# Patient Record
Sex: FEMALE | Race: WHITE | NOT HISPANIC OR LATINO | Employment: FULL TIME | URBAN - METROPOLITAN AREA
[De-identification: names, ages, dates, MRNs, and addresses within clinical notes are randomized per-mention and may not be internally consistent; named-entity substitution may affect disease eponyms.]

---

## 2017-01-26 ENCOUNTER — DOCUMENTATION (OUTPATIENT)
Dept: PHYSICAL THERAPY | Facility: HOSPITAL | Age: 57
End: 2017-01-26

## 2017-01-26 ENCOUNTER — OFFICE VISIT (OUTPATIENT)
Dept: SURGERY | Facility: CLINIC | Age: 57
End: 2017-01-26

## 2017-01-26 VITALS
HEART RATE: 67 BPM | WEIGHT: 274.9 LBS | HEIGHT: 67 IN | DIASTOLIC BLOOD PRESSURE: 80 MMHG | SYSTOLIC BLOOD PRESSURE: 140 MMHG | OXYGEN SATURATION: 98 % | BODY MASS INDEX: 43.15 KG/M2

## 2017-01-26 DIAGNOSIS — R10.33 PERIUMBILICAL ABDOMINAL PAIN: Primary | ICD-10-CM

## 2017-01-26 PROCEDURE — 99213 OFFICE O/P EST LOW 20 MIN: CPT | Performed by: SURGERY

## 2017-01-26 RX ORDER — CARVEDILOL 3.12 MG/1
TABLET ORAL
Refills: 11 | COMMUNITY
Start: 2016-12-31 | End: 2017-10-02 | Stop reason: SINTOL

## 2017-01-26 RX ORDER — LEVOCETIRIZINE DIHYDROCHLORIDE 5 MG/1
TABLET, FILM COATED ORAL
Refills: 11 | COMMUNITY
Start: 2016-12-31 | End: 2017-10-02

## 2017-01-26 RX ORDER — MOMETASONE FUROATE AND FORMOTEROL FUMARATE DIHYDRATE 200; 5 UG/1; UG/1
AEROSOL RESPIRATORY (INHALATION)
Refills: 0 | COMMUNITY
Start: 2016-11-28 | End: 2017-10-02

## 2017-01-26 RX ORDER — MONTELUKAST SODIUM 10 MG/1
TABLET ORAL
Refills: 11 | COMMUNITY
Start: 2016-12-01 | End: 2017-10-02

## 2017-01-26 NOTE — LETTER
January 26, 2017     Francisca Gillette MD    Patient: Mariya Campos   YOB: 1960   Date of Visit: 1/26/2017       Dear Dr. Nain MD:    Thank you for referring Mariya Campos to me for evaluation. Below are the relevant portions of my assessment and plan of care.        Mariya Campos is a pleasant 56 y.o. female presenting with periumbilical abdominal pain and asymmetry.  At this time, I am unable to appreciate any incisional hernias.  I have discussed with the patient if she feels something that she can push in or out to please follow back up.  Patient will follow-up as needed.    Tri Guzman MD  General, Minimally Invasive and Endoscopic Surgery  [facility]    [address], [address]  [place], [place], [address]  P: [phone]  F: [phone]    Cc:  Francisca Gillette MD                If you have questions, please do not hesitate to call me. I look forward to following Mariya along with you.         Sincerely,        Tri Guzman MD        CC: No Recipients

## 2017-01-26 NOTE — MR AVS SNAPSHOT
Mariya Campos   1/26/2017 8:50 AM   Office Visit    Dept Phone:  [phone]   Encounter #:  [id number]    Provider:  Tri Guzman MD   Department:  [facility] GENERAL SURGERY                Your Full Care Plan              Today's Medication Changes          These changes are accurate as of: 1/26/17  9:56 AM.  If you have any questions, ask your nurse or doctor.               Stop taking medication(s)listed here:     B-12 PO   Stopped by:  Tri Guzman MD           Fluticasone Furoate-Vilanterol 200-25 MCG/INH aerosol powder    Stopped by:  Tri Guzman MD                      Your Updated Medication List          This list is accurate as of: 1/26/17  9:56 AM.  Always use your most recent med list.                CALCIUM 600 PO       carvedilol 3.125 MG tablet   Commonly known as:  COREG       DULERA 200-5 MCG/ACT inhaler   Generic drug:  mometasone-formoterol       Iron tablet       levocetirizine 5 MG tablet   Commonly known as:  XYZAL       montelukast 10 MG tablet   Commonly known as:  SINGULAIR       PROAIR  (90 BASE) MCG/ACT inhaler   Generic drug:  albuterol               Instructions     None    Patient Instructions History      Upcoming Appointments     Visit Type Date Time Department    OFFICE VISIT 1/26/2017  8:50 AM [facility]      [place] Signup     Our records indicate that you have an active [facility] Getable account.    You can view your After Visit Summary by going to [employer] and logging in with your Getable username and password.  If you don't have a Getable username and password but a parent or guardian has access to your record, the parent or guardian should login with their own Getable username and password and access your record to view the After Visit Summary.    If you have questions, you can email [email or web address] or call [phone] to talk to our Getable staff.   "Remember, MyChart is NOT to be used for urgent needs.  For medical emergencies, dial 911.               Other Info from Your Visit           Allergies     Fish-derived Products  Anaphylaxis    Shellfish-derived Products  Anaphylaxis      Reason for Visit     Hernia ventral      Vital Signs     Blood Pressure Pulse Height Weight Oxygen Saturation Body Mass Index    140/80 67 67\" (170.2 cm) 274 lb 14.4 oz (125 kg) 98% 43.06 kg/m2    Smoking Status                   Never Smoker             "

## 2017-01-26 NOTE — PROGRESS NOTES
Subjective   Hernia    History of Present Illness    Mariya Campos is a 56 y.o. female is being seen in consultation today for incisional periumbilical pain described as a dull aching sensation.  Patient first noticed this about 2-3 weeks ago.  She noticed some asymmetry in bulging off to the right of the umbilicus that disappears when she lays down.  Patient reports she has gained 25 pounds however.  Patient is unable to push anything in or out.  Patient denies fever, chills, nausea or vomiting.  Patient had not had this in the past.    Past Medical History   Diagnosis Date   • Allergic rhinitis    • Anemia    • Asthma    • Chronic bronchitis    • Chronic kidney disease    • Colon cancer    • Hernia    • Hiatal hernia    • Hypertension    • Incisional hernia    • Overweight    • Peripheral neuropathy    • Rosacea    • Shoulder pain, left      PT STATES MIGHT BE TORN ROTATOR CUFF   • Visit for screening mammogram      Past Surgical History   Procedure Laterality Date   • Colonoscopy       Complete Colonoscopy   • Colectomy partial / total       Partial Colectomy    • Ventral hernia repair N/A 3/15/2016     Procedure: LAPAROSCOPIC INSIONAL HERNIA REPAIR W/ MESH DAVINCI ROBOT;  Surgeon: Tri uGzman MD;  Location: [facility] OR;  Service:    • Colon surgery       XS 2   • Joint manipulation Left 4/18/2016     Procedure: MANIPULATION OF LT SHOULDER;  Surgeon: Lidia Ugalde MD;  Location: [facility];  Service:    • Shoulder surgery     • Breast biopsy       Family History   Problem Relation Age of Onset   • Cancer Mother      Breast Cancer   • Breast cancer Mother    • Cancer Father      Colon Cancer   • Cancer Maternal Grandmother      Breast Cancer   • Hypertension Other    • Cancer Other      Liver Cancer   • Other Other      Varicose veins of lower extremities     Social History   Substance Use Topics   • Smoking status: Never Smoker   • Smokeless tobacco: Never Used   • Alcohol use Yes       Comment: Social drinker         Current Outpatient Prescriptions:   •  Calcium Carbonate (CALCIUM 600 PO), Take 600 mg by mouth daily., Disp: , Rfl:   •  carvedilol (COREG) 3.125 MG tablet, TK 1 T PO BID, Disp: , Rfl: 11  •  DULERA 200-5 MCG/ACT inhaler, INHALE 2 PUFFS PO BID, Disp: , Rfl: 0  •  Iron tablet, Take  by mouth., Disp: , Rfl:   •  levocetirizine (XYZAL) 5 MG tablet, TK 1 T PO QD, Disp: , Rfl: 11  •  montelukast (SINGULAIR) 10 MG tablet, TK 1 T PO D, Disp: , Rfl: 11  •  PROAIR  (90 BASE) MCG/ACT inhaler, 1 puff every 4 (four) hours as needed., Disp: , Rfl: 2      Review of Systems   Constitutional: Positive for activity change and fatigue.   HENT: Positive for congestion, postnasal drip and sinus pressure.    Respiratory: Positive for cough.    Gastrointestinal: Positive for abdominal distention.   Genitourinary: Positive for frequency.   All other systems reviewed and are negative.      Objective   Physical Exam    General: Alert and oriented x3 in no acute distress  HEENT: Normal cephalic, atraumatic, PERRLA, EOMI, sclera anicteric, moist mucous membranes, neck is supple, no JVD, no carotid bruits, no thyromegaly no adenopathy  Chest: CTA and percussion  CVA: RRR, normal S1-S2, no murmurs, no gallops or rubs  Abdomen: Positive BS, soft, nondistended, nontender, no rebound, no guarding, no organomegaly and no masses.  I have examined the patient in the sitting as well as supine position and I am unable to appreciate any hernial defects.  Extremities: Full range of motion, no clubbing, no cyanosis or edema  Neurovascular: Grossly intact    Assessment/Plan     Mariya Campos is a pleasant 56 y.o. female presenting with periumbilical abdominal pain and asymmetry.  At this time, I am unable to appreciate any incisional hernias.  I have discussed with the patient if she feels something that she can push in or out to please follow back up.  Patient will follow-up as needed.    Tri WEST  MD Megan  General, Minimally Invasive and Endoscopic Surgery  [facility] [place]    [address], [address]  [place], [place], [address]  P: [phone]  F: [phone]    Cc:  Francisca Gillette MD

## 2017-01-26 NOTE — THERAPY DISCHARGE NOTE
Outpatient Physical Therapy Ortho Treatment Note/Discharge Summary       Patient Name: Mariya Campos  : 1960  MRN: [id number]  Today's Date: 2017      Visit Date: 2017    Visit Dx:  No diagnosis found.    Patient Active Problem List   Diagnosis   • Atopic rhinitis   • Fatigue   • Abnormal mammogram   • Adiposity   • Shoulder, capsulitis, adhesive   • Abnormal ECG   • Abnormal creatinine clearance glomerular filtration   • Anemia   • Recurrent UTI   • Pain   • Bursitis/tendonitis, shoulder   • Osteopenia        Past Medical History   Diagnosis Date   • Allergic rhinitis    • Anemia    • Asthma    • Chronic bronchitis    • Chronic kidney disease    • Colon cancer    • Hernia    • Hiatal hernia    • Hypertension    • Incisional hernia    • Overweight    • Peripheral neuropathy    • Rosacea    • Shoulder pain, left      PT STATES MIGHT BE TORN ROTATOR CUFF   • Visit for screening mammogram         Past Surgical History   Procedure Laterality Date   • Colonoscopy       Complete Colonoscopy   • Colectomy partial / total       Partial Colectomy    • Ventral hernia repair N/A 3/15/2016     Procedure: LAPAROSCOPIC INSIONAL HERNIA REPAIR W/ MESH DAVINCI ROBOT;  Surgeon: Tri Guzman MD;  Location: [facility] MAIN OR;  Service:    • Colon surgery       XS 2   • Joint manipulation Left 2016     Procedure: MANIPULATION OF LT SHOULDER;  Surgeon: Lidia Ugalde MD;  Location: [facility] [place] [facility];  Service:    • Shoulder surgery     • Breast biopsy                                                          PT OP Goals       17 1000          PT Short Term Goals    STG Date to Achieve 16  -      STG 1 Pt to be independent with HEP for initial mobility ex and posture.  -WS      STG 1 Progress Met  -WS      STG 2 Pt to elevate left UE to 130 deg actively without compensation.  -WS      STG 2 Progress Met  -WS      STG 3 Pt to report at least 4-6 hours of sleep without interruption and pain  level 2 or less.  -      STG 3 Progress Not Met  -WS      Long Term Goals    LTG Date to Achieve 05/19/16  -WS      LTG 1 Pt will be ind and compliant with advanced HEP for self management of symptoms.  -WS      LTG 1 Progress Partially Met  -WS      LTG 2 Pt will have active shoulder elevation to at least 155 deg without compensation fo return to overhead activities.  -WS      LTG 2 Progress Met  -WS      LTG 3 Pt to have strength of shoulder girdle 4/5 for return to normal use of UE.  -WS      LTG 3 Progress Met  -WS      LTG 4 Pt to be able to fasten bra or tuck shirt in behind her back.  -      LTG 4 Progress Partially Met  -WS        User Key  (r) = Recorded By, (t) = Taken By, (c) = Cosigned By    Initials Name Provider Type    NUBIA Roberts PTA Physical Therapy Assistant                    Time Calculation:                  OP PT Discharge Summary  Outcomes Achieved: Patient able to partially acheive established goals  Discharge Destination: Home with home program  Discharge Instructions: Patient was seen for 22 visits from 4/19/16 to 6/27/16 consisting of therapeutic exercise, manual therapy and modalilities as needed s/p left shoulder manipulation. Patient was last seen 6/27/16 and was independent with home exercise program and follow up with surgeon. Patient was discharged to continue work on rom/strength.       Yury Roberts PTA  1/26/2017

## 2017-09-27 DIAGNOSIS — Z85.038 PERSONAL HISTORY OF COLON CANCER: Primary | ICD-10-CM

## 2017-10-02 ENCOUNTER — OFFICE VISIT (OUTPATIENT)
Dept: INTERNAL MEDICINE | Facility: CLINIC | Age: 57
End: 2017-10-02

## 2017-10-02 ENCOUNTER — TRANSCRIBE ORDERS (OUTPATIENT)
Dept: ADMINISTRATIVE | Facility: HOSPITAL | Age: 57
End: 2017-10-02

## 2017-10-02 VITALS
SYSTOLIC BLOOD PRESSURE: 135 MMHG | BODY MASS INDEX: 43.15 KG/M2 | OXYGEN SATURATION: 98 % | WEIGHT: 274.9 LBS | TEMPERATURE: 98.2 F | HEIGHT: 67 IN | HEART RATE: 87 BPM | DIASTOLIC BLOOD PRESSURE: 82 MMHG

## 2017-10-02 DIAGNOSIS — Z85.038 HISTORY OF COLON CANCER: ICD-10-CM

## 2017-10-02 DIAGNOSIS — Z12.31 VISIT FOR SCREENING MAMMOGRAM: Primary | ICD-10-CM

## 2017-10-02 DIAGNOSIS — R53.83 FATIGUE, UNSPECIFIED TYPE: ICD-10-CM

## 2017-10-02 DIAGNOSIS — Z12.39 SCREENING FOR BREAST CANCER: ICD-10-CM

## 2017-10-02 DIAGNOSIS — N18.30 CRF (CHRONIC RENAL FAILURE), STAGE 3 (MODERATE) (HCC): ICD-10-CM

## 2017-10-02 DIAGNOSIS — Z11.59 NEED FOR HEPATITIS C SCREENING TEST: ICD-10-CM

## 2017-10-02 DIAGNOSIS — I10 ESSENTIAL HYPERTENSION: Primary | ICD-10-CM

## 2017-10-02 PROCEDURE — 90471 IMMUNIZATION ADMIN: CPT | Performed by: FAMILY MEDICINE

## 2017-10-02 PROCEDURE — 90732 PPSV23 VACC 2 YRS+ SUBQ/IM: CPT | Performed by: FAMILY MEDICINE

## 2017-10-02 PROCEDURE — 99214 OFFICE O/P EST MOD 30 MIN: CPT | Performed by: FAMILY MEDICINE

## 2017-10-02 RX ORDER — DILTIAZEM HYDROCHLORIDE 300 MG/1
CAPSULE, COATED, EXTENDED RELEASE ORAL
Refills: 11 | COMMUNITY
Start: 2017-09-12 | End: 2018-01-26

## 2017-10-02 RX ORDER — MONTELUKAST SODIUM 10 MG/1
10 TABLET ORAL NIGHTLY
Qty: 90 TABLET | Refills: 1 | Status: SHIPPED | OUTPATIENT
Start: 2017-10-02 | End: 2018-10-16

## 2017-10-02 NOTE — PROGRESS NOTES
Subjective   Mariya Campos is a 56 y.o. female.   Chief Complaint   Patient presents with   • Fatigue   • Anemia   • Hypertension   • Allergic Rhinitis       History of Present Illness     This is a new patient in our office.    #1 hypertension-on diltiazem at 300 mg a day.  Patient takes it everyday.  She has no side effects.  She has no chest pain, no shortness of breath, no dizziness, no palpitations.  She has LE edema on and off and she takes furosemide 40 mg as needed.  She takes it on average once or twice a week.  It is managed by her nephrologist.  She has chronic kidney failure.  Baseline creatinine about 1.8.     #2 fatigue-started 1-2 years ago.  Patient had anemia.  She was treated with iron once a day but had problems with constipation.  After 6 months her blood count was not much better.  She had colonoscopy in 2016 and is in a process of scheduling it for this year.  She has history of colon cancer.  She was diagnosed in 2006.  She was treated with colonoscopy and chemotherapy.  She requires colonoscopy annually because of the polyps.  Last colonoscopy was in 2016.  She also requires CEA annually.  She sleeps about 8 hours at night.  She wakes up tired.  She snores.  She does not feel depressed.    #3 asthma-diagnosed in childhood.  Patient uses albuterol as needed only.  She uses it on average once a week in summer, less frequently in other months.  She is on no maintenance medications.  She was treated a few times in urgent care within last year and required oral prednisone.  Currently she reports dry cough for 3 or 4 weeks.  No wheezing, no shortness of breath.  She is on no medications for it.  She has postnasal drainage which is clear.  She has to clear her throat all the time.  She is on no medications for AR.    Patient is requesting order for mammogram.  She is due.    The following portions of the patient's history were reviewed and updated as appropriate: allergies, current medications,  past family history, past medical history, past social history, past surgical history and problem list.    Review of Systems   Constitutional: Negative.    HENT: Positive for postnasal drip and rhinorrhea.    Respiratory: Positive for cough. Negative for shortness of breath and wheezing.    Cardiovascular: Negative.    Psychiatric/Behavioral: Negative.          Objective   Wt Readings from Last 3 Encounters:   10/02/17 274 lb 14.4 oz (125 kg)   09/12/17 270 lb (122 kg)   07/31/17 265 lb (120 kg)      Vitals:    10/02/17 0809   BP: 135/82   Pulse: 87   Temp: 98.2 °F (36.8 °C)   SpO2: 98%     Temp Readings from Last 3 Encounters:   10/02/17 98.2 °F (36.8 °C)   09/12/17 98.3 °F (36.8 °C) (Tympanic)   07/31/17 98.8 °F (37.1 °C) (Tympanic)     BP Readings from Last 3 Encounters:   10/02/17 135/82   09/12/17 154/95   07/31/17 146/94     Pulse Readings from Last 3 Encounters:   10/02/17 87   09/12/17 86   07/31/17 97       Physical Exam   Constitutional: She is oriented to person, place, and time. She appears well-developed and well-nourished.   HENT:   Head: Normocephalic and atraumatic.   Neck: Neck supple. Carotid bruit is not present. No thyromegaly present.   Cardiovascular: Normal rate, regular rhythm and normal heart sounds.    Pulmonary/Chest: Effort normal and breath sounds normal.   Neurological: She is alert and oriented to person, place, and time.   Skin: Skin is warm, dry and intact.   Psychiatric: She has a normal mood and affect. Her behavior is normal.       Assessment/Plan   Mariya was seen today for fatigue, anemia, hypertension and allergic rhinitis.    Diagnoses and all orders for this visit:    Essential hypertension  -     CBC (No Diff)  -     Comprehensive Metabolic Panel  -     Lipid Panel With LDL / HDL Ratio  -     Thyroid Cascade Profile  -     Vitamin D 25 Hydroxy  -     Iron Profile  -     Vitamin B12 & Folate    CRF (chronic renal failure), stage 3 (moderate)  -     CBC (No Diff)  -      Comprehensive Metabolic Panel  -     Lipid Panel With LDL / HDL Ratio  -     Thyroid Cascade Profile  -     Vitamin D 25 Hydroxy  -     Iron Profile  -     Vitamin B12 & Folate    Fatigue, unspecified type  -     Ambulatory Referral to Sleep Medicine  -     CBC (No Diff)  -     Comprehensive Metabolic Panel  -     Lipid Panel With LDL / HDL Ratio  -     Thyroid Cascade Profile  -     Vitamin D 25 Hydroxy  -     Iron Profile  -     Vitamin B12 & Folate    History of colon cancer  -     CEA    Screening for breast cancer  -     Mammo Screening Bilateral With CAD; Future    Need for hepatitis C screening test  -     Hepatitis C Antibody    Other orders  -     montelukast (SINGULAIR) 10 MG tablet; Take 1 tablet by mouth Every Night.        #1 fatigue-new and uncontrolled problem.  We are checking blood count, iron, B12 and folic acid.  I'm referring patient to sleep specialist.  Further recommendations depending on test results.      #2 anemia-check CBC.      #3 asthma-uncontrolled.  Start singular.  Treat allergic rhinitis.  If cough does not resolve in one month, pllease return to the office.  She is getting Pneumovax today.  She will get flu vaccine at work next week.    #4 allergic rhinitis-uncontrolled.  Start Flonase daily.  Normal saline drops to prevent dryness.    #5 history of colon cancer-we are checking CEA.  Patient is scheduling office visit with Dr. Guzman for colonoscopy.

## 2017-10-03 LAB
25(OH)D3+25(OH)D2 SERPL-MCNC: 36.2 NG/ML (ref 30–100)
ALBUMIN SERPL-MCNC: 4.5 G/DL (ref 3.5–5.2)
ALBUMIN/GLOB SERPL: 1.5 G/DL
ALP SERPL-CCNC: 93 U/L (ref 39–117)
ALT SERPL-CCNC: 9 U/L (ref 1–33)
AST SERPL-CCNC: 9 U/L (ref 1–32)
BILIRUB SERPL-MCNC: 0.3 MG/DL (ref 0.1–1.2)
BUN SERPL-MCNC: 24 MG/DL (ref 6–20)
BUN/CREAT SERPL: 15.6 (ref 7–25)
CALCIUM SERPL-MCNC: 9.7 MG/DL (ref 8.6–10.5)
CEA SERPL-MCNC: 3.71 NG/ML
CHLORIDE SERPL-SCNC: 99 MMOL/L (ref 98–107)
CHOLEST SERPL-MCNC: 200 MG/DL (ref 0–200)
CO2 SERPL-SCNC: 27.3 MMOL/L (ref 22–29)
CREAT SERPL-MCNC: 1.54 MG/DL (ref 0.57–1)
ERYTHROCYTE [DISTWIDTH] IN BLOOD BY AUTOMATED COUNT: 14.9 % (ref 11.7–13)
FOLATE SERPL-MCNC: 10.23 NG/ML (ref 4.78–24.2)
GLOBULIN SER CALC-MCNC: 3.1 GM/DL
GLUCOSE SERPL-MCNC: 95 MG/DL (ref 65–99)
HCT VFR BLD AUTO: 37.9 % (ref 35.6–45.5)
HCV AB S/CO SERPL IA: <0.1 S/CO RATIO (ref 0–0.9)
HDLC SERPL-MCNC: 50 MG/DL (ref 40–60)
HGB BLD-MCNC: 11.8 G/DL (ref 11.9–15.5)
IRON SATN MFR SERPL: 16 % (ref 20–50)
IRON SERPL-MCNC: 61 MCG/DL (ref 37–145)
LDLC SERPL CALC-MCNC: 123 MG/DL (ref 0–100)
LDLC/HDLC SERPL: 2.46 {RATIO}
MCH RBC QN AUTO: 28.2 PG (ref 26.9–32)
MCHC RBC AUTO-ENTMCNC: 31.1 G/DL (ref 32.4–36.3)
MCV RBC AUTO: 90.7 FL (ref 80.5–98.2)
PLATELET # BLD AUTO: 347 10*3/MM3 (ref 140–500)
POTASSIUM SERPL-SCNC: 4.7 MMOL/L (ref 3.5–5.2)
PROT SERPL-MCNC: 7.6 G/DL (ref 6–8.5)
RBC # BLD AUTO: 4.18 10*6/MM3 (ref 3.9–5.2)
SODIUM SERPL-SCNC: 141 MMOL/L (ref 136–145)
TIBC SERPL-MCNC: 381 MCG/DL
TRIGL SERPL-MCNC: 134 MG/DL (ref 0–150)
TSH SERPL DL<=0.005 MIU/L-ACNC: 1.85 UIU/ML (ref 0.45–4.5)
UIBC SERPL-MCNC: 320 MCG/DL
VIT B12 SERPL-MCNC: 567 PG/ML (ref 211–946)
VLDLC SERPL CALC-MCNC: 26.8 MG/DL (ref 5–40)
WBC # BLD AUTO: 5.85 10*3/MM3 (ref 4.5–10.7)

## 2017-10-17 ENCOUNTER — HOSPITAL ENCOUNTER (OUTPATIENT)
Dept: MAMMOGRAPHY | Facility: HOSPITAL | Age: 57
Discharge: HOME | End: 2017-10-17
Admitting: FAMILY MEDICINE

## 2017-10-17 DIAGNOSIS — Z12.31 VISIT FOR SCREENING MAMMOGRAM: ICD-10-CM

## 2017-10-17 PROCEDURE — 77063 BREAST TOMOSYNTHESIS BI: CPT

## 2017-10-17 PROCEDURE — G0202 SCR MAMMO BI INCL CAD: HCPCS

## 2017-12-18 ENCOUNTER — OFFICE VISIT (OUTPATIENT)
Dept: INTERNAL MEDICINE | Facility: CLINIC | Age: 57
End: 2017-12-18

## 2017-12-18 VITALS
OXYGEN SATURATION: 98 % | HEIGHT: 68 IN | BODY MASS INDEX: 41.22 KG/M2 | HEART RATE: 76 BPM | SYSTOLIC BLOOD PRESSURE: 138 MMHG | TEMPERATURE: 98.4 F | DIASTOLIC BLOOD PRESSURE: 80 MMHG | WEIGHT: 272 LBS

## 2017-12-18 DIAGNOSIS — K45.8 RECURRENT ABDOMINAL HERNIA WITHOUT OBSTRUCTION OR GANGRENE, UNSPECIFIED HERNIA TYPE: ICD-10-CM

## 2017-12-18 DIAGNOSIS — J45.41 MODERATE PERSISTENT ASTHMA WITH ACUTE EXACERBATION: Primary | ICD-10-CM

## 2017-12-18 PROBLEM — K43.2 INCISIONAL HERNIA: Status: ACTIVE | Noted: 2017-12-18

## 2017-12-18 PROBLEM — J45.40 MODERATE PERSISTENT ASTHMA WITHOUT COMPLICATION: Status: ACTIVE | Noted: 2017-12-18

## 2017-12-18 PROCEDURE — 99213 OFFICE O/P EST LOW 20 MIN: CPT | Performed by: FAMILY MEDICINE

## 2017-12-18 NOTE — PROGRESS NOTES
Subjective   Mariya Campos is a 57 y.o. female.   Chief Complaint   Patient presents with   • Cough     for one month   • possible hernia       History of Present Illness     #1 Cough-started about 10 days ago, after walking in a cold weather.  It is a productive cough with/yellow discharge.  No other symptoms associated.  No sore throat, no fever, no chills.  Patient has mild congestion.  No wheezing.  No shortness of breath.  She has asthma.  She is currently on Brio.  She takes it everyday as prescribed.  It was changed 2 weeks ago from Dulera because it was not covered anymore.  Patient sees allergist Dr. Gross.    #2 Hernia-Patient has a history of hernia surgery.  During her coughing spells she notices a hard left sided bulge in her left lower abdomen.  Worse with cough.  Resolves after coughing spells.  No other symptoms associated.    The following portions of the patient's history were reviewed and updated as appropriate: allergies, current medications, past family history, past medical history, past social history, past surgical history and problem list.    Review of Systems   Constitutional: Negative for chills and fever.   HENT: Positive for congestion and postnasal drip. Negative for ear pain, rhinorrhea and sore throat.    Respiratory: Positive for cough. Negative for shortness of breath and wheezing.    Cardiovascular: Negative for chest pain.   Musculoskeletal: Negative for myalgias.   Skin: Negative for rash.   Neurological: Negative for headaches.         Objective   Wt Readings from Last 3 Encounters:   12/18/17 123 kg (272 lb)   11/03/17 122 kg (270 lb)   10/02/17 125 kg (274 lb 14.4 oz)      Vitals:    12/18/17 0920   BP: 138/80   Pulse: 76   Temp: 98.4 °F (36.9 °C)   SpO2: 98%     Temp Readings from Last 3 Encounters:   12/18/17 98.4 °F (36.9 °C)   11/03/17 98.9 °F (37.2 °C) (Oral)   10/02/17 98.2 °F (36.8 °C)     BP Readings from Last 3 Encounters:   12/18/17 138/80   11/03/17 136/81    10/02/17 135/82     Pulse Readings from Last 3 Encounters:   12/18/17 76   11/03/17 86   10/02/17 87       Physical Exam   Constitutional: She is oriented to person, place, and time. She appears well-developed and well-nourished.   HENT:   Head: Normocephalic and atraumatic.   Neck: Neck supple. Carotid bruit is not present.   Cardiovascular: Normal rate, regular rhythm and normal heart sounds.    Pulmonary/Chest: Effort normal and breath sounds normal.   Abdominal: Soft. She exhibits no distension and no mass. There is no tenderness. A hernia is present.   Left lower abdomen wall hernia.  Present with cough.No TTP.  Reducible.   Lymphadenopathy:     She has no cervical adenopathy.   Neurological: She is alert and oriented to person, place, and time.   Skin: Skin is warm, dry and intact.       Assessment/Plan   Mariya was seen today for cough and possible hernia.    Diagnoses and all orders for this visit:    Moderate persistent asthma with acute exacerbation    Recurrent abdominal hernia without obstruction or gangrene, unspecified hernia type        #1 asthma exacerbation-started after changing Dulera to Brio.  I'm giving patient sample of Dulera 200/5 to be taken twice a day.  Return to clinic if symptoms are not resolved with treatment, or sooner if worsening.  She will also contact her allergist.     #2 hernia-patient will schedule office visit with Dr. Guzman.

## 2017-12-21 ENCOUNTER — OFFICE VISIT (OUTPATIENT)
Dept: SURGERY | Facility: CLINIC | Age: 57
End: 2017-12-21

## 2017-12-21 VITALS
HEIGHT: 68 IN | SYSTOLIC BLOOD PRESSURE: 150 MMHG | HEART RATE: 75 BPM | WEIGHT: 274 LBS | DIASTOLIC BLOOD PRESSURE: 80 MMHG | OXYGEN SATURATION: 99 % | BODY MASS INDEX: 41.52 KG/M2

## 2017-12-21 DIAGNOSIS — K43.2 INCISIONAL HERNIA, WITHOUT OBSTRUCTION OR GANGRENE: Primary | ICD-10-CM

## 2017-12-21 PROCEDURE — 99213 OFFICE O/P EST LOW 20 MIN: CPT | Performed by: SURGERY

## 2017-12-21 RX ORDER — CEFAZOLIN SODIUM 2 G/100ML
2 INJECTION, SOLUTION INTRAVENOUS ONCE
Status: CANCELLED | OUTPATIENT
Start: 2018-01-30 | End: 2018-01-30

## 2017-12-21 NOTE — PROGRESS NOTES
Subjective   Mariya Campos is a 57 y.o. female.     History of Present Illness     {Common H&P Review Areas:95755}    Review of Systems   Constitutional: Positive for appetite change.   Respiratory: Positive for cough.    Cardiovascular: Positive for leg swelling.   Gastrointestinal: Positive for abdominal distention, abdominal pain and constipation.   Endocrine: Positive for cold intolerance.   Allergic/Immunologic: Positive for environmental allergies.   All other systems reviewed and are negative.      Objective   Physical Exam    Assessment/Plan   {Assess/PlanSmartLinks:67498}

## 2018-01-08 ENCOUNTER — OUTSIDE FACILITY SERVICE (OUTPATIENT)
Dept: SURGERY | Facility: CLINIC | Age: 58
End: 2018-01-08

## 2018-01-08 ENCOUNTER — LAB REQUISITION (OUTPATIENT)
Dept: LAB | Facility: HOSPITAL | Age: 58
End: 2018-01-08

## 2018-01-08 DIAGNOSIS — Z86.010 HISTORY OF COLONIC POLYPS: ICD-10-CM

## 2018-01-08 PROCEDURE — 45385 COLONOSCOPY W/LESION REMOVAL: CPT | Performed by: SURGERY

## 2018-01-08 PROCEDURE — 88305 TISSUE EXAM BY PATHOLOGIST: CPT | Performed by: SURGERY

## 2018-01-10 LAB
CYTO UR: NORMAL
LAB AP CASE REPORT: NORMAL
LAB AP CLINICAL INFORMATION: NORMAL
Lab: NORMAL
PATH REPORT.FINAL DX SPEC: NORMAL
PATH REPORT.GROSS SPEC: NORMAL

## 2018-01-26 ENCOUNTER — APPOINTMENT (OUTPATIENT)
Dept: PREADMISSION TESTING | Facility: HOSPITAL | Age: 58
End: 2018-01-26

## 2018-01-26 VITALS
TEMPERATURE: 97.7 F | SYSTOLIC BLOOD PRESSURE: 148 MMHG | WEIGHT: 277 LBS | HEIGHT: 68 IN | DIASTOLIC BLOOD PRESSURE: 96 MMHG | OXYGEN SATURATION: 100 % | RESPIRATION RATE: 16 BRPM | BODY MASS INDEX: 41.98 KG/M2 | HEART RATE: 69 BPM

## 2018-01-26 LAB
ANION GAP SERPL CALCULATED.3IONS-SCNC: 15.4 MMOL/L
BASOPHILS # BLD AUTO: 0.04 10*3/MM3 (ref 0–0.2)
BASOPHILS NFR BLD AUTO: 0.6 % (ref 0–1.5)
BUN BLD-MCNC: 32 MG/DL (ref 6–20)
BUN/CREAT SERPL: 22.4 (ref 7–25)
CALCIUM SPEC-SCNC: 9.3 MG/DL (ref 8.6–10.5)
CHLORIDE SERPL-SCNC: 103 MMOL/L (ref 98–107)
CO2 SERPL-SCNC: 25.6 MMOL/L (ref 22–29)
CREAT BLD-MCNC: 1.43 MG/DL (ref 0.57–1)
DEPRECATED RDW RBC AUTO: 47.2 FL (ref 37–54)
EOSINOPHIL # BLD AUTO: 0.13 10*3/MM3 (ref 0–0.7)
EOSINOPHIL NFR BLD AUTO: 2 % (ref 0.3–6.2)
ERYTHROCYTE [DISTWIDTH] IN BLOOD BY AUTOMATED COUNT: 14.4 % (ref 11.7–13)
GFR SERPL CREATININE-BSD FRML MDRD: 38 ML/MIN/1.73
GLUCOSE BLD-MCNC: 84 MG/DL (ref 65–99)
HCT VFR BLD AUTO: 35.8 % (ref 35.6–45.5)
HGB BLD-MCNC: 11.1 G/DL (ref 11.9–15.5)
IMM GRANULOCYTES # BLD: 0 10*3/MM3 (ref 0–0.03)
IMM GRANULOCYTES NFR BLD: 0 % (ref 0–0.5)
LYMPHOCYTES # BLD AUTO: 2 10*3/MM3 (ref 0.9–4.8)
LYMPHOCYTES NFR BLD AUTO: 30.2 % (ref 19.6–45.3)
MCH RBC QN AUTO: 28 PG (ref 26.9–32)
MCHC RBC AUTO-ENTMCNC: 31 G/DL (ref 32.4–36.3)
MCV RBC AUTO: 90.2 FL (ref 80.5–98.2)
MONOCYTES # BLD AUTO: 0.32 10*3/MM3 (ref 0.2–1.2)
MONOCYTES NFR BLD AUTO: 4.8 % (ref 5–12)
NEUTROPHILS # BLD AUTO: 4.14 10*3/MM3 (ref 1.9–8.1)
NEUTROPHILS NFR BLD AUTO: 62.4 % (ref 42.7–76)
PHOSPHATE SERPL-MCNC: 4.1 MG/DL (ref 2.5–4.5)
PLATELET # BLD AUTO: 302 10*3/MM3 (ref 140–500)
PMV BLD AUTO: 9.5 FL (ref 6–12)
POTASSIUM BLD-SCNC: 4.2 MMOL/L (ref 3.5–5.2)
PTH-INTACT SERPL-MCNC: 92.5 PG/ML (ref 15–65)
RBC # BLD AUTO: 3.97 10*6/MM3 (ref 3.9–5.2)
SODIUM BLD-SCNC: 144 MMOL/L (ref 136–145)
WBC NRBC COR # BLD: 6.63 10*3/MM3 (ref 4.5–10.7)

## 2018-01-26 PROCEDURE — 80048 BASIC METABOLIC PNL TOTAL CA: CPT | Performed by: INTERNAL MEDICINE

## 2018-01-26 PROCEDURE — 84100 ASSAY OF PHOSPHORUS: CPT | Performed by: INTERNAL MEDICINE

## 2018-01-26 PROCEDURE — 93010 ELECTROCARDIOGRAM REPORT: CPT | Performed by: INTERNAL MEDICINE

## 2018-01-26 PROCEDURE — 36415 COLL VENOUS BLD VENIPUNCTURE: CPT

## 2018-01-26 PROCEDURE — 93005 ELECTROCARDIOGRAM TRACING: CPT

## 2018-01-26 PROCEDURE — 85025 COMPLETE CBC W/AUTO DIFF WBC: CPT | Performed by: INTERNAL MEDICINE

## 2018-01-26 PROCEDURE — 83970 ASSAY OF PARATHORMONE: CPT | Performed by: INTERNAL MEDICINE

## 2018-01-26 RX ORDER — FAMOTIDINE 40 MG/1
40 TABLET, FILM COATED ORAL EVERY MORNING
Status: ON HOLD | COMMUNITY
End: 2018-01-30 | Stop reason: SDUPTHER

## 2018-01-26 RX ORDER — FUROSEMIDE 40 MG/1
40 TABLET ORAL AS NEEDED
COMMUNITY
End: 2018-10-16

## 2018-01-26 RX ORDER — DILTIAZEM HYDROCHLORIDE 300 MG/1
300 CAPSULE, COATED, EXTENDED RELEASE ORAL EVERY MORNING
COMMUNITY
End: 2020-03-12 | Stop reason: SDUPTHER

## 2018-01-26 NOTE — DISCHARGE INSTRUCTIONS
SURGERY  1-30-18  ARRIVAL TIME 6:00    Take the following medications the morning of surgery with a small sip of water    DILTIAZEM AND INHALER        General Instructions:  • Do not eat solid food after midnight the night before surgery.  • You may drink clear liquids day of surgery but must stop at least one hour before your hospital arrival time.  • It is beneficial for you to have a clear drink that contains carbohydrates the day of surgery.  We suggest a 12 to 20 ounce bottle of Gatorade or Powerade for non-diabetic patients or a 12 to 20 ounce bottle of G2 or Powerade Zero for diabetic patients. (Pediatric patients, are not advised to drink a 12 to 20 ounce carbohydrate drink)    Clear liquids are liquids you can see through.  Nothing red in color.     Plain water                               Sports drinks  Sodas                                   Gelatin (Jell-O)  Fruit juices without pulp such as white grape juice and apple juice  Popsicles that contain no fruit or yogurt  Tea or coffee (no cream or milk added)  Gatorade / Powerade  G2 / Powerade Zero    • Infants may have breast milk up to four hours before surgery.  • Infants drinking formula may drink formula up to six hours before surgery.   • Patients who avoid smoking, chewing tobacco and alcohol for 4 weeks prior to surgery have a reduced risk of post-operative complications.  Quit smoking as many days before surgery as you can.  • Do not smoke, use chewing tobacco or drink alcohol the day of surgery.   • If applicable bring your C-PAP/ BI-PAP machine.  • Bring any papers given to you in the doctor’s office.  • Wear clean comfortable clothes and socks.  • Do not wear contact lenses or make-up.  Bring a case for your glasses.   • Bring crutches or walker if applicable.  • Remove all piercings.  Leave jewelry and any other valuables at home.  • Hair extensions with metal clips must be removed prior to surgery.  • The Pre-Admission Testing nurse will  instruct you to bring medications if unable to obtain an accurate list in Pre-Admission Testing.        If you were given a blood bank ID arm band remember to bring it with you the day of surgery.    Preventing a Surgical Site Infection:  • For 2 to 3 days before surgery, avoid shaving with a razor because the razor can irritate skin and make it easier to develop an infection.  • The night prior to surgery sleep in a clean bed with clean clothing.  Do not allow pets to sleep with you.  • Shower on the morning of surgery using a fresh bar of anti-bacterial soap (such as Dial) and clean washcloth.  Dry with a clean towel and dress in clean clothing.  • Ask your surgeon if you will be receiving antibiotics prior to surgery.  • Make sure you, your family, and all healthcare providers clean their hands with soap and water or an alcohol based hand  before caring for you or your wound.    Day of surgery:  Upon arrival, a Pre-op nurse and Anesthesiologist will review your health history, obtain vital signs, and answer questions you may have.  The only belongings needed at this time will be your home medications and if applicable your C-PAP/BI-PAP machine.  If you are staying overnight your family can leave the rest of your belongings in the car and bring them to your room later.  A Pre-op nurse will start an IV and you may receive medication in preparation for surgery, including something to help you relax.  Your family will be able to see you in the Pre-op area.  While you are in surgery your family should notify the waiting room  if they leave the waiting room area and provide a contact phone number.    Please be aware that surgery does come with discomfort.  We want to make every effort to control your discomfort so please discuss any uncontrolled symptoms with your nurse.   Your doctor will most likely have prescribed pain medications.      If you are going home after surgery you will receive  individualized written care instructions before being discharged.  A responsible adult must drive you to and from the hospital on the day of your surgery and stay with you for 24 hours.    If you are staying overnight following surgery, you will be transported to your hospital room following the recovery period.  [facility] has all private rooms.    If you have any questions please call Pre-Admission Testing at [phone].  Deductibles and co-payments are collected on the day of service. Please be prepared to pay the required co-pay, deductible or deposit on the day of service as defined by your plan.

## 2018-01-30 ENCOUNTER — ANESTHESIA (OUTPATIENT)
Dept: PERIOP | Facility: HOSPITAL | Age: 58
End: 2018-01-30

## 2018-01-30 ENCOUNTER — APPOINTMENT (OUTPATIENT)
Dept: PREADMISSION TESTING | Facility: HOSPITAL | Age: 58
End: 2018-01-30

## 2018-01-30 ENCOUNTER — HOSPITAL ENCOUNTER (OUTPATIENT)
Facility: HOSPITAL | Age: 58
Discharge: HOME | End: 2018-01-30
Attending: SURGERY | Admitting: SURGERY

## 2018-01-30 ENCOUNTER — ANESTHESIA EVENT (OUTPATIENT)
Dept: PERIOP | Facility: HOSPITAL | Age: 58
End: 2018-01-30

## 2018-01-30 VITALS
TEMPERATURE: 97.9 F | OXYGEN SATURATION: 98 % | BODY MASS INDEX: 41.91 KG/M2 | SYSTOLIC BLOOD PRESSURE: 143 MMHG | WEIGHT: 276.5 LBS | HEIGHT: 68 IN | DIASTOLIC BLOOD PRESSURE: 82 MMHG | RESPIRATION RATE: 16 BRPM | HEART RATE: 84 BPM

## 2018-01-30 DIAGNOSIS — K43.2 RECURRENT VENTRAL HERNIA: Primary | ICD-10-CM

## 2018-01-30 DIAGNOSIS — K43.2 INCISIONAL HERNIA, WITHOUT OBSTRUCTION OR GANGRENE: ICD-10-CM

## 2018-01-30 PROCEDURE — 25010000002 MIDAZOLAM PER 1 MG: Performed by: ANESTHESIOLOGY

## 2018-01-30 PROCEDURE — S0260 H&P FOR SURGERY: HCPCS | Performed by: SURGERY

## 2018-01-30 RX ORDER — PROMETHAZINE HYDROCHLORIDE 25 MG/ML
12.5 INJECTION, SOLUTION INTRAMUSCULAR; INTRAVENOUS ONCE AS NEEDED
Status: CANCELLED | OUTPATIENT
Start: 2018-01-30

## 2018-01-30 RX ORDER — PROMETHAZINE HYDROCHLORIDE 25 MG/1
25 TABLET ORAL ONCE AS NEEDED
Status: CANCELLED | OUTPATIENT
Start: 2018-01-30

## 2018-01-30 RX ORDER — ONDANSETRON 2 MG/ML
4 INJECTION INTRAMUSCULAR; INTRAVENOUS ONCE AS NEEDED
Status: CANCELLED | OUTPATIENT
Start: 2018-01-30

## 2018-01-30 RX ORDER — OXYCODONE AND ACETAMINOPHEN 7.5; 325 MG/1; MG/1
1 TABLET ORAL ONCE AS NEEDED
Status: CANCELLED | OUTPATIENT
Start: 2018-01-30 | End: 2018-01-31

## 2018-01-30 RX ORDER — EPHEDRINE SULFATE 50 MG/ML
5 INJECTION, SOLUTION INTRAVENOUS ONCE AS NEEDED
Status: CANCELLED | OUTPATIENT
Start: 2018-01-30

## 2018-01-30 RX ORDER — FAMOTIDINE 20 MG/1
20 TABLET, FILM COATED ORAL NIGHTLY
COMMUNITY
End: 2020-03-09

## 2018-01-30 RX ORDER — FAMOTIDINE 10 MG/ML
20 INJECTION, SOLUTION INTRAVENOUS ONCE
Status: COMPLETED | OUTPATIENT
Start: 2018-01-30 | End: 2018-01-30

## 2018-01-30 RX ORDER — SCOLOPAMINE TRANSDERMAL SYSTEM 1 MG/1
1 PATCH, EXTENDED RELEASE TRANSDERMAL
Status: DISCONTINUED | OUTPATIENT
Start: 2018-01-30 | End: 2018-01-30 | Stop reason: HOSPADM

## 2018-01-30 RX ORDER — FLUMAZENIL 0.1 MG/ML
0.2 INJECTION INTRAVENOUS AS NEEDED
Status: CANCELLED | OUTPATIENT
Start: 2018-01-30

## 2018-01-30 RX ORDER — MIDAZOLAM HYDROCHLORIDE 1 MG/ML
1 INJECTION INTRAMUSCULAR; INTRAVENOUS
Status: DISCONTINUED | OUTPATIENT
Start: 2018-01-30 | End: 2018-01-30 | Stop reason: HOSPADM

## 2018-01-30 RX ORDER — PROMETHAZINE HYDROCHLORIDE 25 MG/1
12.5 TABLET ORAL ONCE AS NEEDED
Status: CANCELLED | OUTPATIENT
Start: 2018-01-30 | End: 2018-01-31

## 2018-01-30 RX ORDER — FENTANYL CITRATE 50 UG/ML
50 INJECTION, SOLUTION INTRAMUSCULAR; INTRAVENOUS
Status: CANCELLED | OUTPATIENT
Start: 2018-01-30

## 2018-01-30 RX ORDER — NALOXONE HCL 0.4 MG/ML
0.2 VIAL (ML) INJECTION AS NEEDED
Status: CANCELLED | OUTPATIENT
Start: 2018-01-30

## 2018-01-30 RX ORDER — SODIUM CHLORIDE 0.9 % (FLUSH) 0.9 %
1-10 SYRINGE (ML) INJECTION AS NEEDED
Status: DISCONTINUED | OUTPATIENT
Start: 2018-01-30 | End: 2018-01-30 | Stop reason: HOSPADM

## 2018-01-30 RX ORDER — MIDAZOLAM HYDROCHLORIDE 1 MG/ML
2 INJECTION INTRAMUSCULAR; INTRAVENOUS
Status: DISCONTINUED | OUTPATIENT
Start: 2018-01-30 | End: 2018-01-30 | Stop reason: HOSPADM

## 2018-01-30 RX ORDER — LIDOCAINE HYDROCHLORIDE 10 MG/ML
0.5 INJECTION, SOLUTION EPIDURAL; INFILTRATION; INTRACAUDAL; PERINEURAL ONCE AS NEEDED
Status: DISCONTINUED | OUTPATIENT
Start: 2018-01-30 | End: 2018-01-30 | Stop reason: HOSPADM

## 2018-01-30 RX ORDER — HYDROMORPHONE HCL 110MG/55ML
0.5 PATIENT CONTROLLED ANALGESIA SYRINGE INTRAVENOUS
Status: CANCELLED | OUTPATIENT
Start: 2018-01-30

## 2018-01-30 RX ORDER — CEFAZOLIN SODIUM 2 G/100ML
2 INJECTION, SOLUTION INTRAVENOUS ONCE
Status: DISCONTINUED | OUTPATIENT
Start: 2018-01-30 | End: 2018-01-30

## 2018-01-30 RX ORDER — PROMETHAZINE HYDROCHLORIDE 25 MG/1
25 SUPPOSITORY RECTAL ONCE AS NEEDED
Status: CANCELLED | OUTPATIENT
Start: 2018-01-30

## 2018-01-30 RX ORDER — LABETALOL HYDROCHLORIDE 5 MG/ML
5 INJECTION, SOLUTION INTRAVENOUS
Status: CANCELLED | OUTPATIENT
Start: 2018-01-30

## 2018-01-30 RX ORDER — HYDRALAZINE HYDROCHLORIDE 20 MG/ML
5 INJECTION INTRAMUSCULAR; INTRAVENOUS
Status: CANCELLED | OUTPATIENT
Start: 2018-01-30

## 2018-01-30 RX ORDER — HYDROCODONE BITARTRATE AND ACETAMINOPHEN 7.5; 325 MG/1; MG/1
1 TABLET ORAL ONCE AS NEEDED
Status: CANCELLED | OUTPATIENT
Start: 2018-01-30 | End: 2018-01-31

## 2018-01-30 RX ORDER — DIPHENHYDRAMINE HYDROCHLORIDE 50 MG/ML
12.5 INJECTION INTRAMUSCULAR; INTRAVENOUS
Status: CANCELLED | OUTPATIENT
Start: 2018-01-30

## 2018-01-30 RX ORDER — CEFAZOLIN SODIUM IN 0.9 % NACL 3 G/100 ML
3 INTRAVENOUS SOLUTION, PIGGYBACK (ML) INTRAVENOUS ONCE
Status: DISCONTINUED | OUTPATIENT
Start: 2018-01-30 | End: 2018-01-30 | Stop reason: HOSPADM

## 2018-01-30 RX ORDER — SODIUM CHLORIDE, SODIUM LACTATE, POTASSIUM CHLORIDE, CALCIUM CHLORIDE 600; 310; 30; 20 MG/100ML; MG/100ML; MG/100ML; MG/100ML
9 INJECTION, SOLUTION INTRAVENOUS CONTINUOUS
Status: DISCONTINUED | OUTPATIENT
Start: 2018-01-30 | End: 2018-01-30 | Stop reason: HOSPADM

## 2018-01-30 RX ORDER — FENTANYL CITRATE 50 UG/ML
50 INJECTION, SOLUTION INTRAMUSCULAR; INTRAVENOUS
Status: DISCONTINUED | OUTPATIENT
Start: 2018-01-30 | End: 2018-01-30 | Stop reason: HOSPADM

## 2018-01-30 RX ADMIN — SODIUM CHLORIDE, POTASSIUM CHLORIDE, SODIUM LACTATE AND CALCIUM CHLORIDE 9 ML/HR: 600; 310; 30; 20 INJECTION, SOLUTION INTRAVENOUS at 07:01

## 2018-01-30 RX ADMIN — SCOPALAMINE 1 PATCH: 1 PATCH, EXTENDED RELEASE TRANSDERMAL at 07:01

## 2018-01-30 RX ADMIN — FAMOTIDINE 20 MG: 10 INJECTION, SOLUTION INTRAVENOUS at 07:01

## 2018-01-30 RX ADMIN — MIDAZOLAM 2 MG: 1 INJECTION INTRAMUSCULAR; INTRAVENOUS at 07:01

## 2018-02-01 ENCOUNTER — HOSPITAL ENCOUNTER (INPATIENT)
Facility: HOSPITAL | Age: 58
LOS: 39 days | Discharge: NURSING, REHAB OR LONG-TERM CARE | End: 2018-03-12
Attending: SURGERY | Admitting: SURGERY

## 2018-02-01 ENCOUNTER — ANESTHESIA EVENT (OUTPATIENT)
Dept: PERIOP | Facility: HOSPITAL | Age: 58
End: 2018-02-01

## 2018-02-01 ENCOUNTER — ANESTHESIA (OUTPATIENT)
Dept: PERIOP | Facility: HOSPITAL | Age: 58
End: 2018-02-01

## 2018-02-01 DIAGNOSIS — T14.8XXA WOUND INFECTION: ICD-10-CM

## 2018-02-01 DIAGNOSIS — N17.0 ARF (ACUTE RENAL FAILURE) WITH TUBULAR NECROSIS (HCC): ICD-10-CM

## 2018-02-01 DIAGNOSIS — N18.30 CRF (CHRONIC RENAL FAILURE), STAGE 3 (MODERATE) (HCC): ICD-10-CM

## 2018-02-01 DIAGNOSIS — K43.2 INCISIONAL HERNIA, WITHOUT OBSTRUCTION OR GANGRENE: Primary | ICD-10-CM

## 2018-02-01 DIAGNOSIS — K43.2 RECURRENT VENTRAL HERNIA: ICD-10-CM

## 2018-02-01 DIAGNOSIS — R53.1 GENERALIZED WEAKNESS: ICD-10-CM

## 2018-02-01 DIAGNOSIS — L08.9 WOUND INFECTION: ICD-10-CM

## 2018-02-01 PROCEDURE — S2900 ROBOTIC SURGICAL SYSTEM: HCPCS | Performed by: SURGERY

## 2018-02-01 PROCEDURE — 25010000002 MIDAZOLAM PER 1 MG: Performed by: ANESTHESIOLOGY

## 2018-02-01 PROCEDURE — 8E0W4CZ ROBOTIC ASSISTED PROCEDURE OF TRUNK REGION, PERCUTANEOUS ENDOSCOPIC APPROACH: ICD-10-PCS | Performed by: SURGERY

## 2018-02-01 PROCEDURE — 25010000002 HYDROMORPHONE PER 4 MG: Performed by: SURGERY

## 2018-02-01 PROCEDURE — C1781 MESH (IMPLANTABLE): HCPCS | Performed by: SURGERY

## 2018-02-01 PROCEDURE — 25010000002 HYDROMORPHONE PER 4 MG: Performed by: ANESTHESIOLOGY

## 2018-02-01 PROCEDURE — 25010000002 HYDROMORPHONE PER 4 MG: Performed by: NURSE ANESTHETIST, CERTIFIED REGISTERED

## 2018-02-01 PROCEDURE — 25010000002 DEXAMETHASONE PER 1 MG: Performed by: NURSE ANESTHETIST, CERTIFIED REGISTERED

## 2018-02-01 PROCEDURE — 0WUF4JZ SUPPLEMENT ABDOMINAL WALL WITH SYNTHETIC SUBSTITUTE, PERCUTANEOUS ENDOSCOPIC APPROACH: ICD-10-PCS | Performed by: SURGERY

## 2018-02-01 PROCEDURE — 0DN84ZZ RELEASE SMALL INTESTINE, PERCUTANEOUS ENDOSCOPIC APPROACH: ICD-10-PCS | Performed by: SURGERY

## 2018-02-01 PROCEDURE — 25010000002 PROPOFOL 10 MG/ML EMULSION: Performed by: NURSE ANESTHETIST, CERTIFIED REGISTERED

## 2018-02-01 PROCEDURE — 25010000002 ONDANSETRON PER 1 MG: Performed by: NURSE ANESTHETIST, CERTIFIED REGISTERED

## 2018-02-01 PROCEDURE — 49654 PR LAP, INCISIONAL HERNIA REPAIR,REDUCIBLE: CPT | Performed by: SURGERY

## 2018-02-01 PROCEDURE — 25010000002 FENTANYL CITRATE (PF) 100 MCG/2ML SOLUTION: Performed by: ANESTHESIOLOGY

## 2018-02-01 DEVICE — VENTRALIGHT ST MESH WITH ECHO PS POSITONING SYSTEM
Type: IMPLANTABLE DEVICE | Site: ABDOMEN | Status: FUNCTIONAL
Brand: VENTRALIGHT ST MESH WITH ECHO PS POSITONING SYSTEM

## 2018-02-01 RX ORDER — MONTELUKAST SODIUM 10 MG/1
10 TABLET ORAL NIGHTLY
Status: DISCONTINUED | OUTPATIENT
Start: 2018-02-01 | End: 2018-02-05

## 2018-02-01 RX ORDER — ROCURONIUM BROMIDE 10 MG/ML
INJECTION, SOLUTION INTRAVENOUS AS NEEDED
Status: DISCONTINUED | OUTPATIENT
Start: 2018-02-01 | End: 2018-02-01 | Stop reason: SURG

## 2018-02-01 RX ORDER — NALOXONE HCL 0.4 MG/ML
0.1 VIAL (ML) INJECTION
Status: DISCONTINUED | OUTPATIENT
Start: 2018-02-01 | End: 2018-02-05

## 2018-02-01 RX ORDER — LIDOCAINE HYDROCHLORIDE 40 MG/ML
SOLUTION TOPICAL AS NEEDED
Status: DISCONTINUED | OUTPATIENT
Start: 2018-02-01 | End: 2018-02-01 | Stop reason: SURG

## 2018-02-01 RX ORDER — FENTANYL CITRATE 50 UG/ML
50 INJECTION, SOLUTION INTRAMUSCULAR; INTRAVENOUS
Status: DISCONTINUED | OUTPATIENT
Start: 2018-02-01 | End: 2018-02-01 | Stop reason: HOSPADM

## 2018-02-01 RX ORDER — LIDOCAINE HYDROCHLORIDE 10 MG/ML
0.5 INJECTION, SOLUTION EPIDURAL; INFILTRATION; INTRACAUDAL; PERINEURAL ONCE AS NEEDED
Status: DISCONTINUED | OUTPATIENT
Start: 2018-02-01 | End: 2018-02-01 | Stop reason: HOSPADM

## 2018-02-01 RX ORDER — BUPIVACAINE HYDROCHLORIDE AND EPINEPHRINE 2.5; 5 MG/ML; UG/ML
INJECTION, SOLUTION INFILTRATION; PERINEURAL AS NEEDED
Status: DISCONTINUED | OUTPATIENT
Start: 2018-02-01 | End: 2018-02-01 | Stop reason: HOSPADM

## 2018-02-01 RX ORDER — PROMETHAZINE HYDROCHLORIDE 25 MG/ML
12.5 INJECTION, SOLUTION INTRAMUSCULAR; INTRAVENOUS ONCE AS NEEDED
Status: DISCONTINUED | OUTPATIENT
Start: 2018-02-01 | End: 2018-02-01 | Stop reason: HOSPADM

## 2018-02-01 RX ORDER — LIDOCAINE HYDROCHLORIDE 20 MG/ML
INJECTION, SOLUTION INFILTRATION; PERINEURAL AS NEEDED
Status: DISCONTINUED | OUTPATIENT
Start: 2018-02-01 | End: 2018-02-01 | Stop reason: SURG

## 2018-02-01 RX ORDER — MEPERIDINE HYDROCHLORIDE 25 MG/ML
12.5 INJECTION INTRAMUSCULAR; INTRAVENOUS; SUBCUTANEOUS
Status: DISCONTINUED | OUTPATIENT
Start: 2018-02-01 | End: 2018-02-01 | Stop reason: HOSPADM

## 2018-02-01 RX ORDER — ONDANSETRON 2 MG/ML
4 INJECTION INTRAMUSCULAR; INTRAVENOUS EVERY 6 HOURS PRN
Status: DISCONTINUED | OUTPATIENT
Start: 2018-02-01 | End: 2018-02-12 | Stop reason: SDUPTHER

## 2018-02-01 RX ORDER — PROMETHAZINE HYDROCHLORIDE 25 MG/1
12.5 TABLET ORAL ONCE AS NEEDED
Status: DISCONTINUED | OUTPATIENT
Start: 2018-02-01 | End: 2018-02-01 | Stop reason: HOSPADM

## 2018-02-01 RX ORDER — SODIUM CHLORIDE 9 MG/ML
INJECTION, SOLUTION INTRAVENOUS AS NEEDED
Status: DISCONTINUED | OUTPATIENT
Start: 2018-02-01 | End: 2018-02-01 | Stop reason: HOSPADM

## 2018-02-01 RX ORDER — FAMOTIDINE 20 MG/1
20 TABLET, FILM COATED ORAL NIGHTLY
Status: DISCONTINUED | OUTPATIENT
Start: 2018-02-01 | End: 2018-02-05

## 2018-02-01 RX ORDER — SODIUM CHLORIDE, SODIUM LACTATE, POTASSIUM CHLORIDE, CALCIUM CHLORIDE 600; 310; 30; 20 MG/100ML; MG/100ML; MG/100ML; MG/100ML
INJECTION, SOLUTION INTRAVENOUS CONTINUOUS PRN
Status: DISCONTINUED | OUTPATIENT
Start: 2018-02-01 | End: 2018-02-01

## 2018-02-01 RX ORDER — PROMETHAZINE HYDROCHLORIDE 25 MG/1
25 SUPPOSITORY RECTAL ONCE AS NEEDED
Status: DISCONTINUED | OUTPATIENT
Start: 2018-02-01 | End: 2018-02-01 | Stop reason: HOSPADM

## 2018-02-01 RX ORDER — PROPOFOL 10 MG/ML
VIAL (ML) INTRAVENOUS AS NEEDED
Status: DISCONTINUED | OUTPATIENT
Start: 2018-02-01 | End: 2018-02-01 | Stop reason: SURG

## 2018-02-01 RX ORDER — CEFAZOLIN SODIUM 2 G/100ML
2 INJECTION, SOLUTION INTRAVENOUS ONCE
Status: DISCONTINUED | OUTPATIENT
Start: 2018-02-01 | End: 2018-02-01

## 2018-02-01 RX ORDER — MIDAZOLAM HYDROCHLORIDE 1 MG/ML
1 INJECTION INTRAMUSCULAR; INTRAVENOUS
Status: DISCONTINUED | OUTPATIENT
Start: 2018-02-01 | End: 2018-02-01 | Stop reason: HOSPADM

## 2018-02-01 RX ORDER — HYDROMORPHONE HCL IN 0.9% NACL 10 MG/50ML
PATIENT CONTROLLED ANALGESIA SYRINGE INTRAVENOUS CONTINUOUS
Status: DISCONTINUED | OUTPATIENT
Start: 2018-02-01 | End: 2018-02-04

## 2018-02-01 RX ORDER — DIPHENHYDRAMINE HYDROCHLORIDE 50 MG/ML
25 INJECTION INTRAMUSCULAR; INTRAVENOUS EVERY 6 HOURS PRN
Status: DISCONTINUED | OUTPATIENT
Start: 2018-02-01 | End: 2018-02-09

## 2018-02-01 RX ORDER — NALOXONE HCL 0.4 MG/ML
0.2 VIAL (ML) INJECTION AS NEEDED
Status: DISCONTINUED | OUTPATIENT
Start: 2018-02-01 | End: 2018-02-01 | Stop reason: HOSPADM

## 2018-02-01 RX ORDER — LABETALOL HYDROCHLORIDE 5 MG/ML
5 INJECTION, SOLUTION INTRAVENOUS
Status: DISCONTINUED | OUTPATIENT
Start: 2018-02-01 | End: 2018-02-01 | Stop reason: HOSPADM

## 2018-02-01 RX ORDER — BUDESONIDE AND FORMOTEROL FUMARATE DIHYDRATE 160; 4.5 UG/1; UG/1
2 AEROSOL RESPIRATORY (INHALATION)
Status: DISCONTINUED | OUTPATIENT
Start: 2018-02-01 | End: 2018-02-04

## 2018-02-01 RX ORDER — SODIUM CHLORIDE, SODIUM LACTATE, POTASSIUM CHLORIDE, CALCIUM CHLORIDE 600; 310; 30; 20 MG/100ML; MG/100ML; MG/100ML; MG/100ML
9 INJECTION, SOLUTION INTRAVENOUS CONTINUOUS
Status: DISCONTINUED | OUTPATIENT
Start: 2018-02-01 | End: 2018-02-04

## 2018-02-01 RX ORDER — DILTIAZEM HYDROCHLORIDE 300 MG/1
300 CAPSULE, COATED, EXTENDED RELEASE ORAL EVERY MORNING
Status: DISCONTINUED | OUTPATIENT
Start: 2018-02-02 | End: 2018-02-05

## 2018-02-01 RX ORDER — HYDROMORPHONE HCL 110MG/55ML
PATIENT CONTROLLED ANALGESIA SYRINGE INTRAVENOUS AS NEEDED
Status: DISCONTINUED | OUTPATIENT
Start: 2018-02-01 | End: 2018-02-01 | Stop reason: SURG

## 2018-02-01 RX ORDER — FAMOTIDINE 10 MG/ML
20 INJECTION, SOLUTION INTRAVENOUS ONCE
Status: COMPLETED | OUTPATIENT
Start: 2018-02-01 | End: 2018-02-01

## 2018-02-01 RX ORDER — SODIUM CHLORIDE 0.9 % (FLUSH) 0.9 %
1-10 SYRINGE (ML) INJECTION AS NEEDED
Status: DISCONTINUED | OUTPATIENT
Start: 2018-02-01 | End: 2018-02-01 | Stop reason: HOSPADM

## 2018-02-01 RX ORDER — ONDANSETRON 2 MG/ML
INJECTION INTRAMUSCULAR; INTRAVENOUS AS NEEDED
Status: DISCONTINUED | OUTPATIENT
Start: 2018-02-01 | End: 2018-02-01 | Stop reason: SURG

## 2018-02-01 RX ORDER — ALBUTEROL SULFATE 2.5 MG/3ML
2.5 SOLUTION RESPIRATORY (INHALATION) EVERY 4 HOURS PRN
Status: DISCONTINUED | OUTPATIENT
Start: 2018-02-01 | End: 2018-03-12 | Stop reason: HOSPADM

## 2018-02-01 RX ORDER — ONDANSETRON 2 MG/ML
4 INJECTION INTRAMUSCULAR; INTRAVENOUS ONCE AS NEEDED
Status: DISCONTINUED | OUTPATIENT
Start: 2018-02-01 | End: 2018-02-01 | Stop reason: HOSPADM

## 2018-02-01 RX ORDER — FUROSEMIDE 40 MG/1
40 TABLET ORAL AS NEEDED
Status: DISCONTINUED | OUTPATIENT
Start: 2018-02-01 | End: 2018-02-05

## 2018-02-01 RX ORDER — HYDROMORPHONE HCL 110MG/55ML
0.5 PATIENT CONTROLLED ANALGESIA SYRINGE INTRAVENOUS
Status: DISCONTINUED | OUTPATIENT
Start: 2018-02-01 | End: 2018-02-01 | Stop reason: HOSPADM

## 2018-02-01 RX ORDER — ONDANSETRON 4 MG/1
4 TABLET, FILM COATED ORAL EVERY 6 HOURS PRN
Status: DISCONTINUED | OUTPATIENT
Start: 2018-02-01 | End: 2018-02-12 | Stop reason: SDUPTHER

## 2018-02-01 RX ORDER — ALBUTEROL SULFATE 2.5 MG/3ML
2.5 SOLUTION RESPIRATORY (INHALATION) ONCE AS NEEDED
Status: DISCONTINUED | OUTPATIENT
Start: 2018-02-01 | End: 2018-02-01 | Stop reason: HOSPADM

## 2018-02-01 RX ORDER — SODIUM CHLORIDE 9 MG/ML
75 INJECTION, SOLUTION INTRAVENOUS CONTINUOUS
Status: DISCONTINUED | OUTPATIENT
Start: 2018-02-01 | End: 2018-02-04

## 2018-02-01 RX ORDER — MAGNESIUM HYDROXIDE 1200 MG/15ML
LIQUID ORAL AS NEEDED
Status: DISCONTINUED | OUTPATIENT
Start: 2018-02-01 | End: 2018-02-01 | Stop reason: HOSPADM

## 2018-02-01 RX ORDER — ONDANSETRON 4 MG/1
4 TABLET, ORALLY DISINTEGRATING ORAL EVERY 6 HOURS PRN
Status: DISCONTINUED | OUTPATIENT
Start: 2018-02-01 | End: 2018-02-12 | Stop reason: SDUPTHER

## 2018-02-01 RX ORDER — FLUMAZENIL 0.1 MG/ML
0.2 INJECTION INTRAVENOUS AS NEEDED
Status: DISCONTINUED | OUTPATIENT
Start: 2018-02-01 | End: 2018-02-01 | Stop reason: HOSPADM

## 2018-02-01 RX ORDER — DEXAMETHASONE SODIUM PHOSPHATE 10 MG/ML
INJECTION INTRAMUSCULAR; INTRAVENOUS AS NEEDED
Status: DISCONTINUED | OUTPATIENT
Start: 2018-02-01 | End: 2018-02-01 | Stop reason: SURG

## 2018-02-01 RX ORDER — CEFAZOLIN SODIUM IN 0.9 % NACL 3 G/100 ML
3 INTRAVENOUS SOLUTION, PIGGYBACK (ML) INTRAVENOUS
Status: COMPLETED | OUTPATIENT
Start: 2018-02-01 | End: 2018-02-01

## 2018-02-01 RX ORDER — OXYCODONE AND ACETAMINOPHEN 7.5; 325 MG/1; MG/1
1 TABLET ORAL ONCE AS NEEDED
Status: DISCONTINUED | OUTPATIENT
Start: 2018-02-01 | End: 2018-02-01 | Stop reason: HOSPADM

## 2018-02-01 RX ORDER — HYDRALAZINE HYDROCHLORIDE 20 MG/ML
5 INJECTION INTRAMUSCULAR; INTRAVENOUS
Status: DISCONTINUED | OUTPATIENT
Start: 2018-02-01 | End: 2018-02-01 | Stop reason: HOSPADM

## 2018-02-01 RX ORDER — MIDAZOLAM HYDROCHLORIDE 1 MG/ML
2 INJECTION INTRAMUSCULAR; INTRAVENOUS
Status: DISCONTINUED | OUTPATIENT
Start: 2018-02-01 | End: 2018-02-01 | Stop reason: HOSPADM

## 2018-02-01 RX ORDER — DIPHENHYDRAMINE HYDROCHLORIDE 50 MG/ML
12.5 INJECTION INTRAMUSCULAR; INTRAVENOUS
Status: DISCONTINUED | OUTPATIENT
Start: 2018-02-01 | End: 2018-02-01 | Stop reason: HOSPADM

## 2018-02-01 RX ORDER — HYDROCODONE BITARTRATE AND ACETAMINOPHEN 7.5; 325 MG/1; MG/1
1 TABLET ORAL ONCE AS NEEDED
Status: DISCONTINUED | OUTPATIENT
Start: 2018-02-01 | End: 2018-02-01 | Stop reason: HOSPADM

## 2018-02-01 RX ORDER — FERROUS SULFATE 325(65) MG
325 TABLET ORAL EVERY OTHER DAY
Status: DISCONTINUED | OUTPATIENT
Start: 2018-02-01 | End: 2018-02-05

## 2018-02-01 RX ORDER — EPHEDRINE SULFATE 50 MG/ML
5 INJECTION, SOLUTION INTRAVENOUS ONCE AS NEEDED
Status: DISCONTINUED | OUTPATIENT
Start: 2018-02-01 | End: 2018-02-01 | Stop reason: HOSPADM

## 2018-02-01 RX ORDER — PROMETHAZINE HYDROCHLORIDE 25 MG/1
25 TABLET ORAL ONCE AS NEEDED
Status: DISCONTINUED | OUTPATIENT
Start: 2018-02-01 | End: 2018-02-01 | Stop reason: HOSPADM

## 2018-02-01 RX ADMIN — LIDOCAINE HYDROCHLORIDE 1 EACH: 40 SPRAY LARYNGEAL; TRANSTRACHEAL at 12:37

## 2018-02-01 RX ADMIN — FENTANYL CITRATE 100 MCG: 50 INJECTION INTRAMUSCULAR; INTRAVENOUS at 12:34

## 2018-02-01 RX ADMIN — FENTANYL CITRATE 50 MCG: 50 INJECTION INTRAMUSCULAR; INTRAVENOUS at 19:50

## 2018-02-01 RX ADMIN — ROCURONIUM BROMIDE 25 MG: 10 INJECTION INTRAVENOUS at 14:08

## 2018-02-01 RX ADMIN — CEFAZOLIN 3 G: 1 INJECTION, POWDER, FOR SOLUTION INTRAMUSCULAR; INTRAVENOUS; PARENTERAL at 12:29

## 2018-02-01 RX ADMIN — FENTANYL CITRATE 50 MCG: 50 INJECTION INTRAMUSCULAR; INTRAVENOUS at 13:02

## 2018-02-01 RX ADMIN — SODIUM CHLORIDE, POTASSIUM CHLORIDE, SODIUM LACTATE AND CALCIUM CHLORIDE: 600; 310; 30; 20 INJECTION, SOLUTION INTRAVENOUS at 11:51

## 2018-02-01 RX ADMIN — HYDROMORPHONE HYDROCHLORIDE 0.5 MG: 2 INJECTION INTRAMUSCULAR; INTRAVENOUS; SUBCUTANEOUS at 17:50

## 2018-02-01 RX ADMIN — HYDROMORPHONE HYDROCHLORIDE 0.5 MG: 2 INJECTION INTRAMUSCULAR; INTRAVENOUS; SUBCUTANEOUS at 19:50

## 2018-02-01 RX ADMIN — SODIUM CHLORIDE, POTASSIUM CHLORIDE, SODIUM LACTATE AND CALCIUM CHLORIDE: 600; 310; 30; 20 INJECTION, SOLUTION INTRAVENOUS at 15:06

## 2018-02-01 RX ADMIN — ONDANSETRON 4 MG: 2 INJECTION INTRAMUSCULAR; INTRAVENOUS at 19:13

## 2018-02-01 RX ADMIN — SUGAMMADEX 5 ML: 100 INJECTION, SOLUTION INTRAVENOUS at 19:16

## 2018-02-01 RX ADMIN — FAMOTIDINE 20 MG: 10 INJECTION, SOLUTION INTRAVENOUS at 10:35

## 2018-02-01 RX ADMIN — ROCURONIUM BROMIDE 20 MG: 10 INJECTION INTRAVENOUS at 15:53

## 2018-02-01 RX ADMIN — SODIUM CHLORIDE, POTASSIUM CHLORIDE, SODIUM LACTATE AND CALCIUM CHLORIDE: 600; 310; 30; 20 INJECTION, SOLUTION INTRAVENOUS at 19:12

## 2018-02-01 RX ADMIN — LABETALOL HYDROCHLORIDE 5 MG: 5 INJECTION, SOLUTION INTRAVENOUS at 20:03

## 2018-02-01 RX ADMIN — ROCURONIUM BROMIDE 10 MG: 10 INJECTION INTRAVENOUS at 17:43

## 2018-02-01 RX ADMIN — HYDROMORPHONE HYDROCHLORIDE 0.5 MG: 2 INJECTION INTRAMUSCULAR; INTRAVENOUS; SUBCUTANEOUS at 16:37

## 2018-02-01 RX ADMIN — LIDOCAINE HYDROCHLORIDE 100 MG: 20 INJECTION, SOLUTION INFILTRATION; PERINEURAL at 12:34

## 2018-02-01 RX ADMIN — HYDROMORPHONE HYDROCHLORIDE 0.5 MG: 2 INJECTION INTRAMUSCULAR; INTRAVENOUS; SUBCUTANEOUS at 14:05

## 2018-02-01 RX ADMIN — Medication 2 MG: at 10:35

## 2018-02-01 RX ADMIN — ROCURONIUM BROMIDE 50 MG: 10 INJECTION INTRAVENOUS at 12:34

## 2018-02-01 RX ADMIN — ROCURONIUM BROMIDE 20 MG: 10 INJECTION INTRAVENOUS at 15:08

## 2018-02-01 RX ADMIN — DEXAMETHASONE SODIUM PHOSPHATE 6 MG: 10 INJECTION INTRAMUSCULAR; INTRAVENOUS at 14:09

## 2018-02-01 RX ADMIN — Medication: at 20:40

## 2018-02-01 RX ADMIN — HYDROMORPHONE HYDROCHLORIDE 0.5 MG: 2 INJECTION INTRAMUSCULAR; INTRAVENOUS; SUBCUTANEOUS at 13:07

## 2018-02-01 RX ADMIN — Medication 2 MG: at 12:29

## 2018-02-01 RX ADMIN — SODIUM CHLORIDE, POTASSIUM CHLORIDE, SODIUM LACTATE AND CALCIUM CHLORIDE 9 ML/HR: 600; 310; 30; 20 INJECTION, SOLUTION INTRAVENOUS at 10:35

## 2018-02-01 RX ADMIN — PROPOFOL 150 MG: 10 INJECTION, EMULSION INTRAVENOUS at 12:34

## 2018-02-01 RX ADMIN — SODIUM CHLORIDE, POTASSIUM CHLORIDE, SODIUM LACTATE AND CALCIUM CHLORIDE: 600; 310; 30; 20 INJECTION, SOLUTION INTRAVENOUS at 13:00

## 2018-02-01 RX ADMIN — ROCURONIUM BROMIDE 10 MG: 10 INJECTION INTRAVENOUS at 17:45

## 2018-02-01 NOTE — ANESTHESIA PROCEDURE NOTES
Airway  Urgency: elective    Date/Time: 2/1/2018 12:37 PM  Airway not difficult    General Information and Staff    Patient location during procedure: OR  Anesthesiologist: DORCAS WYATT  CRNA: KRISTINE LESTER    Indications and Patient Condition  Indications for airway management: airway protection    Preoxygenated: yes  MILS not maintained throughout  Mask difficulty assessment: 1 - vent by mask    Final Airway Details  Final airway type: endotracheal airway      Successful airway: ETT  Cuffed: yes   Successful intubation technique: direct laryngoscopy  Facilitating devices/methods: anterior pressure/BURP  Endotracheal tube insertion site: oral  Blade: Svetlana  Blade size: #3  ETT size: 7.0 mm  Cormack-Lehane Classification: grade I - full view of glottis  Placement verified by: chest auscultation   Measured from: lips  ETT to lips (cm): 20  Number of attempts at approach: 1    Additional Comments  Pre O2, SIAI

## 2018-02-01 NOTE — ANESTHESIA PREPROCEDURE EVALUATION
Anesthesia Evaluation     Patient summary reviewed and Nursing notes reviewed   NPO Solid Status: > 8 hours  NPO Liquid Status: > 4 hours     Airway   Mallampati: II  Neck ROM: full  no difficulty expected  Dental - normal exam     Pulmonary     breath sounds clear to auscultation  (+) asthma,   Cardiovascular     Rhythm: regular    (+) hypertension,       Neuro/Psych  (+) numbness,     GI/Hepatic/Renal/Endo    (+) obesity, morbid obesity, hiatal hernia,     Musculoskeletal     Abdominal   (+) obese,    Substance History      OB/GYN          Other      history of cancer                                            Anesthesia Plan    ASA 3     general     intravenous induction   Anesthetic plan and risks discussed with patient.

## 2018-02-02 LAB
ANION GAP SERPL CALCULATED.3IONS-SCNC: 16.7 MMOL/L
BASOPHILS # BLD AUTO: 0.03 10*3/MM3 (ref 0–0.2)
BASOPHILS NFR BLD AUTO: 0.3 % (ref 0–1.5)
BUN BLD-MCNC: 34 MG/DL (ref 6–20)
BUN/CREAT SERPL: 15.4 (ref 7–25)
CALCIUM SPEC-SCNC: 8.4 MG/DL (ref 8.6–10.5)
CHLORIDE SERPL-SCNC: 99 MMOL/L (ref 98–107)
CO2 SERPL-SCNC: 20.3 MMOL/L (ref 22–29)
CREAT BLD-MCNC: 2.21 MG/DL (ref 0.57–1)
DEPRECATED RDW RBC AUTO: 50.8 FL (ref 37–54)
EOSINOPHIL # BLD AUTO: 0 10*3/MM3 (ref 0–0.7)
EOSINOPHIL NFR BLD AUTO: 0 % (ref 0.3–6.2)
ERYTHROCYTE [DISTWIDTH] IN BLOOD BY AUTOMATED COUNT: 15 % (ref 11.7–13)
GFR SERPL CREATININE-BSD FRML MDRD: 23 ML/MIN/1.73
GLUCOSE BLD-MCNC: 137 MG/DL (ref 65–99)
HCT VFR BLD AUTO: 39.2 % (ref 35.6–45.5)
HGB BLD-MCNC: 11.9 G/DL (ref 11.9–15.5)
IMM GRANULOCYTES # BLD: 0.09 10*3/MM3 (ref 0–0.03)
IMM GRANULOCYTES NFR BLD: 0.8 % (ref 0–0.5)
LYMPHOCYTES # BLD AUTO: 0.53 10*3/MM3 (ref 0.9–4.8)
LYMPHOCYTES NFR BLD AUTO: 4.5 % (ref 19.6–45.3)
MCH RBC QN AUTO: 28.2 PG (ref 26.9–32)
MCHC RBC AUTO-ENTMCNC: 30.4 G/DL (ref 32.4–36.3)
MCV RBC AUTO: 92.9 FL (ref 80.5–98.2)
MONOCYTES # BLD AUTO: 0.96 10*3/MM3 (ref 0.2–1.2)
MONOCYTES NFR BLD AUTO: 8.2 % (ref 5–12)
NEUTROPHILS # BLD AUTO: 10.14 10*3/MM3 (ref 1.9–8.1)
NEUTROPHILS NFR BLD AUTO: 86.2 % (ref 42.7–76)
NRBC BLD MANUAL-RTO: 0 /100 WBC (ref 0–0)
PLAT MORPH BLD: NORMAL
PLATELET # BLD AUTO: 256 10*3/MM3 (ref 140–500)
PMV BLD AUTO: 9.2 FL (ref 6–12)
POTASSIUM BLD-SCNC: 5.4 MMOL/L (ref 3.5–5.2)
RBC # BLD AUTO: 4.22 10*6/MM3 (ref 3.9–5.2)
RBC MORPH BLD: NORMAL
SODIUM BLD-SCNC: 136 MMOL/L (ref 136–145)
WBC MORPH BLD: NORMAL
WBC NRBC COR # BLD: 11.75 10*3/MM3 (ref 4.5–10.7)

## 2018-02-02 PROCEDURE — 85025 COMPLETE CBC W/AUTO DIFF WBC: CPT | Performed by: SURGERY

## 2018-02-02 PROCEDURE — 25010000002 ONDANSETRON PER 1 MG: Performed by: SURGERY

## 2018-02-02 PROCEDURE — 99024 POSTOP FOLLOW-UP VISIT: CPT | Performed by: SURGERY

## 2018-02-02 PROCEDURE — 25010000002 KETOROLAC TROMETHAMINE PER 15 MG: Performed by: SURGERY

## 2018-02-02 PROCEDURE — 25010000002 ENOXAPARIN PER 10 MG: Performed by: SURGERY

## 2018-02-02 PROCEDURE — 85007 BL SMEAR W/DIFF WBC COUNT: CPT | Performed by: SURGERY

## 2018-02-02 PROCEDURE — 80048 BASIC METABOLIC PNL TOTAL CA: CPT | Performed by: SURGERY

## 2018-02-02 RX ORDER — KETOROLAC TROMETHAMINE 30 MG/ML
30 INJECTION, SOLUTION INTRAMUSCULAR; INTRAVENOUS EVERY 8 HOURS
Status: COMPLETED | OUTPATIENT
Start: 2018-02-02 | End: 2018-02-03

## 2018-02-02 RX ORDER — KETOROLAC TROMETHAMINE 30 MG/ML
30 INJECTION, SOLUTION INTRAMUSCULAR; INTRAVENOUS EVERY 6 HOURS PRN
Status: DISCONTINUED | OUTPATIENT
Start: 2018-02-02 | End: 2018-02-02

## 2018-02-02 RX ADMIN — SODIUM CHLORIDE 200 ML/HR: 9 INJECTION, SOLUTION INTRAVENOUS at 05:50

## 2018-02-02 RX ADMIN — SODIUM CHLORIDE 200 ML/HR: 9 INJECTION, SOLUTION INTRAVENOUS at 00:43

## 2018-02-02 RX ADMIN — ENOXAPARIN SODIUM 40 MG: 40 INJECTION SUBCUTANEOUS at 08:50

## 2018-02-02 RX ADMIN — SODIUM CHLORIDE 200 ML/HR: 9 INJECTION, SOLUTION INTRAVENOUS at 12:50

## 2018-02-02 RX ADMIN — KETOROLAC TROMETHAMINE 30 MG: 30 INJECTION, SOLUTION INTRAMUSCULAR at 20:59

## 2018-02-02 RX ADMIN — MONTELUKAST 10 MG: 10 TABLET, FILM COATED ORAL at 20:59

## 2018-02-02 RX ADMIN — DILTIAZEM HYDROCHLORIDE 300 MG: 300 CAPSULE, COATED, EXTENDED RELEASE ORAL at 08:50

## 2018-02-02 RX ADMIN — SODIUM CHLORIDE 75 ML/HR: 9 INJECTION, SOLUTION INTRAVENOUS at 21:47

## 2018-02-02 RX ADMIN — FAMOTIDINE 20 MG: 20 TABLET, FILM COATED ORAL at 20:59

## 2018-02-02 RX ADMIN — ONDANSETRON 4 MG: 2 INJECTION INTRAMUSCULAR; INTRAVENOUS at 03:13

## 2018-02-03 PROCEDURE — 25010000002 ONDANSETRON PER 1 MG: Performed by: SURGERY

## 2018-02-03 PROCEDURE — 25010000002 KETOROLAC TROMETHAMINE PER 15 MG: Performed by: SURGERY

## 2018-02-03 PROCEDURE — 25010000002 ENOXAPARIN PER 10 MG: Performed by: SURGERY

## 2018-02-03 PROCEDURE — 99024 POSTOP FOLLOW-UP VISIT: CPT | Performed by: SURGERY

## 2018-02-03 RX ORDER — HYDROCODONE BITARTRATE AND ACETAMINOPHEN 5; 325 MG/1; MG/1
1 TABLET ORAL EVERY 4 HOURS PRN
Status: DISCONTINUED | OUTPATIENT
Start: 2018-02-03 | End: 2018-02-05

## 2018-02-03 RX ADMIN — MONTELUKAST 10 MG: 10 TABLET, FILM COATED ORAL at 21:45

## 2018-02-03 RX ADMIN — KETOROLAC TROMETHAMINE 30 MG: 30 INJECTION, SOLUTION INTRAMUSCULAR at 05:11

## 2018-02-03 RX ADMIN — Medication: at 14:55

## 2018-02-03 RX ADMIN — DILTIAZEM HYDROCHLORIDE 300 MG: 300 CAPSULE, COATED, EXTENDED RELEASE ORAL at 08:13

## 2018-02-03 RX ADMIN — ENOXAPARIN SODIUM 40 MG: 40 INJECTION SUBCUTANEOUS at 08:13

## 2018-02-03 RX ADMIN — SODIUM CHLORIDE 75 ML/HR: 9 INJECTION, SOLUTION INTRAVENOUS at 18:58

## 2018-02-03 RX ADMIN — KETOROLAC TROMETHAMINE 30 MG: 30 INJECTION, SOLUTION INTRAMUSCULAR at 13:07

## 2018-02-03 RX ADMIN — FAMOTIDINE 20 MG: 20 TABLET, FILM COATED ORAL at 21:45

## 2018-02-03 RX ADMIN — ONDANSETRON 4 MG: 2 INJECTION INTRAMUSCULAR; INTRAVENOUS at 17:56

## 2018-02-03 RX ADMIN — ONDANSETRON 4 MG: 2 INJECTION INTRAMUSCULAR; INTRAVENOUS at 05:14

## 2018-02-04 ENCOUNTER — APPOINTMENT (OUTPATIENT)
Dept: GENERAL RADIOLOGY | Facility: HOSPITAL | Age: 58
End: 2018-02-04

## 2018-02-04 ENCOUNTER — ANESTHESIA EVENT (OUTPATIENT)
Dept: PERIOP | Facility: HOSPITAL | Age: 58
End: 2018-02-04

## 2018-02-04 ENCOUNTER — APPOINTMENT (OUTPATIENT)
Dept: CT IMAGING | Facility: HOSPITAL | Age: 58
End: 2018-02-04

## 2018-02-04 ENCOUNTER — ANESTHESIA (OUTPATIENT)
Dept: PERIOP | Facility: HOSPITAL | Age: 58
End: 2018-02-04

## 2018-02-04 LAB
ABO GROUP BLD: NORMAL
ACANTHOCYTES BLD QL SMEAR: NORMAL
ANION GAP SERPL CALCULATED.3IONS-SCNC: 16.5 MMOL/L
BASOPHILS # BLD AUTO: 0 10*3/MM3 (ref 0–0.2)
BASOPHILS NFR BLD AUTO: 0 % (ref 0–1.5)
BLD GP AB SCN SERPL QL: NEGATIVE
BUN BLD-MCNC: 47 MG/DL (ref 6–20)
BUN/CREAT SERPL: 13 (ref 7–25)
BURR CELLS BLD QL SMEAR: NORMAL
CALCIUM SPEC-SCNC: 7.7 MG/DL (ref 8.6–10.5)
CHLORIDE SERPL-SCNC: 97 MMOL/L (ref 98–107)
CLUMPED PLATELETS: PRESENT
CO2 SERPL-SCNC: 15.5 MMOL/L (ref 22–29)
CREAT BLD-MCNC: 3.62 MG/DL (ref 0.57–1)
DEPRECATED RDW RBC AUTO: 51.8 FL (ref 37–54)
EOSINOPHIL # BLD AUTO: 0 10*3/MM3 (ref 0–0.7)
EOSINOPHIL NFR BLD AUTO: 0 % (ref 0.3–6.2)
ERYTHROCYTE [DISTWIDTH] IN BLOOD BY AUTOMATED COUNT: 15.4 % (ref 11.7–13)
GFR SERPL CREATININE-BSD FRML MDRD: 13 ML/MIN/1.73
GLUCOSE BLD-MCNC: 130 MG/DL (ref 65–99)
GLUCOSE BLDC GLUCOMTR-MCNC: 120 MG/DL (ref 70–130)
HCT VFR BLD AUTO: 33 % (ref 35.6–45.5)
HGB BLD-MCNC: 10.1 G/DL (ref 11.9–15.5)
IMM GRANULOCYTES # BLD: 0.02 10*3/MM3 (ref 0–0.03)
IMM GRANULOCYTES NFR BLD: 0.6 % (ref 0–0.5)
LYMPHOCYTES # BLD AUTO: 0.26 10*3/MM3 (ref 0.9–4.8)
LYMPHOCYTES NFR BLD AUTO: 8.4 % (ref 19.6–45.3)
MCH RBC QN AUTO: 28 PG (ref 26.9–32)
MCHC RBC AUTO-ENTMCNC: 30.6 G/DL (ref 32.4–36.3)
MCV RBC AUTO: 91.4 FL (ref 80.5–98.2)
MONOCYTES # BLD AUTO: 0.15 10*3/MM3 (ref 0.2–1.2)
MONOCYTES NFR BLD AUTO: 4.8 % (ref 5–12)
NEUTROPHILS # BLD AUTO: 2.67 10*3/MM3 (ref 1.9–8.1)
NEUTROPHILS NFR BLD AUTO: 86.2 % (ref 42.7–76)
NRBC BLD MANUAL-RTO: 0 /100 WBC (ref 0–0)
PHOSPHATE SERPL-MCNC: 2.1 MG/DL (ref 2.5–4.5)
PLATELET # BLD AUTO: 205 10*3/MM3 (ref 140–500)
PMV BLD AUTO: 9.8 FL (ref 6–12)
POTASSIUM BLD-SCNC: 5.2 MMOL/L (ref 3.5–5.2)
RBC # BLD AUTO: 3.61 10*6/MM3 (ref 3.9–5.2)
RH BLD: POSITIVE
SODIUM BLD-SCNC: 129 MMOL/L (ref 136–145)
WBC MORPH BLD: NORMAL
WBC NRBC COR # BLD: 3.1 10*3/MM3 (ref 4.5–10.7)

## 2018-02-04 PROCEDURE — 86900 BLOOD TYPING SEROLOGIC ABO: CPT | Performed by: SURGERY

## 2018-02-04 PROCEDURE — 25010000002 ALBUMIN HUMAN 5% PER 50 ML

## 2018-02-04 PROCEDURE — C1751 CATH, INF, PER/CENT/MIDLINE: HCPCS | Performed by: ANESTHESIOLOGY

## 2018-02-04 PROCEDURE — 25010000002 FENTANYL CITRATE (PF) 100 MCG/2ML SOLUTION: Performed by: ANESTHESIOLOGY

## 2018-02-04 PROCEDURE — 86901 BLOOD TYPING SEROLOGIC RH(D): CPT | Performed by: SURGERY

## 2018-02-04 PROCEDURE — 94799 UNLISTED PULMONARY SVC/PX: CPT

## 2018-02-04 PROCEDURE — 88307 TISSUE EXAM BY PATHOLOGIST: CPT | Performed by: SURGERY

## 2018-02-04 PROCEDURE — 25010000002 PROMETHAZINE PER 50 MG: Performed by: SURGERY

## 2018-02-04 PROCEDURE — 25010000002 PIPERACILLIN SOD-TAZOBACTAM PER 1 G: Performed by: INTERNAL MEDICINE

## 2018-02-04 PROCEDURE — 25010000002 ENOXAPARIN PER 10 MG: Performed by: SURGERY

## 2018-02-04 PROCEDURE — 85025 COMPLETE CBC W/AUTO DIFF WBC: CPT | Performed by: SURGERY

## 2018-02-04 PROCEDURE — 74176 CT ABD & PELVIS W/O CONTRAST: CPT

## 2018-02-04 PROCEDURE — P9041 ALBUMIN (HUMAN),5%, 50ML: HCPCS

## 2018-02-04 PROCEDURE — 85007 BL SMEAR W/DIFF WBC COUNT: CPT | Performed by: SURGERY

## 2018-02-04 PROCEDURE — 25010000002 VANCOMYCIN 10 G RECONSTITUTED SOLUTION: Performed by: SURGERY

## 2018-02-04 PROCEDURE — 93010 ELECTROCARDIOGRAM REPORT: CPT | Performed by: INTERNAL MEDICINE

## 2018-02-04 PROCEDURE — 25010000002 ALBUMIN HUMAN 5% PER 50 ML: Performed by: ANESTHESIOLOGY

## 2018-02-04 PROCEDURE — 25010000002 ONDANSETRON PER 1 MG: Performed by: ANESTHESIOLOGY

## 2018-02-04 PROCEDURE — 25010000002 PHENYLEPHRINE PER 1 ML: Performed by: ANESTHESIOLOGY

## 2018-02-04 PROCEDURE — 80048 BASIC METABOLIC PNL TOTAL CA: CPT | Performed by: SURGERY

## 2018-02-04 PROCEDURE — 74019 RADEX ABDOMEN 2 VIEWS: CPT

## 2018-02-04 PROCEDURE — 0WPF0JZ REMOVAL OF SYNTHETIC SUBSTITUTE FROM ABDOMINAL WALL, OPEN APPROACH: ICD-10-PCS | Performed by: SURGERY

## 2018-02-04 PROCEDURE — 25010000002 ONDANSETRON PER 1 MG: Performed by: SURGERY

## 2018-02-04 PROCEDURE — 86850 RBC ANTIBODY SCREEN: CPT | Performed by: SURGERY

## 2018-02-04 PROCEDURE — 84100 ASSAY OF PHOSPHORUS: CPT | Performed by: SURGERY

## 2018-02-04 PROCEDURE — P9041 ALBUMIN (HUMAN),5%, 50ML: HCPCS | Performed by: ANESTHESIOLOGY

## 2018-02-04 PROCEDURE — 0DTJ0ZZ RESECTION OF APPENDIX, OPEN APPROACH: ICD-10-PCS | Performed by: SURGERY

## 2018-02-04 PROCEDURE — 86923 COMPATIBILITY TEST ELECTRIC: CPT

## 2018-02-04 PROCEDURE — 99024 POSTOP FOLLOW-UP VISIT: CPT | Performed by: SURGERY

## 2018-02-04 PROCEDURE — 25010000002 PROPOFOL 10 MG/ML EMULSION: Performed by: ANESTHESIOLOGY

## 2018-02-04 PROCEDURE — 25010000002 SUCCINYLCHOLINE PER 20 MG: Performed by: ANESTHESIOLOGY

## 2018-02-04 PROCEDURE — 25010000002 PIPERACILLIN SOD-TAZOBACTAM PER 1 G: Performed by: SURGERY

## 2018-02-04 PROCEDURE — 25010000002 VANCOMYCIN PER 500 MG: Performed by: ANESTHESIOLOGY

## 2018-02-04 PROCEDURE — 82962 GLUCOSE BLOOD TEST: CPT

## 2018-02-04 PROCEDURE — 0DN80ZZ RELEASE SMALL INTESTINE, OPEN APPROACH: ICD-10-PCS | Performed by: SURGERY

## 2018-02-04 PROCEDURE — 0DB80ZZ EXCISION OF SMALL INTESTINE, OPEN APPROACH: ICD-10-PCS | Performed by: SURGERY

## 2018-02-04 PROCEDURE — 94002 VENT MGMT INPAT INIT DAY: CPT

## 2018-02-04 PROCEDURE — 93005 ELECTROCARDIOGRAM TRACING: CPT | Performed by: SURGERY

## 2018-02-04 RX ORDER — PROMETHAZINE HYDROCHLORIDE 25 MG/ML
6.25 INJECTION, SOLUTION INTRAMUSCULAR; INTRAVENOUS ONCE AS NEEDED
Status: DISCONTINUED | OUTPATIENT
Start: 2018-02-04 | End: 2018-02-05 | Stop reason: HOSPADM

## 2018-02-04 RX ORDER — FENTANYL CITRATE 50 UG/ML
INJECTION, SOLUTION INTRAMUSCULAR; INTRAVENOUS
Status: COMPLETED
Start: 2018-02-04 | End: 2018-02-04

## 2018-02-04 RX ORDER — PROPOFOL 10 MG/ML
VIAL (ML) INTRAVENOUS AS NEEDED
Status: DISCONTINUED | OUTPATIENT
Start: 2018-02-04 | End: 2018-02-04 | Stop reason: SURG

## 2018-02-04 RX ORDER — SUCCINYLCHOLINE CHLORIDE 20 MG/ML
INJECTION INTRAMUSCULAR; INTRAVENOUS AS NEEDED
Status: DISCONTINUED | OUTPATIENT
Start: 2018-02-04 | End: 2018-02-04 | Stop reason: SURG

## 2018-02-04 RX ORDER — HYDROMORPHONE HCL IN 0.9% NACL 10 MG/50ML
PATIENT CONTROLLED ANALGESIA SYRINGE INTRAVENOUS CONTINUOUS
Status: DISCONTINUED | OUTPATIENT
Start: 2018-02-04 | End: 2018-02-04

## 2018-02-04 RX ORDER — ROCURONIUM BROMIDE 10 MG/ML
INJECTION, SOLUTION INTRAVENOUS AS NEEDED
Status: DISCONTINUED | OUTPATIENT
Start: 2018-02-04 | End: 2018-02-04 | Stop reason: SURG

## 2018-02-04 RX ORDER — SODIUM CHLORIDE 9 MG/ML
200 INJECTION, SOLUTION INTRAVENOUS CONTINUOUS
Status: DISCONTINUED | OUTPATIENT
Start: 2018-02-04 | End: 2018-02-05

## 2018-02-04 RX ORDER — ALBUMIN, HUMAN INJ 5% 5 %
SOLUTION INTRAVENOUS
Status: COMPLETED
Start: 2018-02-04 | End: 2018-02-04

## 2018-02-04 RX ORDER — MIDAZOLAM HYDROCHLORIDE 1 MG/ML
2 INJECTION INTRAMUSCULAR; INTRAVENOUS
Status: DISCONTINUED | OUTPATIENT
Start: 2018-02-04 | End: 2018-02-05 | Stop reason: HOSPADM

## 2018-02-04 RX ORDER — PROMETHAZINE HYDROCHLORIDE 25 MG/ML
12.5 INJECTION, SOLUTION INTRAMUSCULAR; INTRAVENOUS EVERY 8 HOURS PRN
Status: DISCONTINUED | OUTPATIENT
Start: 2018-02-04 | End: 2018-03-12 | Stop reason: HOSPADM

## 2018-02-04 RX ORDER — FENTANYL CITRATE 50 UG/ML
25 INJECTION, SOLUTION INTRAMUSCULAR; INTRAVENOUS
Status: DISCONTINUED | OUTPATIENT
Start: 2018-02-04 | End: 2018-02-05 | Stop reason: HOSPADM

## 2018-02-04 RX ORDER — SODIUM CHLORIDE, SODIUM LACTATE, POTASSIUM CHLORIDE, CALCIUM CHLORIDE 600; 310; 30; 20 MG/100ML; MG/100ML; MG/100ML; MG/100ML
9 INJECTION, SOLUTION INTRAVENOUS CONTINUOUS PRN
Status: DISCONTINUED | OUTPATIENT
Start: 2018-02-04 | End: 2018-02-05 | Stop reason: HOSPADM

## 2018-02-04 RX ORDER — ACETAMINOPHEN 325 MG/1
650 TABLET ORAL EVERY 4 HOURS PRN
Status: DISCONTINUED | OUTPATIENT
Start: 2018-02-04 | End: 2018-02-05

## 2018-02-04 RX ORDER — PROMETHAZINE HYDROCHLORIDE 25 MG/1
25 TABLET ORAL ONCE AS NEEDED
Status: DISCONTINUED | OUTPATIENT
Start: 2018-02-04 | End: 2018-02-05 | Stop reason: HOSPADM

## 2018-02-04 RX ORDER — FENTANYL CITRATE 50 UG/ML
50 INJECTION, SOLUTION INTRAMUSCULAR; INTRAVENOUS
Status: COMPLETED | OUTPATIENT
Start: 2018-02-04 | End: 2018-02-04

## 2018-02-04 RX ORDER — ONDANSETRON 2 MG/ML
4 INJECTION INTRAMUSCULAR; INTRAVENOUS ONCE AS NEEDED
Status: COMPLETED | OUTPATIENT
Start: 2018-02-04 | End: 2018-02-04

## 2018-02-04 RX ORDER — PROMETHAZINE HYDROCHLORIDE 25 MG/1
25 SUPPOSITORY RECTAL ONCE AS NEEDED
Status: DISCONTINUED | OUTPATIENT
Start: 2018-02-04 | End: 2018-02-05 | Stop reason: HOSPADM

## 2018-02-04 RX ORDER — FAMOTIDINE 10 MG/ML
20 INJECTION, SOLUTION INTRAVENOUS ONCE
Status: COMPLETED | OUTPATIENT
Start: 2018-02-04 | End: 2018-02-04

## 2018-02-04 RX ORDER — SODIUM CHLORIDE 0.9 % (FLUSH) 0.9 %
1-10 SYRINGE (ML) INJECTION AS NEEDED
Status: DISCONTINUED | OUTPATIENT
Start: 2018-02-04 | End: 2018-02-05 | Stop reason: HOSPADM

## 2018-02-04 RX ORDER — SODIUM CHLORIDE 9 MG/ML
125 INJECTION, SOLUTION INTRAVENOUS CONTINUOUS
Status: DISCONTINUED | OUTPATIENT
Start: 2018-02-04 | End: 2018-02-04

## 2018-02-04 RX ORDER — ALBUMIN, HUMAN INJ 5% 5 %
250 SOLUTION INTRAVENOUS ONCE
Status: COMPLETED | OUTPATIENT
Start: 2018-02-04 | End: 2018-02-04

## 2018-02-04 RX ORDER — MIDAZOLAM HYDROCHLORIDE 1 MG/ML
1 INJECTION INTRAMUSCULAR; INTRAVENOUS
Status: DISCONTINUED | OUTPATIENT
Start: 2018-02-04 | End: 2018-02-05 | Stop reason: HOSPADM

## 2018-02-04 RX ORDER — ALBUMIN, HUMAN INJ 5% 5 %
SOLUTION INTRAVENOUS CONTINUOUS PRN
Status: DISCONTINUED | OUTPATIENT
Start: 2018-02-04 | End: 2018-02-04 | Stop reason: SURG

## 2018-02-04 RX ORDER — SODIUM CHLORIDE, SODIUM LACTATE, POTASSIUM CHLORIDE, CALCIUM CHLORIDE 600; 310; 30; 20 MG/100ML; MG/100ML; MG/100ML; MG/100ML
1000 INJECTION, SOLUTION INTRAVENOUS CONTINUOUS
Status: DISCONTINUED | OUTPATIENT
Start: 2018-02-05 | End: 2018-02-05

## 2018-02-04 RX ORDER — FAMOTIDINE 10 MG/ML
INJECTION, SOLUTION INTRAVENOUS
Status: COMPLETED
Start: 2018-02-04 | End: 2018-02-04

## 2018-02-04 RX ORDER — MIDAZOLAM HYDROCHLORIDE 1 MG/ML
INJECTION INTRAMUSCULAR; INTRAVENOUS
Status: DISPENSED
Start: 2018-02-04 | End: 2018-02-05

## 2018-02-04 RX ORDER — HYDROMORPHONE HCL 110MG/55ML
0.25 PATIENT CONTROLLED ANALGESIA SYRINGE INTRAVENOUS
Status: DISCONTINUED | OUTPATIENT
Start: 2018-02-04 | End: 2018-02-05 | Stop reason: HOSPADM

## 2018-02-04 RX ADMIN — FENTANYL CITRATE 50 MCG: 50 INJECTION INTRAMUSCULAR; INTRAVENOUS at 22:29

## 2018-02-04 RX ADMIN — PHENYLEPHRINE HYDROCHLORIDE 1 MCG/KG/MIN: 10 INJECTION, SOLUTION INTRAMUSCULAR; INTRAVENOUS; SUBCUTANEOUS at 21:15

## 2018-02-04 RX ADMIN — ONDANSETRON 4 MG: 2 INJECTION INTRAMUSCULAR; INTRAVENOUS at 08:16

## 2018-02-04 RX ADMIN — PHENYLEPHRINE HYDROCHLORIDE: 10 INJECTION, SOLUTION INTRAMUSCULAR; INTRAVENOUS; SUBCUTANEOUS at 22:30

## 2018-02-04 RX ADMIN — VANCOMYCIN HYDROCHLORIDE 2.5 G: 1 INJECTION, POWDER, LYOPHILIZED, FOR SOLUTION INTRAVENOUS at 23:10

## 2018-02-04 RX ADMIN — Medication 2500 MG: at 20:05

## 2018-02-04 RX ADMIN — SODIUM CHLORIDE 125 ML/HR: 9 INJECTION, SOLUTION INTRAVENOUS at 16:36

## 2018-02-04 RX ADMIN — PROMETHAZINE HYDROCHLORIDE 12.5 MG: 25 INJECTION INTRAMUSCULAR; INTRAVENOUS at 11:59

## 2018-02-04 RX ADMIN — ALBUMIN HUMAN 250 ML: 0.05 INJECTION, SOLUTION INTRAVENOUS at 20:25

## 2018-02-04 RX ADMIN — SODIUM CHLORIDE, POTASSIUM CHLORIDE, SODIUM LACTATE AND CALCIUM CHLORIDE 1000 ML/HR: 600; 310; 30; 20 INJECTION, SOLUTION INTRAVENOUS at 23:45

## 2018-02-04 RX ADMIN — ROCURONIUM BROMIDE 20 MG: 10 INJECTION INTRAVENOUS at 22:00

## 2018-02-04 RX ADMIN — ACETAMINOPHEN 650 MG: 325 TABLET, FILM COATED ORAL at 05:05

## 2018-02-04 RX ADMIN — FAMOTIDINE 20 MG: 10 INJECTION, SOLUTION INTRAVENOUS at 19:57

## 2018-02-04 RX ADMIN — PROPOFOL 200 MG: 10 INJECTION, EMULSION INTRAVENOUS at 21:18

## 2018-02-04 RX ADMIN — ROCURONIUM BROMIDE 25 MG: 10 INJECTION INTRAVENOUS at 21:25

## 2018-02-04 RX ADMIN — ONDANSETRON 4 MG: 2 INJECTION INTRAMUSCULAR; INTRAVENOUS at 23:37

## 2018-02-04 RX ADMIN — SODIUM CHLORIDE, POTASSIUM CHLORIDE, SODIUM LACTATE AND CALCIUM CHLORIDE: 600; 310; 30; 20 INJECTION, SOLUTION INTRAVENOUS at 22:55

## 2018-02-04 RX ADMIN — SODIUM CHLORIDE, POTASSIUM CHLORIDE, SODIUM LACTATE AND CALCIUM CHLORIDE: 600; 310; 30; 20 INJECTION, SOLUTION INTRAVENOUS at 23:37

## 2018-02-04 RX ADMIN — ALBUMIN (HUMAN): 0.05 SOLUTION INTRAVENOUS at 21:25

## 2018-02-04 RX ADMIN — SODIUM CHLORIDE 75 ML/HR: 9 INJECTION, SOLUTION INTRAVENOUS at 08:28

## 2018-02-04 RX ADMIN — ONDANSETRON 4 MG: 2 INJECTION INTRAMUSCULAR; INTRAVENOUS at 00:55

## 2018-02-04 RX ADMIN — ONDANSETRON 4 MG: 2 INJECTION INTRAMUSCULAR; INTRAVENOUS at 16:36

## 2018-02-04 RX ADMIN — FENTANYL CITRATE 50 MCG: 50 INJECTION INTRAMUSCULAR; INTRAVENOUS at 21:43

## 2018-02-04 RX ADMIN — HYDROMORPHONE HYDROCHLORIDE 1 MG: 10 INJECTION INTRAMUSCULAR; INTRAVENOUS; SUBCUTANEOUS at 18:29

## 2018-02-04 RX ADMIN — DILTIAZEM HYDROCHLORIDE 300 MG: 300 CAPSULE, COATED, EXTENDED RELEASE ORAL at 08:16

## 2018-02-04 RX ADMIN — SODIUM CHLORIDE 200 ML/HR: 9 INJECTION, SOLUTION INTRAVENOUS at 18:52

## 2018-02-04 RX ADMIN — ROCURONIUM BROMIDE 30 MG: 10 INJECTION INTRAVENOUS at 23:13

## 2018-02-04 RX ADMIN — FENTANYL CITRATE 50 MCG: 50 INJECTION INTRAMUSCULAR; INTRAVENOUS at 21:48

## 2018-02-04 RX ADMIN — ROCURONIUM BROMIDE 5 MG: 10 INJECTION INTRAVENOUS at 21:18

## 2018-02-04 RX ADMIN — TAZOBACTAM SODIUM AND PIPERACILLIN SODIUM 3.38 G: 375; 3 INJECTION, SOLUTION INTRAVENOUS at 18:09

## 2018-02-04 RX ADMIN — ALBUMIN, HUMAN INJ 5% 250 ML: 5 SOLUTION at 20:25

## 2018-02-04 RX ADMIN — PHENYLEPHRINE HYDROCHLORIDE 0.5 MCG/KG/MIN: 10 INJECTION, SOLUTION INTRAMUSCULAR; INTRAVENOUS; SUBCUTANEOUS at 20:47

## 2018-02-04 RX ADMIN — ENOXAPARIN SODIUM 40 MG: 40 INJECTION SUBCUTANEOUS at 08:16

## 2018-02-04 RX ADMIN — FENTANYL CITRATE 50 MCG: 50 INJECTION INTRAMUSCULAR; INTRAVENOUS at 21:57

## 2018-02-04 RX ADMIN — SUCCINYLCHOLINE CHLORIDE 100 MG: 20 INJECTION, SOLUTION INTRAMUSCULAR; INTRAVENOUS; PARENTERAL at 21:18

## 2018-02-04 RX ADMIN — FENTANYL CITRATE 50 MCG: 50 INJECTION INTRAMUSCULAR; INTRAVENOUS at 21:32

## 2018-02-05 ENCOUNTER — APPOINTMENT (OUTPATIENT)
Dept: GENERAL RADIOLOGY | Facility: HOSPITAL | Age: 58
End: 2018-02-05

## 2018-02-05 LAB
ANION GAP SERPL CALCULATED.3IONS-SCNC: 15.6 MMOL/L
ANION GAP SERPL CALCULATED.3IONS-SCNC: 16.8 MMOL/L
ANION GAP SERPL CALCULATED.3IONS-SCNC: 17.9 MMOL/L
ARTERIAL PATENCY WRIST A: ABNORMAL
ARTERIAL PATENCY WRIST A: ABNORMAL
ATMOSPHERIC PRESS: 755.7 MMHG
ATMOSPHERIC PRESS: 757.5 MMHG
BASE EXCESS BLDA CALC-SCNC: -10.2 MMOL/L (ref 0–2)
BASE EXCESS BLDA CALC-SCNC: -10.6 MMOL/L (ref 0–2)
BASOPHILS # BLD AUTO: 0.01 10*3/MM3 (ref 0–0.2)
BASOPHILS NFR BLD AUTO: 0.3 % (ref 0–1.5)
BDY SITE: ABNORMAL
BDY SITE: ABNORMAL
BUN BLD-MCNC: 47 MG/DL (ref 6–20)
BUN BLD-MCNC: 48 MG/DL (ref 6–20)
BUN BLD-MCNC: 52 MG/DL (ref 6–20)
BUN/CREAT SERPL: 12.5 (ref 7–25)
BUN/CREAT SERPL: 12.5 (ref 7–25)
BUN/CREAT SERPL: 13.2 (ref 7–25)
CALCIUM SPEC-SCNC: 6.7 MG/DL (ref 8.6–10.5)
CALCIUM SPEC-SCNC: 6.8 MG/DL (ref 8.6–10.5)
CALCIUM SPEC-SCNC: 6.9 MG/DL (ref 8.6–10.5)
CHLORIDE SERPL-SCNC: 101 MMOL/L (ref 98–107)
CHLORIDE SERPL-SCNC: 102 MMOL/L (ref 98–107)
CHLORIDE SERPL-SCNC: 102 MMOL/L (ref 98–107)
CHLORIDE UR-SCNC: 28 MMOL/L
CO2 SERPL-SCNC: 15.1 MMOL/L (ref 22–29)
CO2 SERPL-SCNC: 16.4 MMOL/L (ref 22–29)
CO2 SERPL-SCNC: 18.2 MMOL/L (ref 22–29)
CREAT BLD-MCNC: 3.77 MG/DL (ref 0.57–1)
CREAT BLD-MCNC: 3.83 MG/DL (ref 0.57–1)
CREAT BLD-MCNC: 3.93 MG/DL (ref 0.57–1)
CREAT UR-MCNC: 174.2 MG/DL
D-LACTATE SERPL-SCNC: 3.2 MMOL/L (ref 0.5–2)
D-LACTATE SERPL-SCNC: 3.2 MMOL/L (ref 0.5–2)
D-LACTATE SERPL-SCNC: 3.3 MMOL/L (ref 0.5–2)
D-LACTATE SERPL-SCNC: 3.8 MMOL/L (ref 0.5–2)
DEPRECATED RDW RBC AUTO: 50.1 FL (ref 37–54)
DEPRECATED RDW RBC AUTO: 50.7 FL (ref 37–54)
EOSINOPHIL # BLD AUTO: 0.02 10*3/MM3 (ref 0–0.7)
EOSINOPHIL NFR BLD AUTO: 0.6 % (ref 0.3–6.2)
ERYTHROCYTE [DISTWIDTH] IN BLOOD BY AUTOMATED COUNT: 15.1 % (ref 11.7–13)
ERYTHROCYTE [DISTWIDTH] IN BLOOD BY AUTOMATED COUNT: 15.5 % (ref 11.7–13)
GFR SERPL CREATININE-BSD FRML MDRD: 12 ML/MIN/1.73
GLUCOSE BLD-MCNC: 105 MG/DL (ref 65–99)
GLUCOSE BLD-MCNC: 122 MG/DL (ref 65–99)
GLUCOSE BLD-MCNC: 126 MG/DL (ref 65–99)
GLUCOSE BLDC GLUCOMTR-MCNC: 109 MG/DL (ref 70–130)
GLUCOSE BLDC GLUCOMTR-MCNC: 112 MG/DL (ref 70–130)
GLUCOSE BLDC GLUCOMTR-MCNC: 116 MG/DL (ref 70–130)
GLUCOSE BLDC GLUCOMTR-MCNC: 118 MG/DL (ref 70–130)
GLUCOSE BLDC GLUCOMTR-MCNC: 93 MG/DL (ref 70–130)
GLUCOSE BLDC GLUCOMTR-MCNC: 98 MG/DL (ref 70–130)
HCO3 BLDA-SCNC: 15.1 MMOL/L (ref 22–28)
HCO3 BLDA-SCNC: 18 MMOL/L (ref 22–28)
HCT VFR BLD AUTO: 26.7 % (ref 35.6–45.5)
HCT VFR BLD AUTO: 27.2 % (ref 35.6–45.5)
HCT VFR BLD AUTO: 28.5 % (ref 35.6–45.5)
HGB BLD-MCNC: 8.3 G/DL (ref 11.9–15.5)
HGB BLD-MCNC: 8.5 G/DL (ref 11.9–15.5)
HGB BLD-MCNC: 8.7 G/DL (ref 11.9–15.5)
HOLD SPECIMEN: NORMAL
HOROWITZ INDEX BLD+IHG-RTO: 100 %
HOROWITZ INDEX BLD+IHG-RTO: 30 %
IMM GRANULOCYTES # BLD: 0 10*3/MM3 (ref 0–0.03)
IMM GRANULOCYTES NFR BLD: 0 % (ref 0–0.5)
LYMPHOCYTES # BLD AUTO: 0.37 10*3/MM3 (ref 0.9–4.8)
LYMPHOCYTES NFR BLD AUTO: 11.9 % (ref 19.6–45.3)
MAGNESIUM SERPL-MCNC: 1.3 MG/DL (ref 1.6–2.6)
MAGNESIUM SERPL-MCNC: 1.9 MG/DL (ref 1.6–2.6)
MCH RBC QN AUTO: 28 PG (ref 26.9–32)
MCH RBC QN AUTO: 28 PG (ref 26.9–32)
MCHC RBC AUTO-ENTMCNC: 31.1 G/DL (ref 32.4–36.3)
MCHC RBC AUTO-ENTMCNC: 31.3 G/DL (ref 32.4–36.3)
MCV RBC AUTO: 89.5 FL (ref 80.5–98.2)
MCV RBC AUTO: 90.2 FL (ref 80.5–98.2)
MODALITY: ABNORMAL
MODALITY: ABNORMAL
MONOCYTES # BLD AUTO: 0.2 10*3/MM3 (ref 0.2–1.2)
MONOCYTES NFR BLD AUTO: 6.5 % (ref 5–12)
NEUTROPHILS # BLD AUTO: 2.49 10*3/MM3 (ref 1.9–8.1)
NEUTROPHILS NFR BLD AUTO: 80.4 % (ref 42.7–76)
NRBC BLD MANUAL-RTO: 0.8 /100 WBC (ref 0–0)
O2 A-A PPRESDIFF RESPIRATORY: 0.4 MMHG
O2 A-A PPRESDIFF RESPIRATORY: 0.5 MMHG
PCO2 BLDA: 30.3 MM HG (ref 35–45)
PCO2 BLDA: 54.4 MM HG (ref 35–45)
PEEP RESPIRATORY: 5 CM[H2O]
PEEP RESPIRATORY: 7.5 CM[H2O]
PH BLDA: 7.13 PH UNITS (ref 7.35–7.45)
PH BLDA: 7.31 PH UNITS (ref 7.35–7.45)
PLATELET # BLD AUTO: 226 10*3/MM3 (ref 140–500)
PLATELET # BLD AUTO: 233 10*3/MM3 (ref 140–500)
PMV BLD AUTO: 10.4 FL (ref 6–12)
PMV BLD AUTO: 9.7 FL (ref 6–12)
PO2 BLDA: 342.2 MM HG (ref 80–100)
PO2 BLDA: 82.8 MM HG (ref 80–100)
POTASSIUM BLD-SCNC: 4.8 MMOL/L (ref 3.5–5.2)
POTASSIUM BLD-SCNC: 4.9 MMOL/L (ref 3.5–5.2)
POTASSIUM BLD-SCNC: 5.1 MMOL/L (ref 3.5–5.2)
PROT UR-MCNC: 192 MG/DL
PROT/CREAT UR: 1102.2 MG/G CREA
RBC # BLD AUTO: 2.96 10*6/MM3 (ref 3.9–5.2)
RBC # BLD AUTO: 3.04 10*6/MM3 (ref 3.9–5.2)
SAO2 % BLDCOA: 95.2 % (ref 92–99)
SAO2 % BLDCOA: 99.9 % (ref 92–99)
SET MECH RESP RATE: 12
SET MECH RESP RATE: 20
SODIUM BLD-SCNC: 134 MMOL/L (ref 136–145)
SODIUM BLD-SCNC: 134 MMOL/L (ref 136–145)
SODIUM BLD-SCNC: 137 MMOL/L (ref 136–145)
SODIUM UR-SCNC: <20 MMOL/L
TOTAL RATE: 12 BREATHS/MINUTE
TOTAL RATE: 22 BREATHS/MINUTE
VANCOMYCIN SERPL-MCNC: 25 MCG/ML (ref 5–40)
VENTILATOR MODE: ABNORMAL
VENTILATOR MODE: AC
VT ON VENT VENT: 550 ML
VT ON VENT VENT: 716 ML
WBC NRBC COR # BLD: 3.1 10*3/MM3 (ref 4.5–10.7)
WBC NRBC COR # BLD: 3.1 10*3/MM3 (ref 4.5–10.7)

## 2018-02-05 PROCEDURE — 25010000002 MIDAZOLAM PER 1 MG: Performed by: ANESTHESIOLOGY

## 2018-02-05 PROCEDURE — 84300 ASSAY OF URINE SODIUM: CPT | Performed by: INTERNAL MEDICINE

## 2018-02-05 PROCEDURE — 85014 HEMATOCRIT: CPT | Performed by: SURGERY

## 2018-02-05 PROCEDURE — 99024 POSTOP FOLLOW-UP VISIT: CPT | Performed by: SURGERY

## 2018-02-05 PROCEDURE — 25010000002 PIPERACILLIN SOD-TAZOBACTAM PER 1 G: Performed by: INTERNAL MEDICINE

## 2018-02-05 PROCEDURE — 83605 ASSAY OF LACTIC ACID: CPT | Performed by: SURGERY

## 2018-02-05 PROCEDURE — 94003 VENT MGMT INPAT SUBQ DAY: CPT

## 2018-02-05 PROCEDURE — 25010000002 FENTANYL CITRATE (PF) 100 MCG/2ML SOLUTION: Performed by: INTERNAL MEDICINE

## 2018-02-05 PROCEDURE — 25010000002 MAGNESIUM SULFATE IN D5W 1G/100ML (PREMIX) 1-5 GM/100ML-% SOLUTION: Performed by: INTERNAL MEDICINE

## 2018-02-05 PROCEDURE — 86901 BLOOD TYPING SEROLOGIC RH(D): CPT

## 2018-02-05 PROCEDURE — 85027 COMPLETE CBC AUTOMATED: CPT | Performed by: SURGERY

## 2018-02-05 PROCEDURE — 82962 GLUCOSE BLOOD TEST: CPT

## 2018-02-05 PROCEDURE — 85025 COMPLETE CBC W/AUTO DIFF WBC: CPT | Performed by: SURGERY

## 2018-02-05 PROCEDURE — 84156 ASSAY OF PROTEIN URINE: CPT | Performed by: INTERNAL MEDICINE

## 2018-02-05 PROCEDURE — 80048 BASIC METABOLIC PNL TOTAL CA: CPT | Performed by: SURGERY

## 2018-02-05 PROCEDURE — 25010000002 PROPOFOL 10 MG/ML EMULSION: Performed by: ANESTHESIOLOGY

## 2018-02-05 PROCEDURE — 25010000002 ENOXAPARIN PER 10 MG: Performed by: SURGERY

## 2018-02-05 PROCEDURE — 80202 ASSAY OF VANCOMYCIN: CPT | Performed by: INTERNAL MEDICINE

## 2018-02-05 PROCEDURE — 87147 CULTURE TYPE IMMUNOLOGIC: CPT | Performed by: INTERNAL MEDICINE

## 2018-02-05 PROCEDURE — 94799 UNLISTED PULMONARY SVC/PX: CPT

## 2018-02-05 PROCEDURE — 80048 BASIC METABOLIC PNL TOTAL CA: CPT | Performed by: INTERNAL MEDICINE

## 2018-02-05 PROCEDURE — 85018 HEMOGLOBIN: CPT | Performed by: SURGERY

## 2018-02-05 PROCEDURE — 82803 BLOOD GASES ANY COMBINATION: CPT

## 2018-02-05 PROCEDURE — 86900 BLOOD TYPING SEROLOGIC ABO: CPT

## 2018-02-05 PROCEDURE — 83605 ASSAY OF LACTIC ACID: CPT | Performed by: INTERNAL MEDICINE

## 2018-02-05 PROCEDURE — 82570 ASSAY OF URINE CREATININE: CPT | Performed by: INTERNAL MEDICINE

## 2018-02-05 PROCEDURE — 83735 ASSAY OF MAGNESIUM: CPT | Performed by: SURGERY

## 2018-02-05 PROCEDURE — 83735 ASSAY OF MAGNESIUM: CPT | Performed by: INTERNAL MEDICINE

## 2018-02-05 PROCEDURE — 87081 CULTURE SCREEN ONLY: CPT | Performed by: INTERNAL MEDICINE

## 2018-02-05 PROCEDURE — 87040 BLOOD CULTURE FOR BACTERIA: CPT | Performed by: INTERNAL MEDICINE

## 2018-02-05 PROCEDURE — 87186 SC STD MICRODIL/AGAR DIL: CPT | Performed by: INTERNAL MEDICINE

## 2018-02-05 PROCEDURE — 87150 DNA/RNA AMPLIFIED PROBE: CPT | Performed by: INTERNAL MEDICINE

## 2018-02-05 PROCEDURE — 74018 RADEX ABDOMEN 1 VIEW: CPT

## 2018-02-05 PROCEDURE — 25010000002 FENTANYL CITRATE (PF) 250 MCG/5ML SOLUTION: Performed by: INTERNAL MEDICINE

## 2018-02-05 PROCEDURE — 82436 ASSAY OF URINE CHLORIDE: CPT | Performed by: INTERNAL MEDICINE

## 2018-02-05 RX ORDER — ONDANSETRON 2 MG/ML
4 INJECTION INTRAMUSCULAR; INTRAVENOUS EVERY 6 HOURS PRN
Status: DISCONTINUED | OUTPATIENT
Start: 2018-02-05 | End: 2018-03-12 | Stop reason: HOSPADM

## 2018-02-05 RX ORDER — ONDANSETRON 4 MG/1
4 TABLET, FILM COATED ORAL EVERY 6 HOURS PRN
Status: DISCONTINUED | OUTPATIENT
Start: 2018-02-05 | End: 2018-02-13

## 2018-02-05 RX ORDER — MAGNESIUM SULFATE 1 G/100ML
1 INJECTION INTRAVENOUS
Status: COMPLETED | OUTPATIENT
Start: 2018-02-05 | End: 2018-02-05

## 2018-02-05 RX ORDER — FAMOTIDINE 10 MG/ML
20 INJECTION, SOLUTION INTRAVENOUS DAILY
Status: DISCONTINUED | OUTPATIENT
Start: 2018-02-05 | End: 2018-02-06

## 2018-02-05 RX ORDER — SODIUM CHLORIDE 9 MG/ML
200 INJECTION, SOLUTION INTRAVENOUS CONTINUOUS
Status: DISCONTINUED | OUTPATIENT
Start: 2018-02-05 | End: 2018-02-05

## 2018-02-05 RX ORDER — ACETAMINOPHEN 325 MG/1
650 TABLET ORAL EVERY 4 HOURS PRN
Status: DISCONTINUED | OUTPATIENT
Start: 2018-02-05 | End: 2018-02-13

## 2018-02-05 RX ORDER — FENTANYL CITRATE 50 UG/ML
50 INJECTION, SOLUTION INTRAMUSCULAR; INTRAVENOUS
Status: DISCONTINUED | OUTPATIENT
Start: 2018-02-05 | End: 2018-02-08

## 2018-02-05 RX ORDER — FAMOTIDINE 10 MG/ML
20 INJECTION, SOLUTION INTRAVENOUS ONCE
Status: COMPLETED | OUTPATIENT
Start: 2018-02-05 | End: 2018-02-05

## 2018-02-05 RX ORDER — ACETAMINOPHEN 650 MG/1
325 SUPPOSITORY RECTAL EVERY 4 HOURS PRN
Status: DISCONTINUED | OUTPATIENT
Start: 2018-02-05 | End: 2018-03-12 | Stop reason: HOSPADM

## 2018-02-05 RX ORDER — ONDANSETRON 4 MG/1
4 TABLET, ORALLY DISINTEGRATING ORAL EVERY 6 HOURS PRN
Status: DISCONTINUED | OUTPATIENT
Start: 2018-02-05 | End: 2018-03-12 | Stop reason: HOSPADM

## 2018-02-05 RX ADMIN — SODIUM BICARBONATE 150 ML/HR: 84 INJECTION, SOLUTION INTRAVENOUS at 08:28

## 2018-02-05 RX ADMIN — PROPOFOL 10 MCG/KG/MIN: 10 INJECTION, EMULSION INTRAVENOUS at 01:15

## 2018-02-05 RX ADMIN — NOREPINEPHRINE BITARTRATE 0.08 MCG/KG/MIN: 1 INJECTION, SOLUTION, CONCENTRATE INTRAVENOUS at 03:33

## 2018-02-05 RX ADMIN — NOREPINEPHRINE BITARTRATE 0.25 MCG/KG/MIN: 1 INJECTION, SOLUTION, CONCENTRATE INTRAVENOUS at 12:28

## 2018-02-05 RX ADMIN — MIDAZOLAM 1 MG: 1 INJECTION INTRAMUSCULAR; INTRAVENOUS at 00:55

## 2018-02-05 RX ADMIN — MAGNESIUM SULFATE HEPTAHYDRATE 1 G: 1 INJECTION, SOLUTION INTRAVENOUS at 11:40

## 2018-02-05 RX ADMIN — TAZOBACTAM SODIUM AND PIPERACILLIN SODIUM 3.38 G: 375; 3 INJECTION, SOLUTION INTRAVENOUS at 03:23

## 2018-02-05 RX ADMIN — FENTANYL CITRATE 50 MCG: 50 INJECTION, SOLUTION INTRAMUSCULAR; INTRAVENOUS at 07:58

## 2018-02-05 RX ADMIN — FENTANYL CITRATE 50 MCG: 50 INJECTION, SOLUTION INTRAMUSCULAR; INTRAVENOUS at 02:00

## 2018-02-05 RX ADMIN — ACETAMINOPHEN 325 MG: 650 SUPPOSITORY RECTAL at 06:53

## 2018-02-05 RX ADMIN — FENTANYL CITRATE 50 MCG: 50 INJECTION, SOLUTION INTRAMUSCULAR; INTRAVENOUS at 03:12

## 2018-02-05 RX ADMIN — FENTANYL CITRATE 50 MCG: 50 INJECTION, SOLUTION INTRAMUSCULAR; INTRAVENOUS at 17:52

## 2018-02-05 RX ADMIN — FENTANYL CITRATE 50 MCG: 50 INJECTION, SOLUTION INTRAMUSCULAR; INTRAVENOUS at 11:31

## 2018-02-05 RX ADMIN — FENTANYL CITRATE 50 MCG: 50 INJECTION, SOLUTION INTRAMUSCULAR; INTRAVENOUS at 15:42

## 2018-02-05 RX ADMIN — TAZOBACTAM SODIUM AND PIPERACILLIN SODIUM 3.38 G: 375; 3 INJECTION, SOLUTION INTRAVENOUS at 15:42

## 2018-02-05 RX ADMIN — SODIUM CHLORIDE 200 ML/HR: 9 INJECTION, SOLUTION INTRAVENOUS at 01:10

## 2018-02-05 RX ADMIN — FENTANYL CITRATE 50 MCG: 50 INJECTION, SOLUTION INTRAMUSCULAR; INTRAVENOUS at 06:16

## 2018-02-05 RX ADMIN — FENTANYL CITRATE 50 MCG: 50 INJECTION, SOLUTION INTRAMUSCULAR; INTRAVENOUS at 22:26

## 2018-02-05 RX ADMIN — FENTANYL CITRATE 50 MCG: 50 INJECTION, SOLUTION INTRAMUSCULAR; INTRAVENOUS at 07:11

## 2018-02-05 RX ADMIN — MAGNESIUM SULFATE HEPTAHYDRATE 1 G: 1 INJECTION, SOLUTION INTRAVENOUS at 10:53

## 2018-02-05 RX ADMIN — MAGNESIUM SULFATE HEPTAHYDRATE 1 G: 1 INJECTION, SOLUTION INTRAVENOUS at 10:36

## 2018-02-05 RX ADMIN — ENOXAPARIN SODIUM 30 MG: 30 INJECTION SUBCUTANEOUS at 10:36

## 2018-02-05 RX ADMIN — SODIUM BICARBONATE 150 ML/HR: 84 INJECTION, SOLUTION INTRAVENOUS at 23:35

## 2018-02-05 RX ADMIN — SODIUM CHLORIDE, POTASSIUM CHLORIDE, SODIUM LACTATE AND CALCIUM CHLORIDE 1000 ML: 600; 310; 30; 20 INJECTION, SOLUTION INTRAVENOUS at 03:24

## 2018-02-05 RX ADMIN — Medication 0.18 MCG/KG/MIN: at 15:36

## 2018-02-05 RX ADMIN — FENTANYL CITRATE 50 MCG: 50 INJECTION, SOLUTION INTRAMUSCULAR; INTRAVENOUS at 13:49

## 2018-02-05 RX ADMIN — FENTANYL CITRATE 50 MCG: 50 INJECTION, SOLUTION INTRAMUSCULAR; INTRAVENOUS at 04:56

## 2018-02-05 RX ADMIN — FAMOTIDINE 20 MG: 10 INJECTION INTRAVENOUS at 11:58

## 2018-02-05 RX ADMIN — NOREPINEPHRINE BITARTRATE 0.3 MCG/KG/MIN: 1 INJECTION, SOLUTION, CONCENTRATE INTRAVENOUS at 08:22

## 2018-02-05 RX ADMIN — FENTANYL CITRATE 50 MCG: 50 INJECTION, SOLUTION INTRAMUSCULAR; INTRAVENOUS at 20:33

## 2018-02-05 RX ADMIN — SODIUM BICARBONATE 150 ML/HR: 84 INJECTION, SOLUTION INTRAVENOUS at 15:31

## 2018-02-05 NOTE — ANESTHESIA PROCEDURE NOTES
Airway  Urgency: elective    Airway not difficult    General Information and Staff    Patient location during procedure: OR  Anesthesiologist: YIN CELAYA    Indications and Patient Condition  Indications for airway management: airway protection    Preoxygenated: yes  Mask difficulty assessment: 1 - vent by mask    Final Airway Details  Final airway type: endotracheal airway      Successful airway: ETT  Cuffed: yes   Successful intubation technique: direct laryngoscopy  Endotracheal tube insertion site: oral  Blade: Ceja  Blade size: #2  ETT size: 7.5 mm  Cormack-Lehane Classification: grade I - full view of glottis  Placement verified by: chest auscultation   Measured from: teeth  ETT to teeth (cm): 22  Number of attempts at approach: 1

## 2018-02-05 NOTE — ANESTHESIA POSTPROCEDURE EVALUATION
Patient: Mariya Campos    Procedure Summary     Date Anesthesia Start Anesthesia Stop Room / Location    02/04/18 [id number] [id number]  [facility] /  [facility]       Procedure Diagnosis Surgeon Provider    EXPLORATORY LAPAROTOMY WITH SMALL BOWEL ANASTAMOSIS, LYSIS OF ADHESIONS, REMOVAL OF MESH (N/A Abdomen) Incisional hernia, without obstruction or gangrene  (Incisional hernia, without obstruction or gangrene [K43.2]) MD Brant Neville MD          Anesthesia Type: general  Last vitals  BP   122/68 (02/05/18 0000)   Temp   37.7 °C (99.8 °F) (02/04/18 2335)   Pulse   118 (02/05/18 0000)   Resp   12 (02/05/18 0000)     SpO2   100 % (02/05/18 0000)     Post Anesthesia Care and Evaluation    Patient location during evaluation: PACU  Patient participation: complete - patient cannot participate  Level of consciousness: obtunded/minimal responses  Pain management: adequate  Airway patency: patent  Anesthetic complications: No anesthetic complications  PONV Status: NA  Cardiovascular status: acceptable  Respiratory status: intubated and acceptable  Hydration status: acceptable

## 2018-02-05 NOTE — ANESTHESIA PREPROCEDURE EVALUATION
Anesthesia Evaluation            Airway   Mallampati: IV  possible difficult intubation  Dental - normal exam     Pulmonary - normal exam   (+) asthma,     PE comment: nonlabored  Cardiovascular     Rhythm: regular  Rate: abnormal    (+) hypertension,     PE comment: tachycardic    Neuro/Psych  (+) numbness (peripheral neuropathy),     GI/Hepatic/Renal/Endo    (+) morbid obesity, hiatal hernia, renal disease (CKD stage 3),     ROS Comment: Draining wound R abd s/p robotic ventral hernia repair    Musculoskeletal     Abdominal    Substance History      OB/GYN          Other      history of cancer (Colon CA)      Other Comment: SIRS                                        Anesthesia Plan    ASA 4 - emergent     general     intravenous induction   Anesthetic plan and risks discussed with patient.

## 2018-02-05 NOTE — ANESTHESIA PROCEDURE NOTES
Central Line    Patient location during procedure: OR  Start time: 2/4/2018 10:00 PM  Stop Time:2/4/2018 10:21 PM  Indications: vascular access  Staff  Anesthesiologist: YIN CELAYA  Preanesthetic Checklist  Completed: patient identified, site marked, surgical consent, pre-op evaluation, timeout performed, IV checked, risks and benefits discussed and monitors and equipment checked  Central Line Prep  Sterile Tech:gloves, mask and sterile barriers  Prep: chloraprep  Patient monitoring: blood pressure monitoring, continuous pulse oximetry and EKG  Central Line Procedure  Laterality:right  Location:internal jugular  Catheter Type:double lumen  Catheter Size:8 Fr  Guidance:palpation technique  Assessment  Post procedure:line sutured, biopatch applied and occlusive dressing applied  Assessement:blood return through all ports  Complications:no  Patient Tolerance:patient tolerated the procedure well with no apparent complications

## 2018-02-06 LAB
ALBUMIN SERPL-MCNC: 1.8 G/DL (ref 3.5–5.2)
ANION GAP SERPL CALCULATED.3IONS-SCNC: 13.2 MMOL/L
ANION GAP SERPL CALCULATED.3IONS-SCNC: 13.8 MMOL/L
ANISOCYTOSIS BLD QL: ABNORMAL
ARTERIAL PATENCY WRIST A: POSITIVE
ATMOSPHERIC PRESS: 753.7 MMHG
ATMOSPHERIC PRESS: 756.6 MMHG
ATMOSPHERIC PRESS: 758.6 MMHG
BASE EXCESS BLDA CALC-SCNC: 2.6 MMOL/L (ref 0–2)
BASE EXCESS BLDA CALC-SCNC: 3.3 MMOL/L (ref 0–2)
BASE EXCESS BLDA CALC-SCNC: 6.4 MMOL/L (ref 0–2)
BDY SITE: ABNORMAL
BUN BLD-MCNC: 57 MG/DL (ref 6–20)
BUN BLD-MCNC: 64 MG/DL (ref 6–20)
BUN/CREAT SERPL: 13.9 (ref 7–25)
BUN/CREAT SERPL: 16 (ref 7–25)
CALCIUM SPEC-SCNC: 6.7 MG/DL (ref 8.6–10.5)
CALCIUM SPEC-SCNC: 7.3 MG/DL (ref 8.6–10.5)
CHLORIDE SERPL-SCNC: 93 MMOL/L (ref 98–107)
CHLORIDE SERPL-SCNC: 97 MMOL/L (ref 98–107)
CLUMPED PLATELETS: PRESENT
CO2 SERPL-SCNC: 23.2 MMOL/L (ref 22–29)
CO2 SERPL-SCNC: 27.8 MMOL/L (ref 22–29)
CREAT BLD-MCNC: 4 MG/DL (ref 0.57–1)
CREAT BLD-MCNC: 4.09 MG/DL (ref 0.57–1)
D-LACTATE SERPL-SCNC: 2.7 MMOL/L (ref 0.5–2)
D-LACTATE SERPL-SCNC: 3.6 MMOL/L (ref 0.5–2)
D-LACTATE SERPL-SCNC: 3.6 MMOL/L (ref 0.5–2)
DEPRECATED RDW RBC AUTO: 50.7 FL (ref 37–54)
DEPRECATED RDW RBC AUTO: 50.8 FL (ref 37–54)
ERYTHROCYTE [DISTWIDTH] IN BLOOD BY AUTOMATED COUNT: 15.8 % (ref 11.7–13)
ERYTHROCYTE [DISTWIDTH] IN BLOOD BY AUTOMATED COUNT: 15.9 % (ref 11.7–13)
GAS FLOW AIRWAY: 3.5 LPM
GFR SERPL CREATININE-BSD FRML MDRD: 11 ML/MIN/1.73
GFR SERPL CREATININE-BSD FRML MDRD: 12 ML/MIN/1.73
GLUCOSE BLD-MCNC: 108 MG/DL (ref 65–99)
GLUCOSE BLD-MCNC: 98 MG/DL (ref 65–99)
GLUCOSE BLDC GLUCOMTR-MCNC: 104 MG/DL (ref 70–130)
GLUCOSE BLDC GLUCOMTR-MCNC: 108 MG/DL (ref 70–130)
GLUCOSE BLDC GLUCOMTR-MCNC: 112 MG/DL (ref 70–130)
HCO3 BLDA-SCNC: 26.3 MMOL/L (ref 22–28)
HCO3 BLDA-SCNC: 27 MMOL/L (ref 22–28)
HCO3 BLDA-SCNC: 30.6 MMOL/L (ref 22–28)
HCT VFR BLD AUTO: 20.5 % (ref 35.6–45.5)
HCT VFR BLD AUTO: 24.8 % (ref 35.6–45.5)
HGB BLD-MCNC: 6.5 G/DL (ref 11.9–15.5)
HGB BLD-MCNC: 8.2 G/DL (ref 11.9–15.5)
HOROWITZ INDEX BLD+IHG-RTO: 30 %
HOROWITZ INDEX BLD+IHG-RTO: 30 %
LAB AP CASE REPORT: NORMAL
LYMPHOCYTES # BLD MANUAL: 1.16 10*3/MM3 (ref 0.9–4.8)
LYMPHOCYTES NFR BLD MANUAL: 21 % (ref 19.6–45.3)
LYMPHOCYTES NFR BLD MANUAL: 5 % (ref 5–12)
Lab: NORMAL
MAGNESIUM SERPL-MCNC: 1.9 MG/DL (ref 1.6–2.6)
MAGNESIUM SERPL-MCNC: 1.9 MG/DL (ref 1.6–2.6)
MCH RBC QN AUTO: 27.3 PG (ref 26.9–32)
MCH RBC QN AUTO: 28.8 PG (ref 26.9–32)
MCHC RBC AUTO-ENTMCNC: 31.7 G/DL (ref 32.4–36.3)
MCHC RBC AUTO-ENTMCNC: 33.1 G/DL (ref 32.4–36.3)
MCV RBC AUTO: 86.1 FL (ref 80.5–98.2)
MCV RBC AUTO: 87 FL (ref 80.5–98.2)
METAMYELOCYTES NFR BLD MANUAL: 2 % (ref 0–0)
MODALITY: ABNORMAL
MONOCYTES # BLD AUTO: 0.28 10*3/MM3 (ref 0.2–1.2)
MRSA SPEC QL CULT: ABNORMAL
MRSA SPEC QL CULT: ABNORMAL
NEUTROPHILS # BLD AUTO: 3.97 10*3/MM3 (ref 1.9–8.1)
NEUTROPHILS NFR BLD MANUAL: 72 % (ref 42.7–76)
O2 A-A PPRESDIFF RESPIRATORY: 0.5 MMHG
O2 A-A PPRESDIFF RESPIRATORY: 0.5 MMHG
OVALOCYTES BLD QL SMEAR: ABNORMAL
PATH REPORT.FINAL DX SPEC: NORMAL
PATH REPORT.GROSS SPEC: NORMAL
PCO2 BLDA: 35.5 MM HG (ref 35–45)
PCO2 BLDA: 36.4 MM HG (ref 35–45)
PCO2 BLDA: 41.6 MM HG (ref 35–45)
PEEP RESPIRATORY: 7.5 CM[H2O]
PEEP RESPIRATORY: 7.5 CM[H2O]
PH BLDA: 7.47 PH UNITS (ref 7.35–7.45)
PH BLDA: 7.48 PH UNITS (ref 7.35–7.45)
PH BLDA: 7.48 PH UNITS (ref 7.35–7.45)
PHOSPHATE SERPL-MCNC: 2.8 MG/DL (ref 2.5–4.5)
PHOSPHATE SERPL-MCNC: 3.4 MG/DL (ref 2.5–4.5)
PLATELET # BLD AUTO: 113 10*3/MM3 (ref 140–500)
PLATELET # BLD AUTO: 93 10*3/MM3 (ref 140–500)
PMV BLD AUTO: 10.3 FL (ref 6–12)
PMV BLD AUTO: 10.4 FL (ref 6–12)
PO2 BLDA: 81 MM HG (ref 80–100)
PO2 BLDA: 86.3 MM HG (ref 80–100)
PO2 BLDA: 90 MM HG (ref 80–100)
POTASSIUM BLD-SCNC: 3.7 MMOL/L (ref 3.5–5.2)
POTASSIUM BLD-SCNC: 4.5 MMOL/L (ref 3.5–5.2)
PSV: 7 CMH2O
PSV: 7 CMH2O
RBC # BLD AUTO: 2.38 10*6/MM3 (ref 3.9–5.2)
RBC # BLD AUTO: 2.85 10*6/MM3 (ref 3.9–5.2)
SAO2 % BLDCOA: 96.6 % (ref 92–99)
SAO2 % BLDCOA: 97.3 % (ref 92–99)
SAO2 % BLDCOA: 97.6 % (ref 92–99)
SCAN SLIDE: NORMAL
SODIUM BLD-SCNC: 134 MMOL/L (ref 136–145)
SODIUM BLD-SCNC: 134 MMOL/L (ref 136–145)
TOTAL RATE: 21 BREATHS/MINUTE
TOTAL RATE: 23 BREATHS/MINUTE
TOTAL RATE: 28 BREATHS/MINUTE
TOXIC GRANULATION: ABNORMAL
TROPONIN T SERPL-MCNC: <0.01 NG/ML (ref 0–0.03)
URATE SERPL-MCNC: 7.2 MG/DL (ref 2.4–5.7)
VANCOMYCIN SERPL-MCNC: 21.6 MCG/ML (ref 5–40)
VENTILATOR MODE: ABNORMAL
VENTILATOR MODE: ABNORMAL
WBC NRBC COR # BLD: 5.52 10*3/MM3 (ref 4.5–10.7)
WBC NRBC COR # BLD: 8.41 10*3/MM3 (ref 4.5–10.7)

## 2018-02-06 PROCEDURE — 80048 BASIC METABOLIC PNL TOTAL CA: CPT | Performed by: INTERNAL MEDICINE

## 2018-02-06 PROCEDURE — 85025 COMPLETE CBC W/AUTO DIFF WBC: CPT | Performed by: INTERNAL MEDICINE

## 2018-02-06 PROCEDURE — 86900 BLOOD TYPING SEROLOGIC ABO: CPT

## 2018-02-06 PROCEDURE — 93005 ELECTROCARDIOGRAM TRACING: CPT | Performed by: INTERNAL MEDICINE

## 2018-02-06 PROCEDURE — 83735 ASSAY OF MAGNESIUM: CPT | Performed by: INTERNAL MEDICINE

## 2018-02-06 PROCEDURE — 80202 ASSAY OF VANCOMYCIN: CPT | Performed by: INTERNAL MEDICINE

## 2018-02-06 PROCEDURE — 36430 TRANSFUSION BLD/BLD COMPNT: CPT

## 2018-02-06 PROCEDURE — 80069 RENAL FUNCTION PANEL: CPT | Performed by: INTERNAL MEDICINE

## 2018-02-06 PROCEDURE — 99024 POSTOP FOLLOW-UP VISIT: CPT | Performed by: SURGERY

## 2018-02-06 PROCEDURE — 94799 UNLISTED PULMONARY SVC/PX: CPT

## 2018-02-06 PROCEDURE — 93010 ELECTROCARDIOGRAM REPORT: CPT | Performed by: INTERNAL MEDICINE

## 2018-02-06 PROCEDURE — 83605 ASSAY OF LACTIC ACID: CPT | Performed by: INTERNAL MEDICINE

## 2018-02-06 PROCEDURE — 36600 WITHDRAWAL OF ARTERIAL BLOOD: CPT

## 2018-02-06 PROCEDURE — 84550 ASSAY OF BLOOD/URIC ACID: CPT | Performed by: INTERNAL MEDICINE

## 2018-02-06 PROCEDURE — 25010000002 FENTANYL CITRATE (PF) 100 MCG/2ML SOLUTION: Performed by: INTERNAL MEDICINE

## 2018-02-06 PROCEDURE — 82962 GLUCOSE BLOOD TEST: CPT

## 2018-02-06 PROCEDURE — 84100 ASSAY OF PHOSPHORUS: CPT | Performed by: INTERNAL MEDICINE

## 2018-02-06 PROCEDURE — 94003 VENT MGMT INPAT SUBQ DAY: CPT

## 2018-02-06 PROCEDURE — 85027 COMPLETE CBC AUTOMATED: CPT | Performed by: INTERNAL MEDICINE

## 2018-02-06 PROCEDURE — 25010000002 PIPERACILLIN SOD-TAZOBACTAM PER 1 G: Performed by: INTERNAL MEDICINE

## 2018-02-06 PROCEDURE — 25010000002 ONDANSETRON PER 1 MG: Performed by: SURGERY

## 2018-02-06 PROCEDURE — 25010000002 PROMETHAZINE PER 50 MG: Performed by: SURGERY

## 2018-02-06 PROCEDURE — P9016 RBC LEUKOCYTES REDUCED: HCPCS

## 2018-02-06 PROCEDURE — 84484 ASSAY OF TROPONIN QUANT: CPT | Performed by: INTERNAL MEDICINE

## 2018-02-06 PROCEDURE — 25010000002 FUROSEMIDE PER 20 MG: Performed by: INTERNAL MEDICINE

## 2018-02-06 PROCEDURE — 82803 BLOOD GASES ANY COMBINATION: CPT

## 2018-02-06 PROCEDURE — 85007 BL SMEAR W/DIFF WBC COUNT: CPT | Performed by: INTERNAL MEDICINE

## 2018-02-06 RX ORDER — FUROSEMIDE 10 MG/ML
80 INJECTION INTRAMUSCULAR; INTRAVENOUS EVERY 6 HOURS
Status: DISCONTINUED | OUTPATIENT
Start: 2018-02-06 | End: 2018-02-08

## 2018-02-06 RX ORDER — DILTIAZEM HYDROCHLORIDE 5 MG/ML
15 INJECTION INTRAVENOUS ONCE
Status: COMPLETED | OUTPATIENT
Start: 2018-02-06 | End: 2018-02-06

## 2018-02-06 RX ORDER — PANTOPRAZOLE SODIUM 40 MG/10ML
40 INJECTION, POWDER, LYOPHILIZED, FOR SOLUTION INTRAVENOUS
Status: DISCONTINUED | OUTPATIENT
Start: 2018-02-06 | End: 2018-02-09 | Stop reason: CLARIF

## 2018-02-06 RX ADMIN — SODIUM BICARBONATE 100 ML/HR: 84 INJECTION, SOLUTION INTRAVENOUS at 20:54

## 2018-02-06 RX ADMIN — FENTANYL CITRATE 50 MCG: 50 INJECTION, SOLUTION INTRAMUSCULAR; INTRAVENOUS at 06:48

## 2018-02-06 RX ADMIN — FENTANYL CITRATE 50 MCG: 50 INJECTION, SOLUTION INTRAMUSCULAR; INTRAVENOUS at 20:12

## 2018-02-06 RX ADMIN — DILTIAZEM HYDROCHLORIDE 15 MG: 5 INJECTION INTRAVENOUS at 20:28

## 2018-02-06 RX ADMIN — FUROSEMIDE 80 MG: 10 INJECTION, SOLUTION INTRAMUSCULAR; INTRAVENOUS at 09:54

## 2018-02-06 RX ADMIN — FENTANYL CITRATE 50 MCG: 50 INJECTION, SOLUTION INTRAMUSCULAR; INTRAVENOUS at 18:01

## 2018-02-06 RX ADMIN — FENTANYL CITRATE 50 MCG: 50 INJECTION, SOLUTION INTRAMUSCULAR; INTRAVENOUS at 22:52

## 2018-02-06 RX ADMIN — FENTANYL CITRATE 50 MCG: 50 INJECTION, SOLUTION INTRAMUSCULAR; INTRAVENOUS at 00:14

## 2018-02-06 RX ADMIN — FENTANYL CITRATE 50 MCG: 50 INJECTION, SOLUTION INTRAMUSCULAR; INTRAVENOUS at 11:26

## 2018-02-06 RX ADMIN — SODIUM BICARBONATE 150 ML/HR: 84 INJECTION, SOLUTION INTRAVENOUS at 07:07

## 2018-02-06 RX ADMIN — TAZOBACTAM SODIUM AND PIPERACILLIN SODIUM 3.38 G: 375; 3 INJECTION, SOLUTION INTRAVENOUS at 15:51

## 2018-02-06 RX ADMIN — FENTANYL CITRATE 50 MCG: 50 INJECTION, SOLUTION INTRAMUSCULAR; INTRAVENOUS at 05:27

## 2018-02-06 RX ADMIN — FENTANYL CITRATE 50 MCG: 50 INJECTION, SOLUTION INTRAMUSCULAR; INTRAVENOUS at 02:55

## 2018-02-06 RX ADMIN — ONDANSETRON 4 MG: 2 INJECTION INTRAMUSCULAR; INTRAVENOUS at 20:08

## 2018-02-06 RX ADMIN — PROMETHAZINE HYDROCHLORIDE 12.5 MG: 25 INJECTION INTRAMUSCULAR; INTRAVENOUS at 23:29

## 2018-02-06 RX ADMIN — FENTANYL CITRATE 50 MCG: 50 INJECTION, SOLUTION INTRAMUSCULAR; INTRAVENOUS at 21:48

## 2018-02-06 RX ADMIN — FUROSEMIDE 80 MG: 10 INJECTION, SOLUTION INTRAMUSCULAR; INTRAVENOUS at 16:43

## 2018-02-06 RX ADMIN — PANTOPRAZOLE SODIUM 40 MG: 40 INJECTION, POWDER, FOR SOLUTION INTRAVENOUS at 11:25

## 2018-02-06 RX ADMIN — TAZOBACTAM SODIUM AND PIPERACILLIN SODIUM 3.38 G: 375; 3 INJECTION, SOLUTION INTRAVENOUS at 03:44

## 2018-02-06 RX ADMIN — FENTANYL CITRATE 50 MCG: 50 INJECTION, SOLUTION INTRAMUSCULAR; INTRAVENOUS at 15:49

## 2018-02-06 RX ADMIN — METOPROLOL TARTRATE 5 MG: 5 INJECTION, SOLUTION INTRAVENOUS at 22:51

## 2018-02-06 RX ADMIN — SODIUM CHLORIDE 5 MG/HR: 900 INJECTION, SOLUTION INTRAVENOUS at 20:54

## 2018-02-06 RX ADMIN — ONDANSETRON 4 MG: 2 INJECTION INTRAMUSCULAR; INTRAVENOUS at 03:42

## 2018-02-06 RX ADMIN — FENTANYL CITRATE 50 MCG: 50 INJECTION, SOLUTION INTRAMUSCULAR; INTRAVENOUS at 08:34

## 2018-02-06 RX ADMIN — FUROSEMIDE 80 MG: 10 INJECTION, SOLUTION INTRAMUSCULAR; INTRAVENOUS at 23:17

## 2018-02-06 RX ADMIN — FENTANYL CITRATE 50 MCG: 50 INJECTION, SOLUTION INTRAMUSCULAR; INTRAVENOUS at 14:02

## 2018-02-07 LAB
ABO + RH BLD: NORMAL
ABO + RH BLD: NORMAL
ALBUMIN SERPL-MCNC: 1.7 G/DL (ref 3.5–5.2)
ALBUMIN/GLOB SERPL: 0.5 G/DL
ALP SERPL-CCNC: 55 U/L (ref 39–117)
ALT SERPL W P-5'-P-CCNC: 15 U/L (ref 1–33)
ANION GAP SERPL CALCULATED.3IONS-SCNC: 13.4 MMOL/L
AST SERPL-CCNC: 25 U/L (ref 1–32)
BASOPHILS # BLD AUTO: 0.01 10*3/MM3 (ref 0–0.2)
BASOPHILS NFR BLD AUTO: 0.1 % (ref 0–1.5)
BH BB BLOOD EXPIRATION DATE: NORMAL
BH BB BLOOD EXPIRATION DATE: NORMAL
BH BB BLOOD TYPE BARCODE: 7300
BH BB BLOOD TYPE BARCODE: 7300
BH BB DISPENSE STATUS: NORMAL
BH BB DISPENSE STATUS: NORMAL
BH BB PRODUCT CODE: NORMAL
BH BB PRODUCT CODE: NORMAL
BH BB UNIT NUMBER: NORMAL
BH BB UNIT NUMBER: NORMAL
BILIRUB SERPL-MCNC: 2 MG/DL (ref 0.1–1.2)
BUN BLD-MCNC: 66 MG/DL (ref 6–20)
BUN/CREAT SERPL: 16.5 (ref 7–25)
CALCIUM SPEC-SCNC: 7.4 MG/DL (ref 8.6–10.5)
CHLORIDE SERPL-SCNC: 96 MMOL/L (ref 98–107)
CO2 SERPL-SCNC: 30.6 MMOL/L (ref 22–29)
CREAT BLD-MCNC: 3.99 MG/DL (ref 0.57–1)
D-LACTATE SERPL-SCNC: 1.6 MMOL/L (ref 0.5–2)
D-LACTATE SERPL-SCNC: 1.8 MMOL/L (ref 0.5–2)
DEPRECATED RDW RBC AUTO: 51.4 FL (ref 37–54)
EOSINOPHIL # BLD AUTO: 0.08 10*3/MM3 (ref 0–0.7)
EOSINOPHIL NFR BLD AUTO: 1 % (ref 0.3–6.2)
ERYTHROCYTE [DISTWIDTH] IN BLOOD BY AUTOMATED COUNT: 15.9 % (ref 11.7–13)
FIBRINOGEN PPP-MCNC: 838 MG/DL (ref 219–464)
FSP PPP LA-ACNC: ABNORMAL
GFR SERPL CREATININE-BSD FRML MDRD: 12 ML/MIN/1.73
GLOBULIN UR ELPH-MCNC: 3.3 GM/DL
GLUCOSE BLD-MCNC: 105 MG/DL (ref 65–99)
GLUCOSE BLDC GLUCOMTR-MCNC: 111 MG/DL (ref 70–130)
GLUCOSE BLDC GLUCOMTR-MCNC: 122 MG/DL (ref 70–130)
HCT VFR BLD AUTO: 25.7 % (ref 35.6–45.5)
HGB BLD-MCNC: 8.2 G/DL (ref 11.9–15.5)
IMM GRANULOCYTES # BLD: 0.15 10*3/MM3 (ref 0–0.03)
IMM GRANULOCYTES NFR BLD: 1.8 % (ref 0–0.5)
INR PPP: 1.32 (ref 0.9–1.1)
LDH SERPL-CCNC: 238 U/L (ref 135–214)
LYMPHOCYTES # BLD AUTO: 0.57 10*3/MM3 (ref 0.9–4.8)
LYMPHOCYTES NFR BLD AUTO: 6.9 % (ref 19.6–45.3)
MAGNESIUM SERPL-MCNC: 1.9 MG/DL (ref 1.6–2.6)
MCH RBC QN AUTO: 27.9 PG (ref 26.9–32)
MCHC RBC AUTO-ENTMCNC: 31.9 G/DL (ref 32.4–36.3)
MCV RBC AUTO: 87.4 FL (ref 80.5–98.2)
MONOCYTES # BLD AUTO: 0.19 10*3/MM3 (ref 0.2–1.2)
MONOCYTES NFR BLD AUTO: 2.3 % (ref 5–12)
NEUTROPHILS # BLD AUTO: 7.21 10*3/MM3 (ref 1.9–8.1)
NEUTROPHILS NFR BLD AUTO: 87.9 % (ref 42.7–76)
NRBC BLD MANUAL-RTO: 0.4 /100 WBC (ref 0–0)
PHOSPHATE SERPL-MCNC: 3.9 MG/DL (ref 2.5–4.5)
PLATELET # BLD AUTO: 94 10*3/MM3 (ref 140–500)
PMV BLD AUTO: 11 FL (ref 6–12)
POTASSIUM BLD-SCNC: 3.5 MMOL/L (ref 3.5–5.2)
PROT SERPL-MCNC: 5 G/DL (ref 6–8.5)
PROTHROMBIN TIME: 16.2 SECONDS (ref 11.7–14.2)
RBC # BLD AUTO: 2.94 10*6/MM3 (ref 3.9–5.2)
SODIUM BLD-SCNC: 140 MMOL/L (ref 136–145)
TSH SERPL DL<=0.05 MIU/L-ACNC: 2.23 MIU/ML (ref 0.27–4.2)
UNIT  ABO: NORMAL
UNIT  ABO: NORMAL
UNIT  RH: NORMAL
UNIT  RH: NORMAL
VANCOMYCIN SERPL-MCNC: 15 MCG/ML (ref 5–40)
WBC NRBC COR # BLD: 8.21 10*3/MM3 (ref 4.5–10.7)

## 2018-02-07 PROCEDURE — 83615 LACTATE (LD) (LDH) ENZYME: CPT | Performed by: INTERNAL MEDICINE

## 2018-02-07 PROCEDURE — 84443 ASSAY THYROID STIM HORMONE: CPT | Performed by: INTERNAL MEDICINE

## 2018-02-07 PROCEDURE — 85025 COMPLETE CBC W/AUTO DIFF WBC: CPT | Performed by: INTERNAL MEDICINE

## 2018-02-07 PROCEDURE — 99255 IP/OBS CONSLTJ NEW/EST HI 80: CPT | Performed by: INTERNAL MEDICINE

## 2018-02-07 PROCEDURE — 80202 ASSAY OF VANCOMYCIN: CPT | Performed by: INTERNAL MEDICINE

## 2018-02-07 PROCEDURE — 85610 PROTHROMBIN TIME: CPT | Performed by: INTERNAL MEDICINE

## 2018-02-07 PROCEDURE — 25010000002 FENTANYL CITRATE (PF) 100 MCG/2ML SOLUTION: Performed by: INTERNAL MEDICINE

## 2018-02-07 PROCEDURE — 99024 POSTOP FOLLOW-UP VISIT: CPT | Performed by: SURGERY

## 2018-02-07 PROCEDURE — 25010000002 FUROSEMIDE PER 20 MG: Performed by: INTERNAL MEDICINE

## 2018-02-07 PROCEDURE — 99254 IP/OBS CNSLTJ NEW/EST MOD 60: CPT | Performed by: INTERNAL MEDICINE

## 2018-02-07 PROCEDURE — 25010000002 AMIODARONE IN DEXTROSE 5% 360-4.14 MG/200ML-% SOLUTION: Performed by: INTERNAL MEDICINE

## 2018-02-07 PROCEDURE — 85362 FIBRIN DEGRADATION PRODUCTS: CPT | Performed by: INTERNAL MEDICINE

## 2018-02-07 PROCEDURE — 25010000002 PROMETHAZINE PER 50 MG: Performed by: SURGERY

## 2018-02-07 PROCEDURE — 84100 ASSAY OF PHOSPHORUS: CPT | Performed by: INTERNAL MEDICINE

## 2018-02-07 PROCEDURE — 25010000002 AMIODARONE IN DEXTROSE 5% 150-4.21 MG/100ML-% SOLUTION: Performed by: INTERNAL MEDICINE

## 2018-02-07 PROCEDURE — 82962 GLUCOSE BLOOD TEST: CPT

## 2018-02-07 PROCEDURE — 80053 COMPREHEN METABOLIC PANEL: CPT | Performed by: INTERNAL MEDICINE

## 2018-02-07 PROCEDURE — 25010000003 POTASSIUM CHLORIDE PER 2 MEQ: Performed by: INTERNAL MEDICINE

## 2018-02-07 PROCEDURE — 83735 ASSAY OF MAGNESIUM: CPT | Performed by: INTERNAL MEDICINE

## 2018-02-07 PROCEDURE — 25010000002 VANCOMYCIN PER 500 MG: Performed by: INTERNAL MEDICINE

## 2018-02-07 PROCEDURE — 25010000002 PIPERACILLIN SOD-TAZOBACTAM PER 1 G: Performed by: INTERNAL MEDICINE

## 2018-02-07 PROCEDURE — 85384 FIBRINOGEN ACTIVITY: CPT | Performed by: INTERNAL MEDICINE

## 2018-02-07 PROCEDURE — 83605 ASSAY OF LACTIC ACID: CPT | Performed by: INTERNAL MEDICINE

## 2018-02-07 RX ORDER — POTASSIUM CHLORIDE 29.8 MG/ML
20 INJECTION INTRAVENOUS
Status: COMPLETED | OUTPATIENT
Start: 2018-02-07 | End: 2018-02-07

## 2018-02-07 RX ORDER — VANCOMYCIN HYDROCHLORIDE 1 G/200ML
1000 INJECTION, SOLUTION INTRAVENOUS ONCE
Status: COMPLETED | OUTPATIENT
Start: 2018-02-07 | End: 2018-02-07

## 2018-02-07 RX ADMIN — PROMETHAZINE HYDROCHLORIDE 12.5 MG: 25 INJECTION INTRAMUSCULAR; INTRAVENOUS at 11:37

## 2018-02-07 RX ADMIN — FENTANYL CITRATE 50 MCG: 50 INJECTION, SOLUTION INTRAMUSCULAR; INTRAVENOUS at 15:36

## 2018-02-07 RX ADMIN — FENTANYL CITRATE 50 MCG: 50 INJECTION, SOLUTION INTRAMUSCULAR; INTRAVENOUS at 18:02

## 2018-02-07 RX ADMIN — SODIUM CHLORIDE 15 MG/HR: 900 INJECTION, SOLUTION INTRAVENOUS at 21:14

## 2018-02-07 RX ADMIN — METOPROLOL TARTRATE 5 MG: 5 INJECTION, SOLUTION INTRAVENOUS at 22:10

## 2018-02-07 RX ADMIN — POTASSIUM CHLORIDE 20 MEQ: 400 INJECTION, SOLUTION INTRAVENOUS at 10:49

## 2018-02-07 RX ADMIN — FENTANYL CITRATE 50 MCG: 50 INJECTION, SOLUTION INTRAMUSCULAR; INTRAVENOUS at 06:32

## 2018-02-07 RX ADMIN — AMIODARONE HYDROCHLORIDE 1 MG/MIN: 1.8 INJECTION, SOLUTION INTRAVENOUS at 16:30

## 2018-02-07 RX ADMIN — FUROSEMIDE 80 MG: 10 INJECTION, SOLUTION INTRAMUSCULAR; INTRAVENOUS at 10:42

## 2018-02-07 RX ADMIN — FENTANYL CITRATE 50 MCG: 50 INJECTION, SOLUTION INTRAMUSCULAR; INTRAVENOUS at 01:16

## 2018-02-07 RX ADMIN — TAZOBACTAM SODIUM AND PIPERACILLIN SODIUM 3.38 G: 375; 3 INJECTION, SOLUTION INTRAVENOUS at 03:12

## 2018-02-07 RX ADMIN — FENTANYL CITRATE 50 MCG: 50 INJECTION, SOLUTION INTRAMUSCULAR; INTRAVENOUS at 03:12

## 2018-02-07 RX ADMIN — METOPROLOL TARTRATE 5 MG: 5 INJECTION, SOLUTION INTRAVENOUS at 04:25

## 2018-02-07 RX ADMIN — FENTANYL CITRATE 50 MCG: 50 INJECTION, SOLUTION INTRAMUSCULAR; INTRAVENOUS at 23:45

## 2018-02-07 RX ADMIN — SODIUM CHLORIDE 15 MG/HR: 900 INJECTION, SOLUTION INTRAVENOUS at 04:25

## 2018-02-07 RX ADMIN — FUROSEMIDE 80 MG: 10 INJECTION, SOLUTION INTRAMUSCULAR; INTRAVENOUS at 04:58

## 2018-02-07 RX ADMIN — PROMETHAZINE HYDROCHLORIDE 12.5 MG: 25 INJECTION INTRAMUSCULAR; INTRAVENOUS at 21:48

## 2018-02-07 RX ADMIN — FENTANYL CITRATE 50 MCG: 50 INJECTION, SOLUTION INTRAMUSCULAR; INTRAVENOUS at 21:48

## 2018-02-07 RX ADMIN — VANCOMYCIN HYDROCHLORIDE 1000 MG: 1 INJECTION, SOLUTION INTRAVENOUS at 09:30

## 2018-02-07 RX ADMIN — FENTANYL CITRATE 50 MCG: 50 INJECTION, SOLUTION INTRAMUSCULAR; INTRAVENOUS at 09:42

## 2018-02-07 RX ADMIN — FUROSEMIDE 80 MG: 10 INJECTION, SOLUTION INTRAMUSCULAR; INTRAVENOUS at 21:48

## 2018-02-07 RX ADMIN — AMIODARONE HYDROCHLORIDE 0.5 MG/MIN: 1.8 INJECTION, SOLUTION INTRAVENOUS at 22:30

## 2018-02-07 RX ADMIN — FENTANYL CITRATE 50 MCG: 50 INJECTION, SOLUTION INTRAMUSCULAR; INTRAVENOUS at 20:12

## 2018-02-07 RX ADMIN — SODIUM BICARBONATE 100 ML/HR: 84 INJECTION, SOLUTION INTRAVENOUS at 08:06

## 2018-02-07 RX ADMIN — TAZOBACTAM SODIUM AND PIPERACILLIN SODIUM 3.38 G: 375; 3 INJECTION, SOLUTION INTRAVENOUS at 16:03

## 2018-02-07 RX ADMIN — METOPROLOL TARTRATE 5 MG: 5 INJECTION, SOLUTION INTRAVENOUS at 18:01

## 2018-02-07 RX ADMIN — FUROSEMIDE 80 MG: 10 INJECTION, SOLUTION INTRAMUSCULAR; INTRAVENOUS at 16:03

## 2018-02-07 RX ADMIN — FENTANYL CITRATE 50 MCG: 50 INJECTION, SOLUTION INTRAMUSCULAR; INTRAVENOUS at 10:44

## 2018-02-07 RX ADMIN — FENTANYL CITRATE 50 MCG: 50 INJECTION, SOLUTION INTRAMUSCULAR; INTRAVENOUS at 04:25

## 2018-02-07 RX ADMIN — FENTANYL CITRATE 50 MCG: 50 INJECTION, SOLUTION INTRAMUSCULAR; INTRAVENOUS at 12:43

## 2018-02-07 RX ADMIN — AMIODARONE HYDROCHLORIDE 150 MG: 1.5 INJECTION, SOLUTION INTRAVENOUS at 16:14

## 2018-02-07 RX ADMIN — SODIUM CHLORIDE 15 MG/HR: 900 INJECTION, SOLUTION INTRAVENOUS at 13:25

## 2018-02-07 RX ADMIN — METOPROLOL TARTRATE 5 MG: 5 INJECTION, SOLUTION INTRAVENOUS at 11:37

## 2018-02-07 RX ADMIN — FENTANYL CITRATE 50 MCG: 50 INJECTION, SOLUTION INTRAMUSCULAR; INTRAVENOUS at 08:06

## 2018-02-07 RX ADMIN — PANTOPRAZOLE SODIUM 40 MG: 40 INJECTION, POWDER, FOR SOLUTION INTRAVENOUS at 06:32

## 2018-02-07 RX ADMIN — POTASSIUM CHLORIDE 20 MEQ: 400 INJECTION, SOLUTION INTRAVENOUS at 10:51

## 2018-02-08 LAB
ALBUMIN SERPL-MCNC: 2 G/DL (ref 3.5–5.2)
ALBUMIN/GLOB SERPL: 0.6 G/DL
ALP SERPL-CCNC: 54 U/L (ref 39–117)
ALT SERPL W P-5'-P-CCNC: 12 U/L (ref 1–33)
ANION GAP SERPL CALCULATED.3IONS-SCNC: 11.4 MMOL/L
AST SERPL-CCNC: 20 U/L (ref 1–32)
BASOPHILS # BLD AUTO: 0.01 10*3/MM3 (ref 0–0.2)
BASOPHILS NFR BLD AUTO: 0.1 % (ref 0–1.5)
BILIRUB SERPL-MCNC: 1.5 MG/DL (ref 0.1–1.2)
BUN BLD-MCNC: 79 MG/DL (ref 6–20)
BUN/CREAT SERPL: 18.9 (ref 7–25)
CALCIUM SPEC-SCNC: 7.6 MG/DL (ref 8.6–10.5)
CHLORIDE SERPL-SCNC: 89 MMOL/L (ref 98–107)
CO2 SERPL-SCNC: 37.6 MMOL/L (ref 22–29)
CREAT BLD-MCNC: 4.17 MG/DL (ref 0.57–1)
CRP SERPL-MCNC: 32.15 MG/DL (ref 0–0.5)
DEPRECATED RDW RBC AUTO: 52.4 FL (ref 37–54)
EOSINOPHIL # BLD AUTO: 0.07 10*3/MM3 (ref 0–0.7)
EOSINOPHIL NFR BLD AUTO: 0.9 % (ref 0.3–6.2)
ERYTHROCYTE [DISTWIDTH] IN BLOOD BY AUTOMATED COUNT: 16.1 % (ref 11.7–13)
GFR SERPL CREATININE-BSD FRML MDRD: 11 ML/MIN/1.73
GLOBULIN UR ELPH-MCNC: 3.2 GM/DL
GLUCOSE BLD-MCNC: 102 MG/DL (ref 65–99)
GLUCOSE BLDC GLUCOMTR-MCNC: 104 MG/DL (ref 70–130)
GLUCOSE BLDC GLUCOMTR-MCNC: 106 MG/DL (ref 70–130)
HCT VFR BLD AUTO: 25.9 % (ref 35.6–45.5)
HGB BLD-MCNC: 8.2 G/DL (ref 11.9–15.5)
IMM GRANULOCYTES # BLD: 0.27 10*3/MM3 (ref 0–0.03)
IMM GRANULOCYTES NFR BLD: 3.4 % (ref 0–0.5)
LYMPHOCYTES # BLD AUTO: 0.62 10*3/MM3 (ref 0.9–4.8)
LYMPHOCYTES NFR BLD AUTO: 7.8 % (ref 19.6–45.3)
MAGNESIUM SERPL-MCNC: 2.3 MG/DL (ref 1.6–2.6)
MCH RBC QN AUTO: 28.3 PG (ref 26.9–32)
MCHC RBC AUTO-ENTMCNC: 31.7 G/DL (ref 32.4–36.3)
MCV RBC AUTO: 89.3 FL (ref 80.5–98.2)
MONOCYTES # BLD AUTO: 0.34 10*3/MM3 (ref 0.2–1.2)
MONOCYTES NFR BLD AUTO: 4.3 % (ref 5–12)
NEUTROPHILS # BLD AUTO: 6.62 10*3/MM3 (ref 1.9–8.1)
NEUTROPHILS NFR BLD AUTO: 83.5 % (ref 42.7–76)
PHOSPHATE SERPL-MCNC: 4.7 MG/DL (ref 2.5–4.5)
PLATELET # BLD AUTO: 103 10*3/MM3 (ref 140–500)
PMV BLD AUTO: 10.9 FL (ref 6–12)
POTASSIUM BLD-SCNC: 3.3 MMOL/L (ref 3.5–5.2)
POTASSIUM BLD-SCNC: 3.6 MMOL/L (ref 3.5–5.2)
PREALB SERPL-MCNC: 4.3 MG/DL (ref 20–40)
PROT SERPL-MCNC: 5.2 G/DL (ref 6–8.5)
RBC # BLD AUTO: 2.9 10*6/MM3 (ref 3.9–5.2)
SODIUM BLD-SCNC: 138 MMOL/L (ref 136–145)
TRIGL SERPL-MCNC: 384 MG/DL (ref 0–150)
WBC NRBC COR # BLD: 7.93 10*3/MM3 (ref 4.5–10.7)

## 2018-02-08 PROCEDURE — 25010000002 AMIODARONE IN DEXTROSE 5% 360-4.14 MG/200ML-% SOLUTION: Performed by: INTERNAL MEDICINE

## 2018-02-08 PROCEDURE — 94799 UNLISTED PULMONARY SVC/PX: CPT

## 2018-02-08 PROCEDURE — 25010000002 ONDANSETRON PER 1 MG: Performed by: SURGERY

## 2018-02-08 PROCEDURE — 99232 SBSQ HOSP IP/OBS MODERATE 35: CPT | Performed by: INTERNAL MEDICINE

## 2018-02-08 PROCEDURE — 25010000002 PROMETHAZINE PER 50 MG: Performed by: SURGERY

## 2018-02-08 PROCEDURE — 25010000002 FUROSEMIDE PER 20 MG: Performed by: INTERNAL MEDICINE

## 2018-02-08 PROCEDURE — 25010000002 CALCIUM GLUCONATE PER 10 ML: Performed by: INTERNAL MEDICINE

## 2018-02-08 PROCEDURE — 84478 ASSAY OF TRIGLYCERIDES: CPT | Performed by: INTERNAL MEDICINE

## 2018-02-08 PROCEDURE — 84132 ASSAY OF SERUM POTASSIUM: CPT | Performed by: INTERNAL MEDICINE

## 2018-02-08 PROCEDURE — 25010000002 FENTANYL CITRATE (PF) 100 MCG/2ML SOLUTION: Performed by: INTERNAL MEDICINE

## 2018-02-08 PROCEDURE — 84100 ASSAY OF PHOSPHORUS: CPT | Performed by: INTERNAL MEDICINE

## 2018-02-08 PROCEDURE — 80053 COMPREHEN METABOLIC PANEL: CPT | Performed by: INTERNAL MEDICINE

## 2018-02-08 PROCEDURE — 25010000003 POTASSIUM CHLORIDE PER 2 MEQ: Performed by: INTERNAL MEDICINE

## 2018-02-08 PROCEDURE — 83735 ASSAY OF MAGNESIUM: CPT | Performed by: INTERNAL MEDICINE

## 2018-02-08 PROCEDURE — 25010000002 POTASSIUM CHLORIDE PER 2 MEQ OF POTASSIUM: Performed by: INTERNAL MEDICINE

## 2018-02-08 PROCEDURE — 86140 C-REACTIVE PROTEIN: CPT | Performed by: INTERNAL MEDICINE

## 2018-02-08 PROCEDURE — 82962 GLUCOSE BLOOD TEST: CPT

## 2018-02-08 PROCEDURE — 25010000002 ACETAZOLAMIDE PER 500 MG: Performed by: INTERNAL MEDICINE

## 2018-02-08 PROCEDURE — 97162 PT EVAL MOD COMPLEX 30 MIN: CPT

## 2018-02-08 PROCEDURE — 25010000002 PIPERACILLIN SOD-TAZOBACTAM PER 1 G: Performed by: INTERNAL MEDICINE

## 2018-02-08 PROCEDURE — 84134 ASSAY OF PREALBUMIN: CPT | Performed by: INTERNAL MEDICINE

## 2018-02-08 PROCEDURE — 99024 POSTOP FOLLOW-UP VISIT: CPT | Performed by: SURGERY

## 2018-02-08 PROCEDURE — 85025 COMPLETE CBC W/AUTO DIFF WBC: CPT | Performed by: INTERNAL MEDICINE

## 2018-02-08 PROCEDURE — 97110 THERAPEUTIC EXERCISES: CPT

## 2018-02-08 RX ORDER — FUROSEMIDE 10 MG/ML
80 INJECTION INTRAMUSCULAR; INTRAVENOUS ONCE
Status: COMPLETED | OUTPATIENT
Start: 2018-02-08 | End: 2018-02-08

## 2018-02-08 RX ORDER — FENTANYL CITRATE 50 UG/ML
25 INJECTION, SOLUTION INTRAMUSCULAR; INTRAVENOUS
Status: DISCONTINUED | OUTPATIENT
Start: 2018-02-08 | End: 2018-02-09

## 2018-02-08 RX ORDER — POTASSIUM CHLORIDE 29.8 MG/ML
20 INJECTION INTRAVENOUS ONCE
Status: COMPLETED | OUTPATIENT
Start: 2018-02-08 | End: 2018-02-08

## 2018-02-08 RX ORDER — POTASSIUM CHLORIDE 29.8 MG/ML
20 INJECTION INTRAVENOUS ONCE
Status: DISCONTINUED | OUTPATIENT
Start: 2018-02-08 | End: 2018-02-12

## 2018-02-08 RX ORDER — POTASSIUM CHLORIDE 29.8 MG/ML
20 INJECTION INTRAVENOUS
Status: COMPLETED | OUTPATIENT
Start: 2018-02-08 | End: 2018-02-08

## 2018-02-08 RX ORDER — FENTANYL CITRATE 50 UG/ML
50 INJECTION, SOLUTION INTRAMUSCULAR; INTRAVENOUS
Status: DISCONTINUED | OUTPATIENT
Start: 2018-02-08 | End: 2018-02-09

## 2018-02-08 RX ADMIN — TAZOBACTAM SODIUM AND PIPERACILLIN SODIUM 3.38 G: 375; 3 INJECTION, SOLUTION INTRAVENOUS at 15:43

## 2018-02-08 RX ADMIN — SODIUM CHLORIDE 15 MG/HR: 900 INJECTION, SOLUTION INTRAVENOUS at 05:53

## 2018-02-08 RX ADMIN — SODIUM CHLORIDE 15 MG/HR: 900 INJECTION, SOLUTION INTRAVENOUS at 13:40

## 2018-02-08 RX ADMIN — PANTOPRAZOLE SODIUM 40 MG: 40 INJECTION, POWDER, FOR SOLUTION INTRAVENOUS at 07:29

## 2018-02-08 RX ADMIN — PROMETHAZINE HYDROCHLORIDE 12.5 MG: 25 INJECTION INTRAMUSCULAR; INTRAVENOUS at 13:14

## 2018-02-08 RX ADMIN — POTASSIUM CHLORIDE 20 MEQ: 400 INJECTION, SOLUTION INTRAVENOUS at 07:29

## 2018-02-08 RX ADMIN — METOPROLOL TARTRATE 5 MG: 5 INJECTION, SOLUTION INTRAVENOUS at 17:23

## 2018-02-08 RX ADMIN — AMIODARONE HYDROCHLORIDE 0.5 MG/MIN: 1.8 INJECTION, SOLUTION INTRAVENOUS at 10:56

## 2018-02-08 RX ADMIN — ONDANSETRON 4 MG: 2 INJECTION INTRAMUSCULAR; INTRAVENOUS at 17:32

## 2018-02-08 RX ADMIN — FUROSEMIDE 80 MG: 10 INJECTION, SOLUTION INTRAMUSCULAR; INTRAVENOUS at 04:04

## 2018-02-08 RX ADMIN — FENTANYL CITRATE 50 MCG: 50 INJECTION, SOLUTION INTRAMUSCULAR; INTRAVENOUS at 01:12

## 2018-02-08 RX ADMIN — POTASSIUM CHLORIDE 20 MEQ: 400 INJECTION, SOLUTION INTRAVENOUS at 13:42

## 2018-02-08 RX ADMIN — FENTANYL CITRATE 25 MCG: 50 INJECTION INTRAMUSCULAR; INTRAVENOUS at 13:14

## 2018-02-08 RX ADMIN — ACETAZOLAMIDE 250 MG: 500 INJECTION, POWDER, LYOPHILIZED, FOR SOLUTION INTRAVENOUS at 11:12

## 2018-02-08 RX ADMIN — CALCIUM GLUCONATE: 94 INJECTION, SOLUTION INTRAVENOUS at 18:37

## 2018-02-08 RX ADMIN — SODIUM CHLORIDE 15 MG/HR: 900 INJECTION, SOLUTION INTRAVENOUS at 21:17

## 2018-02-08 RX ADMIN — TAZOBACTAM SODIUM AND PIPERACILLIN SODIUM 3.38 G: 375; 3 INJECTION, SOLUTION INTRAVENOUS at 04:06

## 2018-02-08 RX ADMIN — METOPROLOL TARTRATE 5 MG: 5 INJECTION, SOLUTION INTRAVENOUS at 04:05

## 2018-02-08 RX ADMIN — POTASSIUM CHLORIDE 20 MEQ: 400 INJECTION, SOLUTION INTRAVENOUS at 09:00

## 2018-02-08 RX ADMIN — FENTANYL CITRATE 25 MCG: 50 INJECTION INTRAMUSCULAR; INTRAVENOUS at 12:04

## 2018-02-08 RX ADMIN — FENTANYL CITRATE 50 MCG: 50 INJECTION, SOLUTION INTRAMUSCULAR; INTRAVENOUS at 04:05

## 2018-02-08 RX ADMIN — POTASSIUM CHLORIDE 20 MEQ: 400 INJECTION, SOLUTION INTRAVENOUS at 13:41

## 2018-02-08 RX ADMIN — AMIODARONE HYDROCHLORIDE 0.5 MG/MIN: 1.8 INJECTION, SOLUTION INTRAVENOUS at 22:37

## 2018-02-08 RX ADMIN — METOPROLOL TARTRATE 5 MG: 5 INJECTION, SOLUTION INTRAVENOUS at 11:13

## 2018-02-08 RX ADMIN — ONDANSETRON 4 MG: 2 INJECTION INTRAMUSCULAR; INTRAVENOUS at 09:04

## 2018-02-08 RX ADMIN — FENTANYL CITRATE 25 MCG: 50 INJECTION INTRAMUSCULAR; INTRAVENOUS at 22:52

## 2018-02-08 RX ADMIN — FENTANYL CITRATE 50 MCG: 50 INJECTION, SOLUTION INTRAMUSCULAR; INTRAVENOUS at 09:00

## 2018-02-08 RX ADMIN — FUROSEMIDE 80 MG: 10 INJECTION, SOLUTION INTRAMUSCULAR; INTRAVENOUS at 21:12

## 2018-02-08 RX ADMIN — FENTANYL CITRATE 25 MCG: 50 INJECTION INTRAMUSCULAR; INTRAVENOUS at 18:34

## 2018-02-09 ENCOUNTER — APPOINTMENT (OUTPATIENT)
Dept: CARDIOLOGY | Facility: HOSPITAL | Age: 58
End: 2018-02-09
Attending: INTERNAL MEDICINE

## 2018-02-09 LAB
ALBUMIN SERPL-MCNC: 1.8 G/DL (ref 3.5–5.2)
ALBUMIN/GLOB SERPL: 0.5 G/DL
ALP SERPL-CCNC: 48 U/L (ref 39–117)
ALT SERPL W P-5'-P-CCNC: 9 U/L (ref 1–33)
ANION GAP SERPL CALCULATED.3IONS-SCNC: 16 MMOL/L
ASCENDING AORTA: 3.2 CM
AST SERPL-CCNC: 19 U/L (ref 1–32)
BACTERIA BLD CULT: ABNORMAL
BH CV ECHO MEAS - ACS: 2.1 CM
BH CV ECHO MEAS - AO MAX PG: 7 MMHG
BH CV ECHO MEAS - AO MEAN PG (FULL): -1 MMHG
BH CV ECHO MEAS - AO MEAN PG: 3 MMHG
BH CV ECHO MEAS - AO ROOT AREA (BSA CORRECTED): 1.3
BH CV ECHO MEAS - AO ROOT AREA: 8 CM^2
BH CV ECHO MEAS - AO ROOT DIAM: 3.2 CM
BH CV ECHO MEAS - AO V2 MAX: 135 CM/SEC
BH CV ECHO MEAS - AO V2 MEAN: 85 CM/SEC
BH CV ECHO MEAS - AO V2 VTI: 18.5 CM
BH CV ECHO MEAS - ASC AORTA: 3.2 CM
BH CV ECHO MEAS - AVA(I,A): 3.4 CM^2
BH CV ECHO MEAS - AVA(I,D): 3.4 CM^2
BH CV ECHO MEAS - BSA(HAYCOCK): 2.6 M^2
BH CV ECHO MEAS - BSA: 2.4 M^2
BH CV ECHO MEAS - BZI_BMI: 45.3 KILOGRAMS/M^2
BH CV ECHO MEAS - BZI_METRIC_HEIGHT: 172.7 CM
BH CV ECHO MEAS - BZI_METRIC_WEIGHT: 135.2 KG
BH CV ECHO MEAS - CONTRAST EF (2CH): 50.5 ML/M^2
BH CV ECHO MEAS - CONTRAST EF 4CH: 49.5 ML/M^2
BH CV ECHO MEAS - EDV(CUBED): 157.5 ML
BH CV ECHO MEAS - EDV(MOD-SP2): 101 ML
BH CV ECHO MEAS - EDV(MOD-SP4): 109 ML
BH CV ECHO MEAS - EDV(TEICH): 141.3 ML
BH CV ECHO MEAS - EF(CUBED): 52.9 %
BH CV ECHO MEAS - EF(MOD-SP2): 50.5 %
BH CV ECHO MEAS - EF(MOD-SP4): 49.5 %
BH CV ECHO MEAS - EF(TEICH): 44.4 %
BH CV ECHO MEAS - ESV(CUBED): 74.1 ML
BH CV ECHO MEAS - ESV(MOD-SP2): 50 ML
BH CV ECHO MEAS - ESV(MOD-SP4): 55 ML
BH CV ECHO MEAS - ESV(TEICH): 78.6 ML
BH CV ECHO MEAS - FS: 22.2 %
BH CV ECHO MEAS - IVS/LVPW: 1
BH CV ECHO MEAS - IVSD: 0.8 CM
BH CV ECHO MEAS - LAT PEAK E' VEL: 16 CM/SEC
BH CV ECHO MEAS - LV DIASTOLIC VOL/BSA (35-75): 45 ML/M^2
BH CV ECHO MEAS - LV MASS(C)D: 155 GRAMS
BH CV ECHO MEAS - LV MASS(C)DI: 64 GRAMS/M^2
BH CV ECHO MEAS - LV MEAN PG: 4 MMHG
BH CV ECHO MEAS - LV SYSTOLIC VOL/BSA (12-30): 22.7 ML/M^2
BH CV ECHO MEAS - LV V1 MAX: 130 CM/SEC
BH CV ECHO MEAS - LV V1 MEAN: 88.1 CM/SEC
BH CV ECHO MEAS - LV V1 VTI: 20 CM
BH CV ECHO MEAS - LVIDD: 5.4 CM
BH CV ECHO MEAS - LVIDS: 4.2 CM
BH CV ECHO MEAS - LVLD AP2: 8.2 CM
BH CV ECHO MEAS - LVLD AP4: 8.7 CM
BH CV ECHO MEAS - LVLS AP2: 7.4 CM
BH CV ECHO MEAS - LVLS AP4: 7.6 CM
BH CV ECHO MEAS - LVOT AREA (M): 3.1 CM^2
BH CV ECHO MEAS - LVOT AREA: 3.1 CM^2
BH CV ECHO MEAS - LVOT DIAM: 2 CM
BH CV ECHO MEAS - LVPWD: 0.8 CM
BH CV ECHO MEAS - MED PEAK E' VEL: 12 CM/SEC
BH CV ECHO MEAS - MV DEC SLOPE: 610 CM/SEC^2
BH CV ECHO MEAS - MV DEC TIME: 0.2 SEC
BH CV ECHO MEAS - MV E MAX VEL: 97.7 CM/SEC
BH CV ECHO MEAS - MV MEAN PG: 1 MMHG
BH CV ECHO MEAS - MV P1/2T MAX VEL: 105 CM/SEC
BH CV ECHO MEAS - MV P1/2T: 50.4 MSEC
BH CV ECHO MEAS - MV V2 MEAN: 55.1 CM/SEC
BH CV ECHO MEAS - MV V2 VTI: 16.9 CM
BH CV ECHO MEAS - MVA P1/2T LCG: 2.1 CM^2
BH CV ECHO MEAS - MVA(P1/2T): 4.4 CM^2
BH CV ECHO MEAS - MVA(VTI): 3.7 CM^2
BH CV ECHO MEAS - PA ACC SLOPE: 8.5 CM/SEC^2
BH CV ECHO MEAS - PA ACC TIME: 0.1 SEC
BH CV ECHO MEAS - PA MAX PG: 5.7 MMHG
BH CV ECHO MEAS - PA PR(ACCEL): 33.1 MMHG
BH CV ECHO MEAS - PA V2 MAX: 119 CM/SEC
BH CV ECHO MEAS - QP/QS: 0.83
BH CV ECHO MEAS - RV MEAN PG: 1 MMHG
BH CV ECHO MEAS - RV V1 MEAN: 57.2 CM/SEC
BH CV ECHO MEAS - RV V1 VTI: 13.8 CM
BH CV ECHO MEAS - RVOT AREA: 3.8 CM^2
BH CV ECHO MEAS - RVOT DIAM: 2.2 CM
BH CV ECHO MEAS - SI(AO): 61.4 ML/M^2
BH CV ECHO MEAS - SI(CUBED): 34.4 ML/M^2
BH CV ECHO MEAS - SI(LVOT): 25.9 ML/M^2
BH CV ECHO MEAS - SI(MOD-SP2): 21.1 ML/M^2
BH CV ECHO MEAS - SI(MOD-SP4): 22.3 ML/M^2
BH CV ECHO MEAS - SI(TEICH): 25.9 ML/M^2
BH CV ECHO MEAS - SV(AO): 148.8 ML
BH CV ECHO MEAS - SV(CUBED): 83.4 ML
BH CV ECHO MEAS - SV(LVOT): 62.8 ML
BH CV ECHO MEAS - SV(MOD-SP2): 51 ML
BH CV ECHO MEAS - SV(MOD-SP4): 54 ML
BH CV ECHO MEAS - SV(RVOT): 52.5 ML
BH CV ECHO MEAS - SV(TEICH): 62.7 ML
BH CV ECHO MEAS - TAPSE (>1.6): 2 CM2
BH CV ECHO MEAS - TR MAX VEL: 235 CM/SEC
BH CV VAS BP RIGHT ARM: NORMAL MMHG
BH CV XLRA - RV BASE: 3.6 CM
BH CV XLRA - TDI S': 10 CM/SEC
BILIRUB SERPL-MCNC: 0.9 MG/DL (ref 0.1–1.2)
BUN BLD-MCNC: 92 MG/DL (ref 6–20)
BUN/CREAT SERPL: 22.1 (ref 7–25)
CALCIUM SPEC-SCNC: 7.6 MG/DL (ref 8.6–10.5)
CHLORIDE SERPL-SCNC: 89 MMOL/L (ref 98–107)
CO2 SERPL-SCNC: 32 MMOL/L (ref 22–29)
CREAT BLD-MCNC: 4.16 MG/DL (ref 0.57–1)
E/E' RATIO: 8
GFR SERPL CREATININE-BSD FRML MDRD: 11 ML/MIN/1.73
GLOBULIN UR ELPH-MCNC: 3.5 GM/DL
GLUCOSE BLD-MCNC: 161 MG/DL (ref 65–99)
GLUCOSE BLDC GLUCOMTR-MCNC: 149 MG/DL (ref 70–130)
GLUCOSE BLDC GLUCOMTR-MCNC: 155 MG/DL (ref 70–130)
GLUCOSE BLDC GLUCOMTR-MCNC: 160 MG/DL (ref 70–130)
LEFT ATRIUM VOLUME INDEX: 27 ML/M2
MAGNESIUM SERPL-MCNC: 2 MG/DL (ref 1.6–2.6)
POTASSIUM BLD-SCNC: 3.1 MMOL/L (ref 3.5–5.2)
PROT SERPL-MCNC: 5.3 G/DL (ref 6–8.5)
SODIUM BLD-SCNC: 137 MMOL/L (ref 136–145)
TRIGL SERPL-MCNC: 414 MG/DL (ref 0–150)
URATE SERPL-MCNC: 11.1 MG/DL (ref 2.4–5.7)

## 2018-02-09 PROCEDURE — 99232 SBSQ HOSP IP/OBS MODERATE 35: CPT | Performed by: INTERNAL MEDICINE

## 2018-02-09 PROCEDURE — 25010000002 CALCIUM GLUCONATE PER 10 ML: Performed by: INTERNAL MEDICINE

## 2018-02-09 PROCEDURE — 80053 COMPREHEN METABOLIC PANEL: CPT | Performed by: INTERNAL MEDICINE

## 2018-02-09 PROCEDURE — 93306 TTE W/DOPPLER COMPLETE: CPT | Performed by: INTERNAL MEDICINE

## 2018-02-09 PROCEDURE — 63710000001 INSULIN ASPART PER 5 UNITS: Performed by: INTERNAL MEDICINE

## 2018-02-09 PROCEDURE — 25010000002 FENTANYL CITRATE (PF) 100 MCG/2ML SOLUTION: Performed by: INTERNAL MEDICINE

## 2018-02-09 PROCEDURE — 99024 POSTOP FOLLOW-UP VISIT: CPT | Performed by: SURGERY

## 2018-02-09 PROCEDURE — 83735 ASSAY OF MAGNESIUM: CPT | Performed by: INTERNAL MEDICINE

## 2018-02-09 PROCEDURE — 25010000002 PERFLUTREN (DEFINITY) 8.476 MG IN SODIUM CHLORIDE 0.9 % 10 ML INJECTION: Performed by: SURGERY

## 2018-02-09 PROCEDURE — 87040 BLOOD CULTURE FOR BACTERIA: CPT | Performed by: INTERNAL MEDICINE

## 2018-02-09 PROCEDURE — 25010000002 PIPERACILLIN SOD-TAZOBACTAM PER 1 G: Performed by: INTERNAL MEDICINE

## 2018-02-09 PROCEDURE — 25010000003 POTASSIUM CHLORIDE PER 2 MEQ: Performed by: INTERNAL MEDICINE

## 2018-02-09 PROCEDURE — 97110 THERAPEUTIC EXERCISES: CPT

## 2018-02-09 PROCEDURE — 63710000001 INSULIN REGULAR HUMAN PER 5 UNITS: Performed by: INTERNAL MEDICINE

## 2018-02-09 PROCEDURE — 93306 TTE W/DOPPLER COMPLETE: CPT

## 2018-02-09 PROCEDURE — 84478 ASSAY OF TRIGLYCERIDES: CPT | Performed by: INTERNAL MEDICINE

## 2018-02-09 PROCEDURE — 25010000002 PROMETHAZINE PER 50 MG: Performed by: SURGERY

## 2018-02-09 PROCEDURE — 82962 GLUCOSE BLOOD TEST: CPT

## 2018-02-09 PROCEDURE — 84550 ASSAY OF BLOOD/URIC ACID: CPT | Performed by: INTERNAL MEDICINE

## 2018-02-09 RX ORDER — HYDROMORPHONE HYDROCHLORIDE 1 MG/ML
0.5 INJECTION, SOLUTION INTRAMUSCULAR; INTRAVENOUS; SUBCUTANEOUS
Status: DISCONTINUED | OUTPATIENT
Start: 2018-02-09 | End: 2018-02-15 | Stop reason: SDUPTHER

## 2018-02-09 RX ORDER — OXYCODONE HYDROCHLORIDE AND ACETAMINOPHEN 5; 325 MG/1; MG/1
1 TABLET ORAL EVERY 4 HOURS PRN
Status: DISCONTINUED | OUTPATIENT
Start: 2018-02-09 | End: 2018-02-13

## 2018-02-09 RX ORDER — POTASSIUM CHLORIDE 29.8 MG/ML
20 INJECTION INTRAVENOUS
Status: COMPLETED | OUTPATIENT
Start: 2018-02-09 | End: 2018-02-09

## 2018-02-09 RX ORDER — METOPROLOL TARTRATE 50 MG/1
50 TABLET, FILM COATED ORAL EVERY 12 HOURS SCHEDULED
Status: DISCONTINUED | OUTPATIENT
Start: 2018-02-09 | End: 2018-02-12

## 2018-02-09 RX ORDER — BUMETANIDE 0.25 MG/ML
4 INJECTION INTRAMUSCULAR; INTRAVENOUS EVERY 8 HOURS
Status: COMPLETED | OUTPATIENT
Start: 2018-02-09 | End: 2018-02-10

## 2018-02-09 RX ORDER — NICOTINE POLACRILEX 4 MG
15 LOZENGE BUCCAL
Status: DISCONTINUED | OUTPATIENT
Start: 2018-02-09 | End: 2018-02-27

## 2018-02-09 RX ORDER — DEXTROSE MONOHYDRATE 25 G/50ML
25 INJECTION, SOLUTION INTRAVENOUS
Status: DISCONTINUED | OUTPATIENT
Start: 2018-02-09 | End: 2018-02-27

## 2018-02-09 RX ORDER — PANTOPRAZOLE SODIUM 40 MG/1
40 TABLET, DELAYED RELEASE ORAL
Status: DISCONTINUED | OUTPATIENT
Start: 2018-02-10 | End: 2018-02-13

## 2018-02-09 RX ORDER — AMIODARONE HYDROCHLORIDE 200 MG/1
400 TABLET ORAL EVERY 12 HOURS SCHEDULED
Status: DISCONTINUED | OUTPATIENT
Start: 2018-02-09 | End: 2018-02-13

## 2018-02-09 RX ADMIN — POTASSIUM CHLORIDE 20 MEQ: 400 INJECTION, SOLUTION INTRAVENOUS at 09:42

## 2018-02-09 RX ADMIN — METOPROLOL TARTRATE 50 MG: 50 TABLET, FILM COATED ORAL at 09:45

## 2018-02-09 RX ADMIN — AMIODARONE HYDROCHLORIDE 400 MG: 200 TABLET ORAL at 09:45

## 2018-02-09 RX ADMIN — CALCIUM GLUCONATE: 94 INJECTION, SOLUTION INTRAVENOUS at 18:38

## 2018-02-09 RX ADMIN — POTASSIUM CHLORIDE 20 MEQ: 400 INJECTION, SOLUTION INTRAVENOUS at 10:43

## 2018-02-09 RX ADMIN — OXYCODONE HYDROCHLORIDE AND ACETAMINOPHEN 1 TABLET: 5; 325 TABLET ORAL at 12:32

## 2018-02-09 RX ADMIN — METOPROLOL TARTRATE 50 MG: 50 TABLET, FILM COATED ORAL at 21:00

## 2018-02-09 RX ADMIN — BUMETANIDE 4 MG: 0.25 INJECTION INTRAMUSCULAR; INTRAVENOUS at 18:36

## 2018-02-09 RX ADMIN — AMIODARONE HYDROCHLORIDE 400 MG: 200 TABLET ORAL at 21:00

## 2018-02-09 RX ADMIN — FENTANYL CITRATE 25 MCG: 50 INJECTION INTRAMUSCULAR; INTRAVENOUS at 03:06

## 2018-02-09 RX ADMIN — SODIUM CHLORIDE 15 MG/HR: 900 INJECTION, SOLUTION INTRAVENOUS at 04:45

## 2018-02-09 RX ADMIN — TAZOBACTAM SODIUM AND PIPERACILLIN SODIUM 3.38 G: 375; 3 INJECTION, SOLUTION INTRAVENOUS at 04:45

## 2018-02-09 RX ADMIN — INSULIN ASPART 3 UNITS: 100 INJECTION, SOLUTION INTRAVENOUS; SUBCUTANEOUS at 12:20

## 2018-02-09 RX ADMIN — POTASSIUM CHLORIDE 20 MEQ: 400 INJECTION, SOLUTION INTRAVENOUS at 08:32

## 2018-02-09 RX ADMIN — TAZOBACTAM SODIUM AND PIPERACILLIN SODIUM 3.38 G: 375; 3 INJECTION, SOLUTION INTRAVENOUS at 18:37

## 2018-02-09 RX ADMIN — PROMETHAZINE HYDROCHLORIDE 12.5 MG: 25 INJECTION INTRAMUSCULAR; INTRAVENOUS at 23:45

## 2018-02-09 RX ADMIN — INSULIN ASPART 3 UNITS: 100 INJECTION, SOLUTION INTRAVENOUS; SUBCUTANEOUS at 21:00

## 2018-02-09 RX ADMIN — BUMETANIDE 4 MG: 0.25 INJECTION INTRAMUSCULAR; INTRAVENOUS at 09:42

## 2018-02-09 RX ADMIN — PERFLUTREN 3 ML: 6.52 INJECTION, SUSPENSION INTRAVENOUS at 13:48

## 2018-02-09 RX ADMIN — PANTOPRAZOLE SODIUM 40 MG: 40 INJECTION, POWDER, FOR SOLUTION INTRAVENOUS at 08:16

## 2018-02-10 PROBLEM — R78.81 BACTEREMIA, ESCHERICHIA COLI: Status: ACTIVE | Noted: 2018-02-10

## 2018-02-10 PROBLEM — B96.20 BACTEREMIA, ESCHERICHIA COLI: Status: ACTIVE | Noted: 2018-02-10

## 2018-02-10 LAB
ALBUMIN SERPL-MCNC: 2.2 G/DL (ref 3.5–5.2)
ALBUMIN/GLOB SERPL: 0.6 G/DL
ALP SERPL-CCNC: 50 U/L (ref 39–117)
ALT SERPL W P-5'-P-CCNC: 8 U/L (ref 1–33)
ANION GAP SERPL CALCULATED.3IONS-SCNC: 12.2 MMOL/L
ANISOCYTOSIS BLD QL: ABNORMAL
ARTICHOKE IGE QN: 11 MG/DL (ref 0–100)
AST SERPL-CCNC: 13 U/L (ref 1–32)
BILIRUB SERPL-MCNC: 0.8 MG/DL (ref 0.1–1.2)
BUN BLD-MCNC: 104 MG/DL (ref 6–20)
BUN/CREAT SERPL: 22.7 (ref 7–25)
CALCIUM SPEC-SCNC: 8 MG/DL (ref 8.6–10.5)
CHLORIDE SERPL-SCNC: 87 MMOL/L (ref 98–107)
CHOLEST SERPL-MCNC: 136 MG/DL (ref 0–200)
CO2 SERPL-SCNC: 35.8 MMOL/L (ref 22–29)
CREAT BLD-MCNC: 4.59 MG/DL (ref 0.57–1)
DEPRECATED RDW RBC AUTO: 53.5 FL (ref 37–54)
ERYTHROCYTE [DISTWIDTH] IN BLOOD BY AUTOMATED COUNT: 16.5 % (ref 11.7–13)
GFR SERPL CREATININE-BSD FRML MDRD: 10 ML/MIN/1.73
GLOBULIN UR ELPH-MCNC: 3.5 GM/DL
GLUCOSE BLD-MCNC: 135 MG/DL (ref 65–99)
GLUCOSE BLDC GLUCOMTR-MCNC: 129 MG/DL (ref 70–130)
GLUCOSE BLDC GLUCOMTR-MCNC: 139 MG/DL (ref 70–130)
GLUCOSE BLDC GLUCOMTR-MCNC: 141 MG/DL (ref 70–130)
GLUCOSE BLDC GLUCOMTR-MCNC: 161 MG/DL (ref 70–130)
HCT VFR BLD AUTO: 27.3 % (ref 35.6–45.5)
HDLC SERPL-MCNC: 4 MG/DL (ref 40–60)
HGB BLD-MCNC: 8.7 G/DL (ref 11.9–15.5)
HYPOCHROMIA BLD QL: ABNORMAL
LDLC SERPL CALC-MCNC: ABNORMAL MG/DL (ref 0–100)
LDLC/HDLC SERPL: ABNORMAL {RATIO}
LYMPHOCYTES # BLD MANUAL: 1.05 10*3/MM3 (ref 0.9–4.8)
LYMPHOCYTES NFR BLD MANUAL: 1 % (ref 5–12)
LYMPHOCYTES NFR BLD MANUAL: 9 % (ref 19.6–45.3)
MAGNESIUM SERPL-MCNC: 2 MG/DL (ref 1.6–2.6)
MCH RBC QN AUTO: 28.4 PG (ref 26.9–32)
MCHC RBC AUTO-ENTMCNC: 31.9 G/DL (ref 32.4–36.3)
MCV RBC AUTO: 89.2 FL (ref 80.5–98.2)
MONOCYTES # BLD AUTO: 0.12 10*3/MM3 (ref 0.2–1.2)
NEUTROPHILS # BLD AUTO: 10.55 10*3/MM3 (ref 1.9–8.1)
NEUTROPHILS NFR BLD MANUAL: 90 % (ref 42.7–76)
PHOSPHATE SERPL-MCNC: 4 MG/DL (ref 2.5–4.5)
PLAT MORPH BLD: NORMAL
PLATELET # BLD AUTO: 195 10*3/MM3 (ref 140–500)
PMV BLD AUTO: 11 FL (ref 6–12)
POTASSIUM BLD-SCNC: 3 MMOL/L (ref 3.5–5.2)
PROT SERPL-MCNC: 5.7 G/DL (ref 6–8.5)
RBC # BLD AUTO: 3.06 10*6/MM3 (ref 3.9–5.2)
SCAN SLIDE: NORMAL
SODIUM BLD-SCNC: 135 MMOL/L (ref 136–145)
TRIGL SERPL-MCNC: 401 MG/DL (ref 0–150)
URATE SERPL-MCNC: 11.5 MG/DL (ref 2.4–5.7)
VLDLC SERPL-MCNC: ABNORMAL MG/DL (ref 5–40)
WBC MORPH BLD: NORMAL
WBC NRBC COR # BLD: 11.72 10*3/MM3 (ref 4.5–10.7)

## 2018-02-10 PROCEDURE — 85007 BL SMEAR W/DIFF WBC COUNT: CPT | Performed by: INTERNAL MEDICINE

## 2018-02-10 PROCEDURE — 99024 POSTOP FOLLOW-UP VISIT: CPT | Performed by: SURGERY

## 2018-02-10 PROCEDURE — 80061 LIPID PANEL: CPT | Performed by: INTERNAL MEDICINE

## 2018-02-10 PROCEDURE — 83735 ASSAY OF MAGNESIUM: CPT | Performed by: INTERNAL MEDICINE

## 2018-02-10 PROCEDURE — 25010000002 PIPERACILLIN SOD-TAZOBACTAM PER 1 G: Performed by: INTERNAL MEDICINE

## 2018-02-10 PROCEDURE — 99232 SBSQ HOSP IP/OBS MODERATE 35: CPT | Performed by: INTERNAL MEDICINE

## 2018-02-10 PROCEDURE — 25010000003 POTASSIUM CHLORIDE PER 2 MEQ: Performed by: INTERNAL MEDICINE

## 2018-02-10 PROCEDURE — 25010000002 HYDROMORPHONE PER 4 MG: Performed by: SURGERY

## 2018-02-10 PROCEDURE — 63710000001 INSULIN REGULAR HUMAN PER 5 UNITS: Performed by: INTERNAL MEDICINE

## 2018-02-10 PROCEDURE — 80053 COMPREHEN METABOLIC PANEL: CPT | Performed by: INTERNAL MEDICINE

## 2018-02-10 PROCEDURE — 85025 COMPLETE CBC W/AUTO DIFF WBC: CPT | Performed by: INTERNAL MEDICINE

## 2018-02-10 PROCEDURE — 83721 ASSAY OF BLOOD LIPOPROTEIN: CPT | Performed by: INTERNAL MEDICINE

## 2018-02-10 PROCEDURE — 84550 ASSAY OF BLOOD/URIC ACID: CPT | Performed by: INTERNAL MEDICINE

## 2018-02-10 PROCEDURE — 63710000001 INSULIN ASPART PER 5 UNITS: Performed by: INTERNAL MEDICINE

## 2018-02-10 PROCEDURE — 84100 ASSAY OF PHOSPHORUS: CPT | Performed by: INTERNAL MEDICINE

## 2018-02-10 PROCEDURE — 99233 SBSQ HOSP IP/OBS HIGH 50: CPT | Performed by: INTERNAL MEDICINE

## 2018-02-10 PROCEDURE — 82962 GLUCOSE BLOOD TEST: CPT

## 2018-02-10 PROCEDURE — 25010000002 CALCIUM GLUCONATE PER 10 ML: Performed by: INTERNAL MEDICINE

## 2018-02-10 RX ORDER — SODIUM CHLORIDE 9 MG/ML
75 INJECTION, SOLUTION INTRAVENOUS CONTINUOUS
Status: ACTIVE | OUTPATIENT
Start: 2018-02-10 | End: 2018-02-11

## 2018-02-10 RX ORDER — DOCUSATE SODIUM 100 MG/1
100 CAPSULE, LIQUID FILLED ORAL 2 TIMES DAILY
Status: DISCONTINUED | OUTPATIENT
Start: 2018-02-10 | End: 2018-02-13

## 2018-02-10 RX ORDER — POTASSIUM CHLORIDE 29.8 MG/ML
20 INJECTION INTRAVENOUS ONCE
Status: COMPLETED | OUTPATIENT
Start: 2018-02-10 | End: 2018-02-10

## 2018-02-10 RX ADMIN — OXYCODONE HYDROCHLORIDE AND ACETAMINOPHEN 1 TABLET: 5; 325 TABLET ORAL at 21:45

## 2018-02-10 RX ADMIN — TAZOBACTAM SODIUM AND PIPERACILLIN SODIUM 3.38 G: 375; 3 INJECTION, SOLUTION INTRAVENOUS at 16:12

## 2018-02-10 RX ADMIN — SODIUM CHLORIDE 500 ML: 9 INJECTION, SOLUTION INTRAVENOUS at 16:52

## 2018-02-10 RX ADMIN — AMIODARONE HYDROCHLORIDE 400 MG: 200 TABLET ORAL at 21:00

## 2018-02-10 RX ADMIN — CALCIUM GLUCONATE: 94 INJECTION, SOLUTION INTRAVENOUS at 18:33

## 2018-02-10 RX ADMIN — DOCUSATE SODIUM 100 MG: 100 CAPSULE, LIQUID FILLED ORAL at 21:00

## 2018-02-10 RX ADMIN — INSULIN ASPART 3 UNITS: 100 INJECTION, SOLUTION INTRAVENOUS; SUBCUTANEOUS at 12:39

## 2018-02-10 RX ADMIN — AMIODARONE HYDROCHLORIDE 400 MG: 200 TABLET ORAL at 08:56

## 2018-02-10 RX ADMIN — BUMETANIDE 4 MG: 0.25 INJECTION INTRAMUSCULAR; INTRAVENOUS at 01:00

## 2018-02-10 RX ADMIN — OXYCODONE HYDROCHLORIDE AND ACETAMINOPHEN 1 TABLET: 5; 325 TABLET ORAL at 02:55

## 2018-02-10 RX ADMIN — POTASSIUM CHLORIDE 20 MEQ: 400 INJECTION, SOLUTION INTRAVENOUS at 12:39

## 2018-02-10 RX ADMIN — TAZOBACTAM SODIUM AND PIPERACILLIN SODIUM 3.38 G: 375; 3 INJECTION, SOLUTION INTRAVENOUS at 03:30

## 2018-02-10 RX ADMIN — HYDROMORPHONE HYDROCHLORIDE 0.5 MG: 10 INJECTION INTRAMUSCULAR; INTRAVENOUS; SUBCUTANEOUS at 04:49

## 2018-02-10 RX ADMIN — DOCUSATE SODIUM 100 MG: 100 CAPSULE, LIQUID FILLED ORAL at 14:30

## 2018-02-10 RX ADMIN — PANTOPRAZOLE SODIUM 40 MG: 40 TABLET, DELAYED RELEASE ORAL at 08:56

## 2018-02-10 RX ADMIN — SODIUM CHLORIDE 75 ML/HR: 9 INJECTION, SOLUTION INTRAVENOUS at 12:39

## 2018-02-10 RX ADMIN — OXYCODONE HYDROCHLORIDE AND ACETAMINOPHEN 1 TABLET: 5; 325 TABLET ORAL at 12:55

## 2018-02-10 RX ADMIN — METOPROLOL TARTRATE 50 MG: 50 TABLET, FILM COATED ORAL at 08:56

## 2018-02-11 LAB
ALBUMIN SERPL-MCNC: 1.8 G/DL (ref 3.5–5.2)
ANION GAP SERPL CALCULATED.3IONS-SCNC: 18.7 MMOL/L
BACTERIA SPEC AEROBE CULT: ABNORMAL
BACTERIA SPEC AEROBE CULT: ABNORMAL
BUN BLD-MCNC: 115 MG/DL (ref 6–20)
BUN/CREAT SERPL: 26.3 (ref 7–25)
CALCIUM SPEC-SCNC: 7.7 MG/DL (ref 8.6–10.5)
CHLORIDE SERPL-SCNC: 91 MMOL/L (ref 98–107)
CO2 SERPL-SCNC: 27.3 MMOL/L (ref 22–29)
CREAT BLD-MCNC: 4.37 MG/DL (ref 0.57–1)
DEPRECATED RDW RBC AUTO: 55.9 FL (ref 37–54)
ERYTHROCYTE [DISTWIDTH] IN BLOOD BY AUTOMATED COUNT: 16.7 % (ref 11.7–13)
GFR SERPL CREATININE-BSD FRML MDRD: 10 ML/MIN/1.73
GLUCOSE BLD-MCNC: 110 MG/DL (ref 65–99)
GLUCOSE BLDC GLUCOMTR-MCNC: 113 MG/DL (ref 70–130)
GLUCOSE BLDC GLUCOMTR-MCNC: 134 MG/DL (ref 70–130)
GLUCOSE BLDC GLUCOMTR-MCNC: 146 MG/DL (ref 70–130)
GLUCOSE BLDC GLUCOMTR-MCNC: 148 MG/DL (ref 70–130)
GRAM STN SPEC: ABNORMAL
HCT VFR BLD AUTO: 25.8 % (ref 35.6–45.5)
HGB BLD-MCNC: 8 G/DL (ref 11.9–15.5)
ISOLATED FROM: ABNORMAL
MAGNESIUM SERPL-MCNC: 1.9 MG/DL (ref 1.6–2.6)
MCH RBC QN AUTO: 28.1 PG (ref 26.9–32)
MCHC RBC AUTO-ENTMCNC: 31 G/DL (ref 32.4–36.3)
MCV RBC AUTO: 90.5 FL (ref 80.5–98.2)
PHOSPHATE SERPL-MCNC: 4 MG/DL (ref 2.5–4.5)
PLATELET # BLD AUTO: 266 10*3/MM3 (ref 140–500)
PMV BLD AUTO: 10.6 FL (ref 6–12)
POTASSIUM BLD-SCNC: 3.2 MMOL/L (ref 3.5–5.2)
RBC # BLD AUTO: 2.85 10*6/MM3 (ref 3.9–5.2)
SODIUM BLD-SCNC: 137 MMOL/L (ref 136–145)
WBC NRBC COR # BLD: 12.51 10*3/MM3 (ref 4.5–10.7)

## 2018-02-11 PROCEDURE — 99232 SBSQ HOSP IP/OBS MODERATE 35: CPT | Performed by: INTERNAL MEDICINE

## 2018-02-11 PROCEDURE — 97110 THERAPEUTIC EXERCISES: CPT

## 2018-02-11 PROCEDURE — 85027 COMPLETE CBC AUTOMATED: CPT | Performed by: INTERNAL MEDICINE

## 2018-02-11 PROCEDURE — 80069 RENAL FUNCTION PANEL: CPT | Performed by: INTERNAL MEDICINE

## 2018-02-11 PROCEDURE — 25010000002 PIPERACILLIN SOD-TAZOBACTAM PER 1 G: Performed by: INTERNAL MEDICINE

## 2018-02-11 PROCEDURE — 82962 GLUCOSE BLOOD TEST: CPT

## 2018-02-11 PROCEDURE — 83735 ASSAY OF MAGNESIUM: CPT | Performed by: INTERNAL MEDICINE

## 2018-02-11 PROCEDURE — 25010000002 ERTAPENEM PER 500 MG: Performed by: INTERNAL MEDICINE

## 2018-02-11 PROCEDURE — 99024 POSTOP FOLLOW-UP VISIT: CPT | Performed by: SURGERY

## 2018-02-11 RX ORDER — POTASSIUM CHLORIDE 750 MG/1
20 CAPSULE, EXTENDED RELEASE ORAL DAILY
Status: DISCONTINUED | OUTPATIENT
Start: 2018-02-11 | End: 2018-02-11

## 2018-02-11 RX ORDER — POTASSIUM CHLORIDE 750 MG/1
20 CAPSULE, EXTENDED RELEASE ORAL ONCE
Status: COMPLETED | OUTPATIENT
Start: 2018-02-11 | End: 2018-02-11

## 2018-02-11 RX ORDER — POTASSIUM CHLORIDE 1.5 G/1.77G
20 POWDER, FOR SOLUTION ORAL ONCE
Status: DISCONTINUED | OUTPATIENT
Start: 2018-02-11 | End: 2018-02-11

## 2018-02-11 RX ADMIN — AMIODARONE HYDROCHLORIDE 400 MG: 200 TABLET ORAL at 21:09

## 2018-02-11 RX ADMIN — DOCUSATE SODIUM 100 MG: 100 CAPSULE, LIQUID FILLED ORAL at 21:10

## 2018-02-11 RX ADMIN — DOCUSATE SODIUM 100 MG: 100 CAPSULE, LIQUID FILLED ORAL at 10:58

## 2018-02-11 RX ADMIN — METOPROLOL TARTRATE 50 MG: 50 TABLET, FILM COATED ORAL at 10:58

## 2018-02-11 RX ADMIN — HYDROMORPHONE HYDROCHLORIDE 0.5 MG: 10 INJECTION INTRAMUSCULAR; INTRAVENOUS; SUBCUTANEOUS at 10:58

## 2018-02-11 RX ADMIN — OXYCODONE HYDROCHLORIDE AND ACETAMINOPHEN 1 TABLET: 5; 325 TABLET ORAL at 21:10

## 2018-02-11 RX ADMIN — AMIODARONE HYDROCHLORIDE 400 MG: 200 TABLET ORAL at 10:58

## 2018-02-11 RX ADMIN — PANTOPRAZOLE SODIUM 40 MG: 40 TABLET, DELAYED RELEASE ORAL at 06:07

## 2018-02-11 RX ADMIN — HYDROMORPHONE HYDROCHLORIDE 0.5 MG: 10 INJECTION INTRAMUSCULAR; INTRAVENOUS; SUBCUTANEOUS at 23:14

## 2018-02-11 RX ADMIN — METOPROLOL TARTRATE 50 MG: 50 TABLET, FILM COATED ORAL at 23:14

## 2018-02-11 RX ADMIN — OXYCODONE HYDROCHLORIDE AND ACETAMINOPHEN 1 TABLET: 5; 325 TABLET ORAL at 04:20

## 2018-02-11 RX ADMIN — SODIUM CHLORIDE 500 MG: 9 INJECTION, SOLUTION INTRAVENOUS at 11:00

## 2018-02-11 RX ADMIN — TAZOBACTAM SODIUM AND PIPERACILLIN SODIUM 3.38 G: 375; 3 INJECTION, SOLUTION INTRAVENOUS at 03:44

## 2018-02-11 RX ADMIN — POTASSIUM CHLORIDE 20 MEQ: 750 CAPSULE, EXTENDED RELEASE ORAL at 18:22

## 2018-02-11 RX ADMIN — HYDROMORPHONE HYDROCHLORIDE 0.5 MG: 10 INJECTION INTRAMUSCULAR; INTRAVENOUS; SUBCUTANEOUS at 14:46

## 2018-02-12 ENCOUNTER — APPOINTMENT (OUTPATIENT)
Dept: CT IMAGING | Facility: HOSPITAL | Age: 58
End: 2018-02-12

## 2018-02-12 LAB
ALBUMIN SERPL-MCNC: 1.8 G/DL (ref 3.5–5.2)
ALBUMIN SERPL-MCNC: 2.2 G/DL (ref 3.5–5.2)
ALBUMIN/GLOB SERPL: 0.4 G/DL
ALP SERPL-CCNC: 60 U/L (ref 39–117)
ALT SERPL W P-5'-P-CCNC: <5 U/L (ref 1–33)
ANION GAP SERPL CALCULATED.3IONS-SCNC: 16.4 MMOL/L
ANION GAP SERPL CALCULATED.3IONS-SCNC: 17.2 MMOL/L
AST SERPL-CCNC: 11 U/L (ref 1–32)
BILIRUB SERPL-MCNC: 0.8 MG/DL (ref 0.1–1.2)
BUN BLD-MCNC: 129 MG/DL (ref 6–20)
BUN BLD-MCNC: 132 MG/DL (ref 6–20)
BUN/CREAT SERPL: 26.6 (ref 7–25)
BUN/CREAT SERPL: 27 (ref 7–25)
CALCIUM SPEC-SCNC: 7.9 MG/DL (ref 8.6–10.5)
CALCIUM SPEC-SCNC: 7.9 MG/DL (ref 8.6–10.5)
CHLORIDE SERPL-SCNC: 86 MMOL/L (ref 98–107)
CHLORIDE SERPL-SCNC: 88 MMOL/L (ref 98–107)
CO2 SERPL-SCNC: 28.8 MMOL/L (ref 22–29)
CO2 SERPL-SCNC: 29.6 MMOL/L (ref 22–29)
CREAT BLD-MCNC: 4.78 MG/DL (ref 0.57–1)
CREAT BLD-MCNC: 4.96 MG/DL (ref 0.57–1)
DEPRECATED RDW RBC AUTO: 54.4 FL (ref 37–54)
DEPRECATED RDW RBC AUTO: 54.6 FL (ref 37–54)
ERYTHROCYTE [DISTWIDTH] IN BLOOD BY AUTOMATED COUNT: 16.4 % (ref 11.7–13)
ERYTHROCYTE [DISTWIDTH] IN BLOOD BY AUTOMATED COUNT: 16.6 % (ref 11.7–13)
GFR SERPL CREATININE-BSD FRML MDRD: 9 ML/MIN/1.73
GFR SERPL CREATININE-BSD FRML MDRD: 9 ML/MIN/1.73
GLOBULIN UR ELPH-MCNC: 4.2 GM/DL
GLUCOSE BLD-MCNC: 117 MG/DL (ref 65–99)
GLUCOSE BLD-MCNC: 96 MG/DL (ref 65–99)
GLUCOSE BLDC GLUCOMTR-MCNC: 102 MG/DL (ref 70–130)
GLUCOSE BLDC GLUCOMTR-MCNC: 107 MG/DL (ref 70–130)
GLUCOSE BLDC GLUCOMTR-MCNC: 116 MG/DL (ref 70–130)
GLUCOSE BLDC GLUCOMTR-MCNC: 129 MG/DL (ref 70–130)
GLUCOSE BLDC GLUCOMTR-MCNC: 135 MG/DL (ref 70–130)
HCT VFR BLD AUTO: 23.9 % (ref 35.6–45.5)
HCT VFR BLD AUTO: 24.8 % (ref 35.6–45.5)
HGB BLD-MCNC: 7.6 G/DL (ref 11.9–15.5)
HGB BLD-MCNC: 7.7 G/DL (ref 11.9–15.5)
MAGNESIUM SERPL-MCNC: 1.9 MG/DL (ref 1.6–2.6)
MAGNESIUM SERPL-MCNC: 1.9 MG/DL (ref 1.6–2.6)
MCH RBC QN AUTO: 28 PG (ref 26.9–32)
MCH RBC QN AUTO: 28.8 PG (ref 26.9–32)
MCHC RBC AUTO-ENTMCNC: 31 G/DL (ref 32.4–36.3)
MCHC RBC AUTO-ENTMCNC: 31.8 G/DL (ref 32.4–36.3)
MCV RBC AUTO: 90.2 FL (ref 80.5–98.2)
MCV RBC AUTO: 90.5 FL (ref 80.5–98.2)
PHOSPHATE SERPL-MCNC: 5.4 MG/DL (ref 2.5–4.5)
PHOSPHATE SERPL-MCNC: 6.2 MG/DL (ref 2.5–4.5)
PLATELET # BLD AUTO: 352 10*3/MM3 (ref 140–500)
PLATELET # BLD AUTO: 403 10*3/MM3 (ref 140–500)
PMV BLD AUTO: 10.1 FL (ref 6–12)
PMV BLD AUTO: 9.9 FL (ref 6–12)
POTASSIUM BLD-SCNC: 3.3 MMOL/L (ref 3.5–5.2)
PROT SERPL-MCNC: 6 G/DL (ref 6–8.5)
RBC # BLD AUTO: 2.64 10*6/MM3 (ref 3.9–5.2)
RBC # BLD AUTO: 2.75 10*6/MM3 (ref 3.9–5.2)
SODIUM BLD-SCNC: 132 MMOL/L (ref 136–145)
SODIUM BLD-SCNC: 134 MMOL/L (ref 136–145)
URATE SERPL-MCNC: 13.2 MG/DL (ref 2.4–5.7)
WBC NRBC COR # BLD: 11.44 10*3/MM3 (ref 4.5–10.7)
WBC NRBC COR # BLD: 11.94 10*3/MM3 (ref 4.5–10.7)

## 2018-02-12 PROCEDURE — 97110 THERAPEUTIC EXERCISES: CPT

## 2018-02-12 PROCEDURE — 80069 RENAL FUNCTION PANEL: CPT | Performed by: INTERNAL MEDICINE

## 2018-02-12 PROCEDURE — 85027 COMPLETE CBC AUTOMATED: CPT | Performed by: INTERNAL MEDICINE

## 2018-02-12 PROCEDURE — 80053 COMPREHEN METABOLIC PANEL: CPT | Performed by: INTERNAL MEDICINE

## 2018-02-12 PROCEDURE — 74176 CT ABD & PELVIS W/O CONTRAST: CPT

## 2018-02-12 PROCEDURE — 84550 ASSAY OF BLOOD/URIC ACID: CPT | Performed by: INTERNAL MEDICINE

## 2018-02-12 PROCEDURE — 93010 ELECTROCARDIOGRAM REPORT: CPT | Performed by: INTERNAL MEDICINE

## 2018-02-12 PROCEDURE — 99024 POSTOP FOLLOW-UP VISIT: CPT | Performed by: SURGERY

## 2018-02-12 PROCEDURE — 84132 ASSAY OF SERUM POTASSIUM: CPT | Performed by: INTERNAL MEDICINE

## 2018-02-12 PROCEDURE — 83735 ASSAY OF MAGNESIUM: CPT | Performed by: INTERNAL MEDICINE

## 2018-02-12 PROCEDURE — 82962 GLUCOSE BLOOD TEST: CPT

## 2018-02-12 PROCEDURE — 25010000002 ERTAPENEM PER 500 MG: Performed by: INTERNAL MEDICINE

## 2018-02-12 PROCEDURE — 84145 PROCALCITONIN (PCT): CPT | Performed by: INTERNAL MEDICINE

## 2018-02-12 PROCEDURE — 0 DIATRIZOATE MEGLUMINE & SODIUM PER 1 ML: Performed by: SURGERY

## 2018-02-12 PROCEDURE — 25010000002 HYDROMORPHONE PER 4 MG: Performed by: SURGERY

## 2018-02-12 PROCEDURE — 99232 SBSQ HOSP IP/OBS MODERATE 35: CPT | Performed by: NURSE PRACTITIONER

## 2018-02-12 PROCEDURE — 99232 SBSQ HOSP IP/OBS MODERATE 35: CPT | Performed by: INTERNAL MEDICINE

## 2018-02-12 PROCEDURE — 84100 ASSAY OF PHOSPHORUS: CPT | Performed by: INTERNAL MEDICINE

## 2018-02-12 PROCEDURE — 93005 ELECTROCARDIOGRAM TRACING: CPT | Performed by: INTERNAL MEDICINE

## 2018-02-12 RX ORDER — METOPROLOL TARTRATE 50 MG/1
50 TABLET, FILM COATED ORAL EVERY 8 HOURS
Status: DISCONTINUED | OUTPATIENT
Start: 2018-02-12 | End: 2018-02-13

## 2018-02-12 RX ORDER — SODIUM CHLORIDE 9 MG/ML
150 INJECTION, SOLUTION INTRAVENOUS CONTINUOUS
Status: DISCONTINUED | OUTPATIENT
Start: 2018-02-12 | End: 2018-02-13

## 2018-02-12 RX ORDER — SODIUM CHLORIDE 9 MG/ML
150 INJECTION, SOLUTION INTRAVENOUS CONTINUOUS
Status: DISCONTINUED | OUTPATIENT
Start: 2018-02-12 | End: 2018-02-14

## 2018-02-12 RX ADMIN — OXYCODONE HYDROCHLORIDE AND ACETAMINOPHEN 1 TABLET: 5; 325 TABLET ORAL at 05:34

## 2018-02-12 RX ADMIN — OXYCODONE HYDROCHLORIDE AND ACETAMINOPHEN 1 TABLET: 5; 325 TABLET ORAL at 09:11

## 2018-02-12 RX ADMIN — AMIODARONE HYDROCHLORIDE 400 MG: 200 TABLET ORAL at 09:11

## 2018-02-12 RX ADMIN — SODIUM CHLORIDE 150 ML/HR: 9 INJECTION, SOLUTION INTRAVENOUS at 22:44

## 2018-02-12 RX ADMIN — METOPROLOL TARTRATE 50 MG: 50 TABLET, FILM COATED ORAL at 09:11

## 2018-02-12 RX ADMIN — DOCUSATE SODIUM 100 MG: 100 CAPSULE, LIQUID FILLED ORAL at 09:11

## 2018-02-12 RX ADMIN — SODIUM CHLORIDE 500 MG: 9 INJECTION, SOLUTION INTRAVENOUS at 10:18

## 2018-02-12 RX ADMIN — OXYCODONE HYDROCHLORIDE AND ACETAMINOPHEN 1 TABLET: 5; 325 TABLET ORAL at 13:13

## 2018-02-12 RX ADMIN — DIATRIZOATE MEGLUMINE AND DIATRIZOATE SODIUM 30 ML: 600; 100 SOLUTION ORAL; RECTAL at 14:44

## 2018-02-12 RX ADMIN — HYDROMORPHONE HYDROCHLORIDE 0.5 MG: 10 INJECTION INTRAMUSCULAR; INTRAVENOUS; SUBCUTANEOUS at 22:05

## 2018-02-12 RX ADMIN — PANTOPRAZOLE SODIUM 40 MG: 40 TABLET, DELAYED RELEASE ORAL at 05:34

## 2018-02-13 ENCOUNTER — ANESTHESIA EVENT (OUTPATIENT)
Dept: PERIOP | Facility: HOSPITAL | Age: 58
End: 2018-02-13

## 2018-02-13 ENCOUNTER — APPOINTMENT (OUTPATIENT)
Dept: GENERAL RADIOLOGY | Facility: HOSPITAL | Age: 58
End: 2018-02-13

## 2018-02-13 ENCOUNTER — ANESTHESIA (OUTPATIENT)
Dept: PERIOP | Facility: HOSPITAL | Age: 58
End: 2018-02-13

## 2018-02-13 LAB
ABO GROUP BLD: NORMAL
ALBUMIN SERPL-MCNC: 2.2 G/DL (ref 3.5–5.2)
ALBUMIN SERPL-MCNC: 2.2 G/DL (ref 3.5–5.2)
ANION GAP SERPL CALCULATED.3IONS-SCNC: 16 MMOL/L
ANION GAP SERPL CALCULATED.3IONS-SCNC: 19.3 MMOL/L
ANION GAP SERPL CALCULATED.3IONS-SCNC: 19.8 MMOL/L
ANISOCYTOSIS BLD QL: NORMAL
APTT PPP: 27.2 SECONDS (ref 22.7–35.4)
BASOPHILS # BLD AUTO: 0.01 10*3/MM3 (ref 0–0.2)
BASOPHILS NFR BLD AUTO: 0.1 % (ref 0–1.5)
BLD GP AB SCN SERPL QL: NEGATIVE
BUN BLD-MCNC: 109 MG/DL (ref 6–20)
BUN BLD-MCNC: 125 MG/DL (ref 6–20)
BUN BLD-MCNC: 133 MG/DL (ref 6–20)
BUN/CREAT SERPL: 26.2 (ref 7–25)
BUN/CREAT SERPL: 26.5 (ref 7–25)
BUN/CREAT SERPL: 27.3 (ref 7–25)
CA-I BLD-MCNC: 4.2 MG/DL (ref 4.6–5.4)
CA-I SERPL ISE-MCNC: 1.06 MMOL/L (ref 1.15–1.35)
CALCIUM SPEC-SCNC: 7.4 MG/DL (ref 8.6–10.5)
CALCIUM SPEC-SCNC: 7.7 MG/DL (ref 8.6–10.5)
CALCIUM SPEC-SCNC: 7.7 MG/DL (ref 8.6–10.5)
CHLORIDE SERPL-SCNC: 86 MMOL/L (ref 98–107)
CHLORIDE SERPL-SCNC: 89 MMOL/L (ref 98–107)
CHLORIDE SERPL-SCNC: 93 MMOL/L (ref 98–107)
CO2 SERPL-SCNC: 25.7 MMOL/L (ref 22–29)
CO2 SERPL-SCNC: 26.2 MMOL/L (ref 22–29)
CO2 SERPL-SCNC: 30 MMOL/L (ref 22–29)
CREAT BLD-MCNC: 4.11 MG/DL (ref 0.57–1)
CREAT BLD-MCNC: 4.77 MG/DL (ref 0.57–1)
CREAT BLD-MCNC: 4.88 MG/DL (ref 0.57–1)
D-LACTATE SERPL-SCNC: 0.9 MMOL/L (ref 0.5–2)
DEPRECATED RDW RBC AUTO: 51 FL (ref 37–54)
DEPRECATED RDW RBC AUTO: 54 FL (ref 37–54)
EOSINOPHIL # BLD AUTO: 0.06 10*3/MM3 (ref 0–0.7)
EOSINOPHIL NFR BLD AUTO: 0.6 % (ref 0.3–6.2)
ERYTHROCYTE [DISTWIDTH] IN BLOOD BY AUTOMATED COUNT: 15.8 % (ref 11.7–13)
ERYTHROCYTE [DISTWIDTH] IN BLOOD BY AUTOMATED COUNT: 16.4 % (ref 11.7–13)
GFR SERPL CREATININE-BSD FRML MDRD: 11 ML/MIN/1.73
GFR SERPL CREATININE-BSD FRML MDRD: 9 ML/MIN/1.73
GFR SERPL CREATININE-BSD FRML MDRD: 9 ML/MIN/1.73
GLUCOSE BLD-MCNC: 107 MG/DL (ref 65–99)
GLUCOSE BLD-MCNC: 117 MG/DL (ref 65–99)
GLUCOSE BLD-MCNC: 97 MG/DL (ref 65–99)
GLUCOSE BLDC GLUCOMTR-MCNC: 104 MG/DL (ref 70–130)
GLUCOSE BLDC GLUCOMTR-MCNC: 114 MG/DL (ref 70–130)
HBV SURFACE AG SERPL QL IA: NORMAL
HCT VFR BLD AUTO: 22.5 % (ref 35.6–45.5)
HCT VFR BLD AUTO: 30.9 % (ref 35.6–45.5)
HGB BLD-MCNC: 7 G/DL (ref 11.9–15.5)
HGB BLD-MCNC: 9.9 G/DL (ref 11.9–15.5)
HYPOCHROMIA BLD QL: NORMAL
IMM GRANULOCYTES # BLD: 0.26 10*3/MM3 (ref 0–0.03)
IMM GRANULOCYTES NFR BLD: 2.4 % (ref 0–0.5)
INR PPP: 1.32 (ref 0.9–1.1)
LYMPHOCYTES # BLD AUTO: 1.38 10*3/MM3 (ref 0.9–4.8)
LYMPHOCYTES NFR BLD AUTO: 12.8 % (ref 19.6–45.3)
MAGNESIUM SERPL-MCNC: 1.9 MG/DL (ref 1.6–2.6)
MAGNESIUM SERPL-MCNC: 2 MG/DL (ref 1.6–2.6)
MAGNESIUM SERPL-MCNC: 2 MG/DL (ref 1.6–2.6)
MAGNESIUM SERPL-MCNC: 2.1 MG/DL (ref 1.6–2.6)
MCH RBC QN AUTO: 28.1 PG (ref 26.9–32)
MCH RBC QN AUTO: 28.6 PG (ref 26.9–32)
MCHC RBC AUTO-ENTMCNC: 31.1 G/DL (ref 32.4–36.3)
MCHC RBC AUTO-ENTMCNC: 32 G/DL (ref 32.4–36.3)
MCV RBC AUTO: 89.3 FL (ref 80.5–98.2)
MCV RBC AUTO: 90.4 FL (ref 80.5–98.2)
MONOCYTES # BLD AUTO: 0.79 10*3/MM3 (ref 0.2–1.2)
MONOCYTES NFR BLD AUTO: 7.3 % (ref 5–12)
NEUTROPHILS # BLD AUTO: 8.31 10*3/MM3 (ref 1.9–8.1)
NEUTROPHILS NFR BLD AUTO: 76.8 % (ref 42.7–76)
NRBC BLD MANUAL-RTO: 0 /100 WBC (ref 0–0)
PHOSPHATE SERPL-MCNC: 5.8 MG/DL (ref 2.5–4.5)
PHOSPHATE SERPL-MCNC: 6.4 MG/DL (ref 2.5–4.5)
PHOSPHATE SERPL-MCNC: 7 MG/DL (ref 2.5–4.5)
PLAT MORPH BLD: NORMAL
PLATELET # BLD AUTO: 387 10*3/MM3 (ref 140–500)
PLATELET # BLD AUTO: 402 10*3/MM3 (ref 140–500)
PMV BLD AUTO: 9.6 FL (ref 6–12)
PMV BLD AUTO: 9.9 FL (ref 6–12)
POTASSIUM BLD-SCNC: 3.4 MMOL/L (ref 3.5–5.2)
POTASSIUM BLD-SCNC: 3.7 MMOL/L (ref 3.5–5.2)
POTASSIUM BLD-SCNC: 3.7 MMOL/L (ref 3.5–5.2)
PROCALCITONIN SERPL-MCNC: 3.93 NG/ML (ref 0.1–0.25)
PROTHROMBIN TIME: 16.2 SECONDS (ref 11.7–14.2)
RBC # BLD AUTO: 2.49 10*6/MM3 (ref 3.9–5.2)
RBC # BLD AUTO: 3.46 10*6/MM3 (ref 3.9–5.2)
RH BLD: POSITIVE
SODIUM BLD-SCNC: 132 MMOL/L (ref 136–145)
SODIUM BLD-SCNC: 135 MMOL/L (ref 136–145)
SODIUM BLD-SCNC: 138 MMOL/L (ref 136–145)
WBC MORPH BLD: NORMAL
WBC NRBC COR # BLD: 10.81 10*3/MM3 (ref 4.5–10.7)
WBC NRBC COR # BLD: 12.46 10*3/MM3 (ref 4.5–10.7)

## 2018-02-13 PROCEDURE — 11043 DBRDMT MUSC&/FSCA 1ST 20/<: CPT | Performed by: SURGERY

## 2018-02-13 PROCEDURE — 86900 BLOOD TYPING SEROLOGIC ABO: CPT | Performed by: SURGERY

## 2018-02-13 PROCEDURE — 02HV33Z INSERTION OF INFUSION DEVICE INTO SUPERIOR VENA CAVA, PERCUTANEOUS APPROACH: ICD-10-PCS | Performed by: INTERNAL MEDICINE

## 2018-02-13 PROCEDURE — 71045 X-RAY EXAM CHEST 1 VIEW: CPT

## 2018-02-13 PROCEDURE — 36430 TRANSFUSION BLD/BLD COMPNT: CPT

## 2018-02-13 PROCEDURE — 83735 ASSAY OF MAGNESIUM: CPT | Performed by: INTERNAL MEDICINE

## 2018-02-13 PROCEDURE — 85025 COMPLETE CBC W/AUTO DIFF WBC: CPT | Performed by: INTERNAL MEDICINE

## 2018-02-13 PROCEDURE — 80069 RENAL FUNCTION PANEL: CPT | Performed by: INTERNAL MEDICINE

## 2018-02-13 PROCEDURE — 99233 SBSQ HOSP IP/OBS HIGH 50: CPT | Performed by: INTERNAL MEDICINE

## 2018-02-13 PROCEDURE — 80048 BASIC METABOLIC PNL TOTAL CA: CPT | Performed by: SURGERY

## 2018-02-13 PROCEDURE — 25010000002 HEPARIN (PORCINE) PER 1000 UNITS: Performed by: INTERNAL MEDICINE

## 2018-02-13 PROCEDURE — 86900 BLOOD TYPING SEROLOGIC ABO: CPT

## 2018-02-13 PROCEDURE — 83735 ASSAY OF MAGNESIUM: CPT | Performed by: SURGERY

## 2018-02-13 PROCEDURE — 25010000002 ALBUMIN HUMAN 25% PER 50 ML: Performed by: INTERNAL MEDICINE

## 2018-02-13 PROCEDURE — 86850 RBC ANTIBODY SCREEN: CPT | Performed by: SURGERY

## 2018-02-13 PROCEDURE — 82962 GLUCOSE BLOOD TEST: CPT

## 2018-02-13 PROCEDURE — 85730 THROMBOPLASTIN TIME PARTIAL: CPT | Performed by: SURGERY

## 2018-02-13 PROCEDURE — P9046 ALBUMIN (HUMAN), 25%, 20 ML: HCPCS | Performed by: INTERNAL MEDICINE

## 2018-02-13 PROCEDURE — 94799 UNLISTED PULMONARY SVC/PX: CPT

## 2018-02-13 PROCEDURE — 86923 COMPATIBILITY TEST ELECTRIC: CPT

## 2018-02-13 PROCEDURE — 25010000002 ONDANSETRON PER 1 MG: Performed by: SURGERY

## 2018-02-13 PROCEDURE — 25010000002 SUCCINYLCHOLINE PER 20 MG: Performed by: NURSE ANESTHETIST, CERTIFIED REGISTERED

## 2018-02-13 PROCEDURE — 87340 HEPATITIS B SURFACE AG IA: CPT | Performed by: INTERNAL MEDICINE

## 2018-02-13 PROCEDURE — 25010000002 ERTAPENEM: Performed by: INTERNAL MEDICINE

## 2018-02-13 PROCEDURE — 25010000002 PROPOFOL 10 MG/ML EMULSION: Performed by: NURSE ANESTHETIST, CERTIFIED REGISTERED

## 2018-02-13 PROCEDURE — 25010000002 FENTANYL CITRATE (PF) 100 MCG/2ML SOLUTION: Performed by: NURSE ANESTHETIST, CERTIFIED REGISTERED

## 2018-02-13 PROCEDURE — P9016 RBC LEUKOCYTES REDUCED: HCPCS

## 2018-02-13 PROCEDURE — 5A1D90Z PERFORMANCE OF URINARY FILTRATION, CONTINUOUS, GREATER THAN 18 HOURS PER DAY: ICD-10-PCS | Performed by: INTERNAL MEDICINE

## 2018-02-13 PROCEDURE — 85610 PROTHROMBIN TIME: CPT | Performed by: SURGERY

## 2018-02-13 PROCEDURE — 85027 COMPLETE CBC AUTOMATED: CPT | Performed by: SURGERY

## 2018-02-13 PROCEDURE — 0JB80ZZ EXCISION OF ABDOMEN SUBCUTANEOUS TISSUE AND FASCIA, OPEN APPROACH: ICD-10-PCS | Performed by: SURGERY

## 2018-02-13 PROCEDURE — 25010000002 MIDAZOLAM PER 1 MG: Performed by: ANESTHESIOLOGY

## 2018-02-13 PROCEDURE — B548ZZA ULTRASONOGRAPHY OF SUPERIOR VENA CAVA, GUIDANCE: ICD-10-PCS | Performed by: INTERNAL MEDICINE

## 2018-02-13 PROCEDURE — 85018 HEMOGLOBIN: CPT

## 2018-02-13 PROCEDURE — 84100 ASSAY OF PHOSPHORUS: CPT | Performed by: INTERNAL MEDICINE

## 2018-02-13 PROCEDURE — 85014 HEMATOCRIT: CPT

## 2018-02-13 PROCEDURE — 25010000002 PHENYLEPHRINE PER 1 ML: Performed by: NURSE ANESTHETIST, CERTIFIED REGISTERED

## 2018-02-13 PROCEDURE — 86901 BLOOD TYPING SEROLOGIC RH(D): CPT | Performed by: SURGERY

## 2018-02-13 PROCEDURE — 25010000002 HYDROMORPHONE PER 4 MG: Performed by: NURSE ANESTHETIST, CERTIFIED REGISTERED

## 2018-02-13 PROCEDURE — 82947 ASSAY GLUCOSE BLOOD QUANT: CPT

## 2018-02-13 PROCEDURE — 85007 BL SMEAR W/DIFF WBC COUNT: CPT | Performed by: INTERNAL MEDICINE

## 2018-02-13 PROCEDURE — 25010000003 POTASSIUM CHLORIDE PER 2 MEQ: Performed by: INTERNAL MEDICINE

## 2018-02-13 PROCEDURE — 83605 ASSAY OF LACTIC ACID: CPT | Performed by: INTERNAL MEDICINE

## 2018-02-13 PROCEDURE — 82803 BLOOD GASES ANY COMBINATION: CPT

## 2018-02-13 PROCEDURE — 82330 ASSAY OF CALCIUM: CPT | Performed by: INTERNAL MEDICINE

## 2018-02-13 PROCEDURE — 25010000002 HYDROMORPHONE PER 4 MG: Performed by: INTERNAL MEDICINE

## 2018-02-13 RX ORDER — SODIUM CHLORIDE 9 MG/ML
9 INJECTION, SOLUTION INTRAVENOUS CONTINUOUS PRN
Status: DISCONTINUED | OUTPATIENT
Start: 2018-02-13 | End: 2018-02-13 | Stop reason: HOSPADM

## 2018-02-13 RX ORDER — FENTANYL CITRATE 50 UG/ML
50 INJECTION, SOLUTION INTRAMUSCULAR; INTRAVENOUS
Status: DISCONTINUED | OUTPATIENT
Start: 2018-02-13 | End: 2018-02-13 | Stop reason: HOSPADM

## 2018-02-13 RX ORDER — MIDAZOLAM HYDROCHLORIDE 1 MG/ML
1 INJECTION INTRAMUSCULAR; INTRAVENOUS
Status: DISCONTINUED | OUTPATIENT
Start: 2018-02-13 | End: 2018-02-13 | Stop reason: HOSPADM

## 2018-02-13 RX ORDER — LIDOCAINE HYDROCHLORIDE 10 MG/ML
0.5 INJECTION, SOLUTION EPIDURAL; INFILTRATION; INTRACAUDAL; PERINEURAL ONCE AS NEEDED
Status: DISCONTINUED | OUTPATIENT
Start: 2018-02-13 | End: 2018-02-13 | Stop reason: HOSPADM

## 2018-02-13 RX ORDER — ONDANSETRON 2 MG/ML
4 INJECTION INTRAMUSCULAR; INTRAVENOUS ONCE AS NEEDED
Status: DISCONTINUED | OUTPATIENT
Start: 2018-02-13 | End: 2018-02-13 | Stop reason: HOSPADM

## 2018-02-13 RX ORDER — PROMETHAZINE HYDROCHLORIDE 25 MG/1
25 TABLET ORAL ONCE AS NEEDED
Status: DISCONTINUED | OUTPATIENT
Start: 2018-02-13 | End: 2018-02-13 | Stop reason: HOSPADM

## 2018-02-13 RX ORDER — HYDROMORPHONE HCL 110MG/55ML
0.5 PATIENT CONTROLLED ANALGESIA SYRINGE INTRAVENOUS
Status: DISCONTINUED | OUTPATIENT
Start: 2018-02-13 | End: 2018-02-13 | Stop reason: HOSPADM

## 2018-02-13 RX ORDER — SODIUM HYPOCHLORITE 1.25 MG/ML
SOLUTION TOPICAL 2 TIMES DAILY
Status: DISCONTINUED | OUTPATIENT
Start: 2018-02-13 | End: 2018-03-08

## 2018-02-13 RX ORDER — HYDRALAZINE HYDROCHLORIDE 20 MG/ML
5 INJECTION INTRAMUSCULAR; INTRAVENOUS
Status: DISCONTINUED | OUTPATIENT
Start: 2018-02-13 | End: 2018-02-13 | Stop reason: HOSPADM

## 2018-02-13 RX ORDER — EPHEDRINE SULFATE 50 MG/ML
5 INJECTION, SOLUTION INTRAVENOUS ONCE AS NEEDED
Status: DISCONTINUED | OUTPATIENT
Start: 2018-02-13 | End: 2018-02-13 | Stop reason: HOSPADM

## 2018-02-13 RX ORDER — PROMETHAZINE HYDROCHLORIDE 25 MG/ML
12.5 INJECTION, SOLUTION INTRAMUSCULAR; INTRAVENOUS ONCE AS NEEDED
Status: DISCONTINUED | OUTPATIENT
Start: 2018-02-13 | End: 2018-02-13 | Stop reason: HOSPADM

## 2018-02-13 RX ORDER — POTASSIUM CHLORIDE 29.8 MG/ML
20 INJECTION INTRAVENOUS ONCE
Status: COMPLETED | OUTPATIENT
Start: 2018-02-13 | End: 2018-02-13

## 2018-02-13 RX ORDER — HYDROCODONE BITARTRATE AND ACETAMINOPHEN 7.5; 325 MG/1; MG/1
1 TABLET ORAL ONCE AS NEEDED
Status: DISCONTINUED | OUTPATIENT
Start: 2018-02-13 | End: 2018-02-13 | Stop reason: HOSPADM

## 2018-02-13 RX ORDER — HYDROMORPHONE HCL 110MG/55ML
PATIENT CONTROLLED ANALGESIA SYRINGE INTRAVENOUS AS NEEDED
Status: DISCONTINUED | OUTPATIENT
Start: 2018-02-13 | End: 2018-02-13 | Stop reason: SURG

## 2018-02-13 RX ORDER — DIPHENHYDRAMINE HYDROCHLORIDE 50 MG/ML
12.5 INJECTION INTRAMUSCULAR; INTRAVENOUS
Status: DISCONTINUED | OUTPATIENT
Start: 2018-02-13 | End: 2018-02-13 | Stop reason: HOSPADM

## 2018-02-13 RX ORDER — ROCURONIUM BROMIDE 10 MG/ML
INJECTION, SOLUTION INTRAVENOUS AS NEEDED
Status: DISCONTINUED | OUTPATIENT
Start: 2018-02-13 | End: 2018-02-13 | Stop reason: SURG

## 2018-02-13 RX ORDER — SODIUM CHLORIDE 0.9 % (FLUSH) 0.9 %
1-10 SYRINGE (ML) INJECTION AS NEEDED
Status: DISCONTINUED | OUTPATIENT
Start: 2018-02-13 | End: 2018-02-13 | Stop reason: HOSPADM

## 2018-02-13 RX ORDER — LIDOCAINE HYDROCHLORIDE 20 MG/ML
INJECTION, SOLUTION INFILTRATION; PERINEURAL AS NEEDED
Status: DISCONTINUED | OUTPATIENT
Start: 2018-02-13 | End: 2018-02-13 | Stop reason: SURG

## 2018-02-13 RX ORDER — LABETALOL HYDROCHLORIDE 5 MG/ML
5 INJECTION, SOLUTION INTRAVENOUS
Status: DISCONTINUED | OUTPATIENT
Start: 2018-02-13 | End: 2018-02-13 | Stop reason: HOSPADM

## 2018-02-13 RX ORDER — MIDAZOLAM HYDROCHLORIDE 1 MG/ML
2 INJECTION INTRAMUSCULAR; INTRAVENOUS
Status: DISCONTINUED | OUTPATIENT
Start: 2018-02-13 | End: 2018-02-13 | Stop reason: HOSPADM

## 2018-02-13 RX ORDER — PANTOPRAZOLE SODIUM 40 MG/10ML
40 INJECTION, POWDER, LYOPHILIZED, FOR SOLUTION INTRAVENOUS
Status: DISCONTINUED | OUTPATIENT
Start: 2018-02-13 | End: 2018-02-17

## 2018-02-13 RX ORDER — HEPARIN SODIUM 1000 [USP'U]/ML
INJECTION, SOLUTION INTRAVENOUS; SUBCUTANEOUS AS NEEDED
Status: DISCONTINUED | OUTPATIENT
Start: 2018-02-13 | End: 2018-03-12 | Stop reason: HOSPADM

## 2018-02-13 RX ORDER — SUCCINYLCHOLINE CHLORIDE 20 MG/ML
INJECTION INTRAMUSCULAR; INTRAVENOUS AS NEEDED
Status: DISCONTINUED | OUTPATIENT
Start: 2018-02-13 | End: 2018-02-13 | Stop reason: SURG

## 2018-02-13 RX ORDER — PROMETHAZINE HYDROCHLORIDE 25 MG/1
25 SUPPOSITORY RECTAL ONCE AS NEEDED
Status: DISCONTINUED | OUTPATIENT
Start: 2018-02-13 | End: 2018-02-13 | Stop reason: HOSPADM

## 2018-02-13 RX ORDER — PROPOFOL 10 MG/ML
VIAL (ML) INTRAVENOUS AS NEEDED
Status: DISCONTINUED | OUTPATIENT
Start: 2018-02-13 | End: 2018-02-13 | Stop reason: SURG

## 2018-02-13 RX ORDER — ALBUMIN (HUMAN) 12.5 G/50ML
25 SOLUTION INTRAVENOUS EVERY 6 HOURS
Status: DISCONTINUED | OUTPATIENT
Start: 2018-02-13 | End: 2018-02-15

## 2018-02-13 RX ORDER — OXYCODONE AND ACETAMINOPHEN 7.5; 325 MG/1; MG/1
1 TABLET ORAL ONCE AS NEEDED
Status: DISCONTINUED | OUTPATIENT
Start: 2018-02-13 | End: 2018-02-13 | Stop reason: HOSPADM

## 2018-02-13 RX ORDER — PROMETHAZINE HYDROCHLORIDE 25 MG/1
12.5 TABLET ORAL ONCE AS NEEDED
Status: DISCONTINUED | OUTPATIENT
Start: 2018-02-13 | End: 2018-02-13 | Stop reason: HOSPADM

## 2018-02-13 RX ORDER — FENTANYL CITRATE 50 UG/ML
INJECTION, SOLUTION INTRAMUSCULAR; INTRAVENOUS AS NEEDED
Status: DISCONTINUED | OUTPATIENT
Start: 2018-02-13 | End: 2018-02-13 | Stop reason: SURG

## 2018-02-13 RX ORDER — FAMOTIDINE 10 MG/ML
20 INJECTION, SOLUTION INTRAVENOUS ONCE
Status: DISCONTINUED | OUTPATIENT
Start: 2018-02-13 | End: 2018-02-13 | Stop reason: HOSPADM

## 2018-02-13 RX ORDER — POTASSIUM CHLORIDE 29.8 MG/ML
20 INJECTION INTRAVENOUS AS NEEDED
Status: DISCONTINUED | OUTPATIENT
Start: 2018-02-13 | End: 2018-02-13

## 2018-02-13 RX ORDER — NALOXONE HCL 0.4 MG/ML
0.2 VIAL (ML) INJECTION AS NEEDED
Status: DISCONTINUED | OUTPATIENT
Start: 2018-02-13 | End: 2018-02-13 | Stop reason: HOSPADM

## 2018-02-13 RX ORDER — FAMOTIDINE 10 MG/ML
20 INJECTION, SOLUTION INTRAVENOUS ONCE
Status: COMPLETED | OUTPATIENT
Start: 2018-02-13 | End: 2018-02-13

## 2018-02-13 RX ORDER — FLUMAZENIL 0.1 MG/ML
0.2 INJECTION INTRAVENOUS AS NEEDED
Status: DISCONTINUED | OUTPATIENT
Start: 2018-02-13 | End: 2018-02-13 | Stop reason: HOSPADM

## 2018-02-13 RX ORDER — ALBUMIN (HUMAN) 12.5 G/50ML
12.5 SOLUTION INTRAVENOUS AS NEEDED
Status: DISCONTINUED | OUTPATIENT
Start: 2018-02-13 | End: 2018-03-12 | Stop reason: HOSPADM

## 2018-02-13 RX ORDER — MAGNESIUM SULFATE 1 G/100ML
2 INJECTION INTRAVENOUS AS NEEDED
Status: DISCONTINUED | OUTPATIENT
Start: 2018-02-13 | End: 2018-02-15

## 2018-02-13 RX ADMIN — PHENYLEPHRINE HYDROCHLORIDE 100 MCG: 10 INJECTION INTRAVENOUS at 13:59

## 2018-02-13 RX ADMIN — HYDROMORPHONE HYDROCHLORIDE 0.5 MG: 10 INJECTION INTRAMUSCULAR; INTRAVENOUS; SUBCUTANEOUS at 00:06

## 2018-02-13 RX ADMIN — MIDAZOLAM 1 MG: 1 INJECTION INTRAMUSCULAR; INTRAVENOUS at 12:56

## 2018-02-13 RX ADMIN — Medication 0.06 MCG/KG/MIN: at 21:15

## 2018-02-13 RX ADMIN — ALBUMIN HUMAN 25 G: 0.25 SOLUTION INTRAVENOUS at 08:20

## 2018-02-13 RX ADMIN — PROPOFOL 100 MG: 10 INJECTION, EMULSION INTRAVENOUS at 13:23

## 2018-02-13 RX ADMIN — DOCUSATE SODIUM 100 MG: 100 CAPSULE, LIQUID FILLED ORAL at 00:05

## 2018-02-13 RX ADMIN — LIDOCAINE HYDROCHLORIDE 60 MG: 20 INJECTION, SOLUTION INFILTRATION; PERINEURAL at 13:23

## 2018-02-13 RX ADMIN — METOPROLOL TARTRATE 50 MG: 50 TABLET, FILM COATED ORAL at 01:13

## 2018-02-13 RX ADMIN — Medication 0.02 MCG/KG/MIN: at 09:40

## 2018-02-13 RX ADMIN — SODIUM CHLORIDE 9 ML/HR: 9 INJECTION, SOLUTION INTRAVENOUS at 12:56

## 2018-02-13 RX ADMIN — HYDROMORPHONE HYDROCHLORIDE 0.5 MG: 10 INJECTION INTRAMUSCULAR; INTRAVENOUS; SUBCUTANEOUS at 16:42

## 2018-02-13 RX ADMIN — POTASSIUM CHLORIDE 20 MEQ: 400 INJECTION, SOLUTION INTRAVENOUS at 09:39

## 2018-02-13 RX ADMIN — FAMOTIDINE 20 MG: 10 INJECTION, SOLUTION INTRAVENOUS at 12:57

## 2018-02-13 RX ADMIN — ROCURONIUM BROMIDE 10 MG: 10 INJECTION INTRAVENOUS at 13:18

## 2018-02-13 RX ADMIN — SODIUM CHLORIDE: 9 INJECTION, SOLUTION INTRAVENOUS at 13:27

## 2018-02-13 RX ADMIN — PANTOPRAZOLE SODIUM 40 MG: 40 INJECTION, POWDER, FOR SOLUTION INTRAVENOUS at 09:39

## 2018-02-13 RX ADMIN — FENTANYL CITRATE 50 MCG: 50 INJECTION INTRAMUSCULAR; INTRAVENOUS at 13:17

## 2018-02-13 RX ADMIN — SODIUM CHLORIDE 150 ML/HR: 9 INJECTION, SOLUTION INTRAVENOUS at 05:17

## 2018-02-13 RX ADMIN — HYDROMORPHONE HYDROCHLORIDE 0.5 MG: 2 INJECTION INTRAMUSCULAR; INTRAVENOUS; SUBCUTANEOUS at 14:50

## 2018-02-13 RX ADMIN — HYDROMORPHONE HYDROCHLORIDE 0.5 MG: 10 INJECTION INTRAMUSCULAR; INTRAVENOUS; SUBCUTANEOUS at 08:20

## 2018-02-13 RX ADMIN — ERTAPENEM SODIUM 1 G: 1 INJECTION, POWDER, LYOPHILIZED, FOR SOLUTION INTRAMUSCULAR; INTRAVENOUS at 10:06

## 2018-02-13 RX ADMIN — ALBUMIN HUMAN 25 G: 0.25 SOLUTION INTRAVENOUS at 01:45

## 2018-02-13 RX ADMIN — HYDROMORPHONE HYDROCHLORIDE 0.5 MG: 10 INJECTION INTRAMUSCULAR; INTRAVENOUS; SUBCUTANEOUS at 05:12

## 2018-02-13 RX ADMIN — FENTANYL CITRATE 50 MCG: 50 INJECTION INTRAMUSCULAR; INTRAVENOUS at 15:26

## 2018-02-13 RX ADMIN — SUCCINYLCHOLINE CHLORIDE 160 MG: 20 INJECTION, SOLUTION INTRAMUSCULAR; INTRAVENOUS; PARENTERAL at 13:23

## 2018-02-13 RX ADMIN — HYDROMORPHONE HYDROCHLORIDE 0.5 MG: 10 INJECTION INTRAMUSCULAR; INTRAVENOUS; SUBCUTANEOUS at 23:40

## 2018-02-13 RX ADMIN — AMIODARONE HYDROCHLORIDE 400 MG: 200 TABLET ORAL at 00:10

## 2018-02-13 RX ADMIN — HEPARIN SODIUM 400 UNITS/HR: 10000 INJECTION, SOLUTION INTRAVENOUS at 20:40

## 2018-02-13 RX ADMIN — ONDANSETRON 4 MG: 2 INJECTION INTRAMUSCULAR; INTRAVENOUS at 14:15

## 2018-02-13 RX ADMIN — HYDROMORPHONE HYDROCHLORIDE 1 MG: 2 INJECTION INTRAMUSCULAR; INTRAVENOUS; SUBCUTANEOUS at 10:07

## 2018-02-13 RX ADMIN — ALBUMIN HUMAN 25 G: 0.25 SOLUTION INTRAVENOUS at 20:50

## 2018-02-13 RX ADMIN — FENTANYL CITRATE 50 MCG: 50 INJECTION INTRAMUSCULAR; INTRAVENOUS at 13:34

## 2018-02-13 NOTE — ANESTHESIA PREPROCEDURE EVALUATION
Anesthesia Evaluation     Patient summary reviewed and Nursing notes reviewed                Airway   Mallampati: II  Neck ROM: limited  no difficulty expected  Dental      Pulmonary    (+) asthma,   Cardiovascular     ECG reviewed  Rhythm: irregular  Rate: abnormal    (+) hypertension, dysrhythmias Atrial Fib,       Neuro/Psych  (+) numbness,     GI/Hepatic/Renal/Endo    (+) obesity, morbid obesity, hiatal hernia, renal disease CRI,     Musculoskeletal (-) negative ROS    Abdominal    Substance History - negative use     OB/GYN negative ob/gyn ROS         Other      history of cancer active                  Anesthesia Plan    ASA 4     general   (EZ intubation by history  Chronic afib usually ranging around 115/120 beats /min  Receiving 2 units PRBC's pre op   Multiple medical problems  BMI  CRI  Colon cancer s/p chemo/surgery)  intravenous induction   Anesthetic plan and risks discussed with patient.

## 2018-02-13 NOTE — ANESTHESIA POSTPROCEDURE EVALUATION
Patient: Mariya Campos    Procedure Summary     Date Anesthesia Start Anesthesia Stop Room / Location    02/13/18 1312 1505  [facility] / [facility]       Procedure Diagnosis Surgeon Provider    WOUND WASHOUT ABDOMEN (N/A ) No diagnosis on file. MD Alessio Neville MD          Anesthesia Type: general  Last vitals  BP   110/58 (02/13/18 1530)   Temp   37 °C (98.6 °F) (02/13/18 1457)   Pulse   77 (02/13/18 1530)   Resp   20 (02/13/18 1530)     SpO2   93 % (02/13/18 1530)     Post Anesthesia Care and Evaluation    Patient location during evaluation: PACU  Patient participation: complete - patient participated  Level of consciousness: sleepy but conscious  Pain management: adequate  Airway patency: patent  Anesthetic complications: No anesthetic complications  PONV Status: none  Cardiovascular status: acceptable  Respiratory status: acceptable  Hydration status: acceptable

## 2018-02-13 NOTE — ANESTHESIA PROCEDURE NOTES
Airway  Urgency: elective    Date/Time: 2/13/2018 1:28 PM  Airway not difficult    General Information and Staff    Patient location during procedure: OR  Anesthesiologist: SARA HANNA  CRNA: RENNY MINER    Indications and Patient Condition  Indications for airway management: airway protection    Preoxygenated: yes  MILS not maintained throughout  Mask difficulty assessment: 1 - vent by mask    Final Airway Details  Final airway type: endotracheal airway      Successful airway: ETT  Cuffed: yes   Successful intubation technique: direct laryngoscopy  Endotracheal tube insertion site: oral  Blade: Svetlana  Blade size: #3  ETT size: 7.0 mm  Cormack-Lehane Classification: grade I - full view of glottis  Placement verified by: chest auscultation and capnometry   Measured from: lips  ETT to lips (cm): 22  Number of attempts at approach: 1    Additional Comments  PreO2 100%, FEO2 >85, SIVI, easy BMV, sniff position, ETT placed/ confirmed, attraumatic, teeth and lips as preop.

## 2018-02-14 LAB
ABO + RH BLD: NORMAL
ALBUMIN SERPL-MCNC: 2.4 G/DL (ref 3.5–5.2)
ALBUMIN SERPL-MCNC: 2.6 G/DL (ref 3.5–5.2)
ALBUMIN SERPL-MCNC: 2.9 G/DL (ref 3.5–5.2)
ALBUMIN SERPL-MCNC: 2.9 G/DL (ref 3.5–5.2)
ALBUMIN/GLOB SERPL: 0.7 G/DL
ALP SERPL-CCNC: 51 U/L (ref 39–117)
ALT SERPL W P-5'-P-CCNC: <5 U/L (ref 1–33)
ANION GAP SERPL CALCULATED.3IONS-SCNC: 10.8 MMOL/L
ANION GAP SERPL CALCULATED.3IONS-SCNC: 12 MMOL/L
ANION GAP SERPL CALCULATED.3IONS-SCNC: 13.3 MMOL/L
ANION GAP SERPL CALCULATED.3IONS-SCNC: 13.8 MMOL/L
ANISOCYTOSIS BLD QL: ABNORMAL
ANISOCYTOSIS BLD QL: NORMAL
AST SERPL-CCNC: 15 U/L (ref 1–32)
BACTERIA SPEC AEROBE CULT: NORMAL
BACTERIA SPEC AEROBE CULT: NORMAL
BASE EXCESS BLDA CALC-SCNC: 1 MMOL/L (ref -5–5)
BASOPHILS # BLD MANUAL: 0.12 10*3/MM3 (ref 0–0.2)
BASOPHILS NFR BLD AUTO: 1 % (ref 0–1.5)
BH BB BLOOD EXPIRATION DATE: NORMAL
BH BB BLOOD TYPE BARCODE: 7300
BH BB DISPENSE STATUS: NORMAL
BH BB PRODUCT CODE: NORMAL
BH BB UNIT NUMBER: NORMAL
BILIRUB SERPL-MCNC: 1.4 MG/DL (ref 0.1–1.2)
BUN BLD-MCNC: 12 MG/DL (ref 6–20)
BUN BLD-MCNC: 21 MG/DL (ref 6–20)
BUN BLD-MCNC: 44 MG/DL (ref 6–20)
BUN BLD-MCNC: 8 MG/DL (ref 6–20)
BUN/CREAT SERPL: 11.9 (ref 7–25)
BUN/CREAT SERPL: 14.5 (ref 7–25)
BUN/CREAT SERPL: 17.8 (ref 7–25)
BUN/CREAT SERPL: 21.6 (ref 7–25)
CA-I BLD-MCNC: 4.4 MG/DL (ref 4.6–5.4)
CA-I BLD-MCNC: 4.4 MG/DL (ref 4.6–5.4)
CA-I BLD-MCNC: 4.5 MG/DL (ref 4.6–5.4)
CA-I BLD-MCNC: 4.6 MG/DL (ref 4.6–5.4)
CA-I SERPL ISE-MCNC: 1.1 MMOL/L (ref 1.15–1.35)
CA-I SERPL ISE-MCNC: 1.11 MMOL/L (ref 1.15–1.35)
CA-I SERPL ISE-MCNC: 1.12 MMOL/L (ref 1.15–1.35)
CA-I SERPL ISE-MCNC: 1.14 MMOL/L (ref 1.15–1.35)
CALCIUM SPEC-SCNC: 7.2 MG/DL (ref 8.6–10.5)
CALCIUM SPEC-SCNC: 7.3 MG/DL (ref 8.6–10.5)
CALCIUM SPEC-SCNC: 7.4 MG/DL (ref 8.6–10.5)
CALCIUM SPEC-SCNC: 7.5 MG/DL (ref 8.6–10.5)
CHLORIDE SERPL-SCNC: 94 MMOL/L (ref 98–107)
CHLORIDE SERPL-SCNC: 95 MMOL/L (ref 98–107)
CHLORIDE SERPL-SCNC: 95 MMOL/L (ref 98–107)
CHLORIDE SERPL-SCNC: 97 MMOL/L (ref 98–107)
CLUMPED PLATELETS: PRESENT
CLUMPED PLATELETS: PRESENT
CO2 BLDA-SCNC: 28 MMOL/L (ref 24–29)
CO2 SERPL-SCNC: 25.7 MMOL/L (ref 22–29)
CO2 SERPL-SCNC: 26 MMOL/L (ref 22–29)
CO2 SERPL-SCNC: 26.2 MMOL/L (ref 22–29)
CO2 SERPL-SCNC: 28.2 MMOL/L (ref 22–29)
CREAT BLD-MCNC: 0.67 MG/DL (ref 0.57–1)
CREAT BLD-MCNC: 0.83 MG/DL (ref 0.57–1)
CREAT BLD-MCNC: 1.18 MG/DL (ref 0.57–1)
CREAT BLD-MCNC: 2.04 MG/DL (ref 0.57–1)
DEPRECATED RDW RBC AUTO: 54 FL (ref 37–54)
DEPRECATED RDW RBC AUTO: 54.3 FL (ref 37–54)
DEPRECATED RDW RBC AUTO: 54.4 FL (ref 37–54)
ERYTHROCYTE [DISTWIDTH] IN BLOOD BY AUTOMATED COUNT: 16.3 % (ref 11.7–13)
ERYTHROCYTE [DISTWIDTH] IN BLOOD BY AUTOMATED COUNT: 16.5 % (ref 11.7–13)
ERYTHROCYTE [DISTWIDTH] IN BLOOD BY AUTOMATED COUNT: 16.6 % (ref 11.7–13)
GFR SERPL CREATININE-BSD FRML MDRD: 25 ML/MIN/1.73
GFR SERPL CREATININE-BSD FRML MDRD: 48 ML/MIN/1.73
GFR SERPL CREATININE-BSD FRML MDRD: 71 ML/MIN/1.73
GFR SERPL CREATININE-BSD FRML MDRD: 91 ML/MIN/1.73
GLOBULIN UR ELPH-MCNC: 4 GM/DL
GLUCOSE BLD-MCNC: 78 MG/DL (ref 65–99)
GLUCOSE BLD-MCNC: 87 MG/DL (ref 65–99)
GLUCOSE BLD-MCNC: 89 MG/DL (ref 65–99)
GLUCOSE BLD-MCNC: 91 MG/DL (ref 65–99)
GLUCOSE BLDC GLUCOMTR-MCNC: 100 MG/DL (ref 70–130)
GLUCOSE BLDC GLUCOMTR-MCNC: 102 MG/DL (ref 70–130)
GLUCOSE BLDC GLUCOMTR-MCNC: 113 MG/DL (ref 70–130)
GLUCOSE BLDC GLUCOMTR-MCNC: 89 MG/DL (ref 70–130)
HCO3 BLDA-SCNC: 26.8 MMOL/L (ref 22–26)
HCT VFR BLD AUTO: 27.4 % (ref 35.6–45.5)
HCT VFR BLD AUTO: 27.8 % (ref 35.6–45.5)
HCT VFR BLD AUTO: 29.3 % (ref 35.6–45.5)
HCT VFR BLDA CALC: 25 % (ref 38–51)
HGB BLD-MCNC: 8.5 G/DL (ref 11.9–15.5)
HGB BLD-MCNC: 8.6 G/DL (ref 11.9–15.5)
HGB BLD-MCNC: 9.1 G/DL (ref 11.9–15.5)
HGB BLDA-MCNC: 8.5 G/DL (ref 12–17)
HYPOCHROMIA BLD QL: ABNORMAL
HYPOCHROMIA BLD QL: ABNORMAL
LYMPHOCYTES # BLD MANUAL: 0.67 10*3/MM3 (ref 0.9–4.8)
LYMPHOCYTES # BLD MANUAL: 1.03 10*3/MM3 (ref 0.9–4.8)
LYMPHOCYTES # BLD MANUAL: 1.36 10*3/MM3 (ref 0.9–4.8)
LYMPHOCYTES NFR BLD MANUAL: 11 % (ref 19.6–45.3)
LYMPHOCYTES NFR BLD MANUAL: 2 % (ref 5–12)
LYMPHOCYTES NFR BLD MANUAL: 4 % (ref 5–12)
LYMPHOCYTES NFR BLD MANUAL: 6 % (ref 19.6–45.3)
LYMPHOCYTES NFR BLD MANUAL: 7 % (ref 5–12)
LYMPHOCYTES NFR BLD MANUAL: 8 % (ref 19.6–45.3)
MAGNESIUM SERPL-MCNC: 1.8 MG/DL (ref 1.6–2.6)
MAGNESIUM SERPL-MCNC: 1.9 MG/DL (ref 1.6–2.6)
MAGNESIUM SERPL-MCNC: 1.9 MG/DL (ref 1.6–2.6)
MAGNESIUM SERPL-MCNC: 2.1 MG/DL (ref 1.6–2.6)
MCH RBC QN AUTO: 27.6 PG (ref 26.9–32)
MCH RBC QN AUTO: 27.8 PG (ref 26.9–32)
MCH RBC QN AUTO: 28.1 PG (ref 26.9–32)
MCHC RBC AUTO-ENTMCNC: 30.9 G/DL (ref 32.4–36.3)
MCHC RBC AUTO-ENTMCNC: 31 G/DL (ref 32.4–36.3)
MCHC RBC AUTO-ENTMCNC: 31.1 G/DL (ref 32.4–36.3)
MCV RBC AUTO: 89.1 FL (ref 80.5–98.2)
MCV RBC AUTO: 89.6 FL (ref 80.5–98.2)
MCV RBC AUTO: 90.7 FL (ref 80.5–98.2)
METAMYELOCYTES NFR BLD MANUAL: 1 % (ref 0–0)
METAMYELOCYTES NFR BLD MANUAL: 3 % (ref 0–0)
METAMYELOCYTES NFR BLD MANUAL: 7 % (ref 0–0)
MONOCYTES # BLD AUTO: 0.25 10*3/MM3 (ref 0.2–1.2)
MONOCYTES # BLD AUTO: 0.45 10*3/MM3 (ref 0.2–1.2)
MONOCYTES # BLD AUTO: 0.9 10*3/MM3 (ref 0.2–1.2)
MYELOCYTES NFR BLD MANUAL: 2 % (ref 0–0)
NEUTROPHILS # BLD AUTO: 10.5 10*3/MM3 (ref 1.9–8.1)
NEUTROPHILS # BLD AUTO: 9.61 10*3/MM3 (ref 1.9–8.1)
NEUTROPHILS # BLD AUTO: 9.8 10*3/MM3 (ref 1.9–8.1)
NEUTROPHILS NFR BLD MANUAL: 76 % (ref 42.7–76)
NEUTROPHILS NFR BLD MANUAL: 85 % (ref 42.7–76)
NEUTROPHILS NFR BLD MANUAL: 86 % (ref 42.7–76)
NRBC SPEC MANUAL: 1 /100 WBC (ref 0–0)
NRBC SPEC MANUAL: 2 /100 WBC (ref 0–0)
PCO2 BLDA: 50.8 MM HG (ref 35–45)
PH BLDA: 7.33 PH UNITS (ref 7.35–7.6)
PHOSPHATE SERPL-MCNC: 1.7 MG/DL (ref 2.5–4.5)
PHOSPHATE SERPL-MCNC: 2.4 MG/DL (ref 2.5–4.5)
PHOSPHATE SERPL-MCNC: 2.9 MG/DL (ref 2.5–4.5)
PHOSPHATE SERPL-MCNC: 3.2 MG/DL (ref 2.5–4.5)
PLAT MORPH BLD: NORMAL
PLAT MORPH BLD: NORMAL
PLATELET # BLD AUTO: 332 10*3/MM3 (ref 140–500)
PLATELET # BLD AUTO: 375 10*3/MM3 (ref 140–500)
PLATELET # BLD AUTO: 401 10*3/MM3 (ref 140–500)
PMV BLD AUTO: 10 FL (ref 6–12)
PMV BLD AUTO: 10 FL (ref 6–12)
PMV BLD AUTO: 9.8 FL (ref 6–12)
PO2 BLDA: 57 MMHG (ref 80–105)
POLYCHROMASIA BLD QL SMEAR: ABNORMAL
POLYCHROMASIA BLD QL SMEAR: ABNORMAL
POTASSIUM BLD-SCNC: 4 MMOL/L (ref 3.5–5.2)
POTASSIUM BLD-SCNC: 4.3 MMOL/L (ref 3.5–5.2)
POTASSIUM BLD-SCNC: 4.4 MMOL/L (ref 3.5–5.2)
POTASSIUM BLD-SCNC: 4.9 MMOL/L (ref 3.5–5.2)
POTASSIUM BLDA-SCNC: 3.8 MMOL/L (ref 3.5–4.9)
PROT SERPL-MCNC: 6.6 G/DL (ref 6–8.5)
RBC # BLD AUTO: 3.02 10*6/MM3 (ref 3.9–5.2)
RBC # BLD AUTO: 3.12 10*6/MM3 (ref 3.9–5.2)
RBC # BLD AUTO: 3.27 10*6/MM3 (ref 3.9–5.2)
SAO2 % BLDA: 87 % (ref 95–98)
SCAN SLIDE: NORMAL
SODIUM BLD-SCNC: 132 MMOL/L (ref 136–145)
SODIUM BLD-SCNC: 134 MMOL/L (ref 136–145)
SODIUM BLD-SCNC: 135 MMOL/L (ref 136–145)
SODIUM BLD-SCNC: 136 MMOL/L (ref 136–145)
UNIT  ABO: NORMAL
UNIT  RH: NORMAL
VARIANT LYMPHS NFR BLD MANUAL: 1 % (ref 0–5)
WBC MORPH BLD: NORMAL
WBC NRBC COR # BLD: 11.18 10*3/MM3 (ref 4.5–10.7)
WBC NRBC COR # BLD: 12.35 10*3/MM3 (ref 4.5–10.7)
WBC NRBC COR # BLD: 12.89 10*3/MM3 (ref 4.5–10.7)

## 2018-02-14 PROCEDURE — 99233 SBSQ HOSP IP/OBS HIGH 50: CPT | Performed by: INTERNAL MEDICINE

## 2018-02-14 PROCEDURE — 82330 ASSAY OF CALCIUM: CPT | Performed by: INTERNAL MEDICINE

## 2018-02-14 PROCEDURE — 85007 BL SMEAR W/DIFF WBC COUNT: CPT | Performed by: INTERNAL MEDICINE

## 2018-02-14 PROCEDURE — 85025 COMPLETE CBC W/AUTO DIFF WBC: CPT | Performed by: SURGERY

## 2018-02-14 PROCEDURE — 85025 COMPLETE CBC W/AUTO DIFF WBC: CPT | Performed by: INTERNAL MEDICINE

## 2018-02-14 PROCEDURE — 99024 POSTOP FOLLOW-UP VISIT: CPT | Performed by: SURGERY

## 2018-02-14 PROCEDURE — 83735 ASSAY OF MAGNESIUM: CPT | Performed by: INTERNAL MEDICINE

## 2018-02-14 PROCEDURE — 80069 RENAL FUNCTION PANEL: CPT | Performed by: INTERNAL MEDICINE

## 2018-02-14 PROCEDURE — 99232 SBSQ HOSP IP/OBS MODERATE 35: CPT | Performed by: INTERNAL MEDICINE

## 2018-02-14 PROCEDURE — 94799 UNLISTED PULMONARY SVC/PX: CPT

## 2018-02-14 PROCEDURE — 82962 GLUCOSE BLOOD TEST: CPT

## 2018-02-14 PROCEDURE — 25010000002 HEPARIN (PORCINE) PER 1000 UNITS: Performed by: INTERNAL MEDICINE

## 2018-02-14 PROCEDURE — 25010000002 ALBUMIN HUMAN 25% PER 50 ML: Performed by: INTERNAL MEDICINE

## 2018-02-14 PROCEDURE — 84100 ASSAY OF PHOSPHORUS: CPT | Performed by: SURGERY

## 2018-02-14 PROCEDURE — 25010000002 HEPARIN FLUSH (PORCINE) 100 UNIT/ML SOLUTION

## 2018-02-14 PROCEDURE — 80053 COMPREHEN METABOLIC PANEL: CPT | Performed by: INTERNAL MEDICINE

## 2018-02-14 PROCEDURE — 25010000002 ERTAPENEM: Performed by: INTERNAL MEDICINE

## 2018-02-14 PROCEDURE — 85007 BL SMEAR W/DIFF WBC COUNT: CPT | Performed by: SURGERY

## 2018-02-14 PROCEDURE — P9046 ALBUMIN (HUMAN), 25%, 20 ML: HCPCS | Performed by: INTERNAL MEDICINE

## 2018-02-14 RX ORDER — LIDOCAINE HYDROCHLORIDE 10 MG/ML
INJECTION, SOLUTION INFILTRATION; PERINEURAL
Status: COMPLETED
Start: 2018-02-14 | End: 2018-02-14

## 2018-02-14 RX ORDER — HYDROMORPHONE HYDROCHLORIDE 1 MG/ML
0.5 INJECTION, SOLUTION INTRAMUSCULAR; INTRAVENOUS; SUBCUTANEOUS
Status: COMPLETED | OUTPATIENT
Start: 2018-02-14 | End: 2018-02-15

## 2018-02-14 RX ORDER — FENTANYL CITRATE 50 UG/ML
50 INJECTION, SOLUTION INTRAMUSCULAR; INTRAVENOUS ONCE
Status: COMPLETED | OUTPATIENT
Start: 2018-02-14 | End: 2018-02-15

## 2018-02-14 RX ORDER — AMIODARONE HYDROCHLORIDE 200 MG/1
400 TABLET ORAL
Status: DISCONTINUED | OUTPATIENT
Start: 2018-02-14 | End: 2018-02-20

## 2018-02-14 RX ORDER — HEPARIN SODIUM 5000 [USP'U]/ML
5000 INJECTION, SOLUTION INTRAVENOUS; SUBCUTANEOUS EVERY 8 HOURS SCHEDULED
Status: DISCONTINUED | OUTPATIENT
Start: 2018-02-14 | End: 2018-03-12 | Stop reason: HOSPADM

## 2018-02-14 RX ADMIN — HEPARIN SODIUM 5000 UNITS: 5000 INJECTION, SOLUTION INTRAVENOUS; SUBCUTANEOUS at 14:59

## 2018-02-14 RX ADMIN — ALBUMIN HUMAN 25 G: 0.25 SOLUTION INTRAVENOUS at 06:53

## 2018-02-14 RX ADMIN — ERTAPENEM SODIUM 1 G: 1 INJECTION, POWDER, LYOPHILIZED, FOR SOLUTION INTRAMUSCULAR; INTRAVENOUS at 10:31

## 2018-02-14 RX ADMIN — HYDROMORPHONE HYDROCHLORIDE 0.5 MG: 10 INJECTION INTRAMUSCULAR; INTRAVENOUS; SUBCUTANEOUS at 19:45

## 2018-02-14 RX ADMIN — PANTOPRAZOLE SODIUM 40 MG: 40 INJECTION, POWDER, FOR SOLUTION INTRAVENOUS at 06:53

## 2018-02-14 RX ADMIN — HYDROMORPHONE HYDROCHLORIDE 0.5 MG: 10 INJECTION INTRAMUSCULAR; INTRAVENOUS; SUBCUTANEOUS at 06:52

## 2018-02-14 RX ADMIN — HEPARIN SODIUM 5000 UNITS: 5000 INJECTION, SOLUTION INTRAVENOUS; SUBCUTANEOUS at 22:10

## 2018-02-14 RX ADMIN — HYDROMORPHONE HYDROCHLORIDE 0.5 MG: 10 INJECTION INTRAMUSCULAR; INTRAVENOUS; SUBCUTANEOUS at 04:23

## 2018-02-14 RX ADMIN — HYDROMORPHONE HYDROCHLORIDE 0.5 MG: 10 INJECTION INTRAMUSCULAR; INTRAVENOUS; SUBCUTANEOUS at 02:29

## 2018-02-14 RX ADMIN — AMIODARONE HYDROCHLORIDE 400 MG: 200 TABLET ORAL at 12:13

## 2018-02-14 RX ADMIN — HYDROMORPHONE HYDROCHLORIDE 0.5 MG: 10 INJECTION INTRAMUSCULAR; INTRAVENOUS; SUBCUTANEOUS at 11:21

## 2018-02-14 RX ADMIN — DAKIN'S SOLUTION 0.125% (QUARTER STRENGTH): 0.12 SOLUTION at 10:47

## 2018-02-14 RX ADMIN — HYDROMORPHONE HYDROCHLORIDE 0.5 MG: 10 INJECTION INTRAMUSCULAR; INTRAVENOUS; SUBCUTANEOUS at 13:20

## 2018-02-14 RX ADMIN — Medication 320 UNITS: at 23:25

## 2018-02-14 RX ADMIN — SODIUM PHOSPHATE, MONOBASIC, MONOHYDRATE 10 MMOL: 276; 142 INJECTION, SOLUTION INTRAVENOUS at 21:14

## 2018-02-14 RX ADMIN — ALBUMIN HUMAN 25 G: 0.25 SOLUTION INTRAVENOUS at 20:22

## 2018-02-14 RX ADMIN — HYDROMORPHONE HYDROCHLORIDE 0.5 MG: 10 INJECTION INTRAMUSCULAR; INTRAVENOUS; SUBCUTANEOUS at 07:39

## 2018-02-14 RX ADMIN — LIDOCAINE HYDROCHLORIDE: 10 INJECTION, SOLUTION INFILTRATION; PERINEURAL at 18:12

## 2018-02-14 RX ADMIN — ALBUMIN HUMAN 25 G: 0.25 SOLUTION INTRAVENOUS at 02:00

## 2018-02-14 RX ADMIN — ALBUMIN HUMAN 25 G: 0.25 SOLUTION INTRAVENOUS at 14:28

## 2018-02-14 RX ADMIN — HYDROMORPHONE HYDROCHLORIDE 0.5 MG: 10 INJECTION INTRAMUSCULAR; INTRAVENOUS; SUBCUTANEOUS at 15:46

## 2018-02-14 RX ADMIN — HYDROMORPHONE HYDROCHLORIDE 0.5 MG: 10 INJECTION INTRAMUSCULAR; INTRAVENOUS; SUBCUTANEOUS at 22:10

## 2018-02-14 RX ADMIN — HYDROMORPHONE HYDROCHLORIDE 0.5 MG: 10 INJECTION INTRAMUSCULAR; INTRAVENOUS; SUBCUTANEOUS at 17:21

## 2018-02-15 PROBLEM — L08.9 WOUND INFECTION: Status: ACTIVE | Noted: 2018-01-30

## 2018-02-15 PROBLEM — T14.8XXA WOUND INFECTION: Status: ACTIVE | Noted: 2018-01-30

## 2018-02-15 LAB
ALBUMIN SERPL-MCNC: 2.6 G/DL (ref 3.5–5.2)
ALBUMIN/GLOB SERPL: 0.8 G/DL
ALP SERPL-CCNC: 48 U/L (ref 39–117)
ALT SERPL W P-5'-P-CCNC: 5 U/L (ref 1–33)
ANION GAP SERPL CALCULATED.3IONS-SCNC: 13.7 MMOL/L
AST SERPL-CCNC: 18 U/L (ref 1–32)
BILIRUB SERPL-MCNC: 1.2 MG/DL (ref 0.1–1.2)
BUN BLD-MCNC: 13 MG/DL (ref 6–20)
BUN/CREAT SERPL: 10.9 (ref 7–25)
CA-I BLD-MCNC: 4.7 MG/DL (ref 4.6–5.4)
CA-I SERPL ISE-MCNC: 1.18 MMOL/L (ref 1.15–1.35)
CALCIUM SPEC-SCNC: 7.9 MG/DL (ref 8.6–10.5)
CHLORIDE SERPL-SCNC: 92 MMOL/L (ref 98–107)
CO2 SERPL-SCNC: 23.3 MMOL/L (ref 22–29)
CREAT BLD-MCNC: 1.19 MG/DL (ref 0.57–1)
DEPRECATED RDW RBC AUTO: 53.3 FL (ref 37–54)
ERYTHROCYTE [DISTWIDTH] IN BLOOD BY AUTOMATED COUNT: 16.3 % (ref 11.7–13)
GFR SERPL CREATININE-BSD FRML MDRD: 47 ML/MIN/1.73
GLOBULIN UR ELPH-MCNC: 3.4 GM/DL
GLUCOSE BLD-MCNC: 95 MG/DL (ref 65–99)
GLUCOSE BLDC GLUCOMTR-MCNC: 115 MG/DL (ref 70–130)
GLUCOSE BLDC GLUCOMTR-MCNC: 132 MG/DL (ref 70–130)
GLUCOSE BLDC GLUCOMTR-MCNC: 141 MG/DL (ref 70–130)
GLUCOSE BLDC GLUCOMTR-MCNC: 141 MG/DL (ref 70–130)
GLUCOSE BLDC GLUCOMTR-MCNC: 92 MG/DL (ref 70–130)
HCT VFR BLD AUTO: 27.9 % (ref 35.6–45.5)
HGB BLD-MCNC: 8.7 G/DL (ref 11.9–15.5)
MAGNESIUM SERPL-MCNC: 2 MG/DL (ref 1.6–2.6)
MCH RBC QN AUTO: 28.1 PG (ref 26.9–32)
MCHC RBC AUTO-ENTMCNC: 31.2 G/DL (ref 32.4–36.3)
MCV RBC AUTO: 90 FL (ref 80.5–98.2)
PLATELET # BLD AUTO: 344 10*3/MM3 (ref 140–500)
PMV BLD AUTO: 10 FL (ref 6–12)
POTASSIUM BLD-SCNC: 4.4 MMOL/L (ref 3.5–5.2)
PROT SERPL-MCNC: 6 G/DL (ref 6–8.5)
RBC # BLD AUTO: 3.1 10*6/MM3 (ref 3.9–5.2)
SODIUM BLD-SCNC: 129 MMOL/L (ref 136–145)
WBC NRBC COR # BLD: 13.14 10*3/MM3 (ref 4.5–10.7)

## 2018-02-15 PROCEDURE — 25010000002 ERTAPENEM: Performed by: INTERNAL MEDICINE

## 2018-02-15 PROCEDURE — 80053 COMPREHEN METABOLIC PANEL: CPT | Performed by: INTERNAL MEDICINE

## 2018-02-15 PROCEDURE — 85027 COMPLETE CBC AUTOMATED: CPT | Performed by: INTERNAL MEDICINE

## 2018-02-15 PROCEDURE — 99233 SBSQ HOSP IP/OBS HIGH 50: CPT | Performed by: INTERNAL MEDICINE

## 2018-02-15 PROCEDURE — 82962 GLUCOSE BLOOD TEST: CPT

## 2018-02-15 PROCEDURE — 99024 POSTOP FOLLOW-UP VISIT: CPT | Performed by: SURGERY

## 2018-02-15 PROCEDURE — 99232 SBSQ HOSP IP/OBS MODERATE 35: CPT | Performed by: INTERNAL MEDICINE

## 2018-02-15 PROCEDURE — 83735 ASSAY OF MAGNESIUM: CPT | Performed by: INTERNAL MEDICINE

## 2018-02-15 PROCEDURE — 82330 ASSAY OF CALCIUM: CPT | Performed by: INTERNAL MEDICINE

## 2018-02-15 PROCEDURE — 5A1D70Z PERFORMANCE OF URINARY FILTRATION, INTERMITTENT, LESS THAN 6 HOURS PER DAY: ICD-10-PCS | Performed by: INTERNAL MEDICINE

## 2018-02-15 PROCEDURE — 97110 THERAPEUTIC EXERCISES: CPT

## 2018-02-15 PROCEDURE — 94799 UNLISTED PULMONARY SVC/PX: CPT

## 2018-02-15 PROCEDURE — 25010000002 HEPARIN (PORCINE) PER 1000 UNITS: Performed by: INTERNAL MEDICINE

## 2018-02-15 PROCEDURE — 25010000002 CALCIUM GLUCONATE PER 10 ML: Performed by: SURGERY

## 2018-02-15 PROCEDURE — 25010000002 FENTANYL CITRATE (PF) 100 MCG/2ML SOLUTION: Performed by: SURGERY

## 2018-02-15 RX ORDER — HEPARIN SODIUM 1000 [USP'U]/ML
4000 INJECTION, SOLUTION INTRAVENOUS; SUBCUTANEOUS AS NEEDED
Status: DISCONTINUED | OUTPATIENT
Start: 2018-02-15 | End: 2018-03-12 | Stop reason: HOSPADM

## 2018-02-15 RX ORDER — ECHINACEA PURPUREA EXTRACT 125 MG
2 TABLET ORAL AS NEEDED
Status: DISCONTINUED | OUTPATIENT
Start: 2018-02-15 | End: 2018-03-12 | Stop reason: HOSPADM

## 2018-02-15 RX ORDER — BUMETANIDE 0.25 MG/ML
4 INJECTION INTRAMUSCULAR; INTRAVENOUS ONCE
Status: COMPLETED | OUTPATIENT
Start: 2018-02-15 | End: 2018-02-15

## 2018-02-15 RX ORDER — HYDROMORPHONE HYDROCHLORIDE 1 MG/ML
0.5 INJECTION, SOLUTION INTRAMUSCULAR; INTRAVENOUS; SUBCUTANEOUS
Status: COMPLETED | OUTPATIENT
Start: 2018-02-15 | End: 2018-02-16

## 2018-02-15 RX ADMIN — HEPARIN SODIUM 4000 UNITS: 1000 INJECTION, SOLUTION INTRAVENOUS; SUBCUTANEOUS at 17:34

## 2018-02-15 RX ADMIN — PANTOPRAZOLE SODIUM 40 MG: 40 INJECTION, POWDER, FOR SOLUTION INTRAVENOUS at 06:06

## 2018-02-15 RX ADMIN — HYDROMORPHONE HYDROCHLORIDE 0.5 MG: 10 INJECTION INTRAMUSCULAR; INTRAVENOUS; SUBCUTANEOUS at 09:41

## 2018-02-15 RX ADMIN — HEPARIN SODIUM 5000 UNITS: 5000 INJECTION, SOLUTION INTRAVENOUS; SUBCUTANEOUS at 06:06

## 2018-02-15 RX ADMIN — CALCIUM GLUCONATE: 94 INJECTION, SOLUTION INTRAVENOUS at 17:36

## 2018-02-15 RX ADMIN — DAKIN'S SOLUTION 0.125% (QUARTER STRENGTH): 0.12 SOLUTION at 15:23

## 2018-02-15 RX ADMIN — FENTANYL CITRATE 50 MCG: 50 INJECTION, SOLUTION INTRAMUSCULAR; INTRAVENOUS at 17:30

## 2018-02-15 RX ADMIN — ERTAPENEM SODIUM 1 G: 1 INJECTION, POWDER, LYOPHILIZED, FOR SOLUTION INTRAMUSCULAR; INTRAVENOUS at 09:06

## 2018-02-15 RX ADMIN — BUMETANIDE 4 MG: 0.25 INJECTION INTRAMUSCULAR; INTRAVENOUS at 08:52

## 2018-02-15 RX ADMIN — HEPARIN SODIUM 5000 UNITS: 5000 INJECTION, SOLUTION INTRAVENOUS; SUBCUTANEOUS at 22:45

## 2018-02-15 RX ADMIN — HEPARIN SODIUM 5000 UNITS: 5000 INJECTION, SOLUTION INTRAVENOUS; SUBCUTANEOUS at 14:39

## 2018-02-15 RX ADMIN — HEPARIN SODIUM 4000 UNITS: 1000 INJECTION, SOLUTION INTRAVENOUS; SUBCUTANEOUS at 18:43

## 2018-02-15 RX ADMIN — SILVER NITRATE APPLICATORS 1 APPLICATION: 25; 75 STICK TOPICAL at 06:58

## 2018-02-15 RX ADMIN — AMIODARONE HYDROCHLORIDE 400 MG: 200 TABLET ORAL at 08:27

## 2018-02-15 RX ADMIN — HYDROMORPHONE HYDROCHLORIDE 0.5 MG: 10 INJECTION INTRAMUSCULAR; INTRAVENOUS; SUBCUTANEOUS at 06:00

## 2018-02-15 RX ADMIN — HYDROMORPHONE HYDROCHLORIDE 0.5 MG: 10 INJECTION INTRAMUSCULAR; INTRAVENOUS; SUBCUTANEOUS at 04:01

## 2018-02-16 ENCOUNTER — ANESTHESIA (OUTPATIENT)
Dept: PERIOP | Facility: HOSPITAL | Age: 58
End: 2018-02-16

## 2018-02-16 ENCOUNTER — ANESTHESIA EVENT (OUTPATIENT)
Dept: PERIOP | Facility: HOSPITAL | Age: 58
End: 2018-02-16

## 2018-02-16 LAB
ABO + RH BLD: NORMAL
ABO + RH BLD: NORMAL
ABO GROUP BLD: NORMAL
ALBUMIN SERPL-MCNC: 2.4 G/DL (ref 3.5–5.2)
ALBUMIN/GLOB SERPL: 0.7 G/DL
ALP SERPL-CCNC: 53 U/L (ref 39–117)
ALT SERPL W P-5'-P-CCNC: 10 U/L (ref 1–33)
ANION GAP SERPL CALCULATED.3IONS-SCNC: 11.7 MMOL/L
AST SERPL-CCNC: 26 U/L (ref 1–32)
BH BB BLOOD EXPIRATION DATE: NORMAL
BH BB BLOOD EXPIRATION DATE: NORMAL
BH BB BLOOD TYPE BARCODE: 7300
BH BB BLOOD TYPE BARCODE: 7300
BH BB DISPENSE STATUS: NORMAL
BH BB DISPENSE STATUS: NORMAL
BH BB PRODUCT CODE: NORMAL
BH BB PRODUCT CODE: NORMAL
BH BB UNIT NUMBER: NORMAL
BH BB UNIT NUMBER: NORMAL
BILIRUB SERPL-MCNC: 0.6 MG/DL (ref 0.1–1.2)
BLD GP AB SCN SERPL QL: NEGATIVE
BUN BLD-MCNC: 37 MG/DL (ref 6–20)
BUN/CREAT SERPL: 14.2 (ref 7–25)
CALCIUM SPEC-SCNC: 8 MG/DL (ref 8.6–10.5)
CHLORIDE SERPL-SCNC: 96 MMOL/L (ref 98–107)
CO2 SERPL-SCNC: 24.3 MMOL/L (ref 22–29)
CREAT BLD-MCNC: 2.6 MG/DL (ref 0.57–1)
DEPRECATED RDW RBC AUTO: 53 FL (ref 37–54)
ERYTHROCYTE [DISTWIDTH] IN BLOOD BY AUTOMATED COUNT: 16.2 % (ref 11.7–13)
GFR SERPL CREATININE-BSD FRML MDRD: 19 ML/MIN/1.73
GLOBULIN UR ELPH-MCNC: 3.5 GM/DL
GLUCOSE BLD-MCNC: 150 MG/DL (ref 65–99)
GLUCOSE BLDC GLUCOMTR-MCNC: 124 MG/DL (ref 70–130)
GLUCOSE BLDC GLUCOMTR-MCNC: 128 MG/DL (ref 70–130)
GLUCOSE BLDC GLUCOMTR-MCNC: 136 MG/DL (ref 70–130)
GLUCOSE BLDC GLUCOMTR-MCNC: 166 MG/DL (ref 70–130)
HCT VFR BLD AUTO: 27.3 % (ref 35.6–45.5)
HGB BLD-MCNC: 8.7 G/DL (ref 11.9–15.5)
MAGNESIUM SERPL-MCNC: 2.1 MG/DL (ref 1.6–2.6)
MCH RBC QN AUTO: 28.3 PG (ref 26.9–32)
MCHC RBC AUTO-ENTMCNC: 31.9 G/DL (ref 32.4–36.3)
MCV RBC AUTO: 88.9 FL (ref 80.5–98.2)
PHOSPHATE SERPL-MCNC: 3.2 MG/DL (ref 2.5–4.5)
PLATELET # BLD AUTO: 347 10*3/MM3 (ref 140–500)
PMV BLD AUTO: 9.6 FL (ref 6–12)
POTASSIUM BLD-SCNC: 4 MMOL/L (ref 3.5–5.2)
PROT SERPL-MCNC: 5.9 G/DL (ref 6–8.5)
RBC # BLD AUTO: 3.07 10*6/MM3 (ref 3.9–5.2)
RH BLD: POSITIVE
SODIUM BLD-SCNC: 132 MMOL/L (ref 136–145)
TRIGL SERPL-MCNC: 195 MG/DL (ref 0–150)
UNIT  ABO: NORMAL
UNIT  ABO: NORMAL
UNIT  RH: NORMAL
UNIT  RH: NORMAL
WBC NRBC COR # BLD: 11.85 10*3/MM3 (ref 4.5–10.7)

## 2018-02-16 PROCEDURE — 25010000002 HEPARIN (PORCINE) PER 1000 UNITS: Performed by: INTERNAL MEDICINE

## 2018-02-16 PROCEDURE — 84478 ASSAY OF TRIGLYCERIDES: CPT | Performed by: SURGERY

## 2018-02-16 PROCEDURE — 85018 HEMOGLOBIN: CPT

## 2018-02-16 PROCEDURE — 11043 DBRDMT MUSC&/FSCA 1ST 20/<: CPT | Performed by: SURGERY

## 2018-02-16 PROCEDURE — 25010000002 ERTAPENEM PER 500 MG: Performed by: INTERNAL MEDICINE

## 2018-02-16 PROCEDURE — 0JB80ZZ EXCISION OF ABDOMEN SUBCUTANEOUS TISSUE AND FASCIA, OPEN APPROACH: ICD-10-PCS | Performed by: INTERNAL MEDICINE

## 2018-02-16 PROCEDURE — 85014 HEMATOCRIT: CPT

## 2018-02-16 PROCEDURE — 11046 DBRDMT MUSC&/FSCA EA ADDL: CPT | Performed by: SURGERY

## 2018-02-16 PROCEDURE — 82962 GLUCOSE BLOOD TEST: CPT

## 2018-02-16 PROCEDURE — 86923 COMPATIBILITY TEST ELECTRIC: CPT

## 2018-02-16 PROCEDURE — 25010000002 ONDANSETRON PER 1 MG: Performed by: NURSE ANESTHETIST, CERTIFIED REGISTERED

## 2018-02-16 PROCEDURE — 82803 BLOOD GASES ANY COMBINATION: CPT

## 2018-02-16 PROCEDURE — 82947 ASSAY GLUCOSE BLOOD QUANT: CPT

## 2018-02-16 PROCEDURE — 25010000002 FENTANYL CITRATE (PF) 100 MCG/2ML SOLUTION: Performed by: ANESTHESIOLOGY

## 2018-02-16 PROCEDURE — 87070 CULTURE OTHR SPECIMN AEROBIC: CPT | Performed by: SURGERY

## 2018-02-16 PROCEDURE — 87075 CULTR BACTERIA EXCEPT BLOOD: CPT | Performed by: SURGERY

## 2018-02-16 PROCEDURE — 25010000002 PROPOFOL 10 MG/ML EMULSION: Performed by: ANESTHESIOLOGY

## 2018-02-16 PROCEDURE — 86900 BLOOD TYPING SEROLOGIC ABO: CPT

## 2018-02-16 PROCEDURE — 80053 COMPREHEN METABOLIC PANEL: CPT | Performed by: INTERNAL MEDICINE

## 2018-02-16 PROCEDURE — 99232 SBSQ HOSP IP/OBS MODERATE 35: CPT | Performed by: INTERNAL MEDICINE

## 2018-02-16 PROCEDURE — 84100 ASSAY OF PHOSPHORUS: CPT | Performed by: INTERNAL MEDICINE

## 2018-02-16 PROCEDURE — 83735 ASSAY OF MAGNESIUM: CPT | Performed by: INTERNAL MEDICINE

## 2018-02-16 PROCEDURE — 86901 BLOOD TYPING SEROLOGIC RH(D): CPT | Performed by: SURGERY

## 2018-02-16 PROCEDURE — 86900 BLOOD TYPING SEROLOGIC ABO: CPT | Performed by: SURGERY

## 2018-02-16 PROCEDURE — 85027 COMPLETE CBC AUTOMATED: CPT | Performed by: INTERNAL MEDICINE

## 2018-02-16 PROCEDURE — 87077 CULTURE AEROBIC IDENTIFY: CPT | Performed by: SURGERY

## 2018-02-16 PROCEDURE — 87205 SMEAR GRAM STAIN: CPT | Performed by: SURGERY

## 2018-02-16 PROCEDURE — 86850 RBC ANTIBODY SCREEN: CPT | Performed by: SURGERY

## 2018-02-16 PROCEDURE — 87186 SC STD MICRODIL/AGAR DIL: CPT | Performed by: SURGERY

## 2018-02-16 PROCEDURE — P9016 RBC LEUKOCYTES REDUCED: HCPCS

## 2018-02-16 DEVICE — GRFT TISS STRATTICE FIRM 10X16CM: Type: IMPLANTABLE DEVICE | Site: ABDOMEN | Status: FUNCTIONAL

## 2018-02-16 RX ORDER — HYDROMORPHONE HYDROCHLORIDE 1 MG/ML
0.5 INJECTION, SOLUTION INTRAMUSCULAR; INTRAVENOUS; SUBCUTANEOUS
Status: DISPENSED | OUTPATIENT
Start: 2018-02-16 | End: 2018-02-26

## 2018-02-16 RX ORDER — MAGNESIUM HYDROXIDE 1200 MG/15ML
LIQUID ORAL AS NEEDED
Status: DISCONTINUED | OUTPATIENT
Start: 2018-02-16 | End: 2018-02-16 | Stop reason: HOSPADM

## 2018-02-16 RX ORDER — PROPOFOL 10 MG/ML
VIAL (ML) INTRAVENOUS AS NEEDED
Status: DISCONTINUED | OUTPATIENT
Start: 2018-02-16 | End: 2018-02-16 | Stop reason: SURG

## 2018-02-16 RX ORDER — ONDANSETRON 2 MG/ML
4 INJECTION INTRAMUSCULAR; INTRAVENOUS ONCE AS NEEDED
Status: COMPLETED | OUTPATIENT
Start: 2018-02-16 | End: 2018-02-16

## 2018-02-16 RX ORDER — HYDROMORPHONE HYDROCHLORIDE 1 MG/ML
0.5 INJECTION, SOLUTION INTRAMUSCULAR; INTRAVENOUS; SUBCUTANEOUS
Status: COMPLETED | OUTPATIENT
Start: 2018-02-16 | End: 2018-02-16

## 2018-02-16 RX ORDER — PROMETHAZINE HYDROCHLORIDE 25 MG/1
25 TABLET ORAL ONCE AS NEEDED
Status: DISCONTINUED | OUTPATIENT
Start: 2018-02-16 | End: 2018-02-16 | Stop reason: HOSPADM

## 2018-02-16 RX ORDER — HYDROCODONE BITARTRATE AND ACETAMINOPHEN 7.5; 325 MG/1; MG/1
1 TABLET ORAL ONCE AS NEEDED
Status: DISCONTINUED | OUTPATIENT
Start: 2018-02-16 | End: 2018-02-16 | Stop reason: HOSPADM

## 2018-02-16 RX ORDER — PROMETHAZINE HYDROCHLORIDE 25 MG/ML
12.5 INJECTION, SOLUTION INTRAMUSCULAR; INTRAVENOUS ONCE AS NEEDED
Status: DISCONTINUED | OUTPATIENT
Start: 2018-02-16 | End: 2018-02-16 | Stop reason: HOSPADM

## 2018-02-16 RX ORDER — FENTANYL CITRATE 50 UG/ML
50 INJECTION, SOLUTION INTRAMUSCULAR; INTRAVENOUS
Status: DISCONTINUED | OUTPATIENT
Start: 2018-02-16 | End: 2018-02-16 | Stop reason: HOSPADM

## 2018-02-16 RX ORDER — HYDROMORPHONE HCL 110MG/55ML
0.5 PATIENT CONTROLLED ANALGESIA SYRINGE INTRAVENOUS
Status: DISCONTINUED | OUTPATIENT
Start: 2018-02-16 | End: 2018-02-16 | Stop reason: HOSPADM

## 2018-02-16 RX ORDER — EPHEDRINE SULFATE 50 MG/ML
5 INJECTION, SOLUTION INTRAVENOUS ONCE AS NEEDED
Status: DISCONTINUED | OUTPATIENT
Start: 2018-02-16 | End: 2018-02-16 | Stop reason: HOSPADM

## 2018-02-16 RX ORDER — LABETALOL HYDROCHLORIDE 5 MG/ML
5 INJECTION, SOLUTION INTRAVENOUS
Status: DISCONTINUED | OUTPATIENT
Start: 2018-02-16 | End: 2018-02-16 | Stop reason: HOSPADM

## 2018-02-16 RX ORDER — SODIUM CHLORIDE 9 MG/ML
9 INJECTION, SOLUTION INTRAVENOUS ONCE
Status: COMPLETED | OUTPATIENT
Start: 2018-02-16 | End: 2018-02-16

## 2018-02-16 RX ORDER — HYDRALAZINE HYDROCHLORIDE 20 MG/ML
5 INJECTION INTRAMUSCULAR; INTRAVENOUS
Status: DISCONTINUED | OUTPATIENT
Start: 2018-02-16 | End: 2018-02-16 | Stop reason: HOSPADM

## 2018-02-16 RX ORDER — FLUMAZENIL 0.1 MG/ML
0.2 INJECTION INTRAVENOUS AS NEEDED
Status: DISCONTINUED | OUTPATIENT
Start: 2018-02-16 | End: 2018-02-16 | Stop reason: HOSPADM

## 2018-02-16 RX ORDER — MIDAZOLAM HYDROCHLORIDE 1 MG/ML
1 INJECTION INTRAMUSCULAR; INTRAVENOUS
Status: DISCONTINUED | OUTPATIENT
Start: 2018-02-16 | End: 2018-02-16 | Stop reason: HOSPADM

## 2018-02-16 RX ORDER — ALBUTEROL SULFATE 2.5 MG/3ML
2.5 SOLUTION RESPIRATORY (INHALATION) ONCE AS NEEDED
Status: DISCONTINUED | OUTPATIENT
Start: 2018-02-16 | End: 2018-02-16 | Stop reason: HOSPADM

## 2018-02-16 RX ORDER — FAMOTIDINE 10 MG/ML
20 INJECTION, SOLUTION INTRAVENOUS ONCE
Status: COMPLETED | OUTPATIENT
Start: 2018-02-16 | End: 2018-02-16

## 2018-02-16 RX ORDER — MIDAZOLAM HYDROCHLORIDE 1 MG/ML
2 INJECTION INTRAMUSCULAR; INTRAVENOUS
Status: DISCONTINUED | OUTPATIENT
Start: 2018-02-16 | End: 2018-02-16 | Stop reason: HOSPADM

## 2018-02-16 RX ORDER — LIDOCAINE HYDROCHLORIDE 20 MG/ML
INJECTION, SOLUTION INFILTRATION; PERINEURAL AS NEEDED
Status: DISCONTINUED | OUTPATIENT
Start: 2018-02-16 | End: 2018-02-16 | Stop reason: SURG

## 2018-02-16 RX ORDER — PROMETHAZINE HYDROCHLORIDE 25 MG/1
12.5 TABLET ORAL ONCE AS NEEDED
Status: DISCONTINUED | OUTPATIENT
Start: 2018-02-16 | End: 2018-02-16 | Stop reason: HOSPADM

## 2018-02-16 RX ORDER — SODIUM CHLORIDE, SODIUM LACTATE, POTASSIUM CHLORIDE, CALCIUM CHLORIDE 600; 310; 30; 20 MG/100ML; MG/100ML; MG/100ML; MG/100ML
9 INJECTION, SOLUTION INTRAVENOUS CONTINUOUS
Status: DISCONTINUED | OUTPATIENT
Start: 2018-02-16 | End: 2018-02-16

## 2018-02-16 RX ORDER — OXYCODONE AND ACETAMINOPHEN 7.5; 325 MG/1; MG/1
1 TABLET ORAL ONCE AS NEEDED
Status: DISCONTINUED | OUTPATIENT
Start: 2018-02-16 | End: 2018-02-16 | Stop reason: HOSPADM

## 2018-02-16 RX ORDER — PROMETHAZINE HYDROCHLORIDE 25 MG/1
25 SUPPOSITORY RECTAL ONCE AS NEEDED
Status: DISCONTINUED | OUTPATIENT
Start: 2018-02-16 | End: 2018-02-16 | Stop reason: HOSPADM

## 2018-02-16 RX ORDER — DIPHENHYDRAMINE HYDROCHLORIDE 50 MG/ML
12.5 INJECTION INTRAMUSCULAR; INTRAVENOUS
Status: DISCONTINUED | OUTPATIENT
Start: 2018-02-16 | End: 2018-02-16 | Stop reason: HOSPADM

## 2018-02-16 RX ORDER — NALOXONE HCL 0.4 MG/ML
0.2 VIAL (ML) INJECTION AS NEEDED
Status: DISCONTINUED | OUTPATIENT
Start: 2018-02-16 | End: 2018-02-16 | Stop reason: HOSPADM

## 2018-02-16 RX ORDER — ROCURONIUM BROMIDE 10 MG/ML
INJECTION, SOLUTION INTRAVENOUS AS NEEDED
Status: DISCONTINUED | OUTPATIENT
Start: 2018-02-16 | End: 2018-02-16 | Stop reason: SURG

## 2018-02-16 RX ORDER — SODIUM CHLORIDE 0.9 % (FLUSH) 0.9 %
1-10 SYRINGE (ML) INJECTION AS NEEDED
Status: DISCONTINUED | OUTPATIENT
Start: 2018-02-16 | End: 2018-02-16 | Stop reason: HOSPADM

## 2018-02-16 RX ORDER — LIDOCAINE HYDROCHLORIDE 10 MG/ML
0.5 INJECTION, SOLUTION EPIDURAL; INFILTRATION; INTRACAUDAL; PERINEURAL ONCE AS NEEDED
Status: DISCONTINUED | OUTPATIENT
Start: 2018-02-16 | End: 2018-02-16 | Stop reason: HOSPADM

## 2018-02-16 RX ADMIN — HEPARIN SODIUM 5000 UNITS: 5000 INJECTION, SOLUTION INTRAVENOUS; SUBCUTANEOUS at 06:01

## 2018-02-16 RX ADMIN — FENTANYL CITRATE 50 MCG: 50 INJECTION INTRAMUSCULAR; INTRAVENOUS at 19:35

## 2018-02-16 RX ADMIN — SODIUM CHLORIDE: 9 INJECTION, SOLUTION INTRAVENOUS at 17:20

## 2018-02-16 RX ADMIN — HYDROMORPHONE HYDROCHLORIDE 0.5 MG: 10 INJECTION INTRAMUSCULAR; INTRAVENOUS; SUBCUTANEOUS at 00:31

## 2018-02-16 RX ADMIN — SODIUM CHLORIDE 500 MG: 9 INJECTION, SOLUTION INTRAVENOUS at 14:06

## 2018-02-16 RX ADMIN — FENTANYL CITRATE 50 MCG: 50 INJECTION INTRAMUSCULAR; INTRAVENOUS at 15:40

## 2018-02-16 RX ADMIN — FENTANYL CITRATE 50 MCG: 50 INJECTION INTRAMUSCULAR; INTRAVENOUS at 16:47

## 2018-02-16 RX ADMIN — ROCURONIUM BROMIDE 40 MG: 10 INJECTION INTRAVENOUS at 17:26

## 2018-02-16 RX ADMIN — AMIODARONE HYDROCHLORIDE 400 MG: 200 TABLET ORAL at 09:09

## 2018-02-16 RX ADMIN — ONDANSETRON HYDROCHLORIDE 4 MG: 2 SOLUTION INTRAMUSCULAR; INTRAVENOUS at 19:23

## 2018-02-16 RX ADMIN — DAKIN'S SOLUTION 0.125% (QUARTER STRENGTH): 0.12 SOLUTION at 08:16

## 2018-02-16 RX ADMIN — FENTANYL CITRATE 50 MCG: 50 INJECTION INTRAMUSCULAR; INTRAVENOUS at 17:20

## 2018-02-16 RX ADMIN — HEPARIN SODIUM 3000 UNITS: 1000 INJECTION, SOLUTION INTRAVENOUS; SUBCUTANEOUS at 13:28

## 2018-02-16 RX ADMIN — FAMOTIDINE 20 MG: 10 INJECTION INTRAVENOUS at 15:40

## 2018-02-16 RX ADMIN — PANTOPRAZOLE SODIUM 40 MG: 40 INJECTION, POWDER, FOR SOLUTION INTRAVENOUS at 06:01

## 2018-02-16 RX ADMIN — SUGAMMADEX 300 MG: 100 INJECTION, SOLUTION INTRAVENOUS at 19:27

## 2018-02-16 RX ADMIN — ROCURONIUM BROMIDE 20 MG: 10 INJECTION INTRAVENOUS at 18:13

## 2018-02-16 RX ADMIN — LIDOCAINE HYDROCHLORIDE 60 MG: 20 INJECTION, SOLUTION INFILTRATION; PERINEURAL at 17:26

## 2018-02-16 RX ADMIN — HYDROMORPHONE HYDROCHLORIDE 0.5 MG: 10 INJECTION INTRAMUSCULAR; INTRAVENOUS; SUBCUTANEOUS at 21:35

## 2018-02-16 RX ADMIN — ROCURONIUM BROMIDE 15 MG: 10 INJECTION INTRAVENOUS at 19:21

## 2018-02-16 RX ADMIN — HYDROMORPHONE HYDROCHLORIDE 0.5 MG: 10 INJECTION INTRAMUSCULAR; INTRAVENOUS; SUBCUTANEOUS at 06:01

## 2018-02-16 RX ADMIN — PROPOFOL 150 MG: 10 INJECTION, EMULSION INTRAVENOUS at 17:26

## 2018-02-16 NOTE — ANESTHESIA PROCEDURE NOTES
Airway  Urgency: elective    Airway not difficult    General Information and Staff    Patient location during procedure: OR  Anesthesiologist: NICOLÁS COWAN    Indications and Patient Condition  Indications for airway management: airway protection    Preoxygenated: yes  Mask difficulty assessment: 1 - vent by mask    Final Airway Details  Final airway type: endotracheal airway      Successful airway: ETT  Cuffed: yes   Successful intubation technique: direct laryngoscopy  Endotracheal tube insertion site: oral  Blade: Svetlana  Blade size: #3  ETT size: 7.5 mm  Cormack-Lehane Classification: grade IIb - view of arytenoids or posterior of glottis only  Placement verified by: chest auscultation and capnometry   Measured from: lips  Number of attempts at approach: 2    Additional Comments  Mucosa very dry and some blooddried up) on mucosa.  Also visualized some thick greenish yellow discharge above cords

## 2018-02-16 NOTE — ANESTHESIA PREPROCEDURE EVALUATION
Anesthesia Evaluation     Patient summary reviewed and Nursing notes reviewed   NPO Solid Status: > 8 hours             Airway   Mallampati: II  Neck ROM: limited  Comment:    Successful airway: ETT  Cuffed: yes   Successful intubation technique: direct laryngoscopy  Endotracheal tube insertion site: oral  Blade: Svetlana  Blade size: #3  ETT size: 7.0 mm  Cormack-Lehane Classification: grade I - full view of glottis  Placement verified by: chest auscultation and capnometry   Measured from: lips  ETT to lips (cm): 22  Number of attempts at approach: 1     Dental      Pulmonary    (+) asthma,     ROS comment: FINDINGS:  1. Limited imaging due to body habitus portable technique and low lung  volumes.  2. Left jugular catheter tip projects over the base of the SVC. No  pneumothorax  3. Developing right-sided pneumonia.  4. Can't rule out congestive failure.  5. Follow-up recommended.  Cardiovascular     ECG reviewed  Rhythm: irregular  Rate: abnormal    (+) hypertension, dysrhythmias Atrial Fib,       Neuro/Psych  (+) numbness,     GI/Hepatic/Renal/Endo    (+) obesity, morbid obesity, hiatal hernia, renal disease CRI,     Musculoskeletal (-) negative ROS    Abdominal    Substance History - negative use     OB/GYN negative ob/gyn ROS         Other      history of cancer active                  Anesthesia Plan    ASA 3 - emergent     general     intravenous induction   Anesthetic plan and risks discussed with patient.

## 2018-02-17 LAB
ABO + RH BLD: NORMAL
ABO + RH BLD: NORMAL
ALBUMIN SERPL-MCNC: 1.8 G/DL (ref 3.5–5.2)
ALBUMIN/GLOB SERPL: 0.5 G/DL
ALP SERPL-CCNC: 76 U/L (ref 39–117)
ALT SERPL W P-5'-P-CCNC: 22 U/L (ref 1–33)
ANION GAP SERPL CALCULATED.3IONS-SCNC: 14.1 MMOL/L
AST SERPL-CCNC: 46 U/L (ref 1–32)
BH BB BLOOD EXPIRATION DATE: NORMAL
BH BB BLOOD EXPIRATION DATE: NORMAL
BH BB BLOOD TYPE BARCODE: 7300
BH BB BLOOD TYPE BARCODE: 7300
BH BB DISPENSE STATUS: NORMAL
BH BB DISPENSE STATUS: NORMAL
BH BB PRODUCT CODE: NORMAL
BH BB PRODUCT CODE: NORMAL
BH BB UNIT NUMBER: NORMAL
BH BB UNIT NUMBER: NORMAL
BILIRUB SERPL-MCNC: 0.6 MG/DL (ref 0.1–1.2)
BUN BLD-MCNC: 47 MG/DL (ref 6–20)
BUN/CREAT SERPL: 15.4 (ref 7–25)
C DIFF TOX GENS STL QL NAA+PROBE: NEGATIVE
CALCIUM SPEC-SCNC: 7.5 MG/DL (ref 8.6–10.5)
CHLORIDE SERPL-SCNC: 98 MMOL/L (ref 98–107)
CO2 SERPL-SCNC: 22.9 MMOL/L (ref 22–29)
CREAT BLD-MCNC: 3.05 MG/DL (ref 0.57–1)
DEPRECATED RDW RBC AUTO: 51.4 FL (ref 37–54)
EOSINOPHIL # BLD MANUAL: 0.15 10*3/MM3 (ref 0–0.7)
EOSINOPHIL NFR BLD MANUAL: 1 % (ref 0.3–6.2)
ERYTHROCYTE [DISTWIDTH] IN BLOOD BY AUTOMATED COUNT: 15.7 % (ref 11.7–13)
GFR SERPL CREATININE-BSD FRML MDRD: 16 ML/MIN/1.73
GLOBULIN UR ELPH-MCNC: 3.5 GM/DL
GLUCOSE BLD-MCNC: 166 MG/DL (ref 65–99)
GLUCOSE BLDC GLUCOMTR-MCNC: 134 MG/DL (ref 70–130)
GLUCOSE BLDC GLUCOMTR-MCNC: 137 MG/DL (ref 70–130)
GLUCOSE BLDC GLUCOMTR-MCNC: 155 MG/DL (ref 70–130)
GLUCOSE BLDC GLUCOMTR-MCNC: 156 MG/DL (ref 70–130)
HCT VFR BLD AUTO: 28.4 % (ref 35.6–45.5)
HGB BLD-MCNC: 9.1 G/DL (ref 11.9–15.5)
LYMPHOCYTES # BLD MANUAL: 1.21 10*3/MM3 (ref 0.9–4.8)
LYMPHOCYTES NFR BLD MANUAL: 6 % (ref 5–12)
LYMPHOCYTES NFR BLD MANUAL: 8 % (ref 19.6–45.3)
MAGNESIUM SERPL-MCNC: 1.9 MG/DL (ref 1.6–2.6)
MCH RBC QN AUTO: 28.9 PG (ref 26.9–32)
MCHC RBC AUTO-ENTMCNC: 32 G/DL (ref 32.4–36.3)
MCV RBC AUTO: 90.2 FL (ref 80.5–98.2)
METAMYELOCYTES NFR BLD MANUAL: 4 % (ref 0–0)
MONOCYTES # BLD AUTO: 0.91 10*3/MM3 (ref 0.2–1.2)
MYELOCYTES NFR BLD MANUAL: 3 % (ref 0–0)
NEUTROPHILS # BLD AUTO: 11.77 10*3/MM3 (ref 1.9–8.1)
NEUTROPHILS NFR BLD MANUAL: 78 % (ref 42.7–76)
NRBC SPEC MANUAL: 1 /100 WBC (ref 0–0)
PHOSPHATE SERPL-MCNC: 2.5 MG/DL (ref 2.5–4.5)
PLAT MORPH BLD: NORMAL
PLATELET # BLD AUTO: 312 10*3/MM3 (ref 140–500)
PMV BLD AUTO: 9.9 FL (ref 6–12)
POLYCHROMASIA BLD QL SMEAR: ABNORMAL
POTASSIUM BLD-SCNC: 4.2 MMOL/L (ref 3.5–5.2)
PROT SERPL-MCNC: 5.3 G/DL (ref 6–8.5)
RBC # BLD AUTO: 3.15 10*6/MM3 (ref 3.9–5.2)
SCAN SLIDE: NORMAL
SODIUM BLD-SCNC: 135 MMOL/L (ref 136–145)
UNIT  ABO: NORMAL
UNIT  ABO: NORMAL
UNIT  RH: NORMAL
UNIT  RH: NORMAL
WBC MORPH BLD: NORMAL
WBC NRBC COR # BLD: 15.09 10*3/MM3 (ref 4.5–10.7)

## 2018-02-17 PROCEDURE — 84100 ASSAY OF PHOSPHORUS: CPT | Performed by: INTERNAL MEDICINE

## 2018-02-17 PROCEDURE — 25010000002 HEPARIN (PORCINE) PER 1000 UNITS: Performed by: INTERNAL MEDICINE

## 2018-02-17 PROCEDURE — 85007 BL SMEAR W/DIFF WBC COUNT: CPT | Performed by: INTERNAL MEDICINE

## 2018-02-17 PROCEDURE — 99024 POSTOP FOLLOW-UP VISIT: CPT | Performed by: SURGERY

## 2018-02-17 PROCEDURE — 99233 SBSQ HOSP IP/OBS HIGH 50: CPT | Performed by: INTERNAL MEDICINE

## 2018-02-17 PROCEDURE — 82962 GLUCOSE BLOOD TEST: CPT

## 2018-02-17 PROCEDURE — 85025 COMPLETE CBC W/AUTO DIFF WBC: CPT | Performed by: INTERNAL MEDICINE

## 2018-02-17 PROCEDURE — 25010000002 CALCIUM GLUCONATE PER 10 ML: Performed by: SURGERY

## 2018-02-17 PROCEDURE — 25010000002 HYDROMORPHONE PER 4 MG: Performed by: SURGERY

## 2018-02-17 PROCEDURE — 25010000002 ERTAPENEM PER 500 MG: Performed by: INTERNAL MEDICINE

## 2018-02-17 PROCEDURE — 83735 ASSAY OF MAGNESIUM: CPT | Performed by: INTERNAL MEDICINE

## 2018-02-17 PROCEDURE — 80053 COMPREHEN METABOLIC PANEL: CPT | Performed by: INTERNAL MEDICINE

## 2018-02-17 PROCEDURE — 97110 THERAPEUTIC EXERCISES: CPT

## 2018-02-17 PROCEDURE — 94799 UNLISTED PULMONARY SVC/PX: CPT

## 2018-02-17 PROCEDURE — 87493 C DIFF AMPLIFIED PROBE: CPT | Performed by: SURGERY

## 2018-02-17 RX ORDER — PANTOPRAZOLE SODIUM 40 MG/1
40 TABLET, DELAYED RELEASE ORAL
Status: DISCONTINUED | OUTPATIENT
Start: 2018-02-18 | End: 2018-03-12 | Stop reason: HOSPADM

## 2018-02-17 RX ORDER — MIRTAZAPINE 15 MG/1
15 TABLET, FILM COATED ORAL NIGHTLY
Status: DISCONTINUED | OUTPATIENT
Start: 2018-02-17 | End: 2018-03-12 | Stop reason: HOSPADM

## 2018-02-17 RX ADMIN — AMIODARONE HYDROCHLORIDE 400 MG: 200 TABLET ORAL at 10:08

## 2018-02-17 RX ADMIN — HEPARIN SODIUM 5000 UNITS: 5000 INJECTION, SOLUTION INTRAVENOUS; SUBCUTANEOUS at 20:05

## 2018-02-17 RX ADMIN — CALCIUM GLUCONATE: 94 INJECTION, SOLUTION INTRAVENOUS at 17:33

## 2018-02-17 RX ADMIN — HEPARIN SODIUM 5000 UNITS: 5000 INJECTION, SOLUTION INTRAVENOUS; SUBCUTANEOUS at 05:40

## 2018-02-17 RX ADMIN — MIRTAZAPINE 15 MG: 15 TABLET, FILM COATED ORAL at 20:04

## 2018-02-17 RX ADMIN — HYDROMORPHONE HYDROCHLORIDE 0.5 MG: 1 INJECTION, SOLUTION INTRAMUSCULAR; INTRAVENOUS; SUBCUTANEOUS at 08:45

## 2018-02-17 RX ADMIN — HYDROMORPHONE HYDROCHLORIDE 1 MG: 1 INJECTION, SOLUTION INTRAMUSCULAR; INTRAVENOUS; SUBCUTANEOUS at 09:00

## 2018-02-17 RX ADMIN — HYDROMORPHONE HYDROCHLORIDE 0.5 MG: 1 INJECTION, SOLUTION INTRAMUSCULAR; INTRAVENOUS; SUBCUTANEOUS at 15:06

## 2018-02-17 RX ADMIN — HEPARIN SODIUM 5000 UNITS: 5000 INJECTION, SOLUTION INTRAVENOUS; SUBCUTANEOUS at 14:21

## 2018-02-17 RX ADMIN — HYDROMORPHONE HYDROCHLORIDE 0.5 MG: 1 INJECTION, SOLUTION INTRAMUSCULAR; INTRAVENOUS; SUBCUTANEOUS at 06:15

## 2018-02-17 RX ADMIN — HYDROMORPHONE HYDROCHLORIDE 0.5 MG: 1 INJECTION, SOLUTION INTRAMUSCULAR; INTRAVENOUS; SUBCUTANEOUS at 03:14

## 2018-02-17 RX ADMIN — HYDROMORPHONE HYDROCHLORIDE 0.5 MG: 1 INJECTION, SOLUTION INTRAMUSCULAR; INTRAVENOUS; SUBCUTANEOUS at 20:05

## 2018-02-17 RX ADMIN — PANTOPRAZOLE SODIUM 40 MG: 40 INJECTION, POWDER, FOR SOLUTION INTRAVENOUS at 05:40

## 2018-02-17 RX ADMIN — DAKIN'S SOLUTION 0.125% (QUARTER STRENGTH): 0.12 SOLUTION at 09:00

## 2018-02-17 RX ADMIN — DAKIN'S SOLUTION 0.125% (QUARTER STRENGTH): 0.12 SOLUTION at 21:00

## 2018-02-17 RX ADMIN — SODIUM CHLORIDE 500 MG: 9 INJECTION, SOLUTION INTRAVENOUS at 14:21

## 2018-02-17 NOTE — ANESTHESIA POSTPROCEDURE EVALUATION
"Patient: Mariya Campos    Procedure Summary     Date Anesthesia Start Anesthesia Stop Room / Location    02/16/18 1720 1949  [facility] / [facility]       Procedure Diagnosis Surgeon Provider    TRUNK DEBRIDEMENT abdominal wound (N/A ) Wound infection  (Wound infection [T14.8XXA, L08.9]) MD Yury Neville MD          Anesthesia Type: general  Last vitals  BP   112/57 (02/16/18 2031)   Temp   36.6 °C (97.9 °F) (02/16/18 2031)   Pulse   102 (02/16/18 2031)   Resp   16 (02/16/18 2031)     SpO2   100 % (02/16/18 2031)     Post Anesthesia Care and Evaluation    Patient location during evaluation: bedside  Patient participation: complete - patient participated  Level of consciousness: awake and alert  Pain management: adequate  Airway patency: patent  Anesthetic complications: No anesthetic complications    Cardiovascular status: acceptable  Respiratory status: acceptable  Hydration status: acceptable    Comments: /57  Pulse 102  Temp 36.6 °C (97.9 °F) (Oral)   Resp 16  Ht 172.7 cm (68\")  Wt 133 kg (292 lb 5.3 oz)  SpO2 100%  BMI 44.45 kg/m2      "

## 2018-02-18 PROBLEM — T82.49XA COMPLICATIONS, DIALYSIS, CATHETER, MECHANICAL, INITIAL ENCOUNTER (HCC): Status: ACTIVE | Noted: 2018-02-18

## 2018-02-18 LAB
ALBUMIN SERPL-MCNC: 1.9 G/DL (ref 3.5–5.2)
ALBUMIN/GLOB SERPL: 0.5 G/DL
ALP SERPL-CCNC: 78 U/L (ref 39–117)
ALT SERPL W P-5'-P-CCNC: 20 U/L (ref 1–33)
ANION GAP SERPL CALCULATED.3IONS-SCNC: 16.6 MMOL/L
ANION GAP SERPL CALCULATED.3IONS-SCNC: 17.3 MMOL/L
AST SERPL-CCNC: 39 U/L (ref 1–32)
BILIRUB SERPL-MCNC: 0.5 MG/DL (ref 0.1–1.2)
BUN BLD-MCNC: 71 MG/DL (ref 6–20)
BUN BLD-MCNC: 72 MG/DL (ref 6–20)
BUN/CREAT SERPL: 16.3 (ref 7–25)
BUN/CREAT SERPL: 16.4 (ref 7–25)
CALCIUM SPEC-SCNC: 7.3 MG/DL (ref 8.6–10.5)
CALCIUM SPEC-SCNC: 7.5 MG/DL (ref 8.6–10.5)
CHLORIDE SERPL-SCNC: 96 MMOL/L (ref 98–107)
CHLORIDE SERPL-SCNC: 99 MMOL/L (ref 98–107)
CO2 SERPL-SCNC: 21.7 MMOL/L (ref 22–29)
CO2 SERPL-SCNC: 23.4 MMOL/L (ref 22–29)
CREAT BLD-MCNC: 4.32 MG/DL (ref 0.57–1)
CREAT BLD-MCNC: 4.42 MG/DL (ref 0.57–1)
DEPRECATED RDW RBC AUTO: 53.9 FL (ref 37–54)
EOSINOPHIL # BLD MANUAL: 0.16 10*3/MM3 (ref 0–0.7)
EOSINOPHIL NFR BLD MANUAL: 1 % (ref 0.3–6.2)
ERYTHROCYTE [DISTWIDTH] IN BLOOD BY AUTOMATED COUNT: 16.3 % (ref 11.7–13)
GFR SERPL CREATININE-BSD FRML MDRD: 10 ML/MIN/1.73
GFR SERPL CREATININE-BSD FRML MDRD: 11 ML/MIN/1.73
GLOBULIN UR ELPH-MCNC: 3.5 GM/DL
GLUCOSE BLD-MCNC: 128 MG/DL (ref 65–99)
GLUCOSE BLD-MCNC: 131 MG/DL (ref 65–99)
GLUCOSE BLDC GLUCOMTR-MCNC: 124 MG/DL (ref 70–130)
GLUCOSE BLDC GLUCOMTR-MCNC: 138 MG/DL (ref 70–130)
GLUCOSE BLDC GLUCOMTR-MCNC: 141 MG/DL (ref 70–130)
GLUCOSE BLDC GLUCOMTR-MCNC: 143 MG/DL (ref 70–130)
GLUCOSE BLDC GLUCOMTR-MCNC: 148 MG/DL (ref 70–130)
HCT VFR BLD AUTO: 24 % (ref 35.6–45.5)
HCT VFR BLD AUTO: 24.2 % (ref 35.6–45.5)
HGB BLD-MCNC: 7.6 G/DL (ref 11.9–15.5)
HGB BLD-MCNC: 7.9 G/DL (ref 11.9–15.5)
HYPOCHROMIA BLD QL: NORMAL
LYMPHOCYTES # BLD MANUAL: 1.44 10*3/MM3 (ref 0.9–4.8)
LYMPHOCYTES NFR BLD MANUAL: 7 % (ref 5–12)
LYMPHOCYTES NFR BLD MANUAL: 9 % (ref 19.6–45.3)
MAGNESIUM SERPL-MCNC: 1.9 MG/DL (ref 1.6–2.6)
MAGNESIUM SERPL-MCNC: 2 MG/DL (ref 1.6–2.6)
MCH RBC QN AUTO: 28.8 PG (ref 26.9–32)
MCHC RBC AUTO-ENTMCNC: 31.7 G/DL (ref 32.4–36.3)
MCV RBC AUTO: 90.9 FL (ref 80.5–98.2)
METAMYELOCYTES NFR BLD MANUAL: 5 % (ref 0–0)
MONOCYTES # BLD AUTO: 1.12 10*3/MM3 (ref 0.2–1.2)
MYELOCYTES NFR BLD MANUAL: 3 % (ref 0–0)
NEUTROPHILS # BLD AUTO: 11.99 10*3/MM3 (ref 1.9–8.1)
NEUTROPHILS NFR BLD MANUAL: 75 % (ref 42.7–76)
PHOSPHATE SERPL-MCNC: 2.9 MG/DL (ref 2.5–4.5)
PHOSPHATE SERPL-MCNC: 3.2 MG/DL (ref 2.5–4.5)
PLAT MORPH BLD: NORMAL
PLATELET # BLD AUTO: 319 10*3/MM3 (ref 140–500)
PMV BLD AUTO: 9.9 FL (ref 6–12)
POLYCHROMASIA BLD QL SMEAR: NORMAL
POTASSIUM BLD-SCNC: 3.8 MMOL/L (ref 3.5–5.2)
POTASSIUM BLD-SCNC: 4.1 MMOL/L (ref 3.5–5.2)
PROT SERPL-MCNC: 5.4 G/DL (ref 6–8.5)
RBC # BLD AUTO: 2.64 10*6/MM3 (ref 3.9–5.2)
SCAN SLIDE: NORMAL
SODIUM BLD-SCNC: 136 MMOL/L (ref 136–145)
SODIUM BLD-SCNC: 138 MMOL/L (ref 136–145)
WBC MORPH BLD: NORMAL
WBC NRBC COR # BLD: 15.99 10*3/MM3 (ref 4.5–10.7)

## 2018-02-18 PROCEDURE — 25010000002 HEPARIN (PORCINE) PER 1000 UNITS: Performed by: INTERNAL MEDICINE

## 2018-02-18 PROCEDURE — 83735 ASSAY OF MAGNESIUM: CPT | Performed by: SURGERY

## 2018-02-18 PROCEDURE — 99024 POSTOP FOLLOW-UP VISIT: CPT | Performed by: SURGERY

## 2018-02-18 PROCEDURE — 25010000002 ALBUMIN HUMAN 25% PER 50 ML: Performed by: INTERNAL MEDICINE

## 2018-02-18 PROCEDURE — 86900 BLOOD TYPING SEROLOGIC ABO: CPT

## 2018-02-18 PROCEDURE — 85007 BL SMEAR W/DIFF WBC COUNT: CPT | Performed by: INTERNAL MEDICINE

## 2018-02-18 PROCEDURE — 82962 GLUCOSE BLOOD TEST: CPT

## 2018-02-18 PROCEDURE — 85018 HEMOGLOBIN: CPT | Performed by: INTERNAL MEDICINE

## 2018-02-18 PROCEDURE — 80048 BASIC METABOLIC PNL TOTAL CA: CPT | Performed by: INTERNAL MEDICINE

## 2018-02-18 PROCEDURE — P9016 RBC LEUKOCYTES REDUCED: HCPCS

## 2018-02-18 PROCEDURE — 99233 SBSQ HOSP IP/OBS HIGH 50: CPT | Performed by: INTERNAL MEDICINE

## 2018-02-18 PROCEDURE — 25010000002 ALTEPLASE: Performed by: INTERNAL MEDICINE

## 2018-02-18 PROCEDURE — 84100 ASSAY OF PHOSPHORUS: CPT | Performed by: SURGERY

## 2018-02-18 PROCEDURE — 85014 HEMATOCRIT: CPT | Performed by: INTERNAL MEDICINE

## 2018-02-18 PROCEDURE — 80053 COMPREHEN METABOLIC PANEL: CPT | Performed by: SURGERY

## 2018-02-18 PROCEDURE — 84100 ASSAY OF PHOSPHORUS: CPT | Performed by: INTERNAL MEDICINE

## 2018-02-18 PROCEDURE — P9046 ALBUMIN (HUMAN), 25%, 20 ML: HCPCS | Performed by: INTERNAL MEDICINE

## 2018-02-18 PROCEDURE — 83735 ASSAY OF MAGNESIUM: CPT | Performed by: INTERNAL MEDICINE

## 2018-02-18 PROCEDURE — 25010000002 CALCIUM GLUCONATE PER 10 ML: Performed by: SURGERY

## 2018-02-18 PROCEDURE — 94799 UNLISTED PULMONARY SVC/PX: CPT

## 2018-02-18 PROCEDURE — 85025 COMPLETE CBC W/AUTO DIFF WBC: CPT | Performed by: INTERNAL MEDICINE

## 2018-02-18 PROCEDURE — 25010000002 ERTAPENEM PER 500 MG: Performed by: INTERNAL MEDICINE

## 2018-02-18 RX ORDER — ALBUMIN (HUMAN) 12.5 G/50ML
12.5 SOLUTION INTRAVENOUS AS NEEDED
Status: ACTIVE | OUTPATIENT
Start: 2018-02-18 | End: 2018-02-18

## 2018-02-18 RX ORDER — HYDROCODONE BITARTRATE AND ACETAMINOPHEN 5; 325 MG/1; MG/1
1 TABLET ORAL EVERY 6 HOURS PRN
Status: DISPENSED | OUTPATIENT
Start: 2018-02-18 | End: 2018-02-28

## 2018-02-18 RX ADMIN — HYDROMORPHONE HYDROCHLORIDE 0.5 MG: 1 INJECTION, SOLUTION INTRAMUSCULAR; INTRAVENOUS; SUBCUTANEOUS at 09:52

## 2018-02-18 RX ADMIN — ALTEPLASE 2 MG: 2.2 INJECTION, POWDER, LYOPHILIZED, FOR SOLUTION INTRAVENOUS at 13:10

## 2018-02-18 RX ADMIN — HYDROMORPHONE HYDROCHLORIDE 0.5 MG: 1 INJECTION, SOLUTION INTRAMUSCULAR; INTRAVENOUS; SUBCUTANEOUS at 01:58

## 2018-02-18 RX ADMIN — PANTOPRAZOLE SODIUM 40 MG: 40 TABLET, DELAYED RELEASE ORAL at 05:33

## 2018-02-18 RX ADMIN — HEPARIN SODIUM 5000 UNITS: 5000 INJECTION, SOLUTION INTRAVENOUS; SUBCUTANEOUS at 16:06

## 2018-02-18 RX ADMIN — HYDROMORPHONE HYDROCHLORIDE 1 MG: 1 INJECTION, SOLUTION INTRAMUSCULAR; INTRAVENOUS; SUBCUTANEOUS at 22:39

## 2018-02-18 RX ADMIN — CALCIUM GLUCONATE: 94 INJECTION, SOLUTION INTRAVENOUS at 18:26

## 2018-02-18 RX ADMIN — MIRTAZAPINE 15 MG: 15 TABLET, FILM COATED ORAL at 20:59

## 2018-02-18 RX ADMIN — ALBUMIN HUMAN 12.5 G: 0.25 SOLUTION INTRAVENOUS at 11:50

## 2018-02-18 RX ADMIN — HYDROCODONE BITARTRATE AND ACETAMINOPHEN 1 TABLET: 5; 325 TABLET ORAL at 10:54

## 2018-02-18 RX ADMIN — HEPARIN SODIUM 5000 UNITS: 5000 INJECTION, SOLUTION INTRAVENOUS; SUBCUTANEOUS at 05:32

## 2018-02-18 RX ADMIN — DAKIN'S SOLUTION 0.125% (QUARTER STRENGTH): 0.12 SOLUTION at 23:07

## 2018-02-18 RX ADMIN — AMIODARONE HYDROCHLORIDE 400 MG: 200 TABLET ORAL at 09:52

## 2018-02-18 RX ADMIN — DAKIN'S SOLUTION 0.125% (QUARTER STRENGTH): 0.12 SOLUTION at 09:00

## 2018-02-18 RX ADMIN — HEPARIN SODIUM 5000 UNITS: 5000 INJECTION, SOLUTION INTRAVENOUS; SUBCUTANEOUS at 22:44

## 2018-02-18 RX ADMIN — SODIUM CHLORIDE 500 MG: 9 INJECTION, SOLUTION INTRAVENOUS at 16:06

## 2018-02-19 ENCOUNTER — ANESTHESIA (OUTPATIENT)
Dept: PERIOP | Facility: HOSPITAL | Age: 58
End: 2018-02-19

## 2018-02-19 ENCOUNTER — APPOINTMENT (OUTPATIENT)
Dept: GENERAL RADIOLOGY | Facility: HOSPITAL | Age: 58
End: 2018-02-19

## 2018-02-19 ENCOUNTER — ANESTHESIA EVENT (OUTPATIENT)
Dept: PERIOP | Facility: HOSPITAL | Age: 58
End: 2018-02-19

## 2018-02-19 LAB
ABO + RH BLD: NORMAL
ALBUMIN SERPL-MCNC: 1.8 G/DL (ref 3.5–5.2)
ALBUMIN/GLOB SERPL: 0.6 G/DL
ALP SERPL-CCNC: 86 U/L (ref 39–117)
ALT SERPL W P-5'-P-CCNC: 20 U/L (ref 1–33)
ANION GAP SERPL CALCULATED.3IONS-SCNC: 15.8 MMOL/L
AST SERPL-CCNC: 42 U/L (ref 1–32)
BASE EXCESS BLDA CALC-SCNC: -6 MMOL/L (ref -5–5)
BH BB BLOOD EXPIRATION DATE: NORMAL
BH BB BLOOD TYPE BARCODE: 7300
BH BB DISPENSE STATUS: NORMAL
BH BB PRODUCT CODE: NORMAL
BH BB UNIT NUMBER: NORMAL
BILIRUB SERPL-MCNC: 0.6 MG/DL (ref 0.1–1.2)
BUN BLD-MCNC: 80 MG/DL (ref 6–20)
BUN/CREAT SERPL: 17.4 (ref 7–25)
CALCIUM SPEC-SCNC: 6.7 MG/DL (ref 8.6–10.5)
CHLORIDE SERPL-SCNC: 101 MMOL/L (ref 98–107)
CO2 BLDA-SCNC: 22 MMOL/L (ref 24–29)
CO2 SERPL-SCNC: 20.2 MMOL/L (ref 22–29)
CREAT BLD-MCNC: 4.59 MG/DL (ref 0.57–1)
DEPRECATED RDW RBC AUTO: 51.8 FL (ref 37–54)
ERYTHROCYTE [DISTWIDTH] IN BLOOD BY AUTOMATED COUNT: 15.9 % (ref 11.7–13)
GFR SERPL CREATININE-BSD FRML MDRD: 10 ML/MIN/1.73
GLOBULIN UR ELPH-MCNC: 3.2 GM/DL
GLUCOSE BLD-MCNC: 127 MG/DL (ref 65–99)
GLUCOSE BLDC GLUCOMTR-MCNC: 111 MG/DL (ref 70–130)
GLUCOSE BLDC GLUCOMTR-MCNC: 124 MG/DL (ref 70–130)
GLUCOSE BLDC GLUCOMTR-MCNC: 127 MG/DL (ref 70–130)
GLUCOSE BLDC GLUCOMTR-MCNC: 131 MG/DL (ref 70–130)
GLUCOSE BLDC GLUCOMTR-MCNC: 150 MG/DL (ref 70–130)
GLUCOSE BLDC GLUCOMTR-MCNC: 160 MG/DL (ref 70–130)
HCO3 BLDA-SCNC: 20.5 MMOL/L (ref 22–26)
HCT VFR BLD AUTO: 22.7 % (ref 35.6–45.5)
HCT VFR BLD AUTO: 24.8 % (ref 35.6–45.5)
HCT VFR BLDA CALC: 19 % (ref 38–51)
HGB BLD-MCNC: 7.2 G/DL (ref 11.9–15.5)
HGB BLD-MCNC: 8 G/DL (ref 11.9–15.5)
HGB BLDA-MCNC: 6.5 G/DL (ref 12–17)
HYPOCHROMIA BLD QL: NORMAL
LYMPHOCYTES # BLD MANUAL: 1.66 10*3/MM3 (ref 0.9–4.8)
LYMPHOCYTES NFR BLD MANUAL: 12 % (ref 19.6–45.3)
LYMPHOCYTES NFR BLD MANUAL: 8 % (ref 5–12)
MAGNESIUM SERPL-MCNC: 1.7 MG/DL (ref 1.6–2.6)
MCH RBC QN AUTO: 28.6 PG (ref 26.9–32)
MCHC RBC AUTO-ENTMCNC: 31.7 G/DL (ref 32.4–36.3)
MCV RBC AUTO: 90.1 FL (ref 80.5–98.2)
METAMYELOCYTES NFR BLD MANUAL: 2 % (ref 0–0)
MONOCYTES # BLD AUTO: 1.11 10*3/MM3 (ref 0.2–1.2)
MYELOCYTES NFR BLD MANUAL: 3 % (ref 0–0)
NEUTROPHILS # BLD AUTO: 10.4 10*3/MM3 (ref 1.9–8.1)
NEUTROPHILS NFR BLD MANUAL: 75 % (ref 42.7–76)
PCO2 BLDA: 40.3 MM HG (ref 35–45)
PH BLDA: 7.31 PH UNITS (ref 7.35–7.6)
PHOSPHATE SERPL-MCNC: 2.8 MG/DL (ref 2.5–4.5)
PLAT MORPH BLD: NORMAL
PLATELET # BLD AUTO: 264 10*3/MM3 (ref 140–500)
PMV BLD AUTO: 9.8 FL (ref 6–12)
PO2 BLDA: 51 MMHG (ref 80–105)
POTASSIUM BLD-SCNC: 3.4 MMOL/L (ref 3.5–5.2)
POTASSIUM BLDA-SCNC: 3.1 MMOL/L (ref 3.5–4.9)
PROT SERPL-MCNC: 5 G/DL (ref 6–8.5)
RBC # BLD AUTO: 2.52 10*6/MM3 (ref 3.9–5.2)
SAO2 % BLDA: 83 % (ref 95–98)
SCAN SLIDE: NORMAL
SODIUM BLD-SCNC: 137 MMOL/L (ref 136–145)
STOMATOCYTES BLD QL SMEAR: NORMAL
TRIGL SERPL-MCNC: 141 MG/DL (ref 0–150)
UNIT  ABO: NORMAL
UNIT  RH: NORMAL
WBC MORPH BLD: NORMAL
WBC NRBC COR # BLD: 13.86 10*3/MM3 (ref 4.5–10.7)

## 2018-02-19 PROCEDURE — 85025 COMPLETE CBC W/AUTO DIFF WBC: CPT | Performed by: INTERNAL MEDICINE

## 2018-02-19 PROCEDURE — 71045 X-RAY EXAM CHEST 1 VIEW: CPT

## 2018-02-19 PROCEDURE — 25010000002 HEPARIN (PORCINE) PER 1000 UNITS: Performed by: SURGERY

## 2018-02-19 PROCEDURE — C1887 CATHETER, GUIDING: HCPCS | Performed by: SURGERY

## 2018-02-19 PROCEDURE — 83735 ASSAY OF MAGNESIUM: CPT | Performed by: SURGERY

## 2018-02-19 PROCEDURE — 84478 ASSAY OF TRIGLYCERIDES: CPT | Performed by: SURGERY

## 2018-02-19 PROCEDURE — 82962 GLUCOSE BLOOD TEST: CPT

## 2018-02-19 PROCEDURE — 0JH63XZ INSERTION OF TUNNELED VASCULAR ACCESS DEVICE INTO CHEST SUBCUTANEOUS TISSUE AND FASCIA, PERCUTANEOUS APPROACH: ICD-10-PCS | Performed by: SURGERY

## 2018-02-19 PROCEDURE — 25010000002 PROPOFOL 10 MG/ML EMULSION: Performed by: NURSE ANESTHETIST, CERTIFIED REGISTERED

## 2018-02-19 PROCEDURE — 85014 HEMATOCRIT: CPT | Performed by: SURGERY

## 2018-02-19 PROCEDURE — 99024 POSTOP FOLLOW-UP VISIT: CPT | Performed by: SURGERY

## 2018-02-19 PROCEDURE — 99232 SBSQ HOSP IP/OBS MODERATE 35: CPT | Performed by: INTERNAL MEDICINE

## 2018-02-19 PROCEDURE — C1751 CATH, INF, PER/CENT/MIDLINE: HCPCS

## 2018-02-19 PROCEDURE — P9016 RBC LEUKOCYTES REDUCED: HCPCS

## 2018-02-19 PROCEDURE — 02HV33Z INSERTION OF INFUSION DEVICE INTO SUPERIOR VENA CAVA, PERCUTANEOUS APPROACH: ICD-10-PCS | Performed by: SURGERY

## 2018-02-19 PROCEDURE — 85018 HEMOGLOBIN: CPT | Performed by: SURGERY

## 2018-02-19 PROCEDURE — 94799 UNLISTED PULMONARY SVC/PX: CPT

## 2018-02-19 PROCEDURE — C1750 CATH, HEMODIALYSIS,LONG-TERM: HCPCS | Performed by: SURGERY

## 2018-02-19 PROCEDURE — 86900 BLOOD TYPING SEROLOGIC ABO: CPT

## 2018-02-19 PROCEDURE — 85007 BL SMEAR W/DIFF WBC COUNT: CPT | Performed by: INTERNAL MEDICINE

## 2018-02-19 PROCEDURE — 25010000002 ERTAPENEM PER 500 MG: Performed by: INTERNAL MEDICINE

## 2018-02-19 PROCEDURE — 25010000002 VANCOMYCIN 10 G RECONSTITUTED SOLUTION: Performed by: INTERNAL MEDICINE

## 2018-02-19 PROCEDURE — 25010000002 MIDAZOLAM PER 1 MG: Performed by: NURSE ANESTHETIST, CERTIFIED REGISTERED

## 2018-02-19 PROCEDURE — 84100 ASSAY OF PHOSPHORUS: CPT | Performed by: SURGERY

## 2018-02-19 PROCEDURE — 77001 FLUOROGUIDE FOR VEIN DEVICE: CPT

## 2018-02-19 PROCEDURE — 25010000002 HEPARIN (PORCINE) PER 1000 UNITS: Performed by: INTERNAL MEDICINE

## 2018-02-19 PROCEDURE — 25010000002 FENTANYL CITRATE (PF) 100 MCG/2ML SOLUTION: Performed by: NURSE ANESTHETIST, CERTIFIED REGISTERED

## 2018-02-19 PROCEDURE — 02H633Z INSERTION OF INFUSION DEVICE INTO RIGHT ATRIUM, PERCUTANEOUS APPROACH: ICD-10-PCS | Performed by: SURGERY

## 2018-02-19 PROCEDURE — 36430 TRANSFUSION BLD/BLD COMPNT: CPT

## 2018-02-19 PROCEDURE — 25010000002 CALCIUM GLUCONATE PER 10 ML: Performed by: SURGERY

## 2018-02-19 PROCEDURE — B548ZZA ULTRASONOGRAPHY OF SUPERIOR VENA CAVA, GUIDANCE: ICD-10-PCS | Performed by: SURGERY

## 2018-02-19 PROCEDURE — 63710000001 INSULIN ASPART PER 5 UNITS: Performed by: INTERNAL MEDICINE

## 2018-02-19 PROCEDURE — 80053 COMPREHEN METABOLIC PANEL: CPT | Performed by: SURGERY

## 2018-02-19 PROCEDURE — C1769 GUIDE WIRE: HCPCS | Performed by: SURGERY

## 2018-02-19 RX ORDER — FENTANYL CITRATE 50 UG/ML
INJECTION, SOLUTION INTRAMUSCULAR; INTRAVENOUS AS NEEDED
Status: DISCONTINUED | OUTPATIENT
Start: 2018-02-19 | End: 2018-02-19 | Stop reason: SURG

## 2018-02-19 RX ORDER — EPHEDRINE SULFATE 50 MG/ML
5 INJECTION, SOLUTION INTRAVENOUS ONCE AS NEEDED
Status: DISCONTINUED | OUTPATIENT
Start: 2018-02-19 | End: 2018-02-19 | Stop reason: HOSPADM

## 2018-02-19 RX ORDER — NALOXONE HCL 0.4 MG/ML
0.2 VIAL (ML) INJECTION AS NEEDED
Status: DISCONTINUED | OUTPATIENT
Start: 2018-02-19 | End: 2018-02-19 | Stop reason: HOSPADM

## 2018-02-19 RX ORDER — SODIUM CHLORIDE 9 MG/ML
INJECTION, SOLUTION INTRAVENOUS CONTINUOUS PRN
Status: DISCONTINUED | OUTPATIENT
Start: 2018-02-19 | End: 2018-02-19 | Stop reason: SURG

## 2018-02-19 RX ORDER — MIDAZOLAM HYDROCHLORIDE 1 MG/ML
INJECTION INTRAMUSCULAR; INTRAVENOUS AS NEEDED
Status: DISCONTINUED | OUTPATIENT
Start: 2018-02-19 | End: 2018-02-19 | Stop reason: SURG

## 2018-02-19 RX ORDER — HYDRALAZINE HYDROCHLORIDE 20 MG/ML
5 INJECTION INTRAMUSCULAR; INTRAVENOUS
Status: DISCONTINUED | OUTPATIENT
Start: 2018-02-19 | End: 2018-02-19 | Stop reason: HOSPADM

## 2018-02-19 RX ORDER — FAMOTIDINE 10 MG/ML
20 INJECTION, SOLUTION INTRAVENOUS ONCE
Status: COMPLETED | OUTPATIENT
Start: 2018-02-19 | End: 2018-02-19

## 2018-02-19 RX ORDER — LIDOCAINE HYDROCHLORIDE 10 MG/ML
0.5 INJECTION, SOLUTION EPIDURAL; INFILTRATION; INTRACAUDAL; PERINEURAL ONCE AS NEEDED
Status: DISCONTINUED | OUTPATIENT
Start: 2018-02-19 | End: 2018-02-19 | Stop reason: HOSPADM

## 2018-02-19 RX ORDER — FLUMAZENIL 0.1 MG/ML
0.2 INJECTION INTRAVENOUS AS NEEDED
Status: DISCONTINUED | OUTPATIENT
Start: 2018-02-19 | End: 2018-02-19 | Stop reason: HOSPADM

## 2018-02-19 RX ORDER — MIDAZOLAM HYDROCHLORIDE 1 MG/ML
1 INJECTION INTRAMUSCULAR; INTRAVENOUS
Status: DISCONTINUED | OUTPATIENT
Start: 2018-02-19 | End: 2018-02-19 | Stop reason: HOSPADM

## 2018-02-19 RX ORDER — SODIUM CHLORIDE, SODIUM LACTATE, POTASSIUM CHLORIDE, CALCIUM CHLORIDE 600; 310; 30; 20 MG/100ML; MG/100ML; MG/100ML; MG/100ML
9 INJECTION, SOLUTION INTRAVENOUS CONTINUOUS
Status: DISCONTINUED | OUTPATIENT
Start: 2018-02-19 | End: 2018-02-20

## 2018-02-19 RX ORDER — ONDANSETRON 2 MG/ML
4 INJECTION INTRAMUSCULAR; INTRAVENOUS ONCE AS NEEDED
Status: DISCONTINUED | OUTPATIENT
Start: 2018-02-19 | End: 2018-02-19 | Stop reason: HOSPADM

## 2018-02-19 RX ORDER — LABETALOL HYDROCHLORIDE 5 MG/ML
5 INJECTION, SOLUTION INTRAVENOUS
Status: DISCONTINUED | OUTPATIENT
Start: 2018-02-19 | End: 2018-02-19 | Stop reason: HOSPADM

## 2018-02-19 RX ORDER — FENTANYL CITRATE 50 UG/ML
50 INJECTION, SOLUTION INTRAMUSCULAR; INTRAVENOUS
Status: DISCONTINUED | OUTPATIENT
Start: 2018-02-19 | End: 2018-02-19 | Stop reason: HOSPADM

## 2018-02-19 RX ORDER — HEPARIN SODIUM 1000 [USP'U]/ML
INJECTION, SOLUTION INTRAVENOUS; SUBCUTANEOUS AS NEEDED
Status: DISCONTINUED | OUTPATIENT
Start: 2018-02-19 | End: 2018-02-19 | Stop reason: HOSPADM

## 2018-02-19 RX ORDER — SODIUM CHLORIDE 0.9 % (FLUSH) 0.9 %
1-10 SYRINGE (ML) INJECTION AS NEEDED
Status: DISCONTINUED | OUTPATIENT
Start: 2018-02-19 | End: 2018-02-19 | Stop reason: HOSPADM

## 2018-02-19 RX ORDER — PROPOFOL 10 MG/ML
VIAL (ML) INTRAVENOUS CONTINUOUS PRN
Status: DISCONTINUED | OUTPATIENT
Start: 2018-02-19 | End: 2018-02-19 | Stop reason: SURG

## 2018-02-19 RX ORDER — DIPHENHYDRAMINE HYDROCHLORIDE 50 MG/ML
12.5 INJECTION INTRAMUSCULAR; INTRAVENOUS
Status: DISCONTINUED | OUTPATIENT
Start: 2018-02-19 | End: 2018-02-19 | Stop reason: HOSPADM

## 2018-02-19 RX ORDER — MIDAZOLAM HYDROCHLORIDE 1 MG/ML
2 INJECTION INTRAMUSCULAR; INTRAVENOUS
Status: DISCONTINUED | OUTPATIENT
Start: 2018-02-19 | End: 2018-02-19 | Stop reason: HOSPADM

## 2018-02-19 RX ADMIN — SODIUM CHLORIDE, POTASSIUM CHLORIDE, SODIUM LACTATE AND CALCIUM CHLORIDE 9 ML/HR: 600; 310; 30; 20 INJECTION, SOLUTION INTRAVENOUS at 09:45

## 2018-02-19 RX ADMIN — DAKIN'S SOLUTION 0.125% (QUARTER STRENGTH): 0.12 SOLUTION at 23:00

## 2018-02-19 RX ADMIN — FAMOTIDINE 20 MG: 10 INJECTION, SOLUTION INTRAVENOUS at 09:20

## 2018-02-19 RX ADMIN — SODIUM CHLORIDE: 9 INJECTION, SOLUTION INTRAVENOUS at 09:41

## 2018-02-19 RX ADMIN — HYDROMORPHONE HYDROCHLORIDE 0.5 MG: 1 INJECTION, SOLUTION INTRAMUSCULAR; INTRAVENOUS; SUBCUTANEOUS at 18:36

## 2018-02-19 RX ADMIN — HYDROMORPHONE HYDROCHLORIDE 0.5 MG: 1 INJECTION, SOLUTION INTRAMUSCULAR; INTRAVENOUS; SUBCUTANEOUS at 15:47

## 2018-02-19 RX ADMIN — SODIUM CHLORIDE 500 MG: 9 INJECTION, SOLUTION INTRAVENOUS at 14:54

## 2018-02-19 RX ADMIN — HYDROMORPHONE HYDROCHLORIDE 0.5 MG: 1 INJECTION, SOLUTION INTRAMUSCULAR; INTRAVENOUS; SUBCUTANEOUS at 07:33

## 2018-02-19 RX ADMIN — PROPOFOL 160 MCG/KG/MIN: 10 INJECTION, EMULSION INTRAVENOUS at 09:53

## 2018-02-19 RX ADMIN — Medication 1 MG: at 09:47

## 2018-02-19 RX ADMIN — VANCOMYCIN HYDROCHLORIDE 1500 MG: 10 INJECTION, POWDER, LYOPHILIZED, FOR SOLUTION INTRAVENOUS at 16:26

## 2018-02-19 RX ADMIN — I.V. FAT EMULSION 20 G: 20 EMULSION INTRAVENOUS at 18:23

## 2018-02-19 RX ADMIN — INSULIN ASPART 3 UNITS: 100 INJECTION, SOLUTION INTRAVENOUS; SUBCUTANEOUS at 08:15

## 2018-02-19 RX ADMIN — MIRTAZAPINE 15 MG: 15 TABLET, FILM COATED ORAL at 21:21

## 2018-02-19 RX ADMIN — FENTANYL CITRATE 50 MCG: 50 INJECTION INTRAMUSCULAR; INTRAVENOUS at 09:47

## 2018-02-19 RX ADMIN — HEPARIN SODIUM 5000 UNITS: 5000 INJECTION, SOLUTION INTRAVENOUS; SUBCUTANEOUS at 15:50

## 2018-02-19 RX ADMIN — PANTOPRAZOLE SODIUM 40 MG: 40 TABLET, DELAYED RELEASE ORAL at 06:15

## 2018-02-19 RX ADMIN — CALCIUM GLUCONATE: 94 INJECTION, SOLUTION INTRAVENOUS at 18:16

## 2018-02-19 RX ADMIN — HYDROMORPHONE HYDROCHLORIDE 1 MG: 1 INJECTION, SOLUTION INTRAMUSCULAR; INTRAVENOUS; SUBCUTANEOUS at 22:32

## 2018-02-19 RX ADMIN — HYDROMORPHONE HYDROCHLORIDE 0.5 MG: 1 INJECTION, SOLUTION INTRAMUSCULAR; INTRAVENOUS; SUBCUTANEOUS at 12:58

## 2018-02-19 RX ADMIN — HEPARIN SODIUM 5000 UNITS: 5000 INJECTION, SOLUTION INTRAVENOUS; SUBCUTANEOUS at 06:12

## 2018-02-19 RX ADMIN — AMIODARONE HYDROCHLORIDE 400 MG: 200 TABLET ORAL at 13:02

## 2018-02-19 RX ADMIN — HEPARIN SODIUM 5000 UNITS: 5000 INJECTION, SOLUTION INTRAVENOUS; SUBCUTANEOUS at 22:32

## 2018-02-19 NOTE — ANESTHESIA PREPROCEDURE EVALUATION
Anesthesia Evaluation                  Airway   Mallampati: III  TM distance: >3 FB  Neck ROM: full  no difficulty expected  Dental - normal exam     Pulmonary - normal exam   (+) asthma,   Cardiovascular - normal exam    (+) hypertension,       Neuro/Psych  (+) numbness,     GI/Hepatic/Renal/Endo    (+) obesity, morbid obesity, hiatal hernia,     Musculoskeletal     Abdominal    Substance History      OB/GYN          Other      history of cancer                    Anesthesia Plan    ASA 3     MAC     Anesthetic plan and risks discussed with patient.

## 2018-02-19 NOTE — ANESTHESIA POSTPROCEDURE EVALUATION
Patient: Mariya Campos    Procedure Summary     Date Anesthesia Start Anesthesia Stop Room / Location    02/19/18 0941 1056  [facility] /  [facility]       Procedure Diagnosis Provider Provider    LEFT QUAD CENTRAL LINE INSERTION AND RIGHT TUNNELED HEMODIALYSIS CATHETER INSERTION (N/A ) (RENAL FAILURE PT IN ISOLATION FOR MRSA) MD Cassie Chiu MD          Anesthesia Type: MAC  Last vitals  BP   109/58 (02/19/18 1155)   Temp   36.6 °C (97.9 °F) (02/19/18 0956)   Pulse   96 (02/19/18 1155)   Resp   14 (02/19/18 1155)     SpO2   96 % (02/19/18 1155)     Post Anesthesia Care and Evaluation    Patient location during evaluation: PACU  Patient participation: complete - patient participated  Level of consciousness: awake and alert  Pain management: adequate  Airway patency: patent  Anesthetic complications: No anesthetic complications    Cardiovascular status: acceptable  Respiratory status: acceptable  Hydration status: acceptable    Comments: --------------------            02/19/18               1155     --------------------   BP:       109/58     Pulse:      96       Resp:       14       Temp:                SpO2:      96%      --------------------

## 2018-02-20 LAB
ABO + RH BLD: NORMAL
ABO + RH BLD: NORMAL
ABO GROUP BLD: NORMAL
ALBUMIN SERPL-MCNC: 1.7 G/DL (ref 3.5–5.2)
ALBUMIN/GLOB SERPL: 0.5 G/DL
ALP SERPL-CCNC: 88 U/L (ref 39–117)
ALT SERPL W P-5'-P-CCNC: 17 U/L (ref 1–33)
ANION GAP SERPL CALCULATED.3IONS-SCNC: 16 MMOL/L
AST SERPL-CCNC: 33 U/L (ref 1–32)
BH BB BLOOD EXPIRATION DATE: NORMAL
BH BB BLOOD EXPIRATION DATE: NORMAL
BH BB BLOOD TYPE BARCODE: 7300
BH BB BLOOD TYPE BARCODE: 7300
BH BB DISPENSE STATUS: NORMAL
BH BB DISPENSE STATUS: NORMAL
BH BB PRODUCT CODE: NORMAL
BH BB PRODUCT CODE: NORMAL
BH BB UNIT NUMBER: NORMAL
BH BB UNIT NUMBER: NORMAL
BILIRUB SERPL-MCNC: 0.5 MG/DL (ref 0.1–1.2)
BLD GP AB SCN SERPL QL: NEGATIVE
BUN BLD-MCNC: 95 MG/DL (ref 6–20)
BUN/CREAT SERPL: 17.8 (ref 7–25)
CALCIUM SPEC-SCNC: 7 MG/DL (ref 8.6–10.5)
CHLORIDE SERPL-SCNC: 104 MMOL/L (ref 98–107)
CO2 SERPL-SCNC: 19 MMOL/L (ref 22–29)
CREAT BLD-MCNC: 5.33 MG/DL (ref 0.57–1)
DEPRECATED RDW RBC AUTO: 53.9 FL (ref 37–54)
EOSINOPHIL # BLD MANUAL: 0.28 10*3/MM3 (ref 0–0.7)
EOSINOPHIL NFR BLD MANUAL: 2 % (ref 0.3–6.2)
ERYTHROCYTE [DISTWIDTH] IN BLOOD BY AUTOMATED COUNT: 16.2 % (ref 11.7–13)
GFR SERPL CREATININE-BSD FRML MDRD: 8 ML/MIN/1.73
GLOBULIN UR ELPH-MCNC: 3.5 GM/DL
GLUCOSE BLD-MCNC: 126 MG/DL (ref 65–99)
GLUCOSE BLDC GLUCOMTR-MCNC: 121 MG/DL (ref 70–130)
GLUCOSE BLDC GLUCOMTR-MCNC: 129 MG/DL (ref 70–130)
GLUCOSE BLDC GLUCOMTR-MCNC: 133 MG/DL (ref 70–130)
GLUCOSE BLDC GLUCOMTR-MCNC: 139 MG/DL (ref 70–130)
GLUCOSE BLDC GLUCOMTR-MCNC: 153 MG/DL (ref 70–130)
HCT VFR BLD AUTO: 25 % (ref 35.6–45.5)
HGB BLD-MCNC: 7.9 G/DL (ref 11.9–15.5)
HYPOCHROMIA BLD QL: ABNORMAL
LYMPHOCYTES # BLD MANUAL: 0.42 10*3/MM3 (ref 0.9–4.8)
LYMPHOCYTES NFR BLD MANUAL: 3 % (ref 19.6–45.3)
LYMPHOCYTES NFR BLD MANUAL: 9 % (ref 5–12)
MAGNESIUM SERPL-MCNC: 1.8 MG/DL (ref 1.6–2.6)
MCH RBC QN AUTO: 28.8 PG (ref 26.9–32)
MCHC RBC AUTO-ENTMCNC: 31.6 G/DL (ref 32.4–36.3)
MCV RBC AUTO: 91.2 FL (ref 80.5–98.2)
METAMYELOCYTES NFR BLD MANUAL: 4 % (ref 0–0)
MONOCYTES # BLD AUTO: 1.27 10*3/MM3 (ref 0.2–1.2)
MYELOCYTES NFR BLD MANUAL: 2 % (ref 0–0)
NEUTROPHILS # BLD AUTO: 11.26 10*3/MM3 (ref 1.9–8.1)
NEUTROPHILS NFR BLD MANUAL: 80 % (ref 42.7–76)
PHOSPHATE SERPL-MCNC: 3.5 MG/DL (ref 2.5–4.5)
PLAT MORPH BLD: NORMAL
PLATELET # BLD AUTO: 265 10*3/MM3 (ref 140–500)
PMV BLD AUTO: 9.6 FL (ref 6–12)
POTASSIUM BLD-SCNC: 3.5 MMOL/L (ref 3.5–5.2)
PROT SERPL-MCNC: 5.2 G/DL (ref 6–8.5)
RBC # BLD AUTO: 2.74 10*6/MM3 (ref 3.9–5.2)
RH BLD: POSITIVE
SCAN SLIDE: NORMAL
SODIUM BLD-SCNC: 139 MMOL/L (ref 136–145)
SPHEROCYTES BLD QL SMEAR: ABNORMAL
UNIT  ABO: NORMAL
UNIT  ABO: NORMAL
UNIT  RH: NORMAL
UNIT  RH: NORMAL
URATE SERPL-MCNC: 7.2 MG/DL (ref 2.4–5.7)
VANCOMYCIN SERPL-MCNC: 17.8 MCG/ML (ref 5–40)
WBC MORPH BLD: NORMAL
WBC NRBC COR # BLD: 14.08 10*3/MM3 (ref 4.5–10.7)

## 2018-02-20 PROCEDURE — 25010000002 CALCIUM GLUCONATE PER 10 ML: Performed by: SURGERY

## 2018-02-20 PROCEDURE — 25010000002 MEROPENEM PER 100 MG: Performed by: INTERNAL MEDICINE

## 2018-02-20 PROCEDURE — 86850 RBC ANTIBODY SCREEN: CPT | Performed by: INTERNAL MEDICINE

## 2018-02-20 PROCEDURE — 82962 GLUCOSE BLOOD TEST: CPT

## 2018-02-20 PROCEDURE — 80053 COMPREHEN METABOLIC PANEL: CPT | Performed by: INTERNAL MEDICINE

## 2018-02-20 PROCEDURE — 25010000002 HEPARIN (PORCINE) PER 1000 UNITS: Performed by: INTERNAL MEDICINE

## 2018-02-20 PROCEDURE — 99024 POSTOP FOLLOW-UP VISIT: CPT | Performed by: SURGERY

## 2018-02-20 PROCEDURE — 99232 SBSQ HOSP IP/OBS MODERATE 35: CPT | Performed by: INTERNAL MEDICINE

## 2018-02-20 PROCEDURE — 84100 ASSAY OF PHOSPHORUS: CPT | Performed by: INTERNAL MEDICINE

## 2018-02-20 PROCEDURE — 25010000002 LEVOFLOXACIN PER 250 MG: Performed by: INTERNAL MEDICINE

## 2018-02-20 PROCEDURE — 86923 COMPATIBILITY TEST ELECTRIC: CPT

## 2018-02-20 PROCEDURE — 86900 BLOOD TYPING SEROLOGIC ABO: CPT

## 2018-02-20 PROCEDURE — 80202 ASSAY OF VANCOMYCIN: CPT | Performed by: INTERNAL MEDICINE

## 2018-02-20 PROCEDURE — 85007 BL SMEAR W/DIFF WBC COUNT: CPT | Performed by: INTERNAL MEDICINE

## 2018-02-20 PROCEDURE — 83735 ASSAY OF MAGNESIUM: CPT | Performed by: INTERNAL MEDICINE

## 2018-02-20 PROCEDURE — 84550 ASSAY OF BLOOD/URIC ACID: CPT | Performed by: INTERNAL MEDICINE

## 2018-02-20 PROCEDURE — 86901 BLOOD TYPING SEROLOGIC RH(D): CPT | Performed by: INTERNAL MEDICINE

## 2018-02-20 PROCEDURE — 86900 BLOOD TYPING SEROLOGIC ABO: CPT | Performed by: INTERNAL MEDICINE

## 2018-02-20 PROCEDURE — P9016 RBC LEUKOCYTES REDUCED: HCPCS

## 2018-02-20 PROCEDURE — 25010000002 VANCOMYCIN PER 500 MG: Performed by: INTERNAL MEDICINE

## 2018-02-20 PROCEDURE — 85025 COMPLETE CBC W/AUTO DIFF WBC: CPT | Performed by: INTERNAL MEDICINE

## 2018-02-20 RX ORDER — LEVOFLOXACIN 5 MG/ML
750 INJECTION, SOLUTION INTRAVENOUS
Status: COMPLETED | OUTPATIENT
Start: 2018-02-20 | End: 2018-03-04

## 2018-02-20 RX ORDER — AMIODARONE HYDROCHLORIDE 200 MG/1
200 TABLET ORAL
Status: DISCONTINUED | OUTPATIENT
Start: 2018-02-21 | End: 2018-02-20

## 2018-02-20 RX ORDER — BUMETANIDE 0.25 MG/ML
4 INJECTION INTRAMUSCULAR; INTRAVENOUS ONCE
Status: COMPLETED | OUTPATIENT
Start: 2018-02-20 | End: 2018-02-20

## 2018-02-20 RX ORDER — VANCOMYCIN HYDROCHLORIDE 1 G/200ML
1000 INJECTION, SOLUTION INTRAVENOUS ONCE
Status: COMPLETED | OUTPATIENT
Start: 2018-02-20 | End: 2018-02-20

## 2018-02-20 RX ADMIN — MEROPENEM 1 G: 1 INJECTION, POWDER, FOR SOLUTION INTRAVENOUS at 17:13

## 2018-02-20 RX ADMIN — HEPARIN SODIUM 4000 UNITS: 1000 INJECTION, SOLUTION INTRAVENOUS; SUBCUTANEOUS at 15:41

## 2018-02-20 RX ADMIN — BUMETANIDE 4 MG: 0.25 INJECTION INTRAMUSCULAR; INTRAVENOUS at 16:37

## 2018-02-20 RX ADMIN — HEPARIN SODIUM 5000 UNITS: 5000 INJECTION, SOLUTION INTRAVENOUS; SUBCUTANEOUS at 21:33

## 2018-02-20 RX ADMIN — HEPARIN SODIUM 5000 UNITS: 5000 INJECTION, SOLUTION INTRAVENOUS; SUBCUTANEOUS at 13:55

## 2018-02-20 RX ADMIN — Medication 10 ML: at 19:51

## 2018-02-20 RX ADMIN — HYDROMORPHONE HYDROCHLORIDE 1 MG: 1 INJECTION, SOLUTION INTRAMUSCULAR; INTRAVENOUS; SUBCUTANEOUS at 23:00

## 2018-02-20 RX ADMIN — PANTOPRAZOLE SODIUM 40 MG: 40 TABLET, DELAYED RELEASE ORAL at 06:31

## 2018-02-20 RX ADMIN — VANCOMYCIN HYDROCHLORIDE 1000 MG: 1 INJECTION, SOLUTION INTRAVENOUS at 15:36

## 2018-02-20 RX ADMIN — LEVOFLOXACIN 750 MG: 5 INJECTION, SOLUTION INTRAVENOUS at 15:36

## 2018-02-20 RX ADMIN — Medication 10 ML: at 15:42

## 2018-02-20 RX ADMIN — LIDOCAINE HYDROCHLORIDE 5 ML: 20 SOLUTION ORAL; TOPICAL at 15:40

## 2018-02-20 RX ADMIN — HYDROMORPHONE HYDROCHLORIDE 1 MG: 1 INJECTION, SOLUTION INTRAMUSCULAR; INTRAVENOUS; SUBCUTANEOUS at 09:40

## 2018-02-20 RX ADMIN — LIDOCAINE HYDROCHLORIDE 5 ML: 20 SOLUTION ORAL; TOPICAL at 10:33

## 2018-02-20 RX ADMIN — HEPARIN SODIUM 5000 UNITS: 5000 INJECTION, SOLUTION INTRAVENOUS; SUBCUTANEOUS at 06:31

## 2018-02-20 RX ADMIN — LIDOCAINE HYDROCHLORIDE 5 ML: 20 SOLUTION ORAL; TOPICAL at 19:51

## 2018-02-20 RX ADMIN — HYDROMORPHONE HYDROCHLORIDE 0.5 MG: 1 INJECTION, SOLUTION INTRAMUSCULAR; INTRAVENOUS; SUBCUTANEOUS at 21:34

## 2018-02-20 RX ADMIN — METOPROLOL TARTRATE 12.5 MG: 25 TABLET ORAL at 21:33

## 2018-02-20 RX ADMIN — CALCIUM GLUCONATE: 94 INJECTION, SOLUTION INTRAVENOUS at 17:38

## 2018-02-20 RX ADMIN — DAKIN'S SOLUTION 0.125% (QUARTER STRENGTH): 0.12 SOLUTION at 10:35

## 2018-02-20 RX ADMIN — HYDROMORPHONE HYDROCHLORIDE 0.5 MG: 1 INJECTION, SOLUTION INTRAMUSCULAR; INTRAVENOUS; SUBCUTANEOUS at 05:20

## 2018-02-20 RX ADMIN — MIRTAZAPINE 15 MG: 15 TABLET, FILM COATED ORAL at 21:33

## 2018-02-20 RX ADMIN — Medication 10 ML: at 10:33

## 2018-02-20 RX ADMIN — AMIODARONE HYDROCHLORIDE 400 MG: 200 TABLET ORAL at 08:01

## 2018-02-21 LAB
ABO + RH BLD: NORMAL
ABO + RH BLD: NORMAL
ALBUMIN SERPL-MCNC: 1.7 G/DL (ref 3.5–5.2)
ALBUMIN/GLOB SERPL: 0.4 G/DL
ALP SERPL-CCNC: 102 U/L (ref 39–117)
ALT SERPL W P-5'-P-CCNC: 18 U/L (ref 1–33)
ANION GAP SERPL CALCULATED.3IONS-SCNC: 15.8 MMOL/L
AST SERPL-CCNC: 32 U/L (ref 1–32)
BACTERIA SPEC ANAEROBE CULT: NORMAL
BASOPHILS # BLD MANUAL: 0.12 10*3/MM3 (ref 0–0.2)
BASOPHILS NFR BLD AUTO: 1 % (ref 0–1.5)
BH BB BLOOD EXPIRATION DATE: NORMAL
BH BB BLOOD EXPIRATION DATE: NORMAL
BH BB BLOOD TYPE BARCODE: 7300
BH BB BLOOD TYPE BARCODE: 7300
BH BB DISPENSE STATUS: NORMAL
BH BB DISPENSE STATUS: NORMAL
BH BB PRODUCT CODE: NORMAL
BH BB PRODUCT CODE: NORMAL
BH BB UNIT NUMBER: NORMAL
BH BB UNIT NUMBER: NORMAL
BILIRUB SERPL-MCNC: 0.6 MG/DL (ref 0.1–1.2)
BUN BLD-MCNC: 68 MG/DL (ref 6–20)
BUN/CREAT SERPL: 17.5 (ref 7–25)
CALCIUM SPEC-SCNC: 7.6 MG/DL (ref 8.6–10.5)
CHLORIDE SERPL-SCNC: 98 MMOL/L (ref 98–107)
CO2 SERPL-SCNC: 24.2 MMOL/L (ref 22–29)
CREAT BLD-MCNC: 3.89 MG/DL (ref 0.57–1)
DEPRECATED RDW RBC AUTO: 51.9 FL (ref 37–54)
EOSINOPHIL # BLD MANUAL: 0.36 10*3/MM3 (ref 0–0.7)
EOSINOPHIL NFR BLD MANUAL: 3 % (ref 0.3–6.2)
ERYTHROCYTE [DISTWIDTH] IN BLOOD BY AUTOMATED COUNT: 15.6 % (ref 11.7–13)
GFR SERPL CREATININE-BSD FRML MDRD: 12 ML/MIN/1.73
GLOBULIN UR ELPH-MCNC: 3.9 GM/DL
GLUCOSE BLD-MCNC: 131 MG/DL (ref 65–99)
GLUCOSE BLDC GLUCOMTR-MCNC: 115 MG/DL (ref 70–130)
GLUCOSE BLDC GLUCOMTR-MCNC: 121 MG/DL (ref 70–130)
GLUCOSE BLDC GLUCOMTR-MCNC: 143 MG/DL (ref 70–130)
GLUCOSE BLDC GLUCOMTR-MCNC: 143 MG/DL (ref 70–130)
GLUCOSE BLDC GLUCOMTR-MCNC: 97 MG/DL (ref 70–130)
GLUCOSE BLDC GLUCOMTR-MCNC: 98 MG/DL (ref 70–130)
HCT VFR BLD AUTO: 31.1 % (ref 35.6–45.5)
HGB BLD-MCNC: 9.9 G/DL (ref 11.9–15.5)
HYPOCHROMIA BLD QL: ABNORMAL
LYMPHOCYTES # BLD MANUAL: 1.19 10*3/MM3 (ref 0.9–4.8)
LYMPHOCYTES NFR BLD MANUAL: 10 % (ref 19.6–45.3)
LYMPHOCYTES NFR BLD MANUAL: 5 % (ref 5–12)
MAGNESIUM SERPL-MCNC: 1.6 MG/DL (ref 1.6–2.6)
MCH RBC QN AUTO: 28.8 PG (ref 26.9–32)
MCHC RBC AUTO-ENTMCNC: 31.8 G/DL (ref 32.4–36.3)
MCV RBC AUTO: 90.4 FL (ref 80.5–98.2)
METAMYELOCYTES NFR BLD MANUAL: 2 % (ref 0–0)
MONOCYTES # BLD AUTO: 0.59 10*3/MM3 (ref 0.2–1.2)
NEUTROPHILS # BLD AUTO: 9.26 10*3/MM3 (ref 1.9–8.1)
NEUTROPHILS NFR BLD MANUAL: 78 % (ref 42.7–76)
PHOSPHATE SERPL-MCNC: 3.3 MG/DL (ref 2.5–4.5)
PLAT MORPH BLD: NORMAL
PLATELET # BLD AUTO: 269 10*3/MM3 (ref 140–500)
PMV BLD AUTO: 10.2 FL (ref 6–12)
POTASSIUM BLD-SCNC: 3.3 MMOL/L (ref 3.5–5.2)
POTASSIUM BLD-SCNC: 3.6 MMOL/L (ref 3.5–5.2)
PROMYELOCYTES NFR BLD MANUAL: 1 % (ref 0–0)
PROT SERPL-MCNC: 5.6 G/DL (ref 6–8.5)
RBC # BLD AUTO: 3.44 10*6/MM3 (ref 3.9–5.2)
SCAN SLIDE: NORMAL
SODIUM BLD-SCNC: 138 MMOL/L (ref 136–145)
UNIT  ABO: NORMAL
UNIT  ABO: NORMAL
UNIT  RH: NORMAL
UNIT  RH: NORMAL
VANCOMYCIN SERPL-MCNC: 22.5 MCG/ML (ref 5–40)
WBC MORPH BLD: NORMAL
WBC NRBC COR # BLD: 11.87 10*3/MM3 (ref 4.5–10.7)

## 2018-02-21 PROCEDURE — 85007 BL SMEAR W/DIFF WBC COUNT: CPT | Performed by: INTERNAL MEDICINE

## 2018-02-21 PROCEDURE — 25010000002 MEROPENEM PER 100 MG: Performed by: INTERNAL MEDICINE

## 2018-02-21 PROCEDURE — 25010000003 POTASSIUM CHLORIDE PER 2 MEQ: Performed by: INTERNAL MEDICINE

## 2018-02-21 PROCEDURE — 97110 THERAPEUTIC EXERCISES: CPT

## 2018-02-21 PROCEDURE — 80053 COMPREHEN METABOLIC PANEL: CPT | Performed by: INTERNAL MEDICINE

## 2018-02-21 PROCEDURE — 25010000002 HEPARIN (PORCINE) PER 1000 UNITS: Performed by: INTERNAL MEDICINE

## 2018-02-21 PROCEDURE — 93005 ELECTROCARDIOGRAM TRACING: CPT | Performed by: INTERNAL MEDICINE

## 2018-02-21 PROCEDURE — 93010 ELECTROCARDIOGRAM REPORT: CPT | Performed by: INTERNAL MEDICINE

## 2018-02-21 PROCEDURE — 99233 SBSQ HOSP IP/OBS HIGH 50: CPT | Performed by: INTERNAL MEDICINE

## 2018-02-21 PROCEDURE — 84132 ASSAY OF SERUM POTASSIUM: CPT | Performed by: INTERNAL MEDICINE

## 2018-02-21 PROCEDURE — 94660 CPAP INITIATION&MGMT: CPT

## 2018-02-21 PROCEDURE — 99024 POSTOP FOLLOW-UP VISIT: CPT | Performed by: SURGERY

## 2018-02-21 PROCEDURE — 85025 COMPLETE CBC W/AUTO DIFF WBC: CPT | Performed by: INTERNAL MEDICINE

## 2018-02-21 PROCEDURE — 80202 ASSAY OF VANCOMYCIN: CPT | Performed by: INTERNAL MEDICINE

## 2018-02-21 PROCEDURE — 84100 ASSAY OF PHOSPHORUS: CPT | Performed by: INTERNAL MEDICINE

## 2018-02-21 PROCEDURE — 83735 ASSAY OF MAGNESIUM: CPT | Performed by: INTERNAL MEDICINE

## 2018-02-21 PROCEDURE — 82962 GLUCOSE BLOOD TEST: CPT

## 2018-02-21 RX ORDER — POTASSIUM CHLORIDE 29.8 MG/ML
20 INJECTION INTRAVENOUS ONCE
Status: COMPLETED | OUTPATIENT
Start: 2018-02-21 | End: 2018-02-21

## 2018-02-21 RX ORDER — BUMETANIDE 0.25 MG/ML
4 INJECTION INTRAMUSCULAR; INTRAVENOUS EVERY 12 HOURS
Status: COMPLETED | OUTPATIENT
Start: 2018-02-21 | End: 2018-02-22

## 2018-02-21 RX ADMIN — HYDROMORPHONE HYDROCHLORIDE 1 MG: 1 INJECTION, SOLUTION INTRAMUSCULAR; INTRAVENOUS; SUBCUTANEOUS at 23:11

## 2018-02-21 RX ADMIN — MEROPENEM 1 G: 1 INJECTION, POWDER, FOR SOLUTION INTRAVENOUS at 13:21

## 2018-02-21 RX ADMIN — Medication 10 ML: at 12:06

## 2018-02-21 RX ADMIN — LIDOCAINE HYDROCHLORIDE 5 ML: 20 SOLUTION ORAL; TOPICAL at 20:01

## 2018-02-21 RX ADMIN — Medication 10 ML: at 08:45

## 2018-02-21 RX ADMIN — DAKIN'S SOLUTION 0.125% (QUARTER STRENGTH): 0.12 SOLUTION at 12:07

## 2018-02-21 RX ADMIN — HEPARIN SODIUM 5000 UNITS: 5000 INJECTION, SOLUTION INTRAVENOUS; SUBCUTANEOUS at 13:21

## 2018-02-21 RX ADMIN — LIDOCAINE HYDROCHLORIDE 5 ML: 20 SOLUTION ORAL; TOPICAL at 04:37

## 2018-02-21 RX ADMIN — POTASSIUM CHLORIDE 20 MEQ: 400 INJECTION, SOLUTION INTRAVENOUS at 08:46

## 2018-02-21 RX ADMIN — LIDOCAINE HYDROCHLORIDE 5 ML: 20 SOLUTION ORAL; TOPICAL at 12:06

## 2018-02-21 RX ADMIN — PANTOPRAZOLE SODIUM 40 MG: 40 TABLET, DELAYED RELEASE ORAL at 06:28

## 2018-02-21 RX ADMIN — Medication 10 ML: at 04:37

## 2018-02-21 RX ADMIN — HEPARIN SODIUM 5000 UNITS: 5000 INJECTION, SOLUTION INTRAVENOUS; SUBCUTANEOUS at 06:23

## 2018-02-21 RX ADMIN — MIRTAZAPINE 15 MG: 15 TABLET, FILM COATED ORAL at 21:59

## 2018-02-21 RX ADMIN — DAKIN'S SOLUTION 0.125% (QUARTER STRENGTH): 0.12 SOLUTION at 23:30

## 2018-02-21 RX ADMIN — LIDOCAINE HYDROCHLORIDE 5 ML: 20 SOLUTION ORAL; TOPICAL at 16:31

## 2018-02-21 RX ADMIN — DAKIN'S SOLUTION 0.125% (QUARTER STRENGTH): 0.12 SOLUTION at 00:12

## 2018-02-21 RX ADMIN — Medication 10 ML: at 00:04

## 2018-02-21 RX ADMIN — HEPARIN SODIUM 5000 UNITS: 5000 INJECTION, SOLUTION INTRAVENOUS; SUBCUTANEOUS at 22:00

## 2018-02-21 RX ADMIN — I.V. FAT EMULSION 20 G: 20 EMULSION INTRAVENOUS at 17:34

## 2018-02-21 RX ADMIN — HYDROMORPHONE HYDROCHLORIDE 0.5 MG: 1 INJECTION, SOLUTION INTRAMUSCULAR; INTRAVENOUS; SUBCUTANEOUS at 15:16

## 2018-02-21 RX ADMIN — Medication 10 ML: at 20:01

## 2018-02-21 RX ADMIN — Medication 10 ML: at 16:31

## 2018-02-21 RX ADMIN — LIDOCAINE HYDROCHLORIDE 5 ML: 20 SOLUTION ORAL; TOPICAL at 00:04

## 2018-02-21 RX ADMIN — LIDOCAINE HYDROCHLORIDE 5 ML: 20 SOLUTION ORAL; TOPICAL at 08:45

## 2018-02-21 RX ADMIN — HYDROMORPHONE HYDROCHLORIDE 1 MG: 1 INJECTION, SOLUTION INTRAMUSCULAR; INTRAVENOUS; SUBCUTANEOUS at 07:32

## 2018-02-21 RX ADMIN — METOPROLOL TARTRATE 12.5 MG: 25 TABLET ORAL at 08:45

## 2018-02-21 RX ADMIN — BUMETANIDE 4 MG: 0.25 INJECTION INTRAMUSCULAR; INTRAVENOUS at 12:07

## 2018-02-22 LAB
ALBUMIN SERPL-MCNC: 1.9 G/DL (ref 3.5–5.2)
ALBUMIN/GLOB SERPL: 0.4 G/DL
ALP SERPL-CCNC: 119 U/L (ref 39–117)
ALT SERPL W P-5'-P-CCNC: 19 U/L (ref 1–33)
ANION GAP SERPL CALCULATED.3IONS-SCNC: 16.6 MMOL/L
AST SERPL-CCNC: 37 U/L (ref 1–32)
BASOPHILS # BLD AUTO: 0.06 10*3/MM3 (ref 0–0.2)
BASOPHILS NFR BLD AUTO: 0.4 % (ref 0–1.5)
BILIRUB SERPL-MCNC: 0.7 MG/DL (ref 0.1–1.2)
BUN BLD-MCNC: 86 MG/DL (ref 6–20)
BUN/CREAT SERPL: 18.7 (ref 7–25)
CALCIUM SPEC-SCNC: 8.3 MG/DL (ref 8.6–10.5)
CHLORIDE SERPL-SCNC: 95 MMOL/L (ref 98–107)
CO2 SERPL-SCNC: 22.4 MMOL/L (ref 22–29)
CREAT BLD-MCNC: 4.6 MG/DL (ref 0.57–1)
DEPRECATED RDW RBC AUTO: 51.1 FL (ref 37–54)
EOSINOPHIL # BLD AUTO: 0.18 10*3/MM3 (ref 0–0.7)
EOSINOPHIL NFR BLD AUTO: 1.3 % (ref 0.3–6.2)
ERYTHROCYTE [DISTWIDTH] IN BLOOD BY AUTOMATED COUNT: 15.6 % (ref 11.7–13)
GFR SERPL CREATININE-BSD FRML MDRD: 10 ML/MIN/1.73
GLOBULIN UR ELPH-MCNC: 4.6 GM/DL
GLUCOSE BLD-MCNC: 95 MG/DL (ref 65–99)
GLUCOSE BLDC GLUCOMTR-MCNC: 103 MG/DL (ref 70–130)
GLUCOSE BLDC GLUCOMTR-MCNC: 106 MG/DL (ref 70–130)
GLUCOSE BLDC GLUCOMTR-MCNC: 84 MG/DL (ref 70–130)
HCT VFR BLD AUTO: 32.3 % (ref 35.6–45.5)
HGB BLD-MCNC: 10.5 G/DL (ref 11.9–15.5)
IMM GRANULOCYTES # BLD: 0.66 10*3/MM3 (ref 0–0.03)
IMM GRANULOCYTES NFR BLD: 4.9 % (ref 0–0.5)
LYMPHOCYTES # BLD AUTO: 1.34 10*3/MM3 (ref 0.9–4.8)
LYMPHOCYTES NFR BLD AUTO: 10 % (ref 19.6–45.3)
MCH RBC QN AUTO: 29.2 PG (ref 26.9–32)
MCHC RBC AUTO-ENTMCNC: 32.5 G/DL (ref 32.4–36.3)
MCV RBC AUTO: 89.7 FL (ref 80.5–98.2)
MONOCYTES # BLD AUTO: 0.8 10*3/MM3 (ref 0.2–1.2)
MONOCYTES NFR BLD AUTO: 6 % (ref 5–12)
NEUTROPHILS # BLD AUTO: 10.3 10*3/MM3 (ref 1.9–8.1)
NEUTROPHILS NFR BLD AUTO: 77.4 % (ref 42.7–76)
NRBC BLD MANUAL-RTO: 0 /100 WBC (ref 0–0)
PHOSPHATE SERPL-MCNC: 5.3 MG/DL (ref 2.5–4.5)
PLATELET # BLD AUTO: 373 10*3/MM3 (ref 140–500)
PMV BLD AUTO: 10.1 FL (ref 6–12)
POTASSIUM BLD-SCNC: 4 MMOL/L (ref 3.5–5.2)
PROT SERPL-MCNC: 6.5 G/DL (ref 6–8.5)
RBC # BLD AUTO: 3.6 10*6/MM3 (ref 3.9–5.2)
SODIUM BLD-SCNC: 134 MMOL/L (ref 136–145)
VANCOMYCIN SERPL-MCNC: 20.9 MCG/ML (ref 5–40)
WBC NRBC COR # BLD: 13.34 10*3/MM3 (ref 4.5–10.7)

## 2018-02-22 PROCEDURE — 25010000002 LEVOFLOXACIN PER 250 MG: Performed by: INTERNAL MEDICINE

## 2018-02-22 PROCEDURE — 84100 ASSAY OF PHOSPHORUS: CPT | Performed by: INTERNAL MEDICINE

## 2018-02-22 PROCEDURE — 97530 THERAPEUTIC ACTIVITIES: CPT

## 2018-02-22 PROCEDURE — 85025 COMPLETE CBC W/AUTO DIFF WBC: CPT | Performed by: INTERNAL MEDICINE

## 2018-02-22 PROCEDURE — 93010 ELECTROCARDIOGRAM REPORT: CPT | Performed by: INTERNAL MEDICINE

## 2018-02-22 PROCEDURE — 97166 OT EVAL MOD COMPLEX 45 MIN: CPT

## 2018-02-22 PROCEDURE — 25010000002 ALTEPLASE: Performed by: INTERNAL MEDICINE

## 2018-02-22 PROCEDURE — 80202 ASSAY OF VANCOMYCIN: CPT | Performed by: INTERNAL MEDICINE

## 2018-02-22 PROCEDURE — 93005 ELECTROCARDIOGRAM TRACING: CPT | Performed by: INTERNAL MEDICINE

## 2018-02-22 PROCEDURE — 25010000002 HEPARIN (PORCINE) PER 1000 UNITS: Performed by: INTERNAL MEDICINE

## 2018-02-22 PROCEDURE — 82962 GLUCOSE BLOOD TEST: CPT

## 2018-02-22 PROCEDURE — 25010000002 HYDROMORPHONE PER 4 MG: Performed by: INTERNAL MEDICINE

## 2018-02-22 PROCEDURE — 99024 POSTOP FOLLOW-UP VISIT: CPT | Performed by: SURGERY

## 2018-02-22 PROCEDURE — 80053 COMPREHEN METABOLIC PANEL: CPT | Performed by: INTERNAL MEDICINE

## 2018-02-22 PROCEDURE — 25010000002 VANCOMYCIN 750 MG RECONSTITUTED SOLUTION: Performed by: INTERNAL MEDICINE

## 2018-02-22 PROCEDURE — 25010000002 MEROPENEM PER 100 MG: Performed by: INTERNAL MEDICINE

## 2018-02-22 PROCEDURE — 97110 THERAPEUTIC EXERCISES: CPT

## 2018-02-22 PROCEDURE — 99232 SBSQ HOSP IP/OBS MODERATE 35: CPT | Performed by: INTERNAL MEDICINE

## 2018-02-22 RX ORDER — BUMETANIDE 0.25 MG/ML
4 INJECTION INTRAMUSCULAR; INTRAVENOUS EVERY 12 HOURS
Status: DISPENSED | OUTPATIENT
Start: 2018-02-22 | End: 2018-02-23

## 2018-02-22 RX ADMIN — HEPARIN SODIUM 5000 UNITS: 5000 INJECTION, SOLUTION INTRAVENOUS; SUBCUTANEOUS at 13:35

## 2018-02-22 RX ADMIN — ALTEPLASE 2 MG: 2.2 INJECTION, POWDER, LYOPHILIZED, FOR SOLUTION INTRAVENOUS at 16:55

## 2018-02-22 RX ADMIN — Medication 10 ML: at 13:35

## 2018-02-22 RX ADMIN — METOPROLOL TARTRATE 12.5 MG: 25 TABLET ORAL at 08:38

## 2018-02-22 RX ADMIN — LEVOFLOXACIN 750 MG: 5 INJECTION, SOLUTION INTRAVENOUS at 13:35

## 2018-02-22 RX ADMIN — BUMETANIDE 4 MG: 0.25 INJECTION INTRAMUSCULAR; INTRAVENOUS at 02:06

## 2018-02-22 RX ADMIN — ALTEPLASE 2 MG: 2.2 INJECTION, POWDER, LYOPHILIZED, FOR SOLUTION INTRAVENOUS at 18:51

## 2018-02-22 RX ADMIN — Medication 10 ML: at 05:11

## 2018-02-22 RX ADMIN — PANTOPRAZOLE SODIUM 40 MG: 40 TABLET, DELAYED RELEASE ORAL at 05:11

## 2018-02-22 RX ADMIN — LIDOCAINE HYDROCHLORIDE 5 ML: 20 SOLUTION ORAL; TOPICAL at 13:36

## 2018-02-22 RX ADMIN — DAKIN'S SOLUTION 0.125% (QUARTER STRENGTH): 0.12 SOLUTION at 08:40

## 2018-02-22 RX ADMIN — HEPARIN SODIUM 5000 UNITS: 5000 INJECTION, SOLUTION INTRAVENOUS; SUBCUTANEOUS at 05:11

## 2018-02-22 RX ADMIN — HYDROMORPHONE HYDROCHLORIDE 0.5 MG: 1 INJECTION, SOLUTION INTRAMUSCULAR; INTRAVENOUS; SUBCUTANEOUS at 10:44

## 2018-02-22 RX ADMIN — DAKIN'S SOLUTION 0.125% (QUARTER STRENGTH): 0.12 SOLUTION at 23:04

## 2018-02-22 RX ADMIN — LIDOCAINE HYDROCHLORIDE 5 ML: 20 SOLUTION ORAL; TOPICAL at 05:11

## 2018-02-22 RX ADMIN — SODIUM CHLORIDE 750 MG: 900 INJECTION, SOLUTION INTRAVENOUS at 13:35

## 2018-02-22 RX ADMIN — HYDROMORPHONE HYDROCHLORIDE 0.5 MG: 1 INJECTION, SOLUTION INTRAMUSCULAR; INTRAVENOUS; SUBCUTANEOUS at 07:34

## 2018-02-22 RX ADMIN — LIDOCAINE HYDROCHLORIDE 5 ML: 20 SOLUTION ORAL; TOPICAL at 08:40

## 2018-02-22 RX ADMIN — HEPARIN SODIUM 5000 UNITS: 5000 INJECTION, SOLUTION INTRAVENOUS; SUBCUTANEOUS at 23:04

## 2018-02-22 RX ADMIN — ALTEPLASE 8 MG: 2.2 INJECTION, POWDER, LYOPHILIZED, FOR SOLUTION INTRAVENOUS at 22:17

## 2018-02-22 RX ADMIN — MEROPENEM 1 G: 1 INJECTION, POWDER, FOR SOLUTION INTRAVENOUS at 13:35

## 2018-02-22 RX ADMIN — HYDROMORPHONE HYDROCHLORIDE 1 MG: 1 INJECTION, SOLUTION INTRAMUSCULAR; INTRAVENOUS; SUBCUTANEOUS at 21:18

## 2018-02-22 RX ADMIN — MIRTAZAPINE 15 MG: 15 TABLET, FILM COATED ORAL at 20:07

## 2018-02-23 LAB
ALBUMIN SERPL-MCNC: 2.1 G/DL (ref 3.5–5.2)
ALBUMIN/GLOB SERPL: 0.5 G/DL
ALP SERPL-CCNC: 119 U/L (ref 39–117)
ALT SERPL W P-5'-P-CCNC: 19 U/L (ref 1–33)
ANION GAP SERPL CALCULATED.3IONS-SCNC: 11.2 MMOL/L
AST SERPL-CCNC: 40 U/L (ref 1–32)
BASOPHILS # BLD AUTO: 0.04 10*3/MM3 (ref 0–0.2)
BASOPHILS NFR BLD AUTO: 0.3 % (ref 0–1.5)
BILIRUB SERPL-MCNC: 0.7 MG/DL (ref 0.1–1.2)
BUN BLD-MCNC: 66 MG/DL (ref 6–20)
BUN/CREAT SERPL: 17.7 (ref 7–25)
CALCIUM SPEC-SCNC: 8.2 MG/DL (ref 8.6–10.5)
CHLORIDE SERPL-SCNC: 94 MMOL/L (ref 98–107)
CO2 SERPL-SCNC: 25.8 MMOL/L (ref 22–29)
CREAT BLD-MCNC: 3.73 MG/DL (ref 0.57–1)
DEPRECATED RDW RBC AUTO: 51.5 FL (ref 37–54)
EOSINOPHIL # BLD AUTO: 0.13 10*3/MM3 (ref 0–0.7)
EOSINOPHIL NFR BLD AUTO: 1 % (ref 0.3–6.2)
ERYTHROCYTE [DISTWIDTH] IN BLOOD BY AUTOMATED COUNT: 15.3 % (ref 11.7–13)
GFR SERPL CREATININE-BSD FRML MDRD: 13 ML/MIN/1.73
GLOBULIN UR ELPH-MCNC: 4.6 GM/DL
GLUCOSE BLD-MCNC: 106 MG/DL (ref 65–99)
GLUCOSE BLDC GLUCOMTR-MCNC: 116 MG/DL (ref 70–130)
GLUCOSE BLDC GLUCOMTR-MCNC: 118 MG/DL (ref 70–130)
GLUCOSE BLDC GLUCOMTR-MCNC: 133 MG/DL (ref 70–130)
GLUCOSE BLDC GLUCOMTR-MCNC: 89 MG/DL (ref 70–130)
GLUCOSE BLDC GLUCOMTR-MCNC: 92 MG/DL (ref 70–130)
HCT VFR BLD AUTO: 33.4 % (ref 35.6–45.5)
HGB BLD-MCNC: 10.4 G/DL (ref 11.9–15.5)
IMM GRANULOCYTES # BLD: 0.48 10*3/MM3 (ref 0–0.03)
IMM GRANULOCYTES NFR BLD: 3.8 % (ref 0–0.5)
LYMPHOCYTES # BLD AUTO: 1.35 10*3/MM3 (ref 0.9–4.8)
LYMPHOCYTES NFR BLD AUTO: 10.7 % (ref 19.6–45.3)
MCH RBC QN AUTO: 28.5 PG (ref 26.9–32)
MCHC RBC AUTO-ENTMCNC: 31.1 G/DL (ref 32.4–36.3)
MCV RBC AUTO: 91.5 FL (ref 80.5–98.2)
MONOCYTES # BLD AUTO: 0.87 10*3/MM3 (ref 0.2–1.2)
MONOCYTES NFR BLD AUTO: 6.9 % (ref 5–12)
NEUTROPHILS # BLD AUTO: 9.7 10*3/MM3 (ref 1.9–8.1)
NEUTROPHILS NFR BLD AUTO: 77.3 % (ref 42.7–76)
PHOSPHATE SERPL-MCNC: 5.5 MG/DL (ref 2.5–4.5)
PLATELET # BLD AUTO: 340 10*3/MM3 (ref 140–500)
PMV BLD AUTO: 10 FL (ref 6–12)
POTASSIUM BLD-SCNC: 4.2 MMOL/L (ref 3.5–5.2)
PROT SERPL-MCNC: 6.7 G/DL (ref 6–8.5)
RBC # BLD AUTO: 3.65 10*6/MM3 (ref 3.9–5.2)
SODIUM BLD-SCNC: 131 MMOL/L (ref 136–145)
VANCOMYCIN SERPL-MCNC: 24.4 MCG/ML (ref 5–40)
WBC NRBC COR # BLD: 12.57 10*3/MM3 (ref 4.5–10.7)

## 2018-02-23 PROCEDURE — 97110 THERAPEUTIC EXERCISES: CPT

## 2018-02-23 PROCEDURE — 84100 ASSAY OF PHOSPHORUS: CPT | Performed by: INTERNAL MEDICINE

## 2018-02-23 PROCEDURE — 80053 COMPREHEN METABOLIC PANEL: CPT | Performed by: INTERNAL MEDICINE

## 2018-02-23 PROCEDURE — 99024 POSTOP FOLLOW-UP VISIT: CPT | Performed by: SURGERY

## 2018-02-23 PROCEDURE — 25010000002 ALTEPLASE: Performed by: INTERNAL MEDICINE

## 2018-02-23 PROCEDURE — 25010000002 HYDROMORPHONE PER 4 MG: Performed by: SURGERY

## 2018-02-23 PROCEDURE — 82962 GLUCOSE BLOOD TEST: CPT

## 2018-02-23 PROCEDURE — 25010000002 MEROPENEM PER 100 MG: Performed by: INTERNAL MEDICINE

## 2018-02-23 PROCEDURE — 25010000002 HEPARIN (PORCINE) PER 1000 UNITS: Performed by: INTERNAL MEDICINE

## 2018-02-23 PROCEDURE — 80202 ASSAY OF VANCOMYCIN: CPT

## 2018-02-23 PROCEDURE — 85025 COMPLETE CBC W/AUTO DIFF WBC: CPT | Performed by: INTERNAL MEDICINE

## 2018-02-23 RX ADMIN — BUMETANIDE 4 MG: 0.25 INJECTION INTRAMUSCULAR; INTRAVENOUS at 08:40

## 2018-02-23 RX ADMIN — DAKIN'S SOLUTION 0.125% (QUARTER STRENGTH): 0.12 SOLUTION at 08:41

## 2018-02-23 RX ADMIN — Medication 10 ML: at 08:39

## 2018-02-23 RX ADMIN — MEROPENEM 1 G: 1 INJECTION, POWDER, FOR SOLUTION INTRAVENOUS at 14:36

## 2018-02-23 RX ADMIN — Medication 10 ML: at 16:01

## 2018-02-23 RX ADMIN — HYDROCODONE BITARTRATE AND ACETAMINOPHEN 1 TABLET: 5; 325 TABLET ORAL at 15:58

## 2018-02-23 RX ADMIN — LIDOCAINE HYDROCHLORIDE 5 ML: 20 SOLUTION ORAL; TOPICAL at 03:18

## 2018-02-23 RX ADMIN — HYDROMORPHONE HYDROCHLORIDE 0.5 MG: 1 INJECTION, SOLUTION INTRAMUSCULAR; INTRAVENOUS; SUBCUTANEOUS at 03:36

## 2018-02-23 RX ADMIN — HEPARIN SODIUM 5000 UNITS: 5000 INJECTION, SOLUTION INTRAVENOUS; SUBCUTANEOUS at 06:14

## 2018-02-23 RX ADMIN — Medication 10 ML: at 20:05

## 2018-02-23 RX ADMIN — DAKIN'S SOLUTION 0.125% (QUARTER STRENGTH): 0.12 SOLUTION at 21:13

## 2018-02-23 RX ADMIN — LIDOCAINE HYDROCHLORIDE 5 ML: 20 SOLUTION ORAL; TOPICAL at 16:00

## 2018-02-23 RX ADMIN — HYDROMORPHONE HYDROCHLORIDE 0.5 MG: 1 INJECTION, SOLUTION INTRAMUSCULAR; INTRAVENOUS; SUBCUTANEOUS at 13:47

## 2018-02-23 RX ADMIN — LIDOCAINE HYDROCHLORIDE 5 ML: 20 SOLUTION ORAL; TOPICAL at 20:05

## 2018-02-23 RX ADMIN — Medication 10 ML: at 03:18

## 2018-02-23 RX ADMIN — ALTEPLASE 2 MG: 2.2 INJECTION, POWDER, LYOPHILIZED, FOR SOLUTION INTRAVENOUS at 05:39

## 2018-02-23 RX ADMIN — METOPROLOL TARTRATE 12.5 MG: 25 TABLET ORAL at 08:40

## 2018-02-23 RX ADMIN — HYDROMORPHONE HYDROCHLORIDE 0.5 MG: 1 INJECTION, SOLUTION INTRAMUSCULAR; INTRAVENOUS; SUBCUTANEOUS at 20:35

## 2018-02-23 RX ADMIN — HEPARIN SODIUM 5000 UNITS: 5000 INJECTION, SOLUTION INTRAVENOUS; SUBCUTANEOUS at 21:15

## 2018-02-23 RX ADMIN — Medication 10 ML: at 12:40

## 2018-02-23 RX ADMIN — METOPROLOL TARTRATE 12.5 MG: 25 TABLET ORAL at 21:13

## 2018-02-23 RX ADMIN — PANTOPRAZOLE SODIUM 40 MG: 40 TABLET, DELAYED RELEASE ORAL at 06:14

## 2018-02-23 RX ADMIN — MIRTAZAPINE 15 MG: 15 TABLET, FILM COATED ORAL at 21:13

## 2018-02-23 RX ADMIN — HEPARIN SODIUM 5000 UNITS: 5000 INJECTION, SOLUTION INTRAVENOUS; SUBCUTANEOUS at 14:36

## 2018-02-23 RX ADMIN — LIDOCAINE HYDROCHLORIDE 5 ML: 20 SOLUTION ORAL; TOPICAL at 12:39

## 2018-02-24 LAB
ALBUMIN SERPL-MCNC: 1.8 G/DL (ref 3.5–5.2)
ALBUMIN/GLOB SERPL: 0.4 G/DL
ALP SERPL-CCNC: 108 U/L (ref 39–117)
ALT SERPL W P-5'-P-CCNC: 22 U/L (ref 1–33)
ANION GAP SERPL CALCULATED.3IONS-SCNC: 13.6 MMOL/L
AST SERPL-CCNC: 44 U/L (ref 1–32)
BASOPHILS # BLD AUTO: 0.04 10*3/MM3 (ref 0–0.2)
BASOPHILS NFR BLD AUTO: 0.3 % (ref 0–1.5)
BILIRUB SERPL-MCNC: 0.5 MG/DL (ref 0.1–1.2)
BUN BLD-MCNC: 86 MG/DL (ref 6–20)
BUN/CREAT SERPL: 19.3 (ref 7–25)
CALCIUM SPEC-SCNC: 7.8 MG/DL (ref 8.6–10.5)
CHLORIDE SERPL-SCNC: 92 MMOL/L (ref 98–107)
CO2 SERPL-SCNC: 26.4 MMOL/L (ref 22–29)
CREAT BLD-MCNC: 4.46 MG/DL (ref 0.57–1)
DEPRECATED RDW RBC AUTO: 51.3 FL (ref 37–54)
EOSINOPHIL # BLD AUTO: 0.17 10*3/MM3 (ref 0–0.7)
EOSINOPHIL NFR BLD AUTO: 1.2 % (ref 0.3–6.2)
ERYTHROCYTE [DISTWIDTH] IN BLOOD BY AUTOMATED COUNT: 15.5 % (ref 11.7–13)
GFR SERPL CREATININE-BSD FRML MDRD: 10 ML/MIN/1.73
GLOBULIN UR ELPH-MCNC: 4.5 GM/DL
GLUCOSE BLD-MCNC: 85 MG/DL (ref 65–99)
GLUCOSE BLDC GLUCOMTR-MCNC: 115 MG/DL (ref 70–130)
GLUCOSE BLDC GLUCOMTR-MCNC: 90 MG/DL (ref 70–130)
GLUCOSE BLDC GLUCOMTR-MCNC: 92 MG/DL (ref 70–130)
GLUCOSE BLDC GLUCOMTR-MCNC: 98 MG/DL (ref 70–130)
HCT VFR BLD AUTO: 30 % (ref 35.6–45.5)
HGB BLD-MCNC: 9.6 G/DL (ref 11.9–15.5)
IMM GRANULOCYTES # BLD: 0.35 10*3/MM3 (ref 0–0.03)
IMM GRANULOCYTES NFR BLD: 2.6 % (ref 0–0.5)
LYMPHOCYTES # BLD AUTO: 1.29 10*3/MM3 (ref 0.9–4.8)
LYMPHOCYTES NFR BLD AUTO: 9.4 % (ref 19.6–45.3)
MAGNESIUM SERPL-MCNC: 2 MG/DL (ref 1.6–2.6)
MCH RBC QN AUTO: 29 PG (ref 26.9–32)
MCHC RBC AUTO-ENTMCNC: 32 G/DL (ref 32.4–36.3)
MCV RBC AUTO: 90.6 FL (ref 80.5–98.2)
MONOCYTES # BLD AUTO: 0.89 10*3/MM3 (ref 0.2–1.2)
MONOCYTES NFR BLD AUTO: 6.5 % (ref 5–12)
NEUTROPHILS # BLD AUTO: 10.95 10*3/MM3 (ref 1.9–8.1)
NEUTROPHILS NFR BLD AUTO: 80 % (ref 42.7–76)
PHOSPHATE SERPL-MCNC: 6.4 MG/DL (ref 2.5–4.5)
PLATELET # BLD AUTO: 351 10*3/MM3 (ref 140–500)
PMV BLD AUTO: 10 FL (ref 6–12)
POTASSIUM BLD-SCNC: 4.1 MMOL/L (ref 3.5–5.2)
PROT SERPL-MCNC: 6.3 G/DL (ref 6–8.5)
RBC # BLD AUTO: 3.31 10*6/MM3 (ref 3.9–5.2)
SODIUM BLD-SCNC: 132 MMOL/L (ref 136–145)
VANCOMYCIN SERPL-MCNC: 21.4 MCG/ML (ref 5–40)
WBC NRBC COR # BLD: 13.69 10*3/MM3 (ref 4.5–10.7)

## 2018-02-24 PROCEDURE — 25010000002 HYDROMORPHONE PER 4 MG: Performed by: SURGERY

## 2018-02-24 PROCEDURE — 84100 ASSAY OF PHOSPHORUS: CPT | Performed by: INTERNAL MEDICINE

## 2018-02-24 PROCEDURE — 25010000002 HEPARIN (PORCINE) PER 1000 UNITS: Performed by: INTERNAL MEDICINE

## 2018-02-24 PROCEDURE — 25010000002 LEVOFLOXACIN PER 250 MG: Performed by: INTERNAL MEDICINE

## 2018-02-24 PROCEDURE — 82962 GLUCOSE BLOOD TEST: CPT

## 2018-02-24 PROCEDURE — 25010000002 VANCOMYCIN 10 G RECONSTITUTED SOLUTION: Performed by: INTERNAL MEDICINE

## 2018-02-24 PROCEDURE — 80202 ASSAY OF VANCOMYCIN: CPT | Performed by: INTERNAL MEDICINE

## 2018-02-24 PROCEDURE — 85025 COMPLETE CBC W/AUTO DIFF WBC: CPT | Performed by: INTERNAL MEDICINE

## 2018-02-24 PROCEDURE — 80053 COMPREHEN METABOLIC PANEL: CPT | Performed by: INTERNAL MEDICINE

## 2018-02-24 PROCEDURE — 99024 POSTOP FOLLOW-UP VISIT: CPT | Performed by: SURGERY

## 2018-02-24 PROCEDURE — 83735 ASSAY OF MAGNESIUM: CPT | Performed by: INTERNAL MEDICINE

## 2018-02-24 PROCEDURE — 25010000002 MEROPENEM PER 100 MG: Performed by: INTERNAL MEDICINE

## 2018-02-24 RX ORDER — MANNITOL 250 MG/ML
12.5 INJECTION, SOLUTION INTRAVENOUS AS NEEDED
Status: ACTIVE | OUTPATIENT
Start: 2018-02-24 | End: 2018-02-24

## 2018-02-24 RX ADMIN — MIRTAZAPINE 15 MG: 15 TABLET, FILM COATED ORAL at 21:05

## 2018-02-24 RX ADMIN — LIDOCAINE HYDROCHLORIDE 5 ML: 20 SOLUTION ORAL; TOPICAL at 10:18

## 2018-02-24 RX ADMIN — Medication 10 ML: at 03:37

## 2018-02-24 RX ADMIN — MEROPENEM 1 G: 1 INJECTION, POWDER, FOR SOLUTION INTRAVENOUS at 19:25

## 2018-02-24 RX ADMIN — Medication 10 ML: at 20:35

## 2018-02-24 RX ADMIN — LIDOCAINE HYDROCHLORIDE 5 ML: 20 SOLUTION ORAL; TOPICAL at 18:09

## 2018-02-24 RX ADMIN — LEVOFLOXACIN 750 MG: 5 INJECTION, SOLUTION INTRAVENOUS at 18:08

## 2018-02-24 RX ADMIN — METOPROLOL TARTRATE 12.5 MG: 25 TABLET ORAL at 21:06

## 2018-02-24 RX ADMIN — PANTOPRAZOLE SODIUM 40 MG: 40 TABLET, DELAYED RELEASE ORAL at 06:23

## 2018-02-24 RX ADMIN — HEPARIN SODIUM 5000 UNITS: 5000 INJECTION, SOLUTION INTRAVENOUS; SUBCUTANEOUS at 21:05

## 2018-02-24 RX ADMIN — LIDOCAINE HYDROCHLORIDE 5 ML: 20 SOLUTION ORAL; TOPICAL at 23:12

## 2018-02-24 RX ADMIN — HYDROMORPHONE HYDROCHLORIDE 0.5 MG: 1 INJECTION, SOLUTION INTRAMUSCULAR; INTRAVENOUS; SUBCUTANEOUS at 03:36

## 2018-02-24 RX ADMIN — DAKIN'S SOLUTION 0.125% (QUARTER STRENGTH): 0.12 SOLUTION at 10:18

## 2018-02-24 RX ADMIN — VANCOMYCIN HYDROCHLORIDE 500 MG: 10 INJECTION, POWDER, LYOPHILIZED, FOR SOLUTION INTRAVENOUS at 18:08

## 2018-02-24 RX ADMIN — DAKIN'S SOLUTION 0.125% (QUARTER STRENGTH): 0.12 SOLUTION at 21:06

## 2018-02-24 RX ADMIN — Medication 10 ML: at 18:09

## 2018-02-24 RX ADMIN — Medication 10 ML: at 10:18

## 2018-02-24 RX ADMIN — LIDOCAINE HYDROCHLORIDE 5 ML: 20 SOLUTION ORAL; TOPICAL at 20:35

## 2018-02-24 RX ADMIN — HEPARIN SODIUM 5000 UNITS: 5000 INJECTION, SOLUTION INTRAVENOUS; SUBCUTANEOUS at 06:23

## 2018-02-24 RX ADMIN — HEPARIN SODIUM 5000 UNITS: 5000 INJECTION, SOLUTION INTRAVENOUS; SUBCUTANEOUS at 18:10

## 2018-02-24 RX ADMIN — LIDOCAINE HYDROCHLORIDE 5 ML: 20 SOLUTION ORAL; TOPICAL at 03:37

## 2018-02-24 RX ADMIN — METOPROLOL TARTRATE 12.5 MG: 25 TABLET ORAL at 10:18

## 2018-02-24 RX ADMIN — Medication 10 ML: at 23:13

## 2018-02-24 RX ADMIN — HYDROMORPHONE HYDROCHLORIDE 0.5 MG: 1 INJECTION, SOLUTION INTRAMUSCULAR; INTRAVENOUS; SUBCUTANEOUS at 10:38

## 2018-02-24 RX ADMIN — HEPARIN SODIUM 4000 UNITS: 1000 INJECTION, SOLUTION INTRAVENOUS; SUBCUTANEOUS at 16:01

## 2018-02-24 RX ADMIN — HYDROMORPHONE HYDROCHLORIDE 0.5 MG: 1 INJECTION, SOLUTION INTRAMUSCULAR; INTRAVENOUS; SUBCUTANEOUS at 20:36

## 2018-02-25 LAB
ANION GAP SERPL CALCULATED.3IONS-SCNC: 11.4 MMOL/L
BASOPHILS # BLD AUTO: 0.04 10*3/MM3 (ref 0–0.2)
BASOPHILS NFR BLD AUTO: 0.3 % (ref 0–1.5)
BUN BLD-MCNC: 44 MG/DL (ref 6–20)
BUN/CREAT SERPL: 15.4 (ref 7–25)
CALCIUM SPEC-SCNC: 7.6 MG/DL (ref 8.6–10.5)
CHLORIDE SERPL-SCNC: 93 MMOL/L (ref 98–107)
CO2 SERPL-SCNC: 26.6 MMOL/L (ref 22–29)
CREAT BLD-MCNC: 2.85 MG/DL (ref 0.57–1)
DEPRECATED RDW RBC AUTO: 51.4 FL (ref 37–54)
EOSINOPHIL # BLD AUTO: 0.15 10*3/MM3 (ref 0–0.7)
EOSINOPHIL NFR BLD AUTO: 1.1 % (ref 0.3–6.2)
ERYTHROCYTE [DISTWIDTH] IN BLOOD BY AUTOMATED COUNT: 15.1 % (ref 11.7–13)
GFR SERPL CREATININE-BSD FRML MDRD: 17 ML/MIN/1.73
GLUCOSE BLD-MCNC: 96 MG/DL (ref 65–99)
GLUCOSE BLDC GLUCOMTR-MCNC: 116 MG/DL (ref 70–130)
GLUCOSE BLDC GLUCOMTR-MCNC: 126 MG/DL (ref 70–130)
GLUCOSE BLDC GLUCOMTR-MCNC: 88 MG/DL (ref 70–130)
GLUCOSE BLDC GLUCOMTR-MCNC: 99 MG/DL (ref 70–130)
HCT VFR BLD AUTO: 28.3 % (ref 35.6–45.5)
HGB BLD-MCNC: 8.9 G/DL (ref 11.9–15.5)
IMM GRANULOCYTES # BLD: 0.26 10*3/MM3 (ref 0–0.03)
IMM GRANULOCYTES NFR BLD: 1.9 % (ref 0–0.5)
LYMPHOCYTES # BLD AUTO: 1.16 10*3/MM3 (ref 0.9–4.8)
LYMPHOCYTES NFR BLD AUTO: 8.6 % (ref 19.6–45.3)
MCH RBC QN AUTO: 28.8 PG (ref 26.9–32)
MCHC RBC AUTO-ENTMCNC: 31.4 G/DL (ref 32.4–36.3)
MCV RBC AUTO: 91.6 FL (ref 80.5–98.2)
MONOCYTES # BLD AUTO: 0.78 10*3/MM3 (ref 0.2–1.2)
MONOCYTES NFR BLD AUTO: 5.8 % (ref 5–12)
NEUTROPHILS # BLD AUTO: 11.08 10*3/MM3 (ref 1.9–8.1)
NEUTROPHILS NFR BLD AUTO: 82.3 % (ref 42.7–76)
PLATELET # BLD AUTO: 304 10*3/MM3 (ref 140–500)
PMV BLD AUTO: 9.3 FL (ref 6–12)
POTASSIUM BLD-SCNC: 3.8 MMOL/L (ref 3.5–5.2)
RBC # BLD AUTO: 3.09 10*6/MM3 (ref 3.9–5.2)
SODIUM BLD-SCNC: 131 MMOL/L (ref 136–145)
VANCOMYCIN SERPL-MCNC: 18.6 MCG/ML (ref 5–40)
WBC NRBC COR # BLD: 13.47 10*3/MM3 (ref 4.5–10.7)

## 2018-02-25 PROCEDURE — 80202 ASSAY OF VANCOMYCIN: CPT | Performed by: INTERNAL MEDICINE

## 2018-02-25 PROCEDURE — 80048 BASIC METABOLIC PNL TOTAL CA: CPT | Performed by: INTERNAL MEDICINE

## 2018-02-25 PROCEDURE — 85025 COMPLETE CBC W/AUTO DIFF WBC: CPT | Performed by: INTERNAL MEDICINE

## 2018-02-25 PROCEDURE — 82962 GLUCOSE BLOOD TEST: CPT

## 2018-02-25 PROCEDURE — 99024 POSTOP FOLLOW-UP VISIT: CPT | Performed by: SURGERY

## 2018-02-25 PROCEDURE — 25010000002 HEPARIN (PORCINE) PER 1000 UNITS: Performed by: INTERNAL MEDICINE

## 2018-02-25 PROCEDURE — 25010000002 VANCOMYCIN 10 G RECONSTITUTED SOLUTION: Performed by: INTERNAL MEDICINE

## 2018-02-25 PROCEDURE — 25010000002 MEROPENEM PER 100 MG: Performed by: INTERNAL MEDICINE

## 2018-02-25 PROCEDURE — 25010000002 HYDROMORPHONE PER 4 MG: Performed by: SURGERY

## 2018-02-25 PROCEDURE — 97110 THERAPEUTIC EXERCISES: CPT

## 2018-02-25 PROCEDURE — 87040 BLOOD CULTURE FOR BACTERIA: CPT | Performed by: SURGERY

## 2018-02-25 PROCEDURE — 81001 URINALYSIS AUTO W/SCOPE: CPT | Performed by: SURGERY

## 2018-02-25 RX ORDER — ACETAMINOPHEN 325 MG/1
325 TABLET ORAL EVERY 6 HOURS PRN
Status: DISCONTINUED | OUTPATIENT
Start: 2018-02-25 | End: 2018-02-26

## 2018-02-25 RX ADMIN — MEROPENEM 1 G: 1 INJECTION, POWDER, FOR SOLUTION INTRAVENOUS at 21:27

## 2018-02-25 RX ADMIN — LIDOCAINE HYDROCHLORIDE 5 ML: 20 SOLUTION ORAL; TOPICAL at 20:42

## 2018-02-25 RX ADMIN — MIRTAZAPINE 15 MG: 15 TABLET, FILM COATED ORAL at 21:14

## 2018-02-25 RX ADMIN — VANCOMYCIN HYDROCHLORIDE 500 MG: 10 INJECTION, POWDER, LYOPHILIZED, FOR SOLUTION INTRAVENOUS at 11:44

## 2018-02-25 RX ADMIN — DAKIN'S SOLUTION 0.125% (QUARTER STRENGTH): 0.12 SOLUTION at 21:15

## 2018-02-25 RX ADMIN — HEPARIN SODIUM 5000 UNITS: 5000 INJECTION, SOLUTION INTRAVENOUS; SUBCUTANEOUS at 05:36

## 2018-02-25 RX ADMIN — PANTOPRAZOLE SODIUM 40 MG: 40 TABLET, DELAYED RELEASE ORAL at 05:36

## 2018-02-25 RX ADMIN — HYDROCODONE BITARTRATE AND ACETAMINOPHEN 1 TABLET: 5; 325 TABLET ORAL at 09:30

## 2018-02-25 RX ADMIN — METOPROLOL TARTRATE 12.5 MG: 25 TABLET ORAL at 09:30

## 2018-02-25 RX ADMIN — HYDROCODONE BITARTRATE AND ACETAMINOPHEN 1 TABLET: 5; 325 TABLET ORAL at 15:26

## 2018-02-25 RX ADMIN — Medication 10 ML: at 23:30

## 2018-02-25 RX ADMIN — DAKIN'S SOLUTION 0.125% (QUARTER STRENGTH): 0.12 SOLUTION at 09:31

## 2018-02-25 RX ADMIN — HYDROMORPHONE HYDROCHLORIDE 0.5 MG: 1 INJECTION, SOLUTION INTRAMUSCULAR; INTRAVENOUS; SUBCUTANEOUS at 20:42

## 2018-02-25 RX ADMIN — Medication 10 ML: at 09:29

## 2018-02-25 RX ADMIN — Medication 10 ML: at 20:42

## 2018-02-25 RX ADMIN — LIDOCAINE HYDROCHLORIDE 5 ML: 20 SOLUTION ORAL; TOPICAL at 18:15

## 2018-02-25 RX ADMIN — Medication 10 ML: at 18:15

## 2018-02-25 RX ADMIN — LIDOCAINE HYDROCHLORIDE 5 ML: 20 SOLUTION ORAL; TOPICAL at 23:30

## 2018-02-25 RX ADMIN — METOPROLOL TARTRATE 12.5 MG: 25 TABLET ORAL at 21:14

## 2018-02-25 RX ADMIN — LIDOCAINE HYDROCHLORIDE 5 ML: 20 SOLUTION ORAL; TOPICAL at 09:29

## 2018-02-25 RX ADMIN — Medication 10 ML: at 11:43

## 2018-02-25 RX ADMIN — ACETAMINOPHEN 325 MG: 325 TABLET, FILM COATED ORAL at 23:42

## 2018-02-25 RX ADMIN — HEPARIN SODIUM 5000 UNITS: 5000 INJECTION, SOLUTION INTRAVENOUS; SUBCUTANEOUS at 21:15

## 2018-02-25 RX ADMIN — HEPARIN SODIUM 5000 UNITS: 5000 INJECTION, SOLUTION INTRAVENOUS; SUBCUTANEOUS at 18:15

## 2018-02-26 ENCOUNTER — APPOINTMENT (OUTPATIENT)
Dept: CT IMAGING | Facility: HOSPITAL | Age: 58
End: 2018-02-26

## 2018-02-26 LAB
ALBUMIN SERPL-MCNC: 1.6 G/DL (ref 3.5–5.2)
ALBUMIN/GLOB SERPL: 0.4 G/DL
ALP SERPL-CCNC: 86 U/L (ref 39–117)
ALT SERPL W P-5'-P-CCNC: 16 U/L (ref 1–33)
AMORPH URATE CRY URNS QL MICRO: ABNORMAL /HPF
ANION GAP SERPL CALCULATED.3IONS-SCNC: 13.8 MMOL/L
AST SERPL-CCNC: 31 U/L (ref 1–32)
BACTERIA UR QL AUTO: ABNORMAL /HPF
BASOPHILS # BLD AUTO: 0.05 10*3/MM3 (ref 0–0.2)
BASOPHILS NFR BLD AUTO: 0.4 % (ref 0–1.5)
BILIRUB SERPL-MCNC: 0.4 MG/DL (ref 0.1–1.2)
BILIRUB UR QL STRIP: NEGATIVE
BUN BLD-MCNC: 57 MG/DL (ref 6–20)
BUN/CREAT SERPL: 15.1 (ref 7–25)
CALCIUM SPEC-SCNC: 7.7 MG/DL (ref 8.6–10.5)
CHLORIDE SERPL-SCNC: 95 MMOL/L (ref 98–107)
CLARITY UR: ABNORMAL
CO2 SERPL-SCNC: 27.2 MMOL/L (ref 22–29)
COLOR UR: YELLOW
CREAT BLD-MCNC: 3.77 MG/DL (ref 0.57–1)
DEPRECATED RDW RBC AUTO: 51.1 FL (ref 37–54)
EOSINOPHIL # BLD AUTO: 0.15 10*3/MM3 (ref 0–0.7)
EOSINOPHIL NFR BLD AUTO: 1.1 % (ref 0.3–6.2)
ERYTHROCYTE [DISTWIDTH] IN BLOOD BY AUTOMATED COUNT: 15.1 % (ref 11.7–13)
GFR SERPL CREATININE-BSD FRML MDRD: 12 ML/MIN/1.73
GLOBULIN UR ELPH-MCNC: 4.5 GM/DL
GLUCOSE BLD-MCNC: 95 MG/DL (ref 65–99)
GLUCOSE BLDC GLUCOMTR-MCNC: 101 MG/DL (ref 70–130)
GLUCOSE BLDC GLUCOMTR-MCNC: 107 MG/DL (ref 70–130)
GLUCOSE BLDC GLUCOMTR-MCNC: 90 MG/DL (ref 70–130)
GLUCOSE BLDC GLUCOMTR-MCNC: 94 MG/DL (ref 70–130)
GLUCOSE UR STRIP-MCNC: NEGATIVE MG/DL
HCT VFR BLD AUTO: 28.6 % (ref 35.6–45.5)
HGB BLD-MCNC: 8.8 G/DL (ref 11.9–15.5)
HGB UR QL STRIP.AUTO: ABNORMAL
HYALINE CASTS UR QL AUTO: ABNORMAL /LPF
IMM GRANULOCYTES # BLD: 0.2 10*3/MM3 (ref 0–0.03)
IMM GRANULOCYTES NFR BLD: 1.5 % (ref 0–0.5)
KETONES UR QL STRIP: NEGATIVE
LEUKOCYTE ESTERASE UR QL STRIP.AUTO: ABNORMAL
LYMPHOCYTES # BLD AUTO: 1.08 10*3/MM3 (ref 0.9–4.8)
LYMPHOCYTES NFR BLD AUTO: 8.1 % (ref 19.6–45.3)
MCH RBC QN AUTO: 28.6 PG (ref 26.9–32)
MCHC RBC AUTO-ENTMCNC: 30.8 G/DL (ref 32.4–36.3)
MCV RBC AUTO: 92.9 FL (ref 80.5–98.2)
MONOCYTES # BLD AUTO: 0.57 10*3/MM3 (ref 0.2–1.2)
MONOCYTES NFR BLD AUTO: 4.3 % (ref 5–12)
NEUTROPHILS # BLD AUTO: 11.27 10*3/MM3 (ref 1.9–8.1)
NEUTROPHILS NFR BLD AUTO: 84.6 % (ref 42.7–76)
NITRITE UR QL STRIP: NEGATIVE
PH UR STRIP.AUTO: 5.5 [PH] (ref 5–8)
PLATELET # BLD AUTO: 339 10*3/MM3 (ref 140–500)
PMV BLD AUTO: 9.3 FL (ref 6–12)
POTASSIUM BLD-SCNC: 4.1 MMOL/L (ref 3.5–5.2)
PROT SERPL-MCNC: 6.1 G/DL (ref 6–8.5)
PROT UR QL STRIP: ABNORMAL
RBC # BLD AUTO: 3.08 10*6/MM3 (ref 3.9–5.2)
RBC # UR: ABNORMAL /HPF
REF LAB TEST METHOD: ABNORMAL
SODIUM BLD-SCNC: 136 MMOL/L (ref 136–145)
SP GR UR STRIP: 1.02 (ref 1–1.03)
SQUAMOUS #/AREA URNS HPF: ABNORMAL /HPF
UROBILINOGEN UR QL STRIP: ABNORMAL
VANCOMYCIN SERPL-MCNC: 20.4 MCG/ML (ref 5–40)
WBC NRBC COR # BLD: 13.32 10*3/MM3 (ref 4.5–10.7)
WBC UR QL AUTO: ABNORMAL /HPF
YEAST URNS QL MICRO: ABNORMAL /HPF

## 2018-02-26 PROCEDURE — 99024 POSTOP FOLLOW-UP VISIT: CPT | Performed by: SURGERY

## 2018-02-26 PROCEDURE — 82962 GLUCOSE BLOOD TEST: CPT

## 2018-02-26 PROCEDURE — 80053 COMPREHEN METABOLIC PANEL: CPT | Performed by: SURGERY

## 2018-02-26 PROCEDURE — 25010000002 HYDROMORPHONE PER 4 MG: Performed by: INTERNAL MEDICINE

## 2018-02-26 PROCEDURE — 25010000002 HEPARIN (PORCINE) PER 1000 UNITS: Performed by: INTERNAL MEDICINE

## 2018-02-26 PROCEDURE — 25010000002 LEVOFLOXACIN PER 250 MG: Performed by: INTERNAL MEDICINE

## 2018-02-26 PROCEDURE — 74176 CT ABD & PELVIS W/O CONTRAST: CPT

## 2018-02-26 PROCEDURE — 85025 COMPLETE CBC W/AUTO DIFF WBC: CPT | Performed by: INTERNAL MEDICINE

## 2018-02-26 PROCEDURE — 99233 SBSQ HOSP IP/OBS HIGH 50: CPT | Performed by: INTERNAL MEDICINE

## 2018-02-26 PROCEDURE — 71250 CT THORAX DX C-: CPT

## 2018-02-26 PROCEDURE — 25010000002 MEROPENEM PER 100 MG: Performed by: INTERNAL MEDICINE

## 2018-02-26 PROCEDURE — 0 DIATRIZOATE MEGLUMINE & SODIUM PER 1 ML: Performed by: INTERNAL MEDICINE

## 2018-02-26 PROCEDURE — 80202 ASSAY OF VANCOMYCIN: CPT | Performed by: INTERNAL MEDICINE

## 2018-02-26 PROCEDURE — 25010000002 HYDROMORPHONE PER 4 MG: Performed by: SURGERY

## 2018-02-26 RX ORDER — ACETAMINOPHEN 325 MG/1
650 TABLET ORAL EVERY 6 HOURS PRN
Status: DISCONTINUED | OUTPATIENT
Start: 2018-02-26 | End: 2018-03-12 | Stop reason: HOSPADM

## 2018-02-26 RX ADMIN — Medication 10 ML: at 08:30

## 2018-02-26 RX ADMIN — LEVOFLOXACIN 750 MG: 5 INJECTION, SOLUTION INTRAVENOUS at 17:44

## 2018-02-26 RX ADMIN — Medication 10 ML: at 16:45

## 2018-02-26 RX ADMIN — PANTOPRAZOLE SODIUM 40 MG: 40 TABLET, DELAYED RELEASE ORAL at 06:07

## 2018-02-26 RX ADMIN — Medication 10 ML: at 23:06

## 2018-02-26 RX ADMIN — METOPROLOL TARTRATE 12.5 MG: 25 TABLET ORAL at 21:28

## 2018-02-26 RX ADMIN — LIDOCAINE HYDROCHLORIDE 5 ML: 20 SOLUTION ORAL; TOPICAL at 08:30

## 2018-02-26 RX ADMIN — HEPARIN SODIUM 5000 UNITS: 5000 INJECTION, SOLUTION INTRAVENOUS; SUBCUTANEOUS at 21:40

## 2018-02-26 RX ADMIN — LIDOCAINE HYDROCHLORIDE 5 ML: 20 SOLUTION ORAL; TOPICAL at 16:45

## 2018-02-26 RX ADMIN — HEPARIN SODIUM 5000 UNITS: 5000 INJECTION, SOLUTION INTRAVENOUS; SUBCUTANEOUS at 13:53

## 2018-02-26 RX ADMIN — MIRTAZAPINE 15 MG: 15 TABLET, FILM COATED ORAL at 21:28

## 2018-02-26 RX ADMIN — LIDOCAINE HYDROCHLORIDE 5 ML: 20 SOLUTION ORAL; TOPICAL at 12:11

## 2018-02-26 RX ADMIN — LIDOCAINE HYDROCHLORIDE 5 ML: 20 SOLUTION ORAL; TOPICAL at 23:01

## 2018-02-26 RX ADMIN — HEPARIN SODIUM 5000 UNITS: 5000 INJECTION, SOLUTION INTRAVENOUS; SUBCUTANEOUS at 06:07

## 2018-02-26 RX ADMIN — DAKIN'S SOLUTION 0.125% (QUARTER STRENGTH): 0.12 SOLUTION at 21:31

## 2018-02-26 RX ADMIN — Medication 10 ML: at 12:11

## 2018-02-26 RX ADMIN — Medication 10 ML: at 21:31

## 2018-02-26 RX ADMIN — ACETAMINOPHEN 650 MG: 325 TABLET ORAL at 23:02

## 2018-02-26 RX ADMIN — DIATRIZOATE MEGLUMINE AND DIATRIZOATE SODIUM 30 ML: 600; 100 SOLUTION ORAL; RECTAL at 13:53

## 2018-02-26 RX ADMIN — MEROPENEM 1 G: 1 INJECTION, POWDER, FOR SOLUTION INTRAVENOUS at 21:39

## 2018-02-26 RX ADMIN — DAKIN'S SOLUTION 0.125% (QUARTER STRENGTH): 0.12 SOLUTION at 08:30

## 2018-02-26 RX ADMIN — HYDROMORPHONE HYDROCHLORIDE 0.5 MG: 1 INJECTION, SOLUTION INTRAMUSCULAR; INTRAVENOUS; SUBCUTANEOUS at 15:16

## 2018-02-26 RX ADMIN — METOPROLOL TARTRATE 12.5 MG: 25 TABLET ORAL at 08:29

## 2018-02-26 RX ADMIN — HYDROMORPHONE HYDROCHLORIDE 1 MG: 1 INJECTION, SOLUTION INTRAMUSCULAR; INTRAVENOUS; SUBCUTANEOUS at 21:40

## 2018-02-27 ENCOUNTER — ANESTHESIA (OUTPATIENT)
Dept: PERIOP | Facility: HOSPITAL | Age: 58
End: 2018-02-27

## 2018-02-27 ENCOUNTER — ANESTHESIA EVENT (OUTPATIENT)
Dept: PERIOP | Facility: HOSPITAL | Age: 58
End: 2018-02-27

## 2018-02-27 LAB
ALBUMIN SERPL-MCNC: 1.9 G/DL (ref 3.5–5.2)
ALBUMIN/GLOB SERPL: 0.4 G/DL
ALP SERPL-CCNC: 81 U/L (ref 39–117)
ALT SERPL W P-5'-P-CCNC: 17 U/L (ref 1–33)
ANION GAP SERPL CALCULATED.3IONS-SCNC: 12.5 MMOL/L
ANION GAP SERPL CALCULATED.3IONS-SCNC: 8.9 MMOL/L
AST SERPL-CCNC: 34 U/L (ref 1–32)
BASOPHILS # BLD AUTO: 0.02 10*3/MM3 (ref 0–0.2)
BASOPHILS NFR BLD AUTO: 0.2 % (ref 0–1.5)
BILIRUB SERPL-MCNC: 0.5 MG/DL (ref 0.1–1.2)
BUN BLD-MCNC: 64 MG/DL (ref 6–20)
BUN BLD-MCNC: 65 MG/DL (ref 6–20)
BUN/CREAT SERPL: 16.5 (ref 7–25)
BUN/CREAT SERPL: 17.2 (ref 7–25)
CALCIUM SPEC-SCNC: 7.7 MG/DL (ref 8.6–10.5)
CALCIUM SPEC-SCNC: 7.8 MG/DL (ref 8.6–10.5)
CHLORIDE SERPL-SCNC: 95 MMOL/L (ref 98–107)
CHLORIDE SERPL-SCNC: 95 MMOL/L (ref 98–107)
CO2 SERPL-SCNC: 26.5 MMOL/L (ref 22–29)
CO2 SERPL-SCNC: 31.1 MMOL/L (ref 22–29)
CREAT BLD-MCNC: 3.78 MG/DL (ref 0.57–1)
CREAT BLD-MCNC: 3.87 MG/DL (ref 0.57–1)
DEPRECATED RDW RBC AUTO: 51.8 FL (ref 37–54)
EOSINOPHIL # BLD AUTO: 0.1 10*3/MM3 (ref 0–0.7)
EOSINOPHIL NFR BLD AUTO: 0.9 % (ref 0.3–6.2)
ERYTHROCYTE [DISTWIDTH] IN BLOOD BY AUTOMATED COUNT: 15.3 % (ref 11.7–13)
GFR SERPL CREATININE-BSD FRML MDRD: 12 ML/MIN/1.73
GFR SERPL CREATININE-BSD FRML MDRD: 12 ML/MIN/1.73
GLOBULIN UR ELPH-MCNC: 4.3 GM/DL
GLUCOSE BLD-MCNC: 105 MG/DL (ref 65–99)
GLUCOSE BLD-MCNC: 105 MG/DL (ref 65–99)
GLUCOSE BLDC GLUCOMTR-MCNC: 103 MG/DL (ref 70–130)
HCT VFR BLD AUTO: 27.1 % (ref 35.6–45.5)
HGB BLD-MCNC: 8.5 G/DL (ref 11.9–15.5)
IMM GRANULOCYTES # BLD: 0.15 10*3/MM3 (ref 0–0.03)
IMM GRANULOCYTES NFR BLD: 1.4 % (ref 0–0.5)
LYMPHOCYTES # BLD AUTO: 1.01 10*3/MM3 (ref 0.9–4.8)
LYMPHOCYTES NFR BLD AUTO: 9.2 % (ref 19.6–45.3)
MAGNESIUM SERPL-MCNC: 2.1 MG/DL (ref 1.6–2.6)
MCH RBC QN AUTO: 29.2 PG (ref 26.9–32)
MCHC RBC AUTO-ENTMCNC: 31.4 G/DL (ref 32.4–36.3)
MCV RBC AUTO: 93.1 FL (ref 80.5–98.2)
MONOCYTES # BLD AUTO: 0.53 10*3/MM3 (ref 0.2–1.2)
MONOCYTES NFR BLD AUTO: 4.8 % (ref 5–12)
NEUTROPHILS # BLD AUTO: 9.22 10*3/MM3 (ref 1.9–8.1)
NEUTROPHILS NFR BLD AUTO: 83.5 % (ref 42.7–76)
PHOSPHATE SERPL-MCNC: 7.4 MG/DL (ref 2.5–4.5)
PLATELET # BLD AUTO: 362 10*3/MM3 (ref 140–500)
PMV BLD AUTO: 9.3 FL (ref 6–12)
POTASSIUM BLD-SCNC: 4.3 MMOL/L (ref 3.5–5.2)
POTASSIUM BLD-SCNC: 4.3 MMOL/L (ref 3.5–5.2)
PROT SERPL-MCNC: 6.2 G/DL (ref 6–8.5)
RBC # BLD AUTO: 2.91 10*6/MM3 (ref 3.9–5.2)
SODIUM BLD-SCNC: 134 MMOL/L (ref 136–145)
SODIUM BLD-SCNC: 135 MMOL/L (ref 136–145)
WBC NRBC COR # BLD: 11.03 10*3/MM3 (ref 4.5–10.7)

## 2018-02-27 PROCEDURE — 25010000002 ALBUMIN HUMAN 25% PER 50 ML: Performed by: INTERNAL MEDICINE

## 2018-02-27 PROCEDURE — 25010000002 HYDROMORPHONE PER 4 MG: Performed by: INTERNAL MEDICINE

## 2018-02-27 PROCEDURE — 25010000002 HEPARIN (PORCINE) PER 1000 UNITS: Performed by: INTERNAL MEDICINE

## 2018-02-27 PROCEDURE — 80048 BASIC METABOLIC PNL TOTAL CA: CPT | Performed by: SURGERY

## 2018-02-27 PROCEDURE — 83735 ASSAY OF MAGNESIUM: CPT | Performed by: INTERNAL MEDICINE

## 2018-02-27 PROCEDURE — 80053 COMPREHEN METABOLIC PANEL: CPT | Performed by: INTERNAL MEDICINE

## 2018-02-27 PROCEDURE — P9046 ALBUMIN (HUMAN), 25%, 20 ML: HCPCS | Performed by: INTERNAL MEDICINE

## 2018-02-27 PROCEDURE — 25010000002 ONDANSETRON PER 1 MG: Performed by: NURSE ANESTHETIST, CERTIFIED REGISTERED

## 2018-02-27 PROCEDURE — 84100 ASSAY OF PHOSPHORUS: CPT | Performed by: INTERNAL MEDICINE

## 2018-02-27 PROCEDURE — 25010000002 VANCOMYCIN 750 MG RECONSTITUTED SOLUTION: Performed by: INTERNAL MEDICINE

## 2018-02-27 PROCEDURE — 11042 DBRDMT SUBQ TIS 1ST 20SQCM/<: CPT | Performed by: SURGERY

## 2018-02-27 PROCEDURE — 25010000002 MEROPENEM: Performed by: INTERNAL MEDICINE

## 2018-02-27 PROCEDURE — 82962 GLUCOSE BLOOD TEST: CPT

## 2018-02-27 PROCEDURE — 99233 SBSQ HOSP IP/OBS HIGH 50: CPT | Performed by: INTERNAL MEDICINE

## 2018-02-27 PROCEDURE — 25010000002 PROPOFOL 10 MG/ML EMULSION: Performed by: NURSE ANESTHETIST, CERTIFIED REGISTERED

## 2018-02-27 PROCEDURE — 25010000002 MIDAZOLAM PER 1 MG: Performed by: ANESTHESIOLOGY

## 2018-02-27 PROCEDURE — 25010000002 FENTANYL CITRATE (PF) 100 MCG/2ML SOLUTION: Performed by: NURSE ANESTHETIST, CERTIFIED REGISTERED

## 2018-02-27 PROCEDURE — 97110 THERAPEUTIC EXERCISES: CPT

## 2018-02-27 PROCEDURE — 11045 DBRDMT SUBQ TISS EACH ADDL: CPT | Performed by: SURGERY

## 2018-02-27 PROCEDURE — 0JB80ZZ EXCISION OF ABDOMEN SUBCUTANEOUS TISSUE AND FASCIA, OPEN APPROACH: ICD-10-PCS | Performed by: INTERNAL MEDICINE

## 2018-02-27 PROCEDURE — 97110 THERAPEUTIC EXERCISES: CPT | Performed by: OCCUPATIONAL THERAPIST

## 2018-02-27 PROCEDURE — 85025 COMPLETE CBC W/AUTO DIFF WBC: CPT | Performed by: INTERNAL MEDICINE

## 2018-02-27 RX ORDER — MIDAZOLAM HYDROCHLORIDE 1 MG/ML
2 INJECTION INTRAMUSCULAR; INTRAVENOUS
Status: DISCONTINUED | OUTPATIENT
Start: 2018-02-27 | End: 2018-02-27 | Stop reason: HOSPADM

## 2018-02-27 RX ORDER — SODIUM CHLORIDE 9 MG/ML
9 INJECTION, SOLUTION INTRAVENOUS CONTINUOUS
Status: DISCONTINUED | OUTPATIENT
Start: 2018-02-27 | End: 2018-03-01

## 2018-02-27 RX ORDER — EPHEDRINE SULFATE 50 MG/ML
INJECTION, SOLUTION INTRAVENOUS AS NEEDED
Status: DISCONTINUED | OUTPATIENT
Start: 2018-02-27 | End: 2018-02-27 | Stop reason: SURG

## 2018-02-27 RX ORDER — LIDOCAINE HYDROCHLORIDE 10 MG/ML
0.5 INJECTION, SOLUTION EPIDURAL; INFILTRATION; INTRACAUDAL; PERINEURAL ONCE AS NEEDED
Status: DISCONTINUED | OUTPATIENT
Start: 2018-02-27 | End: 2018-02-27 | Stop reason: HOSPADM

## 2018-02-27 RX ORDER — PROPOFOL 10 MG/ML
VIAL (ML) INTRAVENOUS AS NEEDED
Status: DISCONTINUED | OUTPATIENT
Start: 2018-02-27 | End: 2018-02-27 | Stop reason: SURG

## 2018-02-27 RX ORDER — MAGNESIUM HYDROXIDE 1200 MG/15ML
LIQUID ORAL AS NEEDED
Status: DISCONTINUED | OUTPATIENT
Start: 2018-02-27 | End: 2018-02-27 | Stop reason: HOSPADM

## 2018-02-27 RX ORDER — MIDAZOLAM HYDROCHLORIDE 1 MG/ML
1 INJECTION INTRAMUSCULAR; INTRAVENOUS
Status: DISCONTINUED | OUTPATIENT
Start: 2018-02-27 | End: 2018-02-27 | Stop reason: HOSPADM

## 2018-02-27 RX ORDER — ROCURONIUM BROMIDE 10 MG/ML
INJECTION, SOLUTION INTRAVENOUS AS NEEDED
Status: DISCONTINUED | OUTPATIENT
Start: 2018-02-27 | End: 2018-02-27 | Stop reason: SURG

## 2018-02-27 RX ORDER — SODIUM CHLORIDE 0.9 % (FLUSH) 0.9 %
1-10 SYRINGE (ML) INJECTION AS NEEDED
Status: DISCONTINUED | OUTPATIENT
Start: 2018-02-27 | End: 2018-02-27 | Stop reason: HOSPADM

## 2018-02-27 RX ORDER — SODIUM CHLORIDE, SODIUM LACTATE, POTASSIUM CHLORIDE, CALCIUM CHLORIDE 600; 310; 30; 20 MG/100ML; MG/100ML; MG/100ML; MG/100ML
9 INJECTION, SOLUTION INTRAVENOUS CONTINUOUS
Status: DISCONTINUED | OUTPATIENT
Start: 2018-02-27 | End: 2018-03-01

## 2018-02-27 RX ORDER — FENTANYL CITRATE 50 UG/ML
INJECTION, SOLUTION INTRAMUSCULAR; INTRAVENOUS AS NEEDED
Status: DISCONTINUED | OUTPATIENT
Start: 2018-02-27 | End: 2018-02-27 | Stop reason: SURG

## 2018-02-27 RX ORDER — LIDOCAINE HYDROCHLORIDE 20 MG/ML
INJECTION, SOLUTION INFILTRATION; PERINEURAL AS NEEDED
Status: DISCONTINUED | OUTPATIENT
Start: 2018-02-27 | End: 2018-02-27 | Stop reason: SURG

## 2018-02-27 RX ORDER — MANNITOL 250 MG/ML
12.5 INJECTION, SOLUTION INTRAVENOUS AS NEEDED
Status: DISPENSED | OUTPATIENT
Start: 2018-02-27 | End: 2018-02-27

## 2018-02-27 RX ORDER — ONDANSETRON 2 MG/ML
INJECTION INTRAMUSCULAR; INTRAVENOUS AS NEEDED
Status: DISCONTINUED | OUTPATIENT
Start: 2018-02-27 | End: 2018-02-27 | Stop reason: SURG

## 2018-02-27 RX ORDER — FENTANYL CITRATE 50 UG/ML
50 INJECTION, SOLUTION INTRAMUSCULAR; INTRAVENOUS
Status: DISCONTINUED | OUTPATIENT
Start: 2018-02-27 | End: 2018-02-27 | Stop reason: HOSPADM

## 2018-02-27 RX ORDER — FAMOTIDINE 10 MG/ML
20 INJECTION, SOLUTION INTRAVENOUS ONCE
Status: COMPLETED | OUTPATIENT
Start: 2018-02-27 | End: 2018-02-27

## 2018-02-27 RX ADMIN — HEPARIN SODIUM 4000 UNITS: 1000 INJECTION, SOLUTION INTRAVENOUS; SUBCUTANEOUS at 12:58

## 2018-02-27 RX ADMIN — LIDOCAINE HYDROCHLORIDE 60 MG: 20 INJECTION, SOLUTION INFILTRATION; PERINEURAL at 15:17

## 2018-02-27 RX ADMIN — METOPROLOL TARTRATE 12.5 MG: 25 TABLET ORAL at 08:31

## 2018-02-27 RX ADMIN — MEROPENEM 1 G: 1 INJECTION, POWDER, FOR SOLUTION INTRAVENOUS at 20:41

## 2018-02-27 RX ADMIN — SUGAMMADEX 250 MG: 100 INJECTION, SOLUTION INTRAVENOUS at 17:08

## 2018-02-27 RX ADMIN — MIRTAZAPINE 15 MG: 15 TABLET, FILM COATED ORAL at 20:41

## 2018-02-27 RX ADMIN — Medication 10 ML: at 12:44

## 2018-02-27 RX ADMIN — ALBUMIN HUMAN 12.5 G: 0.25 SOLUTION INTRAVENOUS at 12:17

## 2018-02-27 RX ADMIN — ROCURONIUM BROMIDE 30 MG: 10 INJECTION INTRAVENOUS at 16:17

## 2018-02-27 RX ADMIN — ALBUMIN HUMAN 12.5 G: 0.25 SOLUTION INTRAVENOUS at 13:05

## 2018-02-27 RX ADMIN — LIDOCAINE HYDROCHLORIDE 5 ML: 20 SOLUTION ORAL; TOPICAL at 20:41

## 2018-02-27 RX ADMIN — LIDOCAINE HYDROCHLORIDE 5 ML: 20 SOLUTION ORAL; TOPICAL at 08:32

## 2018-02-27 RX ADMIN — Medication 10 ML: at 20:41

## 2018-02-27 RX ADMIN — PROPOFOL 150 MG: 10 INJECTION, EMULSION INTRAVENOUS at 15:17

## 2018-02-27 RX ADMIN — Medication 10 ML: at 08:32

## 2018-02-27 RX ADMIN — FAMOTIDINE 20 MG: 10 INJECTION, SOLUTION INTRAVENOUS at 15:02

## 2018-02-27 RX ADMIN — SODIUM CHLORIDE 750 MG: 900 INJECTION, SOLUTION INTRAVENOUS at 13:57

## 2018-02-27 RX ADMIN — METOPROLOL TARTRATE 12.5 MG: 25 TABLET ORAL at 20:41

## 2018-02-27 RX ADMIN — HEPARIN SODIUM 5000 UNITS: 5000 INJECTION, SOLUTION INTRAVENOUS; SUBCUTANEOUS at 05:09

## 2018-02-27 RX ADMIN — ROCURONIUM BROMIDE 10 MG: 10 INJECTION INTRAVENOUS at 15:33

## 2018-02-27 RX ADMIN — SODIUM CHLORIDE, POTASSIUM CHLORIDE, SODIUM LACTATE AND CALCIUM CHLORIDE: 600; 310; 30; 20 INJECTION, SOLUTION INTRAVENOUS at 15:11

## 2018-02-27 RX ADMIN — EPHEDRINE SULFATE 10 MG: 50 INJECTION INTRAMUSCULAR; INTRAVENOUS; SUBCUTANEOUS at 16:30

## 2018-02-27 RX ADMIN — ONDANSETRON 4 MG: 2 INJECTION INTRAMUSCULAR; INTRAVENOUS at 17:07

## 2018-02-27 RX ADMIN — PROPOFOL 50 MG: 10 INJECTION, EMULSION INTRAVENOUS at 16:17

## 2018-02-27 RX ADMIN — HYDROMORPHONE HYDROCHLORIDE 1 MG: 1 INJECTION, SOLUTION INTRAMUSCULAR; INTRAVENOUS; SUBCUTANEOUS at 18:52

## 2018-02-27 RX ADMIN — ROCURONIUM BROMIDE 40 MG: 10 INJECTION INTRAVENOUS at 15:17

## 2018-02-27 RX ADMIN — HEPARIN SODIUM 5000 UNITS: 5000 INJECTION, SOLUTION INTRAVENOUS; SUBCUTANEOUS at 20:41

## 2018-02-27 RX ADMIN — MIDAZOLAM 1 MG: 1 INJECTION INTRAMUSCULAR; INTRAVENOUS at 15:02

## 2018-02-27 RX ADMIN — FENTANYL CITRATE 100 MCG: 50 INJECTION INTRAMUSCULAR; INTRAVENOUS at 15:17

## 2018-02-27 NOTE — ANESTHESIA PROCEDURE NOTES
Airway  Urgency: elective    Airway not difficult    General Information and Staff    Patient location during procedure: OR  CRNA: CELESTINO TAVERAS    Indications and Patient Condition  Indications for airway management: airway protection    Preoxygenated: yes  Mask difficulty assessment: 1 - vent by mask    Final Airway Details  Final airway type: endotracheal airway      Successful airway: ETT  Cuffed: yes   Successful intubation technique: direct laryngoscopy  Endotracheal tube insertion site: oral  Blade: Svetlana  Blade size: #3  ETT size: 7.0 mm  Cormack-Lehane Classification: grade I - full view of glottis  Placement verified by: chest auscultation and capnometry   Measured from: lips  ETT to lips (cm): 22  Number of attempts at approach: 1    Additional Comments   ett cuff up at MOP

## 2018-02-27 NOTE — ANESTHESIA PREPROCEDURE EVALUATION
Anesthesia Evaluation     Patient summary reviewed and Nursing notes reviewed   NPO Solid Status: > 8 hours             Airway   Mallampati: II  Neck ROM: limited  Comment:    Successful airway: ETT  Cuffed: yes   Successful intubation technique: direct laryngoscopy  Endotracheal tube insertion site: oral  Blade: Svetlana  Blade size: #3  ETT size: 7.0 mm  Cormack-Lehane Classification: grade I - full view of glottis  Placement verified by: chest auscultation and capnometry   Measured from: lips  ETT to lips (cm): 22  Number of attempts at approach: 1     Dental      Pulmonary    (+) asthma,     ROS comment: FINDINGS:  1. Limited imaging due to body habitus portable technique and low lung  volumes.  2. Left jugular catheter tip projects over the base of the SVC. No  pneumothorax  3. Developing right-sided pneumonia.  4. Can't rule out congestive failure.  5. Follow-up recommended.  Cardiovascular     ECG reviewed  Rhythm: irregular  Rate: abnormal    (+) hypertension, dysrhythmias Atrial Fib,       Neuro/Psych  (+) numbness,     GI/Hepatic/Renal/Endo    (+) obesity, morbid obesity, hiatal hernia,  renal disease CRI,     Musculoskeletal (-) negative ROS    Abdominal    Substance History - negative use     OB/GYN negative ob/gyn ROS         Other      history of cancer active                      Anesthesia Plan    ASA 3 - emergent     general     intravenous induction   Anesthetic plan and risks discussed with patient.    Hgb = 8.5

## 2018-02-27 NOTE — ANESTHESIA POSTPROCEDURE EVALUATION
"Patient: Mariya Campos    Procedure Summary     Date Anesthesia Start Anesthesia Stop Room / Location    02/27/18 1510 1726  [facility] / [facility]       Procedure Diagnosis Surgeon Provider    INCISION AND DRAINAGE WOUND abdomen (N/A ) Wound infection  (Wound infection [T14.8XXA, L08.9]) MD Sarthak Neville MD          Anesthesia Type: general  Last vitals  BP   109/86 (02/27/18 1745)   Temp   36.6 °C (97.8 °F) (02/27/18 1725)   Pulse   94 (02/27/18 1745)   Resp   14 (02/27/18 1745)     SpO2   99 % (02/27/18 1745)     Post Anesthesia Care and Evaluation    Patient location during evaluation: PACU  Patient participation: complete - patient participated  Level of consciousness: awake  Pain management: adequate  Airway patency: patent  Anesthetic complications: No anesthetic complications    Cardiovascular status: acceptable  Respiratory status: acceptable  Hydration status: acceptable    Comments: /86  Pulse 94  Temp 36.6 °C (97.8 °F) (Oral)   Resp 14  Ht 172.7 cm (68\")  Wt 124 kg (272 lb 4.3 oz)  SpO2 99%  BMI 41.4 kg/m2      "

## 2018-02-28 LAB
BACTERIA UR QL AUTO: ABNORMAL /HPF
BASOPHILS # BLD AUTO: 0.04 10*3/MM3 (ref 0–0.2)
BASOPHILS NFR BLD AUTO: 0.4 % (ref 0–1.5)
BILIRUB UR QL STRIP: NEGATIVE
CLARITY UR: ABNORMAL
COLOR UR: YELLOW
DEPRECATED RDW RBC AUTO: 51.9 FL (ref 37–54)
EOSINOPHIL # BLD AUTO: 0.06 10*3/MM3 (ref 0–0.7)
EOSINOPHIL NFR BLD AUTO: 0.6 % (ref 0.3–6.2)
ERYTHROCYTE [DISTWIDTH] IN BLOOD BY AUTOMATED COUNT: 15.2 % (ref 11.7–13)
GLUCOSE UR STRIP-MCNC: NEGATIVE MG/DL
HCT VFR BLD AUTO: 25.2 % (ref 35.6–45.5)
HGB BLD-MCNC: 8.1 G/DL (ref 11.9–15.5)
HGB UR QL STRIP.AUTO: ABNORMAL
HYALINE CASTS UR QL AUTO: ABNORMAL /LPF
IMM GRANULOCYTES # BLD: 0.07 10*3/MM3 (ref 0–0.03)
IMM GRANULOCYTES NFR BLD: 0.7 % (ref 0–0.5)
KETONES UR QL STRIP: NEGATIVE
LEUKOCYTE ESTERASE UR QL STRIP.AUTO: ABNORMAL
LYMPHOCYTES # BLD AUTO: 0.77 10*3/MM3 (ref 0.9–4.8)
LYMPHOCYTES NFR BLD AUTO: 7.2 % (ref 19.6–45.3)
MCH RBC QN AUTO: 30 PG (ref 26.9–32)
MCHC RBC AUTO-ENTMCNC: 32.1 G/DL (ref 32.4–36.3)
MCV RBC AUTO: 93.3 FL (ref 80.5–98.2)
MONOCYTES # BLD AUTO: 0.6 10*3/MM3 (ref 0.2–1.2)
MONOCYTES NFR BLD AUTO: 5.6 % (ref 5–12)
NEUTROPHILS # BLD AUTO: 9.12 10*3/MM3 (ref 1.9–8.1)
NEUTROPHILS NFR BLD AUTO: 85.5 % (ref 42.7–76)
NITRITE UR QL STRIP: NEGATIVE
PH UR STRIP.AUTO: 5.5 [PH] (ref 5–8)
PLATELET # BLD AUTO: 348 10*3/MM3 (ref 140–500)
PMV BLD AUTO: 8.9 FL (ref 6–12)
PROT UR QL STRIP: ABNORMAL
RBC # BLD AUTO: 2.7 10*6/MM3 (ref 3.9–5.2)
RBC # UR: ABNORMAL /HPF
REF LAB TEST METHOD: ABNORMAL
SP GR UR STRIP: 1.01 (ref 1–1.03)
SQUAMOUS #/AREA URNS HPF: ABNORMAL /HPF
UROBILINOGEN UR QL STRIP: ABNORMAL
VANCOMYCIN SERPL-MCNC: 19.4 MCG/ML (ref 5–40)
WBC NRBC COR # BLD: 10.66 10*3/MM3 (ref 4.5–10.7)
WBC UR QL AUTO: ABNORMAL /HPF
YEAST URNS QL MICRO: ABNORMAL /HPF

## 2018-02-28 PROCEDURE — 25010000002 HEPARIN (PORCINE) PER 1000 UNITS: Performed by: INTERNAL MEDICINE

## 2018-02-28 PROCEDURE — 25010000002 HYDROMORPHONE PER 4 MG: Performed by: INTERNAL MEDICINE

## 2018-02-28 PROCEDURE — 85025 COMPLETE CBC W/AUTO DIFF WBC: CPT | Performed by: INTERNAL MEDICINE

## 2018-02-28 PROCEDURE — 99024 POSTOP FOLLOW-UP VISIT: CPT | Performed by: SURGERY

## 2018-02-28 PROCEDURE — 25010000002 MEROPENEM PER 100 MG: Performed by: INTERNAL MEDICINE

## 2018-02-28 PROCEDURE — 25010000002 HYDROMORPHONE PER 4 MG: Performed by: SURGERY

## 2018-02-28 PROCEDURE — 80202 ASSAY OF VANCOMYCIN: CPT | Performed by: INTERNAL MEDICINE

## 2018-02-28 PROCEDURE — 25010000002 LEVOFLOXACIN PER 250 MG: Performed by: INTERNAL MEDICINE

## 2018-02-28 PROCEDURE — 81001 URINALYSIS AUTO W/SCOPE: CPT | Performed by: INTERNAL MEDICINE

## 2018-02-28 PROCEDURE — 97110 THERAPEUTIC EXERCISES: CPT

## 2018-02-28 PROCEDURE — 97110 THERAPEUTIC EXERCISES: CPT | Performed by: OCCUPATIONAL THERAPIST

## 2018-02-28 PROCEDURE — 25010000002 VANCOMYCIN 750 MG RECONSTITUTED SOLUTION: Performed by: INTERNAL MEDICINE

## 2018-02-28 PROCEDURE — 99233 SBSQ HOSP IP/OBS HIGH 50: CPT | Performed by: INTERNAL MEDICINE

## 2018-02-28 PROCEDURE — 97535 SELF CARE MNGMENT TRAINING: CPT | Performed by: OCCUPATIONAL THERAPIST

## 2018-02-28 RX ADMIN — HYDROMORPHONE HYDROCHLORIDE 1 MG: 1 INJECTION, SOLUTION INTRAMUSCULAR; INTRAVENOUS; SUBCUTANEOUS at 08:28

## 2018-02-28 RX ADMIN — LIDOCAINE HYDROCHLORIDE 5 ML: 20 SOLUTION ORAL; TOPICAL at 20:42

## 2018-02-28 RX ADMIN — LEVOFLOXACIN 750 MG: 5 INJECTION, SOLUTION INTRAVENOUS at 17:05

## 2018-02-28 RX ADMIN — PANTOPRAZOLE SODIUM 40 MG: 40 TABLET, DELAYED RELEASE ORAL at 05:25

## 2018-02-28 RX ADMIN — METOPROLOL TARTRATE 12.5 MG: 25 TABLET ORAL at 08:28

## 2018-02-28 RX ADMIN — MEROPENEM 1 G: 1 INJECTION, POWDER, FOR SOLUTION INTRAVENOUS at 20:42

## 2018-02-28 RX ADMIN — METOPROLOL TARTRATE 12.5 MG: 25 TABLET ORAL at 20:40

## 2018-02-28 RX ADMIN — DAKIN'S SOLUTION 0.125% (QUARTER STRENGTH): 0.12 SOLUTION at 08:29

## 2018-02-28 RX ADMIN — SODIUM CHLORIDE 750 MG: 900 INJECTION, SOLUTION INTRAVENOUS at 14:25

## 2018-02-28 RX ADMIN — ACETAMINOPHEN 650 MG: 325 TABLET ORAL at 00:36

## 2018-02-28 RX ADMIN — Medication 10 ML: at 00:36

## 2018-02-28 RX ADMIN — MIRTAZAPINE 15 MG: 15 TABLET, FILM COATED ORAL at 20:40

## 2018-02-28 RX ADMIN — LIDOCAINE HYDROCHLORIDE 5 ML: 20 SOLUTION ORAL; TOPICAL at 07:47

## 2018-02-28 RX ADMIN — HEPARIN SODIUM 5000 UNITS: 5000 INJECTION, SOLUTION INTRAVENOUS; SUBCUTANEOUS at 20:42

## 2018-02-28 RX ADMIN — LIDOCAINE HYDROCHLORIDE 5 ML: 20 SOLUTION ORAL; TOPICAL at 05:25

## 2018-02-28 RX ADMIN — Medication 10 ML: at 05:25

## 2018-02-28 RX ADMIN — Medication 10 ML: at 15:33

## 2018-02-28 RX ADMIN — Medication 10 ML: at 20:42

## 2018-02-28 RX ADMIN — HEPARIN SODIUM 5000 UNITS: 5000 INJECTION, SOLUTION INTRAVENOUS; SUBCUTANEOUS at 05:25

## 2018-02-28 RX ADMIN — LIDOCAINE HYDROCHLORIDE 5 ML: 20 SOLUTION ORAL; TOPICAL at 15:33

## 2018-02-28 RX ADMIN — HYDROMORPHONE HYDROCHLORIDE 1 MG: 1 INJECTION, SOLUTION INTRAMUSCULAR; INTRAVENOUS; SUBCUTANEOUS at 18:00

## 2018-02-28 RX ADMIN — HEPARIN SODIUM 5000 UNITS: 5000 INJECTION, SOLUTION INTRAVENOUS; SUBCUTANEOUS at 14:25

## 2018-02-28 RX ADMIN — LIDOCAINE HYDROCHLORIDE 5 ML: 20 SOLUTION ORAL; TOPICAL at 00:36

## 2018-02-28 RX ADMIN — Medication 10 ML: at 07:48

## 2018-03-01 LAB
ABO GROUP BLD: NORMAL
ALBUMIN SERPL-MCNC: 1.8 G/DL (ref 3.5–5.2)
ALBUMIN/GLOB SERPL: 0.4 G/DL
ALP SERPL-CCNC: 68 U/L (ref 39–117)
ALT SERPL W P-5'-P-CCNC: 11 U/L (ref 1–33)
ANION GAP SERPL CALCULATED.3IONS-SCNC: 15.4 MMOL/L
AST SERPL-CCNC: 30 U/L (ref 1–32)
BASOPHILS # BLD AUTO: 0.04 10*3/MM3 (ref 0–0.2)
BASOPHILS NFR BLD AUTO: 0.4 % (ref 0–1.5)
BILIRUB SERPL-MCNC: 0.4 MG/DL (ref 0.1–1.2)
BLD GP AB SCN SERPL QL: NEGATIVE
BUN BLD-MCNC: 52 MG/DL (ref 6–20)
BUN/CREAT SERPL: 16.3 (ref 7–25)
CALCIUM SPEC-SCNC: 7.7 MG/DL (ref 8.6–10.5)
CHLORIDE SERPL-SCNC: 99 MMOL/L (ref 98–107)
CO2 SERPL-SCNC: 25.6 MMOL/L (ref 22–29)
CREAT BLD-MCNC: 3.19 MG/DL (ref 0.57–1)
DEPRECATED RDW RBC AUTO: 51.7 FL (ref 37–54)
EOSINOPHIL # BLD AUTO: 0.14 10*3/MM3 (ref 0–0.7)
EOSINOPHIL NFR BLD AUTO: 1.4 % (ref 0.3–6.2)
ERYTHROCYTE [DISTWIDTH] IN BLOOD BY AUTOMATED COUNT: 15.1 % (ref 11.7–13)
GFR SERPL CREATININE-BSD FRML MDRD: 15 ML/MIN/1.73
GLOBULIN UR ELPH-MCNC: 4.1 GM/DL
GLUCOSE BLD-MCNC: 100 MG/DL (ref 65–99)
HCT VFR BLD AUTO: 25.1 % (ref 35.6–45.5)
HGB BLD-MCNC: 7.8 G/DL (ref 11.9–15.5)
IMM GRANULOCYTES # BLD: 0.1 10*3/MM3 (ref 0–0.03)
IMM GRANULOCYTES NFR BLD: 1 % (ref 0–0.5)
LYMPHOCYTES # BLD AUTO: 1.14 10*3/MM3 (ref 0.9–4.8)
LYMPHOCYTES NFR BLD AUTO: 11.5 % (ref 19.6–45.3)
MCH RBC QN AUTO: 28.9 PG (ref 26.9–32)
MCHC RBC AUTO-ENTMCNC: 31.1 G/DL (ref 32.4–36.3)
MCV RBC AUTO: 93 FL (ref 80.5–98.2)
MONOCYTES # BLD AUTO: 0.76 10*3/MM3 (ref 0.2–1.2)
MONOCYTES NFR BLD AUTO: 7.7 % (ref 5–12)
NEUTROPHILS # BLD AUTO: 7.75 10*3/MM3 (ref 1.9–8.1)
NEUTROPHILS NFR BLD AUTO: 78 % (ref 42.7–76)
PHOSPHATE SERPL-MCNC: 6.3 MG/DL (ref 2.5–4.5)
PLATELET # BLD AUTO: 360 10*3/MM3 (ref 140–500)
PMV BLD AUTO: 9 FL (ref 6–12)
POTASSIUM BLD-SCNC: 4 MMOL/L (ref 3.5–5.2)
PROT SERPL-MCNC: 5.9 G/DL (ref 6–8.5)
RBC # BLD AUTO: 2.7 10*6/MM3 (ref 3.9–5.2)
RH BLD: POSITIVE
SODIUM BLD-SCNC: 140 MMOL/L (ref 136–145)
VANCOMYCIN SERPL-MCNC: 25 MCG/ML (ref 5–40)
WBC NRBC COR # BLD: 9.93 10*3/MM3 (ref 4.5–10.7)

## 2018-03-01 PROCEDURE — 25010000002 HYDROMORPHONE PER 4 MG: Performed by: SURGERY

## 2018-03-01 PROCEDURE — 86900 BLOOD TYPING SEROLOGIC ABO: CPT | Performed by: INTERNAL MEDICINE

## 2018-03-01 PROCEDURE — 86923 COMPATIBILITY TEST ELECTRIC: CPT

## 2018-03-01 PROCEDURE — P9016 RBC LEUKOCYTES REDUCED: HCPCS

## 2018-03-01 PROCEDURE — 80202 ASSAY OF VANCOMYCIN: CPT | Performed by: INTERNAL MEDICINE

## 2018-03-01 PROCEDURE — 25010000002 ONDANSETRON PER 1 MG: Performed by: SURGERY

## 2018-03-01 PROCEDURE — 86900 BLOOD TYPING SEROLOGIC ABO: CPT

## 2018-03-01 PROCEDURE — 86850 RBC ANTIBODY SCREEN: CPT | Performed by: INTERNAL MEDICINE

## 2018-03-01 PROCEDURE — 99233 SBSQ HOSP IP/OBS HIGH 50: CPT | Performed by: INTERNAL MEDICINE

## 2018-03-01 PROCEDURE — 86901 BLOOD TYPING SEROLOGIC RH(D): CPT | Performed by: INTERNAL MEDICINE

## 2018-03-01 PROCEDURE — 80053 COMPREHEN METABOLIC PANEL: CPT | Performed by: INTERNAL MEDICINE

## 2018-03-01 PROCEDURE — 85025 COMPLETE CBC W/AUTO DIFF WBC: CPT | Performed by: INTERNAL MEDICINE

## 2018-03-01 PROCEDURE — 84100 ASSAY OF PHOSPHORUS: CPT | Performed by: INTERNAL MEDICINE

## 2018-03-01 PROCEDURE — 63510000001 EPOETIN ALFA PER 1000 UNITS: Performed by: INTERNAL MEDICINE

## 2018-03-01 PROCEDURE — 25010000002 FUROSEMIDE PER 20 MG: Performed by: INTERNAL MEDICINE

## 2018-03-01 PROCEDURE — 25010000002 MEROPENEM PER 100 MG: Performed by: INTERNAL MEDICINE

## 2018-03-01 PROCEDURE — 99024 POSTOP FOLLOW-UP VISIT: CPT | Performed by: SURGERY

## 2018-03-01 PROCEDURE — 25010000002 HEPARIN (PORCINE) PER 1000 UNITS: Performed by: INTERNAL MEDICINE

## 2018-03-01 PROCEDURE — 36430 TRANSFUSION BLD/BLD COMPNT: CPT

## 2018-03-01 PROCEDURE — 97110 THERAPEUTIC EXERCISES: CPT

## 2018-03-01 RX ORDER — FUROSEMIDE 10 MG/ML
40 INJECTION INTRAMUSCULAR; INTRAVENOUS ONCE
Status: COMPLETED | OUTPATIENT
Start: 2018-03-01 | End: 2018-03-01

## 2018-03-01 RX ADMIN — Medication 10 ML: at 20:50

## 2018-03-01 RX ADMIN — Medication 10 ML: at 18:35

## 2018-03-01 RX ADMIN — Medication 10 ML: at 14:37

## 2018-03-01 RX ADMIN — LIDOCAINE HYDROCHLORIDE 5 ML: 20 SOLUTION ORAL; TOPICAL at 12:35

## 2018-03-01 RX ADMIN — ACETAMINOPHEN 650 MG: 325 TABLET ORAL at 00:14

## 2018-03-01 RX ADMIN — METOPROLOL TARTRATE 12.5 MG: 25 TABLET ORAL at 20:50

## 2018-03-01 RX ADMIN — HYDROMORPHONE HYDROCHLORIDE 1 MG: 1 INJECTION, SOLUTION INTRAMUSCULAR; INTRAVENOUS; SUBCUTANEOUS at 10:28

## 2018-03-01 RX ADMIN — HYDROMORPHONE HYDROCHLORIDE 1 MG: 1 INJECTION, SOLUTION INTRAMUSCULAR; INTRAVENOUS; SUBCUTANEOUS at 22:24

## 2018-03-01 RX ADMIN — METOPROLOL TARTRATE 12.5 MG: 25 TABLET ORAL at 09:17

## 2018-03-01 RX ADMIN — HEPARIN SODIUM 5000 UNITS: 5000 INJECTION, SOLUTION INTRAVENOUS; SUBCUTANEOUS at 14:38

## 2018-03-01 RX ADMIN — FUROSEMIDE 40 MG: 10 INJECTION, SOLUTION INTRAMUSCULAR; INTRAVENOUS at 09:27

## 2018-03-01 RX ADMIN — Medication 10 ML: at 00:00

## 2018-03-01 RX ADMIN — HEPARIN SODIUM 5000 UNITS: 5000 INJECTION, SOLUTION INTRAVENOUS; SUBCUTANEOUS at 23:30

## 2018-03-01 RX ADMIN — DAKIN'S SOLUTION 0.125% (QUARTER STRENGTH): 0.12 SOLUTION at 11:00

## 2018-03-01 RX ADMIN — ONDANSETRON 4 MG: 2 INJECTION INTRAMUSCULAR; INTRAVENOUS at 09:27

## 2018-03-01 RX ADMIN — LIDOCAINE HYDROCHLORIDE 5 ML: 20 SOLUTION ORAL; TOPICAL at 09:16

## 2018-03-01 RX ADMIN — HEPARIN SODIUM 5000 UNITS: 5000 INJECTION, SOLUTION INTRAVENOUS; SUBCUTANEOUS at 05:10

## 2018-03-01 RX ADMIN — Medication 10 ML: at 05:10

## 2018-03-01 RX ADMIN — ERYTHROPOIETIN 20000 UNITS: 20000 INJECTION, SOLUTION INTRAVENOUS; SUBCUTANEOUS at 14:38

## 2018-03-01 RX ADMIN — MIRTAZAPINE 15 MG: 15 TABLET, FILM COATED ORAL at 20:50

## 2018-03-01 RX ADMIN — PANTOPRAZOLE SODIUM 40 MG: 40 TABLET, DELAYED RELEASE ORAL at 05:10

## 2018-03-01 RX ADMIN — MEROPENEM 1 G: 1 INJECTION, POWDER, FOR SOLUTION INTRAVENOUS at 20:50

## 2018-03-01 NOTE — PROGRESS NOTES
"   LOS: 28 days    Patient Care Team:  Zahida Coffman MD as PCP - General (Family Medicine)    Chief Complaint:  No chief complaint on file.      Subjective     Interval History:   Follow up Mireya on CKD 3. Mouth still very dry.  Small BM.  Moving better in bed.  Febrile at 11 pm again last night 100.3. Hungry.   Objective     Vital Signs  Temp:  [99.1 °F (37.3 °C)-100.3 °F (37.9 °C)] 99.2 °F (37.3 °C)  Heart Rate:  [] 104  Resp:  [16-18] 18  BP: (106-123)/(54-72) 123/72    Flowsheet Rows         First Filed Value    Admission Height  172.7 cm (68\") Documented at 02/01/2018 1014    Admission Weight  123 kg (271 lb 14.4 oz) Documented at 02/01/2018 1014             I/O last 3 completed shifts:  In: 940 [P.O.:840; IV Piggyback:100]  Out: 2085 [Urine:1975; Other:110]    Intake/Output Summary (Last 24 hours) at 03/01/18 0834  Last data filed at 03/01/18 0700   Gross per 24 hour   Intake              940 ml   Output             1540 ml   Net             -600 ml       Physical Exam:  Awake, [facility] TLC 2/19, [facility] RIJ place 2/19    Oral mucosa very dry, but mucosa pink, no lesions.  Nasal 02.   Heart Tachy, RRR no s3 or rub. Lungs upper airway rhonchi. Decreased bs bases.    Abd Distended, Binder in place. +bs.  RLQ ANDER drain.   Ext 1+ upper and lower ext edema .       Results Review:      Results from last 7 days  Lab Units 03/01/18  0516 02/27/18  0531 02/27/18  0530 02/26/18  0606   SODIUM mmol/L 140 134* 135* 136   POTASSIUM mmol/L 4.0 4.3 4.3 4.1   CHLORIDE mmol/L 99 95* 95* 95*   CO2 mmol/L 25.6 26.5 31.1* 27.2   BUN mg/dL 52* 65* 64* 57*   CREATININE mg/dL 3.19* 3.78* 3.87* 3.77*   CALCIUM mg/dL 7.7* 7.7* 7.8* 7.7*   BILIRUBIN mg/dL 0.4  --  0.5 0.4   ALK PHOS U/L 68  --  81 86   ALT (SGPT) U/L 11  --  17 16   AST (SGOT) U/L 30  --  34* 31   GLUCOSE mg/dL 100* 105* 105* 95       Estimated Creatinine Clearance: 26.9 mL/min (by C-G formula based on Cr of 3.19).      Results from last 7 days  Lab Units 03/01/18  0516 " 02/27/18  0530 02/24/18  0337   MAGNESIUM mg/dL  --  2.1 2.0   PHOSPHORUS mg/dL 6.3* 7.4* 6.4*               Results from last 7 days  Lab Units 03/01/18  0516 02/28/18  0733 02/27/18  0531 02/26/18  0606 02/25/18  0536   WBC 10*3/mm3 9.93 10.66 11.03* 13.32* 13.47*   HEMOGLOBIN g/dL 7.8* 8.1* 8.5* 8.8* 8.9*   PLATELETS 10*3/mm3 360 348 362 339 304               Imaging Results (last 24 hours)     ** No results found for the last 24 hours. **          epoetin kristen 20,000 Units Subcutaneous Weekly   furosemide 40 mg Intravenous Once   heparin (porcine) 5,000 Units Subcutaneous Q8H   iopamidol 50 mL Intravenous Once in imaging   levoFLOXacin 750 mg Intravenous Q48H   lidocaine viscous 5 mL Mouth/Throat Q4H   magic mouthwash 10 mL Swish & Spit Q4H   meropenem 1 g Intravenous Q24H   metoprolol tartrate 12.5 mg Oral Q12H   mirtazapine 15 mg Oral Nightly   pantoprazole 40 mg Oral Q AM   sodium hypochlorite  Topical BID   Vancomycin Pharmacy Intermittent Dosing  Does not apply Daily       Pharmacy to dose vancomycin        Medication Review:   Current Facility-Administered Medications   Medication Dose Route Frequency Provider Last Rate Last Dose   • acetaminophen (TYLENOL) suppository 325 mg  325 mg Rectal Q4H PRN Edgard Felder MD   325 mg at 02/05/18 0653   • acetaminophen (TYLENOL) tablet 650 mg  650 mg Oral Q6H PRN Tri Guzman MD   650 mg at 03/01/18 0014   • albumin human 25 % IV SOLN 12.5 g  12.5 g Intravenous PRN Madeline Kay MD   12.5 g at 02/27/18 1305   • albuterol (PROVENTIL) nebulizer solution 0.083% 2.5 mg/3mL  2.5 mg Nebulization Q4H PRN Tri Guzman MD       • alteplase ((CATHFLO/ACTIVASE)) syringe 2 mg  2 mg Intracatheter Once PRN Mazin Barrera MD   2 mg at 02/23/18 0539   • calcium gluconate 1 g in sodium chloride 0.9 % 50 mL IVPB  1 g Intravenous PRN Madeline Kay MD        Or   • calcium gluconate 2 g in sodium chloride 0.9 % 50 mL IVPB  2 g Intravenous PRN Madeline  America Kay MD       • epoetin kristen (EPOGEN,PROCRIT) injection 20,000 Units  20,000 Units Subcutaneous Weekly Madeline Kay MD       • furosemide (LASIX) injection 40 mg  40 mg Intravenous Once Madeline Kay MD       • heparin (porcine) 5000 UNIT/ML injection 5,000 Units  5,000 Units Subcutaneous Q8H Benjy Samson MD   5,000 Units at 03/01/18 0510   • heparin (porcine) injection 1,000-2,000 Units  1,000-2,000 Units Intravenous PRN Madeline Kay MD   4,000 Units at 02/15/18 1843   • heparin (porcine) injection 4,000 Units  4,000 Units Intracatheter PRN Madeline Kay MD   4,000 Units at 02/27/18 1258   • iopamidol (ISOVUE-250) 51 % injection 50 mL  50 mL Intravenous Once in imaging Meir Guillen MD       • levoFLOXacin (LEVAQUIN) 750 mg/150 mL D5W (premix) (LEVAQUIN) 750 mg  750 mg Intravenous Q48H Masoud Arechiga MD   750 mg at 02/28/18 1705   • lidocaine viscous (XYLOCAINE) 2 % mouth solution 5 mL  5 mL Mouth/Throat Q4H Madeline Kay MD   5 mL at 02/28/18 2042   • magic mouthwash oral supsension 10 mL  10 mL Swish & Spit Q4H Madeline Kay MD   10 mL at 03/01/18 0510   • meropenem (MERREM) 1 g/100 mL 0.9% NS VTB (mbp)  1 g Intravenous Q24H Masoud Arechiga MD   1 g at 02/28/18 2042   • metoprolol tartrate (LOPRESSOR) tablet 12.5 mg  12.5 mg Oral Q12H Bradford So MD   12.5 mg at 02/28/18 2040   • mirtazapine (REMERON) tablet 15 mg  15 mg Oral Nightly Ernestina Hussein MD   15 mg at 02/28/18 2040   • ondansetron ODT (ZOFRAN-ODT) disintegrating tablet 4 mg  4 mg Oral Q6H PRN Tri Guzman MD        Or   • ondansetron (ZOFRAN) injection 4 mg  4 mg Intravenous Q6H PRN Tri Guzman MD   4 mg at 02/13/18 1415   • pantoprazole (PROTONIX) EC tablet 40 mg  40 mg Oral Q AM Madeline Kay MD   40 mg at 03/01/18 0510   • Pharmacy to dose vancomycin   Does not apply Continuous PRN Masoud Arechiga MD       • promethazine  (PHENERGAN) injection 12.5 mg  12.5 mg Intravenous Q8H PRN Sarthak Figueroa MD   12.5 mg at 02/09/18 5139   • sodium chloride (OCEAN) nasal spray 2 spray  2 spray Each Nare PRN Ernestina Hussein MD       • sodium hypochlorite (DAKIN'S 1/4 STRENGTH) 0.125 % topical solution 0.125% solution   Topical BID Tri Guzman MD       • Vancomycin Pharmacy Intermittent Dosing   Does not apply Daily Masoud Arechiga MD           Assessment/Plan       1. Acute kidney injury on chronic kidney disease stage III due to shock. Urine output 1500 cc yesterday.  Dialyzed for 2:45  2/27 cut short   due to I and D.   Slow rate of rise of waste products.  No dialysis today. Lasix after PRBC today.   2.  Chronic kidney disease stage III baseline Crt 1.5.   3.   Status post exploratory laparotomy with small bowel resection, mesh removal, RLQ port site debridement, and  Appendectomy. POD 22.  SP subcutaneous debridement 2/13 and 2/16, 2/27. 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  Dw Dr. Arechiga.   4.  Acute on Chronic anemia: Hg  7.8 today.  PRBC 1 unit today.   5.  Small bowel enterotomy, sp repair 2/4.    ESBL, now carbapenem resistant from wound.   E. coli from blood 2/5. Blood cultures 2/9 negative. Ongoing wound debridement.    6. Encephalopathy . Resolved.   7. Afib with RVR.  Now in SR.  On beta blocker   8.  PCM. Improving po intake.   9.  Oral mucositis. Resolved.  On topical therapy.   10. Debility.  Working with PT .      DW       Madeline Kay MD  03/01/18  8:34 AM

## 2018-03-01 NOTE — PROGRESS NOTES
INFECTIOUS DISEASES PROGRESS NOTE    CC: Follow-up fever    S:   Having some abdominal pain.  Still with nightly fever    O:  Physical Exam:  Temp:  [99.1 °F (37.3 °C)-100.3 °F (37.9 °C)] 99.2 °F (37.3 °C)  Heart Rate:  [] 104  Resp:  [16-18] 18  BP: (106-123)/(54-72) 123/72  Physical Exam  GENERAL: Awake and alert, chronically ill appearing but nontoxic  HEENT: Oropharynx is clear. Hearing is grossly normal.   EYES: No conjunctival injection. No lid lag.   HEART: Regular rate and rhythm. 1-2+ peripheral edema.   LUNGS: Clear to auscultation anteriorly with normal respiratory effort.   GI: Soft, tender, abdominal binder in place limiting exam, nondistended. No appreciable organomegaly.   SKIN: Warm and dry without cutaneous eruptions in exposed areas  PSYCHIATRIC: Appropriate mood, affect.     Diagnostics:    Cr 3.19  WBC 9.93 (p78, L 12, M 8, e1)  H/H 7.8/25    UA   vanc 25      Assessment/Plan   1.  Septic shock secondary to small bowel enterotomy and peritonitis status post repair with small bowel resection and anastomosis by Dr. Guzman on 2/4/18; shock resolved; Repeat I and D for wound cellulitis on 2/13/18 and 2/16. Cx with CRE and E faecalis; CT on 2/26 shows fistula, c/f free air and abscess; repeat incision and debridement on 2/27/18  2.  Acute kidney injury  3.  Persistent right pleural effusion, no pna.    Cont renally dosed vanc, lvq, merrem.  Still febrile but improved.  White count normal.  No extra-adbdominal source of infection.  Has CRE/multidrug-resistant Escherichia coli and trying to avoid nephrotoxic aminoglycosides or colistin.       Masoud Arechiga MD  8:41 AM  03/01/18

## 2018-03-01 NOTE — PLAN OF CARE
Problem: Patient Care Overview (Adult)  Goal: Plan of Care Review  Outcome: Ongoing (interventions implemented as appropriate)    Goal: Adult Individualization and Mutuality  Outcome: Ongoing (interventions implemented as appropriate)    Goal: Discharge Needs Assessment  Outcome: Ongoing (interventions implemented as appropriate)      Problem: Sepsis (Adult)  Goal: Signs and Symptoms of Listed Potential Problems Will be Absent or Manageable (Sepsis)  Outcome: Ongoing (interventions implemented as appropriate)      Problem: Pressure Ulcer Risk (Aristeo Scale) (Adult,Obstetrics,Pediatric)  Goal: Skin Integrity  Outcome: Ongoing (interventions implemented as appropriate)    Goal: Identify Related Risk Factors and Signs and Symptoms  Outcome: Ongoing (interventions implemented as appropriate)    Goal: Skin Integrity  Outcome: Ongoing (interventions implemented as appropriate)      Problem: Pain, Acute (Adult)  Goal: Identify Related Risk Factors and Signs and Symptoms  Outcome: Ongoing (interventions implemented as appropriate)    Goal: Acceptable Pain Control/Comfort Level  Outcome: Ongoing (interventions implemented as appropriate)

## 2018-03-01 NOTE — THERAPY TREATMENT NOTE
Acute Care - Physical Therapy Treatment Note  [facility]     Patient Name: Mariya Campos  : 1960  MRN: [id number]  Today's Date: 3/1/2018  Onset of Illness/Injury or Date of Surgery Date: 18          Admit Date: 2018    Visit Dx:    ICD-10-CM ICD-9-CM   1. Incisional hernia, without obstruction or gangrene K43.2 553.21   2. Recurrent ventral hernia K43.2 553.21   3. Generalized weakness R53.1 780.79   4. CRF (chronic renal failure), stage 3 (moderate) N18.3 585.3   5. Wound infection T14.8XXA 958.3    L08.9    6. ARF (acute renal failure) with tubular necrosis N17.0 584.5     Patient Active Problem List   Diagnosis   • Atopic rhinitis   • Fatigue   • Abnormal mammogram   • Adiposity   • Abnormal ECG   • CRF (chronic renal failure), stage 3 (moderate)   • Anemia   • Recurrent UTI   • Osteopenia   • Essential hypertension   • Incisional hernia   • Moderate persistent asthma without complication   • Incisional hernia, without obstruction or gangrene   • Recurrent ventral hernia   • Bacteremia, escherichia coli   • Wound infection   • Complications, dialysis, catheter, mechanical, initial encounter               Adult Rehabilitation Note       18 0848 18 1502 18 1218    Rehab Assessment/Intervention    Discipline physical therapist  -AA physical therapist  -AA occupational therapist  -    Document Type therapy note (daily note)  -AA therapy note (daily note)  -AA therapy note (daily note)  -    Subjective Information agree to therapy;complains of;weakness;fatigue;pain;dizziness  -AA agree to therapy;complains of;weakness;fatigue;pain  -AA agree to therapy;no complaints  -    Patient Effort, Rehab Treatment good  -AA good  -AA good  -KP    Symptoms Noted During/After Treatment fatigue;increased pain  -AA fatigue;increased pain  -AA     Precautions/Limitations fall precautions  -AA fall precautions  -AA fall precautions;other (see comments)   infection prec  -    Patient  Response to Treatment tolerated but fatigued  -AA tolerated but fatigued  -AA     Recorded by [AA] Manju Laguerre PT [AA] Manju Laguerre PT [KP] Yue Farley, OTR    Pain Assessment    Pain Assessment 0-10  -AA 0-10  -AA 0-10  -KP    Pain Score 5  -AA 4  -AA 3  -KP    Post Pain Score  7  -AA 3  -KP    Pain Type Acute pain;Surgical pain  -AA Acute pain;Surgical pain  -AA Acute pain;Surgical pain  -KP    Pain Location Abdomen  -AA Abdomen  -AA Abdomen  -KP    Pain Intervention(s) Ambulation/increased activity;Repositioned  -AA Ambulation/increased activity;Repositioned  -AA Repositioned  -KP    Response to Interventions tolerated but fatigued  -AA tolerated but fatigued  -AA     Recorded by [AA] Manju Laguerre PT [AA] Manju Laguerre PT [KP] Yue Farley, OTR    Cognitive Assessment/Intervention    Current Cognitive/Communication Assessment functional  -AA functional  -AA functional  -KP    Orientation Status oriented x 4  -AA oriented x 4  -AA oriented x 4  -KP    Follows Commands/Answers Questions 100% of the time  -% of the time;able to follow single-step instructions;needs cueing;needs increased time  -% of the time;able to follow single-step instructions  -KP    Personal Safety WNL/WFL  -AA WNL/WFL  -AA WNL/WFL  -KP    Personal Safety Interventions fall prevention program maintained;nonskid shoes/slippers when out of bed;gait belt  -AA fall prevention program maintained  -AA fall prevention program maintained;muscle strengthening facilitated;nonskid shoes/slippers when out of bed  -KP    Recorded by [AA] Manju Laguerre PT [AA] Manju Laguerre PT [KP] Yue Farley, OTR    Bed Mobility, Assessment/Treatment    Bed Mobility, Assistive Device bed rails;head of bed elevated  -AA bed rails;head of bed elevated;draw sheet  -AA     Bed Mob, Supine to Sit, [place] moderate assist (50% patient effort);2 person assist required;verbal cues required  -AA  moderate assist (50% patient effort);2 person assist required;verbal cues required  -AA     Bed Mob, Sit to Supine, [place] maximum assist (25% patient effort);2 person assist required;verbal cues required  -AA maximum assist (25% patient effort);2 person assist required;verbal cues required  -AA     Bed Mobility, Safety Issues impaired trunk control for bed mobility;decreased use of arms for pushing/pulling;decreased use of legs for bridging/pushing  -AA impaired trunk control for bed mobility;decreased use of arms for pushing/pulling;decreased use of legs for bridging/pushing  -AA     Bed Mobility, Impairments strength decreased;pain  -AA strength decreased;pain  -AA     Bed Mobility, Comment verbal cues for hand placement and log roll technique  -AA verbal cues for hand placement  -AA pt doesn't want to do any bed mobility this am  -KP    Recorded by [AA] Manju Laguerre PT [AA] Manju Laguerre PT [KP] Yue Farley, OTR    Transfer Assessment/Treatment    Transfers, Sit-Stand [place] moderate assist (50% patient effort);2 person assist required;verbal cues required  -AA      Transfers, Stand-Sit [place] moderate assist (50% patient effort);2 person assist required;verbal cues required  -AA      Transfers, Sit-Stand-Sit, Assist Device other (see comments)   B handrails from hospital bed   -AA      Transfer, Safety Issues weight-shifting ability decreased;knees buckling  -AA      Transfer, Impairments strength decreased;pain  -AA      Transfer, Comment sit <> stand X2 with ability to clear bed but unable to come up to full stand  -AA      Recorded by [AA] Manju Laguerre PT      Gait Assessment/Treatment    Gait, [place] Level not appropriate to assess  -AA      Recorded by [AA] Manju Laguerre PT      Grooming Assessment/Training    Grooming Assess/Train, Position   supine  -    Grooming Assess/Train, Indepen Level   set up required;supervision required  -KP     Grooming Assess/Train, Comment   brush teeth, wash face  -KP    Recorded by   [KP] MAHOGANY Farah    Motor Skills/Interventions    Additional Documentation Balance Skills Training (Group)  -AA Balance Skills Training (Group)  -AA     Recorded by [AA] Manju Laguerre PT [AA] Manju Laguerre PT     Balance Skills Training    Sitting-Level of Assistance Contact guard  -AA Minimum assistance;Moderate assistance  -AA     Sitting-Balance Support Feet supported;Right upper extremity supported  -AA Feet supported;Right upper extremity supported;Left upper extremity supported  -AA     Sitting-Balance Activities Trunk control activities;Lateral lean;Forward lean;Reaching across midline  -AA Trunk control activities;Lateral lean;Reaching across midline  -AA     Sitting # of Minutes 10  -AA 12  -AA     Recorded by [AA] Manju Laguerre PT [AA] Manju Laguerre PT     Therapy Exercises    Bilateral Lower Extremities AROM:;5 reps;sitting;hip flexion;10 reps;LAQ;ankle pumps/circles  -AA AROM:;10 reps;sitting;LAQ;ankle pumps/circles;knee flexion   unable to perform hip flexion due to abdominal pain  -AA     Bilateral Upper Extremity   AROM:;30 reps;supine;elbow flexion/extension;pronation/supination;shoulder extension/flexion   10x3 w. rest breaks in between. chest press  -KP    Trunk Exercises 10 reps;sitting;trunk flexion;trunk rotation  -AA 10 reps;sitting;trunk flexion;trunk rotation  -AA     Recorded by [AA] Manju Laguerre PT [AA] Manju Laguerre PT [KP] MAHOGANY Farah    Positioning and Restraints    Pre-Treatment Position in bed  - in bed  - in bed  -    Post Treatment Position bed  - bed  - bed  -    In Bed supine;call light within reach;encouraged to call for assist;with family/caregiver;legs elevated  - supine;call light within reach;encouraged to call for assist;legs elevated  - supine;call light within reach;encouraged to call for assist;exit alarm on  -     Recorded by [AA] Manju Laguerre, PT [AA] Manju Laguerre PT [KP] Yue Hicks Martine, OTR      02/27/18 1209 02/27/18 0900       Rehab Assessment/Intervention    Discipline occupational therapist  -SO physical therapy assistant  -CW     Document Type therapy note (daily note)  -SO therapy note (daily note)  -CW     Subjective Information agree to therapy;complains of;weakness;fatigue;pain  -SO agree to therapy;complains of;weakness;pain  -CW     Patient Effort, Rehab Treatment adequate  -SO adequate  -CW     Symptoms Noted During/After Treatment none  -SO      Precautions/Limitations fall precautions  -SO fall precautions  -CW     Specific Treatment Considerations  Isolation  -CW     Recorded by [SO] Di Pickens, OTR [CW] Leandro Posada PTA     Vital Signs    O2 Delivery Pre Treatment  supplemental O2  -CW     O2 Delivery Intra Treatment  supplemental O2  -CW     O2 Delivery Post Treatment  supplemental O2  -CW     Recorded by  [CW] Leandro Posada PTA     Pain Assessment    Pain Assessment 0-10  -SO 0-10  -CW     Pain Score 4  -SO 6  -CW     Post Pain Score  6  -CW     Pain Type Acute pain  -SO      Pain Location Abdomen  -SO Abdomen  -CW     Pain Intervention(s) Repositioned  -SO Repositioned;Ambulation/increased activity  -CW     Response to Interventions  kane  -CW     Recorded by [SO] Di Pickens, OTR [CW] Leandro Posada, VALERIE     Cognitive Assessment/Intervention    Current Cognitive/Communication Assessment functional  -SO functional  -CW     Orientation Status oriented x 4  -SO oriented x 4  -CW     Follows Commands/Answers Questions 100% of the time  -% of the time;able to follow single-step instructions;needs cueing  -CW     Personal Safety WNL/WFL  -SO WNL/WFL  -CW     Personal Safety Interventions  fall prevention program maintained;gait belt;muscle strengthening facilitated;nonskid shoes/slippers when out of bed  -CW     Recorded by [SO] Di  Mouna Pickens, OTR [CW] Leandro Posada PTA     Bed Mobility, Assessment/Treatment    Bed Mob, Supine to Sit, [place]  verbal cues required;nonverbal cues required (demo/gesture);moderate assist (50% patient effort);maximum assist (25% patient effort);2 person assist required  -CW     Bed Mob, Sit to Supine, [place]  verbal cues required;nonverbal cues required (demo/gesture);maximum assist (25% patient effort);2 person assist required  -CW     Bed Mobility, Comment Did not want to get up EOB, nausea  -SO      Recorded by [SO] Di Pickens, OTR [CW] Leandro Posada PTA     Transfer Assessment/Treatment    Transfers, Stand-Sit [place]  not tested  -CW     Recorded by  [CW] Leandro Posada PTA     Therapy Exercises    Bilateral Lower Extremities  AROM:;15 reps;supine;ankle pumps/circles;glut sets;quad sets  -CW     Bilateral Upper Extremity AROM:;10 reps;supine;elbow flexion/extension;shoulder extension/flexion;shoulder protraction/retraction   Only able to tolerate 10 reps of each  -SO      Recorded by [SO] Di Pickens, OTR [CW] Leandro Posada PTA     Positioning and Restraints    Pre-Treatment Position in bed  -SO      Post Treatment Position bed  -SO      In Bed supine;call light within reach;encouraged to call for assist;exit alarm on;with family/caregiver  -SO      Recorded by [SO] Di Pickens, OTR        User Key  (r) = Recorded By, (t) = Taken By, (c) = Cosigned By    Initials Name Effective Dates    SO Di Pickens, OTR 04/13/15 -     KP Yue Farley, OTR 04/13/15 -     CW Leandro Posada, VALERIE 12/13/16 -     AA Manju Laguerre, PT 09/05/17 -                 IP PT Goals       02/28/18 1536 02/21/18 1421       Bed Mobility PT LTG    Bed Mobility PT LTG, Time to Achieve  1 wk  -PC     Bed Mobility PT LTG, Activity Type  supine to sit/sit to supine  -PC     Bed Mobility PT LTG, [place] Level  moderate assist (50% patient  effort)  -PC     Bed Mobility PT LTG, Date Goal Reviewed 02/28/18  -AA      Bed Mobility PT LTG, Outcome goal ongoing  -AA goal revised  -PC     Bed Mobility PT LTG, Reason Goal Not Met progress slower than expected  -AA medical status inhibits participation  -PC     Transfer Training PT LTG    Transfer Training PT LTG, Time to Achieve  1 wk  -PC     Transfer Training PT LTG, Activity Type  sit to stand/stand to sit  -PC     Transfer Training PT LTG, [place] Level  moderate assist (50% patient effort);2 person assist required  -PC     Transfer Training PT  LTG, Date Goal Reviewed 02/28/18  -AA      Transfer Training PT LTG, Outcome goal ongoing  -AA goal revised  -PC     Transfer Training PT LTG, Reason Goal Not Met progress slower than expected  -AA medical status inhibits participation  -PC     Gait Training PT LTG    Gait Training Goal PT LTG, Time to Achieve  1 wk  -PC     Gait Training Goal PT LTG, [place] Level  moderate assist (50% patient effort);2 person assist required  -PC     Gait Training Goal PT LTG, Assist Device  walker, rolling  -PC     Gait Training Goal PT LTG, Distance to Achieve  10 ft  -PC     Gait Training Goal PT LTG, Date Goal Reviewed 02/28/18  -AA      Gait Training Goal PT LTG, Outcome goal ongoing  -AA goal revised  -PC     Gait Training Goal PT LTG, Reason Goal Not Met progress slower than expected  - medical status inhibits participation  -PC       User Key  (r) = Recorded By, (t) = Taken By, (c) = Cosigned By    Initials Name Provider Type    PC Joycelyn Fisher, PT Physical Therapist    JOHN Laguerre PT Physical Therapist          Physical Therapy Education     Title: PT OT SLP Therapies (Active)     Topic: Physical Therapy (Done)     Point: Mobility training (Done)    Learning Progress Summary    Learner Readiness Method Response Comment Documented by Status   Patient Acceptance E VU education on importance of mobility; education on BLE strengthening and core  strengthening for improved mobility; education on transfer training AA 03/01/18 0921 Done    Acceptance E VU  AA 02/28/18 1535 Done    Acceptance E,TB DU,VU  CW 02/27/18 0908 Done    Acceptance E,D VU,NR  EJ 02/25/18 1543 Done    Acceptance E,TB,D VU,NR   02/23/18 1558 Done    Acceptance E VU,NR  AA 02/17/18 1156 Done    Acceptance E,TB,D VU,NR  SH 02/12/18 0902 Done    Acceptance E,TB,D NR  RW 02/11/18 1410 Active    Acceptance E,D NR  PC 02/09/18 1725 Active    Acceptance E NR  EM 02/08/18 1319 Active   Family Acceptance E VU education on importance of mobility; education on BLE strengthening and core strengthening for improved mobility; education on transfer training AA 03/01/18 0921 Done    Acceptance E,D VU,NR  EJ 02/25/18 1543 Done    Acceptance E,TB,D VU,NR  SH 02/12/18 0902 Done               Point: Home exercise program (Done)    Learning Progress Summary    Learner Readiness Method Response Comment Documented by Status   Patient Acceptance E VU education on importance of mobility; education on BLE strengthening and core strengthening for improved mobility; education on transfer training AA 03/01/18 0921 Done    Acceptance E VU  AA 02/28/18 1535 Done    Acceptance E,TB DU,VU  CW 02/27/18 0908 Done    Acceptance E,D VU,NR  EJ 02/25/18 1543 Done    Acceptance E,D NR  PC 02/21/18 1419 Active    Acceptance E,D NR  PC 02/15/18 1453 Active    Acceptance E,TB,D VU,NR  SH 02/12/18 0902 Done    Acceptance E,TB,D NR  RW 02/11/18 1410 Active    Acceptance E,D NR  PC 02/09/18 1725 Active    Acceptance E NR  EM 02/08/18 1319 Active   Family Acceptance E VU education on importance of mobility; education on BLE strengthening and core strengthening for improved mobility; education on transfer training AA 03/01/18 0921 Done    Acceptance E,D VU,NR  EJ 02/25/18 1543 Done    Acceptance E,TB,D VU,NR  SH 02/12/18 0902 Done               Point: Body mechanics (Done)    Learning Progress Summary    Learner Readiness Method  Response Comment Documented by Status   Patient Acceptance E VU education on importance of mobility; education on BLE strengthening and core strengthening for improved mobility; education on transfer training AA 03/01/18 0921 Done    Acceptance E VU  AA 02/28/18 1535 Done    Acceptance E,TB DU,VU  CW 02/27/18 0908 Done    Acceptance E,D VU,NR  EJ 02/25/18 1543 Done    Acceptance E,TB,D VU,NR   02/23/18 1558 Done    Acceptance E,TB,D VU,NR   02/12/18 0902 Done    Acceptance E,TB,D NR  RW 02/11/18 1410 Active    Acceptance E,D NR  PC 02/09/18 1725 Active   Family Acceptance E VU education on importance of mobility; education on BLE strengthening and core strengthening for improved mobility; education on transfer training AA 03/01/18 0921 Done    Acceptance E,D VU,NR   02/25/18 1543 Done    Acceptance E,TB,D VU,NR   02/12/18 0902 Done               Point: Precautions (Done)    Learning Progress Summary    Learner Readiness Method Response Comment Documented by Status   Patient Acceptance E VU education on importance of mobility; education on BLE strengthening and core strengthening for improved mobility; education on transfer training AA 03/01/18 0921 Done    Acceptance E VU  AA 02/28/18 1535 Done    Acceptance E,TB DU,VU   02/27/18 0908 Done    Acceptance E,D VU,NR   02/25/18 1543 Done    Acceptance E,TB,D VU,NR   02/23/18 1558 Done    Acceptance E VU,NR  AA 02/17/18 1156 Done    Acceptance E,TB,D VU,NR   02/12/18 0902 Done    Acceptance E,TB,D NR  RW 02/11/18 1410 Active    Acceptance E,D NR  PC 02/09/18 1725 Active   Family Acceptance E VU education on importance of mobility; education on BLE strengthening and core strengthening for improved mobility; education on transfer training AA 03/01/18 0921 Done    Acceptance E,D VU,NR  EJ 02/25/18 1543 Done    Acceptance E,TB,D VU,NR   02/12/18 0902 Done                      User Key     Initials Effective Dates Name Provider Type Discipline     12/01/15  -  Isabel Manning, PT Physical Therapist PT    PC 12/01/15 -  Joycelyn Fisher, PT Physical Therapist PT    EM 12/01/15 -  Sade Vergara, PT Physical Therapist PT    EJ 04/21/17 -  Shira Martínez, PT Physical Therapist PT    RW 04/06/16 -  Radha Bonds, PTA Physical Therapy Assistant PT    CW 12/13/16 -  Leandro Posada, PTA Physical Therapy Assistant PT    AA 09/05/17 -  Manju Laguerre, PT Physical Therapist PT    SH 01/08/18 -  Sophy Catalan, PT Student PT Student PT                    PT Recommendation and Plan  Anticipated Discharge Disposition: home with home health  Planned Therapy Interventions: bed mobility training, gait training, home exercise program, transfer training  PT Frequency: daily  Plan of Care Review  Plan Of Care Reviewed With: patient  Progress: improving  Outcome Summary/Follow up Plan: Pt requires less assist today for upright posture and dynamic sitting balance activities. Pt able to attempt sit <> stand X2 with mod A of 2 required; however, pt able to clear the bed but unable to come up to full stand. Pt c/o dizziness with standing and feels weak in knees. Pt improving and PT will continue progressing pt towards transfer/gait training as tolerated.           Outcome Measures       03/01/18 0900 02/28/18 1500 02/28/18 1220    How much help from another person do you currently need...    Turning from your back to your side while in flat bed without using bedrails? 2  -AA 2  -AA     Moving from lying on back to sitting on the side of a flat bed without bedrails? 2  -AA 2  -AA     Moving to and from a bed to a chair (including a wheelchair)? 1  -AA 1  -AA     Standing up from a chair using your arms (e.g., wheelchair, bedside chair)? 2  -AA 1  -AA     Climbing 3-5 steps with a railing? 1  -AA 1  -AA     To walk in hospital room? 1  -AA 1  -AA     AM-PAC 6 Clicks Score 9  -AA 8  -AA     How much help from another is currently needed...    Putting on and taking off regular  lower body clothing?   1  -KP    Bathing (including washing, rinsing, and drying)   2  -KP    Toileting (which includes using toilet bed pan or urinal)   1  -KP    Putting on and taking off regular upper body clothing   2  -KP    Taking care of personal grooming (such as brushing teeth)   3  -KP    Eating meals   3  -KP    Score   12  -KP    Functional Assessment    Outcome Measure Options AM-PAC 6 Clicks Basic Mobility (PT)  -AA AM-PAC 6 Clicks Basic Mobility (PT)  -AA AM-[facility] 6 Clicks Daily Activity (OT)  -      02/27/18 1200 02/27/18 0900       How much help from another person do you currently need...    Turning from your back to your side while in flat bed without using bedrails?  2  -CW     Moving from lying on back to sitting on the side of a flat bed without bedrails?  2  -CW     Moving to and from a bed to a chair (including a wheelchair)?  1  -CW     Standing up from a chair using your arms (e.g., wheelchair, bedside chair)?  1  -CW     Climbing 3-5 steps with a railing?  1  -CW     To walk in hospital room?  1  -CW     AM-PAC 6 Clicks Score  8  -CW     How much help from another is currently needed...    Putting on and taking off regular lower body clothing? 1  -SO      Bathing (including washing, rinsing, and drying) 1  -SO      Toileting (which includes using toilet bed pan or urinal) 1  -SO      Putting on and taking off regular upper body clothing 1  -SO      Taking care of personal grooming (such as brushing teeth) 2  -SO      Eating meals 2  -SO      Score 8  -SO      Functional Assessment    Outcome Measure Options AM-PAC 6 Clicks Daily Activity (OT)  -SO AM-PAC 6 Clicks Basic Mobility (PT)  -CW       User Key  (r) = Recorded By, (t) = Taken By, (c) = Cosigned By    Initials Name Provider Type    BILL Pickens, OTR Occupational Therapist    RASHEED Farley, OTR Occupational Therapist    CW Leandro Posada, PTA Physical Therapy Assistant    JOHN Laguerre, PT Physical  Therapist           Time Calculation:         PT Charges       03/01/18 0923          Time Calculation    Start Time 0848  -AA      Stop Time 0912  -AA      Time Calculation (min) 24 min  -AA      PT Received On 03/01/18  -AA      PT - Next Appointment 03/02/18  -AA        User Key  (r) = Recorded By, (t) = Taken By, (c) = Cosigned By    Initials Name Provider Type    JOHN Laguerre PT Physical Therapist          Therapy Charges for Today     Code Description Service Date Service Provider Modifiers Qty    04659275446 HC PT THER PROC EA 15 MIN 2/28/2018 Manju Laguerre, PT GP 2    57012266405 HC PT THER PROC EA 15 MIN 3/1/2018 Manju Laguerre, PT GP 2    84355899935 HC PT THER SUPP EA 15 MIN 3/1/2018 Manju Laguerre PT GP 1          PT G-Codes  Outcome Measure Options: AM-PAC 6 Clicks Basic Mobility (PT)    Manju Laguerre PT  3/1/2018

## 2018-03-01 NOTE — PLAN OF CARE
Problem: Patient Care Overview (Adult)  Goal: Plan of Care Review   03/01/18 0921   Coping/Psychosocial Response Interventions   Plan Of Care Reviewed With patient   Patient Care Overview   Progress improving   Outcome Evaluation   Outcome Summary/Follow up Plan Pt requires less assist today for upright posture and dynamic sitting balance activities. Pt able to attempt sit <> stand X2 with mod A of 2 required; however, pt able to clear the bed but unable to come up to full stand. Pt c/o dizziness with standing and feels weak in knees. Pt improving and PT will continue progressing pt towards transfer/gait training as tolerated.

## 2018-03-01 NOTE — PROGRESS NOTES
"Pharmacokinetic Consult - Vancomycin Dosing (Follow-up Note)  Mariya Campos is on day 10 pharmacy to dose vancomycin for intra-abdominal infection.  Pharmacy dosing vancomycin per Dr. Arechiga's request.   Goal trough: 15-20 mg/L   Other antimicrobials: levofloxacin and meropenem  Admit date: 2/1/2018  9:56 AM    Relevant clinical data and objective history reviewed:  57 y.o. female 172.7 cm (68\") 118 kg (260 lb 1.6 oz)    Patient with BENNY on CKD III receiving dialysis.     Past Medical History:   Diagnosis Date   • Abdominal pain    • Allergic rhinitis    • Anemia     ON IRON QOD   • Asthma    • Chronic bronchitis    • Chronic kidney disease     STAGE 3   SINCE 2015 WITH COLON SURGERY   • Colon cancer     CHEMO 6 MONTHS   • Hiatal hernia    • Hypertension    • Incisional hernia    • Overweight    • Peripheral neuropathy     FROM CHEMO TX'S   • Rosacea    • Visit for screening mammogram      Creatinine   Date Value Ref Range Status   03/01/2018 3.19 (H) 0.57 - 1.00 mg/dL Final   02/27/2018 3.78 (H) 0.57 - 1.00 mg/dL Final   02/27/2018 3.87 (H) 0.57 - 1.00 mg/dL Final     BUN   Date Value Ref Range Status   03/01/2018 52 (H) 6 - 20 mg/dL Final     Estimated Creatinine Clearance: 26.9 mL/min (by C-G formula based on Cr of 3.19).    Lab Results   Component Value Date    WBC 9.93 03/01/2018    WBC 10.66 02/28/2018    WBC 11.03 (H) 02/27/2018     Temp Readings from Last 3 Encounters:   03/01/18 99.2 °F (37.3 °C) (Oral)   01/30/18 97.9 °F (36.6 °C) (Oral)   01/26/18 97.7 °F (36.5 °C) (Oral)     Baseline cultures/source/suscetibilit/labs/radiology:  2/05 MRSA screen: positive  2/05 Bcx E.coli ESBL+  2/09 Bcx x2: NG Final  2/16 Wound cx (abd wall): E.coli CRE, E. Faecalis (S-amp, gent, vanc)  2/17 Cdiff: Neg  2/25 Bcx ngtd    Surgery:   2/01 Lap ventral hernia repair with mesh. Extensive small bowel adhesiolysis  2/04 Small bowel enterotomy status post repair  2/13 & 2/16 Abdominal wound debridement and washout  2/19 " Tunneled dialysis catheter placement  2/27 abdominal wound debridement and washout     HD (BENNY  on CKD 3 requiring HD):  CRRT 2/14, HD 2/15, 2/20, 2/24, 2/27     Anti-Infectives     Ordered     Dose/Rate Route Frequency Start Stop    02/28/18 1256  vancomycin 750 mg/250 mL 0.9% NS add-vantage     Ordering Provider:  Masoud Arechiga MD    750 mg  over 75 Minutes Intravenous Once 02/28/18 1400 02/28/18 1540    02/27/18 1232  vancomycin 750 mg/250 mL 0.9% NS add-vantage     Ordering Provider:  Masoud Arechiga MD    750 mg Intravenous Once 02/27/18 1315 02/27/18 1357    02/26/18 1326  Pharmacy to dose vancomycin     Ordering Provider:  Masoud Arechiga MD     Does not apply Continuous PRN 02/26/18 1326 03/12/18 1425    02/25/18 1101  vancomycin 500 mg/100 mL 0.9% NS IVPB ([facility])     Ordering Provider:  Masoud Arechiga MD    500 mg  over 60 Minutes Intravenous Once 02/25/18 1145 02/25/18 1244    02/24/18 1031  vancomycin 500 mg/100 mL 0.9% NS IVPB ([facility])     Ordering Provider:  Masoud Arechiga MD    500 mg  over 60 Minutes Intravenous Once 02/24/18 1800 02/24/18 1908    02/22/18 0834  vancomycin 750 mg/250 mL 0.9% NS add-vantage     Ordering Provider:  Masoud Arechiga MD    750 mg  over 75 Minutes Intravenous Once 02/22/18 1400 02/22/18 1450    02/20/18 0808  vancomycin (VANCOCIN) in iso-osmotic dextrose IVPB 1 g (premix) 200 mL     Ordering Provider:  Masoud Arechiga MD    1,000 mg  over 100 Minutes Intravenous Once 02/20/18 1400 02/20/18 1716    02/20/18 1239  meropenem (MERREM) 1 g/100 mL 0.9% NS VTB ([facility])     Masoud Arechiga MD reviewed the order on 02/26/18 1326.   Ordering Provider:  Masoud Arechiga MD    1 g  over 180 Minutes Intravenous Every 24 Hours 02/20/18 1400 03/05/18 1324    02/20/18 1242  levoFLOXacin (LEVAQUIN) 750 mg/150 mL D5W (premix) (LEVAQUIN) 750 mg     Ordering Provider:  Masoud Arechiga MD    750 mg  Intravenous Every 48 Hours 18 1400 18 1759    18 1423  vancomycin 1500 mg/500 mL 0.9% NS IVPB ([facility])     Ordering Provider:  Masoud Arechiga MD    1,500 mg  over 150 Minutes Intravenous Once 18 1500 18 1856    18 1423  Vancomycin Pharmacy Intermittent Dosing     Marge Vela [facility] reviewed the order on 18 1443.   Ordering Provider:  Masoud Arechiga MD     Does not apply Daily 18 1500 18 2359    18 1414  Pharmacy to dose vancomycin     Masoud Arechiga MD let the order  on 18 1345.   Ordering Provider:  Masoud Arechiga MD     Does not apply Continuous PRN 18 1413 18 1344    02/15/18 1056  ertapenem (INVanz) 500 mg in sodium chloride 0.9 % 50 mL IVPB     Ordering Provider:  Masoud Arechiga MD    500 mg  100 mL/hr over 30 Minutes Intravenous Every 24 Hours 18 1300 18 1524    18 0755  vancomycin (VANCOCIN) in iso-osmotic dextrose IVPB 1 g (premix) 200 mL     Ordering Provider:  Tania Kennedy MD    1,000 mg  over 120 Minutes Intravenous Once 18 0830 18 1130    18 1842  [MAR Hold]  vancomycin 2500 mg/500 mL 0.9% NS IVPB ([facility])     (MAR Hold since 18 1937)   Ordering Provider:  Bryant Spence MD    20 mg/kg × 123 kg Intravenous Once 18 1111  CeFAZolin in Sodium Chloride (ANCEF) IVPB solution 3 g     Ordering Provider:  Tri Guzman MD    3 g  200 mL/hr over 30 Minutes Intravenous On Call to O.R. 18 1145 18 1229           No results found for: LULÚ  Lab Results   Component Value Date    VANCORANDOM 25.00 2018    VANCORANDOM 19.40 2018    VANCORANDOM 20.40 2018       Vancomycin dosing history:    1626  Vancomycin 1500mg IV x1    0518  Vanc random: 17.8 mcg/mL; HD today   153  Vancomycin 1000mg IV x1    0436  Vanc random: 22.5 mcg/mL    06  Vanc random:  20.9 mcg/mL, HD today  2/22 1335  Vancomycin 750 mg IV x1   2/24 0337  Vanc random: 21.40 mcg/mL, HD today  2/24 1808  Vancomycin 500 mg IV x1   2/25 0536  Vanc random: 18.60 mcg/mL  2/25 1144   Vancomycin 500 mg    2/26 0606 Vanc random: 20.4 mcg/mL, no dose today  2/27 HD scheduled, vanc 750 mg IV x1    2/28 0733 Vanc random = 19.4 mcg/ml  2/28 1425 Vanc 750mg iv x 1     3/1 0516 Vanc random: 25 mcg/mL    Assessment/Plan  1. Vancomycin random level of 25 mcg/mL. Given level and no dialysis for today, will not re-dose vancomycin today.  2. Will monitor renal function and check AM vancomycin random level.     Pharmacy will continue to follow daily while on vancomycin and adjust as needed.     Yojana Jolley, Pharm.D.  03/01/18 8:55 AM

## 2018-03-01 NOTE — PROGRESS NOTES
Chief Complaint: POD # 2, 10, 14, 17, 24, 28    Subjective   Pt reports sitting at bedside with PT today, feels nauseated today  Objective     Vital signs in last 24 hours:  Temp:  [99.1 °F (37.3 °C)-100.3 °F (37.9 °C)] 99.2 °F (37.3 °C)  Heart Rate:  [] 106  Resp:  [16-18] 18  BP: (106-123)/(54-72) 123/72    Intake/Output last 3 shifts:  I/O last 3 completed shifts:  In: 940 [P.O.:840; IV Piggyback:100]  Out: 2085 [Urine:1975; Other:110]    Intake/Output this shift:  I/O this shift:  In: -   Out: 30 [Other:30]    Physical Exam:  Respiratory: CTA, good inspiratory effort  CV: RRR  Abd: + BS, soft, ND, ANDER 40 cc serosanguinous, midline wound and RLQ wound clean with good granulation tissue, no necrosis     Results from last 7 days  Lab Units 03/01/18  0516   WBC 10*3/mm3 9.93   HEMOGLOBIN g/dL 7.8*   HEMATOCRIT % 25.1*   PLATELETS 10*3/mm3 360       Results from last 7 days  Lab Units 03/01/18  0516   SODIUM mmol/L 140   POTASSIUM mmol/L 4.0   CHLORIDE mmol/L 99   CO2 mmol/L 25.6   BUN mg/dL 52*   CREATININE mg/dL 3.19*   CALCIUM mg/dL 7.7*   BILIRUBIN mg/dL 0.4   ALK PHOS U/L 68   ALT (SGPT) U/L 11   AST (SGOT) U/L 30   GLUCOSE mg/dL 100*       Assessment/Plan   S/P Shiley Cath   S/P wound debridement and washout x 3  S/P exploratory laparotomy, small bowel resection, mesh removal  S/P recurrent lap incisional hernia repair with small bowel adenolysis  Wound appears to be improving with granulation tissue  A. Fib - converted to sinus rhythm - oral amiodarone  BENNY - dialysis - Catheter placed by vascular surgery, nephrology following   PT/OT  Anemia - chronic on acute - transfuse one unit today of PRBC    Tri Guzman MD

## 2018-03-01 NOTE — PLAN OF CARE
Problem: Patient Care Overview (Adult)  Goal: Plan of Care Review  Outcome: Ongoing (interventions implemented as appropriate)   03/01/18 0647   Coping/Psychosocial Response Interventions   Plan Of Care Reviewed With patient   Patient Care Overview   Progress no change   Outcome Evaluation   Outcome Summary/Follow up Plan vss. c/o pain with movement. Abdominal dressing dry/intact. UA positive for UTI. Small BM. Dressing to be changed this AM.Low grade temp 100.3, treated with tylenol. Will continue to monitor.

## 2018-03-01 NOTE — PROGRESS NOTES
Continued Stay Note  [facility]     Patient Name: Mariya Campos  MRN: [id number]  Today's Date: 3/1/2018    Admit Date: 2/1/2018          Discharge Plan       03/01/18 1203    Case Management/Social Work Plan    Plan [facility] pending bed availablity continue to await medical readiness.    Patient/Family In Agreement With Plan yes    Additional Comments Spoke with fany Baugh [place] next week also discussed awaiting confirmed need for HD at [place] as well. Spoke with Mary Ann with [facility] and pt has been accepted pending bed availablity when medically ready.  HD is not confirmed yet if it will be needed at [place]. Packet in CCP office. Brigid PATIÑO/CCP              Discharge Codes     None            Katelin Gar, RN

## 2018-03-02 LAB
ABO + RH BLD: NORMAL
ALBUMIN SERPL-MCNC: 2 G/DL (ref 3.5–5.2)
ALBUMIN/GLOB SERPL: 0.5 G/DL
ALP SERPL-CCNC: 68 U/L (ref 39–117)
ALT SERPL W P-5'-P-CCNC: 14 U/L (ref 1–33)
ANION GAP SERPL CALCULATED.3IONS-SCNC: 9.3 MMOL/L
AST SERPL-CCNC: 31 U/L (ref 1–32)
BACTERIA SPEC AEROBE CULT: NORMAL
BASOPHILS # BLD AUTO: 0.04 10*3/MM3 (ref 0–0.2)
BASOPHILS NFR BLD AUTO: 0.5 % (ref 0–1.5)
BH BB BLOOD EXPIRATION DATE: NORMAL
BH BB BLOOD TYPE BARCODE: 7300
BH BB DISPENSE STATUS: NORMAL
BH BB PRODUCT CODE: NORMAL
BH BB UNIT NUMBER: NORMAL
BILIRUB SERPL-MCNC: 0.4 MG/DL (ref 0.1–1.2)
BUN BLD-MCNC: 55 MG/DL (ref 6–20)
BUN/CREAT SERPL: 17.1 (ref 7–25)
CALCIUM SPEC-SCNC: 8.1 MG/DL (ref 8.6–10.5)
CHLORIDE SERPL-SCNC: 100 MMOL/L (ref 98–107)
CO2 SERPL-SCNC: 31.7 MMOL/L (ref 22–29)
CREAT BLD-MCNC: 3.22 MG/DL (ref 0.57–1)
DEPRECATED RDW RBC AUTO: 50.8 FL (ref 37–54)
EOSINOPHIL # BLD AUTO: 0.09 10*3/MM3 (ref 0–0.7)
EOSINOPHIL NFR BLD AUTO: 1 % (ref 0.3–6.2)
ERYTHROCYTE [DISTWIDTH] IN BLOOD BY AUTOMATED COUNT: 14.8 % (ref 11.7–13)
GFR SERPL CREATININE-BSD FRML MDRD: 15 ML/MIN/1.73
GLOBULIN UR ELPH-MCNC: 4.2 GM/DL
GLUCOSE BLD-MCNC: 98 MG/DL (ref 65–99)
HCT VFR BLD AUTO: 27.6 % (ref 35.6–45.5)
HGB BLD-MCNC: 8.8 G/DL (ref 11.9–15.5)
IMM GRANULOCYTES # BLD: 0.1 10*3/MM3 (ref 0–0.03)
IMM GRANULOCYTES NFR BLD: 1.2 % (ref 0–0.5)
LYMPHOCYTES # BLD AUTO: 1.04 10*3/MM3 (ref 0.9–4.8)
LYMPHOCYTES NFR BLD AUTO: 12.1 % (ref 19.6–45.3)
MAGNESIUM SERPL-MCNC: 1.8 MG/DL (ref 1.6–2.6)
MCH RBC QN AUTO: 29.6 PG (ref 26.9–32)
MCHC RBC AUTO-ENTMCNC: 31.9 G/DL (ref 32.4–36.3)
MCV RBC AUTO: 92.9 FL (ref 80.5–98.2)
MONOCYTES # BLD AUTO: 0.72 10*3/MM3 (ref 0.2–1.2)
MONOCYTES NFR BLD AUTO: 8.4 % (ref 5–12)
NEUTROPHILS # BLD AUTO: 6.63 10*3/MM3 (ref 1.9–8.1)
NEUTROPHILS NFR BLD AUTO: 76.8 % (ref 42.7–76)
PHOSPHATE SERPL-MCNC: 7.2 MG/DL (ref 2.5–4.5)
PLATELET # BLD AUTO: 366 10*3/MM3 (ref 140–500)
PMV BLD AUTO: 8.9 FL (ref 6–12)
POTASSIUM BLD-SCNC: 3.9 MMOL/L (ref 3.5–5.2)
PROT SERPL-MCNC: 6.2 G/DL (ref 6–8.5)
RBC # BLD AUTO: 2.97 10*6/MM3 (ref 3.9–5.2)
SODIUM BLD-SCNC: 141 MMOL/L (ref 136–145)
UNIT  ABO: NORMAL
UNIT  RH: NORMAL
VANCOMYCIN SERPL-MCNC: 17.9 MCG/ML (ref 5–40)
WBC NRBC COR # BLD: 8.62 10*3/MM3 (ref 4.5–10.7)

## 2018-03-02 PROCEDURE — 99024 POSTOP FOLLOW-UP VISIT: CPT | Performed by: SURGERY

## 2018-03-02 PROCEDURE — 80202 ASSAY OF VANCOMYCIN: CPT | Performed by: INTERNAL MEDICINE

## 2018-03-02 PROCEDURE — 99232 SBSQ HOSP IP/OBS MODERATE 35: CPT | Performed by: INTERNAL MEDICINE

## 2018-03-02 PROCEDURE — 80053 COMPREHEN METABOLIC PANEL: CPT | Performed by: INTERNAL MEDICINE

## 2018-03-02 PROCEDURE — 97110 THERAPEUTIC EXERCISES: CPT

## 2018-03-02 PROCEDURE — 25010000002 HEPARIN (PORCINE) PER 1000 UNITS: Performed by: INTERNAL MEDICINE

## 2018-03-02 PROCEDURE — 83735 ASSAY OF MAGNESIUM: CPT | Performed by: INTERNAL MEDICINE

## 2018-03-02 PROCEDURE — 85025 COMPLETE CBC W/AUTO DIFF WBC: CPT | Performed by: INTERNAL MEDICINE

## 2018-03-02 PROCEDURE — 25010000002 LEVOFLOXACIN PER 250 MG: Performed by: INTERNAL MEDICINE

## 2018-03-02 PROCEDURE — 84100 ASSAY OF PHOSPHORUS: CPT | Performed by: INTERNAL MEDICINE

## 2018-03-02 PROCEDURE — 25010000002 HYDROMORPHONE PER 4 MG: Performed by: SURGERY

## 2018-03-02 PROCEDURE — 25010000002 VANCOMYCIN 750 MG RECONSTITUTED SOLUTION: Performed by: INTERNAL MEDICINE

## 2018-03-02 PROCEDURE — 25010000002 MEROPENEM: Performed by: INTERNAL MEDICINE

## 2018-03-02 RX ORDER — OXYCODONE HYDROCHLORIDE AND ACETAMINOPHEN 5; 325 MG/1; MG/1
1 TABLET ORAL EVERY 4 HOURS PRN
Status: DISPENSED | OUTPATIENT
Start: 2018-03-02 | End: 2018-03-12

## 2018-03-02 RX ORDER — OXYCODONE HYDROCHLORIDE AND ACETAMINOPHEN 5; 325 MG/1; MG/1
2 TABLET ORAL EVERY 4 HOURS PRN
Status: DISPENSED | OUTPATIENT
Start: 2018-03-02 | End: 2018-03-12

## 2018-03-02 RX ADMIN — DAKIN'S SOLUTION 0.125% (QUARTER STRENGTH): 0.12 SOLUTION at 11:08

## 2018-03-02 RX ADMIN — PANTOPRAZOLE SODIUM 40 MG: 40 TABLET, DELAYED RELEASE ORAL at 06:30

## 2018-03-02 RX ADMIN — HEPARIN SODIUM 5000 UNITS: 5000 INJECTION, SOLUTION INTRAVENOUS; SUBCUTANEOUS at 06:27

## 2018-03-02 RX ADMIN — Medication 10 ML: at 06:26

## 2018-03-02 RX ADMIN — METOPROLOL TARTRATE 12.5 MG: 25 TABLET ORAL at 21:38

## 2018-03-02 RX ADMIN — Medication 10 ML: at 21:38

## 2018-03-02 RX ADMIN — METOPROLOL TARTRATE 12.5 MG: 25 TABLET ORAL at 11:09

## 2018-03-02 RX ADMIN — HYDROMORPHONE HYDROCHLORIDE 1 MG: 1 INJECTION, SOLUTION INTRAMUSCULAR; INTRAVENOUS; SUBCUTANEOUS at 11:08

## 2018-03-02 RX ADMIN — MEROPENEM 1 G: 1 INJECTION, POWDER, FOR SOLUTION INTRAVENOUS at 21:38

## 2018-03-02 RX ADMIN — HEPARIN SODIUM 5000 UNITS: 5000 INJECTION, SOLUTION INTRAVENOUS; SUBCUTANEOUS at 21:38

## 2018-03-02 RX ADMIN — DAKIN'S SOLUTION 0.125% (QUARTER STRENGTH): 0.12 SOLUTION at 00:02

## 2018-03-02 RX ADMIN — HYDROMORPHONE HYDROCHLORIDE 1 MG: 1 INJECTION, SOLUTION INTRAMUSCULAR; INTRAVENOUS; SUBCUTANEOUS at 21:39

## 2018-03-02 RX ADMIN — Medication 10 ML: at 11:09

## 2018-03-02 RX ADMIN — MIRTAZAPINE 15 MG: 15 TABLET, FILM COATED ORAL at 21:38

## 2018-03-02 RX ADMIN — LEVOFLOXACIN 750 MG: 5 INJECTION, SOLUTION INTRAVENOUS at 19:29

## 2018-03-02 RX ADMIN — SODIUM CHLORIDE 750 MG: 900 INJECTION, SOLUTION INTRAVENOUS at 17:05

## 2018-03-02 RX ADMIN — Medication 10 ML: at 00:00

## 2018-03-02 RX ADMIN — DAKIN'S SOLUTION 0.125% (QUARTER STRENGTH): 0.12 SOLUTION at 21:39

## 2018-03-02 NOTE — PLAN OF CARE
Problem: Patient Care Overview (Adult)  Goal: Plan of Care Review  Outcome: Ongoing (interventions implemented as appropriate)   03/02/18 0650   Coping/Psychosocial Response Interventions   Plan Of Care Reviewed With patient   Patient Care Overview   Progress progress toward functional goals as expected   Outcome Evaluation   Outcome Summary/Follow up Plan pt resting well, medicated with dilaudid prior to dressing change, kane well, f/c d/c voiding incontinent c/o mild buring with urination, VSS, will continue to monitor       Problem: Pressure Ulcer Risk (Aristeo Scale) (Adult,Obstetrics,Pediatric)  Goal: Identify Related Risk Factors and Signs and Symptoms  Outcome: Ongoing (interventions implemented as appropriate)   03/02/18 0650   Pressure Ulcer Risk (Aristeo Scale)   Related Risk Factors (Pressure Ulcer Risk (Aristeo Scale)) body weight extremes;hospitalization prolonged;infection;mobility impaired

## 2018-03-02 NOTE — PROGRESS NOTES
Continued Stay Note  [facility]     Patient Name: Mariya Campos  MRN: [id number]  Today's Date: 3/2/2018    Admit Date: 2/1/2018          Discharge Plan       03/02/18 1559    Case Management/Social Work Plan    Plan [facility] pending bed availablity and [facility] when medically ready    Patient/Family In Agreement With Plan yes    Additional Comments CCP followed up with patient at bedside and discussed [facility] has accepted pending bed availablity when pt is medically ready. CCP asked if would like to call spouse, and she stated she would tell him. CCP explained [facility] process and continuing to follow for HD setup if needed. Spoke with Kirby with [facility] and he states if pt does goes home on IV antibiotics vs rehab, insurance will cover 80% and copay to pt will be 20%, Kirby continues to follow. Brigid PATIÑO/CCP              Discharge Codes     None            Katelin Gar, RN

## 2018-03-02 NOTE — THERAPY TREATMENT NOTE
Acute Care - Physical Therapy Treatment Note  [facility]     Patient Name: Mariya Campos  : 1960  MRN: [id number]  Today's Date: 3/2/2018  Onset of Illness/Injury or Date of Surgery Date: 18          Admit Date: 2018    Visit Dx:    ICD-10-CM ICD-9-CM   1. Incisional hernia, without obstruction or gangrene K43.2 553.21   2. Recurrent ventral hernia K43.2 553.21   3. Generalized weakness R53.1 780.79   4. CRF (chronic renal failure), stage 3 (moderate) N18.3 585.3   5. Wound infection T14.8XXA 958.3    L08.9    6. ARF (acute renal failure) with tubular necrosis N17.0 584.5     Patient Active Problem List   Diagnosis   • Atopic rhinitis   • Fatigue   • Abnormal mammogram   • Adiposity   • Abnormal ECG   • CRF (chronic renal failure), stage 3 (moderate)   • Anemia   • Recurrent UTI   • Osteopenia   • Essential hypertension   • Incisional hernia   • Moderate persistent asthma without complication   • Incisional hernia, without obstruction or gangrene   • Recurrent ventral hernia   • Bacteremia, escherichia coli   • Wound infection   • Complications, dialysis, catheter, mechanical, initial encounter               Adult Rehabilitation Note       18 1300 18 0848 18 1502    Rehab Assessment/Intervention    Discipline physical therapy assistant  -CW physical therapist  -AA physical therapist  -AA    Document Type therapy note (daily note)  -CW therapy note (daily note)  -AA therapy note (daily note)  -AA    Subjective Information agree to therapy;complains of;weakness;fatigue  -CW agree to therapy;complains of;weakness;fatigue;pain;dizziness  -AA agree to therapy;complains of;weakness;fatigue;pain  -AA    Patient Effort, Rehab Treatment good  -CW good  -AA good  -AA    Symptoms Noted During/After Treatment  fatigue;increased pain  -AA fatigue;increased pain  -AA    Precautions/Limitations fall precautions  -CW fall precautions  -AA fall precautions  -AA    Patient Response to  Treatment  tolerated but fatigued  -AA tolerated but fatigued  -AA    Recorded by [CW] Leandro Posada PTA [AA] Manju Laguerre PT [AA] Manju Laguerre PT    Pain Assessment    Pain Assessment 0-10  -CW 0-10  -AA 0-10  -AA    Pain Score 5  -CW 5  -AA 4  -AA    Post Pain Score 5  -CW  7  -AA    Pain Type Surgical pain  -CW Acute pain;Surgical pain  -AA Acute pain;Surgical pain  -AA    Pain Location Abdomen  -CW Abdomen  -AA Abdomen  -AA    Pain Intervention(s) Repositioned;Ambulation/increased activity  -CW Ambulation/increased activity;Repositioned  -AA Ambulation/increased activity;Repositioned  -AA    Response to Interventions kane  -CW tolerated but fatigued  -AA tolerated but fatigued  -AA    Recorded by [CW] Leandro Posada, PTA [AA] Manju Laguerre PT [AA] Manju Laguerre PT    Cognitive Assessment/Intervention    Current Cognitive/Communication Assessment functional  -CW functional  -AA functional  -AA    Orientation Status oriented x 4  -CW oriented x 4  -AA oriented x 4  -AA    Follows Commands/Answers Questions 100% of the time  -% of the time  -% of the time;able to follow single-step instructions;needs cueing;needs increased time  -AA    Personal Safety WNL/WFL  -CW WNL/WFL  -AA WNL/WFL  -AA    Personal Safety Interventions fall prevention program maintained;gait belt;muscle strengthening facilitated;nonskid shoes/slippers when out of bed  -CW fall prevention program maintained;nonskid shoes/slippers when out of bed;gait belt  -AA fall prevention program maintained  -AA    Recorded by [CW] Leandro Posada, PTA [AA] Manju Laguerre PT [AA] Manju Laguerre, PT    Bed Mobility, Assessment/Treatment    Bed Mobility, Assistive Device  bed rails;head of bed elevated  -AA bed rails;head of bed elevated;draw sheet  -AA    Bed Mob, Supine to Sit, [place] minimum assist (75% patient effort)  -CW moderate assist (50% patient effort);2 person assist required;verbal  cues required  -AA moderate assist (50% patient effort);2 person assist required;verbal cues required  -AA    Bed Mob, Sit to Supine, [place] not tested  -CW maximum assist (25% patient effort);2 person assist required;verbal cues required  -AA maximum assist (25% patient effort);2 person assist required;verbal cues required  -AA    Bed Mobility, Safety Issues  impaired trunk control for bed mobility;decreased use of arms for pushing/pulling;decreased use of legs for bridging/pushing  -AA impaired trunk control for bed mobility;decreased use of arms for pushing/pulling;decreased use of legs for bridging/pushing  -AA    Bed Mobility, Impairments  strength decreased;pain  -AA strength decreased;pain  -AA    Bed Mobility, Comment up to chair  -CW verbal cues for hand placement and log roll technique  -AA verbal cues for hand placement  -AA    Recorded by [CW] Leandro Posada, PTA [AA] Manju Laguerre PT [AA] Manju Laguerre PT    Transfer Assessment/Treatment    Transfers, Sit-Stand [place] moderate assist (50% patient effort)  -CW moderate assist (50% patient effort);2 person assist required;verbal cues required  -AA     Transfers, Stand-Sit [place] moderate assist (50% patient effort)  -CW moderate assist (50% patient effort);2 person assist required;verbal cues required  -AA     Transfers, Sit-Stand-Sit, Assist Device rolling walker  -CW other (see comments)   B handrails from hospital bed   -AA     Transfer, Safety Issues  weight-shifting ability decreased;knees buckling  -AA     Transfer, Impairments  strength decreased;pain  -AA     Transfer, Comment  sit <> stand X2 with ability to clear bed but unable to come up to full stand  -AA     Recorded by [CW] Leandro Posada, PTA [AA] Manju Laguerre, PT     Gait Assessment/Treatment    Gait, [place] Level minimum assist (75% patient effort)  -CW not appropriate to assess  -AA     Gait, Assistive Device rolling walker  -CW       Gait, Distance (Feet) 12  -CW      Gait, Gait Deviations jovany decreased;step length decreased;stride length decreased  -CW      Gait, Safety Issues step length decreased  -CW      Gait, Impairments strength decreased;impaired balance  -CW      Recorded by [CW] Leandro Posada PTA [AA] Manju Laguerre PT     Motor Skills/Interventions    Additional Documentation  Balance Skills Training (Group)  -AA Balance Skills Training (Group)  -AA    Recorded by  [AA] Manju Laguerre PT [AA] Manju Laguerre PT    Balance Skills Training    Sitting-Level of Assistance  Contact guard  -AA Minimum assistance;Moderate assistance  -AA    Sitting-Balance Support  Feet supported;Right upper extremity supported  -AA Feet supported;Right upper extremity supported;Left upper extremity supported  -AA    Sitting-Balance Activities  Trunk control activities;Lateral lean;Forward lean;Reaching across midline  -AA Trunk control activities;Lateral lean;Reaching across midline  -AA    Sitting # of Minutes  10  -AA 12  -AA    Recorded by  [AA] Manju Laguerre PT [AA] Manju Laguerre PT    Therapy Exercises    Bilateral Lower Extremities  AROM:;5 reps;sitting;hip flexion;10 reps;LAQ;ankle pumps/circles  -AA AROM:;10 reps;sitting;LAQ;ankle pumps/circles;knee flexion   unable to perform hip flexion due to abdominal pain  -AA    Trunk Exercises  10 reps;sitting;trunk flexion;trunk rotation  -AA 10 reps;sitting;trunk flexion;trunk rotation  -AA    Recorded by  [AA] Manju Laguerre PT [AA] Manju Laguerre PT    Positioning and Restraints    Pre-Treatment Position in bed  -CW in bed  -AA in bed  -AA    Post Treatment Position chair  -CW bed  -AA bed  -AA    In Bed  supine;call light within reach;encouraged to call for assist;with family/caregiver;legs elevated  -AA supine;call light within reach;encouraged to call for assist;legs elevated  -AA    In Chair notified nsg;sitting;call light within reach;encouraged to call  for assist  -CW      Recorded by [CW] Leandro Posada, PTA [AA] Manju Laguerre, PT [AA] Manju Laguerre, PT      02/28/18 1218          Rehab Assessment/Intervention    Discipline occupational therapist  -      Document Type therapy note (daily note)  -KP      Subjective Information agree to therapy;no complaints  -KP      Patient Effort, Rehab Treatment good  -KP      Precautions/Limitations fall precautions;other (see comments)   infection prec  -KP      Recorded by [KP] MAHOGANY Farah      Pain Assessment    Pain Assessment 0-10  -KP      Pain Score 3  -KP      Post Pain Score 3  -KP      Pain Type Acute pain;Surgical pain  -KP      Pain Location Abdomen  -KP      Pain Intervention(s) Repositioned  -KP      Recorded by [KP] MAHOGANY Farah      Cognitive Assessment/Intervention    Current Cognitive/Communication Assessment functional  -      Orientation Status oriented x 4  -KP      Follows Commands/Answers Questions 100% of the time;able to follow single-step instructions  -      Personal Safety WNL/WFL  -KP      Personal Safety Interventions fall prevention program maintained;muscle strengthening facilitated;nonskid shoes/slippers when out of bed  -KP      Recorded by [KP] MAHOGANY Farah      Bed Mobility, Assessment/Treatment    Bed Mobility, Comment pt doesn't want to do any bed mobility this am  -KP      Recorded by [KP] MAHOGANY Farah      Grooming Assessment/Training    Grooming Assess/Train, Position supine  -KP      Grooming Assess/Train, Indepen Level set up required;supervision required  -      Grooming Assess/Train, Comment brush teeth, wash face  -KP      Recorded by [KP] MAHOGANY Farah      Therapy Exercises    Bilateral Upper Extremity AROM:;30 reps;supine;elbow flexion/extension;pronation/supination;shoulder extension/flexion   10x3 w. rest breaks in between. chest press  -KP      Recorded by [KP] Yue Farley  OTR      Positioning and Restraints    Pre-Treatment Position in bed  -KP      Post Treatment Position bed  -KP      In Bed supine;call light within reach;encouraged to call for assist;exit alarm on  -KP      Recorded by [KP] Yue Farley, OTR        User Key  (r) = Recorded By, (t) = Taken By, (c) = Cosigned By    Initials Name Effective Dates     Yue Farley, OTR 04/13/15 -     CW Leandro Posada, PTA 12/13/16 -     AA Manju Laguerre, PT 09/05/17 -                 IP PT Goals       02/28/18 1536 02/21/18 1421       Bed Mobility PT LTG    Bed Mobility PT LTG, Time to Achieve  1 wk  -PC     Bed Mobility PT LTG, Activity Type  supine to sit/sit to supine  -PC     Bed Mobility PT LTG, [place] Level  moderate assist (50% patient effort)  -PC     Bed Mobility PT LTG, Date Goal Reviewed 02/28/18  -AA      Bed Mobility PT LTG, Outcome goal ongoing  -AA goal revised  -PC     Bed Mobility PT LTG, Reason Goal Not Met progress slower than expected  -AA medical status inhibits participation  -PC     Transfer Training PT LTG    Transfer Training PT LTG, Time to Achieve  1 wk  -PC     Transfer Training PT LTG, Activity Type  sit to stand/stand to sit  -PC     Transfer Training PT LTG, [place] Level  moderate assist (50% patient effort);2 person assist required  -PC     Transfer Training PT  LTG, Date Goal Reviewed 02/28/18  -AA      Transfer Training PT LTG, Outcome goal ongoing  -AA goal revised  -PC     Transfer Training PT LTG, Reason Goal Not Met progress slower than expected  - medical status inhibits participation  -PC     Gait Training PT LTG    Gait Training Goal PT LTG, Time to Achieve  1 wk  -PC     Gait Training Goal PT LTG, [place] Level  moderate assist (50% patient effort);2 person assist required  -PC     Gait Training Goal PT LTG, Assist Device  walker, rolling  -PC     Gait Training Goal PT LTG, Distance to Achieve  10 ft  -PC     Gait Training Goal PT LTG, Date Goal  Reviewed 02/28/18  -AA      Gait Training Goal PT LTG, Outcome goal ongoing  -AA goal revised  -PC     Gait Training Goal PT LTG, Reason Goal Not Met progress slower than expected  -AA medical status inhibits participation  -PC       User Key  (r) = Recorded By, (t) = Taken By, (c) = Cosigned By    Initials Name Provider Type    PC Joycelyn Fisher, PT Physical Therapist    AA Manju Laguerre, PT Physical Therapist          Physical Therapy Education     Title: PT OT SLP Therapies (Active)     Topic: Physical Therapy (Done)     Point: Mobility training (Done)    Learning Progress Summary    Learner Readiness Method Response Comment Documented by Status   Patient Acceptance E,TB VU,DU  CW 03/02/18 1351 Done    Acceptance E VU education on importance of mobility; education on BLE strengthening and core strengthening for improved mobility; education on transfer training AA 03/01/18 0921 Done    Acceptance E VU  AA 02/28/18 1535 Done    Acceptance E,TB DU,VU  CW 02/27/18 0908 Done    Acceptance E,D VU,NR  EJ 02/25/18 1543 Done    Acceptance E,TB,D VU,NR  CH 02/23/18 1558 Done    Acceptance E VU,NR  AA 02/17/18 1156 Done    Acceptance E,TB,D VU,NR  SH 02/12/18 0902 Done    Acceptance E,TB,D NR  RW 02/11/18 1410 Active    Acceptance E,D NR  PC 02/09/18 1725 Active    Acceptance E NR  EM 02/08/18 1319 Active   Family Acceptance E VU education on importance of mobility; education on BLE strengthening and core strengthening for improved mobility; education on transfer training AA 03/01/18 0921 Done    Acceptance E,D VU,NR  EJ 02/25/18 1543 Done    Acceptance E,TB,D VU,NR  SH 02/12/18 0902 Done               Point: Home exercise program (Done)    Learning Progress Summary    Learner Readiness Method Response Comment Documented by Status   Patient Acceptance E,TB VU,DU  CW 03/02/18 1351 Done    Acceptance E VU education on importance of mobility; education on BLE strengthening and core strengthening for improved mobility;  education on transfer training AA 03/01/18 0921 Done    Acceptance E VU  AA 02/28/18 1535 Done    Acceptance E,TB DU,VU  CW 02/27/18 0908 Done    Acceptance E,D VU,NR  EJ 02/25/18 1543 Done    Acceptance E,D NR  PC 02/21/18 1419 Active    Acceptance E,D NR  PC 02/15/18 1453 Active    Acceptance E,TB,D VU,NR  SH 02/12/18 0902 Done    Acceptance E,TB,D NR  RW 02/11/18 1410 Active    Acceptance E,D NR  PC 02/09/18 1725 Active    Acceptance E NR  EM 02/08/18 1319 Active   Family Acceptance E VU education on importance of mobility; education on BLE strengthening and core strengthening for improved mobility; education on transfer training AA 03/01/18 0921 Done    Acceptance E,D VU,NR  EJ 02/25/18 1543 Done    Acceptance E,TB,D VU,NR  SH 02/12/18 0902 Done               Point: Body mechanics (Done)    Learning Progress Summary    Learner Readiness Method Response Comment Documented by Status   Patient Acceptance E,TB VU,DU  CW 03/02/18 1351 Done    Acceptance E VU education on importance of mobility; education on BLE strengthening and core strengthening for improved mobility; education on transfer training AA 03/01/18 0921 Done    Acceptance E VU  AA 02/28/18 1535 Done    Acceptance E,TB DU,VU  CW 02/27/18 0908 Done    Acceptance E,D VU,NR  EJ 02/25/18 1543 Done    Acceptance E,TB,D VU,NR  CH 02/23/18 1558 Done    Acceptance E,TB,D VU,NR  SH 02/12/18 0902 Done    Acceptance E,TB,D NR  RW 02/11/18 1410 Active    Acceptance E,D NR  PC 02/09/18 1725 Active   Family Acceptance E VU education on importance of mobility; education on BLE strengthening and core strengthening for improved mobility; education on transfer training AA 03/01/18 0921 Done    Acceptance E,D VU,NR  EJ 02/25/18 1543 Done    Acceptance E,TB,D VU,NR  SH 02/12/18 0902 Done               Point: Precautions (Done)    Learning Progress Summary    Learner Readiness Method Response Comment Documented by Status   Patient Acceptance E,TB VU,DU  CW 03/02/18 1351 Done     Acceptance E VU education on importance of mobility; education on BLE strengthening and core strengthening for improved mobility; education on transfer training AA 03/01/18 0921 Done    Acceptance E VU  AA 02/28/18 1535 Done    Acceptance E,TB DU,VU   02/27/18 0908 Done    Acceptance E,D VU,NR  EJ 02/25/18 1543 Done    Acceptance E,TB,D VU,NR   02/23/18 1558 Done    Acceptance E VU,NR  AA 02/17/18 1156 Done    Acceptance E,TB,D VU,NR   02/12/18 0902 Done    Acceptance E,TB,D NR   02/11/18 1410 Active    Acceptance E,D NR   02/09/18 1725 Active   Family Acceptance E VU education on importance of mobility; education on BLE strengthening and core strengthening for improved mobility; education on transfer training  03/01/18 0921 Done    Acceptance E,D VU,NR   02/25/18 1543 Done    Acceptance E,TB,D VU,NR   02/12/18 0902 Done                      User Key     Initials Effective Dates Name Provider Type Discipline     12/01/15 -  Isabel Manning, PT Physical Therapist PT    PC 12/01/15 -  Joycelyn Fisher, PT Physical Therapist PT    EM 12/01/15 -  Sade Vergara, PT Physical Therapist PT     04/21/17 -  Shira Martínez, PT Physical Therapist PT     04/06/16 -  Radha Bonds, PTA Physical Therapy Assistant PT     12/13/16 -  Leandro Posada, PTA Physical Therapy Assistant PT     09/05/17 -  Manju Laguerre, PT Physical Therapist PT     01/08/18 -  Sophy Catalan, PT Student PT Student PT                    PT Recommendation and Plan  Anticipated Discharge Disposition: home with home health  Planned Therapy Interventions: bed mobility training, gait training, home exercise program, transfer training  PT Frequency: daily  Plan of Care Review  Plan Of Care Reviewed With: patient  Progress: progress towards functional goals is fair  Outcome Summary/Follow up Plan: Pt is feeling better and able to improve with bed mobility, transfers, and amb distance with RWX but pt is still very  weak in B LE limiting amb distance and safety          Outcome Measures       03/02/18 1300 03/01/18 0900 02/28/18 1500    How much help from another person do you currently need...    Turning from your back to your side while in flat bed without using bedrails? 3  -CW 2  -AA 2  -AA    Moving from lying on back to sitting on the side of a flat bed without bedrails? 3  -CW 2  -AA 2  -AA    Moving to and from a bed to a chair (including a wheelchair)? 3  -CW 1  -AA 1  -AA    Standing up from a chair using your arms (e.g., wheelchair, bedside chair)? 3  -CW 2  -AA 1  -AA    Climbing 3-5 steps with a railing? 1  -CW 1  -AA 1  -AA    To walk in hospital room? 3  -CW 1  -AA 1  -AA    AM-PAC 6 Clicks Score 16  -CW 9  -AA 8  -AA    Functional Assessment    Outcome Measure Options AM-PAC 6 Clicks Basic Mobility (PT)  -CW AM-PAC 6 Clicks Basic Mobility (PT)  -AA AM-PAC 6 Clicks Basic Mobility (PT)  -AA      02/28/18 1220          How much help from another is currently needed...    Putting on and taking off regular lower body clothing? 1  -KP      Bathing (including washing, rinsing, and drying) 2  -KP      Toileting (which includes using toilet bed pan or urinal) 1  -KP      Putting on and taking off regular upper body clothing 2  -KP      Taking care of personal grooming (such as brushing teeth) 3  -KP      Eating meals 3  -KP      Score 12  -KP      Functional Assessment    Outcome Measure Options AM-PAC 6 Clicks Daily Activity (OT)  -        User Key  (r) = Recorded By, (t) = Taken By, (c) = Cosigned By    Initials Name Provider Type     Yue Farley, OTR Occupational Therapist    BILL Posada PTA Physical Therapy Assistant    JOHN Laguerre, PT Physical Therapist           Time Calculation:         PT Charges       03/02/18 1354          Time Calculation    Start Time 1337  -CW      Stop Time 1354  -CW      Time Calculation (min) 17 min  -CW      PT Received On 03/02/18  -CW      PT - Next  Appointment 03/03/18  -BILL        User Key  (r) = Recorded By, (t) = Taken By, (c) = Cosigned By    Initials Name Provider Type    CW Leandro Posada PTA Physical Therapy Assistant          Therapy Charges for Today     Code Description Service Date Service Provider Modifiers Qty    89596710724 HC PT THER PROC EA 15 MIN 3/2/2018 Leandro Posada PTA GP 1          PT G-Codes  Outcome Measure Options: AM-PAC 6 Clicks Basic Mobility (PT)    Leandro Posada PTA  3/2/2018

## 2018-03-02 NOTE — PROGRESS NOTES
Chief Complaint: POD # 3,11,15,18, 25, 29    Subjective   No new c/o  Objective     Vital signs in last 24 hours:  Temp:  [98.1 °F (36.7 °C)-99.1 °F (37.3 °C)] 98.1 °F (36.7 °C)  Heart Rate:  [101] 101  Resp:  [16-18] 18  BP: (113-147)/(63-79) 133/64    Intake/Output last 3 shifts:  I/O last 3 completed shifts:  In: 700 [Blood:600; IV Piggyback:100]  Out: 1230 [Urine:1200; Other:30]    Intake/Output this shift:       Physical Exam:  Respiratory: CTA, good inspiratory effort  CV: RRR  Abd: + BS, soft, midline wound good granulation tissue, RLQ wound good granulation tissue ANDER 30cc      Results from last 7 days  Lab Units 03/02/18  0625   WBC 10*3/mm3 8.62   HEMOGLOBIN g/dL 8.8*   HEMATOCRIT % 27.6*   PLATELETS 10*3/mm3 366       Results from last 7 days  Lab Units 03/02/18  0625   SODIUM mmol/L 141   POTASSIUM mmol/L 3.9   CHLORIDE mmol/L 100   CO2 mmol/L 31.7*   BUN mg/dL 55*   CREATININE mg/dL 3.22*   GLUCOSE mg/dL 98   CALCIUM mg/dL 8.1*       Assessment/Plan   S/P Shiley Cath   S/P wound debridement and washout x 3  S/P exploratory laparotomy, small bowel resection, mesh removal  S/P recurrent lap incisional hernia repair with small bowel adenolysis  Wound appears to be improving with granulation tissue  A. Fib - converted to sinus rhythm - oral amiodarone  BENNY - dialysis - Catheter placed by vascular surgery, nephrology following   PT/OT    Tri Guzman MD

## 2018-03-02 NOTE — PROGRESS NOTES
"Pharmacokinetic Consult - Vancomycin Dosing (Follow-up Note)    Mariya Campos is on day 11  pharmacy to dose vancomycin for intra-abdominal infection  Pharmacy dosing vancomycin per Dr Arechiga's request.   Goal trough: 15-20  mg/L   Admit date: 2/1/2018  9:56 AM    Relevant clinical data and objective history reviewed:  57 y.o. female 172.7 cm (68\") 120 kg (264 lb)    Past Medical History:   Diagnosis Date   • Abdominal pain    • Allergic rhinitis    • Anemia     ON IRON QOD   • Asthma    • Chronic bronchitis    • Chronic kidney disease     STAGE 3   SINCE 2015 WITH COLON SURGERY   • Colon cancer     CHEMO 6 MONTHS   • Hiatal hernia    • Hypertension    • Incisional hernia    • Overweight    • Peripheral neuropathy     FROM CHEMO TX'S   • Rosacea    • Visit for screening mammogram      Creatinine   Date Value Ref Range Status   03/02/2018 3.22 (H) 0.57 - 1.00 mg/dL Final   03/01/2018 3.19 (H) 0.57 - 1.00 mg/dL Final     BUN   Date Value Ref Range Status   03/02/2018 57 (H) 6 - 20 mg/dL Final     Estimated Creatinine Clearance: 26.9 mL/min (by C-G formula based on Cr of 3.22).    Lab Results   Component Value Date    WBC 8.62 03/02/2018     Temp Readings from Last 3 Encounters:   03/02/18 98.1 °F (36.7 °C) (Oral)   01/30/18 97.9 °F (36.6 °C) (Oral)   01/26/18 97.7 °F (36.5 °C) (Oral)     Baseline cultures/source/suscetibilit/labs/radiology:  2/05 MRSA screen: positive  2/05 Bcx E.coli ESBL+  2/09 Bcx x2: NG Final  2/16 Wound cx (abd wall): E.coli CRE, E. Faecalis (S-amp, gent, vanc)  2/17 Cdiff: Neg  2/25 Bcx ngtd     Surgery:   2/01 Lap ventral hernia repair with mesh. Extensive small bowel adhesiolysis  2/04 Small bowel enterotomy status post repair  2/13 & 2/16 Abdominal wound debridement and washout  2/19 Tunneled dialysis catheter placement  2/27 abdominal wound debridement and washout      HD (BENNY  on CKD 3 requiring HD):  CRRT 2/14, HD 2/15, 2/20, 2/24, 2/27     Anti-Infectives     Ordered     Dose/Rate " Route Frequency Start Stop    03/02/18 1350  vancomycin 750 mg/250 mL 0.9% NS add-vantage     Ordering Provider:  Masoud Arechiga MD    750 mg  over 75 Minutes Intravenous Once 03/02/18 1500      02/28/18 1256  vancomycin 750 mg/250 mL 0.9% NS add-vantage     Ordering Provider:  Masoud Arechiga MD    750 mg  over 75 Minutes Intravenous Once 02/28/18 1400 02/28/18 1540    02/27/18 1232  vancomycin 750 mg/250 mL 0.9% NS add-vantage     Ordering Provider:  Masoud Arechiga MD    750 mg Intravenous Once 02/27/18 1315 02/27/18 1357    02/26/18 1326  Pharmacy to dose vancomycin     Ordering Provider:  Masoud Arechiga MD     Does not apply Continuous PRN 02/26/18 1326 03/12/18 1425    02/25/18 1101  vancomycin 500 mg/100 mL 0.9% NS IVPB ([facility])     Ordering Provider:  Masoud Arechiga MD    500 mg  over 60 Minutes Intravenous Once 02/25/18 1145 02/25/18 1244    02/24/18 1031  vancomycin 500 mg/100 mL 0.9% NS IVPB ([facility])     Ordering Provider:  Masoud Arechiga MD    500 mg  over 60 Minutes Intravenous Once 02/24/18 1800 02/24/18 1908    02/22/18 0834  vancomycin 750 mg/250 mL 0.9% NS add-vantage     Ordering Provider:  Masoud Arechiga MD    750 mg  over 75 Minutes Intravenous Once 02/22/18 1400 02/22/18 1450    02/20/18 0808  vancomycin (VANCOCIN) in iso-osmotic dextrose IVPB 1 g (premix) 200 mL     Ordering Provider:  Masoud Arechiga MD    1,000 mg  over 100 Minutes Intravenous Once 02/20/18 1400 02/20/18 1716    02/20/18 1239  meropenem (MERREM) 1 g/100 mL 0.9% NS VTB ([facility])     Masoud Arechiga MD reviewed the order on 02/26/18 1326.   Ordering Provider:  Masoud Arechiga MD    1 g  over 180 Minutes Intravenous Every 24 Hours 02/20/18 1400 03/05/18 1324    02/20/18 1242  levoFLOXacin (LEVAQUIN) 750 mg/150 mL D5W (premix) (LEVAQUIN) 750 mg     Ordering Provider:  Masoud Arechiga MD    750 mg Intravenous Every 48 Hours  18 1400 18 1759    18 1423  vancomycin 1500 mg/500 mL 0.9% NS IVPB ([facility])     Ordering Provider:  Masoud Arechiga MD    1,500 mg  over 150 Minutes Intravenous Once 18 1500 18 1856    18 1423  Vancomycin Pharmacy Intermittent Dosing     Marge Vela RP reviewed the order on 18 1443.   Ordering Provider:  Masoud Arechiga MD     Does not apply Daily 18 1500 18 2359    18 1414  Pharmacy to dose vancomycin     Masoud Arechiga MD let the order  on 18 1345.   Ordering Provider:  Masoud Arechiga MD     Does not apply Continuous PRN 18 1413 18 1344    02/15/18 1056  ertapenem (INVanz) 500 mg in sodium chloride 0.9 % 50 mL IVPB     Ordering Provider:  Masoud Arechiga MD    500 mg  100 mL/hr over 30 Minutes Intravenous Every 24 Hours 18 1300 18 1524    18 0755  vancomycin (VANCOCIN) in iso-osmotic dextrose IVPB 1 g (premix) 200 mL     Ordering Provider:  Tania Kennedy MD    1,000 mg  over 120 Minutes Intravenous Once 18 0830 18 1130    18 1842  [MAR Hold]  vancomycin 2500 mg/500 mL 0.9% NS IVPB ([facility])     (MAR Hold since 18 1937)   Ordering Provider:  Bryant Spence MD    20 mg/kg × 123 kg Intravenous Once 18 1111  CeFAZolin in Sodium Chloride (ANCEF) IVPB solution 3 g     Ordering Provider:  Tri Guzman MD    3 g  200 mL/hr over 30 Minutes Intravenous On Call to O.R. 18 1145 18 1229            Lab Results   Component Value Date    VANCORANDOM 17.90 2018     Vancomycin dosing history:    1626  Vancomycin 1500mg IV x1                        518  Vanc random: 17.8 mcg/mL; HD today   1536  Vancomycin 1000mg IV x1                         0436  Vanc random: 22.5 mcg/mL                        600  Vanc random: 20.9 mcg/mL, HD today   133  Vancomycin 750 mg IV x1                         2/24 0337  Vanc random: 21.40 mcg/mL, HD today  2/24 1808  Vancomycin 500 mg IV x1                        2/25 0536  Vanc random: 18.60 mcg/mL  2/25 1144   Vancomycin 500 mg                         2/26 0606 Vanc random: 20.4 mcg/mL, no dose today  2/27 HD scheduled, vanc 750 mg IV x1                         2/28 0733 Vanc random = 19.4 mcg/ml  2/28 1425 Vanc 750mg iv x 1                          3/1 0516 Vanc random: 25 mcg/mL                        3/2  0625 Vanc random = 17.9 mcg/ml  3/2 ~1500 Vanc 750mg iv x 1 dose after HD if scheduled     Assessment/Plan  1. Vancomycin random level of 17.9 mcg/mL. Will give 1 time dose of Vanc 750mg ( after HD if scheduled)   2. Will monitor renal function and check AM vancomycin random level.     Pharmacy will continue to follow daily while on vancomycin and adjust as needed.       Allyssa Sánchez [facility]

## 2018-03-02 NOTE — PROGRESS NOTES
INFECTIOUS DISEASES PROGRESS NOTE    CC: f/u sepsis    S:   Intermittent abdominal pain, improved since most recent I&D  Wants to go home  No f/c/ns    O:  Physical Exam:  Temp:  [98.4 °F (36.9 °C)-99.5 °F (37.5 °C)] 98.4 °F (36.9 °C)  Heart Rate:  [] 101  Resp:  [16-20] 18  BP: ()/(57-79) 147/79  Physical Exam   Constitutional: She appears well-developed. No distress.   Abdominal: Soft. She exhibits distension. There is tenderness.   Musculoskeletal: She exhibits edema.   Neurological: She is alert.   Skin: Skin is warm and dry.   Psychiatric: She has a normal mood and affect. Her behavior is normal.        Diagnostics:    Cr 3.22  WBC 8.62   H/H 8.8/28     Vanc 17.9    Assessment/Plan   1.  Septic shock secondary to small bowel enterotomy and peritonitis status post repair with small bowel resection and anastomosis by Dr. Guzman on 2/4/18; shock resolved; Repeat I and D for wound cellulitis on 2/13/18 and 2/16. Cx with CRE and E faecalis; CT on 2/26 shows fistula, c/f free air and abscess; repeat incision and debridement on 2/27/18  2.  Acute kidney injury  3.  Persistent right pleural effusion, no pna.    Stable.  Cont on merrem, lvq, vanc, renally dosing.  Doses okay.  May need to increase merrem if cr recovers.  It is stable today. assuming that source control has been obtained, I would give her a week after her most recent surgery which would put her abx thru 3/5      Masoud Arechiga MD  9:00 AM  03/02/18

## 2018-03-02 NOTE — PAYOR COMM NOTE
"Mariya Barbosa (57 y.o. Female)     PLEASE ATTACHED CLINICAL REVIEW. PT. REMAINS INPT ON A MONITORED TELEM FLOOR.       REF#[id number]    PLEASE CALL   OR  [phone] WITH CONTINUED STAY.     THANK YOU    SATISH WOOTEN, JONATHAN, CCP    Date of Birth Social Security Number Address Home Phone MRN    1960  [facility] [facility] [address] [phone] [id number]    [employer] Marital Status          Unknown        Admission Date Admission Type Admitting Provider Attending Provider Department, Room/Bed    2/1/18 Elective Tri Guzman MD Figert, Patricia L, MD [address], 530/1    Discharge Date Discharge Disposition Discharge Destination                      Attending Provider: Tri Guzman MD     Allergies:  Fish-derived Products, Shellfish-derived Products    Isolation:  Contact   Infection:  CRE (02/20/18), ESBL E coli (02/11/18), MRSA (02/06/18)   Code Status:  FULL    Ht:  172.7 cm (68\")   Wt:  120 kg (264 lb)    Admission Cmt:  None   Principal Problem:  Wound infection [T14.8XXA,L08.9] More...                 Active Insurance as of 2/1/2018     Primary Coverage     Payor Plan Insurance Group Employer/Plan Group    [employer] EM[employer] [id number]     Payor Plan Address Payor Plan Phone Number Effective From Effective To    [address] [phone] 1/1/2015     [place] [address]       Subscriber Name Subscriber Birth Date Member ID       FELICIA BARBOSA 10/18/1965 [id number]                 Emergency Contacts      (Rel.) Home Phone Work Phone Mobile Phone    Felicia Barbosa (Spouse) [phone] -- [phone]            Intake & Output (last day)       03/01 0701 - 03/02 0700 03/02 0701 - 03/03 0700    P.O.      Blood 600     IV Piggyback      Total Intake(mL/kg) 600 (5)     Urine (mL/kg/hr) 450 (0.2)     Other 30 (0)     Stool 0 (0)     Total Output 480      Net +120            " Unmeasured Urine Occurrence  1 x    Unmeasured Stool Occurrence 1 x 1 x        Blood Administration Record         Completed transfusions     Ordered     Start    03/01/18 0829  Transfuse RBC, 1 Units Infuse Each Unit Over: 4H  Transfusion     Released Time Blood Unit Number Status   03/01/18 1134   18  [id number]  [id number] Completed 03/01/18 1539       03/01/18 0829                      Lab Results (last 24 hours)     Procedure Component Value Units Date/Time    Blood Culture - Blood, [[id number]]  (Normal) Collected:  02/25/18 2325    Specimen:  Blood from Arm, Left Updated:  03/01/18 2331     Blood Culture No growth at 4 days    Blood Culture - Blood, [[id number]]  (Normal) Collected:  02/25/18 2332    Specimen:  Blood from Arm, Left Updated:  03/02/18 0046     Blood Culture No growth at 4 days    CBC Auto Differential [[id number]]  (Abnormal) Collected:  03/02/18 0625    Specimen:  Blood Updated:  03/02/18 0655     WBC 8.62 10*3/mm3      RBC 2.97 (L) 10*6/mm3      Hemoglobin 8.8 (L) g/dL      Hematocrit 27.6 (L) %      MCV 92.9 fL      MCH 29.6 pg      MCHC 31.9 (L) g/dL      RDW 14.8 (H) %      RDW-SD 50.8 fl      MPV 8.9 fL      Platelets 366 10*3/mm3      Neutrophil % 76.8 (H) %      Lymphocyte % 12.1 (L) %      Monocyte % 8.4 %      Eosinophil % 1.0 %      Basophil % 0.5 %      Immature Grans % 1.2 (H) %      Neutrophils, Absolute 6.63 10*3/mm3      Lymphocytes, Absolute 1.04 10*3/mm3      Monocytes, Absolute 0.72 10*3/mm3      Eosinophils, Absolute 0.09 10*3/mm3      Basophils, Absolute 0.04 10*3/mm3      Immature Grans, Absolute 0.10 (H) 10*3/mm3     CBC & Differential [[id number]] Collected:  03/02/18 0625    Specimen:  Blood Updated:  03/02/18 0655    Narrative:       The following orders were created for panel order CBC & Differential.  Procedure                               Abnormality         Status                     ---------                               -----------         ------                      CBC Auto Differential[[id number]]        Abnormal            Final result                 Please view results for these tests on the individual orders.    Phosphorus [[id number]]  (Abnormal) Collected:  03/02/18 0625    Specimen:  Blood Updated:  03/02/18 0711     Phosphorus 7.2 (H) mg/dL     Magnesium [[id number]]  (Normal) Collected:  03/02/18 0625    Specimen:  Blood Updated:  03/02/18 0711     Magnesium 1.8 mg/dL     Vancomycin, Random [[id number]]  (Normal) Collected:  03/02/18 0625    Specimen:  Blood Updated:  03/02/18 0712     Vancomycin Random 17.90 mcg/mL     Comprehensive Metabolic Panel [[id number]]  (Abnormal) Collected:  03/02/18 0625    Specimen:  Blood Updated:  03/02/18 0712     Glucose 98 mg/dL      BUN 55 (H) mg/dL      Creatinine 3.22 (H) mg/dL      Sodium 141 mmol/L      Potassium 3.9 mmol/L      Chloride 100 mmol/L      CO2 31.7 (H) mmol/L      Calcium 8.1 (L) mg/dL      Total Protein 6.2 g/dL      Albumin 2.00 (L) g/dL      ALT (SGPT) 14 U/L      AST (SGOT) 31 U/L      Alkaline Phosphatase 68 U/L      Total Bilirubin 0.4 mg/dL      eGFR Non African Amer 15 (L) mL/min/1.73      Globulin 4.2 gm/dL      A/G Ratio 0.5 g/dL      BUN/Creatinine Ratio 17.1     Anion Gap 9.3 mmol/L         Orders (last 24 hrs)     Start     Ordered    03/02/18 1500  vancomycin 750 mg/250 mL 0.9% NS add-vantage  Once      03/02/18 1350    03/02/18 1010  oxyCODONE-acetaminophen (PERCOCET) 5-325 MG per tablet 1 tablet  Every 4 Hours PRN      03/02/18 1011    03/02/18 1010  oxyCODONE-acetaminophen (PERCOCET) 5-325 MG per tablet 2 tablet  Every 4 Hours PRN      03/02/18 1011    03/02/18 0959  Vital Signs  Per Hospital Policy      03/02/18 0959    03/02/18 0600  Comprehensive Metabolic Panel  Morning Draw      03/01/18 0829    03/02/18 0600  Phosphorus  Morning Draw      03/01/18 0829    03/02/18 0600  Magnesium  Morning Draw      03/01/18 0829    03/02/18 0600  Vancomycin, Random  Morning Draw      03/01/18 0910    03/02/18  0600  CBC Auto Differential  PROCEDURE ONCE      03/02/18 0000             Physician Progress Notes (last 24 hours) (Notes from 3/1/2018  1:52 PM through 3/2/2018  1:52 PM)      Masoud Arechiga MD at 3/2/2018  9:00 AM  Version 1 of 1         INFECTIOUS DISEASES PROGRESS NOTE    CC: f/u sepsis    S:   Intermittent abdominal pain, improved since most recent I&D  Wants to go home  No f/c/ns    O:  Physical Exam:  Temp:  [98.4 °F (36.9 °C)-99.5 °F (37.5 °C)] 98.4 °F (36.9 °C)  Heart Rate:  [] 101  Resp:  [16-20] 18  BP: ()/(57-79) 147/79  Physical Exam   Constitutional: She appears well-developed. No distress.   Abdominal: Soft. She exhibits distension. There is tenderness.   Musculoskeletal: She exhibits edema.   Neurological: She is alert.   Skin: Skin is warm and dry.   Psychiatric: She has a normal mood and affect. Her behavior is normal.        Diagnostics:    Cr 3.22  WBC 8.62   H/H 8.8/28     Vanc 17.9    Assessment/Plan   1.  Septic shock secondary to small bowel enterotomy and peritonitis status post repair with small bowel resection and anastomosis by Dr. Guzman on 2/4/18; shock resolved; Repeat I and D for wound cellulitis on 2/13/18 and 2/16. Cx with CRE and E faecalis; CT on 2/26 shows fistula, c/f free air and abscess; repeat incision and debridement on 2/27/18  2.  Acute kidney injury  3.  Persistent right pleural effusion, no pna.    Stable.  Cont on merrem, lvq, vanc, renally dosing.  Doses okay.  May need to increase merrem if cr recovers.  It is stable today. assuming that source control has been obtained, I would give her a week after her most recent surgery which would put her abx thru 3/5      Masoud Arechiga MD  9:00 AM  03/02/18     Electronically signed by Masoud Arechiga MD at 3/2/2018  9:04 AM     Tri Guzman MD Physician Signed Surgery Progress Notes Date of Service: 3/1/2018 10:46 AM      Expand All Collapse All    []Hide copied  text  Chief Complaint: POD # 2, 10, 14, 17, 24, 28        Subjective       Pt reports sitting at bedside with PT today, feels nauseated today     Objective         Vital signs in last 24 hours:  Temp:  [99.1 °F (37.3 °C)-100.3 °F (37.9 °C)] 99.2 °F (37.3 °C)  Heart Rate:  [] 106  Resp:  [16-18] 18  BP: (106-123)/(54-72) 123/72     Intake/Output last 3 shifts:  I/O last 3 completed shifts:  In: 940 [P.O.:840; IV Piggyback:100]  Out: 2085 [Urine:1975; Other:110]     Intake/Output this shift:  I/O this shift:  In: -   Out: 30 [Other:30]     Physical Exam:  Respiratory: CTA, good inspiratory effort  CV: RRR  Abd: + BS, soft, ND, ANDER 40 cc serosanguinous, midline wound and RLQ wound clean with good granulation tissue, no necrosis      Results from last 7 days  Lab Units 03/01/18  0516   WBC 10*3/mm3 9.93   HEMOGLOBIN g/dL 7.8*   HEMATOCRIT % 25.1*   PLATELETS 10*3/mm3 360         Results from last 7 days  Lab Units 03/01/18  0516   SODIUM mmol/L 140   POTASSIUM mmol/L 4.0   CHLORIDE mmol/L 99   CO2 mmol/L 25.6   BUN mg/dL 52*   CREATININE mg/dL 3.19*   CALCIUM mg/dL 7.7*   BILIRUBIN mg/dL 0.4   ALK PHOS U/L 68   ALT (SGPT) U/L 11   AST (SGOT) U/L 30   GLUCOSE mg/dL 100*            Assessment/Plan       S/P Shiley Cath   S/P wound debridement and washout x 3  S/P exploratory laparotomy, small bowel resection, mesh removal  S/P recurrent lap incisional hernia repair with small bowel adenolysis  Wound appears to be improving with granulation tissue  A. Fib - converted to sinus rhythm - oral amiodarone  BENNY - dialysis - Catheter placed by vascular surgery, nephrology following   PT/OT  Anemia - chronic on acute - transfuse one unit today of PRBC     Tri Guzman MD

## 2018-03-02 NOTE — PLAN OF CARE
Problem: Patient Care Overview (Adult)  Goal: Plan of Care Review  Outcome: Ongoing (interventions implemented as appropriate)   03/02/18 3081   Coping/Psychosocial Response Interventions   Plan Of Care Reviewed With patient   Patient Care Overview   Progress improving   Outcome Evaluation   Outcome Summary/Follow up Plan Pt has had more discomfort today, Md ordered percocet but pt had Dilaudid with Dsg change. Encouraged OOB activities, PT got up to chair and has been up most of the afternoon, Wound dressing changed and tissues look to be healing well with beefy red appearance and minimal drainage. Plans for possible wound vac after sx d/c the ANDER drain probably monday. staff noticed that pt is more motivated to move when family is not present. VSS     Goal: Adult Individualization and Mutuality  Outcome: Ongoing (interventions implemented as appropriate)    Goal: Discharge Needs Assessment  Outcome: Ongoing (interventions implemented as appropriate)      Problem: Fall Risk (Adult)  Goal: Absence of Falls  Outcome: Ongoing (interventions implemented as appropriate)      Problem: Pressure Ulcer Risk (Aristeo Scale) (Adult,Obstetrics,Pediatric)  Goal: Identify Related Risk Factors and Signs and Symptoms  Outcome: Outcome(s) achieved Date Met: 03/02/18    Goal: Skin Integrity  Outcome: Outcome(s) achieved Date Met: 03/02/18    Goal: Identify Related Risk Factors and Signs and Symptoms  Outcome: Outcome(s) achieved Date Met: 03/02/18    Goal: Skin Integrity  Outcome: Ongoing (interventions implemented as appropriate)      Problem: Pain, Acute (Adult)  Goal: Identify Related Risk Factors and Signs and Symptoms  Outcome: Outcome(s) achieved Date Met: 03/02/18    Goal: Acceptable Pain Control/Comfort Level  Outcome: Ongoing (interventions implemented as appropriate)

## 2018-03-02 NOTE — PLAN OF CARE
Problem: Patient Care Overview (Adult)  Goal: Plan of Care Review  Outcome: Ongoing (interventions implemented as appropriate)   03/02/18 4963   Coping/Psychosocial Response Interventions   Plan Of Care Reviewed With patient   Patient Care Overview   Progress progress towards functional goals is fair   Outcome Evaluation   Outcome Summary/Follow up Plan Pt is feeling better and able to improve with bed mobility, transfers, and amb distance with RWX but pt is still very weak in B LE limiting amb distance and safety

## 2018-03-03 LAB
ALBUMIN SERPL-MCNC: 1.8 G/DL (ref 3.5–5.2)
ANION GAP SERPL CALCULATED.3IONS-SCNC: 13.9 MMOL/L
BACTERIA SPEC AEROBE CULT: NORMAL
BASOPHILS # BLD AUTO: 0.04 10*3/MM3 (ref 0–0.2)
BASOPHILS NFR BLD AUTO: 0.5 % (ref 0–1.5)
BUN BLD-MCNC: 57 MG/DL (ref 6–20)
BUN/CREAT SERPL: 20.5 (ref 7–25)
CALCIUM SPEC-SCNC: 8.1 MG/DL (ref 8.6–10.5)
CHLORIDE SERPL-SCNC: 98 MMOL/L (ref 98–107)
CO2 SERPL-SCNC: 26.1 MMOL/L (ref 22–29)
CREAT BLD-MCNC: 2.78 MG/DL (ref 0.57–1)
DEPRECATED RDW RBC AUTO: 52.1 FL (ref 37–54)
EOSINOPHIL # BLD AUTO: 0.12 10*3/MM3 (ref 0–0.7)
EOSINOPHIL NFR BLD AUTO: 1.5 % (ref 0.3–6.2)
ERYTHROCYTE [DISTWIDTH] IN BLOOD BY AUTOMATED COUNT: 15.4 % (ref 11.7–13)
GFR SERPL CREATININE-BSD FRML MDRD: 18 ML/MIN/1.73
GLUCOSE BLD-MCNC: 88 MG/DL (ref 65–99)
HCT VFR BLD AUTO: 27.5 % (ref 35.6–45.5)
HGB BLD-MCNC: 8.6 G/DL (ref 11.9–15.5)
IMM GRANULOCYTES # BLD: 0.1 10*3/MM3 (ref 0–0.03)
IMM GRANULOCYTES NFR BLD: 1.3 % (ref 0–0.5)
LYMPHOCYTES # BLD AUTO: 1.05 10*3/MM3 (ref 0.9–4.8)
LYMPHOCYTES NFR BLD AUTO: 13.2 % (ref 19.6–45.3)
MCH RBC QN AUTO: 29.3 PG (ref 26.9–32)
MCHC RBC AUTO-ENTMCNC: 31.3 G/DL (ref 32.4–36.3)
MCV RBC AUTO: 93.5 FL (ref 80.5–98.2)
MONOCYTES # BLD AUTO: 0.66 10*3/MM3 (ref 0.2–1.2)
MONOCYTES NFR BLD AUTO: 8.3 % (ref 5–12)
NEUTROPHILS # BLD AUTO: 5.96 10*3/MM3 (ref 1.9–8.1)
NEUTROPHILS NFR BLD AUTO: 75.2 % (ref 42.7–76)
PHOSPHATE SERPL-MCNC: 6 MG/DL (ref 2.5–4.5)
PLATELET # BLD AUTO: 369 10*3/MM3 (ref 140–500)
PMV BLD AUTO: 9.4 FL (ref 6–12)
POTASSIUM BLD-SCNC: 4.1 MMOL/L (ref 3.5–5.2)
RBC # BLD AUTO: 2.94 10*6/MM3 (ref 3.9–5.2)
SODIUM BLD-SCNC: 138 MMOL/L (ref 136–145)
VANCOMYCIN SERPL-MCNC: 25.4 MCG/ML (ref 5–40)
WBC NRBC COR # BLD: 7.93 10*3/MM3 (ref 4.5–10.7)

## 2018-03-03 PROCEDURE — 25010000002 FUROSEMIDE PER 20 MG: Performed by: INTERNAL MEDICINE

## 2018-03-03 PROCEDURE — 25010000002 MEROPENEM PER 100 MG: Performed by: INTERNAL MEDICINE

## 2018-03-03 PROCEDURE — 25010000002 HEPARIN (PORCINE) PER 1000 UNITS: Performed by: INTERNAL MEDICINE

## 2018-03-03 PROCEDURE — 85025 COMPLETE CBC W/AUTO DIFF WBC: CPT | Performed by: INTERNAL MEDICINE

## 2018-03-03 PROCEDURE — 25010000002 ONDANSETRON PER 1 MG: Performed by: SURGERY

## 2018-03-03 PROCEDURE — 80202 ASSAY OF VANCOMYCIN: CPT | Performed by: INTERNAL MEDICINE

## 2018-03-03 PROCEDURE — 99024 POSTOP FOLLOW-UP VISIT: CPT | Performed by: SURGERY

## 2018-03-03 PROCEDURE — 25010000002 HYDROMORPHONE PER 4 MG: Performed by: SURGERY

## 2018-03-03 PROCEDURE — 80069 RENAL FUNCTION PANEL: CPT | Performed by: INTERNAL MEDICINE

## 2018-03-03 PROCEDURE — 25010000002 MEROPENEM: Performed by: INTERNAL MEDICINE

## 2018-03-03 RX ORDER — FUROSEMIDE 10 MG/ML
80 INJECTION INTRAMUSCULAR; INTRAVENOUS ONCE
Status: COMPLETED | OUTPATIENT
Start: 2018-03-03 | End: 2018-03-03

## 2018-03-03 RX ADMIN — MIRTAZAPINE 15 MG: 15 TABLET, FILM COATED ORAL at 22:03

## 2018-03-03 RX ADMIN — HEPARIN SODIUM 5000 UNITS: 5000 INJECTION, SOLUTION INTRAVENOUS; SUBCUTANEOUS at 22:02

## 2018-03-03 RX ADMIN — DAKIN'S SOLUTION 0.125% (QUARTER STRENGTH): 0.12 SOLUTION at 09:08

## 2018-03-03 RX ADMIN — MEROPENEM 1 G: 1 INJECTION, POWDER, FOR SOLUTION INTRAVENOUS at 22:02

## 2018-03-03 RX ADMIN — FUROSEMIDE 80 MG: 10 INJECTION, SOLUTION INTRAMUSCULAR; INTRAVENOUS at 18:51

## 2018-03-03 RX ADMIN — ONDANSETRON 4 MG: 2 INJECTION INTRAMUSCULAR; INTRAVENOUS at 14:21

## 2018-03-03 RX ADMIN — HEPARIN SODIUM 5000 UNITS: 5000 INJECTION, SOLUTION INTRAVENOUS; SUBCUTANEOUS at 14:21

## 2018-03-03 RX ADMIN — Medication 10 ML: at 12:27

## 2018-03-03 RX ADMIN — Medication 10 ML: at 05:00

## 2018-03-03 RX ADMIN — PANTOPRAZOLE SODIUM 40 MG: 40 TABLET, DELAYED RELEASE ORAL at 07:00

## 2018-03-03 RX ADMIN — Medication 10 ML: at 18:00

## 2018-03-03 RX ADMIN — Medication 10 ML: at 22:02

## 2018-03-03 RX ADMIN — HYDROMORPHONE HYDROCHLORIDE 0.5 MG: 1 INJECTION, SOLUTION INTRAMUSCULAR; INTRAVENOUS; SUBCUTANEOUS at 16:55

## 2018-03-03 RX ADMIN — MEROPENEM 1 G: 1 INJECTION, POWDER, FOR SOLUTION INTRAVENOUS at 12:27

## 2018-03-03 RX ADMIN — Medication 10 ML: at 08:55

## 2018-03-03 RX ADMIN — METOPROLOL TARTRATE 12.5 MG: 25 TABLET ORAL at 22:02

## 2018-03-03 RX ADMIN — HEPARIN SODIUM 5000 UNITS: 5000 INJECTION, SOLUTION INTRAVENOUS; SUBCUTANEOUS at 07:00

## 2018-03-03 RX ADMIN — METOPROLOL TARTRATE 12.5 MG: 25 TABLET ORAL at 08:55

## 2018-03-03 RX ADMIN — HYDROMORPHONE HYDROCHLORIDE 0.5 MG: 1 INJECTION, SOLUTION INTRAMUSCULAR; INTRAVENOUS; SUBCUTANEOUS at 09:09

## 2018-03-03 NOTE — PROGRESS NOTES
"   LOS: 30 days    Patient Care Team:  Zahida Coffman MD as PCP - General (Family Medicine)    Chief Complaint:  No chief complaint on file.      Subjective     Interval History:  Feels ok; getting ready to have wound dressed by Dr. Forbes; breathing is comfortable; legs feels puffy; no orthopnea; good UOP (wick system in place)    Objective     Vital Signs  Temp:  [96.9 °F (36.1 °C)-99 °F (37.2 °C)] 96.9 °F (36.1 °C)  Heart Rate:  [82-95] 90  Resp:  [18] 18  BP: (111-125)/(65-74) 125/68    Flowsheet Rows         First Filed Value    Admission Height  172.7 cm (68\") Documented at 02/01/2018 1014    Admission Weight  123 kg (271 lb 14.4 oz) Documented at 02/01/2018 1014             I/O last 3 completed shifts:  In: 400 [IV Piggyback:400]  Out: 50 [Other:50]    Intake/Output Summary (Last 24 hours) at 03/03/18 1816  Last data filed at 03/02/18 2139   Gross per 24 hour   Intake              150 ml   Output               50 ml   Net              100 ml       Physical Exam:  Awake, NAD, obese, LIJ TLC 2/19, TDC RIJ placed 2/19      Oral mucosa dry; no lesions.  Nasal 02.   Heart Tachy, RR no s3 or rub.   Lungs upper airway rhonchi anteriorly. Decreased bs bases; not labored.   Abd Distended, Binder in place. +bs.  RLQ ANDER drain.     Ext trace upper and +1-2 lower ext edema .  Mood and affect fine  No skin rash       Results Review:      Results from last 7 days  Lab Units 03/03/18  0532 03/02/18  0625 03/01/18  0516  02/27/18  0530   SODIUM mmol/L 138 141 140  < > 135*   POTASSIUM mmol/L 4.1 3.9 4.0  < > 4.3   CHLORIDE mmol/L 98 100 99  < > 95*   CO2 mmol/L 26.1 31.7* 25.6  < > 31.1*   BUN mg/dL 57* 55* 52*  < > 64*   CREATININE mg/dL 2.78* 3.22* 3.19*  < > 3.87*   CALCIUM mg/dL 8.1* 8.1* 7.7*  < > 7.8*   BILIRUBIN mg/dL  --  0.4 0.4  --  0.5   ALK PHOS U/L  --  68 68  --  81   ALT (SGPT) U/L  --  14 11  --  17   AST (SGOT) U/L  --  31 30  --  34*   GLUCOSE mg/dL 88 98 100*  < > 105*   < > = values in this interval " not displayed.    Estimated Creatinine Clearance: 31.3 mL/min (by C-G formula based on Cr of 2.78).      Results from last 7 days  Lab Units 03/03/18  0532 03/02/18  0625 03/01/18  0516 02/27/18  0530   MAGNESIUM mg/dL  --  1.8  --  2.1   PHOSPHORUS mg/dL 6.0* 7.2* 6.3* 7.4*               Results from last 7 days  Lab Units 03/03/18  0532 03/02/18  0625 03/01/18  0516 02/28/18  0733 02/27/18  0531   WBC 10*3/mm3 7.93 8.62 9.93 10.66 11.03*   HEMOGLOBIN g/dL 8.6* 8.8* 7.8* 8.1* 8.5*   PLATELETS 10*3/mm3 369 366 360 348 362               Imaging Results (last 24 hours)     ** No results found for the last 24 hours. **          epoetin kristen 20,000 Units Subcutaneous Weekly   heparin (porcine) 5,000 Units Subcutaneous Q8H   iopamidol 50 mL Intravenous Once in imaging   levoFLOXacin 750 mg Intravenous Q48H   lidocaine viscous 5 mL Mouth/Throat Q4H   magic mouthwash 10 mL Swish & Spit Q4H   meropenem 1 g Intravenous Q12H   metoprolol tartrate 12.5 mg Oral Q12H   mirtazapine 15 mg Oral Nightly   pantoprazole 40 mg Oral Q AM   sodium hypochlorite  Topical BID   Vancomycin Pharmacy Intermittent Dosing  Does not apply Daily       Pharmacy to dose vancomycin        Medication Review:   Current Facility-Administered Medications   Medication Dose Route Frequency Provider Last Rate Last Dose   • acetaminophen (TYLENOL) suppository 325 mg  325 mg Rectal Q4H PRN Edgard Felder MD   325 mg at 02/05/18 0653   • acetaminophen (TYLENOL) tablet 650 mg  650 mg Oral Q6H PRN Tri Guzman MD   650 mg at 03/01/18 0014   • albumin human 25 % IV SOLN 12.5 g  12.5 g Intravenous PRN Madeline Kay MD   12.5 g at 02/27/18 1305   • albuterol (PROVENTIL) nebulizer solution 0.083% 2.5 mg/3mL  2.5 mg Nebulization Q4H PRN Tri Guzman MD       • alteplase ((CATHFLO/ACTIVASE)) syringe 2 mg  2 mg Intracatheter Once PRN Mazin Barrera MD   2 mg at 02/23/18 0539   • calcium gluconate 1 g in sodium chloride 0.9 % 50 mL IVPB  1 g  Intravenous PRN Madeline Kay MD        Or   • calcium gluconate 2 g in sodium chloride 0.9 % 50 mL IVPB  2 g Intravenous PRN Madeline Kay MD       • epoetin kristen (EPOGEN,PROCRIT) injection 20,000 Units  20,000 Units Subcutaneous Weekly Madeline Kay MD   20,000 Units at 03/01/18 1438   • heparin (porcine) 5000 UNIT/ML injection 5,000 Units  5,000 Units Subcutaneous Q8H Benjy Samson MD   5,000 Units at 03/03/18 1421   • heparin (porcine) injection 1,000-2,000 Units  1,000-2,000 Units Intravenous PRN Madeline Kay MD   4,000 Units at 02/15/18 1843   • heparin (porcine) injection 4,000 Units  4,000 Units Intracatheter PRN Madeline Kay MD   4,000 Units at 02/27/18 1258   • HYDROmorphone (DILAUDID) injection 1 mg  1 mg Intravenous BID PRN Bryant Spence MD   0.5 mg at 03/03/18 1655   • iopamidol (ISOVUE-250) 51 % injection 50 mL  50 mL Intravenous Once in imaging Meir Guillen MD       • levoFLOXacin (LEVAQUIN) 750 mg/150 mL D5W (premix) (LEVAQUIN) 750 mg  750 mg Intravenous Q48H Masoud Arechiga MD   750 mg at 03/02/18 1929   • lidocaine viscous (XYLOCAINE) 2 % mouth solution 5 mL  5 mL Mouth/Throat Q4H Madeline Kay MD   5 mL at 03/01/18 1235   • magic mouthwash oral supsension 10 mL  10 mL Swish & Spit Q4H Madeline Kay MD   10 mL at 03/03/18 1800   • meropenem (MERREM) 1 g/100 mL 0.9% NS VTB (mbp)  1 g Intravenous Q12H Arvin Cooney MD   1 g at 03/03/18 1227   • metoprolol tartrate (LOPRESSOR) tablet 12.5 mg  12.5 mg Oral Q12H Bradford So MD   12.5 mg at 03/03/18 0855   • mirtazapine (REMERON) tablet 15 mg  15 mg Oral Nightly Ernestina Hussein MD   15 mg at 03/02/18 2134   • ondansetron ODT (ZOFRAN-ODT) disintegrating tablet 4 mg  4 mg Oral Q6H PRN Tri Guzman MD        Or   • ondansetron (ZOFRAN) injection 4 mg  4 mg Intravenous Q6H PRN Tri Guzman MD   4 mg at 03/03/18 1421   • oxyCODONE-acetaminophen  (PERCOCET) 5-325 MG per tablet 1 tablet  1 tablet Oral Q4H PRN Tri Guzman MD        Or   • oxyCODONE-acetaminophen (PERCOCET) 5-325 MG per tablet 2 tablet  2 tablet Oral Q4H PRN Tri Guzman MD       • pantoprazole (PROTONIX) EC tablet 40 mg  40 mg Oral Q AM Madeline Kay MD   40 mg at 03/03/18 0700   • Pharmacy to dose vancomycin   Does not apply Continuous PRN Masoud Arechiga MD       • promethazine (PHENERGAN) injection 12.5 mg  12.5 mg Intravenous Q8H PRN Sarthak Figueroa MD   12.5 mg at 02/09/18 2345   • sodium chloride (OCEAN) nasal spray 2 spray  2 spray Each Nare PRN Ernestina Hussein MD       • sodium hypochlorite (DAKIN'S 1/4 STRENGTH) 0.125 % topical solution 0.125% solution   Topical BID Tri Guzman MD       • Vancomycin Pharmacy Intermittent Dosing   Does not apply Daily Masoud Arechiga MD           Assessment/Plan       1.  Acute kidney injury on chronic kidney disease stage III, non-oliguric, better; prerenal --> ATN due to shock. Last HD was 2/27   2.  Chronic kidney disease stage III baseline Crt 1.5.   3.  Status post exploratory laparotomy with small bowel resection, mesh removal, RLQ port site debridement, and  Appendectomy. POD 24.  SP subcutaneous debridement 2/13 and 2/16, 2/27. 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    4.  Acute on chronic anemia  5.  Small bowel enterotomy, sp repair 2/4.  ESBL, now carbapenem resistant from wound.   E. coli from blood 2/5. Blood cultures 2/9 negative. Ongoing wound debridement.    6. Encephalopathy . Resolved.   7. Afib with RVR.  Now in SR.  On beta blocker   8.  PCM. Improving po intake.   9.  Oral mucositis. Resolved.  On topical therapy.   10. Debility.  Working with PT .      Plan:  1.  No need for HD again today  2.  If serum creatinine again improved tomorrow, will likely remove HD catheter  3.  One dose of diuretic to keep fluid balance negative  4.  Surveillance labs  5.  D/w pt's  at bedside    Cedric  Juanjose Bashir MD  03/03/18  6:16 PM

## 2018-03-03 NOTE — PROGRESS NOTES
"Pharmacokinetic Consult - Vancomycin Dosing (Follow-up Note)    Mariya Campos is on day #12 pharmacy to dose vancomycin for intra-abdominal infection .  Pharmacy dosing vancomycin per Dr Arechiga 's request.   Goal trough: 15-20  mg/L   Admit date: 2/1/2018  9:56 AM    Relevant clinical data and objective history reviewed:  57 y.o. female 172.7 cm (68\") 121 kg (266 lb 1.6 oz)    Past Medical History:   Diagnosis Date   • Abdominal pain    • Allergic rhinitis    • Anemia     ON IRON QOD   • Asthma    • Chronic bronchitis    • Chronic kidney disease     STAGE 3   SINCE 2015 WITH COLON SURGERY   • Colon cancer     CHEMO 6 MONTHS   • Hiatal hernia    • Hypertension    • Incisional hernia    • Overweight    • Peripheral neuropathy     FROM CHEMO TX'S   • Rosacea    • Visit for screening mammogram      Creatinine   Date Value Ref Range Status   03/03/2018 2.78 (H) 0.57 - 1.00 mg/dL Final   03/02/2018 3.22 (H) 0.57 - 1.00 mg/dL Final   03/01/2018 3.19 (H) 0.57 - 1.00 mg/dL Final     BUN   Date Value Ref Range Status   03/03/2018 57 (H) 6 - 20 mg/dL Final     Estimated Creatinine Clearance: 31.3 mL/min (by C-G formula based on Cr of 2.78).    Lab Results   Component Value Date    WBC 7.93 03/03/2018     Temp Readings from Last 3 Encounters:   03/03/18 99 °F (37.2 °C) (Oral)   01/30/18 97.9 °F (36.6 °C) (Oral)   01/26/18 97.7 °F (36.5 °C) (Oral)     Baseline cultures/source/suscetibilit/labs/radiology:  2/05 MRSA screen: positive  2/05 Bcx E.coli ESBL+  2/09 Bcx x2: NG Final  2/16 Wound cx (abd wall): E.coli CRE, E. Faecalis (S-amp, gent, vanc)  2/17 Cdiff: Neg  2/25 Bcx ngtd      Surgery:   2/01 Lap ventral hernia repair with mesh. Extensive small bowel adhesiolysis  2/04 Small bowel enterotomy status post repair  2/13 & 2/16 Abdominal wound debridement and washout  2/19 Tunneled dialysis catheter placement  2/27 abdominal wound debridement and washout       HD (BENNY  on CKD 3 requiring HD):  CRRT 2/14, HD 2/15, 2/20, " 2/24, 2/27      Anti-Infectives     Ordered     Dose/Rate Route Frequency Start Stop    03/03/18 1012  meropenem (MERREM) 1 g/100 mL 0.9% NS VTB (mbp)     Ordering Provider:  Arvin Cooney MD    1 g  over 180 Minutes Intravenous Every 12 Hours 03/03/18 1015 03/06/18 1044    03/02/18 1350  vancomycin 750 mg/250 mL 0.9% NS add-vantage     Ordering Provider:  Masoud Arechiga MD    750 mg  over 75 Minutes Intravenous Once 03/02/18 1500 03/02/18 1820    02/28/18 1256  vancomycin 750 mg/250 mL 0.9% NS add-vantage     Ordering Provider:  Masoud Arechiga MD    750 mg  over 75 Minutes Intravenous Once 02/28/18 1400 02/28/18 1540    02/27/18 1232  vancomycin 750 mg/250 mL 0.9% NS add-vantage     Ordering Provider:  Masoud Arechiga MD    750 mg Intravenous Once 02/27/18 1315 02/27/18 1357    02/26/18 1326  Pharmacy to dose vancomycin     Ordering Provider:  Masoud Arechiga MD     Does not apply Continuous PRN 02/26/18 1326 03/12/18 1425    02/25/18 1101  vancomycin 500 mg/100 mL 0.9% NS IVPB ([facility])     Ordering Provider:  Masoud Arechiga MD    500 mg  over 60 Minutes Intravenous Once 02/25/18 1145 02/25/18 1244    02/24/18 1031  vancomycin 500 mg/100 mL 0.9% NS IVPB ([facility])     Ordering Provider:  Masoud Arechiga MD    500 mg  over 60 Minutes Intravenous Once 02/24/18 1800 02/24/18 1908    02/22/18 0834  vancomycin 750 mg/250 mL 0.9% NS add-vantage     Ordering Provider:  Masoud Arechiga MD    750 mg  over 75 Minutes Intravenous Once 02/22/18 1400 02/22/18 1450    02/20/18 0808  vancomycin (VANCOCIN) in iso-osmotic dextrose IVPB 1 g (premix) 200 mL     Ordering Provider:  Masoud Arechiga MD    1,000 mg  over 100 Minutes Intravenous Once 02/20/18 1400 02/20/18 1716    02/20/18 1242  levoFLOXacin (LEVAQUIN) 750 mg/150 mL D5W (premix) (LEVAQUIN) 750 mg     Ordering Provider:  Masoud Arechiga MD    750 mg Intravenous Every 48 Hours  18 1400 18 1759    18 1423  vancomycin 1500 mg/500 mL 0.9% NS IVPB ([facility])     Ordering Provider:  Masoud Arechiga MD    1,500 mg  over 150 Minutes Intravenous Once 18 1500 18 1856    18 1423  Vancomycin Pharmacy Intermittent Dosing     Marge Vela RPH reviewed the order on 18 1443.   Ordering Provider:  Masoud Arechiga MD     Does not apply Daily 18 1500 18 2359    18 1414  Pharmacy to dose vancomycin     Masoud Arechiga MD let the order  on 18 1345.   Ordering Provider:  Masoud Arechiga MD     Does not apply Continuous PRN 18 1413 18 1344    02/15/18 1056  ertapenem (INVanz) 500 mg in sodium chloride 0.9 % 50 mL IVPB     Ordering Provider:  Masoud Arechiga MD    500 mg  100 mL/hr over 30 Minutes Intravenous Every 24 Hours 18 1300 18 1524    18 0755  vancomycin (VANCOCIN) in iso-osmotic dextrose IVPB 1 g (premix) 200 mL     Ordering Provider:  Tania Kennedy MD    1,000 mg  over 120 Minutes Intravenous Once 18 0830 18 1130    18 1842  [MAR Hold]  vancomycin 2500 mg/500 mL 0.9% NS IVPB ([facility])     (MAR Hold since 18 1937)   Ordering Provider:  Bryant Spence MD    20 mg/kg × 123 kg Intravenous Once 18 1111  CeFAZolin in Sodium Chloride (ANCEF) IVPB solution 3 g     Ordering Provider:  Tri Guzman MD    3 g  200 mL/hr over 30 Minutes Intravenous On Call to O.R. 18 1145 18 1229            Lab Results   Component Value Date    VANCORANDOM 25.40 2018     VANCOMYCIN DOSING SCHEDULE ( INCLUDING LEVELS)   162  Vancomycin 1500mg IV x1                         0518  Vanc random: 17.8 mcg/mL; HD today   1536  Vancomycin 1000mg IV x1                         0436  Vanc random: 22.5 mcg/mL                        600  Vanc random: 20.9 mcg/mL, HD today   1335  Vancomycin  750 mg IV x1                        2/24 0337  Vanc random: 21.40 mcg/mL, HD today  2/24 1808  Vancomycin 500 mg IV x1                        2/25 0536  Vanc random: 18.60 mcg/mL  2/25 1144   Vancomycin 500 mg                         2/26 0606 Vanc random: 20.4 mcg/mL, no dose today  2/27 HD scheduled, vanc 750 mg IV x1                         2/28 0733 Vanc random = 19.4 mcg/ml  2/28 1425 Vanc 750mg iv x 1                          3/1 0516 Vanc random: 25 mcg/mL                        3/2  0625 Vanc random = 17.9 mcg/ml  3/2   1705  Vanc 750mg iv x 1 dose                         3/3  0532 Vanc random = 25.4mcg/ml      Results for CINTIA BARBOSA (MRN [id number]) as of 3/3/2018 10:14   Ref. Range 2/27/2018 05:30 2/27/2018 05:31 3/1/2018 05:16 3/2/2018 06:25 3/3/2018 05:32   Creatinine Latest Ref Range: 0.57 - 1.00 mg/dL 3.87 (H) 3.78 (H) 3.19 (H) 3.22 (H) 2.78 (H)       Assessment/Plan  1. Vancomycin random level of 25.4mcg/mL. Given level and no dialysis  scheduled for today, will not    give any vanc doses today   2. Will monitor renal function and check AM vancomycin random level.      Scr continues to improve   Pharmacy will continue to follow daily while on vancomycin and adjust as needed.     3. Per Dr Arechiga's note: Cont on merrem, lvq, vanc, renally dosing.  Doses okay.  May need to increase merrem if cr recovers.  It is stable today. assuming that source control has been obtained, I would give her a week after her most recent surgery which would put her abx thru 3/5    Allyssa Sánchez [facility]

## 2018-03-03 NOTE — PROGRESS NOTES
Chief Complaint:    S/P da Niesha ventral hernia repair with mesh  S/P wound debridements and mesh removal    Subjective:    Appears comfortable.  Tolerating diet.    Objective:    Vitals:    03/02/18 2355 03/03/18 0500 03/03/18 0700 03/03/18 1425   BP: 111/65  118/72 125/68   BP Location: Left arm  Left arm Left arm   Patient Position: Lying  Lying Lying   Pulse: 94  82 90   Resp: 18 18 18   Temp: 98.4 °F (36.9 °C)  99 °F (37.2 °C) 96.9 °F (36.1 °C)   TempSrc: Oral  Oral Oral   SpO2: 90%  98% 98%   Weight:  121 kg (266 lb 1.6 oz)     Height:           Lungs: Clear  Heart: Regular  Abdomen: Incisions okay.  Wounds redressed by me.  They're granulating well throughout.  No purulent drainage.  No areas of necrosis. BS present.   Extremities: Warm    Labs reviewed.  CBC 7.93, Hgb 8.6, creat 2.78.    Assessment:    S/P da Niesha ventral hernia repair with mesh  S/P wound debridements and mesh removal    Plan:    Wounds coming along nicely.  Probably able to be changed over to VAC dressing beginning of this week.

## 2018-03-03 NOTE — PLAN OF CARE
Problem: Patient Care Overview (Adult)  Goal: Plan of Care Review  Outcome: Ongoing (interventions implemented as appropriate)   03/03/18 6978   Coping/Psychosocial Response Interventions   Plan Of Care Reviewed With patient   Patient Care Overview   Progress no change   Outcome Evaluation   Outcome Summary/Follow up Plan vitals stable. no c/o except with dressing change. Dressing changed this am- was able to try the 0.5mg of dilauded and reported feeling okay. purewick intact. c/o some nausea this afternoon- zofran given. family at the bedside.       Problem: Fall Risk (Adult)  Goal: Absence of Falls  Outcome: Ongoing (interventions implemented as appropriate)      Problem: Pressure Ulcer Risk (Aristeo Scale) (Adult,Obstetrics,Pediatric)  Goal: Skin Integrity  Outcome: Ongoing (interventions implemented as appropriate)      Problem: Pain, Acute (Adult)  Goal: Acceptable Pain Control/Comfort Level  Outcome: Ongoing (interventions implemented as appropriate)

## 2018-03-03 NOTE — SIGNIFICANT NOTE
03/03/18 1602   Rehab Treatment   Discipline physical therapy assistant   Treatment Not Performed patient/family declined treatment  (Pt refused PT this pm due to nausea. RN Rhonda meneses.Will check back tomorrow.)   Recommendation   PT - Next Appointment 03/04/18

## 2018-03-04 LAB
ALBUMIN SERPL-MCNC: 2 G/DL (ref 3.5–5.2)
ANION GAP SERPL CALCULATED.3IONS-SCNC: 13.8 MMOL/L
BASOPHILS # BLD AUTO: 0.04 10*3/MM3 (ref 0–0.2)
BASOPHILS NFR BLD AUTO: 0.5 % (ref 0–1.5)
BUN BLD-MCNC: 64 MG/DL (ref 6–20)
BUN/CREAT SERPL: 21.5 (ref 7–25)
CALCIUM SPEC-SCNC: 8.1 MG/DL (ref 8.6–10.5)
CHLORIDE SERPL-SCNC: 98 MMOL/L (ref 98–107)
CO2 SERPL-SCNC: 27.2 MMOL/L (ref 22–29)
CREAT BLD-MCNC: 2.97 MG/DL (ref 0.57–1)
CREAT UR-MCNC: 37.6 MG/DL
DEPRECATED RDW RBC AUTO: 52.5 FL (ref 37–54)
EOSINOPHIL # BLD AUTO: 0.14 10*3/MM3 (ref 0–0.7)
EOSINOPHIL NFR BLD AUTO: 1.6 % (ref 0.3–6.2)
ERYTHROCYTE [DISTWIDTH] IN BLOOD BY AUTOMATED COUNT: 15.3 % (ref 11.7–13)
GFR SERPL CREATININE-BSD FRML MDRD: 16 ML/MIN/1.73
GLUCOSE BLD-MCNC: 112 MG/DL (ref 65–99)
HCT VFR BLD AUTO: 28.8 % (ref 35.6–45.5)
HGB BLD-MCNC: 8.9 G/DL (ref 11.9–15.5)
IMM GRANULOCYTES # BLD: 0.18 10*3/MM3 (ref 0–0.03)
IMM GRANULOCYTES NFR BLD: 2.1 % (ref 0–0.5)
LYMPHOCYTES # BLD AUTO: 0.75 10*3/MM3 (ref 0.9–4.8)
LYMPHOCYTES NFR BLD AUTO: 8.8 % (ref 19.6–45.3)
MAGNESIUM SERPL-MCNC: 1.5 MG/DL (ref 1.6–2.6)
MCH RBC QN AUTO: 29.1 PG (ref 26.9–32)
MCHC RBC AUTO-ENTMCNC: 30.9 G/DL (ref 32.4–36.3)
MCV RBC AUTO: 94.1 FL (ref 80.5–98.2)
MONOCYTES # BLD AUTO: 0.6 10*3/MM3 (ref 0.2–1.2)
MONOCYTES NFR BLD AUTO: 7 % (ref 5–12)
NEUTROPHILS # BLD AUTO: 6.84 10*3/MM3 (ref 1.9–8.1)
NEUTROPHILS NFR BLD AUTO: 80 % (ref 42.7–76)
PHOSPHATE SERPL-MCNC: 6.2 MG/DL (ref 2.5–4.5)
PLATELET # BLD AUTO: 321 10*3/MM3 (ref 140–500)
PMV BLD AUTO: 8.9 FL (ref 6–12)
POTASSIUM BLD-SCNC: 4 MMOL/L (ref 3.5–5.2)
RBC # BLD AUTO: 3.06 10*6/MM3 (ref 3.9–5.2)
SODIUM BLD-SCNC: 139 MMOL/L (ref 136–145)
SODIUM UR-SCNC: 40 MMOL/L
VANCOMYCIN SERPL-MCNC: 16.7 MCG/ML (ref 5–40)
WBC NRBC COR # BLD: 8.55 10*3/MM3 (ref 4.5–10.7)

## 2018-03-04 PROCEDURE — 99024 POSTOP FOLLOW-UP VISIT: CPT | Performed by: SURGERY

## 2018-03-04 PROCEDURE — 85025 COMPLETE CBC W/AUTO DIFF WBC: CPT | Performed by: INTERNAL MEDICINE

## 2018-03-04 PROCEDURE — 25010000002 HYDROMORPHONE PER 4 MG: Performed by: SURGERY

## 2018-03-04 PROCEDURE — 25010000002 MEROPENEM PER 100 MG: Performed by: INTERNAL MEDICINE

## 2018-03-04 PROCEDURE — 25010000002 ALTEPLASE: Performed by: INTERNAL MEDICINE

## 2018-03-04 PROCEDURE — 82570 ASSAY OF URINE CREATININE: CPT | Performed by: INTERNAL MEDICINE

## 2018-03-04 PROCEDURE — 25010000002 LEVOFLOXACIN PER 250 MG: Performed by: INTERNAL MEDICINE

## 2018-03-04 PROCEDURE — 25010000002 MEROPENEM: Performed by: INTERNAL MEDICINE

## 2018-03-04 PROCEDURE — 83735 ASSAY OF MAGNESIUM: CPT | Performed by: INTERNAL MEDICINE

## 2018-03-04 PROCEDURE — 25010000002 VANCOMYCIN 750 MG RECONSTITUTED SOLUTION: Performed by: INTERNAL MEDICINE

## 2018-03-04 PROCEDURE — 25010000002 MAGNESIUM SULFATE IN D5W 1G/100ML (PREMIX) 1-5 GM/100ML-% SOLUTION: Performed by: INTERNAL MEDICINE

## 2018-03-04 PROCEDURE — 97530 THERAPEUTIC ACTIVITIES: CPT

## 2018-03-04 PROCEDURE — 80069 RENAL FUNCTION PANEL: CPT | Performed by: INTERNAL MEDICINE

## 2018-03-04 PROCEDURE — 25010000002 HEPARIN (PORCINE) PER 1000 UNITS: Performed by: INTERNAL MEDICINE

## 2018-03-04 PROCEDURE — 80202 ASSAY OF VANCOMYCIN: CPT | Performed by: INTERNAL MEDICINE

## 2018-03-04 PROCEDURE — 84300 ASSAY OF URINE SODIUM: CPT | Performed by: INTERNAL MEDICINE

## 2018-03-04 RX ORDER — MAGNESIUM SULFATE 1 G/100ML
2 INJECTION INTRAVENOUS ONCE
Status: DISCONTINUED | OUTPATIENT
Start: 2018-03-04 | End: 2018-03-04 | Stop reason: CLARIF

## 2018-03-04 RX ORDER — MAGNESIUM SULFATE 1 G/100ML
1 INJECTION INTRAVENOUS
Status: COMPLETED | OUTPATIENT
Start: 2018-03-04 | End: 2018-03-04

## 2018-03-04 RX ADMIN — Medication 10 ML: at 08:34

## 2018-03-04 RX ADMIN — ALTEPLASE 2 MG: 2.2 INJECTION, POWDER, LYOPHILIZED, FOR SOLUTION INTRAVENOUS at 11:40

## 2018-03-04 RX ADMIN — ALTEPLASE 2 MG: 2.2 INJECTION, POWDER, LYOPHILIZED, FOR SOLUTION INTRAVENOUS at 11:38

## 2018-03-04 RX ADMIN — HEPARIN SODIUM 5000 UNITS: 5000 INJECTION, SOLUTION INTRAVENOUS; SUBCUTANEOUS at 21:30

## 2018-03-04 RX ADMIN — HEPARIN SODIUM 5000 UNITS: 5000 INJECTION, SOLUTION INTRAVENOUS; SUBCUTANEOUS at 05:31

## 2018-03-04 RX ADMIN — Medication 10 ML: at 04:52

## 2018-03-04 RX ADMIN — HEPARIN SODIUM 5000 UNITS: 5000 INJECTION, SOLUTION INTRAVENOUS; SUBCUTANEOUS at 14:45

## 2018-03-04 RX ADMIN — DAKIN'S SOLUTION 0.125% (QUARTER STRENGTH): 0.12 SOLUTION at 10:24

## 2018-03-04 RX ADMIN — METOPROLOL TARTRATE 12.5 MG: 25 TABLET ORAL at 21:30

## 2018-03-04 RX ADMIN — MEROPENEM 1 G: 1 INJECTION, POWDER, FOR SOLUTION INTRAVENOUS at 10:24

## 2018-03-04 RX ADMIN — Medication 10 ML: at 01:07

## 2018-03-04 RX ADMIN — MIRTAZAPINE 15 MG: 15 TABLET, FILM COATED ORAL at 21:30

## 2018-03-04 RX ADMIN — DAKIN'S SOLUTION 0.125% (QUARTER STRENGTH): 0.12 SOLUTION at 21:00

## 2018-03-04 RX ADMIN — MAGNESIUM SULFATE HEPTAHYDRATE 1 G: 1 INJECTION, SOLUTION INTRAVENOUS at 20:00

## 2018-03-04 RX ADMIN — MAGNESIUM SULFATE HEPTAHYDRATE 1 G: 1 INJECTION, SOLUTION INTRAVENOUS at 18:30

## 2018-03-04 RX ADMIN — Medication 10 ML: at 16:17

## 2018-03-04 RX ADMIN — OXYCODONE HYDROCHLORIDE AND ACETAMINOPHEN 1 TABLET: 5; 325 TABLET ORAL at 01:07

## 2018-03-04 RX ADMIN — HYDROMORPHONE HYDROCHLORIDE 0.5 MG: 1 INJECTION, SOLUTION INTRAMUSCULAR; INTRAVENOUS; SUBCUTANEOUS at 21:47

## 2018-03-04 RX ADMIN — PANTOPRAZOLE SODIUM 40 MG: 40 TABLET, DELAYED RELEASE ORAL at 05:31

## 2018-03-04 RX ADMIN — LEVOFLOXACIN 750 MG: 5 INJECTION, SOLUTION INTRAVENOUS at 18:31

## 2018-03-04 RX ADMIN — ALTEPLASE 2 MG: 2.2 INJECTION, POWDER, LYOPHILIZED, FOR SOLUTION INTRAVENOUS at 13:33

## 2018-03-04 RX ADMIN — SODIUM CHLORIDE 750 MG: 900 INJECTION, SOLUTION INTRAVENOUS at 16:18

## 2018-03-04 RX ADMIN — Medication 10 ML: at 12:22

## 2018-03-04 RX ADMIN — HYDROMORPHONE HYDROCHLORIDE 0.5 MG: 1 INJECTION, SOLUTION INTRAMUSCULAR; INTRAVENOUS; SUBCUTANEOUS at 10:23

## 2018-03-04 RX ADMIN — METOPROLOL TARTRATE 12.5 MG: 25 TABLET ORAL at 08:34

## 2018-03-04 RX ADMIN — MEROPENEM 1 G: 1 INJECTION, POWDER, FOR SOLUTION INTRAVENOUS at 22:38

## 2018-03-04 NOTE — PROGRESS NOTES
Chief Complaint:    S/P da Niesha ventral hernia repair with mesh  S/P wound debridements and mesh removal    Subjective:    Appears comfortable.  Tolerating diet.    Objective:    Vitals:    03/04/18 0025 03/04/18 0416 03/04/18 0700 03/04/18 1444   BP: 125/72  119/63 116/66   BP Location: Left arm  Left arm Left arm   Patient Position: Lying  Lying Lying   Pulse: 101  92 95   Resp: 22  22 20   Temp: 98.3 °F (36.8 °C)  98 °F (36.7 °C) 98.6 °F (37 °C)   TempSrc: Oral  Oral Oral   SpO2: 98%  97% 96%   Weight:  118 kg (260 lb 8 oz)     Height:           Lungs: Clear  Heart: Regular  Abdomen: BS present.   Extremities: Warm    Assessment:    S/P da Niesha ventral hernia repair with mesh  S/P wound debridements and mesh removal    Plan:    Probably able to be changed over to VAC dressing beginning of this week.

## 2018-03-04 NOTE — PLAN OF CARE
Problem: Patient Care Overview (Adult)  Goal: Plan of Care Review  Outcome: Ongoing (interventions implemented as appropriate)   03/04/18 0526   Coping/Psychosocial Response Interventions   Plan Of Care Reviewed With patient   Patient Care Overview   Progress no change   Outcome Evaluation   Outcome Summary/Follow up Plan Vss. c/o pain x1, prn meds given. multiple incontinence episodes. Pt d/c pasquale, not working very well. pt very frustrated. dressing dry/intact. family at bedside. vss. will continue to monitor.

## 2018-03-04 NOTE — PROGRESS NOTES
"   LOS: 31 days    Patient Care Team:  Zahida Coffman MD as PCP - General (Family Medicine)    Chief Complaint:  No chief complaint on file.      Subjective     Interval History:  Feels ok, tho very thirsty.  Breathing is stable; keeping food and drink down; pain controlled    Objective     Vital Signs  Temp:  [98 °F (36.7 °C)-99.2 °F (37.3 °C)] 98.6 °F (37 °C)  Heart Rate:  [] 95  Resp:  [20-22] 20  BP: (116-177)/(63-73) 116/66    Flowsheet Rows         First Filed Value    Admission Height  172.7 cm (68\") Documented at 02/01/2018 1014    Admission Weight  123 kg (271 lb 14.4 oz) Documented at 02/01/2018 1014          I/O this shift:  In: -   Out: 385 [Urine:350; Other:35]  I/O last 3 completed shifts:  In: 150 [IV Piggyback:150]  Out: 1140 [Urine:1050; Other:90]    Intake/Output Summary (Last 24 hours) at 03/04/18 1650  Last data filed at 03/04/18 1444   Gross per 24 hour   Intake                0 ml   Output             1475 ml   Net            -1475 ml       Physical Exam:  Awake, NAD, obese, LIJ TLC 2/19, TDC RIJ placed 2/19      Oral mucosa dry; no lesions.  Nasal 02.   Heart Tachy, RR no s3 or rub.   Lungs upper airway rhonchi anteriorly. Decreased bs bases; not labored.   Abd Distended, Binder in place. +bs.  RLQ ANDER drain.     Ext trace upper and +1-2 lower ext edema .  Mood and affect fine  No skin rash       Results Review:      Results from last 7 days  Lab Units 03/04/18  1220 03/03/18  0532 03/02/18  0625 03/01/18  0516  02/27/18  0530   SODIUM mmol/L 139 138 141 140  < > 135*   POTASSIUM mmol/L 4.0 4.1 3.9 4.0  < > 4.3   CHLORIDE mmol/L 98 98 100 99  < > 95*   CO2 mmol/L 27.2 26.1 31.7* 25.6  < > 31.1*   BUN mg/dL 64* 57* 55* 52*  < > 64*   CREATININE mg/dL 2.97* 2.78* 3.22* 3.19*  < > 3.87*   CALCIUM mg/dL 8.1* 8.1* 8.1* 7.7*  < > 7.8*   BILIRUBIN mg/dL  --   --  0.4 0.4  --  0.5   ALK PHOS U/L  --   --  68 68  --  81   ALT (SGPT) U/L  --   --  14 11  --  17   AST (SGOT) U/L  --   --  31 30  " --  34*   GLUCOSE mg/dL 112* 88 98 100*  < > 105*   < > = values in this interval not displayed.    Estimated Creatinine Clearance: 28.9 mL/min (by C-G formula based on Cr of 2.97).      Results from last 7 days  Lab Units 03/04/18  1220 03/03/18  0532 03/02/18  0625  02/27/18  0530   MAGNESIUM mg/dL 1.5*  --  1.8  --  2.1   PHOSPHORUS mg/dL 6.2* 6.0* 7.2*  < > 7.4*   < > = values in this interval not displayed.            Results from last 7 days  Lab Units 03/04/18  1220 03/03/18  0532 03/02/18  0625 03/01/18  0516 02/28/18  0733   WBC 10*3/mm3 8.55 7.93 8.62 9.93 10.66   HEMOGLOBIN g/dL 8.9* 8.6* 8.8* 7.8* 8.1*   PLATELETS 10*3/mm3 321 369 366 360 348               Imaging Results (last 24 hours)     ** No results found for the last 24 hours. **          epoetin kristen 20,000 Units Subcutaneous Weekly   heparin (porcine) 5,000 Units Subcutaneous Q8H   iopamidol 50 mL Intravenous Once in imaging   levoFLOXacin 750 mg Intravenous Q48H   lidocaine viscous 5 mL Mouth/Throat Q4H   magic mouthwash 10 mL Swish & Spit Q4H   meropenem 1 g Intravenous Q12H   metoprolol tartrate 12.5 mg Oral Q12H   mirtazapine 15 mg Oral Nightly   pantoprazole 40 mg Oral Q AM   sodium hypochlorite  Topical BID   vancomycin 750 mg Intravenous Once   Vancomycin Pharmacy Intermittent Dosing  Does not apply Daily       Pharmacy to dose vancomycin        Medication Review:   Current Facility-Administered Medications   Medication Dose Route Frequency Provider Last Rate Last Dose   • acetaminophen (TYLENOL) suppository 325 mg  325 mg Rectal Q4H PRN Edgard Felder MD   325 mg at 02/05/18 0653   • acetaminophen (TYLENOL) tablet 650 mg  650 mg Oral Q6H PRN Tri Guzman MD   650 mg at 03/01/18 0014   • albumin human 25 % IV SOLN 12.5 g  12.5 g Intravenous PRN Madeline Kay MD   12.5 g at 02/27/18 1305   • albuterol (PROVENTIL) nebulizer solution 0.083% 2.5 mg/3mL  2.5 mg Nebulization Q4H PRN Tri Guzman MD       • calcium  gluconate 1 g in sodium chloride 0.9 % 50 mL IVPB  1 g Intravenous PRN Madeline Kay MD        Or   • calcium gluconate 2 g in sodium chloride 0.9 % 50 mL IVPB  2 g Intravenous PRN Madeline Kay MD       • epoetin kristen (EPOGEN,PROCRIT) injection 20,000 Units  20,000 Units Subcutaneous Weekly Madeline Kay MD   20,000 Units at 03/01/18 1438   • heparin (porcine) 5000 UNIT/ML injection 5,000 Units  5,000 Units Subcutaneous Q8H Benjy Samson MD   5,000 Units at 03/04/18 1445   • heparin (porcine) injection 1,000-2,000 Units  1,000-2,000 Units Intravenous PRN Madeline Kay MD   4,000 Units at 02/15/18 1843   • heparin (porcine) injection 4,000 Units  4,000 Units Intracatheter PRN Madeline Kay MD   4,000 Units at 02/27/18 1258   • HYDROmorphone (DILAUDID) injection 1 mg  1 mg Intravenous BID PRN Bryant Spence MD   0.5 mg at 03/04/18 1023   • iopamidol (ISOVUE-250) 51 % injection 50 mL  50 mL Intravenous Once in imaging Meir Guillen MD       • levoFLOXacin (LEVAQUIN) 750 mg/150 mL D5W (premix) (LEVAQUIN) 750 mg  750 mg Intravenous Q48H Masoud Arechiga MD   750 mg at 03/02/18 1929   • lidocaine viscous (XYLOCAINE) 2 % mouth solution 5 mL  5 mL Mouth/Throat Q4H Madeline Kay MD   5 mL at 03/01/18 1235   • magic mouthwash oral supsension 10 mL  10 mL Swish & Spit Q4H Madeline Kay MD   10 mL at 03/04/18 1617   • meropenem (MERREM) 1 g/100 mL 0.9% NS VTB (mbp)  1 g Intravenous Q12H Arvin Cooney MD   1 g at 03/04/18 1024   • metoprolol tartrate (LOPRESSOR) tablet 12.5 mg  12.5 mg Oral Q12H Bradford So MD   12.5 mg at 03/04/18 0834   • mirtazapine (REMERON) tablet 15 mg  15 mg Oral Nightly Ernestina Hussein MD   15 mg at 03/03/18 2203   • ondansetron ODT (ZOFRAN-ODT) disintegrating tablet 4 mg  4 mg Oral Q6H PRN Tri Guzman MD        Or   • ondansetron (ZOFRAN) injection 4 mg  4 mg Intravenous Q6H PRN Tri Guzman MD    4 mg at 03/03/18 1421   • oxyCODONE-acetaminophen (PERCOCET) 5-325 MG per tablet 1 tablet  1 tablet Oral Q4H PRN Tri Guzman MD   1 tablet at 03/04/18 0107    Or   • oxyCODONE-acetaminophen (PERCOCET) 5-325 MG per tablet 2 tablet  2 tablet Oral Q4H PRN Tri Guzman MD       • pantoprazole (PROTONIX) EC tablet 40 mg  40 mg Oral Q AM Madeline Kay MD   40 mg at 03/04/18 0531   • Pharmacy to dose vancomycin   Does not apply Continuous PRN Masoud Arechiga MD       • promethazine (PHENERGAN) injection 12.5 mg  12.5 mg Intravenous Q8H PRN Sarthak Figeuroa MD   12.5 mg at 02/09/18 2345   • sodium chloride (OCEAN) nasal spray 2 spray  2 spray Each Nare PRN Ernestina Hussein MD       • sodium hypochlorite (DAKIN'S 1/4 STRENGTH) 0.125 % topical solution 0.125% solution   Topical BID Tri Guzman MD       • vancomycin 750 mg/250 mL 0.9% NS add-vantage  750 mg Intravenous Once Masoud Arechiga MD   750 mg at 03/04/18 1618   • Vancomycin Pharmacy Intermittent Dosing   Does not apply Daily Masoud Arechiga MD           Assessment/Plan       1.  Acute kidney injury on chronic kidney disease stage III, non-oliguric, with SCr slightly higher today than yesterday; prerenal --> ATN due to shock. Last HD was 2/27.  Central vol not clear; low serum abd and obesity prob playing significant roles in her edema.  Wonder if central vol actually contracted.  Low BP, dry MM, meager PO intake thus far.  2.  Chronic kidney disease stage III baseline SCr 1.5.   3.  Status post exploratory laparotomy with small bowel resection, mesh removal, RLQ port site debridement, and  Appendectomy. POD 25.  SP subcutaneous debridement 2/13 and 2/16, 2/27. 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    4.  Acute on chronic anemia  5.  Small bowel enterotomy, sp repair 2/4.  ESBL, now carbapenem resistant from wound.   E. coli from blood 2/5. Blood cultures 2/9 negative. Ongoing wound debridement.    6.  Encephalopathy .  Resolved.   7.  Afib with RVR.  Now in SR.  On beta blocker   8.  PCM. Improving po intake.   9.  Oral mucositis. Resolved.  On topical therapy.   10. Debility.  Working with PT .      Plan:  1.  No need for HD again today; will remove TDC if clear trend for improving SCr--but not there yet  2.  No diuretic today  3.  Re-check urine lytes, and check uric acid  4.  Surveillance labs  5.  D/w pt's  at bedside    Cedric Bashir MD  03/04/18  4:50 PM

## 2018-03-04 NOTE — PROGRESS NOTES
"Pharmacokinetic Consult - Vancomycin Dosing (Follow-up Note)    Mariya Campos is on day #13  pharmacy to dose vancomycin for intra-abdominal infection .  Pharmacy dosing vancomycin per Dr Arechiga 's request.   Goal trough: 15-20  mg/L   Admit date: 2/1/2018  9:56 AM    Relevant clinical data and objective history reviewed:  57 y.o. female 172.7 cm (68\") 118 kg (260 lb 8 oz)    Past Medical History:   Diagnosis Date   • Abdominal pain    • Allergic rhinitis    • Anemia     ON IRON QOD   • Asthma    • Chronic bronchitis    • Chronic kidney disease     STAGE 3   SINCE 2015 WITH COLON SURGERY   • Colon cancer     CHEMO 6 MONTHS   • Hiatal hernia    • Hypertension    • Incisional hernia    • Overweight    • Peripheral neuropathy     FROM CHEMO TX'S   • Rosacea    • Visit for screening mammogram      Creatinine   Date Value Ref Range Status   03/04/2018 2.97 (H) 0.57 - 1.00 mg/dL Final   03/03/2018 2.78 (H) 0.57 - 1.00 mg/dL Final   03/02/2018 3.22 (H) 0.57 - 1.00 mg/dL Final     BUN   Date Value Ref Range Status   03/04/2018 64 (H) 6 - 20 mg/dL Final     Estimated Creatinine Clearance: 28.9 mL/min (by C-G formula based on Cr of 2.97).    Lab Results   Component Value Date    WBC 8.55 03/04/2018     Temp Readings from Last 3 Encounters:   03/04/18 98 °F (36.7 °C) (Oral)   01/30/18 97.9 °F (36.6 °C) (Oral)   01/26/18 97.7 °F (36.5 °C) (Oral)      Baseline cultures/source/suscetibilit/labs/radiology:  2/05 MRSA screen: positive  2/05 Bcx E.coli ESBL+  2/09 Bcx x2: NG Final  2/16 Wound cx (abd wall): E.coli CRE, E. Faecalis (S-amp, gent, vanc)  2/17 Cdiff: Neg  2/25 Bcx ng x 5 days      Surgery:   2/01 Lap ventral hernia repair with mesh. Extensive small bowel adhesiolysis  2/04 Small bowel enterotomy status post repair  2/13 & 2/16 Abdominal wound debridement and washout  2/19 Tunneled dialysis catheter placement  2/27 abdominal wound debridement and washout       HD (BENNY  on CKD 3 requiring HD):  CRRT 2/14, HD 2/15, " 2/20, 2/24, 2/27      Anti-Infectives     Ordered     Dose/Rate Route Frequency Start Stop    03/04/18 1411  vancomycin 750 mg/250 mL 0.9% NS add-vantage     Ordering Provider:  Masoud Arechiga MD    750 mg  over 75 Minutes Intravenous Once 03/04/18 1500      03/03/18 1012  meropenem (MERREM) 1 g/100 mL 0.9% NS VTB (mbp)     Ordering Provider:  Arvin Cooney MD    1 g  over 180 Minutes Intravenous Every 12 Hours 03/03/18 1015 03/06/18 1044    03/02/18 1350  vancomycin 750 mg/250 mL 0.9% NS add-vantage     Ordering Provider:  Masoud Arechiga MD    750 mg  over 75 Minutes Intravenous Once 03/02/18 1500 03/02/18 1820    02/28/18 1256  vancomycin 750 mg/250 mL 0.9% NS add-vantage     Ordering Provider:  Masoud Arechiga MD    750 mg  over 75 Minutes Intravenous Once 02/28/18 1400 02/28/18 1540    02/27/18 1232  vancomycin 750 mg/250 mL 0.9% NS add-vantage     Ordering Provider:  Masoud Arechiga MD    750 mg Intravenous Once 02/27/18 1315 02/27/18 1357    02/26/18 1326  Pharmacy to dose vancomycin     Ordering Provider:  Masoud Arechiga MD     Does not apply Continuous PRN 02/26/18 1326 03/12/18 1425    02/25/18 1101  vancomycin 500 mg/100 mL 0.9% NS IVPB ([facility])     Ordering Provider:  Masoud Arechiga MD    500 mg  over 60 Minutes Intravenous Once 02/25/18 1145 02/25/18 1244    02/24/18 1031  vancomycin 500 mg/100 mL 0.9% NS IVPB ([facility])     Ordering Provider:  Masoud Arechiga MD    500 mg  over 60 Minutes Intravenous Once 02/24/18 1800 02/24/18 1908    02/22/18 0834  vancomycin 750 mg/250 mL 0.9% NS add-vantage     Ordering Provider:  Masoud Arechiga MD    750 mg  over 75 Minutes Intravenous Once 02/22/18 1400 02/22/18 1450    02/20/18 0808  vancomycin (VANCOCIN) in iso-osmotic dextrose IVPB 1 g (premix) 200 mL     Ordering Provider:  Masoud Arechiga MD    1,000 mg  over 100 Minutes Intravenous Once 02/20/18 1400 02/20/18  1716    18 1242  levoFLOXacin (LEVAQUIN) 750 mg/150 mL D5W (premix) (LEVAQUIN) 750 mg     Ordering Provider:  Masoud Arechiga MD    750 mg Intravenous Every 48 Hours 18 1400 18 1759    18 1423  vancomycin 1500 mg/500 mL 0.9% NS IVPB ([facility])     Ordering Provider:  Masoud Arechiga MD    1,500 mg  over 150 Minutes Intravenous Once 18 1500 18 1856    18 1423  Vancomycin Pharmacy Intermittent Dosing     Marge Vela [facility] reviewed the order on 18 1443.   Ordering Provider:  Masoud Arechiga MD     Does not apply Daily 18 1500 18 2359    18 1414  Pharmacy to dose vancomycin     Masoud Arechiga MD let the order  on 18 1345.   Ordering Provider:  Masoud Arechiga MD     Does not apply Continuous PRN 18 1413 18 1344    02/15/18 1056  ertapenem (INVanz) 500 mg in sodium chloride 0.9 % 50 mL IVPB     Ordering Provider:  Masoud Arechiga MD    500 mg  100 mL/hr over 30 Minutes Intravenous Every 24 Hours 18 1300 18 1524    18 0755  vancomycin (VANCOCIN) in iso-osmotic dextrose IVPB 1 g (premix) 200 mL     Ordering Provider:  Tania Kennedy MD    1,000 mg  over 120 Minutes Intravenous Once 18 0830 18 1130    18 1842  [MAR Hold]  vancomycin 2500 mg/500 mL 0.9% NS IVPB ([facility])     (MAR Hold since 18 1937)   Ordering Provider:  Bryant Spence MD    20 mg/kg × 123 kg Intravenous Once 18 2005    18 1111  CeFAZolin in Sodium Chloride (ANCEF) IVPB solution 3 g     Ordering Provider:  Tri Guzman MD    3 g  200 mL/hr over 30 Minutes Intravenous On Call to O.R. 18 1145 18 1229            Lab Results   Component Value Date    VANCORANDOM 16.70 2018     VANCOMYCIN DOSING SCHEDULE ( INCLUDING LEVELS)   162  Vancomycin 1500mg IV x1                        518  Vanc random: 17.8 mcg/mL; HD  today  2/20 1536  Vancomycin 1000mg IV x1                        2/21 0436  Vanc random: 22.5 mcg/mL                        2/22 0600  Vanc random: 20.9 mcg/mL, HD today  2/22 1335  Vancomycin 750 mg IV x1                        2/24 0337  Vanc random: 21.40 mcg/mL, HD today  2/24 1808  Vancomycin 500 mg IV x1                        2/25 0536  Vanc random: 18.60 mcg/mL  2/25 1144   Vancomycin 500 mg                         2/26 0606 Vanc random: 20.4 mcg/mL, no dose today  2/27 HD scheduled, vanc 750 mg IV x1                         2/28 0733 Vanc random = 19.4 mcg/ml  2/28 1425 Vanc 750mg iv x 1                          3/1 0516 Vanc random: 25 mcg/mL                        3/2  0625 Vanc random = 17.9 mcg/ml  3/2   1705  Vanc 750mg iv x 1 dose                         3/3  0532 Vanc random = 25.4mcg/ml                        3/4  1220 Vanc random = 16.7mcg/ml  3/4  1500  Vanc 750mg iv x 1 dose   No HD scheduled    Assessment/Plan  1. Vancomycin random level of 16.7mcg/ml at 1220. ( unable to obtain at 0600)  No HD scheduled at this time  Will give Vancomycin 750mg iv x 1 dose and obtain random in am   2. Will monitor renal function     Scr in am  Today = 2.97  Pharmacy will continue to follow daily while on vancomycin and adjust as needed.     3. Per Dr Arechiga's note: Cont on merrem, lvq, vanc, renally dosing.  Doses okay.  May need to increase merrem if cr recovers.  It is stable today. assuming that source control has been obtained, I would give her a week after her most recent surgery which would put her abx thru 3/5       Allyssa Sánchez [facility]ly [facility]

## 2018-03-04 NOTE — THERAPY TREATMENT NOTE
Acute Care - Physical Therapy Treatment Note  [facility]     Patient Name: Mariya Campos  : 1960  MRN: [id number]  Today's Date: 3/4/2018  Onset of Illness/Injury or Date of Surgery Date: 18          Admit Date: 2018    Visit Dx:    ICD-10-CM ICD-9-CM   1. Incisional hernia, without obstruction or gangrene K43.2 553.21   2. Recurrent ventral hernia K43.2 553.21   3. Generalized weakness R53.1 780.79   4. CRF (chronic renal failure), stage 3 (moderate) N18.3 585.3   5. Wound infection T14.8XXA 958.3    L08.9    6. ARF (acute renal failure) with tubular necrosis N17.0 584.5     Patient Active Problem List   Diagnosis   • Atopic rhinitis   • Fatigue   • Abnormal mammogram   • Adiposity   • Abnormal ECG   • CRF (chronic renal failure), stage 3 (moderate)   • Anemia   • Recurrent UTI   • Osteopenia   • Essential hypertension   • Incisional hernia   • Moderate persistent asthma without complication   • Incisional hernia, without obstruction or gangrene   • Recurrent ventral hernia   • Bacteremia, escherichia coli   • Wound infection   • Complications, dialysis, catheter, mechanical, initial encounter               Adult Rehabilitation Note       18 1622 18 1300       Rehab Assessment/Intervention    Discipline physical therapist  -SV physical therapy assistant  -CW     Document Type therapy note (daily note)  -SV therapy note (daily note)  -CW     Subjective Information agree to therapy;complains of;fatigue;pain  -SV agree to therapy;complains of;weakness;fatigue  -CW     Patient Effort, Rehab Treatment  good  -CW     Precautions/Limitations  fall precautions  -CW     Recorded by [SV] Yelena Beltran PT [CW] Leandro Posada PTA     Vital Signs    Pre Systolic BP Rehab 116  -SV      Pre Treatment Diastolic BP 66  -SV      Pretreatment Heart Rate (beats/min) 99  -SV      Pre SpO2 (%) 97  -SV      O2 Delivery Pre Treatment supplemental O2  -SV      Intra SpO2 (%) 95  -SV      O2  Delivery Intra Treatment supplemental O2  -SV      Post SpO2 (%) 95  -SV      O2 Delivery Post Treatment supplemental O2  -SV      Pre Patient Position Supine  -SV      Intra Patient Position Standing  -SV      Post Patient Position Side Lying  -SV      Recorded by [SV] Yelena Beltran, PT      Pain Assessment    Pain Assessment  0-10  -CW     Pain Score  5  -CW     Post Pain Score  5  -CW     Pain Type  Surgical pain  -CW     Pain Location  Abdomen  -CW     Pain Intervention(s)  Repositioned;Ambulation/increased activity  -CW     Response to Interventions  kane  -CW     Recorded by  [CW] Leandro Posada PTA     Cognitive Assessment/Intervention    Current Cognitive/Communication Assessment functional  -SV functional  -CW     Orientation Status oriented x 4  -SV oriented x 4  -CW     Follows Commands/Answers Questions 100% of the time;able to follow multi-step instructions  -% of the time  -CW     Personal Safety  WNL/WFL  -CW     Personal Safety Interventions  fall prevention program maintained;gait belt;muscle strengthening facilitated;nonskid shoes/slippers when out of bed  -CW     Recorded by [SV] Yelena Beltran, PT [CW] Leandro Posada PTA     Bed Mobility, Assessment/Treatment    Bed Mob, Supine to Sit, [place] moderate assist (50% patient effort);minimum assist (75% patient effort);2 person assist required  -SV minimum assist (75% patient effort)  -CW     Bed Mob, Sit to Supine, [place] moderate assist (50% patient effort);2 person assist required  -SV not tested  -CW     Bed Mobility, Comment  up to chair  -CW     Recorded by [SV] Yelena Beltran, PT [CW] Leandro Posada PTA     Transfer Assessment/Treatment    Transfers, Sit-Stand [place] moderate assist (50% patient effort);minimum assist (75% patient effort);2 person assist required  -SV moderate assist (50% patient effort)  -CW     Transfers, Stand-Sit [place] moderate assist (50% patient effort);minimum assist (75%  patient effort);2 person assist required  -SV moderate assist (50% patient effort)  -CW     Transfers, Sit-Stand-Sit, Assist Device  rolling walker  -CW     Transfer, Comment pt stood with rwx 15 seconds   -SV      Recorded by [SV] Yelena Beltran, PT [CW] Leandro Posada PTA     Gait Assessment/Treatment    Gait, [place] Level  minimum assist (75% patient effort)  -CW     Gait, Assistive Device  rolling walker  -CW     Gait, Distance (Feet)  12  -CW     Gait, Gait Deviations  jovany decreased;step length decreased;stride length decreased  -CW     Gait, Safety Issues  step length decreased  -CW     Gait, Impairments  strength decreased;impaired balance  -CW     Recorded by  [CW] Leandro Posada PTA     Balance Skills Training    Sitting-Level of Assistance Close supervision;Contact guard  -SV      Sitting # of Minutes 15  -SV      Standing-Level of Assistance Contact guard;x2  -SV      Static Standing Balance Support Right upper extremity supported;Left upper extremity supported  -SV      Recorded by [SV] Yelena Beltran PT      Therapy Exercises    Bilateral Lower Extremities AROM:;10 reps;ankle pumps/circles;LAQ;SAQ;quad sets  -SV      Recorded by [SV] Yelena Beltran PT      Positioning and Restraints    Pre-Treatment Position in bed  -SV in bed  -CW     Post Treatment Position bed  -SV chair  -CW     In Bed supine;call light within reach;encouraged to call for assist;exit alarm on;with family/caregiver;side lying left  -SV      In Chair  notified nsg;sitting;call light within reach;encouraged to call for assist  -CW     Recorded by [SV] Yelena Beltran, PT [CW] Leandro Posada PTA       User Key  (r) = Recorded By, (t) = Taken By, (c) = Cosigned By    Initials Name Effective Dates     Yelena Beltran, PT 01/17/16 -     CW Leandro Posada PTA 12/13/16 -                 IP PT Goals       02/28/18 1536 02/21/18 1421       Bed Mobility PT LTG    Bed Mobility PT LTG, Time to Achieve  1 wk   -PC     Bed Mobility PT LTG, Activity Type  supine to sit/sit to supine  -PC     Bed Mobility PT LTG, [place] Level  moderate assist (50% patient effort)  -PC     Bed Mobility PT LTG, Date Goal Reviewed 02/28/18  -AA      Bed Mobility PT LTG, Outcome goal ongoing -AA goal revised  -PC     Bed Mobility PT LTG, Reason Goal Not Met progress slower than expected  - medical status inhibits participation  -PC     Transfer Training PT LTG    Transfer Training PT LTG, Time to Achieve  1 wk  -PC     Transfer Training PT LTG, Activity Type  sit to stand/stand to sit  -PC     Transfer Training PT LTG, [place] Level  moderate assist (50% patient effort);2 person assist required  -PC     Transfer Training PT  LTG, Date Goal Reviewed 02/28/18  -AA      Transfer Training PT LTG, Outcome goal ongoing -AA goal revised  -PC     Transfer Training PT LTG, Reason Goal Not Met progress slower than expected  - medical status inhibits participation  -PC     Gait Training PT LTG    Gait Training Goal PT LTG, Time to Achieve  1 wk  -PC     Gait Training Goal PT LTG, [place] Level  moderate assist (50% patient effort);2 person assist required  -PC     Gait Training Goal PT LTG, Assist Device  walker, rolling  -PC     Gait Training Goal PT LTG, Distance to Achieve  10 ft  -PC     Gait Training Goal PT LTG, Date Goal Reviewed 02/28/18  -AA      Gait Training Goal PT LTG, Outcome goal ongoing -AA goal revised  -PC     Gait Training Goal PT LTG, Reason Goal Not Met progress slower than expected  - medical status inhibits participation  -PC       User Key  (r) = Recorded By, (t) = Taken By, (c) = Cosigned By    Initials Name Provider Type    PC Joycelyn Fisher, PT Physical Therapist    JOHN Laguerre, PT Physical Therapist          Physical Therapy Education     Title: PT OT SLP Therapies (Active)     Topic: Physical Therapy (Done)     Point: Mobility training (Done)    Learning Progress Summary    Learner Readiness  Method Response Comment Documented by Status   Patient Acceptance E,TB,D NR,VU  SV 03/04/18 1706 Done    Acceptance E,TB VU,DU  CW 03/02/18 1351 Done    Acceptance E VU education on importance of mobility; education on BLE strengthening and core strengthening for improved mobility; education on transfer training AA 03/01/18 0921 Done    Acceptance E VU  AA 02/28/18 1535 Done    Acceptance E,TB DU,VU  CW 02/27/18 0908 Done    Acceptance E,D VU,NR  EJ 02/25/18 1543 Done    Acceptance E,TB,D VU,NR  CH 02/23/18 1558 Done    Acceptance E VU,NR  AA 02/17/18 1156 Done    Acceptance E,TB,D VU,NR  SH 02/12/18 0902 Done    Acceptance E,TB,D NR  RW 02/11/18 1410 Active    Acceptance E,D NR  PC 02/09/18 1725 Active    Acceptance E NR  EM 02/08/18 1319 Active   Family Acceptance E VU education on importance of mobility; education on BLE strengthening and core strengthening for improved mobility; education on transfer training AA 03/01/18 0921 Done    Acceptance E,D VU,NR  EJ 02/25/18 1543 Done    Acceptance E,TB,D VU,NR  SH 02/12/18 0902 Done               Point: Home exercise program (Done)    Learning Progress Summary    Learner Readiness Method Response Comment Documented by Status   Patient Acceptance E,TB,D NR,VU  SV 03/04/18 1706 Done    Acceptance E,TB VU,DU  CW 03/02/18 1351 Done    Acceptance E VU education on importance of mobility; education on BLE strengthening and core strengthening for improved mobility; education on transfer training AA 03/01/18 0921 Done    Acceptance E VU  AA 02/28/18 1535 Done    Acceptance E,TB DU,VU  CW 02/27/18 0908 Done    Acceptance E,D VU,NR  EJ 02/25/18 1543 Done    Acceptance E,D NR  PC 02/21/18 1419 Active    Acceptance E,D NR  PC 02/15/18 1453 Active    Acceptance E,TB,D VU,NR  SH 02/12/18 0902 Done    Acceptance E,TB,D NR  RW 02/11/18 1410 Active    Acceptance E,D NR  PC 02/09/18 1725 Active    Acceptance E NR  EM 02/08/18 1319 Active   Family Acceptance E VU education on importance  of mobility; education on BLE strengthening and core strengthening for improved mobility; education on transfer training AA 03/01/18 0921 Done    Acceptance E,D VU,NR  EJ 02/25/18 1543 Done    Acceptance E,TB,D VU,NR   02/12/18 0902 Done               Point: Body mechanics (Done)    Learning Progress Summary    Learner Readiness Method Response Comment Documented by Status   Patient Acceptance E,TB,D NR,VU  SV 03/04/18 1706 Done    Acceptance E,TB VU,DU  CW 03/02/18 1351 Done    Acceptance E VU education on importance of mobility; education on BLE strengthening and core strengthening for improved mobility; education on transfer training AA 03/01/18 0921 Done    Acceptance E VU  AA 02/28/18 1535 Done    Acceptance E,TB DU,VU  CW 02/27/18 0908 Done    Acceptance E,D VU,NR  EJ 02/25/18 1543 Done    Acceptance E,TB,D VU,NR   02/23/18 1558 Done    Acceptance E,TB,D VU,NR  SH 02/12/18 0902 Done    Acceptance E,TB,D NR  RW 02/11/18 1410 Active    Acceptance E,D NR  PC 02/09/18 1725 Active   Family Acceptance E VU education on importance of mobility; education on BLE strengthening and core strengthening for improved mobility; education on transfer training AA 03/01/18 0921 Done    Acceptance E,D VU,NR  EJ 02/25/18 1543 Done    Acceptance E,TB,D VU,NR   02/12/18 0902 Done               Point: Precautions (Done)    Learning Progress Summary    Learner Readiness Method Response Comment Documented by Status   Patient Acceptance E,TB,D NR,VU  SV 03/04/18 1706 Done    Acceptance E,TB VU,DU  CW 03/02/18 1351 Done    Acceptance E VU education on importance of mobility; education on BLE strengthening and core strengthening for improved mobility; education on transfer training AA 03/01/18 0921 Done    Acceptance E VU  AA 02/28/18 1535 Done    Acceptance E,TB DU,VU  CW 02/27/18 0908 Done    Acceptance E,D VU,NR  EJ 02/25/18 1543 Done    Acceptance E,TB,D VU,NR  CH 02/23/18 1558 Done    Acceptance E VU,NR  AA 02/17/18 1156 Done     Acceptance E,TB,D VU,NR  SH 02/12/18 0902 Done    Acceptance E,TB,D NR  RW 02/11/18 1410 Active    Acceptance E,D NR  PC 02/09/18 1725 Active   Family Acceptance E VU education on importance of mobility; education on BLE strengthening and core strengthening for improved mobility; education on transfer training AA 03/01/18 0921 Done    Acceptance E,D VU,NR  EJ 02/25/18 1543 Done    Acceptance E,TB,D VU,NR   02/12/18 0902 Done                      User Key     Initials Effective Dates Name Provider Type Discipline     12/01/15 -  Isabel Manning, PT Physical Therapist PT    PC 12/01/15 -  Joycelyn Fisher, PT Physical Therapist PT    EM 12/01/15 -  Sade Vergara, PT Physical Therapist PT    EJ 04/21/17 -  Shira Martínez, PT Physical Therapist PT    SV 01/17/16 -  Yelena Beltran, PT Physical Therapist PT    RW 04/06/16 -  Radha Bonds, PTA Physical Therapy Assistant PT    CW 12/13/16 -  Leandro Posada, PTA Physical Therapy Assistant PT    AA 09/05/17 -  Manju Laguerre, PT Physical Therapist PT     01/08/18 -  Sophy Catalan, PT Student PT Student PT                    PT Recommendation and Plan  Anticipated Discharge Disposition: home with home health  Planned Therapy Interventions: bed mobility training, gait training, home exercise program, transfer training  PT Frequency: daily  Plan of Care Review  Plan Of Care Reviewed With: patient  Progress: progress toward functional goals is gradual  Outcome Summary/Follow up Plan: Pt originally agreed to bed ex only but with encouragement tolerated sitting on eob for laq and PF ex.  Brief standing to adjust bedding .  Pt cooperative and encouraged to perform sup ex ad domiitla.          Outcome Measures       03/04/18 1700 03/02/18 1300       How much help from another person do you currently need...    Turning from your back to your side while in flat bed without using bedrails? 3  -SV 3  -CW     Moving from lying on back to sitting on the side of a  flat bed without bedrails? 2  -SV 3  -CW     Moving to and from a bed to a chair (including a wheelchair)? 2  -SV 3  -CW     Standing up from a chair using your arms (e.g., wheelchair, bedside chair)? 2  -SV 3  -CW     Climbing 3-5 steps with a railing? 1  -SV 1  -CW     To walk in hospital room? 1  -SV 3  -CW     AM-PAC 6 Clicks Score 11  -SV 16  -CW     Functional Assessment    Outcome Measure Options AM-PAC 6 Clicks Basic Mobility (PT)  -SV AM-PAC 6 Clicks Basic Mobility (PT)  -CW       User Key  (r) = Recorded By, (t) = Taken By, (c) = Cosigned By    Initials Name Provider Type    SV Yelena Beltran, PT Physical Therapist    CW Leandro Posada, PTA Physical Therapy Assistant           Time Calculation:         PT Charges       03/04/18 1708          Time Calculation    Start Time 1630  -SV      Stop Time 1709  -SV      Time Calculation (min) 39 min  -SV      PT Received On 03/04/18  -SV      PT - Next Appointment 03/05/18  -SV        User Key  (r) = Recorded By, (t) = Taken By, (c) = Cosigned By    Initials Name Provider Type    SV Yelena Beltran, PT Physical Therapist          Therapy Charges for Today     Code Description Service Date Service Provider Modifiers Qty    69995306294 HC PT THERAPEUTIC ACT EA 15 MIN 3/4/2018 Yelena Beltran, PT GP 2    56547801084 HC PT THER SUPP EA 15 MIN 3/4/2018 Yelena Beltran, PT GP 1          PT G-Codes  Outcome Measure Options: AM-PAC 6 Clicks Basic Mobility (PT)    Yelena Beltran PT  3/4/2018

## 2018-03-05 LAB
BASOPHILS # BLD AUTO: 0.03 10*3/MM3 (ref 0–0.2)
BASOPHILS NFR BLD AUTO: 0.4 % (ref 0–1.5)
DEPRECATED RDW RBC AUTO: 52 FL (ref 37–54)
EOSINOPHIL # BLD AUTO: 0.17 10*3/MM3 (ref 0–0.7)
EOSINOPHIL NFR BLD AUTO: 2.2 % (ref 0.3–6.2)
ERYTHROCYTE [DISTWIDTH] IN BLOOD BY AUTOMATED COUNT: 15.2 % (ref 11.7–13)
HCT VFR BLD AUTO: 27.6 % (ref 35.6–45.5)
HGB BLD-MCNC: 8.4 G/DL (ref 11.9–15.5)
IMM GRANULOCYTES # BLD: 0.21 10*3/MM3 (ref 0–0.03)
IMM GRANULOCYTES NFR BLD: 2.7 % (ref 0–0.5)
LYMPHOCYTES # BLD AUTO: 1.08 10*3/MM3 (ref 0.9–4.8)
LYMPHOCYTES NFR BLD AUTO: 13.9 % (ref 19.6–45.3)
MAGNESIUM SERPL-MCNC: 2.1 MG/DL (ref 1.6–2.6)
MCH RBC QN AUTO: 28.8 PG (ref 26.9–32)
MCHC RBC AUTO-ENTMCNC: 30.4 G/DL (ref 32.4–36.3)
MCV RBC AUTO: 94.5 FL (ref 80.5–98.2)
MONOCYTES # BLD AUTO: 0.67 10*3/MM3 (ref 0.2–1.2)
MONOCYTES NFR BLD AUTO: 8.6 % (ref 5–12)
NEUTROPHILS # BLD AUTO: 5.6 10*3/MM3 (ref 1.9–8.1)
NEUTROPHILS NFR BLD AUTO: 72.2 % (ref 42.7–76)
PLATELET # BLD AUTO: 300 10*3/MM3 (ref 140–500)
PMV BLD AUTO: 8.8 FL (ref 6–12)
RBC # BLD AUTO: 2.92 10*6/MM3 (ref 3.9–5.2)
VANCOMYCIN SERPL-MCNC: 23.5 MCG/ML (ref 5–40)
WBC NRBC COR # BLD: 7.76 10*3/MM3 (ref 4.5–10.7)

## 2018-03-05 PROCEDURE — 99024 POSTOP FOLLOW-UP VISIT: CPT | Performed by: SURGERY

## 2018-03-05 PROCEDURE — 99232 SBSQ HOSP IP/OBS MODERATE 35: CPT | Performed by: INTERNAL MEDICINE

## 2018-03-05 PROCEDURE — 85025 COMPLETE CBC W/AUTO DIFF WBC: CPT | Performed by: INTERNAL MEDICINE

## 2018-03-05 PROCEDURE — 97110 THERAPEUTIC EXERCISES: CPT

## 2018-03-05 PROCEDURE — 83735 ASSAY OF MAGNESIUM: CPT | Performed by: INTERNAL MEDICINE

## 2018-03-05 PROCEDURE — 25010000002 HYDROMORPHONE PER 4 MG: Performed by: SURGERY

## 2018-03-05 PROCEDURE — 80202 ASSAY OF VANCOMYCIN: CPT | Performed by: INTERNAL MEDICINE

## 2018-03-05 PROCEDURE — 25010000002 HEPARIN (PORCINE) PER 1000 UNITS: Performed by: INTERNAL MEDICINE

## 2018-03-05 PROCEDURE — 25010000002 MEROPENEM PER 100 MG: Performed by: INTERNAL MEDICINE

## 2018-03-05 PROCEDURE — 25010000002 ONDANSETRON PER 1 MG: Performed by: SURGERY

## 2018-03-05 RX ORDER — LEVOFLOXACIN 5 MG/ML
750 INJECTION, SOLUTION INTRAVENOUS
Status: DISCONTINUED | OUTPATIENT
Start: 2018-03-06 | End: 2018-03-06

## 2018-03-05 RX ADMIN — ONDANSETRON 4 MG: 2 INJECTION INTRAMUSCULAR; INTRAVENOUS at 06:44

## 2018-03-05 RX ADMIN — LIDOCAINE HYDROCHLORIDE 5 ML: 20 SOLUTION ORAL; TOPICAL at 22:15

## 2018-03-05 RX ADMIN — Medication 10 ML: at 15:58

## 2018-03-05 RX ADMIN — DAKIN'S SOLUTION 0.125% (QUARTER STRENGTH): 0.12 SOLUTION at 20:20

## 2018-03-05 RX ADMIN — MEROPENEM 1 G: 1 INJECTION, POWDER, FOR SOLUTION INTRAVENOUS at 10:38

## 2018-03-05 RX ADMIN — MIRTAZAPINE 15 MG: 15 TABLET, FILM COATED ORAL at 22:14

## 2018-03-05 RX ADMIN — METOPROLOL TARTRATE 12.5 MG: 25 TABLET ORAL at 20:20

## 2018-03-05 RX ADMIN — HEPARIN SODIUM 5000 UNITS: 5000 INJECTION, SOLUTION INTRAVENOUS; SUBCUTANEOUS at 22:14

## 2018-03-05 RX ADMIN — HEPARIN SODIUM 5000 UNITS: 5000 INJECTION, SOLUTION INTRAVENOUS; SUBCUTANEOUS at 05:24

## 2018-03-05 RX ADMIN — MEROPENEM 1 G: 1 INJECTION, POWDER, FOR SOLUTION INTRAVENOUS at 22:14

## 2018-03-05 RX ADMIN — Medication 10 ML: at 03:42

## 2018-03-05 RX ADMIN — Medication 10 ML: at 00:22

## 2018-03-05 RX ADMIN — Medication 10 ML: at 08:15

## 2018-03-05 RX ADMIN — METOPROLOL TARTRATE 12.5 MG: 25 TABLET ORAL at 08:10

## 2018-03-05 RX ADMIN — HEPARIN SODIUM 5000 UNITS: 5000 INJECTION, SOLUTION INTRAVENOUS; SUBCUTANEOUS at 14:50

## 2018-03-05 RX ADMIN — Medication 10 ML: at 12:09

## 2018-03-05 RX ADMIN — PANTOPRAZOLE SODIUM 40 MG: 40 TABLET, DELAYED RELEASE ORAL at 05:24

## 2018-03-05 RX ADMIN — HYDROMORPHONE HYDROCHLORIDE 0.5 MG: 1 INJECTION, SOLUTION INTRAMUSCULAR; INTRAVENOUS; SUBCUTANEOUS at 20:08

## 2018-03-05 RX ADMIN — DAKIN'S SOLUTION 0.125% (QUARTER STRENGTH): 0.12 SOLUTION at 08:15

## 2018-03-05 RX ADMIN — Medication 10 ML: at 22:15

## 2018-03-05 RX ADMIN — HYDROMORPHONE HYDROCHLORIDE 0.5 MG: 1 INJECTION, SOLUTION INTRAMUSCULAR; INTRAVENOUS; SUBCUTANEOUS at 09:11

## 2018-03-05 NOTE — PLAN OF CARE
Problem: Patient Care Overview (Adult)  Goal: Plan of Care Review  Outcome: Ongoing (interventions implemented as appropriate)   03/05/18 0620   Coping/Psychosocial Response Interventions   Plan Of Care Reviewed With patient   Patient Care Overview   Progress improving   Outcome Evaluation   Outcome Summary/Follow up Plan abd dressing changed. vss. no c/o of pain, except soreness with movement. Magic mouthwash continued and encouraged fluids with c/o dry mouth. Q2 turns. Will continue to monitor.

## 2018-03-05 NOTE — PAYOR COMM NOTE
"Mariya Barbosa (57 y.o. Female)     PLEASE SEE ATTACHED CLINICAL REVIEW.   PT. REMAINS ON A MONITORED TELEM FLOOR.     REF#[id number]    PLEASE CALL   OR  [phone] WITH CONTINUED STAY AUTHORIZATION.     THANK YOU      SATISH WOOTEN, LPN, CCP    Date of Birth Social Security Number Address Home Phone MRN    1960  [facility] [place] [address] [phone] [id number]    [employer] Marital Status          Unknown        Admission Date Admission Type Admitting Provider Attending Provider Department, Room/Bed    2/1/18 Elective Tri Guzman MD Figert, Patricia L, MD [address], 530/1    Discharge Date Discharge Disposition Discharge Destination                      Attending Provider: Tri Guzman MD     Allergies:  Fish-derived Products, Shellfish-derived Products    Isolation:  Contact   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                 Active Insurance as of 2/1/2018     Primary Coverage     Payor Plan Insurance Group Employer/Plan Group    [employer] BLUE CROSS [employer] [facility] [employer] EMPLOYEE [id number]     Payor Plan Address Payor Plan Phone Number Effective From Effective To    [id number] [phone] 1/1/2015     [place] [address]       Subscriber Name Subscriber Birth Date Member ID       FELICIA BARBOSA [birth year]1965 [id number]                 Emergency Contacts      (Rel.) Home Phone Work Phone Mobile Phone    Felicia Barbosa (Spouse) [phone] -- [phone]              Intake & Output (last day)       03/04 0701 - 03/05 0700 03/05 0701 - 03/06 0700    Urine (mL/kg/hr) 350 (0.1)     Other 35 (0)     Stool 0 (0)     Total Output 385      Net -385            Unmeasured Urine Occurrence 4 x     Unmeasured " Stool Occurrence 1 x         Orders (last 24 hrs)     Start     Ordered    03/05/18 0600  Vancomycin, Random  Morning Draw      03/04/18 1411    03/05/18 0600  Creatinine, Serum  Morning Draw,   Status:  Canceled      03/04/18 1419    03/05/18 0600  Magnesium  Morning Draw      03/04/18 1705    03/05/18 0600  CBC Auto Differential  PROCEDURE ONCE      03/05/18 0000    03/04/18 1845  magnesium sulfate in D5W 1g/100mL (PREMIX)  Every 1 Hour      03/04/18 1800    03/04/18 1745  magnesium sulfate in D5W 1g/100mL (PREMIX)  Once,   Status:  Discontinued      03/04/18 1705    03/04/18 1705  Sodium, Urine, Random -  Once      03/04/18 1705    03/04/18 1705  Creatinine, Urine, Random -  Once      03/04/18 1705    03/04/18 1500  vancomycin 750 mg/250 mL 0.9% NS add-vantage  Once      03/04/18 1411    03/03/18 1015  meropenem (MERREM) 1 g/100 mL 0.9% NS VTB (mbp)  Every 12 Hours      03/03/18 1012    03/02/18 1010  oxyCODONE-acetaminophen (PERCOCET) 5-325 MG per tablet 1 tablet  Every 4 Hours PRN      03/02/18 1011    03/02/18 1010  oxyCODONE-acetaminophen (PERCOCET) 5-325 MG per tablet 2 tablet  Every 4 Hours PRN      03/02/18 1011    03/01/18 2208  HYDROmorphone (DILAUDID) injection 1 mg  2 Times Daily PRN      03/01/18 2209    03/01/18 0900  epoetin kristen (EPOGEN,PROCRIT) injection 20,000 Units  Weekly      03/01/18 0648    02/28/18 1200  Dietary Nutrition Supplements Ensure Enlive  Daily With Breakfast, Lunch & Dinner     Comments:  Chocolate    02/28/18 1058    02/28/18 1200  Dietary Nutrition Supplements Mohamud  Daily With Lunch & Dinner     Comments:  Mixed in fruit punch crystal light    02/28/18 1058    02/26/18 1821  acetaminophen (TYLENOL) tablet 650 mg  Every 6 Hours PRN      02/26/18 1822    02/26/18 1326  Pharmacy to dose vancomycin  Continuous PRN      02/26/18 1326    02/22/18 0536  alteplase ((CATHFLO/ACTIVASE)) syringe 2 mg  Once As Needed      02/22/18 0536    02/20/18 1600  magic mouthwash oral supsension 10  mL  Every 4 Hours Scheduled      02/20/18 1055    02/20/18 1600  lidocaine viscous (XYLOCAINE) 2 % mouth solution 5 mL  Every 4 Hours Scheduled      02/20/18 1055    02/20/18 1400  levoFLOXacin (LEVAQUIN) 750 mg/150 mL D5W (premix) (LEVAQUIN) 750 mg  Every 48 Hours      02/20/18 1242    02/20/18 1330  metoprolol tartrate (LOPRESSOR) tablet 12.5 mg  Every 12 Hours Scheduled      02/20/18 1253    02/19/18 1500  Vancomycin Pharmacy Intermittent Dosing  Daily      02/19/18 1423    02/19/18 1115  iopamidol (ISOVUE-250) 51 % injection 50 mL  Once in Imaging      02/19/18 1039    02/18/18 0600  CBC & Differential  Daily      02/17/18 1002    02/18/18 0600  pantoprazole (PROTONIX) EC tablet 40 mg  Every Early Morning      02/17/18 1505    02/17/18 2100  mirtazapine (REMERON) tablet 15 mg  Nightly      02/17/18 0856    02/15/18 2243  sodium chloride (OCEAN) nasal spray 2 spray  As Needed      02/15/18 2243    02/15/18 1526  heparin (porcine) injection 4,000 Units  As Needed      02/15/18 1527    02/14/18 1400  heparin (porcine) 5000 UNIT/ML injection 5,000 Units  Every 8 Hours Scheduled      02/14/18 1148    02/13/18 2100  sodium hypochlorite (DAKIN'S 1/4 STRENGTH) 0.125 % topical solution 0.125% solution  2 Times Daily      02/13/18 1832    02/13/18 0922  albumin human 25 % IV SOLN 12.5 g  As Needed      02/13/18 0924    02/13/18 0921  heparin (porcine) injection 1,000-2,000 Units  As Needed      02/13/18 0924    02/13/18 0921  calcium gluconate 1 g in sodium chloride 0.9 % 50 mL IVPB  As Needed      02/13/18 0924    02/13/18 0921  calcium gluconate 2 g in sodium chloride 0.9 % 50 mL IVPB  As Needed      02/13/18 0924    02/05/18 0607  acetaminophen (TYLENOL) suppository 325 mg  Every 4 Hours PRN      02/05/18 0607 02/05/18 0105  ondansetron (ZOFRAN) tablet 4 mg  Every 6 Hours PRN,   Status:  Discontinued      02/05/18 0105 02/05/18 0105  ondansetron ODT (ZOFRAN-ODT) disintegrating tablet 4 mg  Every 6 Hours PRN       02/05/18 0105    02/05/18 0105  ondansetron (ZOFRAN) injection 4 mg  Every 6 Hours PRN      02/05/18 0105    02/04/18 1135  promethazine (PHENERGAN) injection 12.5 mg  Every 8 Hours PRN      02/04/18 1136    02/01/18 2159  albuterol (PROVENTIL) nebulizer solution 0.083% 2.5 mg/3mL  Every 4 Hours PRN      02/01/18 2159    Unscheduled  Magnesium  As Needed      02/12/18 1048    Unscheduled  Potassium  As Needed      02/12/18 1048    Unscheduled  Bladder Scan if Patient Unable to Void 4-6 Hours After Catheter Removal  As Needed      03/01/18 0829    Unscheduled  If Bladder Scan Volume is Less Than 350-500mL & Patient is Without Symptoms of Bladder Discomfort / Distention Monitor Every 1-2 Hours for Spontaneous Void  As Needed      03/01/18 0829    Unscheduled  Straight Cath Every 4-6 Hours As Needed If Patient is Unable to Void After 4-6 Hours, Bladder Scan Volume is Greater Than 350-500mL & Patient Has Symptoms of Bladder Discomfort / Distention  As Needed      03/01/18 0829    Unscheduled  Oxygen Therapy- Nasal Cannula; Titrate for SPO2: Greater Than or Equal To, 90%, 94%  As Needed     Comments:  Dc oxygen this am.  Ok to use for comfort at night or while doing therapy    03/01/18 0829    --  SCANNED - TELEMETRY        02/01/18 0000    --  SCANNED - TELEMETRY        02/01/18 0000    --  SCANNED - TELEMETRY        02/01/18 0000    --  SCANNED - TELEMETRY        02/01/18 0000    --  SCANNED - TELEMETRY        02/01/18 0000    --  SCANNED - TELEMETRY        02/01/18 0000          Masoud Arechiga MD Physician Signed Infectious Disease Progress Notes Date of Service: 3/5/2018  8:48 AM      Expand All Collapse All    []Hide copied text  INFECTIOUS DISEASES PROGRESS NOTE     CC: f/u sepsis     S:   Some nausea earlier today, now resolved  No f/c/ns     O:  Physical Exam:  Temp:  [97.5 °F (36.4 °C)-99.3 °F (37.4 °C)] 97.5 °F (36.4 °C)  Heart Rate:  [86-96] 86  Resp:  [18-20] 18  BP: (102-136)/(58-82)  109/60  Physical Exam   Constitutional: No distress.   Pulmonary/Chest: Effort normal.   Abdominal: Soft. She exhibits no distension. There is no tenderness.   Neurological: She is alert.   Skin: Skin is warm.   Dressed abdominal wounds   Psychiatric: She has a normal mood and affect. Her behavior is normal.          Diagnostics:    WBC 7.96  H/H 8.4/28    Cr 2.97        Assessment/Plan       1.  Septic shock secondary to small bowel enterotomy and peritonitis status post repair with small bowel resection and anastomosis by Dr. Guzman on 2/4/18; shock resolved; Repeat I and D for wound cellulitis on 2/13/18 and 2/16. Cx with CRE and E faecalis; CT on 2/26 shows fistula, c/f free air and abscess; repeat incision and debridement on 2/27/18  2.  Acute kidney injury  3.  Persistent right pleural effusion, no pna.     Doing better. WBC nl and AF.  Cont merrem, vanc, lvq renally dosed.  Probably stop abx in next few days as wounds improving per surgery.           Masoud Arechiga MD  8:48 AM  03/05/18                                   Yelena Beltran, PT Physical Therapist Signed Physical Therapy Plan of Care Date of Service: 3/4/2018  5:08 PM         Problem: Patient Care Overview (Adult)  Goal: Plan of Care Review  Outcome: Ongoing (interventions implemented as appropriate)    03/04/18 1706   Coping/Psychosocial Response Interventions   Plan Of Care Reviewed With patient   Patient Care Overview   Progress progress toward functional goals is gradual   Outcome Evaluation   Outcome Summary/Follow up Plan Pt originally agreed to bed ex only but with encouragement tolerated sitting on eob for laq and PF ex. Brief standing to adjust bedding . Pt cooperative and encouraged to perform sup ex ad domitila.                                Cedric Bashir MD Physician Signed Nephrology Progress Notes Date of Service: 3/4/2018  4:50 PM      Expand All Collapse All    []Hide copied text      LOS: 31 days    Patient  "Care Team:  Zahida Coffman MD as PCP - General (Family Medicine)     Chief Complaint:  No chief complaint on file.           Subjective         Interval History:  Feels ok, tho very thirsty.  Breathing is stable; keeping food and drink down; pain controlled        Objective         Vital Signs  Temp:  [98 °F (36.7 °C)-99.2 °F (37.3 °C)] 98.6 °F (37 °C)  Heart Rate:  [] 95  Resp:  [20-22] 20  BP: (116-177)/(63-73) 116/66     Flowsheet Rows           First Filed Value     Admission Height   172.7 cm (68\") Documented at 02/01/2018 1014     Admission Weight   123 kg (271 lb 14.4 oz) Documented at 02/01/2018 1014            I/O this shift:  In: -   Out: 385 [Urine:350; Other:35]  I/O last 3 completed shifts:  In: 150 [IV Piggyback:150]  Out: 1140 [Urine:1050; Other:90]     Intake/Output Summary (Last 24 hours) at 03/04/18 1650  Last data filed at 03/04/18 1444    Gross per 24 hour   Intake                0 ml   Output             1475 ml   Net            -1475 ml         Physical Exam:  Awake, NAD, obese, LIJ TLC 2/19, TDC RIJ placed 2/19      Oral mucosa dry; no lesions.  Nasal 02.   Heart Tachy, RR no s3 or rub.   Lungs upper airway rhonchi anteriorly. Decreased bs bases; not labored.   Abd Distended, Binder in place. +bs.  RLQ ANDER drain.     Ext trace upper and +1-2 lower ext edema .  Mood and affect fine  No skin rash                    Results Review:       Results from last 7 days  Lab Units 03/04/18  1220 03/03/18  0532 03/02/18  0625 03/01/18  0516   02/27/18  0530   SODIUM mmol/L 139 138 141 140  < > 135*   POTASSIUM mmol/L 4.0 4.1 3.9 4.0  < > 4.3   CHLORIDE mmol/L 98 98 100 99  < > 95*   CO2 mmol/L 27.2 26.1 31.7* 25.6  < > 31.1*   BUN mg/dL 64* 57* 55* 52*  < > 64*   CREATININE mg/dL 2.97* 2.78* 3.22* 3.19*  < > 3.87*   CALCIUM mg/dL 8.1* 8.1* 8.1* 7.7*  < > 7.8*   BILIRUBIN mg/dL  --   --  0.4 0.4  --  0.5   ALK PHOS U/L  --   --  68 68  --  81   ALT (SGPT) U/L  --   --  14 11  --  17   AST (SGOT) U/L "  --   --  31 30  --  34*   GLUCOSE mg/dL 112* 88 98 100*  < > 105*   < > = values in this interval not displayed.     Estimated Creatinine Clearance: 28.9 mL/min (by C-G formula based on Cr of 2.97).        Results from last 7 days  Lab Units 03/04/18  1220 03/03/18  0532 03/02/18  0625   02/27/18  0530   MAGNESIUM mg/dL 1.5*  --  1.8  --  2.1   PHOSPHORUS mg/dL 6.2* 6.0* 7.2*  < > 7.4*   < > = values in this interval not displayed.               Results from last 7 days  Lab Units 03/04/18  1220 03/03/18  0532 03/02/18  0625 03/01/18  0516 02/28/18  0733   WBC 10*3/mm3 8.55 7.93 8.62 9.93 10.66   HEMOGLOBIN g/dL 8.9* 8.6* 8.8* 7.8* 8.1*   PLATELETS 10*3/mm3 321 369 366 360 348                       Imaging Results (last 24 hours)      ** No results found for the last 24 hours. **             epoetin kristen 20,000 Units Subcutaneous Weekly   heparin (porcine) 5,000 Units Subcutaneous Q8H   iopamidol 50 mL Intravenous Once in imaging   levoFLOXacin 750 mg Intravenous Q48H   lidocaine viscous 5 mL Mouth/Throat Q4H   magic mouthwash 10 mL Swish & Spit Q4H   meropenem 1 g Intravenous Q12H   metoprolol tartrate 12.5 mg Oral Q12H   mirtazapine 15 mg Oral Nightly   pantoprazole 40 mg Oral Q AM   sodium hypochlorite   Topical BID   vancomycin 750 mg Intravenous Once   Vancomycin Pharmacy Intermittent Dosing   Does not apply Daily         Pharmacy to dose vancomycin           Medication Review:    Current Medications              Current Facility-Administered Medications   Medication Dose Route Frequency Provider Last Rate Last Dose   • acetaminophen (TYLENOL) suppository 325 mg  325 mg Rectal Q4H PRN Edgard Felder MD    325 mg at 02/05/18 0653   • acetaminophen (TYLENOL) tablet 650 mg  650 mg Oral Q6H PRN Tri Guzman MD    650 mg at 03/01/18 0014   • albumin human 25 % IV SOLN 12.5 g  12.5 g Intravenous PRN Madeline Kay MD    12.5 g at 02/27/18 1305   • albuterol (PROVENTIL) nebulizer solution 0.083% 2.5  mg/3mL  2.5 mg Nebulization Q4H PRN Tri Guzman MD         • calcium gluconate 1 g in sodium chloride 0.9 % 50 mL IVPB  1 g Intravenous PRN Madeline Kay MD           Or   • calcium gluconate 2 g in sodium chloride 0.9 % 50 mL IVPB  2 g Intravenous PRN Madeline Kay MD         • epoetin kristen (EPOGEN,PROCRIT) injection 20,000 Units  20,000 Units Subcutaneous Weekly Madeline Kay MD    20,000 Units at 03/01/18 1438   • heparin (porcine) 5000 UNIT/ML injection 5,000 Units  5,000 Units Subcutaneous Q8H Benjy Samson MD    5,000 Units at 03/04/18 1445   • heparin (porcine) injection 1,000-2,000 Units  1,000-2,000 Units Intravenous PRN Madeline Kay MD    4,000 Units at 02/15/18 1843   • heparin (porcine) injection 4,000 Units  4,000 Units Intracatheter PRN Madeilne Kay MD    4,000 Units at 02/27/18 1258   • HYDROmorphone (DILAUDID) injection 1 mg  1 mg Intravenous BID PRN Bryant Spence MD    0.5 mg at 03/04/18 1023   • iopamidol (ISOVUE-250) 51 % injection 50 mL  50 mL Intravenous Once in imaging Meir Guillen MD         • levoFLOXacin (LEVAQUIN) 750 mg/150 mL D5W (premix) (LEVAQUIN) 750 mg  750 mg Intravenous Q48H Masoud Arechiga MD    750 mg at 03/02/18 1929   • lidocaine viscous (XYLOCAINE) 2 % mouth solution 5 mL  5 mL Mouth/Throat Q4H Madeline Kay MD    5 mL at 03/01/18 1235   • magic mouthwash oral supsension 10 mL  10 mL Swish & Spit Q4H Madeline Kay MD    10 mL at 03/04/18 1617   • meropenem (MERREM) 1 g/100 mL 0.9% NS VTB (mbp)  1 g Intravenous Q12H Arvin Cooney MD    1 g at 03/04/18 1024   • metoprolol tartrate (LOPRESSOR) tablet 12.5 mg  12.5 mg Oral Q12H Bradford So MD    12.5 mg at 03/04/18 0834   • mirtazapine (REMERON) tablet 15 mg  15 mg Oral Nightly Ernestina Hussein MD    15 mg at 03/03/18 2203   • ondansetron ODT (ZOFRAN-ODT) disintegrating tablet 4 mg  4 mg Oral Q6H PRN Tri Guzman MD            Or   • ondansetron (ZOFRAN) injection 4 mg  4 mg Intravenous Q6H PRN Tri Guzman MD    4 mg at 03/03/18 1421   • oxyCODONE-acetaminophen (PERCOCET) 5-325 MG per tablet 1 tablet  1 tablet Oral Q4H PRN Tri Guzman MD    1 tablet at 03/04/18 0107     Or   • oxyCODONE-acetaminophen (PERCOCET) 5-325 MG per tablet 2 tablet  2 tablet Oral Q4H PRN Tri Guzman MD         • pantoprazole (PROTONIX) EC tablet 40 mg  40 mg Oral Q AM Madeline Kay MD    40 mg at 03/04/18 0531   • Pharmacy to dose vancomycin    Does not apply Continuous PRN Masoud Arechiga MD         • promethazine (PHENERGAN) injection 12.5 mg  12.5 mg Intravenous Q8H PRN Sarthak Figueroa MD    12.5 mg at 02/09/18 2345   • sodium chloride (OCEAN) nasal spray 2 spray  2 spray Each Nare PRN Ernestina Hussein MD         • sodium hypochlorite (DAKIN'S 1/4 STRENGTH) 0.125 % topical solution 0.125% solution    Topical BID Tri Guzman MD         • vancomycin 750 mg/250 mL 0.9% NS add-vantage  750 mg Intravenous Once Masoud Arechiga MD    750 mg at 03/04/18 1618   • Vancomycin Pharmacy Intermittent Dosing    Does not apply Daily Masoud Arechiga MD                     Assessment/Plan          1.  Acute kidney injury on chronic kidney disease stage III, non-oliguric, with SCr slightly higher today than yesterday; prerenal --> ATN due to shock. Last HD was 2/27.  Central vol not clear; low serum abd and obesity prob playing significant roles in her edema.  Wonder if central vol actually contracted.  Low BP, dry MM, meager PO intake thus far.  2.  Chronic kidney disease stage III baseline SCr 1.5.   3.  Status post exploratory laparotomy with small bowel resection, mesh removal, RLQ port site debridement, and  Appendectomy. POD 25.  SP subcutaneous debridement 2/13 and 2/16, 2/27. 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    4.  Acute on chronic anemia  5.  Small bowel enterotomy, sp repair 2/4.  ESBL, now carbapenem  resistant from wound.   E. coli from blood 2/5. Blood cultures 2/9 negative. Ongoing wound debridement.    6.  Encephalopathy . Resolved.   7.  Afib with RVR.  Now in SR.  On beta blocker   8.  PCM. Improving po intake.   9.  Oral mucositis. Resolved.  On topical therapy.   10. Debility.  Working with PT .       Plan:  1.  No need for HD again today; will remove TDC if clear trend for improving SCr--but not there yet  2.  No diuretic today  3.  Re-check urine lytes, and check uric acid  4.  Surveillance labs  5.  D/w pt's  at bedside     Cedric Bashir MD  03/04/18  4:50 PM                            Jensen Forbes MD Physician Signed Surgery Progress Notes Date of Service: 3/4/2018  3:50 PM      Expand All Collapse All    []Hide copied text  Chief Complaint:     S/P da Niesha ventral hernia repair with mesh  S/P wound debridements and mesh removal     Subjective:     Appears comfortable.  Tolerating diet.     Objective:      Vitals           Vitals:     03/04/18 0025 03/04/18 0416 03/04/18 0700 03/04/18 1444   BP: 125/72   119/63 116/66   BP Location: Left arm   Left arm Left arm   Patient Position: Lying   Lying Lying   Pulse: 101   92 95   Resp: 22   22 20   Temp: 98.3 °F (36.8 °C)   98 °F (36.7 °C) 98.6 °F (37 °C)   TempSrc: Oral   Oral Oral   SpO2: 98%   97% 96%   Weight:   118 kg (260 lb 8 oz)       Height:                    Lungs: Clear  Heart: Regular  Abdomen: BS present.   Extremities: Warm     Assessment:     S/P da Niesha ventral hernia repair with mesh  S/P wound debridements and mesh removal     Plan:     Probably able to be changed over to VAC dressing beginning of this week.

## 2018-03-05 NOTE — PLAN OF CARE
Problem: Patient Care Overview (Adult)  Goal: Plan of Care Review  Outcome: Ongoing (interventions implemented as appropriate)   03/05/18 2584   Coping/Psychosocial Response Interventions   Plan Of Care Reviewed With patient   Patient Care Overview   Progress progress toward functional goals as expected   Outcome Evaluation   Outcome Summary/Follow up Plan Patient very uncomfortable in chair after working with PT and had a lot of difficulty getting back to bed. Abdominal dressings changed--difficult to hold in place due to location and because patient only allows paper tape and refuses abdominal binder. Anticipate wound vac placement tomorrow.     Goal: Adult Individualization and Mutuality  Outcome: Ongoing (interventions implemented as appropriate)      Problem: Fall Risk (Adult)  Goal: Absence of Falls  Outcome: Ongoing (interventions implemented as appropriate)

## 2018-03-05 NOTE — PROGRESS NOTES
INFECTIOUS DISEASES PROGRESS NOTE    CC: f/u sepsis    S:   Some nausea earlier today, now resolved  No f/c/ns    O:  Physical Exam:  Temp:  [97.5 °F (36.4 °C)-99.3 °F (37.4 °C)] 97.5 °F (36.4 °C)  Heart Rate:  [86-96] 86  Resp:  [18-20] 18  BP: (102-136)/(58-82) 109/60  Physical Exam   Constitutional: No distress.   Pulmonary/Chest: Effort normal.   Abdominal: Soft. She exhibits no distension. There is no tenderness.   Neurological: She is alert.   Skin: Skin is warm.   Dressed abdominal wounds   Psychiatric: She has a normal mood and affect. Her behavior is normal.        Diagnostics:    WBC 7.96  H/H 8.4/28    Cr 2.97    Assessment/Plan   1.  Septic shock secondary to small bowel enterotomy and peritonitis status post repair with small bowel resection and anastomosis by Dr. Guzman on 2/4/18; shock resolved; Repeat I and D for wound cellulitis on 2/13/18 and 2/16. Cx with CRE and E faecalis; CT on 2/26 shows fistula, c/f free air and abscess; repeat incision and debridement on 2/27/18  2.  Acute kidney injury  3.  Persistent right pleural effusion, no pna.    Doing better. WBC nl and AF.  Cont merrem, vanc, lvq renally dosed.  Probably stop abx in next few days as wounds improving per surgery.        Masoud Arechiga MD  8:48 AM  03/05/18

## 2018-03-05 NOTE — PROGRESS NOTES
Subjective   Pt up in chair, no fevers, tolerating diet, no dialysis over the weekend   Objective     Vital signs in last 24 hours:  Temp:  [97.5 °F (36.4 °C)-99.3 °F (37.4 °C)] 97.5 °F (36.4 °C)  Heart Rate:  [86-96] 88  Resp:  [18-20] 18  BP: (102-136)/(58-82) 119/70    Intake/Output last 3 shifts:  I/O last 3 completed shifts:  In: -   Out: 685 [Urine:650; Other:35]    Intake/Output this shift:  I/O this shift:  In: 480 [P.O.:480]  Out: 30 [Other:30]    Physical Exam:  Respiratory: CTA, good inspiratory effort  CV: RRR  Abd: + BS, ANDER clear, dressings dry     Results from last 7 days  Lab Units 03/05/18  0524   WBC 10*3/mm3 7.76   HEMOGLOBIN g/dL 8.4*   HEMATOCRIT % 27.6*   PLATELETS 10*3/mm3 300       Results from last 7 days  Lab Units 03/04/18  1220   SODIUM mmol/L 139   POTASSIUM mmol/L 4.0   CHLORIDE mmol/L 98   CO2 mmol/L 27.2   BUN mg/dL 64*   CREATININE mg/dL 2.97*   GLUCOSE mg/dL 112*   CALCIUM mg/dL 8.1*     Assessment/Plan   Wounds healing - wound vac tomorrow  Will need JULIA Guzman MD

## 2018-03-05 NOTE — PLAN OF CARE
Problem: Patient Care Overview (Adult)  Goal: Plan of Care Review   03/05/18 1208   Coping/Psychosocial Response Interventions   Plan Of Care Reviewed With patient   Patient Care Overview   Progress progress toward functional goals is gradual   Outcome Evaluation   Outcome Summary/Follow up Plan Pt alert and agrees to OT . JOHNY's increasing AROM. Will continue to benefit from OT

## 2018-03-05 NOTE — PLAN OF CARE
Problem: Patient Care Overview (Adult)  Goal: Plan of Care Review  Outcome: Ongoing (interventions implemented as appropriate)   03/04/18 1706 03/04/18 4269   Coping/Psychosocial Response Interventions   Plan Of Care Reviewed With patient --    Patient Care Overview   Progress --  improving   Outcome Evaluation   Outcome Summary/Follow up Plan --  vitals stable. slept most of the day. worked with pt today. dressing changed this morning. left IJ catheters had cathflow put through them and are now able to get blood return.        Problem: Fall Risk (Adult)  Goal: Absence of Falls  Outcome: Ongoing (interventions implemented as appropriate)      Problem: Pain, Acute (Adult)  Goal: Acceptable Pain Control/Comfort Level  Outcome: Ongoing (interventions implemented as appropriate)

## 2018-03-05 NOTE — PROGRESS NOTES
"Pharmacokinetic Consult - Vancomycin Dosing (Follow-up Note)  Mariya Campos is on day 14 pharmacy to dose vancomycin for intra-abdominal infection.  Pharmacy dosing vancomycin per Dr. Arechiga 's request.   Goal trough: 15-20 mg/L   Other antimicrobials: levofloxacin 750 mg iv q48h, meropenem 1g iv q12h  Admit date: 2/1/2018  9:56 AM    Relevant clinical data and objective history reviewed:  57 y.o. female 172.7 cm (68\") 119 kg (262 lb 9.6 oz)    Past Medical History:   Diagnosis Date   • Abdominal pain    • Allergic rhinitis    • Anemia     ON IRON QOD   • Asthma    • Chronic bronchitis    • Chronic kidney disease     STAGE 3   SINCE 2015 WITH COLON SURGERY   • Colon cancer     CHEMO 6 MONTHS   • Hiatal hernia    • Hypertension    • Incisional hernia    • Overweight    • Peripheral neuropathy     FROM CHEMO TX'S   • Rosacea    • Visit for screening mammogram      Creatinine   Date Value Ref Range Status   03/04/2018 2.97 (H) 0.57 - 1.00 mg/dL Final   03/03/2018 2.78 (H) 0.57 - 1.00 mg/dL Final     BUN   Date Value Ref Range Status   03/04/2018 64 (H) 6 - 20 mg/dL Final     Estimated Creatinine Clearance: 29 mL/min (by C-G formula based on Cr of 2.97).    Lab Results   Component Value Date    WBC 7.76 03/05/2018    WBC 8.55 03/04/2018    WBC 7.93 03/03/2018     Temp Readings from Last 3 Encounters:   03/05/18 97.5 °F (36.4 °C) (Oral)   01/30/18 97.9 °F (36.6 °C) (Oral)   01/26/18 97.7 °F (36.5 °C) (Oral)     Baseline cultures/source/suscetibilit/labs/radiology:  2/05 MRSA screen: positive  2/05 Bcx E.coli ESBL+  2/09 Bcx x2: NG Final  2/16 Wound cx (abd wall): E.coli CRE, E. Faecalis (S-amp, gent, vanc)  2/17 Cdiff: Neg  2/25 Bcx ngtd    Anti-Infectives     Ordered     Dose/Rate Route Frequency Start Stop    03/04/18 1411  vancomycin 750 mg/250 mL 0.9% NS add-vantage     Ordering Provider:  Masoud Arechiga MD    750 mg  over 75 Minutes Intravenous Once 03/04/18 1500 03/04/18 1733    03/03/18 1012  " meropenem (MERREM) 1 g/100 mL 0.9% NS VTB (mbp)     Ordering Provider:  Arvin Cooney MD    1 g  over 180 Minutes Intravenous Every 12 Hours 03/03/18 1015 03/06/18 1044    03/02/18 1350  vancomycin 750 mg/250 mL 0.9% NS add-vantage     Ordering Provider:  Masoud Arechiga MD    750 mg  over 75 Minutes Intravenous Once 03/02/18 1500 03/02/18 1820    02/28/18 1256  vancomycin 750 mg/250 mL 0.9% NS add-vantage     Ordering Provider:  Masoud Arechiga MD    750 mg  over 75 Minutes Intravenous Once 02/28/18 1400 02/28/18 1540    02/27/18 1232  vancomycin 750 mg/250 mL 0.9% NS add-vantage     Ordering Provider:  Masoud Arechiga MD    750 mg Intravenous Once 02/27/18 1315 02/27/18 1357    02/26/18 1326  Pharmacy to dose vancomycin     Ordering Provider:  Masoud Arechiga MD     Does not apply Continuous PRN 02/26/18 1326 03/12/18 1425    02/25/18 1101  vancomycin 500 mg/100 mL 0.9% NS IVPB ([facility])     Ordering Provider:  Masoud Arechiga MD    500 mg  over 60 Minutes Intravenous Once 02/25/18 1145 02/25/18 1244    02/24/18 1031  vancomycin 500 mg/100 mL 0.9% NS IVPB ([facility])     Ordering Provider:  Masoud Arechiga MD    500 mg  over 60 Minutes Intravenous Once 02/24/18 1800 02/24/18 1908    02/22/18 0834  vancomycin 750 mg/250 mL 0.9% NS add-vantage     Ordering Provider:  Masoud Arechiga MD    750 mg  over 75 Minutes Intravenous Once 02/22/18 1400 02/22/18 1450    02/20/18 0808  vancomycin (VANCOCIN) in iso-osmotic dextrose IVPB 1 g (premix) 200 mL     Ordering Provider:  Masoud Arechiga MD    1,000 mg  over 100 Minutes Intravenous Once 02/20/18 1400 02/20/18 1716    02/20/18 1242  levoFLOXacin (LEVAQUIN) 750 mg/150 mL D5W (premix) (LEVAQUIN) 750 mg     Ordering Provider:  Masoud Arechiga MD    750 mg Intravenous Every 48 Hours 02/20/18 1400 03/04/18 1831    02/19/18 1423  vancomycin 1500 mg/500 mL 0.9% NS IVPB ([facility])     Ordering  Provider:  Masoud Arechiga MD    1,500 mg  over 150 Minutes Intravenous Once 18 1500 18 1856    18 1423  Vancomycin Pharmacy Intermittent Dosing     Marge Vela RPH reviewed the order on 18 1443.   Ordering Provider:  Masoud Arechiga MD     Does not apply Daily 18 1500 18 2359    18 1414  Pharmacy to dose vancomycin     Masoud Arechiga MD let the order  on 18 1345.   Ordering Provider:  Masoud Arechiga MD     Does not apply Continuous PRN 18 1413 18 1344    02/15/18 1056  ertapenem (INVanz) 500 mg in sodium chloride 0.9 % 50 mL IVPB     Ordering Provider:  Masoud Arechiga MD    500 mg  100 mL/hr over 30 Minutes Intravenous Every 24 Hours 18 1300 18 1524    18 0755  vancomycin (VANCOCIN) in iso-osmotic dextrose IVPB 1 g (premix) 200 mL     Ordering Provider:  Tania Kennedy MD    1,000 mg  over 120 Minutes Intravenous Once 18 0830 18 1130    18 1842  [MAR Hold]  vancomycin 2500 mg/500 mL 0.9% NS IVPB ([facility])     (MAR Hold since 18 1937)   Ordering Provider:  Bryant Spence MD    20 mg/kg × 123 kg Intravenous Once 18 2005    18 1111  CeFAZolin in Sodium Chloride (ANCEF) IVPB solution 3 g     Ordering Provider:  Tri Guzman MD    3 g  200 mL/hr over 30 Minutes Intravenous On Call to O.R. 18 1145 18 1229           No results found for: LULÚ  Lab Results   Component Value Date    VANCORANDOM 23.50 2018    VANCORANDOM 16.70 2018    VANCORANDOM 25.40 2018     Vancomycin dosing history:    1626  Vancomycin 1500mg IV x1   518  Vanc random: 17.8 mcg/mL; HD today   1536  Vancomycin 1000mg IV x1               0436  Vanc random: 22.5 mcg/mL              600  Vanc random: 20.9 mcg/mL, HD today  1335  Vancomycin 750 mg IV x1              337  Vanc random: 21.40 mcg/mL,  HD today  2/24 1808  Vancomycin 500 mg IV x1              2/25 0536  Vanc random: 18.60 mcg/mL  2/25 1144   Vancomycin 500 mg               2/26 0606 Vanc random: 20.4 mcg/mL, no dose today  2/27 Last HD scheduled, vanc 750 mg IV x1               2/28 0733 Vanc random = 19.4 mcg/ml  2/28 1425 Vanc 750mg iv x 1                3/1 0516 Vanc random: 25 mcg/mL              3/2  0625 Vanc random = 17.9 mcg/ml  3/2 1705 Vanc 750mg iv x 1 dose               3/3 0532 Vanc random = 25.4mcg/ml              3/4 1220 Vanc random = 16.7mcg/ml  3/4 1618 Vanc 750mg iv x 1 dose   No HD scheduled   3/5 0524 Vanc random = 23.5 mcg/mL    Assessment/Plan  1. Given random level of 23.5 mcg/mL and no plans for dialysis, will hold vancomycin today. Will check AM vancomycin random level and re-dose accordingly.   2. Will continue to monitor renal function.    Pharmacy will continue to follow daily while on vancomycin and adjust as needed.     Yojana Jolley, Pharm.D.  03/05/18 1:11 PM

## 2018-03-05 NOTE — PLAN OF CARE
Problem: Patient Care Overview (Adult)  Goal: Plan of Care Review  Outcome: Ongoing (interventions implemented as appropriate)   03/05/18 0025   Coping/Psychosocial Response Interventions   Plan Of Care Reviewed With patient   Outcome Evaluation   Outcome Summary/Follow up Plan Pt able to transfer w/ PT from bed to bsc and from bsc to chair. Did well weight shifting and taking steps during transfer. Less assist w/ bed mobility and transfers today.

## 2018-03-05 NOTE — PROGRESS NOTES
"   LOS: 32 days    Patient Care Team:  Zahida Coffman MD as PCP - General (Family Medicine)    Chief Complaint:  No chief complaint on file.      Subjective follow-up acute kidney injury on chronic kidney disease    Interval History:   Patient is feeling somewhat better, denies any chest pain or shortness of air, no nausea or vomiting, her abdominal the incision and is bandaged but been cared for by nursing and she had the drains.  She stated that she's been putting out good amount of urine but I have no record of that.  No lower extremity edema.  Her last dialysis was on 2/27/2018      Objective     Vital Signs  Temp:  [97.5 °F (36.4 °C)-99.3 °F (37.4 °C)] 97.5 °F (36.4 °C)  Heart Rate:  [86-96] 86  Resp:  [18-20] 18  BP: (102-136)/(58-82) 109/60    Flowsheet Rows         First Filed Value    Admission Height  172.7 cm (68\") Documented at 02/01/2018 1014    Admission Weight  123 kg (271 lb 14.4 oz) Documented at 02/01/2018 1014          I/O this shift:  In: 240 [P.O.:240]  Out: 30 [Other:30]  I/O last 3 completed shifts:  In: -   Out: 685 [Urine:650; Other:35]    Intake/Output Summary (Last 24 hours) at 03/05/18 1035  Last data filed at 03/05/18 1004   Gross per 24 hour   Intake              240 ml   Output               65 ml   Net              175 ml       Physical Exam:  General Appearance: alert, oriented x 3, no acute distress,  Skin: warm and dry  HEENT: pupils round and reactive to light, oral mucosa normal,   Neck: supple, no JVD, trachea midline, right IJ tunneled dialysis catheter  Lungs: Decreased breath sounds bilaterally, unlabored breathing effort  Heart: RRR, normal S1 and S2, no S3, no rub  Abdomen: soft, non-tender,  present bowel sounds to auscultation dominant wall dressing, RLQ ANDER drain.  : no palpable bladder,  Extremities: no edema, cyanosis or clubbing  Neuro: normal speech and mental status        Results Review:      Results from last 7 days  Lab Units 03/04/18  1220 03/03/18  0532 " 03/02/18  0625 03/01/18  0516  02/27/18  0530   SODIUM mmol/L 139 138 141 140  < > 135*   POTASSIUM mmol/L 4.0 4.1 3.9 4.0  < > 4.3   CHLORIDE mmol/L 98 98 100 99  < > 95*   CO2 mmol/L 27.2 26.1 31.7* 25.6  < > 31.1*   BUN mg/dL 64* 57* 55* 52*  < > 64*   CREATININE mg/dL 2.97* 2.78* 3.22* 3.19*  < > 3.87*   CALCIUM mg/dL 8.1* 8.1* 8.1* 7.7*  < > 7.8*   BILIRUBIN mg/dL  --   --  0.4 0.4  --  0.5   ALK PHOS U/L  --   --  68 68  --  81   ALT (SGPT) U/L  --   --  14 11  --  17   AST (SGOT) U/L  --   --  31 30  --  34*   GLUCOSE mg/dL 112* 88 98 100*  < > 105*   < > = values in this interval not displayed.    Estimated Creatinine Clearance: 29 mL/min (by C-G formula based on Cr of 2.97).      Results from last 7 days  Lab Units 03/05/18  0524 03/04/18  1220 03/03/18  0532 03/02/18  0625   MAGNESIUM mg/dL 2.1 1.5*  --  1.8   PHOSPHORUS mg/dL  --  6.2* 6.0* 7.2*               Results from last 7 days  Lab Units 03/05/18 0524 03/04/18  1220 03/03/18  0532 03/02/18  0625 03/01/18  0516   WBC 10*3/mm3 7.76 8.55 7.93 8.62 9.93   HEMOGLOBIN g/dL 8.4* 8.9* 8.6* 8.8* 7.8*   PLATELETS 10*3/mm3 300 321 369 366 360               Imaging Results (last 24 hours)     ** No results found for the last 24 hours. **          epoetin kristen 20,000 Units Subcutaneous Weekly   heparin (porcine) 5,000 Units Subcutaneous Q8H   iopamidol 50 mL Intravenous Once in imaging   lidocaine viscous 5 mL Mouth/Throat Q4H   magic mouthwash 10 mL Swish & Spit Q4H   meropenem 1 g Intravenous Q12H   metoprolol tartrate 12.5 mg Oral Q12H   mirtazapine 15 mg Oral Nightly   pantoprazole 40 mg Oral Q AM   sodium hypochlorite  Topical BID   Vancomycin Pharmacy Intermittent Dosing  Does not apply Daily       Pharmacy to dose vancomycin        Medication Review:   Current Facility-Administered Medications   Medication Dose Route Frequency Provider Last Rate Last Dose   • acetaminophen (TYLENOL) suppository 325 mg  325 mg Rectal Q4H PRN Edgard Felder MD   325  mg at 02/05/18 0653   • acetaminophen (TYLENOL) tablet 650 mg  650 mg Oral Q6H PRN Tri Guzman MD   650 mg at 03/01/18 0014   • albumin human 25 % IV SOLN 12.5 g  12.5 g Intravenous PRN Madeline Kay MD   12.5 g at 02/27/18 1305   • albuterol (PROVENTIL) nebulizer solution 0.083% 2.5 mg/3mL  2.5 mg Nebulization Q4H PRN Tri Guzman MD       • calcium gluconate 1 g in sodium chloride 0.9 % 50 mL IVPB  1 g Intravenous PRN Madeline Kay MD        Or   • calcium gluconate 2 g in sodium chloride 0.9 % 50 mL IVPB  2 g Intravenous PRN Madeline Kay MD       • epoetin kristen (EPOGEN,PROCRIT) injection 20,000 Units  20,000 Units Subcutaneous Weekly Madeline Kay MD   20,000 Units at 03/01/18 1438   • heparin (porcine) 5000 UNIT/ML injection 5,000 Units  5,000 Units Subcutaneous Q8H Benjy Samson MD   5,000 Units at 03/05/18 0524   • heparin (porcine) injection 1,000-2,000 Units  1,000-2,000 Units Intravenous PRN Madeline Kay MD   4,000 Units at 02/15/18 1843   • heparin (porcine) injection 4,000 Units  4,000 Units Intracatheter PRN Madeline Kay MD   4,000 Units at 02/27/18 1258   • HYDROmorphone (DILAUDID) injection 1 mg  1 mg Intravenous BID PRN Bryant Spence MD   0.5 mg at 03/05/18 0911   • iopamidol (ISOVUE-250) 51 % injection 50 mL  50 mL Intravenous Once in imaging Meir Guillen MD       • lidocaine viscous (XYLOCAINE) 2 % mouth solution 5 mL  5 mL Mouth/Throat Q4H Madeline Kay MD   5 mL at 03/01/18 1235   • magic mouthwash oral supsension 10 mL  10 mL Swish & Spit Q4H Madeline Kay MD   10 mL at 03/05/18 0815   • meropenem (MERREM) 1 g/100 mL 0.9% NS VTB (mbp)  1 g Intravenous Q12H Arvin Cooney MD   1 g at 03/04/18 2238   • metoprolol tartrate (LOPRESSOR) tablet 12.5 mg  12.5 mg Oral Q12H Bradford So MD   12.5 mg at 03/05/18 0810   • mirtazapine (REMERON) tablet 15 mg  15 mg Oral Nightly Ernestina Hussein MD   15  mg at 03/04/18 2130   • ondansetron ODT (ZOFRAN-ODT) disintegrating tablet 4 mg  4 mg Oral Q6H PRN Tri Guzman MD        Or   • ondansetron (ZOFRAN) injection 4 mg  4 mg Intravenous Q6H PRN Tri Guzman MD   4 mg at 03/05/18 0644   • oxyCODONE-acetaminophen (PERCOCET) 5-325 MG per tablet 1 tablet  1 tablet Oral Q4H PRN Tri Guzman MD   1 tablet at 03/04/18 0107    Or   • oxyCODONE-acetaminophen (PERCOCET) 5-325 MG per tablet 2 tablet  2 tablet Oral Q4H PRN Tri Guzman MD       • pantoprazole (PROTONIX) EC tablet 40 mg  40 mg Oral Q AM Madeline Kay MD   40 mg at 03/05/18 0524   • Pharmacy to dose vancomycin   Does not apply Continuous PRN Masoud Arechiga MD       • promethazine (PHENERGAN) injection 12.5 mg  12.5 mg Intravenous Q8H PRN Sarthak Figueroa MD   12.5 mg at 02/09/18 2345   • sodium chloride (OCEAN) nasal spray 2 spray  2 spray Each Nare PRN Ernestina Hussein MD       • sodium hypochlorite (DAKIN'S 1/4 STRENGTH) 0.125 % topical solution 0.125% solution   Topical BID Tri Guzman MD       • Vancomycin Pharmacy Intermittent Dosing   Does not apply Daily Masoud Arechiga MD           Assessment/Plan       1.  Acute kidney injury on chronic kidney disease stage III, non-oliguric, with SCr slightly higher today than yesterday; prerenal --> ATN due to shock. Last HD was 2/27. He had tinea in today is 2.97, electrolytes within normal range  2.  Chronic kidney disease stage III baseline SCr 1.5.   3.  Status post exploratory laparotomy with small bowel resection, mesh removal, RLQ port site debridement, and  Appendectomy. POD 25.  SP subcutaneous debridement 2/13 and 2/16, 2/27. 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    4.  Acute on chronic anemia  5.  Small bowel enterotomy, sp repair 2/4.  ESBL, now carbapenem resistant from wound.   E. coli from blood 2/5. Blood cultures 2/9 negative. Ongoing wound debridement.    6.  Encephalopathy . Resolved.   7.  Afib with  RVR.  Now in SR.  On beta blocker   8.  PCM. Improving po intake.   9.  Oral mucositis. Resolved.  On topical therapy.   10. Debility.  Working with PT .      Plan:  1.  No indication for dialysis today.  2.  Will monitor renal function and electrolytes closely and determine if dialysis is no longer needed and will arrange for removal of the tunneled dialysis catheter.    Yo Yin MD  03/05/18  10:35 AM

## 2018-03-05 NOTE — THERAPY TREATMENT NOTE
Acute Care - Occupational Therapy Progress Note  [facility]     Patient Name: Mariya Campos  : 1960  MRN: [id number]  Today's Date: 3/5/2018  Onset of Illness/Injury or Date of Surgery Date: 18            Admit Date: 2018    Visit Dx:     ICD-10-CM ICD-9-CM   1. Incisional hernia, without obstruction or gangrene K43.2 553.21   2. Recurrent ventral hernia K43.2 553.21   3. Generalized weakness R53.1 780.79   4. CRF (chronic renal failure), stage 3 (moderate) N18.3 585.3   5. Wound infection T14.8XXA 958.3    L08.9    6. ARF (acute renal failure) with tubular necrosis N17.0 584.5     Patient Active Problem List   Diagnosis   • Atopic rhinitis   • Fatigue   • Abnormal mammogram   • Adiposity   • Abnormal ECG   • CRF (chronic renal failure), stage 3 (moderate)   • Anemia   • Recurrent UTI   • Osteopenia   • Essential hypertension   • Incisional hernia   • Moderate persistent asthma without complication   • Incisional hernia, without obstruction or gangrene   • Recurrent ventral hernia   • Bacteremia, escherichia coli   • Wound infection   • Complications, dialysis, catheter, mechanical, initial encounter             Adult Rehabilitation Note       18 1154 18 1622 18 1300    Rehab Assessment/Intervention    Discipline occupational therapist  -SG physical therapist  -SV physical therapy assistant  -CW    Document Type therapy note (daily note)  -SG therapy note (daily note)  -SV therapy note (daily note)  -CW    Subjective Information agree to therapy  -SG agree to therapy;complains of;fatigue;pain  -SV agree to therapy;complains of;weakness;fatigue  -CW    Patient Effort, Rehab Treatment good  -SG  good  -CW    Precautions/Limitations fall precautions  -SG  fall precautions  -CW    Recorded by [SG] MAHOGANY Conroy [SV] Yelena Beltran PT [CW] Leandro Posada PTA    Vital Signs    Pre Systolic BP Rehab  116  -SV     Pre Treatment Diastolic BP  66  -SV     Pretreatment  Heart Rate (beats/min)  99  -SV     Pre SpO2 (%)  97  -SV     O2 Delivery Pre Treatment  supplemental O2  -SV     Intra SpO2 (%)  95  -SV     O2 Delivery Intra Treatment  supplemental O2  -SV     Post SpO2 (%)  95  -SV     O2 Delivery Post Treatment  supplemental O2  -SV     Pre Patient Position  Supine  -SV     Intra Patient Position  Standing  -SV     Post Patient Position  Side Lying  -SV     Recorded by  [SV] Yelena Beltran, PT     Pain Assessment    Pain Assessment 0-10  -SG  0-10  -CW    Pain Score 2  -SG  5  -CW    Post Pain Score   5  -CW    Pain Type Surgical pain  -SG  Surgical pain  -CW    Pain Location Abdomen  -SG  Abdomen  -CW    Pain Intervention(s)   Repositioned;Ambulation/increased activity  -CW    Response to Interventions   kane  -CW    Recorded by [SG] MAHOGANY Conroy  [CW] Leandro Posada, VALERIE    Cognitive Assessment/Intervention    Current Cognitive/Communication Assessment functional  -SG functional  -SV functional  -CW    Orientation Status oriented x 4  -SG oriented x 4  -SV oriented x 4  -CW    Follows Commands/Answers Questions 100% of the time;able to follow single-step instructions  -% of the time;able to follow multi-step instructions  -% of the time  -CW    Personal Safety   WNL/WFL  -CW    Personal Safety Interventions   fall prevention program maintained;gait belt;muscle strengthening facilitated;nonskid shoes/slippers when out of bed  -CW    Recorded by [SG] MAHOGANY Conroy [SV] Yelena Beltran, PT [CW] Leandro Posada, PTA    ROM (Range of Motion)    General ROM Detail [place]. shoulder 7/8 AROM  -SG      Recorded by [SG] MAHOGANY Conroy      Bed Mobility, Assessment/Treatment    Bed Mob, Supine to Sit, [place]  moderate assist (50% patient effort);minimum assist (75% patient effort);2 person assist required  -SV minimum assist (75% patient effort)  -CW    Bed Mob, Sit to Supine, [place]  moderate assist (50% patient effort);2 person assist  required  -SV not tested  -CW    Bed Mobility, Comment   up to chair  -CW    Recorded by  [SV] Yelena Beltran, PT [CW] Leandro Posada, PTA    Transfer Assessment/Treatment    Transfers, Sit-Stand [place]  moderate assist (50% patient effort);minimum assist (75% patient effort);2 person assist required  -SV moderate assist (50% patient effort)  -CW    Transfers, Stand-Sit [place]  moderate assist (50% patient effort);minimum assist (75% patient effort);2 person assist required  -SV moderate assist (50% patient effort)  -CW    Transfers, Sit-Stand-Sit, Assist Device   rolling walker  -CW    Transfer, Comment  pt stood with rwx 15 seconds   -SV     Recorded by  [SV] Yelena Beltran, PT [CW] Leandro Posada, PTA    Gait Assessment/Treatment    Gait, [place] Level   minimum assist (75% patient effort)  -CW    Gait, Assistive Device   rolling walker  -CW    Gait, Distance (Feet)   12  -CW    Gait, Gait Deviations   jovany decreased;step length decreased;stride length decreased  -CW    Gait, Safety Issues   step length decreased  -CW    Gait, Impairments   strength decreased;impaired balance  -CW    Recorded by   [CW] Leandro Posada PTA    Balance Skills Training    Sitting-Level of Assistance  Close supervision;Contact guard  -SV     Sitting # of Minutes  15  -SV     Standing-Level of Assistance  Contact guard;x2  -SV     Static Standing Balance Support  Right upper extremity supported;Left upper extremity supported  -SV     Recorded by  [SV] Yelena Beltran PT     Therapy Exercises    Bilateral Lower Extremities  AROM:;10 reps;ankle pumps/circles;LAQ;SAQ;quad sets  -SV     Bilateral Upper Extremity AROM:;elbow flexion/extension;hand pumps;pronation/supination;shoulder extension/flexion   pt states feels UE 's close to baseline function  -SG      Recorded by [SG] MAHOGANY Conroy [SV] Yelena Beltran, PT     Sensory Assessment/Intervention    Light Touch LUE;RUE  -SG      LUE Light Touch  WNL  -SG      RUE Light Touch WNL  -SG      Recorded by [SG] Nini Layne, OTR      Positioning and Restraints    Pre-Treatment Position in bed  -SG in bed  -SV in bed  -CW    Post Treatment Position bed  -SG bed  -SV chair  -CW    In Bed call light within reach;encouraged to call for assist;exit alarm on  -SG supine;call light within reach;encouraged to call for assist;exit alarm on;with family/caregiver;side lying left  -SV     In Chair   notified nsg;sitting;call light within reach;encouraged to call for assist  -CW    Recorded by [SG] Nini Layne, OTR [SV] Yelena Beltran, PT [CW] Leandro Posada, PTA      User Key  (r) = Recorded By, (t) = Taken By, (c) = Cosigned By    Initials Name Effective Dates     Nini Layne, OTR 04/13/15 -     SV Yelena Beltran, PT 01/17/16 -     CW Leandro Posada, PTA 12/13/16 -                 OT Goals       02/22/18 1000          Transfer Training 2 OT STG    Transfer Training 2 OT STG, Date Established 02/22/18  -SO (r) NA (t) SO (c)      Transfer Training 2 OT STG, Time to Achieve 1 wk  -SO (r) NA (t) SO (c)      Transfer Training 2 OT STG, Activity Type bed to chair /chair to bed;sit to stand/stand to sit  -SO (r) NA (t) SO (c)      Transfer Training 2 OT STG, [place] Level maximum assist (25% patient effort);moderate assist (50% patient effort);1 person + 1 person to manage equipment  -SO (r) NA (t) SO (c)      Occupational Therapy OT LTG    Occupational Therapy OT LTG, Date Established 02/22/18  -SO (r) NA (t) SO (c)      Occupational Therapy OT LTG, Time to Achieve 1 wk  -SO (r) NA (t) SO (c)      Occupational Therapy OT LTG, Activity Type Frozen shoulder HEP  -SO (r) NA (t) SO (c)      Occupational Therapy OT LTG, [place] Level supervision required  -SO (r) NA (t) SO (c)      Occupational Therapy OT LTG, Addisional Goal to increase movement needed for ADLs  -SO (r) NA (t) SO (c)      ADL OT LTG    ADL OT LTG, Date Established 02/22/18  -SO (r) NA  (t) SO (c)      ADL OT LTG, Time to Achieve 1 wk  -SO (r) NA (t) SO (c)      ADL OT LTG, Activity Type ADL skills  -SO (r) NA (t) SO (c)      ADL OT LTG, [place] Level mod assist;max assist  -SO (r) NA (t) SO (c)      Endurance OT LTG    Endurance Goal OT LTG, Date Established 02/22/18  -SO (r) NA (t) SO (c)      Endurance Goal OT LTG, Time to Achieve 1 wk  -SO (r) NA (t) SO (c)      Endurance Goal OT LTG, Activity Level to increase;endurance 2 fair-  -SO (r) NA (t) SO (c)      Endurance Goal OT LTG, Additional Goal for ADLs  -SO (r) NA (t) SO (c)        User Key  (r) = Recorded By, (t) = Taken By, (c) = Cosigned By    Initials Name Provider Type    SO Di Pickens, OTR Occupational Therapist    ALISA Larson, OT Student OT Student          Occupational Therapy Education     Title: PT OT SLP Therapies (Active)     Topic: Occupational Therapy (Active)     Point: Home exercise program (Done)    Description: Instruct learner(s) on appropriate technique for monitoring, assisting and/or progressing therapeutic exercises/activities.    Learning Progress Summary    Learner Readiness Method Response Comment Documented by Status   Patient Acceptance E,D,TB,H NR,Bed IU educated on exercises and stretches for frozen shoulder. pt with increased stiffness of right shoulder and performed exercises with assistance at shoulder. handout left in patients room and  instructed on performing. NA 02/22/18 0957 Done   Significant Other Acceptance E,D,TB,H NR,Bed IU educated on exercises and stretches for frozen shoulder. pt with increased stiffness of right shoulder and performed exercises with assistance at shoulder. handout left in patients room and  instructed on performing. NA 02/22/18 0957 Done               Point: Body mechanics (Done)    Description: Instruct learner(s) on proper positioning and spine alignment during self-care, functional mobility activities and/or exercises.    Learning Progress  Summary    Learner Readiness Method Response Comment Documented by Status   Patient Acceptance E,D,TB,H NR,Bed IU educated on exercises and stretches for frozen shoulder. pt with increased stiffness of right shoulder and performed exercises with assistance at shoulder. handout left in patients room and  instructed on performing. NA 02/22/18 0957 Done   Significant Other Acceptance E,D,TB,H NR,Bed IU educated on exercises and stretches for frozen shoulder. pt with increased stiffness of right shoulder and performed exercises with assistance at shoulder. handout left in patients room and  instructed on performing. NA 02/22/18 0957 Done                      User Key     Initials Effective Dates Name Provider Type Discipline     08/21/17 -  Luna Larson, OT Student OT Student OT                  OT Recommendation and Plan  Planned Therapy Interventions: other (see comments) (frozen shoulder)  Therapy Frequency: 3-5 times/wk  Plan of Care Review  Plan Of Care Reviewed With: patient  Progress: progress toward functional goals is gradual  Outcome Summary/Follow up Plan: Pt alert and agrees to OT . BUE's increasing AROM. Will continue to benefit from OT        Outcome Measures       03/05/18 1210 03/04/18 1700 03/02/18 1300    How much help from another person do you currently need...    Turning from your back to your side while in flat bed without using bedrails?  3  -SV 3  -CW    Moving from lying on back to sitting on the side of a flat bed without bedrails?  2  -SV 3  -CW    Moving to and from a bed to a chair (including a wheelchair)?  2  -SV 3  -CW    Standing up from a chair using your arms (e.g., wheelchair, bedside chair)?  2  -SV 3  -CW    Climbing 3-5 steps with a railing?  1  -SV 1  -CW    To walk in hospital room?  1  -SV 3  -CW    AM-PAC 6 Clicks Score  11  -SV 16  -CW    How much help from another is currently needed...    Putting on and taking off regular lower body clothing? 1  -SG       Bathing (including washing, rinsing, and drying) 2  -SG      Toileting (which includes using toilet bed pan or urinal) 1  -SG      Putting on and taking off regular upper body clothing 2  -SG      Taking care of personal grooming (such as brushing teeth) 3  -SG      Eating meals 3  -SG      Score 12  -SG      Functional Assessment    Outcome Measure Options  AM-PAC 6 Clicks Basic Mobility (PT)  -SV AM-PAC 6 Clicks Basic Mobility (PT)  -CW      User Key  (r) = Recorded By, (t) = Taken By, (c) = Cosigned By    Initials Name Provider Type    MAHOGANY Potts Occupational Therapist    TIERRA Beltran, PT Physical Therapist    CW Leandro Posada, PTA Physical Therapy Assistant           Time Calculation:         Time Calculation- OT       03/05/18 1211          Time Calculation- OT    OT Start Time 1016  -SG      OT Stop Time 1028  -      OT Time Calculation (min) 12 min  -      OT Received On 03/05/18  -        User Key  (r) = Recorded By, (t) = Taken By, (c) = Cosigned By    Initials Name Provider Type     MAHOGANY Conroy Occupational Therapist           Therapy Charges for Today     Code Description Service Date Service Provider Modifiers Qty    [id number]  OT THER PROC EA 15 MIN 3/5/2018 MAHOGANY Conroy GO 1               MAHOGANY Conroy  3/5/2018

## 2018-03-06 LAB
ALBUMIN SERPL-MCNC: 2.2 G/DL (ref 3.5–5.2)
ALBUMIN/GLOB SERPL: 0.6 G/DL
ALP SERPL-CCNC: 63 U/L (ref 39–117)
ALT SERPL W P-5'-P-CCNC: 7 U/L (ref 1–33)
ANION GAP SERPL CALCULATED.3IONS-SCNC: 7 MMOL/L
AST SERPL-CCNC: 16 U/L (ref 1–32)
BASOPHILS # BLD AUTO: 0.03 10*3/MM3 (ref 0–0.2)
BASOPHILS NFR BLD AUTO: 0.4 % (ref 0–1.5)
BILIRUB SERPL-MCNC: 0.3 MG/DL (ref 0.1–1.2)
BUN BLD-MCNC: 59 MG/DL (ref 6–20)
BUN/CREAT SERPL: 23.8 (ref 7–25)
CALCIUM SPEC-SCNC: 8.3 MG/DL (ref 8.6–10.5)
CHLORIDE SERPL-SCNC: 100 MMOL/L (ref 98–107)
CO2 SERPL-SCNC: 32 MMOL/L (ref 22–29)
CREAT BLD-MCNC: 2.48 MG/DL (ref 0.57–1)
DEPRECATED RDW RBC AUTO: 52.5 FL (ref 37–54)
EOSINOPHIL # BLD AUTO: 0.15 10*3/MM3 (ref 0–0.7)
EOSINOPHIL NFR BLD AUTO: 1.8 % (ref 0.3–6.2)
ERYTHROCYTE [DISTWIDTH] IN BLOOD BY AUTOMATED COUNT: 15.3 % (ref 11.7–13)
GFR SERPL CREATININE-BSD FRML MDRD: 20 ML/MIN/1.73
GLOBULIN UR ELPH-MCNC: 4 GM/DL
GLUCOSE BLD-MCNC: 96 MG/DL (ref 65–99)
HCT VFR BLD AUTO: 27.6 % (ref 35.6–45.5)
HGB BLD-MCNC: 8.4 G/DL (ref 11.9–15.5)
IMM GRANULOCYTES # BLD: 0.22 10*3/MM3 (ref 0–0.03)
IMM GRANULOCYTES NFR BLD: 2.7 % (ref 0–0.5)
LYMPHOCYTES # BLD AUTO: 1.02 10*3/MM3 (ref 0.9–4.8)
LYMPHOCYTES NFR BLD AUTO: 12.4 % (ref 19.6–45.3)
MAGNESIUM SERPL-MCNC: 2 MG/DL (ref 1.6–2.6)
MCH RBC QN AUTO: 28.8 PG (ref 26.9–32)
MCHC RBC AUTO-ENTMCNC: 30.4 G/DL (ref 32.4–36.3)
MCV RBC AUTO: 94.5 FL (ref 80.5–98.2)
MONOCYTES # BLD AUTO: 0.8 10*3/MM3 (ref 0.2–1.2)
MONOCYTES NFR BLD AUTO: 9.7 % (ref 5–12)
NEUTROPHILS # BLD AUTO: 6.01 10*3/MM3 (ref 1.9–8.1)
NEUTROPHILS NFR BLD AUTO: 73 % (ref 42.7–76)
PHOSPHATE SERPL-MCNC: 5.3 MG/DL (ref 2.5–4.5)
PLATELET # BLD AUTO: 311 10*3/MM3 (ref 140–500)
PMV BLD AUTO: 8.8 FL (ref 6–12)
POTASSIUM BLD-SCNC: 3.6 MMOL/L (ref 3.5–5.2)
PROT SERPL-MCNC: 6.2 G/DL (ref 6–8.5)
RBC # BLD AUTO: 2.92 10*6/MM3 (ref 3.9–5.2)
SODIUM BLD-SCNC: 139 MMOL/L (ref 136–145)
URATE SERPL-MCNC: 8.9 MG/DL (ref 2.4–5.7)
VANCOMYCIN SERPL-MCNC: 18 MCG/ML (ref 5–40)
WBC NRBC COR # BLD: 8.23 10*3/MM3 (ref 4.5–10.7)

## 2018-03-06 PROCEDURE — 97110 THERAPEUTIC EXERCISES: CPT

## 2018-03-06 PROCEDURE — 85025 COMPLETE CBC W/AUTO DIFF WBC: CPT | Performed by: INTERNAL MEDICINE

## 2018-03-06 PROCEDURE — 83735 ASSAY OF MAGNESIUM: CPT | Performed by: INTERNAL MEDICINE

## 2018-03-06 PROCEDURE — 84100 ASSAY OF PHOSPHORUS: CPT | Performed by: INTERNAL MEDICINE

## 2018-03-06 PROCEDURE — 80202 ASSAY OF VANCOMYCIN: CPT | Performed by: INTERNAL MEDICINE

## 2018-03-06 PROCEDURE — 25010000002 HYDROMORPHONE PER 4 MG: Performed by: SURGERY

## 2018-03-06 PROCEDURE — 99024 POSTOP FOLLOW-UP VISIT: CPT | Performed by: SURGERY

## 2018-03-06 PROCEDURE — 99232 SBSQ HOSP IP/OBS MODERATE 35: CPT | Performed by: INTERNAL MEDICINE

## 2018-03-06 PROCEDURE — 84550 ASSAY OF BLOOD/URIC ACID: CPT | Performed by: INTERNAL MEDICINE

## 2018-03-06 PROCEDURE — 80053 COMPREHEN METABOLIC PANEL: CPT | Performed by: INTERNAL MEDICINE

## 2018-03-06 PROCEDURE — 25010000002 HEPARIN (PORCINE) PER 1000 UNITS: Performed by: INTERNAL MEDICINE

## 2018-03-06 RX ADMIN — Medication 10 ML: at 03:48

## 2018-03-06 RX ADMIN — HEPARIN SODIUM 5000 UNITS: 5000 INJECTION, SOLUTION INTRAVENOUS; SUBCUTANEOUS at 06:43

## 2018-03-06 RX ADMIN — HEPARIN SODIUM 5000 UNITS: 5000 INJECTION, SOLUTION INTRAVENOUS; SUBCUTANEOUS at 13:33

## 2018-03-06 RX ADMIN — DAKIN'S SOLUTION 0.125% (QUARTER STRENGTH) 1 BOTTLE: 0.12 SOLUTION at 21:45

## 2018-03-06 RX ADMIN — METOPROLOL TARTRATE 12.5 MG: 25 TABLET ORAL at 08:37

## 2018-03-06 RX ADMIN — DAKIN'S SOLUTION 0.125% (QUARTER STRENGTH): 0.12 SOLUTION at 08:30

## 2018-03-06 RX ADMIN — MIRTAZAPINE 15 MG: 15 TABLET, FILM COATED ORAL at 21:35

## 2018-03-06 RX ADMIN — LIDOCAINE HYDROCHLORIDE 5 ML: 20 SOLUTION ORAL; TOPICAL at 03:48

## 2018-03-06 RX ADMIN — HYDROMORPHONE HYDROCHLORIDE 0.5 MG: 1 INJECTION, SOLUTION INTRAMUSCULAR; INTRAVENOUS; SUBCUTANEOUS at 21:45

## 2018-03-06 RX ADMIN — HEPARIN SODIUM 5000 UNITS: 5000 INJECTION, SOLUTION INTRAVENOUS; SUBCUTANEOUS at 21:35

## 2018-03-06 RX ADMIN — Medication 10 ML: at 21:50

## 2018-03-06 RX ADMIN — HYDROMORPHONE HYDROCHLORIDE 0.5 MG: 1 INJECTION, SOLUTION INTRAMUSCULAR; INTRAVENOUS; SUBCUTANEOUS at 07:40

## 2018-03-06 RX ADMIN — PANTOPRAZOLE SODIUM 40 MG: 40 TABLET, DELAYED RELEASE ORAL at 06:44

## 2018-03-06 RX ADMIN — METOPROLOL TARTRATE 12.5 MG: 25 TABLET ORAL at 21:35

## 2018-03-06 NOTE — PROGRESS NOTES
Subjective   Pt reports appetite better, weak  Objective     Vital signs in last 24 hours:  Temp:  [97.5 °F (36.4 °C)-99.6 °F (37.6 °C)] 99.6 °F (37.6 °C)  Heart Rate:  [88-99] 99  Resp:  [18] 18  BP: (119-128)/(61-70) 126/64    Intake/Output last 3 shifts:  I/O last 3 completed shifts:  In: 720 [P.O.:720]  Out: 50 [Other:50]    Intake/Output this shift:       Physical Exam:  Respiratory: CTA, good inspiratory effort  CV: RRR  Abd: + BS, soft, ND, wounds with good granulation tissue     Assessment/Plan   Wound vac  abx completed  Dialysis ??  JULIA Guzman MD

## 2018-03-06 NOTE — PROGRESS NOTES
INFECTIOUS DISEASES PROGRESS NOTE    CC: f/u sepsis    S:   For wound vac  No f/c/ns  Intermittent abdominal pain      O:  Physical Exam:  Temp:  [97.5 °F (36.4 °C)-99.6 °F (37.6 °C)] 98.7 °F (37.1 °C)  Heart Rate:  [88-99] 99  Resp:  [18-20] 20  BP: (119-128)/(61-74) 128/74  Physical Exam   Constitutional: She appears well-developed. No distress.   Pulmonary/Chest: Effort normal.   Abdominal: Soft. She exhibits no distension. There is tenderness.   Neurological: She is alert.   Skin: Skin is warm and dry.        Diagnostics:    WBC 8.23    H/H 8.4/28  Cr 2.48    Assessment/Plan   1.  Septic shock secondary to small bowel enterotomy and peritonitis status post repair with small bowel resection and anastomosis by Dr. Guzman on 2/4/18; shock resolved; Repeat I and D for wound cellulitis on 2/13/18 and 2/16. Cx with CRE and E faecalis; CT on 2/26 shows fistula, c/f free air and abscess; repeat incision and debridement on 2/27/18  2.  Acute kidney injury  3.  Persistent right pleural effusion, no pna.    Doing better.  AF and WBC normal.  Plan for wound vac.  Has completed week of abx and will stop them today to see how she does.  Remove lines when able.    Masoud Arechiga MD  9:30 AM  03/06/18

## 2018-03-06 NOTE — SIGNIFICANT NOTE
03/06/18 1440   Rehab Treatment   Discipline occupational therapist   Treatment Not Performed patient/family declined treatment  (pt initially performed 2 reps of UE exercise and then refuses OT stating she feels too bad today. states stomach upset. Discussed with nurse [id number])

## 2018-03-06 NOTE — THERAPY TREATMENT NOTE
Acute Care - Physical Therapy Treatment Note  [facility]     Patient Name: Mariya Campos  : 1960  MRN: [id number]  Today's Date: 3/6/2018  Onset of Illness/Injury or Date of Surgery Date: 18          Admit Date: 2018    Visit Dx:    ICD-10-CM ICD-9-CM   1. Incisional hernia, without obstruction or gangrene K43.2 553.21   2. Recurrent ventral hernia K43.2 553.21   3. Generalized weakness R53.1 780.79   4. CRF (chronic renal failure), stage 3 (moderate) N18.3 585.3   5. Wound infection T14.8XXA 958.3    L08.9    6. ARF (acute renal failure) with tubular necrosis N17.0 584.5     Patient Active Problem List   Diagnosis   • Atopic rhinitis   • Fatigue   • Abnormal mammogram   • Adiposity   • Abnormal ECG   • CRF (chronic renal failure), stage 3 (moderate)   • Anemia   • Recurrent UTI   • Osteopenia   • Essential hypertension   • Incisional hernia   • Moderate persistent asthma without complication   • Incisional hernia, without obstruction or gangrene   • Recurrent ventral hernia   • Bacteremia, escherichia coli   • Wound infection   • Complications, dialysis, catheter, mechanical, initial encounter               Adult Rehabilitation Note       18 1550 18 1324 18 1154    Rehab Assessment/Intervention    Discipline physical therapist  -EJ physical therapy assistant  -RW occupational therapist  -SG    Document Type therapy note (daily note)  -EJ therapy note (daily note)  -RW therapy note (daily note)  -SG    Subjective Information agree to therapy  -EJ agree to therapy  -RW agree to therapy  -SG    Patient Effort, Rehab Treatment adequate  -EJ good  -RW good  -SG    Symptoms Noted During/After Treatment fatigue   discomfort  -EJ      Precautions/Limitations fall precautions   abdominal wound  -EJ fall precautions;oxygen therapy device and L/min   1L O2  -RW fall precautions  -SG    Recorded by [EJ] Shira Martínez, PT [RW] Radha Bonds PTA [SG] Nini Layne, OTYESICA    Vital  Signs    Pre SpO2 (%)  96  -RW     O2 Delivery Pre Treatment  supplemental O2   1L  -RW     O2 Delivery Intra Treatment  supplemental O2   1L  -RW     O2 Delivery Post Treatment  supplemental O2   1L  -RW     Recorded by  [RW] Radha Bonds PTA     Pain Assessment    Pain Assessment --   pt reports overall discomfort  -EJ 0-10  -RW 0-10  -SG    Pain Score  2  -RW 2  -SG    Pain Type  Surgical pain  -RW Surgical pain  -SG    Pain Location  Abdomen  -RW Abdomen  -SG    Recorded by [EJ] Shira Martínez, PT [RW] Radha Bonds PTA [SG] Nini Layne OTR    Cognitive Assessment/Intervention    Current Cognitive/Communication Assessment  functional  -RW functional  -SG    Orientation Status  oriented x 4  -RW oriented x 4  -SG    Follows Commands/Answers Questions  100% of the time  -% of the time;able to follow single-step instructions  -SG    Personal Safety  WNL/WFL  -RW     Personal Safety Interventions  fall prevention program maintained;gait belt;nonskid shoes/slippers when out of bed  -RW     Recorded by  [RW] Radha Bonds PTA [SG] MAHOGANY Conroy    ROM (Range of Motion)    General ROM Detail   Adrian. shoulder 7/8 AROM  -SG    Recorded by   [SG] MAHOGANY Conroy    Bed Mobility, Assessment/Treatment    Bed Mobility, Assistive Device draw sheet  -EJ bed rails;head of bed elevated  -RW     Bed Mobility, Roll Left, [place]  minimum assist (75% patient effort);verbal cues required;nonverbal cues required (demo/gesture)  -RW     Bed Mobility, Scoot/Bridge, [place] maximum assist (25% patient effort);2 person assist required  -EJ      Bed Mob, Supine to Sit, [place] verbal cues required;moderate assist (50% patient effort);2 person assist required  -EJ      Bed Mob, Sit to Supine, [place] moderate assist (50% patient effort);2 person assist required  -EJ      Bed Mob, Sidelying to Sit, [place]  minimum assist (75% patient effort);2 person assist required;verbal cues  required;nonverbal cues required (demo/gesture)  -RW     Bed Mob, Sit to Sidelying, [place]  not tested   Pt up in chair  -RW     Bed Mobility, Safety Issues impaired trunk control for bed mobility  -EJ      Bed Mobility, Impairments strength decreased  -EJ      Recorded by [EJ] Shira Martínez, PT [RW] Radha Bonds PTA     Transfer Assessment/Treatment    Transfers, Bed-Chair [place]  minimum assist (75% patient effort);2 person assist required;verbal cues required;nonverbal cues required (demo/gesture)   bed to bsc  -RW     Transfers, Chair-Bed [place]  minimum assist (75% patient effort);2 person assist required;verbal cues required;nonverbal cues required (demo/gesture)   bsc to chair  -RW     Transfers, Bed-Chair-Bed, Assist Device  rolling walker  -RW     Transfers, Sit-Stand [place]  minimum assist (75% patient effort);2 person assist required;verbal cues required;nonverbal cues required (demo/gesture)  -RW     Transfers, Stand-Sit [place]  minimum assist (75% patient effort);2 person assist required;verbal cues required;nonverbal cues required (demo/gesture)  -RW     Transfers, Sit-Stand-Sit, Assist Device  rolling walker;elevated surface  -RW     Toilet Transfer, [place]  moderate assist (50% patient effort);2 person assist required;verbal cues required;nonverbal cues required (demo/gesture)  -RW     Toilet Transfer, Assistive Device  rolling walker;bedside commode without drop arms  -RW     Transfer, Safety Issues  weight-shifting ability decreased  -RW     Transfer, Impairments  strength decreased;impaired balance  -RW     Transfer, Comment pt declined standing this date  -EJ pt able to take several pivoting steps from bed to bsc and bsc to chair.   -RW     Recorded by [EJ] Shira Martínez, PT [RW] Radha Bonds, VALERIE     Gait Assessment/Treatment    Gait, [place] Level not tested  -EJ not tested  -RW     Recorded by [EJ] Shira Martínez, PT [RW] Radha COOPER  VALERIE Bonds     Balance Skills Training    Sitting-Level of Assistance Close supervision  -EJ      Sitting-Balance Support Feet supported;Left upper extremity supported;Right upper extremity supported  -EJ      Sitting-Balance Activities Trunk control activities  -EJ      Sitting # of Minutes 7  -EJ      Standing-Level of Assistance  Contact guard  -RW     Static Standing Balance Support  assistive device  -RW     Standing Balance # of Minutes  2   to get cleaned up at [facility]  -RW     Recorded by [EJ] Shira Martínez, PT [RW] Radha Bonds PTA     Therapy Exercises    Bilateral Upper Extremity   AROM:;elbow flexion/extension;hand pumps;pronation/supination;shoulder extension/flexion   pt states feels UE 's close to baseline function  -SG    Recorded by   [SG] Nini Layne, STACYR    Sensory Assessment/Intervention    Light Touch   LUE;RUE  -SG    LUE Light Touch   WNL  -SG    RUE Light Touch   WNL  -SG    Recorded by   [SG] Nini Layne OTR    Positioning and Restraints    Pre-Treatment Position in bed  -EJ in bed  -RW in bed  -SG    Post Treatment Position bed  -EJ chair  -RW bed  -SG    In Bed notified nsg;supine;call light within reach;encouraged to call for assist;exit alarm on  -EJ  call light within reach;encouraged to call for assist;exit alarm on  -SG    In Chair  reclined;call light within reach;encouraged to call for assist;notified nsg  -RW     Recorded by [EJ] Shira Martínez, PT [RW] Radha Bonds PTA [SG] Nini Layne, OTR      03/04/18 1622          Rehab Assessment/Intervention    Discipline physical therapist  -SV      Document Type therapy note (daily note)  -SV      Subjective Information agree to therapy;complains of;fatigue;pain  -SV      Recorded by [SV] Yelena Beltran, PT      Vital Signs    Pre Systolic BP Rehab 116  -SV      Pre Treatment Diastolic BP 66  -SV      Pretreatment Heart Rate (beats/min) 99  -SV      Pre SpO2 (%) 97  -SV      O2 Delivery Pre Treatment supplemental  O2  -SV      Intra SpO2 (%) 95  -SV      O2 Delivery Intra Treatment supplemental O2  -SV      Post SpO2 (%) 95  -SV      O2 Delivery Post Treatment supplemental O2  -SV      Pre Patient Position Supine  -SV      Intra Patient Position Standing  -SV      Post Patient Position Side Lying  -SV      Recorded by [SV] Yelena Beltran PT      Cognitive Assessment/Intervention    Current Cognitive/Communication Assessment functional  -SV      Orientation Status oriented x 4  -SV      Follows Commands/Answers Questions 100% of the time;able to follow multi-step instructions  -SV      Recorded by [SV] Yelena Beltran PT      Bed Mobility, Assessment/Treatment    Bed Mob, Supine to Sit, [place] moderate assist (50% patient effort);minimum assist (75% patient effort);2 person assist required  -SV      Bed Mob, Sit to Supine, [place] moderate assist (50% patient effort);2 person assist required  -SV      Recorded by [SV] Yelena Beltran PT      Transfer Assessment/Treatment    Transfers, Sit-Stand [place] moderate assist (50% patient effort);minimum assist (75% patient effort);2 person assist required  -SV      Transfers, Stand-Sit [place] moderate assist (50% patient effort);minimum assist (75% patient effort);2 person assist required  -SV      Transfer, Comment pt stood with rwx 15 seconds   -SV      Recorded by [SV] Yelena Beltran PT      Balance Skills Training    Sitting-Level of Assistance Close supervision;Contact guard  -SV      Sitting # of Minutes 15  -SV      Standing-Level of Assistance Contact guard;x2  -SV      Static Standing Balance Support Right upper extremity supported;Left upper extremity supported  -SV      Recorded by [SV] Yelena Beltran PT      Therapy Exercises    Bilateral Lower Extremities AROM:;10 reps;ankle pumps/circles;LAQ;SAQ;quad sets  -SV      Recorded by [SV] Yelena Beltran PT      Positioning and Restraints    Pre-Treatment Position in bed  -SV      Post Treatment  Position bed  -SV      In Bed supine;call light within reach;encouraged to call for assist;exit alarm on;with family/caregiver;side lying left  -SV      Recorded by [SV] Yelena Beltran, PT        User Key  (r) = Recorded By, (t) = Taken By, (c) = Cosigned By    Initials Name Effective Dates    SG Nini Layne, OTR 04/13/15 -     EJ Shira Martínez, PT 04/21/17 -     SV Yelena Beltran, PT 01/17/16 -     RW Radha Bonds, PTA 04/06/16 -                 IP PT Goals       02/28/18 1536 02/21/18 1421       Bed Mobility PT LTG    Bed Mobility PT LTG, Time to Achieve  1 wk  -PC     Bed Mobility PT LTG, Activity Type  supine to sit/sit to supine  -PC     Bed Mobility PT LTG, [place] Level  moderate assist (50% patient effort)  -PC     Bed Mobility PT LTG, Date Goal Reviewed 02/28/18  -AA      Bed Mobility PT LTG, Outcome goal ongoing  -AA goal revised  -PC     Bed Mobility PT LTG, Reason Goal Not Met progress slower than expected  -AA medical status inhibits participation  -PC     Transfer Training PT LTG    Transfer Training PT LTG, Time to Achieve  1 wk  -PC     Transfer Training PT LTG, Activity Type  sit to stand/stand to sit  -PC     Transfer Training PT LTG, [place] Level  moderate assist (50% patient effort);2 person assist required  -PC     Transfer Training PT  LTG, Date Goal Reviewed 02/28/18  -AA      Transfer Training PT LTG, Outcome goal ongoing  -AA goal revised  -PC     Transfer Training PT LTG, Reason Goal Not Met progress slower than expected  -AA medical status inhibits participation  -PC     Gait Training PT LTG    Gait Training Goal PT LTG, Time to Achieve  1 wk  -PC     Gait Training Goal PT LTG, [place] Level  moderate assist (50% patient effort);2 person assist required  -PC     Gait Training Goal PT LTG, Assist Device  walker, rolling  -PC     Gait Training Goal PT LTG, Distance to Achieve  10 ft  -PC     Gait Training Goal PT LTG, Date Goal Reviewed 02/28/18  -AA      Gait  Training Goal PT LTG, Outcome goal ongoing  -AA goal revised  -PC     Gait Training Goal PT LTG, Reason Goal Not Met progress slower than expected  -AA medical status inhibits participation  -PC       User Key  (r) = Recorded By, (t) = Taken By, (c) = Cosigned By    Initials Name Provider Type    PC Joycelyn Fisher, PT Physical Therapist    JOHN Laguerre, PT Physical Therapist          Physical Therapy Education     Title: PT OT SLP Therapies (Active)     Topic: Physical Therapy (Done)     Point: Mobility training (Done)    Learning Progress Summary    Learner Readiness Method Response Comment Documented by Status   Patient Acceptance E VU,NR  EJ 03/06/18 1610 Done    Acceptance E,TB,D VU,NR  RW 03/05/18 1347 Done    Acceptance E,TB,D NR,VU  SV 03/04/18 1706 Done    Acceptance E,TB VU,DU  CW 03/02/18 1351 Done    Acceptance E VU education on importance of mobility; education on BLE strengthening and core strengthening for improved mobility; education on transfer training AA 03/01/18 0921 Done    Acceptance E VU  AA 02/28/18 1535 Done    Acceptance E,TB DU,VU  CW 02/27/18 0908 Done    Acceptance E,D VU,NR  EJ 02/25/18 1543 Done    Acceptance E,TB,D VU,NR  CH 02/23/18 1558 Done    Acceptance E VU,NR  AA 02/17/18 1156 Done    Acceptance E,TB,D VU,NR  SH 02/12/18 0902 Done    Acceptance E,TB,D NR  RW 02/11/18 1410 Active    Acceptance E,D NR  PC 02/09/18 1725 Active    Acceptance E NR  EM 02/08/18 1319 Active   Family Acceptance E VU education on importance of mobility; education on BLE strengthening and core strengthening for improved mobility; education on transfer training AA 03/01/18 0921 Done    Acceptance E,D VU,NR  EJ 02/25/18 1543 Done    Acceptance E,TB,D VU,NR  SH 02/12/18 0902 Done               Point: Home exercise program (Done)    Learning Progress Summary    Learner Readiness Method Response Comment Documented by Status   Patient Acceptance E,TB,D NR,VU  SV 03/04/18 1706 Done    Acceptance E,TB  VU,DU  CW 03/02/18 1351 Done    Acceptance E VU education on importance of mobility; education on BLE strengthening and core strengthening for improved mobility; education on transfer training AA 03/01/18 0921 Done    Acceptance E VU  AA 02/28/18 1535 Done    Acceptance E,TB DU,VU  CW 02/27/18 0908 Done    Acceptance E,D VU,NR  EJ 02/25/18 1543 Done    Acceptance E,D NR  PC 02/21/18 1419 Active    Acceptance E,D NR  PC 02/15/18 1453 Active    Acceptance E,TB,D VU,NR  SH 02/12/18 0902 Done    Acceptance E,TB,D NR  RW 02/11/18 1410 Active    Acceptance E,D NR  PC 02/09/18 1725 Active    Acceptance E NR  EM 02/08/18 1319 Active   Family Acceptance E VU education on importance of mobility; education on BLE strengthening and core strengthening for improved mobility; education on transfer training AA 03/01/18 0921 Done    Acceptance E,D VU,NR  EJ 02/25/18 1543 Done    Acceptance E,TB,D VU,NR  SH 02/12/18 0902 Done               Point: Body mechanics (Done)    Learning Progress Summary    Learner Readiness Method Response Comment Documented by Status   Patient Acceptance E,TB,D VU,NR  RW 03/05/18 1347 Done    Acceptance E,TB,D NR,VU  SV 03/04/18 1706 Done    Acceptance E,TB VU,DU  CW 03/02/18 1351 Done    Acceptance E VU education on importance of mobility; education on BLE strengthening and core strengthening for improved mobility; education on transfer training AA 03/01/18 0921 Done    Acceptance E VU  AA 02/28/18 1535 Done    Acceptance E,TB DU,VU  CW 02/27/18 0908 Done    Acceptance E,D VU,NR  EJ 02/25/18 1543 Done    Acceptance E,TB,D VU,NR   02/23/18 1558 Done    Acceptance E,TB,D VU,NR  SH 02/12/18 0902 Done    Acceptance E,TB,D NR  RW 02/11/18 1410 Active    Acceptance E,D NR  PC 02/09/18 1725 Active   Family Acceptance E VU education on importance of mobility; education on BLE strengthening and core strengthening for improved mobility; education on transfer training AA 03/01/18 0921 Done    Acceptance E,D VU,NR    02/25/18 1543 Done    Acceptance E,TB,D VU,NR   02/12/18 0902 Done               Point: Precautions (Done)    Learning Progress Summary    Learner Readiness Method Response Comment Documented by Status   Patient Acceptance E,TB,D VU,NR  RW 03/05/18 1347 Done    Acceptance E,TB,D NR,VU   03/04/18 1706 Done    Acceptance E,TB VU,DU   03/02/18 1351 Done    Acceptance E VU education on importance of mobility; education on BLE strengthening and core strengthening for improved mobility; education on transfer training AA 03/01/18 0921 Done    Acceptance E VU  AA 02/28/18 1535 Done    Acceptance E,TB DU,VU   02/27/18 0908 Done    Acceptance E,D VU,NR   02/25/18 1543 Done    Acceptance E,TB,D VU,NR   02/23/18 1558 Done    Acceptance E VU,NR  AA 02/17/18 1156 Done    Acceptance E,TB,D VU,NR   02/12/18 0902 Done    Acceptance E,TB,D NR   02/11/18 1410 Active    Acceptance E,D NR   02/09/18 1725 Active   Family Acceptance E VU education on importance of mobility; education on BLE strengthening and core strengthening for improved mobility; education on transfer training AA 03/01/18 0921 Done    Acceptance E,D VU,NR   02/25/18 1543 Done    Acceptance E,TB,D VU,NR   02/12/18 0902 Done                      User Key     Initials Effective Dates Name Provider Type Discipline     12/01/15 -  Isabel Manning, PT Physical Therapist PT    PC 12/01/15 -  Joycelyn Fisher, PT Physical Therapist PT    EM 12/01/15 -  Sade Vergara, PT Physical Therapist PT    EJ 04/21/17 -  Shira Martínez, PT Physical Therapist PT    SV 01/17/16 -  Yelena Beltran, PT Physical Therapist PT    RW 04/06/16 -  Radha Bonds, PTA Physical Therapy Assistant PT    CW 12/13/16 -  Leandro Posada, PTA Physical Therapy Assistant PT    AA 09/05/17 -  Manju Laguerre, PT Physical Therapist PT     01/08/18 -  Sophy Catalan, PT Student PT Student PT                    PT Recommendation and Plan  Anticipated Discharge  Disposition: home with home health  Planned Therapy Interventions: bed mobility training, gait training, home exercise program, transfer training  PT Frequency: daily  Plan of Care Review  Plan Of Care Reviewed With: patient  Progress: progress toward functional goals is gradual  Outcome Summary/Follow up Plan: Pt agreeable to PT this pm, but only sitting EOB due to increaesd overall discomfort today. Pt demonstrating good sitting balance and requires mod A x for supine <> sit. Pt with limited activity tolerance due to discomfort and fatigue. WIll progress as tolerated.          Outcome Measures       03/06/18 1600 03/05/18 1330 03/05/18 1210    How much help from another person do you currently need...    Turning from your back to your side while in flat bed without using bedrails? 3  -EJ 3  -RW     Moving from lying on back to sitting on the side of a flat bed without bedrails? 2  -EJ 3  -RW     Moving to and from a bed to a chair (including a wheelchair)? 2  -EJ 2  -RW     Standing up from a chair using your arms (e.g., wheelchair, bedside chair)? 2  -EJ 2  -RW     Climbing 3-5 steps with a railing? 1  -EJ 1  -RW     To walk in hospital room? 1  -EJ 2  -RW     AM-PAC 6 Clicks Score 11  -EJ 13  -RW     How much help from another is currently needed...    Putting on and taking off regular lower body clothing?   1  -SG    Bathing (including washing, rinsing, and drying)   2  -SG    Toileting (which includes using toilet bed pan or urinal)   1  -SG    Putting on and taking off regular upper body clothing   2  -SG    Taking care of personal grooming (such as brushing teeth)   3  -SG    Eating meals   3  -SG    Score   12  -SG    Functional Assessment    Outcome Measure Options AM-PAC 6 Clicks Basic Mobility (PT)  -EJ AM-PAC 6 Clicks Basic Mobility (PT)  -RW       03/04/18 1700          How much help from another person do you currently need...    Turning from your back to your side while in flat bed without using  bedrails? 3  -SV      Moving from lying on back to sitting on the side of a flat bed without bedrails? 2  -SV      Moving to and from a bed to a chair (including a wheelchair)? 2  -SV      Standing up from a chair using your arms (e.g., wheelchair, bedside chair)? 2  -SV      Climbing 3-5 steps with a railing? 1  -SV      To walk in hospital room? 1  -SV      AM-PAC 6 Clicks Score 11  -SV      Functional Assessment    Outcome Measure Options AM-PAC 6 Clicks Basic Mobility (PT)  -SV        User Key  (r) = Recorded By, (t) = Taken By, (c) = Cosigned By    Initials Name Provider Type    SG Nini Layne, OTR Occupational Therapist    SURAJ Martínez, PT Physical Therapist    SV Yelena Beltran, PT Physical Therapist    RW Radha Bonds, PTA Physical Therapy Assistant           Time Calculation:         PT Charges       03/06/18 1612          Time Calculation    Start Time 1550  -EJ      Stop Time 1606  -EJ      Time Calculation (min) 16 min  -EJ      PT Received On 03/06/18  -EJ      PT - Next Appointment 03/07/18  -EJ        User Key  (r) = Recorded By, (t) = Taken By, (c) = Cosigned By    Initials Name Provider Type    SURAJ Martínez, PT Physical Therapist          Therapy Charges for Today     Code Description Service Date Service Provider Modifiers Qty    57556113187 HC PT THER PROC EA 15 MIN 3/6/2018 Shira Martínez, PT GP 1    [id number] HC PT THER SUPP EA 15 MIN 3/6/2018 Shira Martínez, PT GP 1          PT G-Codes  Outcome Measure Options: AM-PAC 6 Clicks Basic Mobility (PT)    Shira Martínez, SKIP  3/6/2018

## 2018-03-06 NOTE — PROGRESS NOTES
"   LOS: 33 days    Patient Care Team:  Zahida Coffman MD as PCP - General (Family Medicine)    Chief Complaint:  No chief complaint on file.      Subjective     Interval History:   Follow up Mireya on CKD 3. Mouth dry at times.  Eating better. Does not like the ensure. Bowels moving. Got up to bedside commode yesterday.   Objective     Vital Signs  Temp:  [97.5 °F (36.4 °C)-99.6 °F (37.6 °C)] 98.7 °F (37.1 °C)  Heart Rate:  [88-99] 99  Resp:  [18-20] 20  BP: (119-128)/(61-74) 128/74    Flowsheet Rows         First Filed Value    Admission Height  172.7 cm (68\") Documented at 02/01/2018 1014    Admission Weight  123 kg (271 lb 14.4 oz) Documented at 02/01/2018 1014             I/O last 3 completed shifts:  In: 720 [P.O.:720]  Out: 50 [Other:50]    Intake/Output Summary (Last 24 hours) at 03/06/18 0826  Last data filed at 03/06/18 0500   Gross per 24 hour   Intake              720 ml   Output               50 ml   Net              670 ml       Physical Exam:  Awake, LIJ TLC 2/19, TDC RIJ placed 2/19    Oral mucosa slightly dry , mucosa pink, no lesions.  Nasal 02.   Heart Tachy, RRR no s3 or rub. Lungs upper airway rhonchi. Decreased bs bases.    Abd Distended, dressing in place.  +bs.  RLQ ANDER drain removed this am.   Ext no edema.        Results Review:      Results from last 7 days  Lab Units 03/06/18  0501 03/04/18  1220 03/03/18  0532 03/02/18  0625 03/01/18  0516   SODIUM mmol/L 139 139 138 141 140   POTASSIUM mmol/L 3.6 4.0 4.1 3.9 4.0   CHLORIDE mmol/L 100 98 98 100 99   CO2 mmol/L 32.0* 27.2 26.1 31.7* 25.6   BUN mg/dL 59* 64* 57* 55* 52*   CREATININE mg/dL 2.48* 2.97* 2.78* 3.22* 3.19*   CALCIUM mg/dL 8.3* 8.1* 8.1* 8.1* 7.7*   BILIRUBIN mg/dL 0.3  --   --  0.4 0.4   ALK PHOS U/L 63  --   --  68 68   ALT (SGPT) U/L 7  --   --  14 11   AST (SGOT) U/L 16  --   --  31 30   GLUCOSE mg/dL 96 112* 88 98 100*       Estimated Creatinine Clearance: 34.9 mL/min (by C-G formula based on Cr of 2.48).      Results from " last 7 days  Lab Units 03/06/18  0501 03/05/18  0524 03/04/18  1220 03/03/18  0532   MAGNESIUM mg/dL 2.0 2.1 1.5*  --    PHOSPHORUS mg/dL 5.3*  --  6.2* 6.0*         Results from last 7 days  Lab Units 03/06/18  0501   URIC ACID mg/dL 8.9*         Results from last 7 days  Lab Units 03/06/18  0501 03/05/18  0524 03/04/18  1220 03/03/18  0532 03/02/18  0625   WBC 10*3/mm3 8.23 7.76 8.55 7.93 8.62   HEMOGLOBIN g/dL 8.4* 8.4* 8.9* 8.6* 8.8*   PLATELETS 10*3/mm3 311 300 321 369 366               Imaging Results (last 24 hours)     ** No results found for the last 24 hours. **          epoetin kristen 20,000 Units Subcutaneous Weekly   heparin (porcine) 5,000 Units Subcutaneous Q8H   iopamidol 50 mL Intravenous Once in imaging   levoFLOXacin 750 mg Intravenous Q48H   lidocaine viscous 5 mL Mouth/Throat Q4H   meropenem 1 g Intravenous Q12H   metoprolol tartrate 12.5 mg Oral Q12H   mirtazapine 15 mg Oral Nightly   pantoprazole 40 mg Oral Q AM   sodium hypochlorite  Topical BID   Vancomycin Pharmacy Intermittent Dosing  Does not apply Daily       Pharmacy to dose vancomycin        Medication Review:   Current Facility-Administered Medications   Medication Dose Route Frequency Provider Last Rate Last Dose   • acetaminophen (TYLENOL) suppository 325 mg  325 mg Rectal Q4H PRN Edgard Felder MD   325 mg at 02/05/18 0653   • acetaminophen (TYLENOL) tablet 650 mg  650 mg Oral Q6H PRN Tri Guzman MD   650 mg at 03/01/18 0014   • albumin human 25 % IV SOLN 12.5 g  12.5 g Intravenous PRN Madeline Kay MD   12.5 g at 02/27/18 1305   • albuterol (PROVENTIL) nebulizer solution 0.083% 2.5 mg/3mL  2.5 mg Nebulization Q4H PRN Tri Guzman MD       • calcium gluconate 1 g in sodium chloride 0.9 % 50 mL IVPB  1 g Intravenous PRN Madeline aKy MD        Or   • calcium gluconate 2 g in sodium chloride 0.9 % 50 mL IVPB  2 g Intravenous PRN Madeline Kya MD       • epoetin kristen (EPOGEN,PROCRIT) injection 20,000  Units  20,000 Units Subcutaneous Weekly Madeline Kay MD   20,000 Units at 03/01/18 1438   • heparin (porcine) 5000 UNIT/ML injection 5,000 Units  5,000 Units Subcutaneous Q8H Benjy Samson MD   5,000 Units at 03/06/18 0643   • heparin (porcine) injection 1,000-2,000 Units  1,000-2,000 Units Intravenous PRN Madeline Kay MD   4,000 Units at 02/15/18 1843   • heparin (porcine) injection 4,000 Units  4,000 Units Intracatheter PRN Madeline Kay MD   4,000 Units at 02/27/18 1258   • HYDROmorphone (DILAUDID) injection 1 mg  1 mg Intravenous BID PRN Bryant Spence MD   0.5 mg at 03/06/18 0740   • iopamidol (ISOVUE-250) 51 % injection 50 mL  50 mL Intravenous Once in imaging Meir Guillen MD       • levoFLOXacin (LEVAQUIN) 750 mg/150 mL D5W (premix) (LEVAQUIN) 750 mg  750 mg Intravenous Q48H Masoud Arechiga MD       • lidocaine viscous (XYLOCAINE) 2 % mouth solution 5 mL  5 mL Mouth/Throat Q4H Madeline Kay MD   5 mL at 03/06/18 0348   • magic mouthwash oral supsension 10 mL  10 mL Swish & Spit Q4H PRN Madeline Kay MD       • meropenem (MERREM) 1 g/100 mL 0.9% NS VTB (mbp)  1 g Intravenous Q12H Masoud Arechiga MD   1 g at 03/05/18 2214   • metoprolol tartrate (LOPRESSOR) tablet 12.5 mg  12.5 mg Oral Q12H Bradford So MD   12.5 mg at 03/05/18 2020   • mirtazapine (REMERON) tablet 15 mg  15 mg Oral Nightly Ernestina Hussein MD   15 mg at 03/05/18 2214   • ondansetron ODT (ZOFRAN-ODT) disintegrating tablet 4 mg  4 mg Oral Q6H PRN Tri Guzman MD        Or   • ondansetron (ZOFRAN) injection 4 mg  4 mg Intravenous Q6H PRN Tri Guzman MD   4 mg at 03/05/18 0644   • oxyCODONE-acetaminophen (PERCOCET) 5-325 MG per tablet 1 tablet  1 tablet Oral Q4H PRN Tri Guzman MD   1 tablet at 03/04/18 0107    Or   • oxyCODONE-acetaminophen (PERCOCET) 5-325 MG per tablet 2 tablet  2 tablet Oral Q4H PRN Tri Guzman MD       • pantoprazole  (PROTONIX) EC tablet 40 mg  40 mg Oral Q AM Madeline Kay MD   40 mg at 03/06/18 0644   • Pharmacy to dose vancomycin   Does not apply Continuous PRN Masoud Arechiga MD       • promethazine (PHENERGAN) injection 12.5 mg  12.5 mg Intravenous Q8H PRN Sarthak Figueroa MD   12.5 mg at 02/09/18 2345   • sodium chloride (OCEAN) nasal spray 2 spray  2 spray Each Nare PRN Ernestina Hussein MD       • sodium hypochlorite (DAKIN'S 1/4 STRENGTH) 0.125 % topical solution 0.125% solution   Topical BID Tri Guzman MD       • Vancomycin Pharmacy Intermittent Dosing   Does not apply Daily Masoud Arechiga MD           Assessment/Plan       1. Acute kidney injury on chronic kidney disease stage III due to shock. Urine output not recorded yesterday.  Last HD 2/27. Recovering. If crt better tomorrow, dc TDC.   2.  Chronic kidney disease stage III baseline Crt 1.5.   3.   Status post exploratory laparotomy with small bowel resection, mesh removal, RLQ port site debridement, and  Appendectomy. POD 27.  SP subcutaneous debridement 2/13 and 2/16, 2/27. 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 Dw Dr. Guzman.   4.  Acute on Chronic anemia: Hg  8.4.  On weekly EPO.  5.  Small bowel enterotomy, sp repair 2/4.    ESBL, now carbapenem resistant from wound.   E. coli from blood 2/5. Blood cultures 2/9 negative.  Wound Vac today.   6. Encephalopathy . Resolved.   7. Afib with RVR.  Now in SR.  On beta blocker   8.  PCM. Improving po intake. Try carnation instant breakfast to supplement.   9.  Oral mucositis. Resolved.  On topical therapy. Change to prn.   10. Debility.  Working with PT .      DW       Madeline Kay MD  03/06/18  8:26 AM

## 2018-03-06 NOTE — PLAN OF CARE
Problem: Patient Care Overview (Adult)  Goal: Plan of Care Review  Outcome: Ongoing (interventions implemented as appropriate)   03/06/18 1687   Coping/Psychosocial Response Interventions   Plan Of Care Reviewed With patient   Patient Care Overview   Progress progress toward functional goals as expected   Outcome Evaluation   Outcome Summary/Follow up Plan Patient was supposed to have wound vac placed today but did not. Called, paged, and overhead paged wound/ostomy nurse, to no avail. Notified Dr. Guzman, who advised to continue with dressing changes as ordered.      Goal: Adult Individualization and Mutuality  Outcome: Ongoing (interventions implemented as appropriate)      Problem: Fall Risk (Adult)  Goal: Absence of Falls  Outcome: Ongoing (interventions implemented as appropriate)      Problem: Pain, Acute (Adult)  Goal: Acceptable Pain Control/Comfort Level  Outcome: Ongoing (interventions implemented as appropriate)

## 2018-03-06 NOTE — PLAN OF CARE
"Problem: Patient Care Overview (Adult)  Goal: Plan of Care Review  Outcome: Ongoing (interventions implemented as appropriate)   03/05/18 2331   Coping/Psychosocial Response Interventions   Plan Of Care Reviewed With patient   Patient Care Overview   Progress progress towards functional goals is fair   Outcome Evaluation   Outcome Summary/Follow up Plan Pt's wound beds are beefy red, with many areas of circumfrential flappage present. Pt requests only half the pain medicine ordered when the dressing is changed; however exhibits excessive pain when dressing is applied at 8 PM. Dressing had to be changed at the beginning of the shift due to it's wound bed being exposed from the packing falling out. Pt is frequently incontinent at night and there is, unfortunately, no way to fully protect the dressing from soilage due to same. The pt refuses to wear an abdominal binder and requests only paper tape to be used.  ADD: (3am) Pt was incontinent, sheets and gown changed.    It was suggested that the pt's dressing be changed once again at approx 6 am due to the dressing, when last changed , the packing was quite dry- due to enough product not being available when it was last changed. The pt tolerated the last dressing change; with complaints of pain and sticking.  I further told the  that it could very well be later in the shift before the wound vac was placed (I don't see that wound therapy has even been consulted yet).  The  sternly refused to allow it to be done again,stating- \"it will be done at 8AM tomorrow\".   Will monitor       Problem: Fall Risk (Adult)  Goal: Absence of Falls  Outcome: Ongoing (interventions implemented as appropriate)   03/05/18 2331   Fall Risk (Adult)   Absence of Falls making progress toward outcome       Problem: Pressure Ulcer Risk (Aristeo Scale) (Adult,Obstetrics,Pediatric)  Goal: Skin Integrity  Outcome: Ongoing (interventions implemented as appropriate)   03/05/18 2331 "   Pressure Ulcer Risk (Aristeo Scale) (Adult,Obstetrics,Pediatric)   Skin Integrity making progress toward outcome       Problem: Pain, Acute (Adult)  Goal: Acceptable Pain Control/Comfort Level  Outcome: Ongoing (interventions implemented as appropriate)   03/05/18 7993   Pain, Acute (Adult)   Acceptable Pain Control/Comfort Level making progress toward outcome

## 2018-03-06 NOTE — PLAN OF CARE
Problem: Patient Care Overview (Adult)  Goal: Plan of Care Review  Outcome: Ongoing (interventions implemented as appropriate)   03/06/18 1611   Coping/Psychosocial Response Interventions   Plan Of Care Reviewed With patient   Patient Care Overview   Progress progress toward functional goals is gradual   Outcome Evaluation   Outcome Summary/Follow up Plan Pt agreeable to PT this pm, but only sitting EOB due to increaesd overall discomfort today. Pt demonstrating good sitting balance and requires mod A x for supine <> sit. Pt with limited activity tolerance due to discomfort and fatigue. WIll progress as tolerated.

## 2018-03-06 NOTE — PROGRESS NOTES
Continued Stay Note  [facility]     Patient Name: Mariya Campos  MRN: [id number]  Today's Date: 3/6/2018    Admit Date: 2/1/2018          Discharge Plan       03/06/18 1553    Case Management/Social Work Plan    Plan [facility] pending Juan michel when medically ready.    Patient/Family In Agreement With Plan yes    Additional Comments CCP spoke with patient at bedside and she confirmed plan is still [facility] skilled rehab at [place]. Pending labs tomorrow, Nephrology may have HD line removed as pt has not needed HD since 2/27. Pt to have Wound Vac placed. CCP discussed with [place] possible dc this weekend pending Dr. Guzman ready for dc. Packet in CCP office. Brigid PATIÑOCC/P              Discharge Codes     None            Katelin Gar, RN

## 2018-03-06 NOTE — PROGRESS NOTES
"Pharmacokinetic Evaluation - Vancomycin    Mariya Campos is on day 15 pharmacy to dose vancomycin for intra-abdominal infection.  Pharmacy dosing vancomycin per Dr. Arechiga 's request.   Goal trough: 15-20 mg/L   Other antimicrobials: levofloxacin 750 mg iv q48h, meropenem 1g iv q12h  Admit date: 2/1/2018  9:56 AM    Blood pressure 126/64, pulse 99, temperature 99.6 °F (37.6 °C), temperature source Oral, resp. rate 18, height 172.7 cm (68\"), weight 120 kg (265 lb), SpO2 95 %.    Results from last 7 days     Lab Units 03/06/18  0501 03/04/18  1220 03/03/18  0532   CREATININE mg/dL 2.48* 2.97* 2.78*     Estimated Creatinine Clearance: 34.9 mL/min (by C-G formula based on Cr of 2.48).    Results from last 7 days     Lab Units 03/06/18  0501 03/05/18  0524 03/04/18  1220   WBC 10*3/mm3 8.23 7.76 8.55   HEMOGLOBIN g/dL 8.4* 8.4* 8.9*   HEMATOCRIT % 27.6* 27.6* 28.8*   PLATELETS 10*3/mm3 311 300 321   Baseline cultures/source/suscetibilit/labs/radiology:  2/05 MRSA screen: positive  2/05 Bcx E.coli ESBL+  2/09 Bcx x2: NG Final  2/16 Wound cx (abd wall): E.coli CRE, E. Faecalis (S-amp, gent, vanc)  2/17 Cdiff: Neg  2/25 Bcx ngtd    Surgery:   2/01 Lap ventral hernia repair with mesh. Extensive small bowel adhesiolysis  2/04 Small bowel enterotomy status post repair  2/13 & 2/16 Abdominal wound debridement and washout  2/19 Tunneled dialysis catheter placement  2/27 abdominal wound debridement and washout       HD (BENNY  on CKD 3 requiring HD):  CRRT 2/14, HD 2/15, 2/20, 2/24, 2/27, 3/1    Vancomycin dosing history:   2/19 1626  Vancomycin 1500mg IV x1                        2/20 0518  Vanc random: 17.8 mcg/mL; HD today  2/20 1536  Vancomycin 1000mg IV x1              2/21 0436  Vanc random: 22.5 mcg/mL              2/22 0600  Vanc random: 20.9 mcg/mL, HD today  2/22 1335  Vancomycin 750 mg IV x1              2/24 0337  Vanc random: 21.40 mcg/mL, HD today  2/24 1808  Vancomycin 500 mg IV x1              2/25 0536  Vanc " random: 18.60 mcg/mL  2/25 1144   Vancomycin 500 mg               2/26 0606 Vanc random: 20.4 mcg/mL, no dose today  2/27 Last HD scheduled, vanc 750 mg IV x1               2/28 0733 Vanc random = 19.4 mcg/ml  2/28 1425 Vanc 750mg iv x 1                3/1 0516 Vanc random: 25 mcg/mL, HD              3/2  0625 Vanc random = 17.9 mcg/ml  3/2 1705 Vanc 750mg iv x 1 dose               3/3 0532 Vanc random = 25.4mcg/ml              3/4 1220 Vanc random = 16.7mcg/ml  3/4 1618 Vanc 750mg iv x 1 dose   No HD scheduled               3/5 0524 Vanc random = 23.5 mcg/mL               3/6 0501 Vanc random = 18 mcg/mL    Assessment:  Level is at goal, at steady state.  Renal function is improved, no HD since 3/1.    Plan:  1)  vancomycin 750 mg once  2)  Vanc random in am     Marge Vela, PharmD, BCPS  3/6/2018 8:39 AM

## 2018-03-07 LAB
ALBUMIN SERPL-MCNC: 1.8 G/DL (ref 3.5–5.2)
ALBUMIN/GLOB SERPL: 0.4 G/DL
ALP SERPL-CCNC: 65 U/L (ref 39–117)
ALT SERPL W P-5'-P-CCNC: 8 U/L (ref 1–33)
ANION GAP SERPL CALCULATED.3IONS-SCNC: 12 MMOL/L
AST SERPL-CCNC: 17 U/L (ref 1–32)
BASOPHILS # BLD AUTO: 0.03 10*3/MM3 (ref 0–0.2)
BASOPHILS NFR BLD AUTO: 0.3 % (ref 0–1.5)
BILIRUB SERPL-MCNC: 0.3 MG/DL (ref 0.1–1.2)
BUN BLD-MCNC: 51 MG/DL (ref 6–20)
BUN/CREAT SERPL: 22.5 (ref 7–25)
CALCIUM SPEC-SCNC: 8.3 MG/DL (ref 8.6–10.5)
CHLORIDE SERPL-SCNC: 100 MMOL/L (ref 98–107)
CO2 SERPL-SCNC: 27 MMOL/L (ref 22–29)
CREAT BLD-MCNC: 2.27 MG/DL (ref 0.57–1)
DEPRECATED RDW RBC AUTO: 53.1 FL (ref 37–54)
EOSINOPHIL # BLD AUTO: 0.22 10*3/MM3 (ref 0–0.7)
EOSINOPHIL NFR BLD AUTO: 2.5 % (ref 0.3–6.2)
ERYTHROCYTE [DISTWIDTH] IN BLOOD BY AUTOMATED COUNT: 15.3 % (ref 11.7–13)
GFR SERPL CREATININE-BSD FRML MDRD: 22 ML/MIN/1.73
GLOBULIN UR ELPH-MCNC: 4.3 GM/DL
GLUCOSE BLD-MCNC: 97 MG/DL (ref 65–99)
HCT VFR BLD AUTO: 27.8 % (ref 35.6–45.5)
HGB BLD-MCNC: 8.5 G/DL (ref 11.9–15.5)
IMM GRANULOCYTES # BLD: 0.19 10*3/MM3 (ref 0–0.03)
IMM GRANULOCYTES NFR BLD: 2.2 % (ref 0–0.5)
LYMPHOCYTES # BLD AUTO: 1.2 10*3/MM3 (ref 0.9–4.8)
LYMPHOCYTES NFR BLD AUTO: 13.9 % (ref 19.6–45.3)
MCH RBC QN AUTO: 29.1 PG (ref 26.9–32)
MCHC RBC AUTO-ENTMCNC: 30.6 G/DL (ref 32.4–36.3)
MCV RBC AUTO: 95.2 FL (ref 80.5–98.2)
MONOCYTES # BLD AUTO: 0.69 10*3/MM3 (ref 0.2–1.2)
MONOCYTES NFR BLD AUTO: 8 % (ref 5–12)
NEUTROPHILS # BLD AUTO: 6.33 10*3/MM3 (ref 1.9–8.1)
NEUTROPHILS NFR BLD AUTO: 73.1 % (ref 42.7–76)
PLATELET # BLD AUTO: 272 10*3/MM3 (ref 140–500)
PMV BLD AUTO: 8.7 FL (ref 6–12)
POTASSIUM BLD-SCNC: 3.7 MMOL/L (ref 3.5–5.2)
PROT SERPL-MCNC: 6.1 G/DL (ref 6–8.5)
RBC # BLD AUTO: 2.92 10*6/MM3 (ref 3.9–5.2)
SODIUM BLD-SCNC: 139 MMOL/L (ref 136–145)
WBC NRBC COR # BLD: 8.66 10*3/MM3 (ref 4.5–10.7)

## 2018-03-07 PROCEDURE — 99232 SBSQ HOSP IP/OBS MODERATE 35: CPT | Performed by: INTERNAL MEDICINE

## 2018-03-07 PROCEDURE — 25010000002 HYDROMORPHONE PER 4 MG: Performed by: SURGERY

## 2018-03-07 PROCEDURE — 25010000002 ONDANSETRON PER 1 MG: Performed by: SURGERY

## 2018-03-07 PROCEDURE — 80053 COMPREHEN METABOLIC PANEL: CPT | Performed by: INTERNAL MEDICINE

## 2018-03-07 PROCEDURE — 85025 COMPLETE CBC W/AUTO DIFF WBC: CPT | Performed by: INTERNAL MEDICINE

## 2018-03-07 PROCEDURE — 02PYX3Z REMOVAL OF INFUSION DEVICE FROM GREAT VESSEL, EXTERNAL APPROACH: ICD-10-PCS | Performed by: SURGERY

## 2018-03-07 PROCEDURE — 99024 POSTOP FOLLOW-UP VISIT: CPT | Performed by: SURGERY

## 2018-03-07 PROCEDURE — 25010000002 HEPARIN (PORCINE) PER 1000 UNITS: Performed by: INTERNAL MEDICINE

## 2018-03-07 PROCEDURE — 97110 THERAPEUTIC EXERCISES: CPT

## 2018-03-07 RX ORDER — LIDOCAINE HYDROCHLORIDE 10 MG/ML
5 INJECTION, SOLUTION EPIDURAL; INFILTRATION; INTRACAUDAL; PERINEURAL ONCE
Status: DISCONTINUED | OUTPATIENT
Start: 2018-03-07 | End: 2018-03-12 | Stop reason: HOSPADM

## 2018-03-07 RX ADMIN — HEPARIN SODIUM 5000 UNITS: 5000 INJECTION, SOLUTION INTRAVENOUS; SUBCUTANEOUS at 05:33

## 2018-03-07 RX ADMIN — HYDROMORPHONE HYDROCHLORIDE 0.5 MG: 1 INJECTION, SOLUTION INTRAMUSCULAR; INTRAVENOUS; SUBCUTANEOUS at 14:21

## 2018-03-07 RX ADMIN — DAKIN'S SOLUTION 0.125% (QUARTER STRENGTH): 0.12 SOLUTION at 09:35

## 2018-03-07 RX ADMIN — METOPROLOL TARTRATE 12.5 MG: 25 TABLET ORAL at 09:35

## 2018-03-07 RX ADMIN — HEPARIN SODIUM 5000 UNITS: 5000 INJECTION, SOLUTION INTRAVENOUS; SUBCUTANEOUS at 21:06

## 2018-03-07 RX ADMIN — ONDANSETRON 4 MG: 2 INJECTION INTRAMUSCULAR; INTRAVENOUS at 23:22

## 2018-03-07 RX ADMIN — PANTOPRAZOLE SODIUM 40 MG: 40 TABLET, DELAYED RELEASE ORAL at 05:32

## 2018-03-07 RX ADMIN — HYDROMORPHONE HYDROCHLORIDE 0.5 MG: 1 INJECTION, SOLUTION INTRAMUSCULAR; INTRAVENOUS; SUBCUTANEOUS at 08:18

## 2018-03-07 RX ADMIN — Medication 10 ML: at 21:06

## 2018-03-07 RX ADMIN — ONDANSETRON 4 MG: 2 INJECTION INTRAMUSCULAR; INTRAVENOUS at 05:46

## 2018-03-07 RX ADMIN — METOPROLOL TARTRATE 12.5 MG: 25 TABLET ORAL at 21:06

## 2018-03-07 RX ADMIN — MIRTAZAPINE 15 MG: 15 TABLET, FILM COATED ORAL at 21:06

## 2018-03-07 NOTE — PROGRESS NOTES
Subjective   Pt reports sitting up at bedside yesterday on her own   Objective     Vital signs in last 24 hours:  Temp:  [98.1 °F (36.7 °C)-99.9 °F (37.7 °C)] 99 °F (37.2 °C)  Heart Rate:  [91-98] 96  Resp:  [18] 18  BP: (127-134)/(69-79) 134/79    Intake/Output last 3 shifts:  I/O last 3 completed shifts:  In: 360 [P.O.:360]  Out: 20 [Other:20]    Intake/Output this shift:       Physical Exam:  Respiratory: CTA, good inspiratory effort  CV: RRR  Abd: + BS, soft, wounds with good granulation tissue     Results from last 7 days  Lab Units 03/07/18  0317   WBC 10*3/mm3 8.66   HEMOGLOBIN g/dL 8.5*   HEMATOCRIT % 27.8*   PLATELETS 10*3/mm3 272       Results from last 7 days  Lab Units 03/07/18  0317   SODIUM mmol/L 139   POTASSIUM mmol/L 3.7   CHLORIDE mmol/L 100   CO2 mmol/L 27.0   BUN mg/dL 51*   CREATININE mg/dL 2.27*   CALCIUM mg/dL 8.3*   BILIRUBIN mg/dL 0.3   ALK PHOS U/L 65   ALT (SGPT) U/L 8   AST (SGOT) U/L 17   GLUCOSE mg/dL 97         Assessment/Plan   Wound vac today   Removal of dialysis cath today   IV abx completed     Tri Guzman MD

## 2018-03-07 NOTE — PROGRESS NOTES
"   LOS: 34 days    Patient Care Team:  Zahida Coffman MD as PCP - General (Family Medicine)    Chief Complaint:  No chief complaint on file.      Subjective     Interval History:   Follow up Mireya on CKD 3. Mouth dry.  Eating better.  Bowels moving.   Objective     Vital Signs  Temp:  [98.1 °F (36.7 °C)-99.9 °F (37.7 °C)] 99 °F (37.2 °C)  Heart Rate:  [91-98] 96  Resp:  [18] 18  BP: (127-134)/(69-79) 134/79    Flowsheet Rows         First Filed Value    Admission Height  172.7 cm (68\") Documented at 02/01/2018 1014    Admission Weight  123 kg (271 lb 14.4 oz) Documented at 02/01/2018 1014             I/O last 3 completed shifts:  In: 360 [P.O.:360]  Out: 20 [Other:20]    Intake/Output Summary (Last 24 hours) at 03/07/18 1039  Last data filed at 03/07/18 0020   Gross per 24 hour   Intake              360 ml   Output                0 ml   Net              360 ml       Physical Exam:  Awake, LIJ TLC 2/19, TDC RIJ placed 2/19    Oral mucosa slightly dry , mucosa pink, no lesions.  Nasal 02.   Heart  RRR no s3 or rub. Lungs upper airway rhonchi. Decreased bs bases.    Abd Distended, dressing in place.  +bs.   Ext no edema.        Results Review:      Results from last 7 days  Lab Units 03/07/18  0317 03/06/18  0501 03/04/18  1220  03/02/18  0625   SODIUM mmol/L 139 139 139  < > 141   POTASSIUM mmol/L 3.7 3.6 4.0  < > 3.9   CHLORIDE mmol/L 100 100 98  < > 100   CO2 mmol/L 27.0 32.0* 27.2  < > 31.7*   BUN mg/dL 51* 59* 64*  < > 55*   CREATININE mg/dL 2.27* 2.48* 2.97*  < > 3.22*   CALCIUM mg/dL 8.3* 8.3* 8.1*  < > 8.1*   BILIRUBIN mg/dL 0.3 0.3  --   --  0.4   ALK PHOS U/L 65 63  --   --  68   ALT (SGPT) U/L 8 7  --   --  14   AST (SGOT) U/L 17 16  --   --  31   GLUCOSE mg/dL 97 96 112*  < > 98   < > = values in this interval not displayed.    Estimated Creatinine Clearance: 37.6 mL/min (by C-G formula based on Cr of 2.27).      Results from last 7 days  Lab Units 03/06/18  0501 03/05/18  0524 03/04/18  1220 03/03/18  0532 "   MAGNESIUM mg/dL 2.0 2.1 1.5*  --    PHOSPHORUS mg/dL 5.3*  --  6.2* 6.0*         Results from last 7 days  Lab Units 03/06/18  0501   URIC ACID mg/dL 8.9*         Results from last 7 days  Lab Units 03/07/18  0317 03/06/18  0501 03/05/18  0524 03/04/18  1220 03/03/18  0532   WBC 10*3/mm3 8.66 8.23 7.76 8.55 7.93   HEMOGLOBIN g/dL 8.5* 8.4* 8.4* 8.9* 8.6*   PLATELETS 10*3/mm3 272 311 300 321 369               Imaging Results (last 24 hours)     ** No results found for the last 24 hours. **          epoetin kristen 20,000 Units Subcutaneous Weekly   heparin (porcine) 5,000 Units Subcutaneous Q8H   iopamidol 50 mL Intravenous Once in imaging   lidocaine 5 mL Injection Once   metoprolol tartrate 12.5 mg Oral Q12H   mirtazapine 15 mg Oral Nightly   pantoprazole 40 mg Oral Q AM   sodium hypochlorite  Topical BID          Medication Review:   Current Facility-Administered Medications   Medication Dose Route Frequency Provider Last Rate Last Dose   • acetaminophen (TYLENOL) suppository 325 mg  325 mg Rectal Q4H PRN Edgard Felder MD   325 mg at 02/05/18 0653   • acetaminophen (TYLENOL) tablet 650 mg  650 mg Oral Q6H PRN Tri Guzman MD   650 mg at 03/01/18 0014   • albumin human 25 % IV SOLN 12.5 g  12.5 g Intravenous PRN Madeline Kay MD   12.5 g at 02/27/18 1305   • albuterol (PROVENTIL) nebulizer solution 0.083% 2.5 mg/3mL  2.5 mg Nebulization Q4H PRN Tri Guzman MD       • calcium gluconate 1 g in sodium chloride 0.9 % 50 mL IVPB  1 g Intravenous PRN Madeline Kay MD        Or   • calcium gluconate 2 g in sodium chloride 0.9 % 50 mL IVPB  2 g Intravenous PRN Madeline Kay MD       • epoetin kristen (EPOGEN,PROCRIT) injection 20,000 Units  20,000 Units Subcutaneous Weekly Madeline Kay MD   20,000 Units at 03/01/18 1438   • heparin (porcine) 5000 UNIT/ML injection 5,000 Units  5,000 Units Subcutaneous Q8H Benjy Samson MD   5,000 Units at 03/07/18 0533   • heparin (porcine)  injection 1,000-2,000 Units  1,000-2,000 Units Intravenous PRN Madeline Kay MD   4,000 Units at 02/15/18 1843   • heparin (porcine) injection 4,000 Units  4,000 Units Intracatheter PRN Madeline Kay MD   4,000 Units at 02/27/18 1258   • HYDROmorphone (DILAUDID) injection 1 mg  1 mg Intravenous BID PRN Bryant Spence MD   0.5 mg at 03/07/18 0818   • iopamidol (ISOVUE-250) 51 % injection 50 mL  50 mL Intravenous Once in imaging Meir Guillen MD       • lidocaine (XYLOCAINE) 1 % injection 5 mL  5 mL Injection Once Dwayne Lyons MD       • magic mouthwash oral supsension 10 mL  10 mL Swish & Spit Q4H PRN Madeline Kay MD   10 mL at 03/06/18 2150   • metoprolol tartrate (LOPRESSOR) tablet 12.5 mg  12.5 mg Oral Q12H Bradford So MD   12.5 mg at 03/07/18 0935   • mirtazapine (REMERON) tablet 15 mg  15 mg Oral Nightly Ernestina Hussein MD   15 mg at 03/06/18 2135   • ondansetron ODT (ZOFRAN-ODT) disintegrating tablet 4 mg  4 mg Oral Q6H PRN Tri Guzman MD        Or   • ondansetron (ZOFRAN) injection 4 mg  4 mg Intravenous Q6H PRN Tri Guzman MD   4 mg at 03/07/18 0546   • oxyCODONE-acetaminophen (PERCOCET) 5-325 MG per tablet 1 tablet  1 tablet Oral Q4H PRN Tri Guzman MD   1 tablet at 03/04/18 0107    Or   • oxyCODONE-acetaminophen (PERCOCET) 5-325 MG per tablet 2 tablet  2 tablet Oral Q4H PRN Tri Guzman MD       • pantoprazole (PROTONIX) EC tablet 40 mg  40 mg Oral Q AM Madeline Kay MD   40 mg at 03/07/18 0532   • promethazine (PHENERGAN) injection 12.5 mg  12.5 mg Intravenous Q8H PRN Sarthak Figueroa MD   12.5 mg at 02/09/18 0543   • sodium chloride (OCEAN) nasal spray 2 spray  2 spray Each Nare PRN Ernestina Hussein MD       • sodium hypochlorite (DAKIN'S 1/4 STRENGTH) 0.125 % topical solution 0.125% solution   Topical BID Tri Guzman MD           Assessment/Plan       1. Acute kidney injury on chronic kidney disease stage III  due to shock. Urine output not recorded.  Last HD 2/27. Recovering. Ask Vascular to dc the  TDC.   2.  Chronic kidney disease stage III baseline Crt 1.5.   3.   Status post exploratory laparotomy with small bowel resection, mesh removal, RLQ port site debridement, and  Appendectomy. POD 27.  SP subcutaneous debridement 2/13 and 2/16, 2/27. 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 Evangelist Guzman.   4.  Acute on Chronic anemia: Hg  8.4.  On weekly EPO.  5.  Small bowel enterotomy, sp repair 2/4.    ESBL, now carbapenem resistant from wound.   E. coli from blood 2/5. Blood cultures 2/9 negative.  Wound Vac today.   6. Encephalopathy . Resolved.   7. Afib with RVR.  Now in SR.  On beta blocker   8.  PCM. Improving po intake. Try carnation instant breakfast to supplement.   9.  Oral mucositis. Resolved.  On topical therapy. Changed to prn.   10. Debility.  Working with PT .      EVANGELIST       Madeline Kay MD  03/07/18  10:39 AM

## 2018-03-07 NOTE — NURSING NOTE
Pt seen for wound VAC placement to abd wounds.  Pt has extremely large abd wounds, one midline and one right lower abd; these wounds communicate to one another. MIdline abd wound measures 32 x 19 x 7cms; base pink with sutures visible.  Right lower abd wound measures 11 x 7.5 x 6cms deep, tunnels 7.5 cms at 2 o'clock and connects to midline wound.  Visible area of wound clean and pink.  Used multiple pieces of granufoam ( two medium foams and two simplace foam drsgs) to fill these two wounds;  Used Y connector to connect trac pads and connected to ActiVac at 125 mm hg, continuous suction.  Periwound skin intact and without erythema.  Pt had IV dilaudid per nsg staff prior to drsg change and did well with drsg chg.  Will see Friday for VAC drsg chg

## 2018-03-07 NOTE — THERAPY TREATMENT NOTE
Acute Care - Occupational Therapy Progress Note  [facility]     Patient Name: Mariya Campos  : 1960  MRN: [id number]  Today's Date: 3/7/2018  Onset of Illness/Injury or Date of Surgery Date: 18            Admit Date: 2018    Visit Dx:     ICD-10-CM ICD-9-CM   1. Incisional hernia, without obstruction or gangrene K43.2 553.21   2. Recurrent ventral hernia K43.2 553.21   3. Generalized weakness R53.1 780.79   4. CRF (chronic renal failure), stage 3 (moderate) N18.3 585.3   5. Wound infection T14.8XXA 958.3    L08.9    6. ARF (acute renal failure) with tubular necrosis N17.0 584.5     Patient Active Problem List   Diagnosis   • Atopic rhinitis   • Fatigue   • Abnormal mammogram   • Adiposity   • Abnormal ECG   • CRF (chronic renal failure), stage 3 (moderate)   • Anemia   • Recurrent UTI   • Osteopenia   • Essential hypertension   • Incisional hernia   • Moderate persistent asthma without complication   • Incisional hernia, without obstruction or gangrene   • Recurrent ventral hernia   • Bacteremia, escherichia coli   • Wound infection   • Complications, dialysis, catheter, mechanical, initial encounter             Adult Rehabilitation Note       18 1458 18 1336 18 1550    Rehab Assessment/Intervention    Discipline occupational therapist   agrees to UE exercise only  -SG physical therapy assistant  -RW physical therapist  -EJ    Document Type therapy note (daily note)  -SG therapy note (daily note)  -RW therapy note (daily note)  -EJ    Subjective Information agree to therapy  -SG agree to therapy  -RW agree to therapy  -EJ    Patient Effort, Rehab Treatment good  -SG good  -RW adequate  -EJ    Symptoms Noted During/After Treatment  fatigue;other (see comments)   nausea  -RW fatigue   discomfort  -EJ    Precautions/Limitations  fall precautions;oxygen therapy device and L/min   1l O2  -RW fall precautions   abdominal wound  -EJ    Recorded by [SG] MAHOGANY Conroy [RW]  Radha Bonds PTA [EJ] Shira Martínez, PT    Vital Signs    Pre SpO2 (%)  95  -RW     O2 Delivery Pre Treatment  supplemental O2   1L  -RW     Post SpO2 (%)  96  -RW     O2 Delivery Post Treatment  supplemental O2   1L  -RW     Recorded by  [RW] Radha Bonds PTA     Pain Assessment    Pain Assessment No/denies pain  -SG 0-10  -RW --   pt reports overall discomfort  -EJ    Pain Score  4  -RW     Pain Type  Surgical pain  -RW     Pain Location  Abdomen  -RW     Pain Intervention(s)  Medication (See MAR);Repositioned;Ambulation/increased activity  -RW     Recorded by [SG] Nini Layne, OTR [RW] Radha Bonds PTA [EJ] Shira Martínez, PT    Cognitive Assessment/Intervention    Current Cognitive/Communication Assessment  functional  -RW     Orientation Status  oriented x 4  -RW     Follows Commands/Answers Questions  100% of the time  -RW     Personal Safety  WNL/WFL  -RW     Personal Safety Interventions  fall prevention program maintained;gait belt;nonskid shoes/slippers when out of bed  -RW     Recorded by  [RW] Radha Bonds PTA     Bed Mobility, Assessment/Treatment    Bed Mobility, Assistive Device  bed rails;head of bed elevated;draw sheet  -RW draw sheet  -EJ    Bed Mobility, Roll Left, [place]  contact guard assist;verbal cues required  -RW     Bed Mobility, Scoot/Bridge, [place]   maximum assist (25% patient effort);2 person assist required  -EJ    Bed Mob, Supine to Sit, [place]   verbal cues required;moderate assist (50% patient effort);2 person assist required  -EJ    Bed Mob, Sit to Supine, [place]   moderate assist (50% patient effort);2 person assist required  -EJ    Bed Mob, Sidelying to Sit, [place]  minimum assist (75% patient effort);verbal cues required;nonverbal cues required (demo/gesture)  -RW     Bed Mobility, Safety Issues   impaired trunk control for bed mobility  -EJ    Bed Mobility, Impairments   strength decreased  -EJ    Recorded by  [RW] Radha  ALLISON Bonds, PTA [EJ] Shira Martínez, PT    Transfer Assessment/Treatment    Transfers, Sit-Stand [place]  minimum assist (75% patient effort);2 person assist required;verbal cues required;nonverbal cues required (demo/gesture)  -RW     Transfers, Stand-Sit [place]  minimum assist (75% patient effort);2 person assist required;verbal cues required;nonverbal cues required (demo/gesture)  -RW     Transfers, Sit-Stand-Sit, Assist Device  standard walker  -RW     Toilet Transfer, [place]  minimum assist (75% patient effort);2 person assist required  -RW     Toilet Transfer, Assistive Device  bedside commode without drop arms;standard walker  -RW     Transfer, Impairments  strength decreased  -RW     Transfer, Comment  stood x2;able to take a few pivoting from bed to [facility]  -RW pt declined standing this date  -EJ    Recorded by  [RW] Radha Bonds PTA [EJ] Shira Martínez, PT    Gait Assessment/Treatment    Gait, [place] Level  contact guard assist;minimum assist (75% patient effort);verbal cues required  -RW not tested  -EJ    Gait, Assistive Device  standard walker  -RW     Gait, Distance (Feet)  --   Pt able to take 5 sidesteps up towards HOB  -RW     Gait, Gait Deviations  forward flexed posture;bilateral:;jovany decreased;step length decreased;stride length decreased  -RW     Gait, Safety Issues  step length decreased;supplemental O2  -RW     Gait, Impairments  strength decreased  -RW     Recorded by  [RW] Radha Bonds PTA [EJ] Shira Martínez, PT    Balance Skills Training    Sitting-Level of Assistance  Close supervision  -RW Close supervision  -EJ    Sitting-Balance Support  Feet supported  -RW Feet supported;Left upper extremity supported;Right upper extremity supported  -EJ    Sitting-Balance Activities   Trunk control activities  -EJ    Sitting # of Minutes   7  -EJ    Standing-Level of Assistance  Contact guard  -RW     Static Standing Balance Support  assistive device  -RW      Recorded by  [RW] Radha Bonds PTA [EJ] Shira Martínez, PT    Therapy Exercises    Bilateral Lower Extremities  AROM:;10 reps;supine;ankle pumps/circles;quad sets;sitting;hip flexion;LAQ  -RW     Bilateral Upper Extremity AROM:;elbow flexion/extension;hand pumps;pronation/supination;shoulder extension/flexion   pt complains of fatigue.   -SG      Recorded by [SG] Nini Layne OTR [RW] Radha Bonds PTA     Positioning and Restraints    Pre-Treatment Position in bed  -SG in bed  -RW in bed  -EJ    Post Treatment Position bed  -SG bed  -RW bed  -EJ    In Bed call light within reach;encouraged to call for assist;exit alarm on;with PT  -SG fowlers;call light within reach;encouraged to call for assist;exit alarm on  -RW notified nsg;supine;call light within reach;encouraged to call for assist;exit alarm on  -EJ    Recorded by [SG] MAHOGANY Conroy [RW] Radha Bonds PTA [EJ] Shira Martínez, PT      03/05/18 1324 03/05/18 1154 03/04/18 1622    Rehab Assessment/Intervention    Discipline physical therapy assistant  -RW occupational therapist  -SG physical therapist  -SV    Document Type therapy note (daily note)  -RW therapy note (daily note)  -SG therapy note (daily note)  -SV    Subjective Information agree to therapy  -RW agree to therapy  -SG agree to therapy;complains of;fatigue;pain  -SV    Patient Effort, Rehab Treatment good  -RW good  -SG     Precautions/Limitations fall precautions;oxygen therapy device and L/min   1L O2  -RW fall precautions  -SG     Recorded by [RW] Radha Bonds PTA [SG] Nini Layne, STACYR [SV] Yelena Beltran, PT    Vital Signs    Pre Systolic BP Rehab   116  -SV    Pre Treatment Diastolic BP   66  -SV    Pretreatment Heart Rate (beats/min)   99  -SV    Pre SpO2 (%) 96  -RW  97  -SV    O2 Delivery Pre Treatment supplemental O2   1L  -RW  supplemental O2  -SV    Intra SpO2 (%)   95  -SV    O2 Delivery Intra Treatment supplemental O2   1L  -RW  supplemental O2  -SV     Post SpO2 (%)   95  -SV    O2 Delivery Post Treatment supplemental O2   1L  -RW  supplemental O2  -SV    Pre Patient Position   Supine  -SV    Intra Patient Position   Standing  -SV    Post Patient Position   Side Lying  -SV    Recorded by [RW] Radha Bonds PTA  [SV] Yelena Beltran, PT    Pain Assessment    Pain Assessment 0-10  -RW 0-10  -SG     Pain Score 2  -RW 2  -SG     Pain Type Surgical pain  -RW Surgical pain  -SG     Pain Location Abdomen  -RW Abdomen  -SG     Recorded by [RW] Radha Bonds PTA [SG] Nini Layne OTR     Cognitive Assessment/Intervention    Current Cognitive/Communication Assessment functional  -RW functional  -SG functional  -SV    Orientation Status oriented x 4  -RW oriented x 4  -SG oriented x 4  -SV    Follows Commands/Answers Questions 100% of the time  -% of the time;able to follow single-step instructions  -% of the time;able to follow multi-step instructions  -SV    Personal Safety WNL/WFL  -RW      Personal Safety Interventions fall prevention program maintained;gait belt;nonskid shoes/slippers when out of bed  -RW      Recorded by [RW] Radha Bonds PTA [SG] Nini Layne, STACYR [SV] Yelena Beltran, PT    ROM (Range of Motion)    General ROM Detail  Adrian. shoulder 7/8 AROM  -SG     Recorded by  [SG] MAHOGANY Conroy     Bed Mobility, Assessment/Treatment    Bed Mobility, Assistive Device bed rails;head of bed elevated  -RW      Bed Mobility, Roll Left, [place] minimum assist (75% patient effort);verbal cues required;nonverbal cues required (demo/gesture)  -RW      Bed Mob, Supine to Sit, [place]   moderate assist (50% patient effort);minimum assist (75% patient effort);2 person assist required  -SV    Bed Mob, Sit to Supine, [place]   moderate assist (50% patient effort);2 person assist required  -SV    Bed Mob, Sidelying to Sit, [place] minimum assist (75% patient effort);2 person assist required;verbal cues required;nonverbal  cues required (demo/gesture)  -RW      Bed Mob, Sit to Sidelying, [place] not tested   Pt up in chair  -RW      Recorded by [RW] Radha Bonds PTA  [SV] Yelena Beltran, PT    Transfer Assessment/Treatment    Transfers, Bed-Chair [place] minimum assist (75% patient effort);2 person assist required;verbal cues required;nonverbal cues required (demo/gesture)   bed to bsc  -RW      Transfers, Chair-Bed [place] minimum assist (75% patient effort);2 person assist required;verbal cues required;nonverbal cues required (demo/gesture)   bsc to chair  -RW      Transfers, Bed-Chair-Bed, Assist Device rolling walker  -RW      Transfers, Sit-Stand [place] minimum assist (75% patient effort);2 person assist required;verbal cues required;nonverbal cues required (demo/gesture)  -RW  moderate assist (50% patient effort);minimum assist (75% patient effort);2 person assist required  -SV    Transfers, Stand-Sit [place] minimum assist (75% patient effort);2 person assist required;verbal cues required;nonverbal cues required (demo/gesture)  -RW  moderate assist (50% patient effort);minimum assist (75% patient effort);2 person assist required  -SV    Transfers, Sit-Stand-Sit, Assist Device rolling walker;elevated surface  -RW      Toilet Transfer, [place] moderate assist (50% patient effort);2 person assist required;verbal cues required;nonverbal cues required (demo/gesture)  -RW      Toilet Transfer, Assistive Device rolling walker;bedside commode without drop arms  -RW      Transfer, Safety Issues weight-shifting ability decreased  -RW      Transfer, Impairments strength decreased;impaired balance  -RW      Transfer, Comment pt able to take several pivoting steps from bed to bsc and bsc to chair.   -RW  pt stood with rwx 15 seconds   -SV    Recorded by [RW] Radha Bonds PTA  [SV] Yelena Beltran, PT    Gait Assessment/Treatment    Gait, [place] Level not tested  -RW      Recorded by [RW] Radha  ALLISON Bonds PTA      Balance Skills Training    Sitting-Level of Assistance   Close supervision;Contact guard  -SV    Sitting # of Minutes   15  -SV    Standing-Level of Assistance Contact guard  -RW  Contact guard;x2  -SV    Static Standing Balance Support assistive device  -RW  Right upper extremity supported;Left upper extremity supported  -SV    Standing Balance # of Minutes 2   to get cleaned up at [facility]  -RW      Recorded by [RW] Radha Bonds PTA  [SV] Yelena Beltran, PT    Therapy Exercises    Bilateral Lower Extremities   AROM:;10 reps;ankle pumps/circles;LAQ;SAQ;quad sets  -SV    Bilateral Upper Extremity  AROM:;elbow flexion/extension;hand pumps;pronation/supination;shoulder extension/flexion   pt states feels UE 's close to baseline function  -SG     Recorded by  [SG] Nini Layne OTR [SV] Yelena Beltran, PT    Sensory Assessment/Intervention    Light Touch  LUE;RUE  -SG     LUE Light Touch  WNL  -SG     RUE Light Touch  WNL  -SG     Recorded by  [SG] Nini Layne OTR     Positioning and Restraints    Pre-Treatment Position in bed  -RW in bed  -SG in bed  -SV    Post Treatment Position chair  -RW bed  -SG bed  -SV    In Bed  call light within reach;encouraged to call for assist;exit alarm on  -SG supine;call light within reach;encouraged to call for assist;exit alarm on;with family/caregiver;side lying left  -SV    In Chair reclined;call light within reach;encouraged to call for assist;notified nsg  -RW      Recorded by [RW] Radha Bonds, PTA [SG] Nini Layne, OTR [SV] Yelena Beltran, PT      User Key  (r) = Recorded By, (t) = Taken By, (c) = Cosigned By    Initials Name Effective Dates     Nini Layne, OTR 04/13/15 - 03/06/18     Nini Layne, OTR 03/07/18 -     EJ Shira Martínez, PT 04/21/17 -     SV Yelena Beltran, PT 01/17/16 - 03/06/18    RW Radha Bonds, PTA 04/06/16 - 03/06/18     Radha Bonds, PTA 03/07/18 -                 OT Goals       02/22/18 1000           Transfer Training 2 OT STG    Transfer Training 2 OT STG, Date Established 02/22/18  -SO (r) NA (t) SO (c)      Transfer Training 2 OT STG, Time to Achieve 1 wk  -SO (r) NA (t) SO (c)      Transfer Training 2 OT STG, Activity Type bed to chair /chair to bed;sit to stand/stand to sit  -SO (r) NA (t) SO (c)      Transfer Training 2 OT STG, [place] Level maximum assist (25% patient effort);moderate assist (50% patient effort);1 person + 1 person to manage equipment  -SO (r) NA (t) SO (c)      Occupational Therapy OT LTG    Occupational Therapy OT LTG, Date Established 02/22/18  -SO (r) NA (t) SO (c)      Occupational Therapy OT LTG, Time to Achieve 1 wk  -SO (r) NA (t) SO (c)      Occupational Therapy OT LTG, Activity Type Frozen shoulder HEP  -SO (r) NA (t) SO (c)      Occupational Therapy OT LTG, [place] Level supervision required  -SO (r) NA (t) SO (c)      Occupational Therapy OT LTG, Addisional Goal to increase movement needed for ADLs  -SO (r) NA (t) SO (c)      ADL OT LTG    ADL OT LTG, Date Established 02/22/18  -SO (r) NA (t) SO (c)      ADL OT LTG, Time to Achieve 1 wk  -SO (r) NA (t) SO (c)      ADL OT LTG, Activity Type ADL skills  -SO (r) NA (t) SO (c)      ADL OT LTG, [place] Level mod assist;max assist  -SO (r) NA (t) SO (c)      Endurance OT LTG    Endurance Goal OT LTG, Date Established 02/22/18  -SO (r) NA (t) SO (c)      Endurance Goal OT LTG, Time to Achieve 1 wk  -SO (r) NA (t) SO (c)      Endurance Goal OT LTG, Activity Level to increase;endurance 2 fair-  -SO (r) NA (t) SO (c)      Endurance Goal OT LTG, Additional Goal for ADLs  -SO (r) NA (t) SO (c)        User Key  (r) = Recorded By, (t) = Taken By, (c) = Cosigned By    Initials Name Provider Type    SO Di [place], OTR Occupational Therapist    ALISA Larson, OT Student OT Student          Occupational Therapy Education     Title: PT OT SLP Therapies (Active)     Topic: Occupational Therapy (Active)     Point:  Home exercise program (Done)    Description: Instruct learner(s) on appropriate technique for monitoring, assisting and/or progressing therapeutic exercises/activities.    Learning Progress Summary    Learner Readiness Method Response Comment Documented by Status   Patient Acceptance E,D,TB,H NR,Bed IU educated on exercises and stretches for frozen shoulder. pt with increased stiffness of right shoulder and performed exercises with assistance at shoulder. handout left in patients room and  instructed on performing. NA 02/22/18 0957 Done   Significant Other Acceptance E,D,TB,H NR,Bed IU educated on exercises and stretches for frozen shoulder. pt with increased stiffness of right shoulder and performed exercises with assistance at shoulder. handout left in patients room and  instructed on performing. NA 02/22/18 0957 Done               Point: Body mechanics (Done)    Description: Instruct learner(s) on proper positioning and spine alignment during self-care, functional mobility activities and/or exercises.    Learning Progress Summary    Learner Readiness Method Response Comment Documented by Status   Patient Acceptance E,D,TB,H NR,Bed IU educated on exercises and stretches for frozen shoulder. pt with increased stiffness of right shoulder and performed exercises with assistance at shoulder. handout left in patients room and  instructed on performing. NA 02/22/18 0957 Done   Significant Other Acceptance E,D,TB,H NR,Bed IU educated on exercises and stretches for frozen shoulder. pt with increased stiffness of right shoulder and performed exercises with assistance at shoulder. handout left in patients room and  instructed on performing. NA 02/22/18 0957 Done                      User Key     Initials Effective Dates Name Provider Type Discipline    NA 08/21/17 - 03/06/18 Luna Larson, OT Student OT Student OT                  OT Recommendation and Plan  Planned Therapy Interventions:  other (see comments) (frozen shoulder)  Therapy Frequency: 3-5 times/wk  Plan of Care Review  Plan Of Care Reviewed With: patient  Progress: progress toward functional goals is gradual  Outcome Summary/Follow up Plan: Pt agrees to UE exercise in bed with OT. Complains of fatigue which limits activity        Outcome Measures       03/07/18 1501 03/07/18 1340 03/06/18 1600    How much help from another person do you currently need...    Turning from your back to your side while in flat bed without using bedrails?  3  -RW 3  -EJ    Moving from lying on back to sitting on the side of a flat bed without bedrails?  3  -RW 2  -EJ    Moving to and from a bed to a chair (including a wheelchair)?  3  -RW 2  -EJ    Standing up from a chair using your arms (e.g., wheelchair, bedside chair)?  3  -RW 2  -EJ    Climbing 3-5 steps with a railing?  1  -RW 1  -EJ    To walk in hospital room?  2  -RW 1  -EJ    AM-PAC 6 Clicks Score  15  -RW 11  -EJ    How much help from another is currently needed...    Putting on and taking off regular lower body clothing? 1  -SG      Bathing (including washing, rinsing, and drying) 2  -SG      Toileting (which includes using toilet bed pan or urinal) 1  -SG      Putting on and taking off regular upper body clothing 2  -SG      Taking care of personal grooming (such as brushing teeth) 3  -SG      Eating meals 3  -SG      Score 12  -SG      Functional Assessment    Outcome Measure Options  AM-PAC 6 Clicks Basic Mobility (PT)  -RW AM-PAC 6 Clicks Basic Mobility (PT)  -EJ      03/05/18 1330 03/05/18 1210 03/04/18 1700    How much help from another person do you currently need...    Turning from your back to your side while in flat bed without using bedrails? 3  -RW  3  -SV    Moving from lying on back to sitting on the side of a flat bed without bedrails? 3  -RW  2  -SV    Moving to and from a bed to a chair (including a wheelchair)? 2  -RW  2  -SV    Standing up from a chair using your arms (e.g.,  wheelchair, bedside chair)? 2  -RW  2  -SV    Climbing 3-5 steps with a railing? 1  -RW  1  -SV    To walk in hospital room? 2  -RW  1  -SV    AM-PAC 6 Clicks Score 13  -RW  11  -SV    How much help from another is currently needed...    Putting on and taking off regular lower body clothing?  1  -SG     Bathing (including washing, rinsing, and drying)  2  -SG     Toileting (which includes using toilet bed pan or urinal)  1  -SG     Putting on and taking off regular upper body clothing  2  -SG     Taking care of personal grooming (such as brushing teeth)  3  -SG     Eating meals  3  -SG     Score  12  -SG     Functional Assessment    Outcome Measure Options AM-PAC 6 Clicks Basic Mobility (PT)  -RW  AM-PAC 6 Clicks Basic Mobility (PT)  -SV      User Key  (r) = Recorded By, (t) = Taken By, (c) = Cosigned By    Initials Name Provider Type    SCOTT Layne OTR Occupational Therapist     Nini Layne, OTR Occupational Therapist    SURAJ Martínez, PT Physical Therapist    SV Yelena Beltran, PT Physical Therapist    RW Radha Bonds, PTA Physical Therapy Assistant     Radha Bonds, PTA Physical Therapy Assistant           Time Calculation:         Time Calculation- OT       03/07/18 1501          Time Calculation- OT    OT Start Time 1325  -SG      OT Stop Time 1336  -SG      OT Time Calculation (min) 11 min  -SG      OT Received On 03/07/18  -        User Key  (r) = Recorded By, (t) = Taken By, (c) = Cosigned By    Initials Name Provider Type     MAHOGANY Conroy Occupational Therapist           Therapy Charges for Today     Code Description Service Date Service Provider Modifiers Qty    [id number]  OT THER PROC EA 15 MIN 3/7/2018 MAHOGANY Conroy GO 1               MAHOGANY Conroy  3/7/2018

## 2018-03-07 NOTE — PAYOR COMM NOTE
"Mariya Barbosa (57 y.o. Female)     PLEASE SEE ATTACHED CLINICAL U/D. PT. REMAINS ON A MONITORED TELEM FLOOR.    REF#[id number]    PLEASE CALL   OR  [phone] WITH INPT CONTINUED STAY APPROVAL.     THANK YOU    SATISH WOOTEN, JONATHAN, CCP    PLANS TO START PRECERT TOMORROW FOR DC OVER WEEKEND TO [facility].      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                 Active Insurance as of 2/1/2018     Primary Coverage     Payor Plan Insurance Group Employer/Plan Group    [facility] BLUE CROSS [employer] EMPL[employer]EE [id number]     Payor Plan Address Payor Plan Phone Number Effective From Effective To    [address] [phone] 1/1/2015     [place] [address]       Subscriber Name Subscriber Birth Date Member ID       FELICIA BARBOSA [birth year]1965 [id number]                 Emergency Contacts      (Rel.) Home Phone Work Phone Mobile Phone    Felicia Barbosa (Spouse) [phone] -- [phone]              Intake & Output (last day)       03/06 0701 - 03/07 0700 03/07 0701 - 03/08 0700    P.O. 360     Total Intake(mL/kg) 360 (3.1)     Urine (mL/kg/hr)      Other      Stool      " Total Output        Net +360            Unmeasured Urine Occurrence 3 x 1 x    Unmeasured Stool Occurrence 1 x         Orders (last 24 hrs)     Start     Ordered    03/07/18 1600  Strict Intake & Output  Every 8 Hours      03/07/18 0813    03/07/18 1030  lidocaine (XYLOCAINE) 1 % injection 5 mL  Once      03/07/18 0955    03/07/18 0955  Shiley Consent  Once      03/07/18 0955    03/07/18 0955  Supplies for Tunneled Dialysis Catheter Removal  Continuous     Comments:  Supplies at Bedside:  Tub of 4x4's  Large Tegaderm  Lidocaine (separate order entered)  10 cc Syringe with 18g and small needle  Betadine  Suture Removal Kit  #11 Blade Scalpel    03/07/18 0955    03/07/18 0845  Abd binder x 2 large  Once      03/07/18 0846    03/07/18 0813  Inpatient Vascular Surgery Consult  Once     Specialty:  Vascular Surgery  Provider:  Eloy Jameson MD    03/07/18 0812    03/07/18 0600  Comprehensive Metabolic Panel  Morning Draw      03/06/18 0823    03/07/18 0600  Vancomycin, Random  Morning Draw,   Status:  Canceled      03/06/18 0841    03/07/18 0600  CBC Auto Differential  PROCEDURE ONCE      03/07/18 0000    03/06/18 1800  levoFLOXacin (LEVAQUIN) 750 mg/150 mL D5W (premix) (LEVAQUIN) 750 mg  Every 48 Hours,   Status:  Discontinued      03/05/18 1331    03/06/18 1659  Wound Ostomy Eval & Treat  Once      03/06/18 1658    03/06/18 1200  Dietary Nutrition Supplements [place] Instant Breakfast  Daily With Breakfast & Lunch      03/06/18 0823    03/06/18 0830  magic mouthwash oral supsension 10 mL  Every 4 Hours PRN      03/06/18 0823             Physician Progress Notes (last 24 hours) (Notes from 3/6/2018 10:24 AM through 3/7/2018 10:24 AM)      Masoud Arechiga MD at 3/7/2018  8:48 AM  Version 1 of 1         INFECTIOUS DISEASES PROGRESS NOTE    CC: f/u sepsis    S:   No sig abdominal pain  No f/c/ns    O:  Physical Exam:  Temp:  [98.1 °F (36.7 °C)-99.9 °F (37.7 °C)] 99 °F (37.2 °C)  Heart Rate:  [91-98]  96  Resp:  [18] 18  BP: (127-134)/(69-79) 134/79  Physical Exam   Constitutional: She appears well-developed. No distress.   Pulmonary/Chest: Effort normal.   Abdominal: Soft. She exhibits no distension. There is tenderness (minimal).   Neurological: She is alert.   Skin: Skin is warm.   Psychiatric: She has a normal mood and affect. Her behavior is normal.        Diagnostics:    Cr 2.27  WBC 8.66 (p73, L 14, M 8, E3)  H/H 8.5/28        Assessment/Plan   1.  Septic shock secondary to small bowel enterotomy and peritonitis status post repair with small bowel resection and anastomosis by Dr. Guzman on 2/4/18; shock resolved; Repeat I and D for wound cellulitis on 2/13/18 and 2/16. Cx with CRE and E faecalis; CT on 2/26 shows fistula, c/f free air and abscess; repeat incision and debridement on 2/27/18  2.  Acute kidney injury  3.  Persistent right pleural effusion, no pna.    Stable off abx.  For wound vac today.  We will be happy to reevaluate whenever you need.  Please remove CVC when central access no longer needed.      Masoud Arechiga MD  8:48 AM  03/07/18            Electronically signed by Masoud Arechiga MD at 3/7/2018  9:15 AM      Tri Guzman MD at 3/7/2018  9:24 AM  Version 1 of 1             Subjective   Pt reports sitting up at bedside yesterday on her own   Objective     Vital signs in last 24 hours:  Temp:  [98.1 °F (36.7 °C)-99.9 °F (37.7 °C)] 99 °F (37.2 °C)  Heart Rate:  [91-98] 96  Resp:  [18] 18  BP: (127-134)/(69-79) 134/79    Intake/Output last 3 shifts:  I/O last 3 completed shifts:  In: 360 [P.O.:360]  Out: 20 [Other:20]    Intake/Output this shift:       Physical Exam:  Respiratory: CTA, good inspiratory effort  CV: RRR  Abd: + BS, soft, wounds with good granulation tissue     Results from last 7 days  Lab Units 03/07/18  0317   WBC 10*3/mm3 8.66   HEMOGLOBIN g/dL 8.5*   HEMATOCRIT % 27.8*   PLATELETS 10*3/mm3 272       Results from last 7 days  Lab Units  03/07/18  0317   SODIUM mmol/L 139   POTASSIUM mmol/L 3.7   CHLORIDE mmol/L 100   CO2 mmol/L 27.0   BUN mg/dL 51*   CREATININE mg/dL 2.27*   CALCIUM mg/dL 8.3*   BILIRUBIN mg/dL 0.3   ALK PHOS U/L 65   ALT (SGPT) U/L 8   AST (SGOT) U/L 17   GLUCOSE mg/dL 97         Assessment/Plan   Wound vac today   Removal of dialysis cath today   IV abx completed     Tri Guzman MD     Electronically signed by Tri Guzmna MD at 3/7/2018  9:27 AM        Only active medications are shown.     Scheduled Meds Sorted by Name for Mariya Campos as of 03/07/18 1026      Legend:                          Inactive     Active     Linked               Medications 03/07/18 03/08/18 03/09/18 03/10/18 03/11/18 03/12/18 03/13/18   epoetin kristen (EPOGEN,PROCRIT) injection 20,000 Units  Dose: 20,000 Units Freq: Weekly Route: SC  Indications of Use: ANEMIA DUE TO RENAL FAILURE  Start: 03/01/18 0900    Admin Instructions:   Refrigerate. Do not shake or send via pneumatic tube.     0900               heparin (porcine) 5000 UNIT/ML injection 5,000 Units  Dose: 5,000 Units Freq: Every 8 Hours Scheduled Route: SC  Start: 02/14/18 1400    0533       1400       2200        0600       1400       2200        0600       1400       2200        0600       1400       2200        0600       1400       2200        0600       1400       2200        0600       1400       2200          iopamidol (ISOVUE-250) 51 % injection 50 mL  Dose: 50 mL Freq: Once in Imaging Route: IV  Start: 02/19/18 1115             lidocaine (XYLOCAINE) 1 % injection 5 mL  Dose: 5 mL Freq: Once Route: IJ  Start: 03/07/18 1030    1030                lidocaine viscous (XYLOCAINE) 2 % mouth solution 5 mL  Dose: 5 mL Freq: Every 4 Hours Scheduled Route: MT  Start: 02/20/18 1600    Admin Instructions:   Give with the rodolfo's magic.  If given for pain, use the following pain scale:  Mild Pain = Pain Score of 1-3, CPOT 1-2  Moderate Pain = Pain Score of 4-6, CPOT 3-4  Severe Pain =  Pain Score of 7-10, CPOT 5-8    (0300)       (0935)       1200       1600       2000        0000       0400       0800       1200       1600       2000        0000       0400       0800       1200       1600       2000        0000       0400       0800       1200       1600       2000        0000       0400       0800       1200       1600       2000        0000       0400       0800       1200       1600       2000        0000       0400       0800       1200       1600       2000          metoprolol tartrate (LOPRESSOR) tablet 12.5 mg  Dose: 12.5 mg Freq: Every 12 Hours Scheduled Route: PO  Start: 02/20/18 1330    Admin Instructions:   Hold for systolic blood pressure less than 100 mmHg    0935 2100 0900 2100 0900 2100 0900 2100 0900 2100 0900 2100 0900 2100          mirtazapine (REMERON) tablet 15 mg  Dose: 15 mg Freq: Nightly Route: PO  Start: 02/17/18 2100 2100 2100 2100 2100 2100 2100 2100          pantoprazole (PROTONIX) EC tablet 40 mg  Dose: 40 mg Freq: Every Early Morning Route: PO  Start: 02/18/18 0600    Admin Instructions:   **IV to PO Adjustment per Medical Executive and P&T committees approval**  Swallow whole; do not crush, split, or chew.    0532        0600        0600        0600        0600        0600        0600          sodium hypochlorite (DAKIN'S 1/4 STRENGTH) 0.125 % topical solution 0.125% solution  Freq: 2 Times Daily Route: TOP  Start: 02/13/18 2100    Admin Instructions:   W-D Drsgs BID    0935 [C]       2100 0900 2100 0900 2100 0900 2100 0900 2100 0900 2100 0900 2100          Medications 03/07/18 03/08/18 03/09/18 03/10/18 03/11/18 03/12/18 03/13/18         Continuous Meds Sorted by Name for Mariya Campos as of 03/07/18 1026      Legend:                          Inactive      Active     Linked               Medications 03/07/18 03/08/18 03/09/18 03/10/18 03/11/18 03/12/18 03/13/18         PRN Meds Sorted by Name for Mariya Campos as of 03/07/18 1026      Legend:                          Inactive     Active     Linked               Medications 03/07/18 03/08/18 03/09/18 03/10/18 03/11/18 03/12/18 03/13/18   acetaminophen (TYLENOL) suppository 325 mg  Dose: 325 mg Freq: Every 4 Hours PRN Route: RE  PRN Reason: Fever  Start: 02/05/18 0607    Admin Instructions:   Do not exceed 4 grams of acetaminophen in a 24 hr period.    If given for pain, use the following pain scale:   Mild Pain = Pain Score of 1-3, CPOT 1-2  Moderate Pain = Pain Score of 4-6, CPOT 3-4  Severe Pain = Pain Score of 7-10, CPOT 5-8             acetaminophen (TYLENOL) tablet 650 mg  Dose: 650 mg Freq: Every 6 Hours PRN Route: PO  PRN Reasons: Mild Pain ,Fever  Start: 02/26/18 1821    Admin Instructions:   Do not exceed 4 grams of acetaminophen in a 24 hr period.    If given for pain, use the following pain scale:   Mild Pain = Pain Score of 1-3, CPOT 1-2  Moderate Pain = Pain Score of 4-6, CPOT 3-4  Severe Pain = Pain Score of 7-10, CPOT 5-8             albumin human 25 % IV SOLN 12.5 g  Dose: 12.5 g Freq: As Needed Route: IV  PRN Comment: Low Symptomatic Systolic Blood Pressure - Not Responding to NS Bolus.  Start: 02/13/18 0922    Admin Instructions:   Symptomatic Systolic Blood Pressure Less Than 90 mmHg (If SBP Doesn't Respond to NS Bolus).  If SBP Remains Low After 4 Doses Contact Provider             albuterol (PROVENTIL) nebulizer solution 0.083% 2.5 mg/3mL  Dose: 2.5 mg Freq: Every 4 Hours PRN Route: NEBULIZATION  PRN Reasons: Wheezing,Shortness of Air  Start: 02/01/18 2159             calcium gluconate 1 g in sodium chloride 0.9 % 50 mL IVPB  Dose: 1 g Freq: As Needed Route: IV  PRN Comment: SERUM Ionized Ca 0.7 - 0.84  Start: 02/13/18 0921    Admin Instructions:   Administer if SERUM Ionized Ca 0.7-0.84.   If  SERUM Ionized Ca is Less Than 0.6, Call Nephrologist.             Or  calcium gluconate 2 g in sodium chloride 0.9 % 50 mL IVPB  Dose: 2 g Freq: As Needed Route: IV  PRN Comment: SERUM Ionized Ca 0.6 - 0.69  Start: 02/13/18 0921    Admin Instructions:   Administer if SERUM Ionized Ca 0.6-0.69.    If SERUM Ionized Ca is less than 0.6, Call Nephrologist.             heparin (porcine) injection 1,000-2,000 Units  Dose: 1,000-2,000 Units Freq: As Needed Route: IV  PRN Comment: If CRRT Is Stopped  Start: 02/13/18 0921    Admin Instructions:   Fill Each Lumen With Heparin & Contact Dialysis Staff Immediately to Restart Treatment             heparin (porcine) injection 4,000 Units  Dose: 4,000 Units Freq: As Needed Route: IK  PRN Comment: to lock catheter ports post dialysis each treatment  Start: 02/15/18 1526             HYDROmorphone (DILAUDID) injection 1 mg  Dose: 1 mg Freq: 2 Times Daily PRN Route: IV  PRN Reason: Severe Pain   PRN Comment: with dressing change  Start: 03/01/18 2208 End: 03/11/18 2307    Admin Instructions:   If given for pain, use the following pain scale:  Mild Pain = Pain Score of 1-3, CPOT 1-2  Moderate Pain = Pain Score of 4-6, CPOT 3-4  Severe Pain = Pain Score of 7-10, CPOT 5-8    0818 [C]           2307-D/C'd        magic mouthwash oral supsension 10 mL  Dose: 10 mL Freq: Every 4 Hours PRN Route: SWISH & SPIT  PRN Reason: Mouth Pain  Start: 03/06/18 0830    Admin Instructions:   .             ondansetron ODT (ZOFRAN-ODT) disintegrating tablet 4 mg  Dose: 4 mg Freq: Every 6 Hours PRN Route: PO  PRN Reasons: Nausea,Vomiting  Start: 02/05/18 0105    Admin Instructions:   Give Zofran first. Give Phenergan if Zofran not effective. Then if Phenergan not effective, give metoclopramide.  Place on tongue and allow to dissolve.                Or  ondansetron (ZOFRAN) injection 4 mg  Dose: 4 mg Freq: Every 6 Hours PRN Route: IV  PRN Reasons: Nausea,Vomiting  Start: 02/05/18 0105    Admin Instructions:    Give Zofran first. Give Phenergan if Zofran not effective. Then if Phenergan not effective, give metoclopramide.    0546                oxyCODONE-acetaminophen (PERCOCET) 5-325 MG per tablet 1 tablet  Dose: 1 tablet Freq: Every 4 Hours PRN Route: PO  PRN Reason: Moderate Pain   Start: 03/02/18 1010 End: 03/12/18 1109    Admin Instructions:   Do not exceed 4 grams of acetaminophen in a 24 hr period.    If given for pain, use the following pain scale:   Mild Pain = Pain Score of 1-3, CPOT 1-2  Moderate Pain = Pain Score of 4-6, CPOT 3-4  Severe Pain = Pain Score of 7-10, CPOT 5-8         1109-D/C'd       Or  oxyCODONE-acetaminophen (PERCOCET) 5-325 MG per tablet 2 tablet  Dose: 2 tablet Freq: Every 4 Hours PRN Route: PO  PRN Reason: Moderate Pain   Start: 03/02/18 1010 End: 03/12/18 1109    Admin Instructions:   Do not exceed 4 grams of acetaminophen in a 24 hr period.    If given for pain, use the following pain scale:   Mild Pain = Pain Score of 1-3, CPOT 1-2  Moderate Pain = Pain Score of 4-6, CPOT 3-4  Severe Pain = Pain Score of 7-10, CPOT 5-8         1109-D/C'd       promethazine (PHENERGAN) injection 12.5 mg  Dose: 12.5 mg Freq: Every 8 Hours PRN Route: IV  PRN Reasons: Nausea,Vomiting  Start: 02/04/18 1135    Admin Instructions:   If giving IV, dilute dose in 20 mL NS and slow IV push over 3-5 minutes. Administer through large bore vein (not hand or wrist) through running IV line at port furthest from patient's vein.             sodium chloride (OCEAN) nasal spray 2 spray  Dose: 2 spray Freq: As Needed Route: EACH NARE  PRN Reason: Congestion  Start: 02/15/18 8455    Admin Instructions:                Medications 03/07/18 03/08/18 03/09/18 03/10/18 03/11/18 03/12/18 03/13/18

## 2018-03-07 NOTE — OP NOTE
Operative Note  Date of Admission:  2/1/2018  OR Date: 3/7/2018    Pre-op Diagnosis:  Acute renal failure    Post-op Diagnosis: Same    Procedure:   1) removal of right internal jugular tunneled dialysis catheter    Surgeon: Dwayne Lyons MD    Assistant: None    Anesthesia: Local      Estimated Blood Loss: none    Specimen: * No orders in the log *    Complications: None    Findings: Catheter removed intact       Procedure:    The patient was seen at the bedside.  I discussed the risks, benefits, and alternatives to the procedure with the patient.  She understood and agreed to dialysis catheter removal.  The dressings were taken down.  The skin at the exit site was cleansed with alcohol.  Sutures were cut.  And a pressure was held and the neck and the catheter and cuff was freed circumferentially.  The catheter was removed intact and was discarded.  Manual pressure along was used for hemostasis at the neck.  Sterile dressing was applied.  Patient understood well there was no complication.      Active Hospital Problems (** Indicates Principal Problem)    Diagnosis Date Noted   • **Wound infection [T14.8XXA, L08.9] 01/30/2018   • Complications, dialysis, catheter, mechanical, initial encounter [T82.49XA] 02/18/2018   • Bacteremia, escherichia coli [R78.81] 02/10/2018   • Recurrent ventral hernia [K43.2] 01/30/2018   • Incisional hernia, without obstruction or gangrene [K43.2] 12/21/2017      Resolved Hospital Problems    Diagnosis Date Noted Date Resolved   No resolved problems to display.      Dwayne Lyons MD     Date: 3/7/2018  Time: 4:19 PM

## 2018-03-07 NOTE — PLAN OF CARE
Problem: Patient Care Overview (Adult)  Goal: Plan of Care Review  Outcome: Ongoing (interventions implemented as appropriate)   03/07/18 1500   Coping/Psychosocial Response Interventions   Plan Of Care Reviewed With patient   Patient Care Overview   Progress progress toward functional goals is gradual   Outcome Evaluation   Outcome Summary/Follow up Plan Pt agrees to UE exercise in bed with OT. Complains of fatigue which limits activity

## 2018-03-07 NOTE — PLAN OF CARE
Problem: Patient Care Overview (Adult)  Goal: Plan of Care Review  Outcome: Ongoing (interventions implemented as appropriate)   03/07/18 0401   Coping/Psychosocial Response Interventions   Plan Of Care Reviewed With patient   Patient Care Overview   Progress progress toward functional goals as expected   Outcome Evaluation   Outcome Summary/Follow up Plan VSS; pt to have wound vac applied today; abdomen wound change last night; Diluadid 0.5mg given x1 before dressing change; pt turned and repostitioned; IS up to 500; Possile D/C HD line today; Will continue to monitor       Problem: Fall Risk (Adult)  Goal: Absence of Falls  Outcome: Ongoing (interventions implemented as appropriate)      Problem: Pressure Ulcer Risk (Aristeo Scale) (Adult,Obstetrics,Pediatric)  Goal: Skin Integrity  Outcome: Ongoing (interventions implemented as appropriate)      Problem: Pain, Acute (Adult)  Goal: Acceptable Pain Control/Comfort Level  Outcome: Ongoing (interventions implemented as appropriate)

## 2018-03-07 NOTE — PROGRESS NOTES
INFECTIOUS DISEASES PROGRESS NOTE    CC: f/u sepsis    S:   No sig abdominal pain  No f/c/ns    O:  Physical Exam:  Temp:  [98.1 °F (36.7 °C)-99.9 °F (37.7 °C)] 99 °F (37.2 °C)  Heart Rate:  [91-98] 96  Resp:  [18] 18  BP: (127-134)/(69-79) 134/79  Physical Exam   Constitutional: She appears well-developed. No distress.   Pulmonary/Chest: Effort normal.   Abdominal: Soft. She exhibits no distension. There is tenderness (minimal).   Neurological: She is alert.   Skin: Skin is warm.   Psychiatric: She has a normal mood and affect. Her behavior is normal.        Diagnostics:    Cr 2.27  WBC 8.66 (p73, L 14, M 8, E3)  H/H 8.5/28        Assessment/Plan   1.  Septic shock secondary to small bowel enterotomy and peritonitis status post repair with small bowel resection and anastomosis by Dr. Guzman on 2/4/18; shock resolved; Repeat I and D for wound cellulitis on 2/13/18 and 2/16. Cx with CRE and E faecalis; CT on 2/26 shows fistula, c/f free air and abscess; repeat incision and debridement on 2/27/18  2.  Acute kidney injury  3.  Persistent right pleural effusion, no pna.    Stable off abx.  For wound vac today.  We will be happy to reevaluate whenever you need.  Please remove CVC when central access no longer needed.      Masoud Arechiga MD  8:48 AM  03/07/18

## 2018-03-07 NOTE — PLAN OF CARE
Problem: Patient Care Overview (Adult)  Goal: Plan of Care Review  Outcome: Ongoing (interventions implemented as appropriate)   03/07/18 3574   Coping/Psychosocial Response Interventions   Plan Of Care Reviewed With patient   Outcome Evaluation   Outcome Summary/Follow up Plan Pt able to stand w/ PT and take some pivoting steps to and from bsc. Fatigue and nausea limiting further activity. Encouraged pt to be getting up w/ nsg to and from commode as well.

## 2018-03-07 NOTE — THERAPY TREATMENT NOTE
Acute Care - Physical Therapy Treatment Note  [facility]     Patient Name: Mariya Campos  : 1960  MRN: [id number]  Today's Date: 3/7/2018  Onset of Illness/Injury or Date of Surgery Date: 18          Admit Date: 2018    Visit Dx:    ICD-10-CM ICD-9-CM   1. Incisional hernia, without obstruction or gangrene K43.2 553.21   2. Recurrent ventral hernia K43.2 553.21   3. Generalized weakness R53.1 780.79   4. CRF (chronic renal failure), stage 3 (moderate) N18.3 585.3   5. Wound infection T14.8XXA 958.3    L08.9    6. ARF (acute renal failure) with tubular necrosis N17.0 584.5     Patient Active Problem List   Diagnosis   • Atopic rhinitis   • Fatigue   • Abnormal mammogram   • Adiposity   • Abnormal ECG   • CRF (chronic renal failure), stage 3 (moderate)   • Anemia   • Recurrent UTI   • Osteopenia   • Essential hypertension   • Incisional hernia   • Moderate persistent asthma without complication   • Incisional hernia, without obstruction or gangrene   • Recurrent ventral hernia   • Bacteremia, escherichia coli   • Wound infection   • Complications, dialysis, catheter, mechanical, initial encounter               Adult Rehabilitation Note       18 1336 18 1550 18 1324    Rehab Assessment/Intervention    Discipline physical therapy assistant  -RW physical therapist  -EJ physical therapy assistant  -RW    Document Type therapy note (daily note)  -RW therapy note (daily note)  -EJ therapy note (daily note)  -RW    Subjective Information agree to therapy  -RW agree to therapy  -EJ agree to therapy  -RW    Patient Effort, Rehab Treatment good  -RW adequate  -EJ good  -RW    Symptoms Noted During/After Treatment fatigue;other (see comments)   nausea  -RW fatigue   discomfort  -EJ     Precautions/Limitations fall precautions;oxygen therapy device and L/min   1l O2  -RW fall precautions   abdominal wound  -EJ fall precautions;oxygen therapy device and L/min   1L O2  -RW    Recorded by  [RW] Radha Bonds PTA [EJ] Shira Martínez, PT [RW] Radha Bonds PTA    Vital Signs    Pre SpO2 (%) 95  -RW  96  -RW    O2 Delivery Pre Treatment supplemental O2   1L  -RW  supplemental O2   1L  -RW    O2 Delivery Intra Treatment   supplemental O2   1L  -RW    Post SpO2 (%) 96  -RW      O2 Delivery Post Treatment supplemental O2   1L  -RW  supplemental O2   1L  -RW    Recorded by [RW] Radha Bonds PTA  [RW] Radha Bonds PTA    Pain Assessment    Pain Assessment 0-10  -RW --   pt reports overall discomfort  -EJ 0-10  -RW    Pain Score 4  -RW  2  -RW    Pain Type Surgical pain  -RW  Surgical pain  -RW    Pain Location Abdomen  -RW  Abdomen  -RW    Pain Intervention(s) Medication (See MAR);Repositioned;Ambulation/increased activity  -RW      Recorded by [RW] Radha Bonds PTA [EJ] Shira Martínez, PT [RW] Radha Bonds PTA    Cognitive Assessment/Intervention    Current Cognitive/Communication Assessment functional  -RW  functional  -RW    Orientation Status oriented x 4  -RW  oriented x 4  -RW    Follows Commands/Answers Questions 100% of the time  -RW  100% of the time  -RW    Personal Safety WNL/WFL  -RW  WNL/WFL  -RW    Personal Safety Interventions fall prevention program maintained;gait belt;nonskid shoes/slippers when out of bed  -RW  fall prevention program maintained;gait belt;nonskid shoes/slippers when out of bed  -RW    Recorded by [RW] Radha Bonds PTA  [RW] Radha Bonds PTA    Bed Mobility, Assessment/Treatment    Bed Mobility, Assistive Device bed rails;head of bed elevated;draw sheet  -RW draw sheet  -EJ bed rails;head of bed elevated  -RW    Bed Mobility, Roll Left, [place] contact guard assist;verbal cues required  -RW  minimum assist (75% patient effort);verbal cues required;nonverbal cues required (demo/gesture)  -RW    Bed Mobility, Scoot/Bridge, [place]  maximum assist (25% patient effort);2 person assist required  -EJ     Bed Mob, Supine to Sit,  [place]  verbal cues required;moderate assist (50% patient effort);2 person assist required  -EJ     Bed Mob, Sit to Supine, [place]  moderate assist (50% patient effort);2 person assist required  -EJ     Bed Mob, Sidelying to Sit, [place] minimum assist (75% patient effort);verbal cues required;nonverbal cues required (demo/gesture)  -RW  minimum assist (75% patient effort);2 person assist required;verbal cues required;nonverbal cues required (demo/gesture)  -RW    Bed Mob, Sit to Sidelying, [place]   not tested   Pt up in chair  -RW    Bed Mobility, Safety Issues  impaired trunk control for bed mobility  -EJ     Bed Mobility, Impairments  strength decreased  -EJ     Recorded by [RW] Radha Bonds, PTA [EJ] Shira Martínez, PT [RW] Radha Bonds PTA    Transfer Assessment/Treatment    Transfers, Bed-Chair [place]   minimum assist (75% patient effort);2 person assist required;verbal cues required;nonverbal cues required (demo/gesture)   bed to bsc  -RW    Transfers, Chair-Bed [place]   minimum assist (75% patient effort);2 person assist required;verbal cues required;nonverbal cues required (demo/gesture)   bsc to chair  -RW    Transfers, Bed-Chair-Bed, Assist Device   rolling walker  -RW    Transfers, Sit-Stand [place] minimum assist (75% patient effort);2 person assist required;verbal cues required;nonverbal cues required (demo/gesture)  -RW  minimum assist (75% patient effort);2 person assist required;verbal cues required;nonverbal cues required (demo/gesture)  -RW    Transfers, Stand-Sit [place] minimum assist (75% patient effort);2 person assist required;verbal cues required;nonverbal cues required (demo/gesture)  -RW  minimum assist (75% patient effort);2 person assist required;verbal cues required;nonverbal cues required (demo/gesture)  -RW    Transfers, Sit-Stand-Sit, Assist Device standard walker  -RW  rolling walker;elevated surface  -RW    Toilet Transfer,  [place] minimum assist (75% patient effort);2 person assist required  -RW  moderate assist (50% patient effort);2 person assist required;verbal cues required;nonverbal cues required (demo/gesture)  -RW    Toilet Transfer, Assistive Device bedside commode without drop arms;standard walker  -RW  rolling walker;bedside commode without drop arms  -RW    Transfer, Safety Issues   weight-shifting ability decreased  -RW    Transfer, Impairments strength decreased  -RW  strength decreased;impaired balance  -RW    Transfer, Comment stood x2;able to take a few pivoting from bed to bsc  -RW pt declined standing this date  -EJ pt able to take several pivoting steps from bed to bsc and bsc to chair.   -RW    Recorded by [RW] Radha Bonds PTA [EJ] Shira Martínez, PT [RW] Radha Bonds PTA    Gait Assessment/Treatment    Gait, [place] Level contact guard assist;minimum assist (75% patient effort);verbal cues required  -RW not tested  -EJ not tested  -RW    Gait, Assistive Device standard walker  -RW      Gait, Distance (Feet) --   Pt able to take 5 sidesteps up towards HOB  -RW      Gait, Gait Deviations forward flexed posture;bilateral:;jovany decreased;step length decreased;stride length decreased  -RW      Gait, Safety Issues step length decreased;supplemental O2  -RW      Gait, Impairments strength decreased  -RW      Recorded by [RW] Radha Bonds PTA [EJ] Shira Martínez, PT [RW] Radha Bonds PTA    Balance Skills Training    Sitting-Level of Assistance Close supervision  -RW Close supervision  -EJ     Sitting-Balance Support Feet supported  -RW Feet supported;Left upper extremity supported;Right upper extremity supported  -EJ     Sitting-Balance Activities  Trunk control activities  -EJ     Sitting # of Minutes  7  -EJ     Standing-Level of Assistance Contact guard  -RW  Contact guard  -RW    Static Standing Balance Support assistive device  -RW  assistive device  -RW    Standing Balance # of  Minutes   2   [place] [facility] [place]    Recorded by [RW] Radha Bonds PTA [EJ] Shira Martínez, PT [RW] Radha Bonds PTA    Therapy Exercises    Bilateral Lower Extremities AROM:;10 reps;supine;ankle pumps/circles;quad sets;sitting;hip flexion;LAQ  -RW      Recorded by [RW] Radha Bonds PTA      Positioning and Restraints    Pre-Treatment Position in bed  -RW in bed  -EJ in bed  -RW    Post Treatment Position bed  -RW bed  -EJ chair  -RW    In Bed fowlers;call light within reach;encouraged to call for assist;exit alarm on  -RW notified nsg;supine;call light within reach;encouraged to call for assist;exit alarm on  -EJ     In Chair   reclined;call light within reach;encouraged to call for assist;notified nsg  -RW    Recorded by [RW] Radha Bonds PTA [EJ] Shira Martínez, PT [RW] Radha Bonds PTA      03/05/18 1154 03/04/18 1622       Rehab Assessment/Intervention    Discipline occupational therapist  -SG physical therapist  -SV     Document Type therapy note (daily note)  -SG therapy note (daily note)  -SV     Subjective Information agree to therapy  -SG agree to therapy;complains of;fatigue;pain  -SV     Patient Effort, Rehab Treatment good  -SG      Precautions/Limitations fall precautions  -SG      Recorded by [SG] MAHOGANY Conroy [SV] Yelena Beltran, PT     Vital Signs    Pre Systolic BP Rehab  116  -SV     Pre Treatment Diastolic BP  66  -SV     Pretreatment Heart Rate (beats/min)  99  -SV     Pre SpO2 (%)  97  -SV     O2 Delivery Pre Treatment  supplemental O2  -SV     Intra SpO2 (%)  95  -SV     O2 Delivery Intra Treatment  supplemental O2  -SV     Post SpO2 (%)  95  -SV     O2 Delivery Post Treatment  supplemental O2  -SV     Pre Patient Position  Supine  -SV     Intra Patient Position  Standing  -SV     Post Patient Position  Side Lying  -SV     Recorded by  [SV] Yelena Beltran, PT     Pain Assessment    Pain Assessment 0-10  -SG      Pain Score 2  -SG      Pain Type  Surgical pain  -SG      Pain Location Abdomen  -SG      Recorded by [SG] Nini Layne OTR      Cognitive Assessment/Intervention    Current Cognitive/Communication Assessment functional  -SG functional  -SV     Orientation Status oriented x 4  -SG oriented x 4  -SV     Follows Commands/Answers Questions 100% of the time;able to follow single-step instructions  -% of the time;able to follow multi-step instructions  -SV     Recorded by [SG] Nini Layne OTR [SV] Yelena Beltran, PT     ROM (Range of Motion)    General ROM Detail [place]. shoulder 7/8 AROM  -SG      Recorded by [SG] Nini Layne OTR      Bed Mobility, Assessment/Treatment    Bed Mob, Supine to Sit, [place]  moderate assist (50% patient effort);minimum assist (75% patient effort);2 person assist required  -SV     Bed Mob, Sit to Supine, [place]  moderate assist (50% patient effort);2 person assist required  -SV     Recorded by  [SV] Yelena Beltran, PT     Transfer Assessment/Treatment    Transfers, Sit-Stand [place]  moderate assist (50% patient effort);minimum assist (75% patient effort);2 person assist required  -SV     Transfers, Stand-Sit [place]  moderate assist (50% patient effort);minimum assist (75% patient effort);2 person assist required  -SV     Transfer, Comment  pt stood with rwx 15 seconds   -SV     Recorded by  [SV] Yelena Beltran, PT     Balance Skills Training    Sitting-Level of Assistance  Close supervision;Contact guard  -SV     Sitting # of Minutes  15  -SV     Standing-Level of Assistance  Contact guard;x2  -SV     Static Standing Balance Support  Right upper extremity supported;Left upper extremity supported  -SV     Recorded by  [SV] Yelena Beltran, PT     Therapy Exercises    Bilateral Lower Extremities  AROM:;10 reps;ankle pumps/circles;LAQ;SAQ;quad sets  -SV     Bilateral Upper Extremity AROM:;elbow flexion/extension;hand pumps;pronation/supination;shoulder extension/flexion   pt states  feels UE 's close to baseline function  -SG      Recorded by [SG] Nini Layne, OTR [SV] Yelena Beltran, PT     Sensory Assessment/Intervention    Light Touch LUE;RUE  -SG      LUE Light Touch WNL  -SG      RUE Light Touch WNL  -SG      Recorded by [SG] Nini Layne, OTR      Positioning and Restraints    Pre-Treatment Position in bed  -SG in bed  -SV     Post Treatment Position bed  -SG bed  -SV     In Bed call light within reach;encouraged to call for assist;exit alarm on  -SG supine;call light within reach;encouraged to call for assist;exit alarm on;with family/caregiver;side lying left  -SV     Recorded by [SG] Nini Layne, OTR [SV] Yelena Beltran, PT       User Key  (r) = Recorded By, (t) = Taken By, (c) = Cosigned By    Initials Name Effective Dates     Nini Layne, OTR 04/13/15 - 03/06/18    EJ Shira Martínez, PT 04/21/17 -     SV Yelena Beltran, PT 01/17/16 - 03/06/18    RW Radha Bonds, PTA 04/06/16 - 03/06/18     Radha Bonds, PTA 03/07/18 -                 IP PT Goals       02/28/18 1536          Bed Mobility PT LTG    Bed Mobility PT LTG, Date Goal Reviewed 02/28/18  -AA      Bed Mobility PT LTG, Outcome goal ongoing  -AA      Bed Mobility PT LTG, Reason Goal Not Met progress slower than expected  -AA      Transfer Training PT LTG    Transfer Training PT  LTG, Date Goal Reviewed 02/28/18  -AA      Transfer Training PT LTG, Outcome goal ongoing  -AA      Transfer Training PT LTG, Reason Goal Not Met progress slower than expected  -AA      Gait Training PT LTG    Gait Training Goal PT LTG, Date Goal Reviewed 02/28/18  -AA      Gait Training Goal PT LTG, Outcome goal ongoing  -AA      Gait Training Goal PT LTG, Reason Goal Not Met progress slower than expected  -AA        User Key  (r) = Recorded By, (t) = Taken By, (c) = Cosigned By    Initials Name Provider Type    JOHN Laguerre PT Physical Therapist          Physical Therapy Education     Title: PT OT SLP Therapies  (Active)     Topic: Physical Therapy (Done)     Point: Mobility training (Done)    Learning Progress Summary    Learner Readiness Method Response Comment Documented by Status   Patient Acceptance E,TB,D VU,NR  RW 03/07/18 1355 Done    Acceptance E VU,NR  EJ 03/06/18 1610 Done    Acceptance E,TB,D VU,NR  RW 03/05/18 1347 Done    Acceptance E,TB,D NR,VU  SV 03/04/18 1706 Done    Acceptance E,TB VU,DU  CW 03/02/18 1351 Done    Acceptance E VU education on importance of mobility; education on BLE strengthening and core strengthening for improved mobility; education on transfer training AA 03/01/18 0921 Done    Acceptance E VU  AA 02/28/18 1535 Done    Acceptance E,TB DU,VU  CW 02/27/18 0908 Done    Acceptance E,D VU,NR  EJ 02/25/18 1543 Done    Acceptance E,TB,D VU,NR   02/23/18 1558 Done    Acceptance E VU,NR  AA 02/17/18 1156 Done    Acceptance E,TB,D VU,NR  SH 02/12/18 0902 Done    Acceptance E,TB,D NR  RW 02/11/18 1410 Active    Acceptance E,D NR  PC 02/09/18 1725 Active    Acceptance E NR  EM 02/08/18 1319 Active   Family Acceptance E VU education on importance of mobility; education on BLE strengthening and core strengthening for improved mobility; education on transfer training AA 03/01/18 0921 Done    Acceptance E,D VU,NR  EJ 02/25/18 1543 Done    Acceptance E,TB,D VU,NR  SH 02/12/18 0902 Done               Point: Home exercise program (Done)    Learning Progress Summary    Learner Readiness Method Response Comment Documented by Status   Patient Acceptance E,TB,D VU,NR  RW 03/07/18 1355 Done    Acceptance E,TB,D NR,VU  SV 03/04/18 1706 Done    Acceptance E,TB VU,DU  CW 03/02/18 1351 Done    Acceptance E VU education on importance of mobility; education on BLE strengthening and core strengthening for improved mobility; education on transfer training AA 03/01/18 0921 Done    Acceptance E VU  AA 02/28/18 1535 Done    Acceptance E,TB DU,VU  CW 02/27/18 0908 Done    Acceptance E,D VU,NR  EJ 02/25/18 1543 Done     Acceptance E,D NR  PC 02/21/18 1419 Active    Acceptance E,D NR  PC 02/15/18 1453 Active    Acceptance E,TB,D VU,NR  SH 02/12/18 0902 Done    Acceptance E,TB,D NR  RW 02/11/18 1410 Active    Acceptance E,D NR  PC 02/09/18 1725 Active    Acceptance E NR  EM 02/08/18 1319 Active   Family Acceptance E VU education on importance of mobility; education on BLE strengthening and core strengthening for improved mobility; education on transfer training AA 03/01/18 0921 Done    Acceptance E,D VU,NR  EJ 02/25/18 1543 Done    Acceptance E,TB,D VU,NR  SH 02/12/18 0902 Done               Point: Body mechanics (Done)    Learning Progress Summary    Learner Readiness Method Response Comment Documented by Status   Patient Acceptance E,TB,D VU,NR  RW 03/07/18 1355 Done    Acceptance E,TB,D VU,NR  RW 03/05/18 1347 Done    Acceptance E,TB,D NR,VU  SV 03/04/18 1706 Done    Acceptance E,TB VU,DU  CW 03/02/18 1351 Done    Acceptance E VU education on importance of mobility; education on BLE strengthening and core strengthening for improved mobility; education on transfer training AA 03/01/18 0921 Done    Acceptance E VU  AA 02/28/18 1535 Done    Acceptance E,TB DU,VU  CW 02/27/18 0908 Done    Acceptance E,D VU,NR  EJ 02/25/18 1543 Done    Acceptance E,TB,D VU,NR   02/23/18 1558 Done    Acceptance E,TB,D VU,NR  SH 02/12/18 0902 Done    Acceptance E,TB,D NR  RW 02/11/18 1410 Active    Acceptance E,D NR  PC 02/09/18 1725 Active   Family Acceptance E VU education on importance of mobility; education on BLE strengthening and core strengthening for improved mobility; education on transfer training AA 03/01/18 0921 Done    Acceptance E,D VU,NR  EJ 02/25/18 1543 Done    Acceptance E,TB,D VU,NR  SH 02/12/18 0902 Done               Point: Precautions (Done)    Learning Progress Summary    Learner Readiness Method Response Comment Documented by Status   Patient Acceptance E,TB,D VU,NR  RW 03/07/18 1355 Done    Acceptance E,TB,D VU,NR  RW 03/05/18  1347 Done    Acceptance E,TB,D NR,VU  SV 03/04/18 1706 Done    Acceptance E,TB VU,DU   03/02/18 1351 Done    Acceptance E VU education on importance of mobility; education on BLE strengthening and core strengthening for improved mobility; education on transfer training AA 03/01/18 0921 Done    Acceptance E VU  AA 02/28/18 1535 Done    Acceptance E,TB DU,VU   02/27/18 0908 Done    Acceptance E,D VU,NR   02/25/18 1543 Done    Acceptance E,TB,D VU,NR   02/23/18 1558 Done    Acceptance E VU,NR  AA 02/17/18 1156 Done    Acceptance E,TB,D VU,NR   02/12/18 0902 Done    Acceptance E,TB,D NR   02/11/18 1410 Active    Acceptance E,D NR   02/09/18 1725 Active   Family Acceptance E VU education on importance of mobility; education on BLE strengthening and core strengthening for improved mobility; education on transfer training  03/01/18 0921 Done    Acceptance E,D VU,NR   02/25/18 1543 Done    Acceptance E,TB,D VU,NR   02/12/18 0902 Done                      User Key     Initials Effective Dates Name Provider Type Discipline     12/01/15 -  Isabel Manning, PT Physical Therapist PT    PC 12/01/15 -  Joycelyn Fisher, PT Physical Therapist PT    EM 12/01/15 -  Sade Vegrara, PT Physical Therapist PT    EJ 04/21/17 -  Shira Martínez, PT Physical Therapist PT     01/17/16 - 03/06/18 Yelena Beltran, PT Physical Therapist PT    RW 04/06/16 - 03/06/18 Radha Bonds, PTA Physical Therapy Assistant PT     03/07/18 -  Radha Bonds, PTA Physical Therapy Assistant PT     12/13/16 - 03/06/18 Leandro Posada, PTA Physical Therapy Assistant PT     09/05/17 -  Manju Laguerre, PT Physical Therapist PT     01/08/18 -  Sophy Catalan, PT Student PT Student PT                    PT Recommendation and Plan  Anticipated Discharge Disposition: home with home health  Planned Therapy Interventions: bed mobility training, gait training, home exercise program, transfer training  PT Frequency:  daily  Plan of Care Review  Plan Of Care Reviewed With: patient  Outcome Summary/Follow up Plan: Pt able to stand w/ PT and take some pivoting steps to and from bsc. Fatigue and nausea limiting further activity. Encouraged pt to be getting up w/ nsg to and from commode as well.           Outcome Measures       03/07/18 1340 03/06/18 1600 03/05/18 1330    How much help from another person do you currently need...    Turning from your back to your side while in flat bed without using bedrails? 3  -RW 3  -EJ 3  -RW    Moving from lying on back to sitting on the side of a flat bed without bedrails? 3  -RW 2  -EJ 3  -RW    Moving to and from a bed to a chair (including a wheelchair)? 3  -RW 2  -EJ 2  -RW    Standing up from a chair using your arms (e.g., wheelchair, bedside chair)? 3  -RW 2  -EJ 2  -RW    Climbing 3-5 steps with a railing? 1  -RW 1  -EJ 1  -RW    To walk in hospital room? 2  -RW 1  -EJ 2  -RW    AM-PAC 6 Clicks Score 15  -RW 11  -EJ 13  -RW    Functional Assessment    Outcome Measure Options AM-PAC 6 Clicks Basic Mobility (PT)  -RW AM-PAC 6 Clicks Basic Mobility (PT)  -EJ AM-PAC 6 Clicks Basic Mobility (PT)  -RW      03/05/18 1210 03/04/18 1700       How much help from another person do you currently need...    Turning from your back to your side while in flat bed without using bedrails?  3  -SV     Moving from lying on back to sitting on the side of a flat bed without bedrails?  2  -SV     Moving to and from a bed to a chair (including a wheelchair)?  2  -SV     Standing up from a chair using your arms (e.g., wheelchair, bedside chair)?  2  -SV     Climbing 3-5 steps with a railing?  1  -SV     To walk in hospital room?  1  -SV     AM-PAC 6 Clicks Score  11  -SV     How much help from another is currently needed...    Putting on and taking off regular lower body clothing? 1  -SG      Bathing (including washing, rinsing, and drying) 2  -SG      Toileting (which includes using toilet bed pan or urinal) 1   -SG      Putting on and taking off regular upper body clothing 2  -SG      Taking care of personal grooming (such as brushing teeth) 3  -SG      Eating meals 3  -SG      Score 12  -SG      Functional Assessment    Outcome Measure Options  AM-PAC 6 Clicks Basic Mobility (PT)  -       User Key  (r) = Recorded By, (t) = Taken By, (c) = Cosigned By    Initials Name Provider Type    SG Nini Layne, OTR Occupational Therapist    EJ Shira Martínez, PT Physical Therapist    SV Yelena Beltran, PT Physical Therapist    RW Radha Bonds PTA Physical Therapy Assistant     Radha Bonds PTA Physical Therapy Assistant           Time Calculation:         PT Charges       03/07/18 1336          Time Calculation    Start Time 1332  -RW      Stop Time 1355  -RW      Time Calculation (min) 23 min  -RW      PT Received On 03/07/18  -RW      PT - Next Appointment 03/08/18  -        User Key  (r) = Recorded By, (t) = Taken By, (c) = Cosigned By    Initials Name Provider Type     Radha Bonds PTA Physical Therapy Assistant          Therapy Charges for Today     Code Description Service Date Service Provider Modifiers Qty    05666985459 HC PT THER PROC EA 15 MIN 3/7/2018 Radha Bonds PTA GP 2    22295733347 HC PT THER SUPP EA 15 MIN 3/7/2018 Radha Bonds PTA GP 2          PT G-Codes  Outcome Measure Options: AM-PAC 6 Clicks Basic Mobility (PT)    Radha Bonds PTA  3/7/2018

## 2018-03-07 NOTE — PLAN OF CARE
Problem: Patient Care Overview (Adult)  Goal: Plan of Care Review  Outcome: Ongoing (interventions implemented as appropriate)   03/07/18 2712   Coping/Psychosocial Response Interventions   Plan Of Care Reviewed With patient   Patient Care Overview   Progress improving   Outcome Evaluation   Outcome Summary/Follow up Plan Pts VSS, premdicated for dressing change per surgeon and premdicated prior to placement of wound vac. Pt has no complaints.        Problem: Fall Risk (Adult)  Goal: Absence of Falls  Outcome: Ongoing (interventions implemented as appropriate)      Problem: Pain, Acute (Adult)  Goal: Acceptable Pain Control/Comfort Level  Outcome: Ongoing (interventions implemented as appropriate)

## 2018-03-07 NOTE — PROGRESS NOTES
Continued Stay Note  [facility]     Patient Name: Mariya Campos  MRN: [id number]  Today's Date: 3/7/2018    Admit Date: 2/1/2018          Discharge Plan       03/07/18 1545    Case Management/Social Work Plan    Plan [facility] pending Juan precert- start precert when MD states    Patient/Family In Agreement With Plan yes    Additional Comments Spoke with patient and  via outbound call and explained pt is accepted at [facility] pending Juan precert. Dr. Guzman hopes dc monday but will notify [facility]. [facility] spoke with [facility] and states precert should only take 1-2 days and she will continue to follow. Packet in [facility] office, Brigid PATIÑO/[facility]P              Discharge Codes     None            Katelin Gar, RN

## 2018-03-08 PROCEDURE — 63510000001 EPOETIN ALFA PER 1000 UNITS: Performed by: INTERNAL MEDICINE

## 2018-03-08 PROCEDURE — 25010000002 HEPARIN (PORCINE) PER 1000 UNITS: Performed by: INTERNAL MEDICINE

## 2018-03-08 PROCEDURE — 99024 POSTOP FOLLOW-UP VISIT: CPT | Performed by: SURGERY

## 2018-03-08 PROCEDURE — 97110 THERAPEUTIC EXERCISES: CPT | Performed by: PHYSICAL THERAPIST

## 2018-03-08 PROCEDURE — 25010000002 ONDANSETRON PER 1 MG: Performed by: SURGERY

## 2018-03-08 RX ORDER — BISACODYL 10 MG
10 SUPPOSITORY, RECTAL RECTAL ONCE
Status: COMPLETED | OUTPATIENT
Start: 2018-03-08 | End: 2018-03-08

## 2018-03-08 RX ADMIN — BISACODYL 10 MG: 10 SUPPOSITORY RECTAL at 09:02

## 2018-03-08 RX ADMIN — METOPROLOL TARTRATE 12.5 MG: 25 TABLET ORAL at 21:31

## 2018-03-08 RX ADMIN — Medication 10 ML: at 23:38

## 2018-03-08 RX ADMIN — HEPARIN SODIUM 5000 UNITS: 5000 INJECTION, SOLUTION INTRAVENOUS; SUBCUTANEOUS at 21:31

## 2018-03-08 RX ADMIN — MIRTAZAPINE 15 MG: 15 TABLET, FILM COATED ORAL at 21:31

## 2018-03-08 RX ADMIN — PANTOPRAZOLE SODIUM 40 MG: 40 TABLET, DELAYED RELEASE ORAL at 05:04

## 2018-03-08 RX ADMIN — ONDANSETRON 4 MG: 2 INJECTION INTRAMUSCULAR; INTRAVENOUS at 11:50

## 2018-03-08 RX ADMIN — METOPROLOL TARTRATE 12.5 MG: 25 TABLET ORAL at 09:02

## 2018-03-08 RX ADMIN — ERYTHROPOIETIN 20000 UNITS: 20000 INJECTION, SOLUTION INTRAVENOUS; SUBCUTANEOUS at 15:14

## 2018-03-08 RX ADMIN — HEPARIN SODIUM 5000 UNITS: 5000 INJECTION, SOLUTION INTRAVENOUS; SUBCUTANEOUS at 05:05

## 2018-03-08 RX ADMIN — HEPARIN SODIUM 5000 UNITS: 5000 INJECTION, SOLUTION INTRAVENOUS; SUBCUTANEOUS at 15:14

## 2018-03-08 RX ADMIN — OXYCODONE HYDROCHLORIDE AND ACETAMINOPHEN 1 TABLET: 5; 325 TABLET ORAL at 11:49

## 2018-03-08 NOTE — THERAPY RE-EVALUATION
Acute Care - Physical Therapy Re-Evaluation  [facility]     Patient Name: Mariya Campos  : 1960  MRN: [id number]  Today's Date: 3/8/2018   Onset of Illness/Injury or Date of Surgery Date: 18            Admit Date: 2018     Visit Dx:    ICD-10-CM ICD-9-CM   1. Incisional hernia, without obstruction or gangrene K43.2 553.21   2. Recurrent ventral hernia K43.2 553.21   3. Generalized weakness R53.1 780.79   4. CRF (chronic renal failure), stage 3 (moderate) N18.3 585.3   5. Wound infection T14.8XXA 958.3    L08.9    6. ARF (acute renal failure) with tubular necrosis N17.0 584.5     Patient Active Problem List   Diagnosis   • Atopic rhinitis   • Fatigue   • Abnormal mammogram   • Adiposity   • Abnormal ECG   • CRF (chronic renal failure), stage 3 (moderate)   • Anemia   • Recurrent UTI   • Osteopenia   • Essential hypertension   • Incisional hernia   • Moderate persistent asthma without complication   • Incisional hernia, without obstruction or gangrene   • Recurrent ventral hernia   • Bacteremia, escherichia coli   • Wound infection   • Complications, dialysis, catheter, mechanical, initial encounter     Past Medical History:   Diagnosis Date   • Abdominal pain    • Allergic rhinitis    • Anemia     ON IRON QOD   • Asthma    • Chronic bronchitis    • Chronic kidney disease     STAGE 3   SINCE  WITH COLON SURGERY   • Colon cancer     CHEMO 6 MONTHS   • Hiatal hernia    • Hypertension    • Incisional hernia    • Overweight    • Peripheral neuropathy     FROM CHEMO TX'S   • Rosacea    • Visit for screening mammogram      Past Surgical History:   Procedure Laterality Date   • BREAST BIOPSY     • COLECTOMY PARTIAL / TOTAL      Partial Colectomy     • COLON SURGERY      XS 2    COLON CANCER AND 2015   • COLONOSCOPY      Complete Colonoscopy   • DIAGNOSTIC LAPAROSCOPY N/A 2018    Procedure: EXPLORATORY LAPAROTOMY WITH SMALL BOWEL ANASTAMOSIS, LYSIS OF ADHESIONS, REMOVAL OF MESH;   Surgeon: Tri Guzman MD;  Location: [facility] MAIN OR;  Service:    • INCISION AND DRAINAGE TRUNK N/A 2/13/2018    Procedure: WOUND WASHOUT ABDOMEN;  Surgeon: Tri Guzman MD;  Location: [facility] MAIN OR;  Service:    • INCISION AND DRAINAGE TRUNK N/A 2/16/2018    Procedure: TRUNK DEBRIDEMENT abdominal wound;  Surgeon: Tri Guzman MD;  Location: [facility] MAIN OR;  Service:    • INCISION AND DRAINAGE TRUNK N/A 2/27/2018    Procedure: INCISION AND DRAINAGE WOUND abdomen;  Surgeon: Tri Guzman MD;  Location: [facility] MAIN OR;  Service:    • INSERTION HEMODIALYSIS CATHETER N/A 2/19/2018    Procedure: LEFT QUAD CENTRAL LINE INSERTION AND RIGHT TUNNELED HEMODIALYSIS CATHETER INSERTION;  Surgeon: Meir Guillen MD;  Location: [facility] HYBRID OR 18/19;  Service:    • JOINT MANIPULATION Left 4/18/2016    Procedure: MANIPULATION OF LT SHOULDER;  Surgeon: Lidia Ugalde MD;  Location: [facility] OR OSC;  Service:    • VENTRAL HERNIA REPAIR N/A 3/15/2016    Procedure: LAPAROSCOPIC INSIONAL HERNIA REPAIR W/ MESH DAVINCI ROBOT;  Surgeon: Tri Guzman MD;  Location: [facility] MAIN OR;  Service:    • VENTRAL HERNIA REPAIR N/A 2/1/2018    Procedure: VENTRAL HERNIA REPAIR LAPAROSCOPIC WITH DAVINCI ROBOT;  Surgeon: Tri Guzman MD;  Location: [facility] MAIN OR;  Service:           PT ASSESSMENT (last 72 hours)      PT Evaluation       03/08/18 1400 03/07/18 7607    Rehab Evaluation    Document Type therapy note (daily note);re-evaluation  -KH     Subjective Information agree to therapy  -KH     Patient Effort, Rehab Treatment good  -KH     Pain Assessment    Pain Assessment No/denies pain  -KH     ROM (Range of Motion)    General ROM Detail BLE limited 25%  -KH     MMT (Manual Muscle Testing)    General MMT Assessment Detail BLE functionally 3-/5  -KH     Muscle Tone Assessment    Muscle Tone Assessment  Bilateral Upper Extremities;Bilateral Lower Extremities  -JW    Bilateral Upper Extremities Muscle  Tone Assessment  moderately decreased tone  -    Bilateral Lower Extremities Muscle Tone Assessment  severely decreased tone  -    Bed Mobility, Assessment/Treatment    Bed Mobility, Assistive Device bed rails;head of bed elevated  -     Bed Mobility, Roll Right, [place] contact guard assist  -     Bed Mob, Supine to Sit, [place] conditional independence  -     Transfer Assessment/Treatment    Transfers, Bed-Chair [place] minimum assist (75% patient effort)  -     Transfers, Sit-Stand [place] minimum assist (75% patient effort);moderate assist (50% patient effort)  -     Transfers, Stand-Sit [place] minimum assist (75% patient effort)  -     Transfer, Safety Issues weight-shifting ability decreased  -     Transfer, Impairments strength decreased  -     Transfer, Comment min A on first attempt from bed, mod A for subsequent stand from bed and BS. Stood twice from bed then pivoted to BSC. Stood 3 times from BSC then pivoted back to bed.    seated rest breaks after all stands  -     Gait Assessment/Treatment    Gait, [place] Level not tested  -     Balance Skills Training    Sitting-Level of Assistance Distant supervision  -     Sitting-Balance Support Feet supported  -     Sitting-Balance Activities Trunk control activities  -     Sitting # of Minutes 12  -     Standing-Level of Assistance Contact guard  -     Static Standing Balance Support assistive device  -     Standing Balance # of Minutes 30-60 secondsx 4, [place] [facility] brief  -     Therapy Exercises    Bilateral Lower Extremities AROM:;10 reps;ankle pumps/circles;LAQ  -     Sensory Assessment/Intervention    Light Touch  LUE;RUE  -JW    LUE Light Touch  WNL  -    RUE Light Touch  WNL  -    Positioning and Restraints    Pre-Treatment Position in bed  -     Post Treatment Position bed  -     In Bed side lying left;call light within reach;encouraged to call for  assist;notified nsg   NA in room assisting with toileting/cleanup  -       03/07/18 2120 03/07/18 1458    Rehab Evaluation    Document Type  therapy note (daily note)  -SG    Subjective Information  agree to therapy  -SG    Patient Effort, Rehab Treatment  good  -SG    Pain Assessment    Pain Assessment  No/denies pain  -SG    Muscle Tone Assessment    Muscle Tone Assessment Bilateral Upper Extremities;Bilateral Lower Extremities  -JW     Bilateral Upper Extremities Muscle Tone Assessment moderately decreased tone  -JW     Bilateral Lower Extremities Muscle Tone Assessment severely decreased tone  -JW     Therapy Exercises    Bilateral Upper Extremity  AROM:;elbow flexion/extension;hand pumps;pronation/supination;shoulder extension/flexion   pt complains of fatigue.   -SG    Sensory Assessment/Intervention    Light Touch LUE;RUE  -JW     LUE Light Touch WNL  -JW     RUE Light Touch WNL  -JW     Positioning and Restraints    Pre-Treatment Position  in bed  -SG    Post Treatment Position  bed  -SG    In Bed  call light within reach;encouraged to call for assist;exit alarm on;with PT  -SG      03/07/18 1336 03/07/18 0019    Rehab Evaluation    Document Type therapy note (daily note)  -RW     Subjective Information agree to therapy  -RW     Patient Effort, Rehab Treatment good  -RW     Symptoms Noted During/After Treatment fatigue;other (see comments)   nausea  -RW     General Information    Precautions/Limitations fall precautions;oxygen therapy device and L/min   1l O2  -RW     Vital Signs    Pre SpO2 (%) 95  -RW     O2 Delivery Pre Treatment supplemental O2   1L  -RW     Post SpO2 (%) 96  -RW     O2 Delivery Post Treatment supplemental O2   1L  -RW     Pain Assessment    Pain Assessment 0-10  -RW     Pain Score 4  -RW     Pain Type Surgical pain  -RW     Pain Location Abdomen  -RW     Pain Intervention(s) Medication (See MAR);Repositioned;Ambulation/increased activity  -RW     Cognitive Assessment/Intervention     Current Cognitive/Communication Assessment functional  -RW     Orientation Status oriented x 4  -RW     Follows Commands/Answers Questions 100% of the time  -RW     Personal Safety WNL/WFL  -RW     Personal Safety Interventions fall prevention program maintained;gait belt;nonskid shoes/slippers when out of bed  -RW     Muscle Tone Assessment    Muscle Tone Assessment  Bilateral Upper Extremities;Bilateral Lower Extremities  -JW    Bilateral Upper Extremities Muscle Tone Assessment  moderately decreased tone  -JW    Bilateral Lower Extremities Muscle Tone Assessment  severely decreased tone  -JW    Bed Mobility, Assessment/Treatment    Bed Mobility, Assistive Device bed rails;head of bed elevated;draw sheet  -RW     Bed Mobility, Roll Left, [place] contact guard assist;verbal cues required  -RW     Bed Mob, Sidelying to Sit, [place] minimum assist (75% patient effort);verbal cues required;nonverbal cues required (demo/gesture)  -RW     Transfer Assessment/Treatment    Transfers, Sit-Stand [place] minimum assist (75% patient effort);2 person assist required;verbal cues required;nonverbal cues required (demo/gesture)  -RW     Transfers, Stand-Sit [place] minimum assist (75% patient effort);2 person assist required;verbal cues required;nonverbal cues required (demo/gesture)  -RW     Transfers, Sit-Stand-Sit, Assist Device standard walker  -RW     Toilet Transfer, [place] minimum assist (75% patient effort);2 person assist required  -RW     Toilet Transfer, Assistive Device bedside commode without drop arms;standard walker  -RW     Transfer, Impairments strength decreased  -RW     Transfer, Comment stood x2;able to take a few pivoting from bed to bsc  -RW     Gait Assessment/Treatment    Gait, [place] Level contact guard assist;minimum assist (75% patient effort);verbal cues required  -RW     Gait, Assistive Device standard walker  -RW     Gait, Distance (Feet) --   Pt able to take 5  sidesteps up towards HOB  -RW     Gait, Gait Deviations forward flexed posture;bilateral:;jovany decreased;step length decreased;stride length decreased  -RW     Gait, Safety Issues step length decreased;supplemental O2  -RW     Gait, Impairments strength decreased  -RW     Balance Skills Training    Sitting-Level of Assistance Close supervision  -RW     Sitting-Balance Support Feet supported  -RW     Standing-Level of Assistance Contact guard  -RW     Static Standing Balance Support assistive device  -RW     Therapy Exercises    Bilateral Lower Extremities AROM:;10 reps;supine;ankle pumps/circles;quad sets;sitting;hip flexion;LAQ  -RW     Sensory Assessment/Intervention    Light Touch  LUE;RUE  -JW    LUE Light Touch  WNL  -JW    RUE Light Touch  WNL  -JW    Positioning and Restraints    Pre-Treatment Position in bed  -RW     Post Treatment Position bed  -RW     In Bed fowlers;call light within reach;encouraged to call for assist;exit alarm on  -RW       03/06/18 2127 03/06/18 1550    Rehab Evaluation    Document Type  therapy note (daily note)  -EJ    Subjective Information  agree to therapy  -EJ    Patient Effort, Rehab Treatment  adequate  -EJ    Symptoms Noted During/After Treatment  fatigue   discomfort  -EJ    General Information    Precautions/Limitations  fall precautions   abdominal wound  -EJ    Pain Assessment    Pain Assessment  --   pt reports overall discomfort  -EJ    Muscle Tone Assessment    Muscle Tone Assessment Bilateral Upper Extremities;Bilateral Lower Extremities  -JW     Bilateral Upper Extremities Muscle Tone Assessment moderately decreased tone  -JW     Bilateral Lower Extremities Muscle Tone Assessment severely decreased tone  -JW     Bed Mobility, Assessment/Treatment    Bed Mobility, Assistive Device  draw sheet  -EJ    Bed Mobility, Scoot/Bridge, [place]  maximum assist (25% patient effort);2 person assist required  -EJ    Bed Mob, Supine to Sit, [place]  verbal cues  required;moderate assist (50% patient effort);2 person assist required  -EJ    Bed Mob, Sit to Supine, [place]  moderate assist (50% patient effort);2 person assist required  -EJ    Bed Mobility, Safety Issues  impaired trunk control for bed mobility  -EJ    Bed Mobility, Impairments  strength decreased  -EJ    Transfer Assessment/Treatment    Transfer, Comment  pt declined standing this date  -EJ    Gait Assessment/Treatment    Gait, [place] Level  not tested  -EJ    Balance Skills Training    Sitting-Level of Assistance  Close supervision  -EJ    Sitting-Balance Support  Feet supported;Left upper extremity supported;Right upper extremity supported  -EJ    Sitting-Balance Activities  Trunk control activities  -EJ    Sitting # of Minutes  7  -EJ    Sensory Assessment/Intervention    Light Touch LUE;RUE  -JW     LUE Light Touch WNL  -JW     RUE Light Touch WNL  -JW     Positioning and Restraints    Pre-Treatment Position  in bed  -EJ    Post Treatment Position  bed  -EJ    In Bed  notified nsg;supine;call light within reach;encouraged to call for assist;exit alarm on  -EJ      03/06/18 0215 03/05/18 2020    Muscle Tone Assessment    Muscle Tone Assessment Bilateral Upper Extremities;Bilateral Lower Extremities  -TB Bilateral Upper Extremities;Bilateral Lower Extremities  -TB    Bilateral Upper Extremities Muscle Tone Assessment moderately decreased tone  -TB moderately decreased tone  -TB    Bilateral Lower Extremities Muscle Tone Assessment severely decreased tone  -TB severely decreased tone  -TB      User Key  (r) = Recorded By, (t) = Taken By, (c) = Cosigned By    Initials Name Provider Type    ЕЛЕНА Tomas, PT Physical Therapist    SG Nini Layne, OTR Occupational Therapist    TB Eloy Cedillo, RN Registered Nurse    REJI Golden, RN Registered Nurse    EJ Shira Martínez, PT Physical Therapist    RW Radha Bonds, PTA Physical Therapy Assistant          Physical Therapy  Education     Title: PT OT SLP Therapies (Active)     Topic: Physical Therapy (Done)     Point: Mobility training (Done)    Learning Progress Summary    Learner Readiness Method Response Comment Documented by Status   Patient Acceptance E VU,NR  KH 03/08/18 1449 Done    Acceptance E,TB,D VU,NR  RW 03/07/18 1355 Done    Acceptance E VU,NR  EJ 03/06/18 1610 Done    Acceptance E,TB,D VU,NR  RW 03/05/18 1347 Done    Acceptance E,TB,D NR,VU  SV 03/04/18 1706 Done    Acceptance E,TB VU,DU  CW 03/02/18 1351 Done    Acceptance E VU education on importance of mobility; education on BLE strengthening and core strengthening for improved mobility; education on transfer training AA 03/01/18 0921 Done    Acceptance E VU  AA 02/28/18 1535 Done    Acceptance E,TB DU,VU  CW 02/27/18 0908 Done    Acceptance E,D VU,NR  EJ 02/25/18 1543 Done    Acceptance E,TB,D VU,NR  CH 02/23/18 1558 Done    Acceptance E VU,NR  AA 02/17/18 1156 Done    Acceptance E,TB,D VU,NR  SH 02/12/18 0902 Done    Acceptance E,TB,D NR  RW 02/11/18 1410 Active    Acceptance E,D NR  PC 02/09/18 1725 Active    Acceptance E NR  EM 02/08/18 1319 Active   Family Acceptance E VU education on importance of mobility; education on BLE strengthening and core strengthening for improved mobility; education on transfer training AA 03/01/18 0921 Done    Acceptance E,D VU,NR  EJ 02/25/18 1543 Done    Acceptance E,TB,D VU,NR  SH 02/12/18 0902 Done               Point: Home exercise program (Done)    Learning Progress Summary    Learner Readiness Method Response Comment Documented by Status   Patient Acceptance E VU,NR  KH 03/08/18 1449 Done    Acceptance E,TB,D VU,NR  RW 03/07/18 1355 Done    Acceptance E,TB,D NR,VU  SV 03/04/18 1706 Done    Acceptance E,TB VU,DU  CW 03/02/18 1351 Done    Acceptance E VU education on importance of mobility; education on BLE strengthening and core strengthening for improved mobility; education on transfer training AA 03/01/18 0921 Done     Acceptance E VU  AA 02/28/18 1535 Done    Acceptance E,TB DU,VU  CW 02/27/18 0908 Done    Acceptance E,D VU,NR  EJ 02/25/18 1543 Done    Acceptance E,D NR  PC 02/21/18 1419 Active    Acceptance E,D NR  PC 02/15/18 1453 Active    Acceptance E,TB,D VU,NR  SH 02/12/18 0902 Done    Acceptance E,TB,D NR  RW 02/11/18 1410 Active    Acceptance E,D NR  PC 02/09/18 1725 Active    Acceptance E NR  EM 02/08/18 1319 Active   Family Acceptance E VU education on importance of mobility; education on BLE strengthening and core strengthening for improved mobility; education on transfer training AA 03/01/18 0921 Done    Acceptance E,D VU,NR  EJ 02/25/18 1543 Done    Acceptance E,TB,D VU,NR  SH 02/12/18 0902 Done               Point: Body mechanics (Done)    Learning Progress Summary    Learner Readiness Method Response Comment Documented by Status   Patient Acceptance E VU,NR  KH 03/08/18 1449 Done    Acceptance E,TB,D VU,NR  RW 03/07/18 1355 Done    Acceptance E,TB,D VU,NR  RW 03/05/18 1347 Done    Acceptance E,TB,D NR,VU  SV 03/04/18 1706 Done    Acceptance E,TB VU,DU  CW 03/02/18 1351 Done    Acceptance E VU education on importance of mobility; education on BLE strengthening and core strengthening for improved mobility; education on transfer training AA 03/01/18 0921 Done    Acceptance E VU  AA 02/28/18 1535 Done    Acceptance E,TB DU,VU  CW 02/27/18 0908 Done    Acceptance E,D VU,NR  EJ 02/25/18 1543 Done    Acceptance E,TB,D VU,NR   02/23/18 1558 Done    Acceptance E,TB,D VU,NR  SH 02/12/18 0902 Done    Acceptance E,TB,D NR  RW 02/11/18 1410 Active    Acceptance E,D NR  PC 02/09/18 1725 Active   Family Acceptance E VU education on importance of mobility; education on BLE strengthening and core strengthening for improved mobility; education on transfer training AA 03/01/18 0921 Done    Acceptance E,D VU,NR  EJ 02/25/18 1543 Done    Acceptance E,TB,D VU,NR  SH 02/12/18 0902 Done               Point: Precautions (Done)     Learning Progress Summary    Learner Readiness Method Response Comment Documented by Status   Patient Acceptance E,TB,D VU,NR  RW 03/07/18 1355 Done    Acceptance E,TB,D VU,NR  RW 03/05/18 1347 Done    Acceptance E,TB,D NR,VU  SV 03/04/18 1706 Done    Acceptance E,TB VU,DU  CW 03/02/18 1351 Done    Acceptance E VU education on importance of mobility; education on BLE strengthening and core strengthening for improved mobility; education on transfer training AA 03/01/18 0921 Done    Acceptance E VU  AA 02/28/18 1535 Done    Acceptance E,TB DU,VU  CW 02/27/18 0908 Done    Acceptance E,D VU,NR  EJ 02/25/18 1543 Done    Acceptance E,TB,D VU,NR   02/23/18 1558 Done    Acceptance E VU,NR  AA 02/17/18 1156 Done    Acceptance E,TB,D VU,NR  SH 02/12/18 0902 Done    Acceptance E,TB,D NR  RW 02/11/18 1410 Active    Acceptance E,D NR  PC 02/09/18 1725 Active   Family Acceptance E VU education on importance of mobility; education on BLE strengthening and core strengthening for improved mobility; education on transfer training AA 03/01/18 0921 Done    Acceptance E,D VU,NR  EJ 02/25/18 1543 Done    Acceptance E,TB,D VU,NR   02/12/18 0902 Done                      User Key     Initials Effective Dates Name Provider Type Discipline     05/18/15 -  Katelin Tomas, PT Physical Therapist PT    CH 12/01/15 -  Isabel Manning, PT Physical Therapist PT    PC 12/01/15 -  Joycelyn Fisher, PT Physical Therapist PT    EM 12/01/15 -  Sade Vergara, PT Physical Therapist PT    EJ 04/21/17 -  Shira Martínez, PT Physical Therapist PT    SV 01/17/16 - 03/06/18 Yelena Beltran, PT Physical Therapist PT    RW 04/06/16 - 03/06/18 Radha Bonds, PTA Physical Therapy Assistant PT    RW 03/07/18 -  Radha Bonds, PTA Physical Therapy Assistant PT    CW 12/13/16 - 03/06/18 Leandro Posada, PTA Physical Therapy Assistant PT     09/05/17 -  Manju Laguerre, PT Physical Therapist PT     01/08/18 -  Sophy Catalan, PT  Student PT Student PT                PT Recommendation and Plan  Anticipated Discharge Disposition: skilled nursing facility  Planned Therapy Interventions: bed mobility training, gait training, home exercise program, transfer training  PT Frequency: daily  Plan of Care Review  Outcome Summary/Follow up Plan: Progressing well with bed mobility. Comes to standing with min-mod A. Fatigues quickly after initial stand. No ambulation today as pt was fatigued after pivoting to [facility] and multiple stands for clean up and to bev brief          IP PT Goals       03/08/18 1451 02/28/18 1536       Bed Mobility PT LTG    Bed Mobility PT LTG, Date Established 03/08/18  -      Bed Mobility PT LTG, Time to Achieve 1 wk  -KH      Bed Mobility PT LTG, Activity Type all bed mobility  -      Bed Mobility PT LTG, [place] Level conditional independence  -      Bed Mobility PT Goal  LTG, Assist Device bed rails  -      Bed Mobility PT LTG, Date Goal Reviewed  02/28/18  -     Bed Mobility PT LTG, Outcome  goal ongoing  -     Bed Mobility PT LTG, Reason Goal Not Met  progress slower than expected  -     Transfer Training PT LTG    Transfer Training PT LTG, Date Established 03/08/18  -      Transfer Training PT LTG, Time to Achieve 1 wk  -KH      Transfer Training PT LTG, Activity Type all transfers  -      Transfer Training PT LTG, [place] Level contact guard assist  -      Transfer Training PT LTG, Assist Device walker, rolling  -      Transfer Training PT  LTG, Date Goal Reviewed  02/28/18  -     Transfer Training PT LTG, Outcome  goal ongoing  -     Transfer Training PT LTG, Reason Goal Not Met  progress slower than expected  -AA     Gait Training PT LTG    Gait Training Goal PT LTG, Date Established 03/08/18  -      Gait Training Goal PT LTG, Time to Achieve 1 wk  -KH      Gait Training Goal PT LTG, [place] Level minimum assist (75% patient effort)  -      Gait Training Goal PT LTG, Assist  Device walker, rolling  -      Gait Training Goal PT LTG, Distance to Achieve 10 ft  -      Gait Training Goal PT LTG, Date Goal Reviewed  02/28/18  -     Gait Training Goal PT LTG, Outcome  goal ongoing  -     Gait Training Goal PT LTG, Reason Goal Not Met  progress slower than expected  -       User Key  (r) = Recorded By, (t) = Taken By, (c) = Cosigned By    Initials Name Provider Type     Katelin Tomas, PT Physical Therapist    JOHN Laguerre, PT Physical Therapist                Outcome Measures       03/07/18 1501 03/07/18 1340 03/06/18 1600    How much help from another person do you currently need...    Turning from your back to your side while in flat bed without using bedrails?  3  -RW 3  -EJ    Moving from lying on back to sitting on the side of a flat bed without bedrails?  3  -RW 2  -EJ    Moving to and from a bed to a chair (including a wheelchair)?  3  -RW 2  -EJ    Standing up from a chair using your arms (e.g., wheelchair, bedside chair)?  3  -RW 2  -EJ    Climbing 3-5 steps with a railing?  1  -RW 1  -EJ    To walk in hospital room?  2  -RW 1  -EJ    AM-PAC 6 Clicks Score  15  -RW 11  -EJ    How much help from another is currently needed...    Putting on and taking off regular lower body clothing? 1  -SG      Bathing (including washing, rinsing, and drying) 2  -SG      Toileting (which includes using toilet bed pan or urinal) 1  -SG      Putting on and taking off regular upper body clothing 2  -SG      Taking care of personal grooming (such as brushing teeth) 3  -SG      Eating meals 3  -SG      Score 12  -SG      Functional Assessment    Outcome Measure Options  AM-PAC 6 Clicks Basic Mobility (PT)  -RW AM-PAC 6 Clicks Basic Mobility (PT)  -EJ      User Key  (r) = Recorded By, (t) = Taken By, (c) = Cosigned By    Initials Name Provider Type    SCOTT Layne, OTR Occupational Therapist    EJ Shira Martínez, PT Physical Therapist    RW Radha Bonds, PTA Physical  Therapy Assistant           Time Calculation:         PT Charges       03/08/18 1451 03/08/18 1443 03/08/18 0927    Time Calculation    Start Time  1409  -     Stop Time  1440  -     Time Calculation (min)  31 min  -KH     PT Received On  03/08/18  -KH     PT - Next Appointment  03/09/18  -KH 03/08/18  -RW    PT Goal Re-Cert Due Date 03/15/18  -        User Key  (r) = Recorded By, (t) = Taken By, (c) = Cosigned By    Initials Name Provider Type    ЕЛЕНА Tomas, PT Physical Therapist    RW Radha Bonds, PTA Physical Therapy Assistant          Therapy Charges for Today     Code Description Service Date Service Provider Modifiers Qty    76582246109 HC PT THER PROC EA 15 MIN 3/8/2018 Katelin Tomas, PT GP 2          PT G-Codes  Outcome Measure Options: AM-PAC 6 Clicks Basic Mobility (PT)      Katelin Tomas, PT  3/8/2018

## 2018-03-08 NOTE — PROGRESS NOTES
Daily Progress Note    Subjective     Pt tolerating reg diet, pain controlled   Objective     Vital signs in last 24 hours:  Temp:  [98.2 °F (36.8 °C)-99.7 °F (37.6 °C)] 98.3 °F (36.8 °C)  Heart Rate:  [] 93  Resp:  [16-18] 16  BP: ()/(58-91) 98/58    Intake/Output last 3 shifts:  I/O last 3 completed shifts:  In: 1260 [P.O.:1260]  Out: -     Physical Exam:  Respiratory: CTA, normal inspiratory effort  CV: RRR, no JVD  Abd: Positive BS, soft, wound vac in place    Results from last 7 days  Lab Units 03/07/18  0317   WBC 10*3/mm3 8.66   HEMOGLOBIN g/dL 8.5*   HEMATOCRIT % 27.8*   PLATELETS 10*3/mm3 272       Results from last 7 days  Lab Units 03/07/18  0317   SODIUM mmol/L 139   POTASSIUM mmol/L 3.7   CHLORIDE mmol/L 100   CO2 mmol/L 27.0   BUN mg/dL 51*   CREATININE mg/dL 2.27*   GLUCOSE mg/dL 97   CALCIUM mg/dL 8.3*           Assessment/Plan   Anticipate JULIA on Monday  Wound improving, abx completed     Tri Guzman MD  General, Minimally Invasive and Endoscopic Surgery  [facility]

## 2018-03-08 NOTE — SIGNIFICANT NOTE
03/08/18 0927   Rehab Treatment   Discipline physical therapy assistant   Treatment Not Performed patient/family declined treatment  (Pt asked for PT to come back this pm. Currently using bedpan. HAROON Spivey present. )   Recommendation   PT - Next Appointment 03/08/18

## 2018-03-08 NOTE — PLAN OF CARE
Problem: Patient Care Overview (Adult)  Goal: Plan of Care Review   03/08/18 4358   Outcome Evaluation   Outcome Summary/Follow up Plan Progressing well with bed mobility. Comes to standing with min-mod A. Fatigues quickly after initial stand. No ambulation today as pt was fatigued after pivoting to BSC and multiple stands for clean up and to bev brief

## 2018-03-08 NOTE — PLAN OF CARE
Problem: Patient Care Overview (Adult)  Goal: Plan of Care Review  Outcome: Ongoing (interventions implemented as appropriate)   03/08/18 4315   Coping/Psychosocial Response Interventions   Plan Of Care Reviewed With patient   Patient Care Overview   Progress improving   Outcome Evaluation   Outcome Summary/Follow up Plan VSS. Pain meds given x1. Getting up to BS with 2 asst. DCed IJ today.      Goal: Adult Individualization and Mutuality  Outcome: Ongoing (interventions implemented as appropriate)    Goal: Discharge Needs Assessment  Outcome: Ongoing (interventions implemented as appropriate)      Problem: Fall Risk (Adult)  Goal: Absence of Falls  Outcome: Ongoing (interventions implemented as appropriate)      Problem: Pressure Ulcer Risk (Aristeo Scale) (Adult,Obstetrics,Pediatric)  Goal: Skin Integrity  Outcome: Ongoing (interventions implemented as appropriate)      Problem: Pain, Acute (Adult)  Goal: Acceptable Pain Control/Comfort Level  Outcome: Ongoing (interventions implemented as appropriate)

## 2018-03-08 NOTE — PROGRESS NOTES
Adult Nutrition  Assessment/PES    Patient Name:  Mariya Campos  YOB: 1960  MRN: [id number]  Admit Date:  2/1/2018    Assessment Date:  3/8/2018    Follow up-encouraged adequate po intake for wound healing-consuming only 25% of meals. Mohamud and carnation instant breakfast ordered BID.   Recommend to continue mohamud BID for the next 1-2 months to promote skin integrity.           Reason for Assessment       03/08/18 1500    Reason for Assessment    Reason For Assessment/Visit follow up protocol                  Labs/Tests/Procedures/Meds       03/08/18 1500    Labs/Tests/Procedures/Meds    Labs/Tests Review Reviewed;Creat;BUN    Medication Review Reviewed, pertinent    Significant Vitals reviewed            Physical Findings       03/08/18 1500    Physical Appearance    Skin non-healing wound(s);surgical wound   wound VAC              Nutrition Prescription Ordered       03/08/18 1501    Nutrition Prescription PO    Current PO Diet Regular    Supplement [place] Breakfast Essentials;Mohamud    Supplement Frequency 2 times a day            Evaluation of Received Nutrient/Fluid Intake       03/08/18 1501    PO Evaluation    Number of Meals 4    % PO Intake 25            Problem/Interventions:                  Intervention Goal       03/08/18 1501    Intervention Goal    General Maintain nutrition;Meet nutritional needs for age/condition    PO Tolerate PO;Increase intake    Weight Appropriate weight loss            Nutrition Intervention       03/08/18 1501    Nutrition Intervention    RD/Tech Action Interview for preference;Follow Tx progress;Care plan reviewd;Encourage intake              Education/Evaluation       03/08/18 1501    Education    Education Provided education regarding    Education Topics Protein    Monitor/Evaluation    Monitor Per protocol    Education Follow-up Reinforce PRN        Electronically signed by:  Keiry Gomez RD  03/08/18 3:03 PM

## 2018-03-08 NOTE — PLAN OF CARE
Problem: Inpatient Physical Therapy  Goal: Bed Mobility Goal LTG- PT   03/08/18 1451   Bed Mobility PT LTG   Bed Mobility PT LTG, Date Established 03/08/18   Bed Mobility PT LTG, Time to Achieve 1 wk   Bed Mobility PT LTG, Activity Type all bed mobility   Bed Mobility PT LTG, [place] Level conditional independence   Bed Mobility PT Goal LTG, Assist Device bed rails     Goal: Transfer Training Goal 1 LTG- PT   03/08/18 1451   Transfer Training PT LTG   Transfer Training PT LTG, Date Established 03/08/18   Transfer Training PT LTG, Time to Achieve 1 wk   Transfer Training PT LTG, Activity Type all transfers   Transfer Training PT LTG, [place] Level contact guard assist   Transfer Training PT LTG, Assist Device walker, rolling     Goal: Gait Training Goal LTG- PT   03/08/18 1451   Gait Training PT LTG   Gait Training Goal PT LTG, Date Established 03/08/18   Gait Training Goal PT LTG, Time to Achieve 1 wk   Gait Training Goal PT LTG, [place] Level minimum assist (75% patient effort)   Gait Training Goal PT LTG, Assist Device walker, rolling   Gait Training Goal PT LTG, Distance to Achieve 10 ft

## 2018-03-08 NOTE — SIGNIFICANT NOTE
03/08/18 1516   Rehab Treatment   Discipline occupational therapist   Treatment Not Performed patient unavailable for treatment  (nsg procedure at this time 1516)

## 2018-03-08 NOTE — PLAN OF CARE
Problem: Patient Care Overview (Adult)  Goal: Plan of Care Review  Outcome: Ongoing (interventions implemented as appropriate)   03/08/18 0434   Coping/Psychosocial Response Interventions   Plan Of Care Reviewed With patient   Patient Care Overview   Progress improving   Outcome Evaluation   Outcome Summary/Follow up Plan VSS; pt now has wound vac to the abdomen; Pt c/o nausea and was medicated with positive results; IJ ports are not returning blood; Family at bedside; Will continue to monitor       Problem: Fall Risk (Adult)  Goal: Absence of Falls  Outcome: Ongoing (interventions implemented as appropriate)      Problem: Pressure Ulcer Risk (Aristeo Scale) (Adult,Obstetrics,Pediatric)  Goal: Skin Integrity  Outcome: Ongoing (interventions implemented as appropriate)      Problem: Pain, Acute (Adult)  Goal: Acceptable Pain Control/Comfort Level  Outcome: Ongoing (interventions implemented as appropriate)

## 2018-03-08 NOTE — PROGRESS NOTES
"   LOS: 35 days    Patient Care Team:  Zahida Coffman MD as PCP - General (Family Medicine)    Chief Complaint:  No chief complaint on file.      Subjective     Interval History:   Follow up Mireya on CKD 3. Mouth dry.  Eating better. Only having smears for BM. Does not remember last formed BM.  VAC placed.  TDC out.  IJ not working. Lab holiday today.   Objective     Vital Signs  Temp:  [98.5 °F (36.9 °C)-99.7 °F (37.6 °C)] 99.7 °F (37.6 °C)  Heart Rate:  [] 100  Resp:  [16-18] 16  BP: (120-184)/(61-91) 120/61    Flowsheet Rows         First Filed Value    Admission Height  172.7 cm (68\") Documented at 02/01/2018 1014    Admission Weight  123 kg (271 lb 14.4 oz) Documented at 02/01/2018 1014             I/O last 3 completed shifts:  In: 1260 [P.O.:1260]  Out: -     Intake/Output Summary (Last 24 hours) at 03/08/18 0710  Last data filed at 03/08/18 0641   Gross per 24 hour   Intake              900 ml   Output                0 ml   Net              900 ml       Physical Exam:  Awake, [facility] TLC 2/19,    Oral mucosa  dry .   Nasal 02.   Heart  RRR no s3 or rub. Lungs upper airway rhonchi. Decreased bs bases.    Abd Distended,VAC in place midline and RLQ,  +bs.   Ext no edema.        Results Review:      Results from last 7 days  Lab Units 03/07/18  0317 03/06/18  0501 03/04/18  1220  03/02/18  0625   SODIUM mmol/L 139 139 139  < > 141   POTASSIUM mmol/L 3.7 3.6 4.0  < > 3.9   CHLORIDE mmol/L 100 100 98  < > 100   CO2 mmol/L 27.0 32.0* 27.2  < > 31.7*   BUN mg/dL 51* 59* 64*  < > 55*   CREATININE mg/dL 2.27* 2.48* 2.97*  < > 3.22*   CALCIUM mg/dL 8.3* 8.3* 8.1*  < > 8.1*   BILIRUBIN mg/dL 0.3 0.3  --   --  0.4   ALK PHOS U/L 65 63  --   --  68   ALT (SGPT) U/L 8 7  --   --  14   AST (SGOT) U/L 17 16  --   --  31   GLUCOSE mg/dL 97 96 112*  < > 98   < > = values in this interval not displayed.    Estimated Creatinine Clearance: 37.8 mL/min (by C-G formula based on Cr of 2.27).      Results from last 7 days  Lab Units " 03/06/18  0501 03/05/18  0524 03/04/18  1220 03/03/18  0532   MAGNESIUM mg/dL 2.0 2.1 1.5*  --    PHOSPHORUS mg/dL 5.3*  --  6.2* 6.0*         Results from last 7 days  Lab Units 03/06/18  0501   URIC ACID mg/dL 8.9*         Results from last 7 days  Lab Units 03/07/18  0317 03/06/18  0501 03/05/18  0524 03/04/18  1220 03/03/18  0532   WBC 10*3/mm3 8.66 8.23 7.76 8.55 7.93   HEMOGLOBIN g/dL 8.5* 8.4* 8.4* 8.9* 8.6*   PLATELETS 10*3/mm3 272 311 300 321 369               Imaging Results (last 24 hours)     ** No results found for the last 24 hours. **          alteplase 2 mg Intravenous Once   bisacodyl 10 mg Rectal Once   epoetin kristen 20,000 Units Subcutaneous Weekly   heparin (porcine) 5,000 Units Subcutaneous Q8H   iopamidol 50 mL Intravenous Once in imaging   lidocaine PF 1% 5 mL Injection Once   metoprolol tartrate 12.5 mg Oral Q12H   mirtazapine 15 mg Oral Nightly   pantoprazole 40 mg Oral Q AM   sodium hypochlorite  Topical BID          Medication Review:   Current Facility-Administered Medications   Medication Dose Route Frequency Provider Last Rate Last Dose   • acetaminophen (TYLENOL) suppository 325 mg  325 mg Rectal Q4H PRN Edgard Felder MD   325 mg at 02/05/18 0653   • acetaminophen (TYLENOL) tablet 650 mg  650 mg Oral Q6H PRN Tri Guzman MD   650 mg at 03/01/18 0014   • albumin human 25 % IV SOLN 12.5 g  12.5 g Intravenous PRN Madeline Kay MD   12.5 g at 02/27/18 1305   • albuterol (PROVENTIL) nebulizer solution 0.083% 2.5 mg/3mL  2.5 mg Nebulization Q4H PRN Tri Guzman MD       • alteplase ((CATHFLO/ACTIVASE)) injection 2 mg  2 mg Intravenous Once Madeline Kay MD       • bisacodyl (DULCOLAX) suppository 10 mg  10 mg Rectal Once Madeline Kay MD       • calcium gluconate 1 g in sodium chloride 0.9 % 50 mL IVPB  1 g Intravenous PRN Madeline Kay MD        Or   • calcium gluconate 2 g in sodium chloride 0.9 % 50 mL IVPB  2 g Intravenous PRN Madeline Kay,  MD       • epoetin kristen (EPOGEN,PROCRIT) injection 20,000 Units  20,000 Units Subcutaneous Weekly Madeline Kay MD   20,000 Units at 03/01/18 1438   • heparin (porcine) 5000 UNIT/ML injection 5,000 Units  5,000 Units Subcutaneous Q8H Benjy Samson MD   5,000 Units at 03/08/18 0505   • heparin (porcine) injection 1,000-2,000 Units  1,000-2,000 Units Intravenous PRN Madeline Kay MD   4,000 Units at 02/15/18 1843   • heparin (porcine) injection 4,000 Units  4,000 Units Intracatheter PRN Madeline Kay MD   4,000 Units at 02/27/18 1258   • HYDROmorphone (DILAUDID) injection 1 mg  1 mg Intravenous BID PRN Bryant Spence MD   0.5 mg at 03/07/18 1421   • iopamidol (ISOVUE-250) 51 % injection 50 mL  50 mL Intravenous Once in imaging Meir Guillen MD       • lidocaine PF 1% (XYLOCAINE) injection 5 mL  5 mL Injection Once Dwayne Lyons MD       • magic mouthwash oral supsension 10 mL  10 mL Swish & Spit Q4H PRN Madeline Kay MD   10 mL at 03/07/18 2106   • metoprolol tartrate (LOPRESSOR) tablet 12.5 mg  12.5 mg Oral Q12H Bradford So MD   12.5 mg at 03/07/18 2106   • mirtazapine (REMERON) tablet 15 mg  15 mg Oral Nightly Ernestina Hussein MD   15 mg at 03/07/18 2106   • ondansetron ODT (ZOFRAN-ODT) disintegrating tablet 4 mg  4 mg Oral Q6H PRN Tri Guzman MD        Or   • ondansetron (ZOFRAN) injection 4 mg  4 mg Intravenous Q6H PRN Tri Guzman MD   4 mg at 03/07/18 2322   • oxyCODONE-acetaminophen (PERCOCET) 5-325 MG per tablet 1 tablet  1 tablet Oral Q4H PRN Tri Guzman MD   1 tablet at 03/04/18 0107    Or   • oxyCODONE-acetaminophen (PERCOCET) 5-325 MG per tablet 2 tablet  2 tablet Oral Q4H PRN Tri Guzman MD       • pantoprazole (PROTONIX) EC tablet 40 mg  40 mg Oral Q AM Madeline Kay MD   40 mg at 03/08/18 0504   • promethazine (PHENERGAN) injection 12.5 mg  12.5 mg Intravenous Q8H PRN Sarthak Figueroa MD   12.5 mg at  02/09/18 0033   • sodium chloride (OCEAN) nasal spray 2 spray  2 spray Each Nare SONIAN Ernestina Hussein MD       • sodium hypochlorite (DAKIN'S 1/4 STRENGTH) 0.125 % topical solution 0.125% solution   Topical BID Tri Guzman MD           Assessment/Plan       1. Acute kidney injury on chronic kidney disease stage III due to shock. Urine output not recorded.  Last HD 2/27. Recovered. Lab holiday today.  TDC removed yesterday.  Once dc, will get weekly labs faxed to Dr. Hutchisnon.   2.  Chronic kidney disease stage III baseline Crt 1.5.   3.   SP incisional hernia repair 2/1.  Status post exploratory laparotomy with small bowel resection, mesh removal, RLQ port site debridement, and  Appendectomy 2/4.  SP subcutaneous debridement 2/13 and 2/16, 2/27. 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  4.  Acute on Chronic anemia: Hg  8.4.  On weekly EPO.  5.  Small bowel enterotomy, sp repair 2/4.    ESBL, now carbapenem resistant from wound.   E. coli from blood 2/5. Blood cultures 2/9 negative.  Wound Vac in place.  6. Encephalopathy . Resolved.   7. Afib with RVR.  Now in SR.  On beta blocker   8.  PCM. Improving po intake.  carnation instant breakfast to supplement.   9.  Oral mucositis. Resolved.   10. Debility.  Working with PT .      DW  .       Madeline Kay MD  03/08/18  7:10 AM

## 2018-03-09 LAB
ALBUMIN SERPL-MCNC: 2.2 G/DL (ref 3.5–5.2)
ALBUMIN/GLOB SERPL: 0.5 G/DL
ALP SERPL-CCNC: 64 U/L (ref 39–117)
ALT SERPL W P-5'-P-CCNC: 9 U/L (ref 1–33)
ANION GAP SERPL CALCULATED.3IONS-SCNC: 11.3 MMOL/L
AST SERPL-CCNC: 26 U/L (ref 1–32)
BILIRUB SERPL-MCNC: 0.3 MG/DL (ref 0.1–1.2)
BUN BLD-MCNC: 42 MG/DL (ref 6–20)
BUN/CREAT SERPL: 21.6 (ref 7–25)
CALCIUM SPEC-SCNC: 8.1 MG/DL (ref 8.6–10.5)
CHLORIDE SERPL-SCNC: 99 MMOL/L (ref 98–107)
CO2 SERPL-SCNC: 26.7 MMOL/L (ref 22–29)
CREAT BLD-MCNC: 1.94 MG/DL (ref 0.57–1)
DEPRECATED RDW RBC AUTO: 51.9 FL (ref 37–54)
ERYTHROCYTE [DISTWIDTH] IN BLOOD BY AUTOMATED COUNT: 15.1 % (ref 11.7–13)
GFR SERPL CREATININE-BSD FRML MDRD: 27 ML/MIN/1.73
GLOBULIN UR ELPH-MCNC: 4.2 GM/DL
GLUCOSE BLD-MCNC: 98 MG/DL (ref 65–99)
HCT VFR BLD AUTO: 27.9 % (ref 35.6–45.5)
HGB BLD-MCNC: 8.5 G/DL (ref 11.9–15.5)
MCH RBC QN AUTO: 28.7 PG (ref 26.9–32)
MCHC RBC AUTO-ENTMCNC: 30.5 G/DL (ref 32.4–36.3)
MCV RBC AUTO: 94.3 FL (ref 80.5–98.2)
PLATELET # BLD AUTO: 272 10*3/MM3 (ref 140–500)
PMV BLD AUTO: 9.2 FL (ref 6–12)
POTASSIUM BLD-SCNC: 3.9 MMOL/L (ref 3.5–5.2)
PROT SERPL-MCNC: 6.4 G/DL (ref 6–8.5)
RBC # BLD AUTO: 2.96 10*6/MM3 (ref 3.9–5.2)
SODIUM BLD-SCNC: 137 MMOL/L (ref 136–145)
WBC NRBC COR # BLD: 9.59 10*3/MM3 (ref 4.5–10.7)

## 2018-03-09 PROCEDURE — 80053 COMPREHEN METABOLIC PANEL: CPT | Performed by: INTERNAL MEDICINE

## 2018-03-09 PROCEDURE — 99024 POSTOP FOLLOW-UP VISIT: CPT | Performed by: SURGERY

## 2018-03-09 PROCEDURE — 25010000002 ONDANSETRON PER 1 MG: Performed by: SURGERY

## 2018-03-09 PROCEDURE — 85027 COMPLETE CBC AUTOMATED: CPT | Performed by: INTERNAL MEDICINE

## 2018-03-09 PROCEDURE — 25010000002 HEPARIN (PORCINE) PER 1000 UNITS: Performed by: INTERNAL MEDICINE

## 2018-03-09 RX ADMIN — Medication 10 ML: at 21:30

## 2018-03-09 RX ADMIN — METOPROLOL TARTRATE 12.5 MG: 25 TABLET ORAL at 21:30

## 2018-03-09 RX ADMIN — METOPROLOL TARTRATE 12.5 MG: 25 TABLET ORAL at 08:17

## 2018-03-09 RX ADMIN — HEPARIN SODIUM 5000 UNITS: 5000 INJECTION, SOLUTION INTRAVENOUS; SUBCUTANEOUS at 05:22

## 2018-03-09 RX ADMIN — ONDANSETRON 4 MG: 2 INJECTION INTRAMUSCULAR; INTRAVENOUS at 05:23

## 2018-03-09 RX ADMIN — MIRTAZAPINE 15 MG: 15 TABLET, FILM COATED ORAL at 21:30

## 2018-03-09 RX ADMIN — OXYCODONE HYDROCHLORIDE AND ACETAMINOPHEN 1 TABLET: 5; 325 TABLET ORAL at 10:32

## 2018-03-09 RX ADMIN — HYDROMORPHONE HYDROCHLORIDE 0.5 MG: 1 INJECTION, SOLUTION INTRAMUSCULAR; INTRAVENOUS; SUBCUTANEOUS at 14:49

## 2018-03-09 RX ADMIN — Medication 10 ML: at 17:14

## 2018-03-09 RX ADMIN — HEPARIN SODIUM 5000 UNITS: 5000 INJECTION, SOLUTION INTRAVENOUS; SUBCUTANEOUS at 14:49

## 2018-03-09 RX ADMIN — HEPARIN SODIUM 5000 UNITS: 5000 INJECTION, SOLUTION INTRAVENOUS; SUBCUTANEOUS at 21:30

## 2018-03-09 RX ADMIN — PANTOPRAZOLE SODIUM 40 MG: 40 TABLET, DELAYED RELEASE ORAL at 05:22

## 2018-03-09 NOTE — PROGRESS NOTES
Continued Stay Note  [facility]     Patient Name: Mariya Campos  MRN: [id number]  Today's Date: 3/9/2018    Admit Date: 2/1/2018          Discharge Plan       03/09/18 0917    Case Management/Social Work Plan    Plan [facility]- will start precert process this afternoon    Patient/Family In Agreement With Plan yes    Additional Comments Spoke with Mary Ann with [facility]- discussed when to start precert as anticipated dc is Monday. Mary Ann states precert should be started this afternoon . Updated HAROON Monte. Packet in CCP office. Brigid PATIÑO/CCP              Discharge Codes     None            Katelin Gar, RN

## 2018-03-09 NOTE — PROGRESS NOTES
Daily Progress Note        Subjective     Pt reports feeling stronger, tolerating diet, pain controlled     Objective     Vital signs in last 24 hours:  Temp:  [97.9 °F (36.6 °C)-99.7 °F (37.6 °C)] 98 °F (36.7 °C)  Heart Rate:  [] 93  Resp:  [16] 16  BP: (110-130)/(62-77) 125/77    Intake/Output last 3 shifts:  I/O last 3 completed shifts:  In: 960 [P.O.:960]  Out: 400     Physical Exam:  Respiratory: CTA, normal inspiratory effort  CV: RRR, no JVD  Abd: Positive BS, soft, ND, NT, no rebound, no guarding, wound vac removed wounds look good  Ext:  No cyanosis, no edema    Results from last 7 days  Lab Units 03/09/18  0427   WBC 10*3/mm3 9.59   HEMOGLOBIN g/dL 8.5*   HEMATOCRIT % 27.9*   PLATELETS 10*3/mm3 272       Results from last 7 days  Lab Units 03/09/18  0427   SODIUM mmol/L 137   POTASSIUM mmol/L 3.9   CHLORIDE mmol/L 99   CO2 mmol/L 26.7   BUN mg/dL 42*   CREATININE mg/dL 1.94*   GLUCOSE mg/dL 98   CALCIUM mg/dL 8.1*     Assessment/Plan   Anticipate JULIA on Monday    Tri Guzman MD  General, Minimally Invasive and Endoscopic Surgery  [facility]

## 2018-03-09 NOTE — SIGNIFICANT NOTE
03/09/18 1420   Rehab Treatment   Discipline occupational therapist   Treatment Not Performed patient/family declined treatment  (pt refused OT stating she has done enough today. States sat in chair and ate lunch and now back to bed and needs to rest. Family present and agrees)

## 2018-03-09 NOTE — NURSING NOTE
VAC drsg changed to midline abd wound and abd wound on right side of abd.  Both wounds looking better, clean and beefy red.  Dr. Guzman at bedside to assess wound at time of VAC change. Packed both wounds (which connect to one another) with multiple pieces of black granufoam, used two trac pads, one on each wound and connected to VAC via Y connector; VAC set at 125 mm hg, continuous suction.  Pt tolerated drsg chg well with IV pain medication given per nsg staff prior to drsg chg. Also used Huggins Adhesive Remover Spray to remove VAC drape which pt states helped enormously!

## 2018-03-09 NOTE — PROGRESS NOTES
Continued Stay Note  [facility]     Patient Name: Mariya Campos  MRN: [id number]  Today's Date: 3/9/2018    Admit Date: 2/1/2018          Discharge Plan       03/09/18 1654    Case Management/Social Work Plan    Plan [facility]- Juan precert started for anticipated dc Monday    Patient/Family In Agreement With Plan yes    Additional Comments Spoke with Mell PATIÑO, anticipated dc is monday. Confirmed with Mary Ann with [facility] they initated precert with Juan today. Packet in CCP office. Brigid PATIÑO/CCP              Discharge Codes     None            Katelin Gar, RN

## 2018-03-09 NOTE — SIGNIFICANT NOTE
03/09/18 1453   Rehab Treatment   Discipline physical therapist   Treatment Not Performed (Wound care RN in room placing wound vac and states they will need about an hour. PT will follow up tomorrow)   Recommendation   PT - Next Appointment 03/10/18

## 2018-03-09 NOTE — PLAN OF CARE
Problem: Patient Care Overview (Adult)  Goal: Plan of Care Review  Outcome: Ongoing (interventions implemented as appropriate)   03/09/18 7854   Coping/Psychosocial Response Interventions   Plan Of Care Reviewed With patient   Patient Care Overview   Progress improving   Outcome Evaluation   Outcome Summary/Follow up Plan VSS as documented. Slightly tachy prior to metoprolol this am, HR improved. Oriented x4. Up to BSC with 1 assist, sat in chair this morning. +BM today. Wound VAC to abdomen with 125 suction continuous, was changed by skin/wound team. Moderate sero-sang output into canister. Pain meds per MAR. Anticipate d/c Monday, CCP following.      Goal: Adult Individualization and Mutuality  Outcome: Ongoing (interventions implemented as appropriate)    Goal: Discharge Needs Assessment  Outcome: Ongoing (interventions implemented as appropriate)      Problem: Fall Risk (Adult)  Goal: Absence of Falls  Outcome: Ongoing (interventions implemented as appropriate)      Problem: Pressure Ulcer Risk (Aristeo Scale) (Adult,Obstetrics,Pediatric)  Goal: Skin Integrity  Outcome: Ongoing (interventions implemented as appropriate)      Problem: Pain, Acute (Adult)  Goal: Acceptable Pain Control/Comfort Level  Outcome: Ongoing (interventions implemented as appropriate)

## 2018-03-09 NOTE — PAYOR COMM NOTE
"Mariya Barbosa (57 y.o. Female)     PLEASE SEE ATTACHED CLINICAL REVIEW.   PT. REMAINS ON A MONITORED TELEM FLOOR.    REF#[id number]    PLEASE CALL   OR  [phone] WITH CONTINUED STAY AUTH.     THANK YOU    SATISH WOOTEN, JONATHAN, CCP    Date of Birth Social Security Number Address Home Phone MRN    1960  [facility] [address] [phone] [id number]    Anglican Marital Status          Unknown        Admission Date Admission Type Admitting Provider Attending Provider Department, Room/Bed    2/1/18 Elective Tri Guzman MD Figert, Patricia L, MD [address], 530/1    Discharge Date Discharge Disposition Discharge Destination                      Attending Provider: Tri Guzman MD     Allergies:  Fish-derived Products, Shellfish-derived Products    Isolation:  Contact   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                 Active Insurance as of 2/1/2018     Primary Coverage     Payor Plan Insurance Group Employer/Plan Group    [facility] [employer] [id number]     Payor Plan Address Payor Plan Phone Number Effective From Effective To    [address] [phone] 1/1/2015     [place] [address]       Subscriber Name Subscriber Birth Date Member ID       FELICIA BARBOSA 10/18/1965 [id number]                 Emergency Contacts      (Rel.) Home Phone Work Phone Mobile Phone    Felicia Barbosa (Spouse) [phone] -- [phone]              Intake & Output (last day)       03/08 0701 - 03/09 0700 03/09 0701 - 03/10 0700    P.O. 240     Total Intake(mL/kg) 240 (2)     Urine (mL/kg/hr) 0 (0)     Stool 0 (0)     Wound 400 (0.1)     Total Output 400      Net -160            Unmeasured Urine " Occurrence 4 x     Unmeasured Stool Occurrence 3 x         Orders (last 24 hrs)     Start     Ordered    03/11/18 0430  Renal Function Panel  Morning Draw      03/09/18 0809    03/11/18 0430  CBC & Differential  Morning Draw      03/09/18 0809    03/09/18 0800  Dietary Nutrition Supplements [place] Instant Breakfast  Daily With Breakfast & Lunch     Comments:  Chocolate    03/08/18 1500    03/09/18 0600  Comprehensive Metabolic Panel  Morning Draw      03/07/18 1042    03/09/18 0600  CBC (No Diff)  Morning Draw      03/07/18 1042    03/08/18 1800  Dietary Nutrition Supplements Mohamud  Daily With Lunch & Dinner     Comments:  Mixed in fruit punch crystal light    03/08/18 1500    03/08/18 1352  D / C Central Line  Once      03/08/18 1351    03/08/18 0800  alteplase ((CATHFLO/ACTIVASE)) injection 2 mg  Once      03/08/18 0654    03/08/18 0745  bisacodyl (DULCOLAX) suppository 10 mg  Once      03/08/18 0710    03/07/18 1030  lidocaine PF 1% (XYLOCAINE) injection 5 mL  Once      03/07/18 0955    03/06/18 1200  Dietary Nutrition Supplements [place] Instant Breakfast  Daily With Breakfast & Lunch,   Status:  Canceled      03/06/18 0823    03/06/18 0830  magic mouthwash oral supsension 10 mL  Every 4 Hours PRN      03/06/18 0823    03/02/18 1010  oxyCODONE-acetaminophen (PERCOCET) 5-325 MG per tablet 1 tablet  Every 4 Hours PRN      03/02/18 1011    03/02/18 1010  oxyCODONE-acetaminophen (PERCOCET) 5-325 MG per tablet 2 tablet  Every 4 Hours PRN      03/02/18 1011    03/01/18 2208  HYDROmorphone (DILAUDID) injection 1 mg  2 Times Daily PRN      03/01/18 2209    03/01/18 0900  epoetin kristen (EPOGEN,PROCRIT) injection 20,000 Units  Weekly      03/01/18 0648    02/26/18 1821  acetaminophen (TYLENOL) tablet 650 mg  Every 6 Hours PRN      02/26/18 1822    02/20/18 1330  metoprolol tartrate (LOPRESSOR) tablet 12.5 mg  Every 12 Hours Scheduled      02/20/18 1253    02/19/18 1115  iopamidol (ISOVUE-250) 51 % injection 50 mL   Once in Imaging      02/19/18 1039    02/18/18 0600  pantoprazole (PROTONIX) EC tablet 40 mg  Every Early Morning      02/17/18 1505    02/17/18 2100  mirtazapine (REMERON) tablet 15 mg  Nightly      02/17/18 0856    02/15/18 2243  sodium chloride (OCEAN) nasal spray 2 spray  As Needed      02/15/18 2243    02/15/18 1526  heparin (porcine) injection 4,000 Units  As Needed      02/15/18 1527    02/14/18 1400  heparin (porcine) 5000 UNIT/ML injection 5,000 Units  Every 8 Hours Scheduled      02/14/18 1148    02/13/18 2100  sodium hypochlorite (DAKIN'S 1/4 STRENGTH) 0.125 % topical solution 0.125% solution  2 Times Daily,   Status:  Discontinued      02/13/18 1832    02/13/18 0922  albumin human 25 % IV SOLN 12.5 g  As Needed      02/13/18 0924    02/13/18 0921  heparin (porcine) injection 1,000-2,000 Units  As Needed      02/13/18 0924    02/13/18 0921  calcium gluconate 1 g in sodium chloride 0.9 % 50 mL IVPB  As Needed      02/13/18 0924    02/13/18 0921  calcium gluconate 2 g in sodium chloride 0.9 % 50 mL IVPB  As Needed      02/13/18 0924    02/05/18 0607  acetaminophen (TYLENOL) suppository 325 mg  Every 4 Hours PRN      02/05/18 0607    02/05/18 0105  ondansetron (ZOFRAN) tablet 4 mg  Every 6 Hours PRN,   Status:  Discontinued      02/05/18 0105    02/05/18 0105  ondansetron ODT (ZOFRAN-ODT) disintegrating tablet 4 mg  Every 6 Hours PRN      02/05/18 0105    02/05/18 0105  ondansetron (ZOFRAN) injection 4 mg  Every 6 Hours PRN      02/05/18 0105    02/04/18 1135  promethazine (PHENERGAN) injection 12.5 mg  Every 8 Hours PRN      02/04/18 1136    02/01/18 2159  albuterol (PROVENTIL) nebulizer solution 0.083% 2.5 mg/3mL  Every 4 Hours PRN      02/01/18 2159    Unscheduled  Magnesium  As Needed      02/12/18 1048    Unscheduled  Potassium  As Needed      02/12/18 1048    Unscheduled  Bladder Scan if Patient Unable to Void 4-6 Hours After Catheter Removal  As Needed      03/01/18 0829    Unscheduled  If Bladder  Scan Volume is Less Than 350-500mL & Patient is Without Symptoms of Bladder Discomfort / Distention Monitor Every 1-2 Hours for Spontaneous Void  As Needed      03/01/18 0829    Unscheduled  Straight Cath Every 4-6 Hours As Needed If Patient is Unable to Void After 4-6 Hours, Bladder Scan Volume is Greater Than 350-500mL & Patient Has Symptoms of Bladder Discomfort / Distention  As Needed      03/01/18 0829    Unscheduled  Oxygen Therapy- Nasal Cannula; Titrate for SPO2: Greater Than or Equal To, 90%, 94%  As Needed     Comments:  Dc oxygen this am.  Ok to use for comfort at night or while doing therapy    03/01/18 0829    --  SCANNED - TELEMETRY        02/01/18 0000    --  SCANNED - TELEMETRY        02/01/18 0000    --  SCANNED - TELEMETRY        02/01/18 0000    --  SCANNED - TELEMETRY        02/01/18 0000    --  SCANNED - TELEMETRY        02/01/18 0000    --  SCANNED - TELEMETRY        02/01/18 0000          Operative/Procedure Notes (last 24 hours) (Notes from 3/8/2018  8:10 AM through 3/9/2018  8:10 AM)     No notes of this type exist for this encounter.           Physician Progress Notes (last 24 hours) (Notes from 3/8/2018  8:10 AM through 3/9/2018  8:10 AM)      Tri Guzman MD at 3/8/2018  5:50 PM  Version 1 of 1         Daily Progress Note    Subjective     Pt tolerating reg diet, pain controlled   Objective     Vital signs in last 24 hours:  Temp:  [98.2 °F (36.8 °C)-99.7 °F (37.6 °C)] 98.3 °F (36.8 °C)  Heart Rate:  [] 93  Resp:  [16-18] 16  BP: ()/(58-91) 98/58    Intake/Output last 3 shifts:  I/O last 3 completed shifts:  In: 1260 [P.O.:1260]  Out: -     Physical Exam:  Respiratory: CTA, normal inspiratory effort  CV: RRR, no JVD  Abd: Positive BS, soft, wound vac in place    Results from last 7 days  Lab Units 03/07/18  0317   WBC 10*3/mm3 8.66   HEMOGLOBIN g/dL 8.5*   HEMATOCRIT % 27.8*   PLATELETS 10*3/mm3 272       Results from last 7 days  Lab Units 03/07/18  0317   SODIUM mmol/L  "139   POTASSIUM mmol/L 3.7   CHLORIDE mmol/L 100   CO2 mmol/L 27.0   BUN mg/dL 51*   CREATININE mg/dL 2.27*   GLUCOSE mg/dL 97   CALCIUM mg/dL 8.3*           Assessment/Plan   Anticipate JULIA on Monday  Wound improving, abx completed     Tri Guzman MD  General, Minimally Invasive and Endoscopic Surgery  [facility]         Electronically signed by Tri Guzman MD at 3/8/2018  5:53 PM      Yo Yin MD at 3/9/2018  8:04 AM  Version 1 of 1            LOS: 36 days    Patient Care Team:  Zahida Coffman MD as PCP - General (Family Medicine)    Chief Complaint:  No chief complaint on file.      Subjective follow-up acute kidney injury on chronic kidney disease    Interval History:   She is feeling much better, eating better, no chest pain or shortness of air, no nausea or vomiting.  Urine output is good.  She has one diet placed, her tunnel dialysis catheter was removed, her last dialysis was on 2/27/2018     Objective     Vital Signs  Temp:  [97.9 °F (36.6 °C)-99.7 °F (37.6 °C)] 97.9 °F (36.6 °C)  Heart Rate:  [] 102  Resp:  [16] 16  BP: ()/(58-63) 130/62    Flowsheet Rows         First Filed Value    Admission Height  172.7 cm (68\") Documented at 02/01/2018 1014    Admission Weight  123 kg (271 lb 14.4 oz) Documented at 02/01/2018 1014             I/O last 3 completed shifts:  In: 960 [P.O.:960]  Out: 400     Intake/Output Summary (Last 24 hours) at 03/09/18 0804  Last data filed at 03/08/18 1339   Gross per 24 hour   Intake              240 ml   Output              400 ml   Net             -160 ml       Physical Exam:  General Appearance: alert, oriented x 3, no acute distress, morbidly obese  Skin: warm and dry  HEENT: Anicteric sclerae, oral mucosa normal,   Neck: supple, no JVD, trachea midline  Lungs: Clear breath sounds in the bases, unlabored breathing effort  Heart: RRR, normal S1 and S2, no S3, no rub  Abdomen: soft, non-tender,  present bowel sounds to " auscultation, she had dressing and wound VAC  : no palpable bladder,  Extremities: no edema, cyanosis or clubbing  Neuro: normal speech and mental status        Results Review:      Results from last 7 days  Lab Units 03/09/18  0427 03/07/18  0317 03/06/18  0501   SODIUM mmol/L 137 139 139   POTASSIUM mmol/L 3.9 3.7 3.6   CHLORIDE mmol/L 99 100 100   CO2 mmol/L 26.7 27.0 32.0*   BUN mg/dL 42* 51* 59*   CREATININE mg/dL 1.94* 2.27* 2.48*   CALCIUM mg/dL 8.1* 8.3* 8.3*   BILIRUBIN mg/dL 0.3 0.3 0.3   ALK PHOS U/L 64 65 63   ALT (SGPT) U/L 9 8 7   AST (SGOT) U/L 26 17 16   GLUCOSE mg/dL 98 97 96       Estimated Creatinine Clearance: 44.2 mL/min (by C-G formula based on Cr of 1.94).      Results from last 7 days  Lab Units 03/06/18  0501 03/05/18  0524 03/04/18  1220 03/03/18  0532   MAGNESIUM mg/dL 2.0 2.1 1.5*  --    PHOSPHORUS mg/dL 5.3*  --  6.2* 6.0*         Results from last 7 days  Lab Units 03/06/18  0501   URIC ACID mg/dL 8.9*         Results from last 7 days  Lab Units 03/09/18  0427 03/07/18  0317 03/06/18  0501 03/05/18  0524 03/04/18  1220   WBC 10*3/mm3 9.59 8.66 8.23 7.76 8.55   HEMOGLOBIN g/dL 8.5* 8.5* 8.4* 8.4* 8.9*   PLATELETS 10*3/mm3 272 272 311 300 321               Imaging Results (last 24 hours)     ** No results found for the last 24 hours. **          alteplase 2 mg Intravenous Once   epoetin kristen 20,000 Units Subcutaneous Weekly   heparin (porcine) 5,000 Units Subcutaneous Q8H   iopamidol 50 mL Intravenous Once in imaging   lidocaine PF 1% 5 mL Injection Once   metoprolol tartrate 12.5 mg Oral Q12H   mirtazapine 15 mg Oral Nightly   pantoprazole 40 mg Oral Q AM          Medication Review:   Current Facility-Administered Medications   Medication Dose Route Frequency Provider Last Rate Last Dose   • acetaminophen (TYLENOL) suppository 325 mg  325 mg Rectal Q4H PRN Edgard Felder MD   325 mg at 02/05/18 0653   • acetaminophen (TYLENOL) tablet 650 mg  650 mg Oral Q6H PRN Tri Guzman,  MD   650 mg at 03/01/18 0014   • albumin human 25 % IV SOLN 12.5 g  12.5 g Intravenous PRN Madeline Kay MD   12.5 g at 02/27/18 1305   • albuterol (PROVENTIL) nebulizer solution 0.083% 2.5 mg/3mL  2.5 mg Nebulization Q4H PRN Tri Guzman MD       • alteplase ((CATHFLO/ACTIVASE)) injection 2 mg  2 mg Intravenous Once Madeline Kay MD       • calcium gluconate 1 g in sodium chloride 0.9 % 50 mL IVPB  1 g Intravenous PRN Madeline Kay MD        Or   • calcium gluconate 2 g in sodium chloride 0.9 % 50 mL IVPB  2 g Intravenous PRN Madeline Kay MD       • epoetin kristen (EPOGEN,PROCRIT) injection 20,000 Units  20,000 Units Subcutaneous Weekly Madeline Kay MD   20,000 Units at 03/08/18 1514   • heparin (porcine) 5000 UNIT/ML injection 5,000 Units  5,000 Units Subcutaneous Q8H Benjy Vincenzo Samson MD   5,000 Units at 03/09/18 0522   • heparin (porcine) injection 1,000-2,000 Units  1,000-2,000 Units Intravenous PRN Madeline Kay MD   4,000 Units at 02/15/18 1843   • heparin (porcine) injection 4,000 Units  4,000 Units Intracatheter PRN Madeline Kay MD   4,000 Units at 02/27/18 1258   • HYDROmorphone (DILAUDID) injection 1 mg  1 mg Intravenous BID PRN Bryant Spence MD   0.5 mg at 03/07/18 1421   • iopamidol (ISOVUE-250) 51 % injection 50 mL  50 mL Intravenous Once in imaging Meir Guillen MD       • lidocaine PF 1% (XYLOCAINE) injection 5 mL  5 mL Injection Once Dwayne Lyons MD       • magic mouthwash oral supsension 10 mL  10 mL Swish & Spit Q4H PRN Madeline Kay MD   10 mL at 03/08/18 2338   • metoprolol tartrate (LOPRESSOR) tablet 12.5 mg  12.5 mg Oral Q12H Bradford So MD   12.5 mg at 03/08/18 2131   • mirtazapine (REMERON) tablet 15 mg  15 mg Oral Nightly Ernestina Hussein MD   15 mg at 03/08/18 2131   • ondansetron ODT (ZOFRAN-ODT) disintegrating tablet 4 mg  4 mg Oral Q6H PRN Tri Guzman MD        Or   • ondansetron (ZOFRAN)  injection 4 mg  4 mg Intravenous Q6H PRN Tri Guzman MD   4 mg at 03/09/18 0523   • oxyCODONE-acetaminophen (PERCOCET) 5-325 MG per tablet 1 tablet  1 tablet Oral Q4H PRN Tri Guzman MD   1 tablet at 03/08/18 1149    Or   • oxyCODONE-acetaminophen (PERCOCET) 5-325 MG per tablet 2 tablet  2 tablet Oral Q4H PRN Tri Guzman MD       • pantoprazole (PROTONIX) EC tablet 40 mg  40 mg Oral Q AM Madeline Kay MD   40 mg at 03/09/18 0522   • promethazine (PHENERGAN) injection 12.5 mg  12.5 mg Intravenous Q8H PRN Sarthak Figueroa MD   12.5 mg at 02/09/18 2345   • sodium chloride (OCEAN) nasal spray 2 spray  2 spray Each Nare PRN Ernestina Hussein MD           Assessment/Plan       1. Acute kidney injury on chronic kidney disease stage III due to shock. Has recovered.  Last HD 2/27. Serum creatinine today is 1.94, with the normal electrolytes.   2.  Chronic kidney disease stage III baseline Crt 1.5.   3.   SP incisional hernia repair 2/1.  Status post exploratory laparotomy with small bowel resection, mesh removal, RLQ port site debridement, and  Appendectomy 2/4.  SP subcutaneous debridement 2/13 and 2/16, 2/27. 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  4.  Acute on Chronic anemia: Hg  8.5.  On weekly EPO.  5.  Small bowel enterotomy, sp repair 2/4.    ESBL, now carbapenem resistant from wound.   E. coli from blood 2/5. Blood cultures 2/9 negative.  Wound Vac in place.  6. Encephalopathy . Resolved.   7. Afib with RVR.  Now in SR.  On beta blocker   8.  PCM. Improving po intake.  carnation instant breakfast to supplement.   9.  Oral mucositis. Resolved.   10. Debility.  Working with PT .      Plan:  1.  We'll continue the same treatment.  2.  We'll check lab on Sunday    Yo Yin MD  03/09/18  8:04 AM       Electronically signed by Yo Yin MD at 3/9/2018  8:08 AM        Consult Notes (last 24 hours) (Notes from 3/8/2018  8:10 AM through 3/9/2018  8:10 AM)     No notes of this type  exist for this encounter.

## 2018-03-09 NOTE — PLAN OF CARE
Problem: Patient Care Overview (Adult)  Goal: Plan of Care Review  Outcome: Ongoing (interventions implemented as appropriate)   03/09/18 5636   Coping/Psychosocial Response Interventions   Plan Of Care Reviewed With patient;spouse   Patient Care Overview   Progress improving   Outcome Evaluation   Outcome Summary/Follow up Plan Pt getting OOB, stand/pivot to BSC with assist of two. Tires easily. Wound vac in place.      Goal: Adult Individualization and Mutuality  Outcome: Ongoing (interventions implemented as appropriate)    Goal: Discharge Needs Assessment  Outcome: Ongoing (interventions implemented as appropriate)      Problem: Fall Risk (Adult)  Goal: Absence of Falls  Outcome: Ongoing (interventions implemented as appropriate)

## 2018-03-09 NOTE — PROGRESS NOTES
"   LOS: 36 days    Patient Care Team:  Zahida Coffman MD as PCP - General (Family Medicine)    Chief Complaint:  No chief complaint on file.      Subjective follow-up acute kidney injury on chronic kidney disease    Interval History:   She is feeling much better, eating better, no chest pain or shortness of air, no nausea or vomiting.  Urine output is good.  She has one diet placed, her tunnel dialysis catheter was removed, her last dialysis was on 2/27/2018     Objective     Vital Signs  Temp:  [97.9 °F (36.6 °C)-99.7 °F (37.6 °C)] 97.9 °F (36.6 °C)  Heart Rate:  [] 102  Resp:  [16] 16  BP: ()/(58-63) 130/62    Flowsheet Rows         First Filed Value    Admission Height  172.7 cm (68\") Documented at 02/01/2018 1014    Admission Weight  123 kg (271 lb 14.4 oz) Documented at 02/01/2018 1014             I/O last 3 completed shifts:  In: 960 [P.O.:960]  Out: 400     Intake/Output Summary (Last 24 hours) at 03/09/18 0804  Last data filed at 03/08/18 1339   Gross per 24 hour   Intake              240 ml   Output              400 ml   Net             -160 ml       Physical Exam:  General Appearance: alert, oriented x 3, no acute distress, morbidly obese  Skin: warm and dry  HEENT: Anicteric sclerae, oral mucosa normal,   Neck: supple, no JVD, trachea midline  Lungs: Clear breath sounds in the bases, unlabored breathing effort  Heart: RRR, normal S1 and S2, no S3, no rub  Abdomen: soft, non-tender,  present bowel sounds to auscultation, she had dressing and wound VAC  : no palpable bladder,  Extremities: no edema, cyanosis or clubbing  Neuro: normal speech and mental status        Results Review:      Results from last 7 days  Lab Units 03/09/18  0427 03/07/18  0317 03/06/18  0501   SODIUM mmol/L 137 139 139   POTASSIUM mmol/L 3.9 3.7 3.6   CHLORIDE mmol/L 99 100 100   CO2 mmol/L 26.7 27.0 32.0*   BUN mg/dL 42* 51* 59*   CREATININE mg/dL 1.94* 2.27* 2.48*   CALCIUM mg/dL 8.1* 8.3* 8.3*   BILIRUBIN mg/dL " 0.3 0.3 0.3   ALK PHOS U/L 64 65 63   ALT (SGPT) U/L 9 8 7   AST (SGOT) U/L 26 17 16   GLUCOSE mg/dL 98 97 96       Estimated Creatinine Clearance: 44.2 mL/min (by C-G formula based on Cr of 1.94).      Results from last 7 days  Lab Units 03/06/18  0501 03/05/18  0524 03/04/18  1220 03/03/18  0532   MAGNESIUM mg/dL 2.0 2.1 1.5*  --    PHOSPHORUS mg/dL 5.3*  --  6.2* 6.0*         Results from last 7 days  Lab Units 03/06/18  0501   URIC ACID mg/dL 8.9*         Results from last 7 days  Lab Units 03/09/18  0427 03/07/18  0317 03/06/18  0501 03/05/18  0524 03/04/18  1220   WBC 10*3/mm3 9.59 8.66 8.23 7.76 8.55   HEMOGLOBIN g/dL 8.5* 8.5* 8.4* 8.4* 8.9*   PLATELETS 10*3/mm3 272 272 311 300 321               Imaging Results (last 24 hours)     ** No results found for the last 24 hours. **          alteplase 2 mg Intravenous Once   epoetin kristen 20,000 Units Subcutaneous Weekly   heparin (porcine) 5,000 Units Subcutaneous Q8H   iopamidol 50 mL Intravenous Once in imaging   lidocaine PF 1% 5 mL Injection Once   metoprolol tartrate 12.5 mg Oral Q12H   mirtazapine 15 mg Oral Nightly   pantoprazole 40 mg Oral Q AM          Medication Review:   Current Facility-Administered Medications   Medication Dose Route Frequency Provider Last Rate Last Dose   • acetaminophen (TYLENOL) suppository 325 mg  325 mg Rectal Q4H PRN Edgard Felder MD   325 mg at 02/05/18 0653   • acetaminophen (TYLENOL) tablet 650 mg  650 mg Oral Q6H PRN Tri Guzman MD   650 mg at 03/01/18 0014   • albumin human 25 % IV SOLN 12.5 g  12.5 g Intravenous PRN Madeline Kay MD   12.5 g at 02/27/18 1305   • albuterol (PROVENTIL) nebulizer solution 0.083% 2.5 mg/3mL  2.5 mg Nebulization Q4H PRN Tri Guzman MD       • alteplase ((CATHFLO/ACTIVASE)) injection 2 mg  2 mg Intravenous Once Madeline Kay MD       • calcium gluconate 1 g in sodium chloride 0.9 % 50 mL IVPB  1 g Intravenous PRN Madeline Kay MD        Or   • calcium  gluconate 2 g in sodium chloride 0.9 % 50 mL IVPB  2 g Intravenous PRN Madeline Kya MD       • epoetin kristen (EPOGEN,PROCRIT) injection 20,000 Units  20,000 Units Subcutaneous Weekly Madeline Kay MD   20,000 Units at 03/08/18 1514   • heparin (porcine) 5000 UNIT/ML injection 5,000 Units  5,000 Units Subcutaneous Q8H Benjy Samson MD   5,000 Units at 03/09/18 0522   • heparin (porcine) injection 1,000-2,000 Units  1,000-2,000 Units Intravenous PRN Madeline Kay MD   4,000 Units at 02/15/18 1843   • heparin (porcine) injection 4,000 Units  4,000 Units Intracatheter PRN Madeline Kay MD   4,000 Units at 02/27/18 1258   • HYDROmorphone (DILAUDID) injection 1 mg  1 mg Intravenous BID PRN Bryant Spence MD   0.5 mg at 03/07/18 1421   • iopamidol (ISOVUE-250) 51 % injection 50 mL  50 mL Intravenous Once in imaging Meir Guillen MD       • lidocaine PF 1% (XYLOCAINE) injection 5 mL  5 mL Injection Once Dwayne Lyons MD       • magic mouthwash oral supsension 10 mL  10 mL Swish & Spit Q4H PRN Madeline Kay MD   10 mL at 03/08/18 2338   • metoprolol tartrate (LOPRESSOR) tablet 12.5 mg  12.5 mg Oral Q12H Bradford oS MD   12.5 mg at 03/08/18 2131   • mirtazapine (REMERON) tablet 15 mg  15 mg Oral Nightly Ernestina Hussein MD   15 mg at 03/08/18 2131   • ondansetron ODT (ZOFRAN-ODT) disintegrating tablet 4 mg  4 mg Oral Q6H PRN Tri Guzman MD        Or   • ondansetron (ZOFRAN) injection 4 mg  4 mg Intravenous Q6H PRN Tri Guzman MD   4 mg at 03/09/18 0523   • oxyCODONE-acetaminophen (PERCOCET) 5-325 MG per tablet 1 tablet  1 tablet Oral Q4H PRN Tri Guzman MD   1 tablet at 03/08/18 1149    Or   • oxyCODONE-acetaminophen (PERCOCET) 5-325 MG per tablet 2 tablet  2 tablet Oral Q4H PRN Tri Guzman MD       • pantoprazole (PROTONIX) EC tablet 40 mg  40 mg Oral Q AM Madeline Kay MD   40 mg at 03/09/18 0563   • promethazine  (PHENERGAN) injection 12.5 mg  12.5 mg Intravenous Q8H PRN Sarthak Figueroa MD   12.5 mg at 02/09/18 5930   • sodium chloride (OCEAN) nasal spray 2 spray  2 spray Each Nare PRN Ernestina Hussein MD           Assessment/Plan       1. Acute kidney injury on chronic kidney disease stage III due to shock. Has recovered.  Last HD 2/27. Serum creatinine today is 1.94, with the normal electrolytes.   2.  Chronic kidney disease stage III baseline Crt 1.5.   3.   SP incisional hernia repair 2/1.  Status post exploratory laparotomy with small bowel resection, mesh removal, RLQ port site debridement, and  Appendectomy 2/4.  SP subcutaneous debridement 2/13 and 2/16, 2/27. 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  4.  Acute on Chronic anemia: Hg  8.5.  On weekly EPO.  5.  Small bowel enterotomy, sp repair 2/4.    ESBL, now carbapenem resistant from wound.   E. coli from blood 2/5. Blood cultures 2/9 negative.  Wound Vac in place.  6. Encephalopathy . Resolved.   7. Afib with RVR.  Now in SR.  On beta blocker   8.  PCM. Improving po intake.  carnation instant breakfast to supplement.   9.  Oral mucositis. Resolved.   10. Debility.  Working with PT .      Plan:  1.  We'll continue the same treatment.  2.  We'll check lab on Sunday    Yo Yin MD  03/09/18  8:04 AM

## 2018-03-10 PROCEDURE — 25010000002 HEPARIN (PORCINE) PER 1000 UNITS: Performed by: INTERNAL MEDICINE

## 2018-03-10 PROCEDURE — 99024 POSTOP FOLLOW-UP VISIT: CPT | Performed by: SURGERY

## 2018-03-10 RX ORDER — CALCIUM CARBONATE 200(500)MG
1 TABLET,CHEWABLE ORAL 2 TIMES DAILY PRN
Status: DISCONTINUED | OUTPATIENT
Start: 2018-03-10 | End: 2018-03-12 | Stop reason: HOSPADM

## 2018-03-10 RX ADMIN — METOPROLOL TARTRATE 12.5 MG: 25 TABLET ORAL at 20:57

## 2018-03-10 RX ADMIN — HEPARIN SODIUM 5000 UNITS: 5000 INJECTION, SOLUTION INTRAVENOUS; SUBCUTANEOUS at 22:01

## 2018-03-10 RX ADMIN — MIRTAZAPINE 15 MG: 15 TABLET, FILM COATED ORAL at 20:57

## 2018-03-10 RX ADMIN — HEPARIN SODIUM 5000 UNITS: 5000 INJECTION, SOLUTION INTRAVENOUS; SUBCUTANEOUS at 06:25

## 2018-03-10 RX ADMIN — HEPARIN SODIUM 5000 UNITS: 5000 INJECTION, SOLUTION INTRAVENOUS; SUBCUTANEOUS at 14:30

## 2018-03-10 RX ADMIN — PANTOPRAZOLE SODIUM 40 MG: 40 TABLET, DELAYED RELEASE ORAL at 06:25

## 2018-03-10 RX ADMIN — METOPROLOL TARTRATE 12.5 MG: 25 TABLET ORAL at 08:53

## 2018-03-10 RX ADMIN — Medication 1 TABLET: at 22:00

## 2018-03-10 RX ADMIN — Medication 10 ML: at 22:00

## 2018-03-10 NOTE — NURSING NOTE
Pt's wound vac cannister was at 450 ml of sanguinous fluid. Changed wound vac and ensured that wound was still suctioning. Ordered extra supplies for bedside. Will continue to monitor.

## 2018-03-10 NOTE — PROGRESS NOTES
"   LOS: 37 days    Patient Care Team:  Zahida Coffman MD as PCP - General (Family Medicine)    Chief Complaint:  No chief complaint on file.      Subjective follow-up acute kidney injury on chronic kidney disease    Interval History:   She is feeling much better, eating better, no chest pain or shortness of air, no nausea or vomiting.  Urine output is good.  She has one diet placed, her tunnel dialysis catheter was removed, her last dialysis was on 2/27/2018     Objective     Vital Signs  Temp:  [98 °F (36.7 °C)-100.3 °F (37.9 °C)] 99 °F (37.2 °C)  Heart Rate:  [] 90  Resp:  [16-18] 18  BP: (106-129)/(61-77) 106/66    Flowsheet Rows         First Filed Value    Admission Height  172.7 cm (68\") Documented at 02/01/2018 1014    Admission Weight  123 kg (271 lb 14.4 oz) Documented at 02/01/2018 1014          I/O this shift:  In: 210 [P.O.:210]  Out: -   I/O last 3 completed shifts:  In: 120 [P.O.:120]  Out: 625 [Urine:625]    Intake/Output Summary (Last 24 hours) at 03/10/18 1039  Last data filed at 03/10/18 0900   Gross per 24 hour   Intake              210 ml   Output              625 ml   Net             -415 ml       Physical Exam:  General Appearance: alert, oriented x 3, no acute distress, morbidly obese  Skin: warm and dry  HEENT: Anicteric sclerae, oral mucosa normal,   Neck: supple, no JVD, trachea midline  Lungs: Clear breath sounds in the bases, unlabored breathing effort  Heart: RRR, normal S1 and S2, no S3, no rub  Abdomen: soft, non-tender,  present bowel sounds to auscultation, she had dressing and wound VAC  : no palpable bladder,  Extremities: no edema, cyanosis or clubbing  Neuro: normal speech and mental status        Results Review:      Results from last 7 days  Lab Units 03/09/18  0427 03/07/18  0317 03/06/18  0501   SODIUM mmol/L 137 139 139   POTASSIUM mmol/L 3.9 3.7 3.6   CHLORIDE mmol/L 99 100 100   CO2 mmol/L 26.7 27.0 32.0*   BUN mg/dL 42* 51* 59*   CREATININE mg/dL 1.94* 2.27* " 2.48*   CALCIUM mg/dL 8.1* 8.3* 8.3*   BILIRUBIN mg/dL 0.3 0.3 0.3   ALK PHOS U/L 64 65 63   ALT (SGPT) U/L 9 8 7   AST (SGOT) U/L 26 17 16   GLUCOSE mg/dL 98 97 96       Estimated Creatinine Clearance: 43.8 mL/min (by C-G formula based on SCr of 1.94 mg/dL (H)).      Results from last 7 days  Lab Units 03/06/18  0501 03/05/18  0524 03/04/18  1220   MAGNESIUM mg/dL 2.0 2.1 1.5*   PHOSPHORUS mg/dL 5.3*  --  6.2*         Results from last 7 days  Lab Units 03/06/18  0501   URIC ACID mg/dL 8.9*         Results from last 7 days  Lab Units 03/09/18  0427 03/07/18  0317 03/06/18  0501 03/05/18  0524 03/04/18  1220   WBC 10*3/mm3 9.59 8.66 8.23 7.76 8.55   HEMOGLOBIN g/dL 8.5* 8.5* 8.4* 8.4* 8.9*   PLATELETS 10*3/mm3 272 272 311 300 321               Imaging Results (last 24 hours)     ** No results found for the last 24 hours. **          alteplase 2 mg Intravenous Once   epoetin kristen 20,000 Units Subcutaneous Weekly   heparin (porcine) 5,000 Units Subcutaneous Q8H   iopamidol 50 mL Intravenous Once in imaging   lidocaine PF 1% 5 mL Injection Once   metoprolol tartrate 12.5 mg Oral Q12H   mirtazapine 15 mg Oral Nightly   pantoprazole 40 mg Oral Q AM          Medication Review:   Current Facility-Administered Medications   Medication Dose Route Frequency Provider Last Rate Last Dose   • acetaminophen (TYLENOL) suppository 325 mg  325 mg Rectal Q4H PRN Edgard Felder MD   325 mg at 02/05/18 0653   • acetaminophen (TYLENOL) tablet 650 mg  650 mg Oral Q6H PRN Tri Guzman MD   650 mg at 03/01/18 0014   • albumin human 25 % IV SOLN 12.5 g  12.5 g Intravenous PRN Madeline Kay MD   12.5 g at 02/27/18 1305   • albuterol (PROVENTIL) nebulizer solution 0.083% 2.5 mg/3mL  2.5 mg Nebulization Q4H PRN Tri Guzman MD       • alteplase ((CATHFLO/ACTIVASE)) injection 2 mg  2 mg Intravenous Once Madeline Kay MD       • calcium gluconate 1 g in sodium chloride 0.9 % 50 mL IVPB  1 g Intravenous PRN Madeline  America Kay MD        Or   • calcium gluconate 2 g in sodium chloride 0.9 % 50 mL IVPB  2 g Intravenous PRN Madeline Kay MD       • epoetin kristen (EPOGEN,PROCRIT) injection 20,000 Units  20,000 Units Subcutaneous Weekly Madeline Kay MD   20,000 Units at 03/08/18 1514   • heparin (porcine) 5000 UNIT/ML injection 5,000 Units  5,000 Units Subcutaneous Q8H Benjy Samson MD   5,000 Units at 03/10/18 0625   • heparin (porcine) injection 1,000-2,000 Units  1,000-2,000 Units Intravenous PRN Madeline Kay MD   4,000 Units at 02/15/18 1843   • heparin (porcine) injection 4,000 Units  4,000 Units Intracatheter PRN Madeline Kay MD   4,000 Units at 02/27/18 1258   • HYDROmorphone (DILAUDID) injection 1 mg  1 mg Intravenous BID PRN Bryant Spence MD   0.5 mg at 03/09/18 1449   • iopamidol (ISOVUE-250) 51 % injection 50 mL  50 mL Intravenous Once in imaging Meir Guillen MD       • lidocaine PF 1% (XYLOCAINE) injection 5 mL  5 mL Injection Once Dwayne Lyons MD       • magic mouthwash oral supsension 10 mL  10 mL Swish & Spit Q4H PRN Madeline Kay MD   10 mL at 03/09/18 2130   • metoprolol tartrate (LOPRESSOR) tablet 12.5 mg  12.5 mg Oral Q12H Bradford So MD   12.5 mg at 03/10/18 0853   • mirtazapine (REMERON) tablet 15 mg  15 mg Oral Nightly Ernestina Hussein MD   15 mg at 03/09/18 2130   • ondansetron ODT (ZOFRAN-ODT) disintegrating tablet 4 mg  4 mg Oral Q6H PRN Tri Guzman MD        Or   • ondansetron (ZOFRAN) injection 4 mg  4 mg Intravenous Q6H PRN Tri Guzman MD   4 mg at 03/09/18 0523   • oxyCODONE-acetaminophen (PERCOCET) 5-325 MG per tablet 1 tablet  1 tablet Oral Q4H PRN Tri Guzman MD   1 tablet at 03/09/18 1032    Or   • oxyCODONE-acetaminophen (PERCOCET) 5-325 MG per tablet 2 tablet  2 tablet Oral Q4H PRN Tri Guzman MD       • pantoprazole (PROTONIX) EC tablet 40 mg  40 mg Oral Q AM Madeline Kay MD   40 mg at  03/10/18 0625   • promethazine (PHENERGAN) injection 12.5 mg  12.5 mg Intravenous Q8H PRN Sarthak Figueroa MD   12.5 mg at 02/09/18 2340   • sodium chloride (OCEAN) nasal spray 2 spray  2 spray Each Nare PRN Ernestina Hussein MD           Assessment/Plan       1. Acute kidney injury on chronic kidney disease stage III due to shock. Has recovered.  Last HD 2/27. Serum creatinine today is 1.94, with the normal electrolytes.  Monitor labs for now.  2.  Chronic kidney disease stage III baseline Crt 1.5.   3.   SP incisional hernia repair 2/1.  Status post exploratory laparotomy with small bowel resection, mesh removal, RLQ port site debridement, and  Appendectomy 2/4.  SP subcutaneous debridement 2/13 and 2/16, 2/27. 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  4.  Acute on Chronic anemia: Hg  8.5.  On weekly EPO.  5.  Small bowel enterotomy, sp repair 2/4.    ESBL, now carbapenem resistant from wound.   E. coli from blood 2/5. Blood cultures 2/9 negative.  Wound Vac in place.  6. Encephalopathy . Resolved.   7. Afib with RVR.  Now in SR.  On beta blocker   8.  PCM. Improving po intake.  carnation instant breakfast to supplement.   9.  Oral mucositis. Resolved.   10. Debility.  Working with PT .        Juan Howard MD  03/10/18  10:39 AM

## 2018-03-10 NOTE — SIGNIFICANT NOTE
03/10/18 1646   Rehab Treatment   Discipline physical therapist   Reason Treatment Not Performed patient/family declined treatment  (Pt states she just got back to bed after using BSC and sitting EOB. Pt asked for PT to check back tomorrow.)   Recommendation   PT - Next Appointment 03/11/18

## 2018-03-10 NOTE — PROGRESS NOTES
"   LOS: 37 days   Patient Care Team:  Zahida Coffman MD as PCP - General (Family Medicine)    Chief Complaint: Abdominal wound    Subjective     History of Present Illness    Subjective    The patient is feeling well.    Objective     Vital Signs  Temp:  [98 °F (36.7 °C)-100.3 °F (37.9 °C)] 99 °F (37.2 °C)  Heart Rate:  [] 90  Resp:  [16-18] 18  BP: (106-129)/(61-77) 106/66    Objective:  General Appearance:  Comfortable and in no acute distress.    Vital signs: (most recent): Blood pressure 106/66, pulse 90, temperature 99 °F (37.2 °C), temperature source Oral, resp. rate 18, height 172.7 cm (68\"), weight 116 kg (256 lb 1.6 oz), SpO2 95 %.    Abdomen: (Wound VAC device in place).             Results Review:     I reviewed the patient's new clinical results.    Medication Review: Reviewed.    Assessment/Plan     Principal Problem:    Wound infection  Active Problems:    Incisional hernia, without obstruction or gangrene    Recurrent ventral hernia    Bacteremia, escherichia coli    Complications, dialysis, catheter, mechanical, initial encounter      Assessment & Plan     The patient stable with wound VAC device in place.  Plan for rehab on Monday.    Edgardo Ugalde Jr., MD  03/10/18  12:15 PM          "

## 2018-03-10 NOTE — PLAN OF CARE
Problem: Patient Care Overview  Goal: Plan of Care Review  Outcome: Ongoing (interventions implemented as appropriate)   03/10/18 0503   Coping/Psychosocial   Plan of Care Reviewed With patient;spouse   Plan of Care Review   Progress improving   OTHER   Outcome Summary Pt showing slowly improving endurance for BSC activities. Wound vac in place with good seal @ 125 suction.      Goal: Interprofessional Rounds/Family Conf  Outcome: Ongoing (interventions implemented as appropriate)

## 2018-03-11 LAB
ALBUMIN SERPL-MCNC: 1.9 G/DL (ref 3.5–5.2)
ANION GAP SERPL CALCULATED.3IONS-SCNC: 13.2 MMOL/L
BASOPHILS # BLD AUTO: 0.04 10*3/MM3 (ref 0–0.2)
BASOPHILS NFR BLD AUTO: 0.4 % (ref 0–1.5)
BUN BLD-MCNC: 33 MG/DL (ref 6–20)
BUN/CREAT SERPL: 16.6 (ref 7–25)
CALCIUM SPEC-SCNC: 8.2 MG/DL (ref 8.6–10.5)
CHLORIDE SERPL-SCNC: 99 MMOL/L (ref 98–107)
CO2 SERPL-SCNC: 24.8 MMOL/L (ref 22–29)
CREAT BLD-MCNC: 1.99 MG/DL (ref 0.57–1)
DEPRECATED RDW RBC AUTO: 51.3 FL (ref 37–54)
EOSINOPHIL # BLD AUTO: 0.31 10*3/MM3 (ref 0–0.7)
EOSINOPHIL NFR BLD AUTO: 3.1 % (ref 0.3–6.2)
ERYTHROCYTE [DISTWIDTH] IN BLOOD BY AUTOMATED COUNT: 14.9 % (ref 11.7–13)
GFR SERPL CREATININE-BSD FRML MDRD: 26 ML/MIN/1.73
GLUCOSE BLD-MCNC: 95 MG/DL (ref 65–99)
HCT VFR BLD AUTO: 26.2 % (ref 35.6–45.5)
HGB BLD-MCNC: 8.1 G/DL (ref 11.9–15.5)
IMM GRANULOCYTES # BLD: 0.15 10*3/MM3 (ref 0–0.03)
IMM GRANULOCYTES NFR BLD: 1.5 % (ref 0–0.5)
LYMPHOCYTES # BLD AUTO: 1.29 10*3/MM3 (ref 0.9–4.8)
LYMPHOCYTES NFR BLD AUTO: 12.9 % (ref 19.6–45.3)
MCH RBC QN AUTO: 28.8 PG (ref 26.9–32)
MCHC RBC AUTO-ENTMCNC: 30.9 G/DL (ref 32.4–36.3)
MCV RBC AUTO: 93.2 FL (ref 80.5–98.2)
MONOCYTES # BLD AUTO: 0.88 10*3/MM3 (ref 0.2–1.2)
MONOCYTES NFR BLD AUTO: 8.8 % (ref 5–12)
NEUTROPHILS # BLD AUTO: 7.35 10*3/MM3 (ref 1.9–8.1)
NEUTROPHILS NFR BLD AUTO: 73.3 % (ref 42.7–76)
PHOSPHATE SERPL-MCNC: 4.7 MG/DL (ref 2.5–4.5)
PLATELET # BLD AUTO: 336 10*3/MM3 (ref 140–500)
PMV BLD AUTO: 9 FL (ref 6–12)
POTASSIUM BLD-SCNC: 3.9 MMOL/L (ref 3.5–5.2)
RBC # BLD AUTO: 2.81 10*6/MM3 (ref 3.9–5.2)
SODIUM BLD-SCNC: 137 MMOL/L (ref 136–145)
WBC NRBC COR # BLD: 10.02 10*3/MM3 (ref 4.5–10.7)

## 2018-03-11 PROCEDURE — 80069 RENAL FUNCTION PANEL: CPT | Performed by: INTERNAL MEDICINE

## 2018-03-11 PROCEDURE — 85025 COMPLETE CBC W/AUTO DIFF WBC: CPT | Performed by: INTERNAL MEDICINE

## 2018-03-11 PROCEDURE — 97110 THERAPEUTIC EXERCISES: CPT

## 2018-03-11 PROCEDURE — 25010000002 HEPARIN (PORCINE) PER 1000 UNITS: Performed by: INTERNAL MEDICINE

## 2018-03-11 PROCEDURE — 99024 POSTOP FOLLOW-UP VISIT: CPT | Performed by: SURGERY

## 2018-03-11 RX ADMIN — PANTOPRAZOLE SODIUM 40 MG: 40 TABLET, DELAYED RELEASE ORAL at 06:12

## 2018-03-11 RX ADMIN — METOPROLOL TARTRATE 12.5 MG: 25 TABLET ORAL at 09:00

## 2018-03-11 RX ADMIN — METOPROLOL TARTRATE 12.5 MG: 25 TABLET ORAL at 20:16

## 2018-03-11 RX ADMIN — Medication 10 ML: at 17:14

## 2018-03-11 RX ADMIN — HEPARIN SODIUM 5000 UNITS: 5000 INJECTION, SOLUTION INTRAVENOUS; SUBCUTANEOUS at 06:12

## 2018-03-11 RX ADMIN — MIRTAZAPINE 15 MG: 15 TABLET, FILM COATED ORAL at 20:16

## 2018-03-11 RX ADMIN — HEPARIN SODIUM 5000 UNITS: 5000 INJECTION, SOLUTION INTRAVENOUS; SUBCUTANEOUS at 22:00

## 2018-03-11 NOTE — PLAN OF CARE
Problem: Patient Care Overview  Goal: Plan of Care Review  Outcome: Ongoing (interventions implemented as appropriate)   03/11/18 1667   Coping/Psychosocial   Plan of Care Reviewed With patient   OTHER   Outcome Summary Pt demonstrates increased activity tolerance and functional strength as she was able to participate in gait training training and required less assistance for transfers and gait. Pt continues to be limited by generalized weakness and pain. PT will continue to follow to address strength, mobiity, and gait.

## 2018-03-11 NOTE — PLAN OF CARE
Problem: Fall Risk (Adult)  Goal: Identify Related Risk Factors and Signs and Symptoms  Outcome: Outcome(s) achieved Date Met: 03/11/18    Goal: Absence of Fall  Outcome: Ongoing (interventions implemented as appropriate)      Problem: Patient Care Overview  Goal: Plan of Care Review  Outcome: Ongoing (interventions implemented as appropriate)   03/11/18 0458   Coping/Psychosocial   Plan of Care Reviewed With patient;spouse   Plan of Care Review   Progress no change   OTHER   Outcome Summary Patients vitals stable. Patient denies pain and discomfort. Patient reports some heartburn. Order for tums obtained. Patients spouse at bedside. WD vac intact without difficulty. Will continue to monitor.       Problem: Renal Failure/Kidney Injury, Acute (Adult)  Goal: Signs and Symptoms of Listed Potential Problems Will be Absent, Minimized or Managed (Renal Failure/Kidney Injury, Acute)  Outcome: Ongoing (interventions implemented as appropriate)      Problem: Sepsis/Septic Shock (Adult)  Goal: Signs and Symptoms of Listed Potential Problems Will be Absent, Minimized or Managed (Sepsis/Septic Shock)  Outcome: Ongoing (interventions implemented as appropriate)      Problem: Wound (Includes Pressure Injury) (Adult)  Goal: Signs and Symptoms of Listed Potential Problems Will be Absent, Minimized or Managed (Wound)  Outcome: Ongoing (interventions implemented as appropriate)      Problem: Pain, Acute (Adult)  Goal: Identify Related Risk Factors and Signs and Symptoms  Outcome: Outcome(s) achieved Date Met: 03/11/18    Goal: Acceptable Pain Control/Comfort Level  Outcome: Ongoing (interventions implemented as appropriate)      Problem: Nutrition, Parenteral (Adult)  Goal: Signs and Symptoms of Listed Potential Problems Will be Absent, Minimized or Managed (Nutrition, Parenteral)  Outcome: Ongoing (interventions implemented as appropriate)      Problem: Skin Injury Risk (Adult)  Goal: Identify Related Risk Factors and Signs and  Symptoms  Outcome: Outcome(s) achieved Date Met: 03/11/18    Goal: Skin Health and Integrity  Outcome: Ongoing (interventions implemented as appropriate)

## 2018-03-11 NOTE — PROGRESS NOTES
"   LOS: 38 days   Patient Care Team:  Zahida Coffman MD as PCP - General (Family Medicine)    Chief Complaint: Abdominal wound    Subjective     History of Present Illness    Subjective    The patient is stable with no complaints.    Objective     Vital Signs  Temp:  [99.6 °F (37.6 °C)-99.7 °F (37.6 °C)] 99.6 °F (37.6 °C)  Heart Rate:  [101-108] 101  Resp:  [16-18] 16  BP: (111-129)/(61-69) 129/65    Objective:  General Appearance:  Comfortable and in no acute distress.    Vital signs: (most recent): Blood pressure 129/65, pulse 101, temperature 99.6 °F (37.6 °C), temperature source Oral, resp. rate 16, height 172.7 cm (68\"), weight 116 kg (255 lb 11.7 oz), SpO2 94 %.    Neurological: Patient is alert and oriented to person, place and time.              Results Review:     I reviewed the patient's new clinical results.    Medication Review: Reviewed.    Assessment/Plan     Principal Problem:    Wound infection  Active Problems:    Incisional hernia, without obstruction or gangrene    Recurrent ventral hernia    Bacteremia, escherichia coli    Complications, dialysis, catheter, mechanical, initial encounter      Assessment & Plan     The plan is to transfer to rehabilitation tomorrow.    Edgardo Ugalde Jr., MD  03/11/18  1:36 PM          "

## 2018-03-11 NOTE — THERAPY TREATMENT NOTE
Acute Care - Physical Therapy Treatment Note  [facility]     Patient Name: Mariya Campos  : 1960  MRN: [id number]  Today's Date: 3/11/2018             Admit Date: 2018    Visit Dx:    ICD-10-CM ICD-9-CM   1. Incisional hernia, without obstruction or gangrene K43.2 553.21   2. Recurrent ventral hernia K43.2 553.21   3. Generalized weakness R53.1 780.79   4. CRF (chronic renal failure), stage 3 (moderate) N18.3 585.3   5. Wound infection T14.8XXA 958.3    L08.9    6. ARF (acute renal failure) with tubular necrosis N17.0 584.5     Patient Active Problem List   Diagnosis   • Atopic rhinitis   • Fatigue   • Abnormal mammogram   • Adiposity   • Abnormal ECG   • CRF (chronic renal failure), stage 3 (moderate)   • Anemia   • Recurrent UTI   • Osteopenia   • Essential hypertension   • Incisional hernia   • Moderate persistent asthma without complication   • Incisional hernia, without obstruction or gangrene   • Recurrent ventral hernia   • Bacteremia, escherichia coli   • Wound infection   • Complications, dialysis, catheter, mechanical, initial encounter       Therapy Treatment    Therapy Treatment / Health Promotion    Treatment Time/Intention  Discipline: physical therapist  Document Type: therapy note (daily note)  Subjective Information: complains of, weakness, fatigue  Mode of Treatment: physical therapy  Patient/Family Observations: pt sitting in chair, family present, no acute distress noted at rest  Patient Effort: good  Plan of Care Review  Plan of Care Reviewed With: patient    Vitals/Pain/Safety  Pain Assessment  Additional Documentation: Pain Scale: Numbers Pre/Post-Treatment (Group)  Pain Scale: Numbers Pre/Post-Treatment  Pain Location: abdomen  Pain Intervention(s): Repositioned  Pain Scale: Word Pre/Post-Treatment  Pain Location: abdomen  Pain Intervention(s): Repositioned  Pain Scale: FACES Pre/Post-Treatment  Pain Location: abdomen  Pain Intervention(s): Repositioned  Safety Issues,  Functional Mobility  Safety Issues Affecting Function (Mobility): steps too close to assistive device  Impairments Affecting Function (Mobility): pain, strength  Positioning and Restraints  Pre-Treatment Position: sitting in chair/recliner  Post Treatment Position: chair  In Chair: reclined, encouraged to call for assist, call light within reach, with family/caregiver    Mobility,ADL,Motor, Modality  Bed Mobility Assessment/Treatment  Supine-Sit [place] (Bed Mobility): not tested  Sit-Supine [place] (Bed Mobility): not tested  Comment (Bed Mobility): sitting in chair  Transfer Assessment/Treatment  Transfer Assessment/Treatment: sit-stand transfer, stand-sit transfer  Sit-Stand Transfer  Sit-Stand [place] (Transfers): minimum assist (75% patient effort)  Assistive Device (Sit-Stand Transfers): walker, front-wheeled  Stand-Sit Transfer  Stand-Sit [place] (Transfers): minimum assist (75% patient effort)  Assistive Device (Stand-Sit Transfers): walker, front-wheeled  Gait/Stairs Assessment/Training  [place] Level (Gait): minimum assist (75% patient effort)  Assistive Device (Gait): walker, front-wheeled  Distance in Feet (Gait): 10 (several steps forward and back)  Deviations/Abnormal Patterns (Gait): antalgic, jovany decreased (forward flexed posture)  Bilateral Gait Deviations: heel strike decreased     Motor Skills Assessment/Interventions  Additional Documentation: Therapeutic Exercise (Group)  Therapeutic Exercise  Lower Extremity (Therapeutic Exercise): LAQ (long arc quad), left, LAQ (long arc quad), right  Lower Extremity Range of Motion (Therapeutic Exercise): ankle dorsiflexion/plantar flexion, bilateral  Position (Therapeutic Exercise): seated  Sets/Reps (Therapeutic Exercise): 10 reps           ROM/MMT             Sensory, Edema, Orthotics          Cognition, Communication, Swallow  Cognitive Assessment Intervention- SLP  Cognitive Function (Cognition): WFL  Orientation Status  (Cognition): person, place, time, situation    Outcome Summary             Physical Therapy Education     Title: PT OT SLP Therapies (Active)     Topic: Physical Therapy (Done)     Point: Mobility training (Done)    Learning Progress Summary     Learner Status Readiness Method Response Comment Documented by    Patient Done Acceptance E,TB,D VU,NR  CH 03/11/18 0919     Done Acceptance E VU,NR  KH 03/08/18 1449     Done Acceptance E,TB,D VU,NR  RW 03/07/18 1355     Done Acceptance E VU,NR  EJ 03/06/18 1610     Done Acceptance E,TB,D VU,NR  RW 03/05/18 1347     Done Acceptance E,TB,D NR,VU  SV 03/04/18 1706     Done Acceptance E,TB VU,DU  CW 03/02/18 1351     Done Acceptance E VU education on importance of mobility; education on BLE strengthening and core strengthening for improved mobility; education on transfer training AA 03/01/18 0921     Done Acceptance E VU  AA 02/28/18 1535     Done Acceptance E,TB DU,VU  CW 02/27/18 0908     Done Acceptance E,D VU,NR  EJ 02/25/18 1543     Done Acceptance E,TB,D VU,NR  CH 02/23/18 1558     Done Acceptance E VU,NR  AA 02/17/18 1156     Done Acceptance E,TB,D VU,NR  SH 02/12/18 0902     Active Acceptance E,TB,D NR  RW 02/11/18 1410     Active Acceptance E,D NR  PC 02/09/18 1725     Active Acceptance E NR  EM 02/08/18 1319    Family Done Acceptance E,TB,D VU,NR  CH 03/11/18 0919     Done Acceptance E VU education on importance of mobility; education on BLE strengthening and core strengthening for improved mobility; education on transfer training AA 03/01/18 0921     Done Acceptance E,D VU,NR  EJ 02/25/18 1543     Done Acceptance E,TB,D VU,NR  SH 02/12/18 0902          Point: Home exercise program (Done)    Learning Progress Summary     Learner Status Readiness Method Response Comment Documented by    Patient Done Acceptance E,TB,D VU,NR  CH 03/11/18 0919     Done Acceptance E VU,NR  KH 03/08/18 1449     Done Acceptance E,TB,D VU,NR  RW 03/07/18 1355     Done Acceptance E,TB,D NR,VU   SV 03/04/18 1706     Done Acceptance E,TB VU,DU  CW 03/02/18 1351     Done Acceptance E VU education on importance of mobility; education on BLE strengthening and core strengthening for improved mobility; education on transfer training AA 03/01/18 0921     Done Acceptance E VU  AA 02/28/18 1535     Done Acceptance E,TB DU,VU  CW 02/27/18 0908     Done Acceptance E,D VU,NR  EJ 02/25/18 1543     Active Acceptance E,D NR  PC 02/21/18 1419     Active Acceptance E,D NR  PC 02/15/18 1453     Done Acceptance E,TB,D VU,NR  SH 02/12/18 0902     Active Acceptance E,TB,D NR  RW 02/11/18 1410     Active Acceptance E,D NR  PC 02/09/18 1725     Active Acceptance E NR  EM 02/08/18 1319    Family Done Acceptance E,TB,D VU,NR  CH 03/11/18 0919     Done Acceptance E VU education on importance of mobility; education on BLE strengthening and core strengthening for improved mobility; education on transfer training AA 03/01/18 0921     Done Acceptance E,D VU,NR  EJ 02/25/18 1543     Done Acceptance E,TB,D VU,NR  SH 02/12/18 0902          Point: Body mechanics (Done)    Learning Progress Summary     Learner Status Readiness Method Response Comment Documented by    Patient Done Acceptance E,TB,D VU,NR  CH 03/11/18 0919     Done Acceptance E VU,NR   03/08/18 1449     Done Acceptance E,TB,D VU,NR  RW 03/07/18 1355     Done Acceptance E,TB,D VU,NR  RW 03/05/18 1347     Done Acceptance E,TB,D NR,VU  SV 03/04/18 1706     Done Acceptance E,TB VU,DU  CW 03/02/18 1351     Done Acceptance E VU education on importance of mobility; education on BLE strengthening and core strengthening for improved mobility; education on transfer training AA 03/01/18 0921     Done Acceptance E VU  AA 02/28/18 1535     Done Acceptance E,TB DU,VU  CW 02/27/18 0908     Done Acceptance E,D VU,NR  EJ 02/25/18 1543     Done Acceptance E,TB,D VU,NR  CH 02/23/18 1558     Done Acceptance E,TB,D VU,NR  SH 02/12/18 0902     Active Acceptance E,TB,D NR  RW 02/11/18 1411      Active Acceptance E,D NR  PC 02/09/18 1725    Family Done Acceptance E,TB,D VU,NR  CH 03/11/18 0919     Done Acceptance E VU education on importance of mobility; education on BLE strengthening and core strengthening for improved mobility; education on transfer training AA 03/01/18 0921     Done Acceptance E,D VU,NR  EJ 02/25/18 1543     Done Acceptance E,TB,D VU,NR  SH 02/12/18 0902          Point: Precautions (Done)    Learning Progress Summary     Learner Status Readiness Method Response Comment Documented by    Patient Done Acceptance E,TB,D VU,NR  CH 03/11/18 0919     Done Acceptance E,TB,D VU,NR  RW 03/07/18 1355     Done Acceptance E,TB,D VU,NR  RW 03/05/18 1347     Done Acceptance E,TB,D NR,VU  SV 03/04/18 1706     Done Acceptance E,TB VU,DU  CW 03/02/18 1351     Done Acceptance E VU education on importance of mobility; education on BLE strengthening and core strengthening for improved mobility; education on transfer training AA 03/01/18 0921     Done Acceptance E VU  AA 02/28/18 1535     Done Acceptance E,TB DU,VU  CW 02/27/18 0908     Done Acceptance E,D VU,NR  EJ 02/25/18 1543     Done Acceptance E,TB,D VU,NR   02/23/18 1558     Done Acceptance E VU,NR  AA 02/17/18 1156     Done Acceptance E,TB,D VU,NR  SH 02/12/18 0902     Active Acceptance E,TB,D NR  RW 02/11/18 1410     Active Acceptance E,D NR  PC 02/09/18 1725    Family Done Acceptance E,TB,D VU,NR  CH 03/11/18 0919     Done Acceptance E VU education on importance of mobility; education on BLE strengthening and core strengthening for improved mobility; education on transfer training AA 03/01/18 0921     Done Acceptance E,D VU,NR  EJ 02/25/18 1543     Done Acceptance E,TB,D VU,NR  SH 02/12/18 0902                      User Key     Initials Effective Dates Name Provider Type Discipline     05/18/15 -  Katelin Tomas, PT Physical Therapist PT    CH 12/01/15 -  Isabel Manning, PT Physical Therapist PT    PC 12/01/15 -  Joycelyn Fisher, PT  Physical Therapist PT    EM 12/01/15 -  Sade Vergara, PT Physical Therapist PT    EJ 04/21/17 -  Shira Martínez, PT Physical Therapist PT    SV 01/17/16 - 03/06/18 Yelena Beltran, PT Physical Therapist PT    RW 04/06/16 - 03/06/18 Radha Bonds, PTA Physical Therapy Assistant PT    RW 03/07/18 -  Radha Bonds, PTA Physical Therapy Assistant PT    CW 12/13/16 - 03/06/18 Leandro Posada, PTA Physical Therapy Assistant PT    AA 09/05/17 -  Manju Laguerre, PT Physical Therapist PT    SH 01/08/18 -  Sophy Catalan, PT Student PT Student PT                    PT Recommendation and Plan     Plan of Care Reviewed With: patient  Outcome Summary: Pt demonstrates increased activity tolerance and functional strength as she was able to participate in gait training training and required less assistance for transfers and gait. Pt continues to be limited by generalized weakness and pain. PT will continue to follow to address strength, mobiity, and gait.          Outcome Measures     Row Name 03/11/18 0900             How much help from another person do you currently need...    Turning from your back to your side while in flat bed without using bedrails? 3  -CH      Moving from lying on back to sitting on the side of a flat bed without bedrails? 3  -CH      Moving to and from a bed to a chair (including a wheelchair)? 3  -CH      Standing up from a chair using your arms (e.g., wheelchair, bedside chair)? 3  -CH      Climbing 3-5 steps with a railing? 1  -CH      To walk in hospital room? 3  -CH      AM-PAC 6 Clicks Score 16  -CH         Functional Assessment    Outcome Measure Options AM-PAC 6 Clicks Basic Mobility (PT)  -CH        User Key  (r) = Recorded By, (t) = Taken By, (c) = Cosigned By    Initials Name Provider Type     Isabel Manning, PT Physical Therapist           Time Calculation:         PT Charges     Row Name 03/11/18 0924             Time Calculation    Start Time 0857  -CH      Stop Time  0912  -      Time Calculation (min) 15 min  -      PT Received On 03/11/18  -      PT - Next Appointment 03/12/18  -        User Key  (r) = Recorded By, (t) = Taken By, (c) = Cosigned By    Initials Name Provider Type     Isabel Manning PT Physical Therapist          Therapy Charges for Today     Code Description Service Date Service Provider Modifiers Qty    [id number] HC PT THER PROC EA 15 MIN 3/11/2018 Isabel Manning, PT GP 1          PT G-Codes  Outcome Measure Options: AM-PAC 6 Clicks Basic Mobility (PT)    Isabel Manning PT  3/11/2018

## 2018-03-11 NOTE — NURSING NOTE
This nurse changed wound vac cannister. Ensured dressing stayed intact. Dressing pressure continued. Wound vac cannister contained 400 ml. Will continue to monitor.

## 2018-03-12 VITALS
BODY MASS INDEX: 38.76 KG/M2 | WEIGHT: 255.73 LBS | OXYGEN SATURATION: 97 % | SYSTOLIC BLOOD PRESSURE: 138 MMHG | HEART RATE: 90 BPM | HEIGHT: 68 IN | TEMPERATURE: 99.6 F | DIASTOLIC BLOOD PRESSURE: 71 MMHG | RESPIRATION RATE: 18 BRPM

## 2018-03-12 LAB
ANION GAP SERPL CALCULATED.3IONS-SCNC: 13.6 MMOL/L
BASOPHILS # BLD AUTO: 0.04 10*3/MM3 (ref 0–0.2)
BASOPHILS NFR BLD AUTO: 0.4 % (ref 0–1.5)
BUN BLD-MCNC: 28 MG/DL (ref 6–20)
BUN/CREAT SERPL: 14.1 (ref 7–25)
CALCIUM SPEC-SCNC: 8.3 MG/DL (ref 8.6–10.5)
CHLORIDE SERPL-SCNC: 99 MMOL/L (ref 98–107)
CO2 SERPL-SCNC: 25.4 MMOL/L (ref 22–29)
CREAT BLD-MCNC: 1.98 MG/DL (ref 0.57–1)
DEPRECATED RDW RBC AUTO: 51.7 FL (ref 37–54)
EOSINOPHIL # BLD AUTO: 0.28 10*3/MM3 (ref 0–0.7)
EOSINOPHIL NFR BLD AUTO: 2.5 % (ref 0.3–6.2)
ERYTHROCYTE [DISTWIDTH] IN BLOOD BY AUTOMATED COUNT: 15 % (ref 11.7–13)
GFR SERPL CREATININE-BSD FRML MDRD: 26 ML/MIN/1.73
GLUCOSE BLD-MCNC: 104 MG/DL (ref 65–99)
HCT VFR BLD AUTO: 27.3 % (ref 35.6–45.5)
HGB BLD-MCNC: 8.3 G/DL (ref 11.9–15.5)
IMM GRANULOCYTES # BLD: 0.16 10*3/MM3 (ref 0–0.03)
IMM GRANULOCYTES NFR BLD: 1.4 % (ref 0–0.5)
LYMPHOCYTES # BLD AUTO: 1.08 10*3/MM3 (ref 0.9–4.8)
LYMPHOCYTES NFR BLD AUTO: 9.6 % (ref 19.6–45.3)
MCH RBC QN AUTO: 28.5 PG (ref 26.9–32)
MCHC RBC AUTO-ENTMCNC: 30.4 G/DL (ref 32.4–36.3)
MCV RBC AUTO: 93.8 FL (ref 80.5–98.2)
MONOCYTES # BLD AUTO: 0.79 10*3/MM3 (ref 0.2–1.2)
MONOCYTES NFR BLD AUTO: 7 % (ref 5–12)
NEUTROPHILS # BLD AUTO: 8.88 10*3/MM3 (ref 1.9–8.1)
NEUTROPHILS NFR BLD AUTO: 79.1 % (ref 42.7–76)
NRBC BLD MANUAL-RTO: 0 /100 WBC (ref 0–0)
PLATELET # BLD AUTO: 398 10*3/MM3 (ref 140–500)
PMV BLD AUTO: 9.1 FL (ref 6–12)
POTASSIUM BLD-SCNC: 3.8 MMOL/L (ref 3.5–5.2)
RBC # BLD AUTO: 2.91 10*6/MM3 (ref 3.9–5.2)
SODIUM BLD-SCNC: 138 MMOL/L (ref 136–145)
WBC NRBC COR # BLD: 11.23 10*3/MM3 (ref 4.5–10.7)

## 2018-03-12 PROCEDURE — 85025 COMPLETE CBC W/AUTO DIFF WBC: CPT | Performed by: SURGERY

## 2018-03-12 PROCEDURE — 94799 UNLISTED PULMONARY SVC/PX: CPT

## 2018-03-12 PROCEDURE — 80048 BASIC METABOLIC PNL TOTAL CA: CPT | Performed by: INTERNAL MEDICINE

## 2018-03-12 PROCEDURE — 25010000002 HEPARIN (PORCINE) PER 1000 UNITS: Performed by: INTERNAL MEDICINE

## 2018-03-12 PROCEDURE — 99024 POSTOP FOLLOW-UP VISIT: CPT | Performed by: SURGERY

## 2018-03-12 RX ORDER — SULFAMETHOXAZOLE AND TRIMETHOPRIM 800; 160 MG/1; MG/1
1 TABLET ORAL EVERY 12 HOURS SCHEDULED
Status: DISCONTINUED | OUTPATIENT
Start: 2018-03-12 | End: 2018-03-12 | Stop reason: HOSPADM

## 2018-03-12 RX ORDER — SULFAMETHOXAZOLE AND TRIMETHOPRIM 800; 160 MG/1; MG/1
1 TABLET ORAL EVERY 12 HOURS SCHEDULED
Qty: 20 TABLET | Refills: 0 | Status: SHIPPED | OUTPATIENT
Start: 2018-03-12 | End: 2018-03-22

## 2018-03-12 RX ORDER — OXYCODONE HYDROCHLORIDE AND ACETAMINOPHEN 5; 325 MG/1; MG/1
1 TABLET ORAL EVERY 4 HOURS PRN
Qty: 30 TABLET | Refills: 0 | Status: SHIPPED | OUTPATIENT
Start: 2018-03-12 | End: 2018-03-22

## 2018-03-12 RX ORDER — HYDROMORPHONE HYDROCHLORIDE 1 MG/ML
0.5 INJECTION, SOLUTION INTRAMUSCULAR; INTRAVENOUS; SUBCUTANEOUS
Status: DISCONTINUED | OUTPATIENT
Start: 2018-03-12 | End: 2018-03-12 | Stop reason: HOSPADM

## 2018-03-12 RX ADMIN — PANTOPRAZOLE SODIUM 40 MG: 40 TABLET, DELAYED RELEASE ORAL at 08:19

## 2018-03-12 RX ADMIN — HEPARIN SODIUM 5000 UNITS: 5000 INJECTION, SOLUTION INTRAVENOUS; SUBCUTANEOUS at 08:19

## 2018-03-12 RX ADMIN — HEPARIN SODIUM 5000 UNITS: 5000 INJECTION, SOLUTION INTRAVENOUS; SUBCUTANEOUS at 14:37

## 2018-03-12 RX ADMIN — HYDROMORPHONE HYDROCHLORIDE 0.5 MG: 10 INJECTION INTRAMUSCULAR; INTRAVENOUS; SUBCUTANEOUS at 15:46

## 2018-03-12 RX ADMIN — METOPROLOL TARTRATE 12.5 MG: 25 TABLET ORAL at 08:18

## 2018-03-12 RX ADMIN — OXYCODONE HYDROCHLORIDE AND ACETAMINOPHEN 1 TABLET: 5; 325 TABLET ORAL at 03:20

## 2018-03-12 NOTE — PLAN OF CARE
Problem: Patient Care Overview  Goal: Plan of Care Review   03/12/18 0511   Coping/Psychosocial   Plan of Care Reviewed With patient;spouse   Plan of Care Review   Progress improving   OTHER   Outcome Summary VSS. Pt c/o pain x1. Pt slept throughout night. Pt up to BSC with walker. DC to rehab today. Will CTM.      Goal: Discharge Needs Assessment  Outcome: Ongoing (interventions implemented as appropriate)    Goal: Interprofessional Rounds/Family Conf  Outcome: Ongoing (interventions implemented as appropriate)      Problem: Renal Failure/Kidney Injury, Acute (Adult)  Goal: Signs and Symptoms of Listed Potential Problems Will be Absent, Minimized or Managed (Renal Failure/Kidney Injury, Acute)  Outcome: Ongoing (interventions implemented as appropriate)      Problem: Sepsis/Septic Shock (Adult)  Goal: Signs and Symptoms of Listed Potential Problems Will be Absent, Minimized or Managed (Sepsis/Septic Shock)  Outcome: Ongoing (interventions implemented as appropriate)      Problem: Wound (Includes Pressure Injury) (Adult)  Goal: Signs and Symptoms of Listed Potential Problems Will be Absent, Minimized or Managed (Wound)  Outcome: Ongoing (interventions implemented as appropriate)      Problem: Pain, Acute (Adult)  Goal: Acceptable Pain Control/Comfort Level  Outcome: Ongoing (interventions implemented as appropriate)      Problem: Nutrition, Parenteral (Adult)  Goal: Signs and Symptoms of Listed Potential Problems Will be Absent, Minimized or Managed (Nutrition, Parenteral)  Outcome: Ongoing (interventions implemented as appropriate)      Problem: Skin Injury Risk (Adult)  Goal: Skin Health and Integrity  Outcome: Ongoing (interventions implemented as appropriate)

## 2018-03-12 NOTE — NURSING NOTE
[facility] Wound VAC drsg changed to midline and right lower quadrant abd wounds.  Removed VAC drape using Gilbert Adhesive Remover Spray.   Removed all sponges from both wounds and removed piece of adaptic that was in base of midline abd wound; cleansed wounds with saline; cleansed periwound with saline.  PLaced piece of adaptic in midline abd wound to cover sutures then packed both wounds, using one medium granufoam and one package of medium simplace granufoam;  Connected via three way adaptor to Activac at 125 mm hg, continuous suction.  PT had IV dilaudid per nsg staf prior to drsg chg; tolerated well.  Possible discharge to [facility] later today.  PT alert, but c/o feeling very tired today;  at bedside

## 2018-03-12 NOTE — PROGRESS NOTES
"   LOS: 39 days    Patient Care Team:  Zahida Coffman MD as PCP - General (Family Medicine)    Chief Complaint:  No chief complaint on file.      Subjective follow-up acute kidney injury on chronic kidney disease    Interval History:   She is feeling much better, eating better, no chest pain or shortness of air, no nausea or vomiting.  Urine output is good.  She has one diet placed, her tunnel dialysis catheter was removed, her last dialysis was on 2/27/2018, patient is feeling better and with probable transfer to the [facility] for rehabilitation     Objective     Vital Signs  Temp:  [99.4 °F (37.4 °C)-99.5 °F (37.5 °C)] 99.4 °F (37.4 °C)  Heart Rate:  [102-103] 103  Resp:  [16] 16  BP: (117-123)/(59-63) 123/59    Flowsheet Rows         First Filed Value    Admission Height  172.7 cm (68\") Documented at 02/01/2018 1014    Admission Weight  123 kg (271 lb 14.4 oz) Documented at 02/01/2018 1014          No intake/output data recorded.  I/O last 3 completed shifts:  In: 240 [P.O.:240]  Out: 175 [Urine:175]    Intake/Output Summary (Last 24 hours) at 03/12/18 0751  Last data filed at 03/11/18 0910   Gross per 24 hour   Intake              240 ml   Output                0 ml   Net              240 ml       Physical Exam:  General Appearance: alert, oriented x 3, no acute distress, morbidly obese  Skin: warm and dry  HEENT: Anicteric sclerae, oral mucosa normal,   Neck: supple, no JVD, trachea midline  Lungs: Clear breath sounds in the bases, unlabored breathing effort  Heart: RRR, normal S1 and S2, no S3, no rub  Abdomen: soft, non-tender,  present bowel sounds to auscultation, she had dressing and wound VAC  : no palpable bladder,  Extremities: no edema, cyanosis or clubbing  Neuro: normal speech and mental status        Results Review:      Results from last 7 days  Lab Units 03/12/18  0609 03/11/18  0412 03/09/18  0427 03/07/18  0317 03/06/18  0501   SODIUM mmol/L 138 137 137 139 139   POTASSIUM mmol/L 3.8 3.9 " 3.9 3.7 3.6   CHLORIDE mmol/L 99 99 99 100 100   CO2 mmol/L 25.4 24.8 26.7 27.0 32.0*   BUN mg/dL 28* 33* 42* 51* 59*   CREATININE mg/dL 1.98* 1.99* 1.94* 2.27* 2.48*   CALCIUM mg/dL 8.3* 8.2* 8.1* 8.3* 8.3*   BILIRUBIN mg/dL  --   --  0.3 0.3 0.3   ALK PHOS U/L  --   --  64 65 63   ALT (SGPT) U/L  --   --  9 8 7   AST (SGOT) U/L  --   --  26 17 16   GLUCOSE mg/dL 104* 95 98 97 96       Estimated Creatinine Clearance: 42.9 mL/min (by C-G formula based on SCr of 1.98 mg/dL (H)).      Results from last 7 days  Lab Units 03/11/18  0412 03/06/18  0501   MAGNESIUM mg/dL  --  2.0   PHOSPHORUS mg/dL 4.7* 5.3*         Results from last 7 days  Lab Units 03/06/18  0501   URIC ACID mg/dL 8.9*         Results from last 7 days  Lab Units 03/11/18  0412 03/09/18  0427 03/07/18  0317 03/06/18  0501   WBC 10*3/mm3 10.02 9.59 8.66 8.23   HEMOGLOBIN g/dL 8.1* 8.5* 8.5* 8.4*   PLATELETS 10*3/mm3 336 272 272 311               Imaging Results (last 24 hours)     ** No results found for the last 24 hours. **          alteplase 2 mg Intravenous Once   epoetin kristen 20,000 Units Subcutaneous Weekly   heparin (porcine) 5,000 Units Subcutaneous Q8H   iopamidol 50 mL Intravenous Once in imaging   lidocaine PF 1% 5 mL Injection Once   metoprolol tartrate 12.5 mg Oral Q12H   mirtazapine 15 mg Oral Nightly   pantoprazole 40 mg Oral Q AM          Medication Review:   Current Facility-Administered Medications   Medication Dose Route Frequency Provider Last Rate Last Dose   • acetaminophen (TYLENOL) suppository 325 mg  325 mg Rectal Q4H PRN Edgard Felder MD   325 mg at 02/05/18 0653   • acetaminophen (TYLENOL) tablet 650 mg  650 mg Oral Q6H PRN Tri Guzman MD   650 mg at 03/01/18 0014   • albumin human 25 % IV SOLN 12.5 g  12.5 g Intravenous PRN Madeline Kay MD   12.5 g at 02/27/18 1305   • albuterol (PROVENTIL) nebulizer solution 0.083% 2.5 mg/3mL  2.5 mg Nebulization Q4H PRN Tri Guzman MD       • alteplase  ((CATHFLO/ACTIVASE)) injection 2 mg  2 mg Intravenous Once Madeline Kay MD       • calcium carbonate (TUMS) chewable tablet 500 mg (200 mg elemental)  1 tablet Oral BID PRN Jensen Forbes MD   1 tablet at 03/10/18 2200   • calcium gluconate 1 g in sodium chloride 0.9 % 50 mL IVPB  1 g Intravenous PRN Madeline Kay MD        Or   • calcium gluconate 2 g in sodium chloride 0.9 % 50 mL IVPB  2 g Intravenous PRN Madeline Kay MD       • epoetin kristen (EPOGEN,PROCRIT) injection 20,000 Units  20,000 Units Subcutaneous Weekly Madeline Kay MD   20,000 Units at 03/08/18 1514   • heparin (porcine) 5000 UNIT/ML injection 5,000 Units  5,000 Units Subcutaneous Q8H Benjy Samson MD   5,000 Units at 03/11/18 2200   • heparin (porcine) injection 1,000-2,000 Units  1,000-2,000 Units Intravenous PRN Madeline Kay MD   4,000 Units at 02/15/18 1843   • heparin (porcine) injection 4,000 Units  4,000 Units Intracatheter PRN Madeline Kay MD   4,000 Units at 02/27/18 1258   • iopamidol (ISOVUE-250) 51 % injection 50 mL  50 mL Intravenous Once in imaging Meir Guillen MD       • lidocaine PF 1% (XYLOCAINE) injection 5 mL  5 mL Injection Once Dwayne Lyons MD       • magic mouthwash oral supsension 10 mL  10 mL Swish & Spit Q4H PRN Madeline Kay MD   10 mL at 03/11/18 1714   • metoprolol tartrate (LOPRESSOR) tablet 12.5 mg  12.5 mg Oral Q12H Bradford So MD   12.5 mg at 03/11/18 2016   • mirtazapine (REMERON) tablet 15 mg  15 mg Oral Nightly Ernestina Hussein MD   15 mg at 03/11/18 2016   • ondansetron ODT (ZOFRAN-ODT) disintegrating tablet 4 mg  4 mg Oral Q6H PRN Tri Guzman MD        Or   • ondansetron (ZOFRAN) injection 4 mg  4 mg Intravenous Q6H PRN Tri Guzman MD   4 mg at 03/09/18 0523   • oxyCODONE-acetaminophen (PERCOCET) 5-325 MG per tablet 1 tablet  1 tablet Oral Q4H PRN Tri Guzman MD   1 tablet at 03/12/18 0320    Or   •  oxyCODONE-acetaminophen (PERCOCET) 5-325 MG per tablet 2 tablet  2 tablet Oral Q4H PRN Tri Guzman MD       • pantoprazole (PROTONIX) EC tablet 40 mg  40 mg Oral Q AM Madeline Kay MD   40 mg at 03/11/18 0612   • promethazine (PHENERGAN) injection 12.5 mg  12.5 mg Intravenous Q8H PRN Sarthak Figueroa MD   12.5 mg at 02/09/18 2345   • sodium chloride (OCEAN) nasal spray 2 spray  2 spray Each Nare PRN Ernestina Hussein MD           Assessment/Plan       1. Acute kidney injury on chronic kidney disease stage III due to shock. Has recovered.  Last HD 2/27. Serum creatinine today is 1.98, with the normal electrolytes.   2.  Chronic kidney disease stage III baseline Crt 1.5.   3.   SP incisional hernia repair 2/1.  Status post exploratory laparotomy with small bowel resection, mesh removal, RLQ port site debridement, and  Appendectomy 2/4.  SP subcutaneous debridement 2/13 and 2/16, 2/27. 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  4.  Acute on Chronic anemia: Hg  8.1.  On weekly EPO.  5.  Small bowel enterotomy, sp repair 2/4.    ESBL, now carbapenem resistant from wound.   E. coli from blood 2/5. Blood cultures 2/9 negative.  Wound Vac in place.  6. Encephalopathy . Resolved.   7. Afib with RVR.  Now in SR.  On beta blocker   8.  PCM. Improving po intake.  carnation instant breakfast to supplement.   9.  Oral mucositis. Resolved.   10. Debility.  Working with PT .      Plan:  1.  The patient is clear from the renal standpoint for transfer to rehabilitation.  2.  Follow-up with Dr. RADHA Blevins our office in 4 weeks    Yo Yin MD  03/12/18  7:51 AM

## 2018-03-12 NOTE — PROGRESS NOTES
Continued Stay Note  [facility]     Patient Name: Mariya Campos  MRN: [id number]  Today's Date: 3/12/2018    Admit Date: 2/1/2018          Discharge Plan     Row Name 03/12/18 1520       Plan    Plan [facility] precert obtained today    Patient/Family in Agreement with Plan yes    Plan Comments Spoke with [facility] precert obtained for today for rehab. Pt will go to [facility] Room 112. CCP spoke with pt at bedside and she states her spouse will transport her. Packet given to HAROON Rebollar and she will notify [facility]. Brigid PATIÑO/CCP    Row Name 03/12/18 0349       Plan    Plan Awaiting Precert from [facility] for [facility]    Patient/Family in Agreement with Plan yes    Plan Comments Spoke with [facility] and awaiting precert and unsure if will be obtained today. Mary Ann states Wound Vac has been arranged for use when pt arrives. CCP spoke with Padmini FRIED, and states, if [facility] Wound Vac is compatible then dressing can remain and hook pt to vac once arrived at [facility]. Updated HAROON Rebollar.  Packet in CCP office. Brigid patiño/ccp              Discharge Codes    No documentation.       Expected Discharge Date and Time     Expected Discharge Date Expected Discharge Time    Mar 12, 2018             Katelin Gar RN

## 2018-03-12 NOTE — PAYOR COMM NOTE
"Mariya Barbosa (57 y.o. Female)     PLEASE SEE ATTACHED CLINICAL REVIEW. PT. REMAINS ON A MONITORED TELEM FLOOR.    REF#[id number]    PLEASE CALL   OR  [phone] WITH CONTINUED STAY AUTHORIZATION AND DAYS APPROVED.     THANK YOU    SATISH WOOTEN, LPN, CCP    Date of Birth Social Security Number Address Home Phone MRN    1960  [facility] [id number] [phone] [id number]    Yazidi Marital Status          Unknown        Admission Date Admission Type Admitting Provider Attending Provider Department, Room/Bed    2/1/18 Elective Tri Guzman MD Figert, Patricia L, MD [address], 530/1    Discharge Date Discharge Disposition Discharge Destination                       Attending Provider:  Tri Guzman MD    Allergies:  Fish-derived Products, Shellfish-derived Products    Isolation:  Contact   Infection:  CRE (02/20/18), ESBL E coli (02/11/18), MRSA (02/06/18)   Code Status:  FULL    Ht:  172.7 cm (68\")   Wt:  116 kg (255 lb 11.7 oz)    Admission Cmt:  None   Principal Problem:  Wound infection [T14.8XXA,L08.9] More...                 Active Insurance as of 2/1/2018     Primary Coverage     Payor Plan Insurance Group Employer/Plan Group    [facility] BLUE CROSS [employer] EMPLOYEE [id number]     Payor Plan Address Payor Plan Phone Number Effective From Effective To    [id number] [phone] 1/1/2015     [place] [address]       Subscriber Name Subscriber Birth Date Member ID       FELICIA BARBOAS [birth year]1965 [id number]                 Emergency Contacts      (Rel.) Home Phone Work Phone Mobile Phone    Felicia Barbosa (Spouse) [phone] -- [phone]            Intake & Output (last day)       03/11 0701 - 03/12 0700 03/12 0701 - 03/13 0700    P.O. 240     Total Intake(mL/kg) 240 (2.1)     Urine (mL/kg/hr)      Total Output        Net +240            Unmeasured Urine Occurrence 4 x     "               Orders (last 24 hrs)     Start     Ordered    03/12/18 0754  Please schedule a follow-up appointment with nephrology with Dr. Erich Blevins in 4 weeks, [phone]  Nursing Communication  Once     Comments:  Please schedule a follow-up appointment with nephrology with Dr. Erich Blevins in 4 weeks, [phone]    03/12/18 0754    03/09/18 0800  Dietary Nutrition Supplements Carmi Instant Breakfast  Daily With Breakfast & Lunch     Comments:  Chocolate    03/08/18 1500    03/08/18 1800  Dietary Nutrition Supplements Mohamud  Daily With Lunch & Dinner     Comments:  Mixed in fruit punch crystal light    03/08/18 1500    Unscheduled  If Bladder Scan Volume is Less Than 350-500mL & Patient is Without Symptoms of Bladder Discomfort / Distention Monitor Every 1-2 Hours for Spontaneous Void  As Needed      03/01/18 0829    Unscheduled  Straight Cath Every 4-6 Hours As Needed If Patient is Unable to Void After 4-6 Hours, Bladder Scan Volume is Greater Than 350-500mL & Patient Has Symptoms of Bladder Discomfort / Distention  As Needed      03/01/18 0829             Physician Progress Notes (last 24 hours) (Notes from 3/11/2018  8:22 AM through 3/12/2018  8:22 AM)      Yo Yin MD at 3/12/2018  7:51 AM             LOS: 39 days    Patient Care Team:  Zahida Coffman MD as PCP - General (Family Medicine)    Chief Complaint:  No chief complaint on file.      Subjective follow-up acute kidney injury on chronic kidney disease    Interval History:   She is feeling much better, eating better, no chest pain or shortness of air, no nausea or vomiting.  Urine output is good.  She has one diet placed, her tunnel dialysis catheter was removed, her last dialysis was on 2/27/2018, patient is feeling better and with probable transfer to the [facility] for rehabilitation     Objective     Vital Signs  Temp:  [99.4 °F (37.4 °C)-99.5 °F (37.5 °C)] 99.4 °F (37.4 °C)  Heart Rate:  [102-103] 103  Resp:  [16] 16  BP:  "117123)/5963 123/59    Flowsheet Rows         First Filed Value    Admission Height  172.7 cm (68\") Documented at 02/01/2018 1014    Admission Weight  123 kg (271 lb 14.4 oz) Documented at 02/01/2018 1014          No intake/output data recorded.  I/O last 3 completed shifts:  In: 240 [P.O.:240]  Out: 175 [Urine:175]    Intake/Output Summary (Last 24 hours) at 03/12/18 0751  Last data filed at 03/11/18 0910   Gross per 24 hour   Intake              240 ml   Output                0 ml   Net              240 ml       Physical Exam:  General Appearance: alert, oriented x 3, no acute distress, morbidly obese  Skin: warm and dry  HEENT: Anicteric sclerae, oral mucosa normal,   Neck: supple, no JVD, trachea midline  Lungs: Clear breath sounds in the bases, unlabored breathing effort  Heart: RRR, normal S1 and S2, no S3, no rub  Abdomen: soft, non-tender,  present bowel sounds to auscultation, she had dressing and wound VAC  : no palpable bladder,  Extremities: no edema, cyanosis or clubbing  Neuro: normal speech and mental status        Results Review:      Results from last 7 days  Lab Units 03/12/18  0609 03/11/18  0412 03/09/18  0427 03/07/18  0317 03/06/18  0501   SODIUM mmol/L 138 137 137 139 139   POTASSIUM mmol/L 3.8 3.9 3.9 3.7 3.6   CHLORIDE mmol/L 99 99 99 100 100   CO2 mmol/L 25.4 24.8 26.7 27.0 32.0*   BUN mg/dL 28* 33* 42* 51* 59*   CREATININE mg/dL 1.98* 1.99* 1.94* 2.27* 2.48*   CALCIUM mg/dL 8.3* 8.2* 8.1* 8.3* 8.3*   BILIRUBIN mg/dL  --   --  0.3 0.3 0.3   ALK PHOS U/L  --   --  64 65 63   ALT (SGPT) U/L  --   --  9 8 7   AST (SGOT) U/L  --   --  26 17 16   GLUCOSE mg/dL 104* 95 98 97 96       Estimated Creatinine Clearance: 42.9 mL/min (by C-G formula based on SCr of 1.98 mg/dL (H)).      Results from last 7 days  Lab Units 03/11/18 0412 03/06/18  0501   MAGNESIUM mg/dL  --  2.0   PHOSPHORUS mg/dL 4.7* 5.3*           Results from last 7 days  Lab Units 03/11/18 0412 03/09/18  0427 03/07/18  0317 " 03/06/18  0501   WBC 10*3/mm3 10.02 9.59 8.66 8.23   HEMOGLOBIN g/dL 8.1* 8.5* 8.5* 8.4*   PLATELETS 10*3/mm3 336 272 272 311                 alteplase 2 mg Intravenous Once   epoetin kristen 20,000 Units Subcutaneous Weekly   heparin (porcine) 5,000 Units Subcutaneous Q8H   iopamidol 50 mL Intravenous Once in imaging   lidocaine PF 1% 5 mL Injection Once   metoprolol tartrate 12.5 mg Oral Q12H   mirtazapine 15 mg Oral Nightly   pantoprazole 40 mg Oral Q AM            Assessment/Plan       1. Acute kidney injury on chronic kidney disease stage III due to shock. Has recovered.  Last HD 2/27. Serum creatinine today is 1.98, with the normal electrolytes.   2.  Chronic kidney disease stage III baseline Crt 1.5.   3.   SP incisional hernia repair 2/1.  Status post exploratory laparotomy with small bowel resection, mesh removal, RLQ port site debridement, and  Appendectomy 2/4.  SP subcutaneous debridement 2/13 and 2/16, 2/27. 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  4.  Acute on Chronic anemia: Hg  8.1.  On weekly EPO.  5.  Small bowel enterotomy, sp repair 2/4.    ESBL, now carbapenem resistant from wound.   E. coli from blood 2/5. Blood cultures 2/9 negative.  Wound Vac in place.  6. Encephalopathy . Resolved.   7. Afib with RVR.  Now in SR.  On beta blocker   8.  PCM. Improving po intake.  carnation instant breakfast to supplement.   9.  Oral mucositis. Resolved.   10. Debility.  Working with PT .      Plan:  1.  The patient is clear from the renal standpoint for transfer to rehabilitation.  2.  Follow-up with Dr. RADHA Blevins our office in 4 weeks    Yo Yin MD  03/12/18  7:51 AM      Electronically signed by Yo Yin MD at 3/12/2018  7:53 AM     Edgardo Ugalde Jr., MD at 3/11/2018  1:36 PM           LOS: 38 days   Patient Care Team:  Zahida Coffman MD as PCP - General (Family Medicine)    Chief Complaint: Abdominal wound    Subjective     History of Present Illness    Subjective    The patient is  "stable with no complaints.    Objective     Vital Signs  Temp:  [99.6 °F (37.6 °C)-99.7 °F (37.6 °C)] 99.6 °F (37.6 °C)  Heart Rate:  [101-108] 101  Resp:  [16-18] 16  BP: (111-129)/(61-69) 129/65    Objective:  General Appearance:  Comfortable and in no acute distress.    Vital signs: (most recent): Blood pressure 129/65, pulse 101, temperature 99.6 °F (37.6 °C), temperature source Oral, resp. rate 16, height 172.7 cm (68\"), weight 116 kg (255 lb 11.7 oz), SpO2 94 %.    Neurological: Patient is alert and oriented to person, place and time.              Results Review:     I reviewed the patient's new clinical results.    Medication Review: Reviewed.    Assessment/Plan     Principal Problem:    Wound infection  Active Problems:    Incisional hernia, without obstruction or gangrene    Recurrent ventral hernia    Bacteremia, escherichia coli    Complications, dialysis, catheter, mechanical, initial encounter      Assessment & Plan     The plan is to transfer to rehabilitation tomorrow.    Edgardo Ugalde Jr., MD  03/11/18  1:36 PM            Electronically signed by Edgardo Ugalde Jr., MD at 3/11/2018  1:37 PM     Juan Howard MD at 3/11/2018 10:30 AM             LOS: 38 days    Patient Care Team:  Zahida Coffman MD as PCP - General (Family Medicine)    Chief Complaint:  No chief complaint on file.      Subjective follow-up acute kidney injury on chronic kidney disease    Interval History:   She is feeling much better, eating better, no chest pain or shortness of air, no nausea or vomiting.  Urine output is good.  She has one diet placed, her tunnel dialysis catheter was removed, her last dialysis was on 2/27/2018     Objective     Vital Signs  Temp:  [99.6 °F (37.6 °C)-99.7 °F (37.6 °C)] 99.6 °F (37.6 °C)  Heart Rate:  [101-108] 101  Resp:  [16-18] 16  BP: (111-129)/(61-69) 129/65    Flowsheet Rows         First Filed Value    Admission Height  172.7 cm (68\") Documented at 02/01/2018 1014    Admission Weight  123 " kg (271 lb 14.4 oz) Documented at 02/01/2018 1014          I/O this shift:  In: 240 [P.O.:240]  Out: -   I/O last 3 completed shifts:  In: 450 [P.O.:450]  Out: 800 [Urine:800]    Intake/Output Summary (Last 24 hours) at 03/11/18 1030  Last data filed at 03/11/18 0910   Gross per 24 hour   Intake              480 ml   Output              175 ml   Net              305 ml       Physical Exam:  General Appearance: alert, oriented x 3, no acute distress, morbidly obese  Skin: warm and dry  HEENT: Anicteric sclerae, oral mucosa normal,   Neck: supple, no JVD, trachea midline  Lungs: Clear breath sounds in the bases, unlabored breathing effort  Heart: RRR, normal S1 and S2, no S3, no rub  Abdomen: soft, non-tender,  present bowel sounds to auscultation, she had dressing and wound VAC  : no palpable bladder,  Extremities: no edema, cyanosis or clubbing  Neuro: normal speech and mental status              Assessment/Plan       1. Acute kidney injury on chronic kidney disease stage III due to shock. Has recovered.  Last HD 2/27. Serum creatinine today is 1.94, with the normal electrolytes.  Monitor labs for now.  2.  Chronic kidney disease stage III baseline Crt 1.5.   3.   SP incisional hernia repair 2/1.  Status post exploratory laparotomy with small bowel resection, mesh removal, RLQ port site debridement, and  Appendectomy 2/4.  SP subcutaneous debridement 2/13 and 2/16, 2/27. 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  4.  Acute on Chronic anemia: Hg  8.5.  On weekly EPO.  5.  Small bowel enterotomy, sp repair 2/4.    ESBL, now carbapenem resistant from wound.   E. coli from blood 2/5. Blood cultures 2/9 negative.  Wound Vac in place.  6. Encephalopathy . Resolved.   7. Afib with RVR.  Now in SR.  On beta blocker   8.  PCM. Improving po intake.  carnation instant breakfast to supplement.   9.  Oral mucositis. Resolved.   10. Debility.  Working with PT .        Juan Howard MD  03/11/18  10:30 AM      Electronically signed by Juan COOPER  "MD Anita at 3/11/2018 10:30 AM       Juan Howard MD Physician Signed Nephrology  Progress Notes Date of Service: 3/10/2018 10:39 AM      Expand All Collapse All    []Manual[]Template  []Copied      LOS: 37 days    Patient Care Team:  Zahida Coffman MD as PCP - General (Family Medicine)     Chief Complaint:  No chief complaint on file.           Subjective    follow-up acute kidney injury on chronic kidney disease     Interval History:   She is feeling much better, eating better, no chest pain or shortness of air, no nausea or vomiting.  Urine output is good.  She has one diet placed, her tunnel dialysis catheter was removed, her last dialysis was on 2/27/2018        Objective         Vital Signs  Temp:  [98 °F (36.7 °C)-100.3 °F (37.9 °C)] 99 °F (37.2 °C)  Heart Rate:  [] 90  Resp:  [16-18] 18  BP: (106-129)/(61-77) 106/66     Flowsheet Rows          First Filed Value     Admission Height   172.7 cm (68\") Documented at 02/01/2018 1014     Admission Weight   123 kg (271 lb 14.4 oz) Documented at 02/01/2018 1014             I/O this shift:  In: 210 [P.O.:210]  Out: -   I/O last 3 completed shifts:  In: 120 [P.O.:120]  Out: 625 [Urine:625]     Intake/Output Summary (Last 24 hours) at 03/10/18 1039  Last data filed at 03/10/18 0900    Gross per 24 hour   Intake              210 ml   Output              625 ml   Net             -415 ml         Physical Exam:  General Appearance: alert, oriented x 3, no acute distress, morbidly obese  Skin: warm and dry  HEENT: Anicteric sclerae, oral mucosa normal,   Neck: supple, no JVD, trachea midline  Lungs: Clear breath sounds in the bases, unlabored breathing effort  Heart: RRR, normal S1 and S2, no S3, no rub  Abdomen: soft, non-tender,  present bowel sounds to auscultation, she had dressing and wound VAC  : no palpable bladder,  Extremities: no edema, cyanosis or clubbing  Neuro: normal speech and mental status                              Results Review:  "      Results from last 7 days  Lab Units 03/09/18  0427 03/07/18  0317 03/06/18  0501   SODIUM mmol/L 137 139 139   POTASSIUM mmol/L 3.9 3.7 3.6   CHLORIDE mmol/L 99 100 100   CO2 mmol/L 26.7 27.0 32.0*   BUN mg/dL 42* 51* 59*   CREATININE mg/dL 1.94* 2.27* 2.48*   CALCIUM mg/dL 8.1* 8.3* 8.3*   BILIRUBIN mg/dL 0.3 0.3 0.3   ALK PHOS U/L 64 65 63   ALT (SGPT) U/L 9 8 7   AST (SGOT) U/L 26 17 16   GLUCOSE mg/dL 98 97 96         Estimated Creatinine Clearance: 43.8 mL/min (by C-G formula based on SCr of 1.94 mg/dL (H)).        Results from last 7 days  Lab Units 03/06/18  0501 03/05/18  0524 03/04/18  1220   MAGNESIUM mg/dL 2.0 2.1 1.5*   PHOSPHORUS mg/dL 5.3*  --  6.2*            Results from last 7 days  Lab Units 03/06/18  0501   URIC ACID mg/dL 8.9*            Results from last 7 days  Lab Units 03/09/18  0427 03/07/18  0317 03/06/18  0501 03/05/18  0524 03/04/18  1220   WBC 10*3/mm3 9.59 8.66 8.23 7.76 8.55   HEMOGLOBIN g/dL 8.5* 8.5* 8.4* 8.4* 8.9*   PLATELETS 10*3/mm3 272 272 311 300 321                       Imaging Results (last 24 hours)      ** No results found for the last 24 hours. **             alteplase 2 mg Intravenous Once   epoetin kristen 20,000 Units Subcutaneous Weekly   heparin (porcine) 5,000 Units Subcutaneous Q8H   iopamidol 50 mL Intravenous Once in imaging   lidocaine PF 1% 5 mL Injection Once   metoprolol tartrate 12.5 mg Oral Q12H   mirtazapine 15 mg Oral Nightly   pantoprazole 40 mg Oral Q AM         Medication Review:   Current Medications             Current Facility-Administered Medications   Medication Dose Route Frequency Provider Last Rate Last Dose   • acetaminophen (TYLENOL) suppository 325 mg  325 mg Rectal Q4H PRN Edgard Felder MD   325 mg at 02/05/18 0653   • acetaminophen (TYLENOL) tablet 650 mg  650 mg Oral Q6H PRN Tri Guzman MD   650 mg at 03/01/18 0014   • albumin human 25 % IV SOLN 12.5 g  12.5 g Intravenous PRN Madeline Kay MD   12.5 g at 02/27/18 1305    • albuterol (PROVENTIL) nebulizer solution 0.083% 2.5 mg/3mL  2.5 mg Nebulization Q4H PRN Tri Guzman MD       • alteplase ((CATHFLO/ACTIVASE)) injection 2 mg  2 mg Intravenous Once Madeline Kay MD       • calcium gluconate 1 g in sodium chloride 0.9 % 50 mL IVPB  1 g Intravenous PRN Madeline Kay MD         Or   • calcium gluconate 2 g in sodium chloride 0.9 % 50 mL IVPB  2 g Intravenous PRN Madeline Kay MD       • epoetin kristen (EPOGEN,PROCRIT) injection 20,000 Units  20,000 Units Subcutaneous Weekly Madeline Kay MD   20,000 Units at 03/08/18 1514   • heparin (porcine) 5000 UNIT/ML injection 5,000 Units  5,000 Units Subcutaneous Q8H Benjy Samson MD   5,000 Units at 03/10/18 0625   • heparin (porcine) injection 1,000-2,000 Units  1,000-2,000 Units Intravenous PRN Madeline Kay MD   4,000 Units at 02/15/18 1843   • heparin (porcine) injection 4,000 Units  4,000 Units Intracatheter PRN Madeline Kay MD   4,000 Units at 02/27/18 1258   • HYDROmorphone (DILAUDID) injection 1 mg  1 mg Intravenous BID PRN Bryant Spence MD   0.5 mg at 03/09/18 1449   • iopamidol (ISOVUE-250) 51 % injection 50 mL  50 mL Intravenous Once in imaging Meir Guillen MD       • lidocaine PF 1% (XYLOCAINE) injection 5 mL  5 mL Injection Once Dwayne Lyons MD       • magic mouthwash oral supsension 10 mL  10 mL Swish & Spit Q4H PRN Madeline Kay MD   10 mL at 03/09/18 2130   • metoprolol tartrate (LOPRESSOR) tablet 12.5 mg  12.5 mg Oral Q12H Bradford So MD   12.5 mg at 03/10/18 0853   • mirtazapine (REMERON) tablet 15 mg  15 mg Oral Nightly Ernestina Hussein MD   15 mg at 03/09/18 8370   • ondansetron ODT (ZOFRAN-ODT) disintegrating tablet 4 mg  4 mg Oral Q6H PRN Tri Guzman MD         Or   • ondansetron (ZOFRAN) injection 4 mg  4 mg Intravenous Q6H PRN Tri Guzman MD   4 mg at 03/09/18 9424   • oxyCODONE-acetaminophen (PERCOCET) 5-325 MG per  tablet 1 tablet  1 tablet Oral Q4H PRN Tri Guzman MD   1 tablet at 03/09/18 1032     Or   • oxyCODONE-acetaminophen (PERCOCET) 5-325 MG per tablet 2 tablet  2 tablet Oral Q4H PRN Tri Guzman MD       • pantoprazole (PROTONIX) EC tablet 40 mg  40 mg Oral Q AM Madeline Kay MD   40 mg at 03/10/18 0625   • promethazine (PHENERGAN) injection 12.5 mg  12.5 mg Intravenous Q8H PRN Sarthak Figueroa MD   12.5 mg at 02/09/18 2345   • sodium chloride (OCEAN) nasal spray 2 spray  2 spray Each Nare PRN Ernestina Hussein MD                   Assessment/Plan          1. Acute kidney injury on chronic kidney disease stage III due to shock. Has recovered.  Last HD 2/27. Serum creatinine today is 1.94, with the normal electrolytes.  Monitor labs for now.  2.  Chronic kidney disease stage III baseline Crt 1.5.   3.   SP incisional hernia repair 2/1.  Status post exploratory laparotomy with small bowel resection, mesh removal, RLQ port site debridement, and  Appendectomy 2/4.  SP subcutaneous debridement 2/13 and 2/16, 2/27. 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  4.  Acute on Chronic anemia: Hg  8.5.  On weekly EPO.  5.  Small bowel enterotomy, sp repair 2/4.    ESBL, now carbapenem resistant from wound.   E. coli from blood 2/5. Blood cultures 2/9 negative.  Wound Vac in place.  6. Encephalopathy . Resolved.   7. Afib with RVR.  Now in SR.  On beta blocker   8.  PCM. Improving po intake.  carnation instant breakfast to supplement.   9.  Oral mucositis. Resolved.   10. Debility.  Working with PT .          Juan Howard MD  03/10/18  10:39 AM              Edgardo Ugalde Jr., MD Physician Signed Surgery  Progress Notes Date of Service: 3/10/2018 12:15 PM      Expand All Collapse All    []Manual[]Template  []Copied      LOS: 37 days   Patient Care Team:  Zahida Coffman MD as PCP - General (Family Medicine)     Chief Complaint: Abdominal wound        Subjective         History of Present Illness     Subjective     The  "patient is feeling well.        Objective         Vital Signs  Temp:  [98 °F (36.7 °C)-100.3 °F (37.9 °C)] 99 °F (37.2 °C)  Heart Rate:  [] 90  Resp:  [16-18] 18  BP: (106-129)/(61-77) 106/66     Objective:  General Appearance:  Comfortable and in no acute distress.    Vital signs: (most recent): Blood pressure 106/66, pulse 90, temperature 99 °F (37.2 °C), temperature source Oral, resp. rate 18, height 172.7 cm (68\"), weight 116 kg (256 lb 1.6 oz), SpO2 95 %.    Abdomen: (Wound VAC device in place).                  Results Review:                I reviewed the patient's new clinical results.     Medication Review: Reviewed.        Assessment/Plan         Principal Problem:    Wound infection  Active Problems:    Incisional hernia, without obstruction or gangrene    Recurrent ventral hernia    Bacteremia, escherichia coli    Complications, dialysis, catheter, mechanical, initial encounter        Assessment & Plan      The patient stable with wound VAC device in place.  Plan for rehab on Monday.     Edgardo Ugalde Jr., MD  03/10/18  12:15 PM                         "

## 2018-03-12 NOTE — DISCHARGE SUMMARY
Admitting date: 2/1/2018     Discharging date: 3/12/2018    Admitting diagnosis: Incisional hernia                                     Discharging diagnoses: Incisional hernia                                   Small bowel enterotomy                                   Sepsis                                   Wound infection                                   Acute renal failure                                   Acute on chronic anemia                                   Atrial fibrillation      Admitting/discharging physician: Dr. Guzman    Procedures: Laparoscopic ventral hernia repair                       Exploratory laparotomy with removal of                               mesh and bowel resection                        Wound debridement washout ×3                          Shiley catheter    Hospital course:  This is a pleasant 57-year-old female patient who presented on February 1, 2018 for a incisional hernia repair laparoscopically using the da Niesha.  On February 4 of 2018 patient was taken back to the operating room with an acute abdomen that revealed an enterotomy in the small bowel which was resected and repaired.  Patient's mesh was also removed secondary to exposure to small bowel content.  Patient was in critical condition and placed in the ICU with sepsis, acute renal failure, atrial fibrillation and anemia.  Patient's sepsis was also treated with IV antibiotics.  During her intensive care stay patient had been transfused with packed red blood cells, extubated from the ventilator, and weaned off of vasopressors.  Patient had been placed on IV amiodarone with conversion of her atrial fibrillation.  Patient was maintained on TPN until she could tolerate adequate by mouth calories via a central line.  Patient was taken to the operating room on February 13, 2018 and underwent a wound debridement and washout.  Patient also had a Shiley placed and underwent dialysis.  Patient's last day of dialysis was on  February 28, 2018.  Patient Shiley was removed on March 7, 2018.  Central line was removed on March 8, 2018.  Patient had completed her IV antibiotics on March 7, 2018.  Patient had been receiving physical therapy and occupational therapy throughout her hospital stay.  Patient had a wound VAC placed on 3/8/2018.  Patient had been maintained in normal sinus rhythm, on oral amiodarone.  Patient was discharged to physical rehabilitation on 3/12/2018 on a regular diet, oral pain medicine Percocets and amiodarone.  Patient was in stable condition.  Patient was instructed to follow-up in my office in 3 weeks.        Tri Guzman MD  General, Minimally Invasive and Endoscopic Surgery  [facility]    [address], [address]  [place], [place], [address]  P: [phone]  F: [phone]    Cc:  Zahida Coffman MD

## 2018-03-12 NOTE — PROGRESS NOTES
Continued Stay Note  [facility]     Patient Name: Mariya Campos  MRN: [id number]  Today's Date: 3/12/2018    Admit Date: 2/1/2018          Discharge Plan     Row Name 03/12/18 1345       Plan    Plan Awaiting Precert from [place] for [facility]    Patient/Family in Agreement with Plan yes    Plan Comments Spoke with [place] and awaiting precert and unsure if will be obtained today. Mary Ann states Wound Vac has been arranged for use when pt arrives. CCP spoke with Padmini FRIED, and states, if [facility] Wound Vac is compatible then dressing can remain and hook pt to vac once arrived at [facility]. Updated HAROON Rebollar.  Packet in CCP office. Brigid hoffman/ccp              Discharge Codes    No documentation.           Katelin Gar, RN

## 2018-03-13 RX ORDER — OXYCODONE HYDROCHLORIDE AND ACETAMINOPHEN 5; 325 MG/1; MG/1
TABLET ORAL
Qty: 30 TABLET | Refills: 0 | Status: SHIPPED | OUTPATIENT
Start: 2018-03-13 | End: 2018-03-13 | Stop reason: SDUPTHER

## 2018-03-13 RX ORDER — OXYCODONE HYDROCHLORIDE AND ACETAMINOPHEN 5; 325 MG/1; MG/1
TABLET ORAL
Qty: 30 TABLET | Refills: 0 | Status: SHIPPED | OUTPATIENT
Start: 2018-03-13 | End: 2018-06-20

## 2018-03-13 NOTE — PROGRESS NOTES
Case Management Discharge Note    Final Note: Discharged to [facility]     Destination - Selection Complete     Service Request Status Selected Specialties Address Phone Number Fax Number    [facility] Selected Not available [address] [address] [phone]    [facility] Accepted N/A [address] [address] [phone]    [facility] Declined N/A [address] [address] [phone]      Durable Medical Equipment     No service coordination in this encounter.      Dialysis/Infusion     No service coordination in this encounter.      Home Medical Care     No service coordination in this encounter.      Social Care     No service coordination in this encounter.             Final Discharge Disposition Code: 03 - skilled nursing facility (SNF)

## 2018-03-15 NOTE — PROGRESS NOTES
Case Management Discharge Note    Final Note: discharged to [facility]- skilled rehab. joyce rnccp    Destination - Selection Complete     Service Request Status Selected Specialties Address Phone Number Fax Number    [facility] Selected Not available [address] [address] [phone]    [facility] Accepted N/A [address] [address] [phone]    [facility] Declined N/A [address] [address] [phone]      Durable Medical Equipment     No service coordination in this encounter.      Dialysis/Infusion     No service coordination in this encounter.      Home Medical Care     No service coordination in this encounter.      Social Care     No service coordination in this encounter.        Other: Other (private auto)    Final Discharge Disposition Code: 03 - skilled nursing facility (SNF)

## 2018-03-21 LAB
BACTERIA SPEC AEROBE CULT: ABNORMAL
GRAM STN SPEC: ABNORMAL
GRAM STN SPEC: ABNORMAL

## 2018-03-27 ENCOUNTER — TRANSCRIBE ORDERS (OUTPATIENT)
Dept: ADMINISTRATIVE | Facility: HOSPITAL | Age: 58
End: 2018-03-27

## 2018-03-27 DIAGNOSIS — D64.9 LOW HEMOGLOBIN: Primary | ICD-10-CM

## 2018-03-28 ENCOUNTER — HOSPITAL ENCOUNTER (OUTPATIENT)
Dept: INFUSION THERAPY | Facility: HOSPITAL | Age: 58
Discharge: HOME | End: 2018-03-28
Admitting: INTERNAL MEDICINE

## 2018-03-28 VITALS
HEART RATE: 89 BPM | SYSTOLIC BLOOD PRESSURE: 133 MMHG | TEMPERATURE: 98.7 F | OXYGEN SATURATION: 99 % | DIASTOLIC BLOOD PRESSURE: 72 MMHG | RESPIRATION RATE: 16 BRPM

## 2018-03-28 DIAGNOSIS — D64.9 LOW HEMOGLOBIN: ICD-10-CM

## 2018-03-28 LAB
ABO GROUP BLD: NORMAL
BLD GP AB SCN SERPL QL: NEGATIVE
RH BLD: POSITIVE

## 2018-03-28 PROCEDURE — 86900 BLOOD TYPING SEROLOGIC ABO: CPT | Performed by: INTERNAL MEDICINE

## 2018-03-28 PROCEDURE — 86901 BLOOD TYPING SEROLOGIC RH(D): CPT | Performed by: INTERNAL MEDICINE

## 2018-03-28 PROCEDURE — P9016 RBC LEUKOCYTES REDUCED: HCPCS

## 2018-03-28 PROCEDURE — 36430 TRANSFUSION BLD/BLD COMPNT: CPT

## 2018-03-28 PROCEDURE — 86900 BLOOD TYPING SEROLOGIC ABO: CPT

## 2018-03-28 PROCEDURE — 86923 COMPATIBILITY TEST ELECTRIC: CPT

## 2018-03-28 PROCEDURE — 86850 RBC ANTIBODY SCREEN: CPT | Performed by: INTERNAL MEDICINE

## 2018-03-28 PROCEDURE — 36415 COLL VENOUS BLD VENIPUNCTURE: CPT

## 2018-03-28 RX ORDER — DILTIAZEM HYDROCHLORIDE 300 MG/1
300 CAPSULE, COATED, EXTENDED RELEASE ORAL ONCE
Status: COMPLETED | OUTPATIENT
Start: 2018-03-28 | End: 2018-03-28

## 2018-03-28 RX ORDER — ACETAMINOPHEN 325 MG/1
650 TABLET ORAL ONCE
Status: COMPLETED | OUTPATIENT
Start: 2018-03-28 | End: 2018-03-28

## 2018-03-28 RX ADMIN — ACETAMINOPHEN 650 MG: 325 TABLET ORAL at 09:17

## 2018-03-28 RX ADMIN — DILTIAZEM HYDROCHLORIDE 300 MG: 300 CAPSULE, COATED, EXTENDED RELEASE ORAL at 10:31

## 2018-03-29 ENCOUNTER — OFFICE VISIT (OUTPATIENT)
Dept: SURGERY | Facility: CLINIC | Age: 58
End: 2018-03-29

## 2018-03-29 VITALS
DIASTOLIC BLOOD PRESSURE: 78 MMHG | WEIGHT: 244 LBS | HEIGHT: 68 IN | SYSTOLIC BLOOD PRESSURE: 124 MMHG | OXYGEN SATURATION: 99 % | HEART RATE: 98 BPM | BODY MASS INDEX: 36.98 KG/M2

## 2018-03-29 DIAGNOSIS — Z09 FOLLOW UP: Primary | ICD-10-CM

## 2018-03-29 LAB
ABO + RH BLD: NORMAL
ABO + RH BLD: NORMAL
BH BB BLOOD EXPIRATION DATE: NORMAL
BH BB BLOOD EXPIRATION DATE: NORMAL
BH BB BLOOD TYPE BARCODE: 7300
BH BB BLOOD TYPE BARCODE: 7300
BH BB DISPENSE STATUS: NORMAL
BH BB DISPENSE STATUS: NORMAL
BH BB PRODUCT CODE: NORMAL
BH BB PRODUCT CODE: NORMAL
BH BB UNIT NUMBER: NORMAL
BH BB UNIT NUMBER: NORMAL
UNIT  ABO: NORMAL
UNIT  ABO: NORMAL
UNIT  RH: NORMAL
UNIT  RH: NORMAL

## 2018-03-29 PROCEDURE — 99024 POSTOP FOLLOW-UP VISIT: CPT | Performed by: SURGERY

## 2018-03-29 NOTE — PROGRESS NOTES
"Chief complaint:  Post-op  Follow up    History of Present Illness    This is Mariya Campos 57 y.o. presenting in follow-up for wound check.  Patient is currently in physical therapy doing well ×2 weeks from the hospital.  Patient denies fever, chills, nausea or vomiting.  Patient's pain is well-controlled.      The following portions of the patient's history were reviewed and updated as appropriate: allergies, current medications, past family history, past medical history, past social history, past surgical history and problem list.    Vitals:    03/29/18 0950   BP: 124/78   Pulse: 98   SpO2: 99%   Weight: 111 kg (244 lb)   Height: 171.5 cm (67.5\")       Physical Exam  Patient has a midline wound as well as a right lower quadrant wound with a wound VAC.  There is no evidence of any erythema or cellulitis.  There is no evidence of any yeast infection.  The wound VAC was removed.  The lower quadrant wound has significantly filled in and has contracted very well with good healthy granulation tissue.  The midline wound appears to be healthy with good granulation tissue. However, there is an Adaptic that appears to have overgrowth of granulation tissue on top of it.  I removed the Adaptic.  I have placed a saline soaked Kerlix in both wounds and covered with abdominal pad.    Assessment/plan:  I have reviewed the hospital dressing wound VAC notes that do not discuss any Adaptic placement.  Will call the ostomy wound care nurses to discuss wound.  Patient will follow-up in 2 weeks.    Tri Guzman MD  General, Minimally Invasive and Endoscopic Surgery  [facility]    [address], [address]  [place], [place], [facility]  P: [phone]  F: [phone]    Cc:  Zahida Coffman MD  "

## 2018-04-10 ENCOUNTER — TELEPHONE (OUTPATIENT)
Dept: SURGERY | Facility: CLINIC | Age: 58
End: 2018-04-10

## 2018-04-10 NOTE — TELEPHONE ENCOUNTER
calling to say she called pt for PT and pt refused and told Home health nurse that she is doing great and does not need PT home health nurse was just calling to let  know about this and Home health nurse told patient if she should need PT tocal.      By Anabel Solis

## 2018-04-11 ENCOUNTER — OFFICE VISIT (OUTPATIENT)
Dept: SURGERY | Facility: CLINIC | Age: 58
End: 2018-04-11

## 2018-04-11 VITALS
WEIGHT: 244.7 LBS | OXYGEN SATURATION: 98 % | HEIGHT: 68 IN | DIASTOLIC BLOOD PRESSURE: 88 MMHG | BODY MASS INDEX: 37.08 KG/M2 | SYSTOLIC BLOOD PRESSURE: 130 MMHG | HEART RATE: 112 BPM

## 2018-04-11 DIAGNOSIS — Z09 FOLLOW UP: Primary | ICD-10-CM

## 2018-04-11 PROCEDURE — 99024 POSTOP FOLLOW-UP VISIT: CPT | Performed by: SURGERY

## 2018-04-11 RX ORDER — SULFAMETHOXAZOLE AND TRIMETHOPRIM 800; 160 MG/1; MG/1
1 TABLET ORAL 2 TIMES DAILY
Qty: 28 TABLET | Refills: 0 | Status: SHIPPED | OUTPATIENT
Start: 2018-04-11 | End: 2018-04-25

## 2018-04-11 NOTE — PROGRESS NOTES
"Chief complaint:  Post-op  Follow up    History of Present Illness    Patient is presenting for wound check.  Patient has completed her inpatient physical therapy and has been at home for 2 days.  Patient also reports it's been over 2 days for pain meds. She reports that her urine looks like it is infected.    The following portions of the patient's history were reviewed and updated as appropriate: allergies, current medications, past family history, past medical history, past social history, past surgical history and problem list.    Vitals:    04/11/18 1343   BP: 130/88   BP Location: Left arm   Patient Position: Sitting   Cuff Size: Adult   Pulse: 112   SpO2: 98%   Weight: 111 kg (244 lb 11.2 oz)   Height: 171.5 cm (67.5\")       Physical Exam  Right lateral wound with good granulation tissue and skin growth. Midline wound kip and depth has not completely filled in with granulation tissue. There is no evidence of cellulitis or tunnels.     Assessment/plan:  ?? UTI will place on bactrim ds  Wound healing well - place wound vac on Friday, will no longer need wound vac on right lateral wound, just wet to dry NS.  F/U in 2 weeks.    Tri Guzman MD  General, Minimally Invasive and Endoscopic Surgery  [facility]    [address], [address]  [place], [place], [address]  P: [phone]  F: [phone]    Cc:  Zahida Coffman MD  "

## 2018-04-25 ENCOUNTER — OFFICE VISIT (OUTPATIENT)
Dept: SURGERY | Facility: CLINIC | Age: 58
End: 2018-04-25

## 2018-04-25 VITALS
WEIGHT: 242.8 LBS | SYSTOLIC BLOOD PRESSURE: 122 MMHG | OXYGEN SATURATION: 98 % | DIASTOLIC BLOOD PRESSURE: 74 MMHG | HEIGHT: 68 IN | HEART RATE: 104 BPM | BODY MASS INDEX: 36.8 KG/M2

## 2018-04-25 DIAGNOSIS — Z09 FOLLOW UP: Primary | ICD-10-CM

## 2018-04-25 PROCEDURE — 99024 POSTOP FOLLOW-UP VISIT: CPT | Performed by: SURGERY

## 2018-04-25 NOTE — PROGRESS NOTES
"Chief complaint:  Post-op  Follow up    History of Present Illness    Presenting for a wound check.   Patient denies fever, chills, nausea or vomiting.  Patient's pain is well-controlled.      The following portions of the patient's history were reviewed and updated as appropriate: allergies, current medications, past family history, past medical history, past social history, past surgical history and problem list.    Vitals:    04/25/18 1019   BP: 122/74   Pulse: 104   SpO2: 98%   Weight: 110 kg (242 lb 12.8 oz)   Height: 171.5 cm (67.5\")       Physical Exam  Right lower quadrant wound small amount of granulation tissue. Silver Nitrate was applied to this area.  Midline wound looks good with good granulation tissue, depth has filled in and no tunnels.     Patient does not use tobacco products currently and I have counseled the patient not to start using tobacco products in the future.    Assessment/plan:  Will d/c wound vac, only wet to dry to midline wound and no need for further wound dressing changes of the right lower quadrant wound. I will repeat a wound check on Monday at 2:00 pm.     Tri Guzman MD  General, Minimally Invasive and Endoscopic Surgery  [facility]    [address]  [place], [place], [address]  P: [phone]  F: [phone]    Cc:  Zahida Coffman MD  "

## 2018-05-17 ENCOUNTER — OFFICE VISIT (OUTPATIENT)
Dept: SURGERY | Facility: CLINIC | Age: 58
End: 2018-05-17

## 2018-05-17 ENCOUNTER — LAB (OUTPATIENT)
Dept: LAB | Facility: HOSPITAL | Age: 58
End: 2018-05-17

## 2018-05-17 DIAGNOSIS — Z09 FOLLOW UP: ICD-10-CM

## 2018-05-17 DIAGNOSIS — D50.0 IRON DEFICIENCY ANEMIA DUE TO CHRONIC BLOOD LOSS: ICD-10-CM

## 2018-05-17 DIAGNOSIS — D50.0 IRON DEFICIENCY ANEMIA DUE TO CHRONIC BLOOD LOSS: Primary | ICD-10-CM

## 2018-05-17 LAB
BASOPHILS # BLD AUTO: 0.06 10*3/MM3 (ref 0–0.2)
BASOPHILS NFR BLD AUTO: 0.7 % (ref 0–1.5)
DEPRECATED RDW RBC AUTO: 57 FL (ref 37–54)
EOSINOPHIL # BLD AUTO: 0.22 10*3/MM3 (ref 0–0.7)
EOSINOPHIL NFR BLD AUTO: 2.7 % (ref 0.3–6.2)
ERYTHROCYTE [DISTWIDTH] IN BLOOD BY AUTOMATED COUNT: 16.4 % (ref 11.7–13)
HCT VFR BLD AUTO: 28.4 % (ref 35.6–45.5)
HGB BLD-MCNC: 8.9 G/DL (ref 11.9–15.5)
IMM GRANULOCYTES # BLD: 0.04 10*3/MM3 (ref 0–0.03)
IMM GRANULOCYTES NFR BLD: 0.5 % (ref 0–0.5)
LYMPHOCYTES # BLD AUTO: 1.59 10*3/MM3 (ref 0.9–4.8)
LYMPHOCYTES NFR BLD AUTO: 19.6 % (ref 19.6–45.3)
MCH RBC QN AUTO: 29.6 PG (ref 26.9–32)
MCHC RBC AUTO-ENTMCNC: 31.3 G/DL (ref 32.4–36.3)
MCV RBC AUTO: 94.4 FL (ref 80.5–98.2)
MONOCYTES # BLD AUTO: 0.55 10*3/MM3 (ref 0.2–1.2)
MONOCYTES NFR BLD AUTO: 6.8 % (ref 5–12)
NEUTROPHILS # BLD AUTO: 5.67 10*3/MM3 (ref 1.9–8.1)
NEUTROPHILS NFR BLD AUTO: 69.7 % (ref 42.7–76)
NRBC BLD MANUAL-RTO: 0 /100 WBC (ref 0–0)
PLATELET # BLD AUTO: 388 10*3/MM3 (ref 140–500)
PMV BLD AUTO: 8.5 FL (ref 6–12)
RBC # BLD AUTO: 3.01 10*6/MM3 (ref 3.9–5.2)
WBC NRBC COR # BLD: 8.13 10*3/MM3 (ref 4.5–10.7)

## 2018-05-17 PROCEDURE — 99024 POSTOP FOLLOW-UP VISIT: CPT | Performed by: SURGERY

## 2018-05-17 PROCEDURE — 36415 COLL VENOUS BLD VENIPUNCTURE: CPT

## 2018-05-17 PROCEDURE — 85025 COMPLETE CBC W/AUTO DIFF WBC: CPT

## 2018-05-17 NOTE — PROGRESS NOTES
Chief complaint:  Post-op  Follow up    History of Present Illness    Patient presenting for a wound check.   Patient denies fever, chills, nausea or vomiting.  Patient's pain is well-controlled.  Pt c/o cold intolerance and hair falling out.     The following portions of the patient's history were reviewed and updated as appropriate: allergies, current medications, past family history, past medical history, past social history, past surgical history and problem list.    There were no vitals filed for this visit.    Physical Exam  Wound appears healthy and smaller    Patient does not use tobacco products currently and I have counseled the patient not to start using tobacco products in the future.    Assessment/plan:    Check cbc, f/u in 2 weeks   Extend work day to 6 hours    Tri Guzman MD  General, Minimally Invasive and Endoscopic Surgery  [facility]    [address]  [place], [place], [address]  P: [phone]  F: [phone]    Cc:  Zahida Coffman MD

## 2018-05-30 ENCOUNTER — OFFICE VISIT (OUTPATIENT)
Dept: SURGERY | Facility: CLINIC | Age: 58
End: 2018-05-30

## 2018-05-30 VITALS
WEIGHT: 248.1 LBS | SYSTOLIC BLOOD PRESSURE: 138 MMHG | HEIGHT: 55 IN | BODY MASS INDEX: 57.42 KG/M2 | DIASTOLIC BLOOD PRESSURE: 78 MMHG | HEART RATE: 78 BPM | OXYGEN SATURATION: 98 %

## 2018-05-30 DIAGNOSIS — Z09 FOLLOW UP: Primary | ICD-10-CM

## 2018-05-30 PROCEDURE — 99024 POSTOP FOLLOW-UP VISIT: CPT | Performed by: SURGERY

## 2018-05-30 NOTE — PROGRESS NOTES
"Chief complaint:  Post-op  Follow up    History of Present Illness  Patient is present for a wound check. Patient denies fever, chills or vomiting.     The following portions of the patient's history were reviewed and updated as appropriate: allergies, current medications, past family history, past medical history, past social history, past surgical history and problem list.    Vitals:    05/30/18 1547   BP: 138/78   Pulse: 78   SpO2: 98%   Weight: 113 kg (248 lb 1.6 oz)   Height: 67.5 cm (26.58\")       Physical Exam  Wound with very small amount of granulation tissue. I have applied silver nitrate.     Patient does not use tobacco products currently and I have counseled the patient not to start using tobacco products in the future.    Assessment/plan:  Wound looks good. I applied silver nitrated. Patient will f/u in 3 weeks.    Tri Guzman MD  General, Minimally Invasive and Endoscopic Surgery  [facility]    [address]  [place], [place], [address]  P: [phone]  F: [phone]    Cc:  Zahida Coffman MD  "

## 2018-06-20 ENCOUNTER — OFFICE VISIT (OUTPATIENT)
Dept: SURGERY | Facility: CLINIC | Age: 58
End: 2018-06-20

## 2018-06-20 VITALS
HEIGHT: 67 IN | BODY MASS INDEX: 38.92 KG/M2 | OXYGEN SATURATION: 98 % | HEART RATE: 86 BPM | SYSTOLIC BLOOD PRESSURE: 124 MMHG | WEIGHT: 248 LBS | DIASTOLIC BLOOD PRESSURE: 60 MMHG

## 2018-06-20 DIAGNOSIS — Z09 FOLLOW UP: Primary | ICD-10-CM

## 2018-06-20 PROCEDURE — 99024 POSTOP FOLLOW-UP VISIT: CPT | Performed by: SURGERY

## 2018-06-20 NOTE — PROGRESS NOTES
"Chief complaint:  Post-op  Follow up    History of Present Illness    This is Mariya Campos 57 y.o. is presenting for a wound check.  Patient denies fever or chills.  Patient reports the amount of hair loss is less on her pillow.  The following portions of the patient's history were reviewed and updated as appropriate: allergies, current medications, past family history, past medical history, past social history, past surgical history and problem list.    Vitals:    06/20/18 0839   BP: 124/60   Pulse: 86   SpO2: 98%   Weight: 112 kg (248 lb)   Height: 67.3 cm (26.5\")       Physical Exam    The wounds have healed however the right lateral wound has some scar tissue that is tender to palpation.  Patient is left lower extremity is swollen and larger than the right lower extremity.  There is no pitting edema or tenderness over the lower calves.    Patient does not use tobacco products currently and I have counseled the patient not to start using tobacco products in the future.    Assessment/plan:   Wound has healed    Left lower extremity swelling will be further evaluated rule out a DVT with Doppler duplex scan today    Tri Guzman MD  General, Minimally Invasive and Endoscopic Surgery  [facility]    [address]  [place], [place], [address]  P: [phone]  F: [phone]    Cc:  Zahida Coffman MD  "

## 2018-07-18 ENCOUNTER — OFFICE VISIT (OUTPATIENT)
Dept: SURGERY | Facility: CLINIC | Age: 58
End: 2018-07-18

## 2018-07-18 VITALS
HEIGHT: 67 IN | DIASTOLIC BLOOD PRESSURE: 78 MMHG | WEIGHT: 253.9 LBS | BODY MASS INDEX: 39.85 KG/M2 | OXYGEN SATURATION: 98 % | HEART RATE: 78 BPM | SYSTOLIC BLOOD PRESSURE: 138 MMHG

## 2018-07-18 DIAGNOSIS — Z09 FOLLOW UP: Primary | ICD-10-CM

## 2018-07-18 PROCEDURE — 99024 POSTOP FOLLOW-UP VISIT: CPT | Performed by: SURGERY

## 2018-07-18 NOTE — PROGRESS NOTES
"Chief complaint:  Post-op  Follow up    History of Present Illness    This is Mariya Campos 57 y.o. who underwent an [facility] for leg swelling was found to have an old DVT that had recanalized and had collaterals.  Patient did not see a vascular surgeon at that time.  Patient has never had a blood clot in the past.  And patient is concerned about any kind of blood thinners or medications that may affect her renal function.  Patient also has a pulling sensation on the right side in her scar tissue.  The following portions of the patient's history were reviewed and updated as appropriate: allergies, current medications, past family history, past medical history, past social history, past surgical history and problem list.    Vitals:    07/18/18 0828   BP: 138/78   Pulse: 78   SpO2: 98%   Weight: 115 kg (253 lb 14.4 oz)   Height: 170.2 cm (67\")       Physical Exam  Patient is very dense scar tissue in the midline as well as in her right lower quadrant area.  I have injected Kenalog to try to soften up the scar tissue.    Assessment/plan:  I would like to see patient back in 3 months.  I have advised patient she may need another Kenalog injection in order to soften the scar tissue.  I will discuss the DVT and treatment with her nephrologist Dr. Blevins.    Tri Guzman MD  General, Minimally Invasive and Endoscopic Surgery  [facility]    [address]  [place], [place], [address]  P: [phone]  F: [phone]    Cc:  Zahida Coffman MD  "

## 2018-10-05 ENCOUNTER — TRANSCRIBE ORDERS (OUTPATIENT)
Dept: PHYSICAL THERAPY | Facility: HOSPITAL | Age: 58
End: 2018-10-05

## 2018-10-05 DIAGNOSIS — I89.0 LYMPHEDEMA: Primary | ICD-10-CM

## 2018-10-10 ENCOUNTER — HOSPITAL ENCOUNTER (OUTPATIENT)
Dept: PHYSICAL THERAPY | Facility: HOSPITAL | Age: 58
Setting detail: THERAPIES SERIES
Discharge: HOME | End: 2018-10-10

## 2018-10-10 DIAGNOSIS — I89.0 LYMPHEDEMA: Primary | ICD-10-CM

## 2018-10-10 PROCEDURE — 97162 PT EVAL MOD COMPLEX 30 MIN: CPT

## 2018-10-10 NOTE — THERAPY EVALUATION
Physical Therapy Lymphedema Initial Evaluation  [facility]     Patient Name: Mariya Campos  : 1960  MRN: [id number]  Today's Date: 10/10/2018      Visit Date: 10/10/2018    Visit Dx:    ICD-10-CM ICD-9-CM   1. Lymphedema I89.0 457.1       Patient Active Problem List   Diagnosis   • Atopic rhinitis   • Fatigue   • Abnormal mammogram   • Adiposity   • Abnormal ECG   • CRF (chronic renal failure), stage 3 (moderate) (CMS/HCC)   • Anemia   • Recurrent UTI   • Osteopenia   • Essential hypertension   • Incisional hernia   • Moderate persistent asthma without complication   • Incisional hernia, without obstruction or gangrene   • Recurrent ventral hernia   • Bacteremia, escherichia coli   • Wound infection   • Complications, dialysis, catheter, mechanical, initial encounter (CMS/Formerly [facility])        Past Medical History:   Diagnosis Date   • Abdominal pain    • Allergic rhinitis    • Anemia     ON IRON QOD   • Asthma    • Chronic bronchitis (CMS/HCC)    • Chronic kidney disease     STAGE 3   SINCE  WITH COLON SURGERY   • Colon cancer (CMS/HCC)     CHEMO 6 MONTHS   • Hiatal hernia    • Hypertension    • Incisional hernia    • Overweight    • Peripheral neuropathy     FROM CHEMO TX'S   • Rosacea    • Visit for screening mammogram         Past Surgical History:   Procedure Laterality Date   • BREAST BIOPSY     • COLECTOMY PARTIAL / TOTAL      Partial Colectomy     • COLON SURGERY      XS 2    COLON CANCER AND    • COLONOSCOPY      Complete Colonoscopy   • DIAGNOSTIC LAPAROSCOPY N/A 2018    Procedure: EXPLORATORY LAPAROTOMY WITH SMALL BOWEL ANASTAMOSIS, LYSIS OF ADHESIONS, REMOVAL OF MESH;  Surgeon: Tri Guzman MD;  Location: [facility];  Service:    • INCISION AND DRAINAGE TRUNK N/A 2018    Procedure: WOUND WASHOUT ABDOMEN;  Surgeon: Tri Guzman MD;  Location: [facility];  Service:    • INCISION AND DRAINAGE TRUNK N/A 2018    Procedure: TRUNK DEBRIDEMENT abdominal  wound;  Surgeon: Tri Guzman MD;  Location: [facility] OR;  Service:    • INCISION AND DRAINAGE TRUNK N/A 2/27/2018    Procedure: INCISION AND DRAINAGE WOUND abdomen;  Surgeon: Tri Guzman MD;  Location: [facility] MAIN OR;  Service:    • INSERTION HEMODIALYSIS CATHETER N/A 2/19/2018    Procedure: LEFT QUAD CENTRAL LINE INSERTION AND RIGHT TUNNELED HEMODIALYSIS CATHETER INSERTION;  Surgeon: Meir Guillen MD;  Location: [facility] HYBRID OR 18/19;  Service:    • JOINT MANIPULATION Left 4/18/2016    Procedure: MANIPULATION OF LT SHOULDER;  Surgeon: Lidia Ugalde MD;  Location: [facility] OR OSC;  Service:    • VENTRAL HERNIA REPAIR N/A 3/15/2016    Procedure: LAPAROSCOPIC INSIONAL HERNIA REPAIR W/ MESH DAVINCI ROBOT;  Surgeon: Tri Guzman MD;  Location: [facility] OR;  Service:    • VENTRAL HERNIA REPAIR N/A 2/1/2018    Procedure: VENTRAL HERNIA REPAIR LAPAROSCOPIC WITH DAVINCI ROBOT;  Surgeon: Tri Guzman MD;  Location: [facility] OR;  Service:        Visit Dx:    ICD-10-CM ICD-9-CM   1. Lymphedema I89.0 457.1             Patient History     Row Name 10/10/18 1600             History    Chief Complaint Swelling  -PC      Date Current Problem(s) Began --   Feb 2018  -PC      Brief Description of Current Complaint Pt has a history of colon cancer and resection in 2006 and 2015. She underwent surg for incisional hernia on 2/01/2018.  She became septic and had 7 more surgeries in the next 2 weeks.  She was in ICU on ventilator and dialysis.  She was in hospital for over 2 months. Other medical history includes chronic kidney disease stage 3, chemo for colon cancer, peripheral neuropathy from the chemo. Pt has returned to work but tires easily and her leg swelling has not resolved.  -PC      Patient/Caregiver Goals Decrease swelling  -PC      What clinical tests have you had for this problem? --   Doppler  -PC      Results of Clinical Tests Old DVT Left  -PC      Are you or can you be  pregnant No  -PC         Pain     Pain Location Leg;Ankle  -PC      Pain at Present 3  -PC      Pain at Best 1  -PC      Pain at Worst 3  -PC      What Performance Factors Make the Current Problem(s) WORSE? Prolonged sitting  -PC      What Performance Factors Make the Current Problem(s) BETTER? Elevation, rest  -PC      Difficulties at work? Sitting prolonged causes increased swelling.  -PC      Difficulties with ADL's? Tires easily, being up on feet causes increased swelling and pain.  -PC         Fall Risk Assessment    Any falls in the past year: No  -PC         Services    Are you currently receiving Home Health services No  -PC         Daily Activities    Primary Language English  -PC      Are you able to read Yes  -PC      Are you able to write Yes  -PC      Teaching needs identified Management of Condition  -PC      Patient is concerned about/has problems with Performing home management (household chores, shopping, care of dependents);Walking  -PC      Does patient have problems with the following? None  -PC      Barriers to learning None  -PC      Functional Status mobility issues preventing performance of daily activities  -PC      Pt Participated in POC and Goals Yes  -PC         Safety    Are you being hurt, hit, or frightened by anyone at home or in your life? No  -PC      Are you being neglected by a caregiver No  -PC        User Key  (r) = Recorded By, (t) = Taken By, (c) = Cosigned By    Initials Name Provider Type    Teri Swan, PT Physical Therapist                Lymphedema     Row Name 10/10/18 2258             Subjective Pain    Able to rate subjective pain? yes  -PC      Pre-Treatment Pain Level 3  -PC      Post-Treatment Pain Level 3  -PC         Subjective Comments    Subjective Comments States she has no vacation or sick time left at work, so will have to come early am.  -PC         Lymphedema Assessment    Lymphedema Classification RLE:;LLE:;stage 2 (Spontaneously Irreversible)  -PC       Cancer Comments Colon cancer 2006 and 2015 with colon resection  -PC      Chemo Received yes  -PC      Chemo Treatments #/Timeframe 6 months  -PC      Adverse Chemo Reactions/Complication Peripheral neuropathy  -PC      Infections or Cellulitis? yes  -PC      Infection/Cellulitis Treatment MRSA abdomen Feb 2018, several surgeries and IV antibiotics.  -PC         Lymphedema Edema Assessment    Pitting Edema --   No pitting  -PC      Edema Assessment Comment Mod edema both feet to knees, worse in ankles.  -PC         Skin Changes/Observations    Skin Observations Comment Skin is intact but dry. No discoloration noted.  -PC         Lymphedema Sensation    Lymphedema Sensation Tests light touch  -PC      Lymphedema Light Touch WNL  -PC         Lymphedema Measurements    Measurement Type(s) Circumferential  -PC      Circumferential Areas Lower extremities  -PC         BLE Circumferential (cm)    Measurement Location 1 Knee  -PC      Left 1 46 cm  -PC      Right 1 46.5 cm  -PC      Measurement Location 2 10cm below knee  -PC      Left 2 47 cm  -PC      Right 2 46 cm  -PC      Measurement Location 3 20cm below knee  -PC      Left 3 42.5 cm  -PC      Right 3 40 cm  -PC      Measurement Location 4 30cm below knee  -PC      Left 4 35.1 cm  -PC      Right 4 32.7 cm  -PC      Measurement Location 5 Ankle (smallest)  -PC      Left 5 31.8 cm  -PC      Right 5 29.5 cm  -PC      Measurement Location 6 Ankle (largest)  -PC      Left 6 33 cm  -PC      Right 6 31.5 cm  -PC      Measurement Location 7 Midfoot  -PC      Left 7 25 cm  -PC      Right 7 23.9 cm  -PC      LLE Circumferential Total 260.4 cm  -PC      RLE Circumferential Total 250.1 cm  -PC        User Key  (r) = Recorded By, (t) = Taken By, (c) = Cosigned By    Initials Name Provider Type    Teri Swan PT Physical Therapist                          Therapy Education  Education Details: Reviewed treatment program and plan of care.  Program: New  How Provided:  Verbal, Written (Issued Lymphedema info)  Provided to: Patient  Level of Understanding: Verbalized            Exercises     Row Name 10/10/18 1700             Subjective Comments    Subjective Comments States she has no vacation or sick time left at work, so will have to come early am.  -PC         Subjective Pain    Able to rate subjective pain? yes  -PC      Pre-Treatment Pain Level 3  -PC      Post-Treatment Pain Level 3  -PC        User Key  (r) = Recorded By, (t) = Taken By, (c) = Cosigned By    Initials Name Provider Type    Teri Swan, PT Physical Therapist                              PT OP Goals     Row Name 10/10/18 1700          PT Short Term Goals    STG Date to Achieve 10/24/18  -PC     STG 1 Pt demo awareness of condition and precautions for improved prevention, management, care of symptoms, and ease of transition to self-care of condition.  -PC     STG 1 Progress New  -PC     STG 2 Pt/family independent with self-wrapping techniques of compression bandages as indicated for improved self-management of condition.  -PC     STG 2 Progress New  -PC     STG 3 Pt demo decreased net edema of >/=5-10cm for decreased edema symptoms, decreased risk of infection, and improved skin care.  -PC     STG 3 Progress New  -PC        Long Term Goals    LTG Date to Achieve 11/10/18  -PC     LTG 1 Pt/family independent with self-care techniques for self-management of condition.  -PC     LTG 1 Progress New  -PC     LTG 2 Pt demo decreased net edema of >/=10-20cm for decreased edema symptoms, decreased risk of infection, and improved skin care.  -PC     LTG 2 Progress New  -PC     LTG 3 Pt/family independent with compression garments as indicated for self-management of condition.  -PC     LTG 3 Progress New  -PC        Time Calculation    PT Goal Re-Cert Due Date 01/10/19  -PC       User Key  (r) = Recorded By, (t) = Taken By, (c) = Cosigned By    Initials Name Provider Type    Teri Swan PT Physical  Therapist                PT Assessment/Plan     Row Name 10/10/18 1717          PT Assessment    Functional Limitations Limitation in home management;Limitations in community activities  -PC     Impairments Edema;Impaired lymphatic circulation;Endurance;Pain  -PC     Assessment Comments 57 yr old female presents with mod edema in both lower legs from feet to knees.  Edema started in Feb 2018 while she was hospitalized.  She had surgery for an incisional hernia and developed sepsis.  Underwent 7 additional surgeries and was in ICU on a ventilator and dialysis.  Other medical hx incl chronic kidney disease stage 3, colon cancer with resection and chemo, peripheral neuropathy.  She presents with mod non-pitting edema both feet to knees, dry skin.  She has good ROM, no open sores.  Pain is level 3/10. Pt works full time so will need an early morning appt.  -PC     Please refer to paper survey for additional self-reported information Yes  -PC     Rehab Potential Good  -PC     Patient/caregiver participated in establishment of treatment plan and goals Yes  -PC     Patient would benefit from skilled therapy intervention Yes  -PC        PT Plan    PT Frequency --   3-5 times a week  -PC     Predicted Duration of Therapy Intervention (Therapy Eval) 4 weeks  -PC     Planned CPT's? PT EVAL MOD COMPLELITY: 28767;PT MANUAL THERAPY EA 15 MIN: 68706;PT SELF CARE/HOME MGMT/TRAIN EA 15: 95224  -PC     Physical Therapy Interventions (Optional Details) bandaging;home exercise program;patient/family education;manual lymphatic drainage  -PC     PT Plan Comments Pt will be scheduled with OT for sme treatment because she needs the earliest possible appt.  -PC       User Key  (r) = Recorded By, (t) = Taken By, (c) = Cosigned By    Initials Name Provider Type    Teri Swan PT Physical Therapist                       Time Calculation:   Start Time: 1535  Stop Time: 1610  Time Calculation (min): 35 min   Therapy Suggested Charges      Code   Minutes Charges    None           Therapy Charges for Today     Code Description Service Date Service Provider Modifiers Qty    09119696254 HC PT EVAL MOD COMPLEXITY 2 10/10/2018 Teri Maza, PT GP 1                    Teri Maza, PT  10/10/2018

## 2018-10-16 ENCOUNTER — OFFICE VISIT (OUTPATIENT)
Dept: SURGERY | Facility: CLINIC | Age: 58
End: 2018-10-16

## 2018-10-16 VITALS
HEIGHT: 67 IN | DIASTOLIC BLOOD PRESSURE: 78 MMHG | BODY MASS INDEX: 41.81 KG/M2 | HEART RATE: 80 BPM | OXYGEN SATURATION: 97 % | WEIGHT: 266.4 LBS | SYSTOLIC BLOOD PRESSURE: 130 MMHG

## 2018-10-16 DIAGNOSIS — Z09 FOLLOW UP: Primary | ICD-10-CM

## 2018-10-16 PROCEDURE — 99024 POSTOP FOLLOW-UP VISIT: CPT | Performed by: SURGERY

## 2018-10-16 RX ORDER — APIXABAN 5 MG/1
TABLET, FILM COATED ORAL
Refills: 5 | COMMUNITY
Start: 2018-10-01 | End: 2019-12-02

## 2018-11-05 ENCOUNTER — TRANSCRIBE ORDERS (OUTPATIENT)
Dept: ADMINISTRATIVE | Facility: HOSPITAL | Age: 58
End: 2018-11-05

## 2018-11-05 DIAGNOSIS — Z12.31 VISIT FOR SCREENING MAMMOGRAM: Primary | ICD-10-CM

## 2018-11-07 ENCOUNTER — OFFICE VISIT (OUTPATIENT)
Dept: INTERNAL MEDICINE | Facility: CLINIC | Age: 58
End: 2018-11-07

## 2018-11-07 VITALS
WEIGHT: 264.7 LBS | HEIGHT: 67 IN | HEART RATE: 97 BPM | OXYGEN SATURATION: 97 % | DIASTOLIC BLOOD PRESSURE: 70 MMHG | SYSTOLIC BLOOD PRESSURE: 144 MMHG | BODY MASS INDEX: 41.55 KG/M2 | TEMPERATURE: 98.4 F

## 2018-11-07 DIAGNOSIS — J30.2 SEASONAL ALLERGIC RHINITIS, UNSPECIFIED TRIGGER: ICD-10-CM

## 2018-11-07 DIAGNOSIS — Z12.4 PAP SMEAR FOR CERVICAL CANCER SCREENING: ICD-10-CM

## 2018-11-07 DIAGNOSIS — Z00.00 PHYSICAL EXAM, ANNUAL: Primary | ICD-10-CM

## 2018-11-07 PROBLEM — T14.8XXA WOUND INFECTION: Status: RESOLVED | Noted: 2018-01-30 | Resolved: 2018-11-07

## 2018-11-07 PROBLEM — R78.81 BACTEREMIA, ESCHERICHIA COLI: Status: RESOLVED | Noted: 2018-02-10 | Resolved: 2018-11-07

## 2018-11-07 PROBLEM — L08.9 WOUND INFECTION: Status: RESOLVED | Noted: 2018-01-30 | Resolved: 2018-11-07

## 2018-11-07 PROBLEM — K43.2 INCISIONAL HERNIA, WITHOUT OBSTRUCTION OR GANGRENE: Status: RESOLVED | Noted: 2017-12-21 | Resolved: 2018-11-07

## 2018-11-07 PROBLEM — T82.49XA: Status: RESOLVED | Noted: 2018-02-18 | Resolved: 2018-11-07

## 2018-11-07 PROBLEM — B96.20 BACTEREMIA, ESCHERICHIA COLI: Status: RESOLVED | Noted: 2018-02-10 | Resolved: 2018-11-07

## 2018-11-07 PROCEDURE — 99396 PREV VISIT EST AGE 40-64: CPT | Performed by: FAMILY MEDICINE

## 2018-11-07 RX ORDER — AZELASTINE HYDROCHLORIDE, FLUTICASONE PROPIONATE 137; 50 UG/1; UG/1
1 SPRAY, METERED NASAL 2 TIMES DAILY
Qty: 1 BOTTLE | Refills: 5 | Status: SHIPPED | OUTPATIENT
Start: 2018-11-07 | End: 2019-05-27

## 2018-11-07 RX ORDER — LEVOCETIRIZINE DIHYDROCHLORIDE 5 MG/1
5 TABLET, FILM COATED ORAL DAILY PRN
COMMUNITY

## 2018-11-07 NOTE — PROGRESS NOTES
Subjective   Mariya Campos is a 57 y.o. female.   Chief Complaint   Patient presents with   • Annual Exam   • Cough       History of Present Illness     Pt is here for complete physical exam with GYN evaluation and she complains of cough.    #1 CPE-no complains other than cough.  No change in medical history.  Surgical history-patient underwent hernia repair in 2/ 2018.  It was complicated.  She was treated in ICU.  She developed blood clots.  She is on Eliquids.  It is managed by Dr. Dumont.  No change in family history.  Medications-no other new prescription medications.  No change in over-the-counter medications.  She takes one tablet of iron every other day.  She is allergic to any medications.  She does not smoke cigarettes.  She drinks alcohol on average few times a year.  No drugs.  She does not exercise.  She has dental appointments every 6 months.  Last eye exam was in March 2017.  She wears glasses.  Colonoscopy January 2018 and is due in January 2019.  He is going to schedule it.  She had mammogram in October 2017 and is scheduled on December 5, 2018.  She had Pap smear in 2015 which was normal.  Tetanus vaccine 2010.  Flu vaccine this season.    Follows up with nephrologist every 4 months on chronic kidney disease.    #2 cough-on and off for a few months.  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  It's much better at home.  She has a history of asthma.  She is scheduled with her allergist in December.  She takes Brio.  Albuterol as needed.  She has constant postnasal drainage.  She takes Flonase every other day because otherwise she gets nosebleeds.  No fever, no chills, no other symptoms associated.    The following portions of the patient's history were reviewed and updated as appropriate: allergies, current medications, past family history, past medical history, past social history, past surgical history and problem list.    Review of Systems   Constitutional:  Positive for fatigue. Negative for chills and fever.   HENT: Positive for postnasal drip.    Respiratory: Positive for cough. Negative for shortness of breath and wheezing.    Cardiovascular: Negative.    Neurological: Negative.          Objective   Wt Readings from Last 3 Encounters:   11/07/18 120 kg (264 lb 11.2 oz)   10/16/18 121 kg (266 lb 6.4 oz)   07/18/18 115 kg (253 lb 14.4 oz)      Vitals:    11/07/18 1357   BP: 144/70   Pulse: 97   Temp: 98.4 °F (36.9 °C)   SpO2: 97%     Temp Readings from Last 3 Encounters:   11/07/18 98.4 °F (36.9 °C)   03/28/18 98.7 °F (37.1 °C) (Oral)   03/12/18 99.6 °F (37.6 °C) (Oral)     BP Readings from Last 3 Encounters:   11/07/18 144/70   10/16/18 130/78   07/18/18 138/78     Pulse Readings from Last 3 Encounters:   11/07/18 97   10/16/18 80   07/18/18 78       Physical Exam   Constitutional: She is oriented to person, place, and time. She appears well-developed and well-nourished. No distress.   HENT:   Head: Normocephalic and atraumatic. Hair is normal.   Right Ear: Hearing, tympanic membrane, external ear and ear canal normal. No drainage. No decreased hearing is noted.   Left Ear: Hearing, tympanic membrane, external ear and ear canal normal. No decreased hearing is noted.   Nose: No nasal deformity.   Mouth/Throat: Oropharynx is clear and moist.   Eyes: Pupils are equal, round, and reactive to light. Conjunctivae, EOM and lids are normal. Right eye exhibits no discharge. Left eye exhibits no discharge.   Neck: Normal range of motion. Neck supple. No JVD present. No tracheal deviation present. No thyromegaly present.   Cardiovascular: Normal rate, regular rhythm, normal heart sounds, intact distal pulses and normal pulses.  Exam reveals no gallop and no friction rub.    No murmur heard.  Pulmonary/Chest: Effort normal and breath sounds normal. No respiratory distress. She has no wheezes. She has no rales. She exhibits no tenderness.   Abdominal: Soft. She exhibits no  distension and no mass. There is no tenderness. There is no rebound and no guarding. No hernia.   Musculoskeletal: Normal range of motion. She exhibits no edema, tenderness or deformity.   Lymphadenopathy:     She has no cervical adenopathy.   Neurological: She is alert and oriented to person, place, and time. She has normal reflexes. She displays normal reflexes. No cranial nerve deficit. She exhibits normal muscle tone. Coordination normal.   Skin: Skin is warm and dry. No rash noted. She is not diaphoretic. No erythema.   Large scar in abdomen.   Psychiatric: She has a normal mood and affect. Her behavior is normal. Judgment and thought content normal.   Vitals reviewed.      Assessment/Plan   Mariya was seen today for annual exam and cough.    Diagnoses and all orders for this visit:    Physical exam, annual  -     CBC (No Diff)  -     Vitamin D 25 Hydroxy  -     Lipid Panel With LDL / HDL Ratio  -     Thyroid Cascade Profile  -     Comprehensive Metabolic Panel    Seasonal allergic rhinitis, unspecified trigger    Other orders  -     Azelastine-Fluticasone 137-50 MCG/ACT suspension; 1 spray into the nostril(s) as directed by provider 2 (Two) Times a Day.        #1 CPE- Pap and pelvic exam today.  Patient is going to schedule colonoscopy.  She is already scheduled for mammogram.  She prefers to hold with bone density for a few months.  She is advised to get shingrix at the pharmacy.  I'm advising her to start exercise as tolerated and build it up to 30 minutes a day, 5 days a week.  Labs today.    #2 cough- will work on better control of allergic rhinitis and postnasal drainage.  We are starting Dymista.  If it doesn't help in the next 3-4 weeks patient will follow-up with me or her allergist.

## 2018-11-08 LAB
25(OH)D3+25(OH)D2 SERPL-MCNC: 34.5 NG/ML (ref 30–100)
ALBUMIN SERPL-MCNC: 3.7 G/DL (ref 3.5–5.2)
ALBUMIN/GLOB SERPL: 0.9 G/DL
ALP SERPL-CCNC: 99 U/L (ref 39–117)
ALT SERPL-CCNC: 11 U/L (ref 1–33)
AST SERPL-CCNC: 12 U/L (ref 1–32)
BILIRUB SERPL-MCNC: 0.3 MG/DL (ref 0.1–1.2)
BUN SERPL-MCNC: 24 MG/DL (ref 6–20)
BUN/CREAT SERPL: 14.2 (ref 7–25)
CALCIUM SERPL-MCNC: 9.6 MG/DL (ref 8.6–10.5)
CHLORIDE SERPL-SCNC: 102 MMOL/L (ref 98–107)
CHOLEST SERPL-MCNC: 183 MG/DL (ref 0–200)
CO2 SERPL-SCNC: 26.4 MMOL/L (ref 22–29)
CREAT SERPL-MCNC: 1.69 MG/DL (ref 0.57–1)
ERYTHROCYTE [DISTWIDTH] IN BLOOD BY AUTOMATED COUNT: 13.3 % (ref 11.7–13)
GLOBULIN SER CALC-MCNC: 4.2 GM/DL
GLUCOSE SERPL-MCNC: 89 MG/DL (ref 65–99)
HCT VFR BLD AUTO: 38.8 % (ref 35.6–45.5)
HDLC SERPL-MCNC: 51 MG/DL (ref 40–60)
HGB BLD-MCNC: 11.7 G/DL (ref 11.9–15.5)
LDLC SERPL CALC-MCNC: 111 MG/DL (ref 0–100)
LDLC/HDLC SERPL: 2.17 {RATIO}
MCH RBC QN AUTO: 29.8 PG (ref 26.9–32)
MCHC RBC AUTO-ENTMCNC: 30.2 G/DL (ref 32.4–36.3)
MCV RBC AUTO: 98.7 FL (ref 80.5–98.2)
PLATELET # BLD AUTO: 340 10*3/MM3 (ref 140–500)
POTASSIUM SERPL-SCNC: 4.9 MMOL/L (ref 3.5–5.2)
PROT SERPL-MCNC: 7.9 G/DL (ref 6–8.5)
RBC # BLD AUTO: 3.93 10*6/MM3 (ref 3.9–5.2)
SODIUM SERPL-SCNC: 142 MMOL/L (ref 136–145)
TRIGL SERPL-MCNC: 106 MG/DL (ref 0–150)
TSH SERPL DL<=0.005 MIU/L-ACNC: 1.24 UIU/ML (ref 0.45–4.5)
VLDLC SERPL CALC-MCNC: 21.2 MG/DL (ref 5–40)
WBC # BLD AUTO: 9.16 10*3/MM3 (ref 4.5–10.7)

## 2018-11-09 LAB
CYTOLOGIST CVX/VAG CYTO: NORMAL
CYTOLOGY CVX/VAG DOC THIN PREP: NORMAL
DX ICD CODE: NORMAL
HIV 1 & 2 AB SER-IMP: NORMAL
HPV I/H RISK 1 DNA CVX QL PROBE+SIG AMP: NEGATIVE
OTHER STN SPEC: NORMAL
PATH REPORT.FINAL DX SPEC: NORMAL
STAT OF ADQ CVX/VAG CYTO-IMP: NORMAL

## 2018-11-09 RX ORDER — FLUTICASONE PROPIONATE 50 MCG
2 SPRAY, SUSPENSION (ML) NASAL DAILY
Qty: 1 BOTTLE | Refills: 12 | Status: ON HOLD | OUTPATIENT
Start: 2018-11-09 | End: 2018-12-13

## 2018-11-09 RX ORDER — AZELASTINE 1 MG/ML
2 SPRAY, METERED NASAL DAILY
Qty: 1 EACH | Refills: 12 | Status: SHIPPED | OUTPATIENT
Start: 2018-11-09 | End: 2020-12-02 | Stop reason: SDUPTHER

## 2018-11-12 ENCOUNTER — HOSPITAL ENCOUNTER (OUTPATIENT)
Dept: OCCUPATIONAL THERAPY | Facility: HOSPITAL | Age: 58
Setting detail: THERAPIES SERIES
Discharge: HOME | End: 2018-11-12

## 2018-11-12 DIAGNOSIS — I89.0 LYMPHEDEMA, NOT ELSEWHERE CLASSIFIED: Primary | ICD-10-CM

## 2018-11-12 PROCEDURE — 97140 MANUAL THERAPY 1/> REGIONS: CPT

## 2018-11-12 PROCEDURE — 97166 OT EVAL MOD COMPLEX 45 MIN: CPT

## 2018-11-12 NOTE — THERAPY EVALUATION
Outpatient Occupational Therapy Lymphedema Initial Evaluation  [facility]     Patient Name: Mariya Campos  : 1960  MRN: [id number]  Today's Date: 2018      Visit Date: 2018    Patient Active Problem List   Diagnosis   • Atopic rhinitis   • Adiposity   • Abnormal ECG   • CRF (chronic renal failure), stage 3 (moderate) (CMS/HCC)   • Anemia   • Recurrent UTI   • Osteopenia   • Essential hypertension   • Incisional hernia   • Moderate persistent asthma without complication   • Recurrent ventral hernia        Past Medical History:   Diagnosis Date   • Abdominal pain    • Allergic rhinitis    • Anemia     ON IRON QOD   • Asthma    • Chronic bronchitis (CMS/HCC)    • Chronic kidney disease     STAGE 3   SINCE  WITH COLON SURGERY   • Colon cancer (CMS/HCC)     CHEMO 6 MONTHS   • Hiatal hernia    • Hypertension    • Incisional hernia    • Overweight    • Peripheral neuropathy     FROM CHEMO TX'S   • Rosacea    • Visit for screening mammogram         Past Surgical History:   Procedure Laterality Date   • BREAST BIOPSY     • COLECTOMY PARTIAL / TOTAL      Partial Colectomy     • COLON SURGERY      XS 2    COLON CANCER AND    • COLONOSCOPY      Complete Colonoscopy         Visit Dx:     ICD-10-CM ICD-9-CM   1. Lymphedema, not elsewhere classified I89.0 457.1       Patient History     Row Name 18 1400             History    Chief Complaint  Swelling  -CW      Date Current Problem(s) Began  — 2018  -CW      Brief Description of Current Complaint  Pt has a history of colon cancer and resection in  and . She underwent surg for incisional hernia on 2018.  She became septic and had 7 more surgeries in the next 2 weeks.  She was in ICU on ventilator and dialysis.  She was in hospital for over 2 months. Other medical history includes chronic kidney disease stage 3, chemo for colon cancer, peripheral neuropathy from the chemo. Pt has returned to work but tires easily and  her leg swelling has not resolved.  -CW      Patient/Caregiver Goals  Decrease swelling  -CW      Occupation/sports/leisure activities  Works as  for [employer]  -CW      What clinical tests have you had for this problem?  — Doppler  -CW      Results of Clinical Tests  Old DVT Left  -CW      Are you or can you be pregnant  No  -CW         Pain     Pain Location  — L leg painful at times. No pain at rest. Abd. tenderness.   -CW      What Performance Factors Make the Current Problem(s) WORSE?  Prolonged sitting  -CW      Difficulties at work?  Sitting prolonged causes increased swelling.  -CW      Difficulties with ADL's?  Tires easily, being up on feet causes increased swelling and pain.  -CW         Fall Risk Assessment    Any falls in the past year:  No  -CW         Services    Are you currently receiving Home Health services  No  -CW         Daily Activities    Primary Language  English  -CW      Are you able to read  Yes  -CW      Are you able to write  Yes  -CW      Teaching needs identified  Management of Condition  -CW      Patient is concerned about/has problems with  Performing home management (household chores, shopping, care of dependents);Walking  -CW      Does patient have problems with the following?  None  -CW      Barriers to learning  None  -CW      Functional Status  mobility issues preventing performance of daily activities  -CW      Pt Participated in POC and Goals  Yes  -CW         Safety    Are you being hurt, hit, or frightened by anyone at home or in your life?  No  -CW      Are you being neglected by a caregiver  No  -CW        User Key  (r) = Recorded By, (t) = Taken By, (c) = Cosigned By    Initials Name Provider Type    Brooke Verma OTR Occupational Therapist          Lymphedema     Row Name 11/12/18 1400             Subjective Pain    Able to rate subjective pain?  yes  -CW      Pre-Treatment Pain Level  0  -CW      Post-Treatment Pain Level  0  -CW      Subjective  Pain Comment  No LE pain at rest.   -CW         Subjective Comments    Subjective Comments  Pt. reports her edema is worse towards the end of day. Her edema is increased after sitting for long time periods.   -CW         Lymphedema Assessment    Lymphedema Classification  RLE:;LLE:;stage 2 (Spontaneously Irreversible)  -CW      Cancer Comments  Colon cancer 2006 and 2015 with colon resection  -CW      Chemo Received  yes  -CW      Chemo Treatments #/Timeframe  6 months  -CW      Adverse Chemo Reactions/Complication  Peripheral neuropathy  -CW      Infections or Cellulitis?  yes  -CW      Infection/Cellulitis Treatment  MRSA abdomen Feb 2018, several surgeries and IV antibiotics.  -CW      Lymphedema Precautions  — Abdominal pain/tenderness. Hx. multiple abd. surgeries.   -CW         Lymphedema Edema Assessment    Pitting Edema  — No pitting  -CW      Edema Assessment Comment  Mod edema both feet to knees, worse in ankles.  -CW         Skin Changes/Observations    Skin Observations Comment  Skin is intact but dry. No discoloration noted.  -CW         Lymphedema Sensation    Lymphedema Sensation Tests  light touch  -CW      Lymphedema Light Touch  WNL  -CW         Lymphedema Measurements    Measurement Type(s)  Circumferential  -CW      Circumferential Areas  Lower extremities  -CW         BLE Circumferential (cm)    Measurement Location 1  Knee  -CW      Left 1  44 cm  -CW      Right 1  44.7 cm  -CW      Measurement Location 2  10cm below knee  -CW      Left 2  44.2 cm  -CW      Right 2  43.8 cm  -CW      Measurement Location 3  20cm below knee  -CW      Left 3  35.5 cm  -CW      Right 3  35.7 cm  -CW      Measurement Location 4  30cm below knee  -CW      Left 4  30.7 cm  -CW      Right 4  30.2 cm  -CW      Measurement Location 5  Ankle (smallest)  -CW      Measurement Location 6  Ankle (largest)  -CW      Left 6  32.8 cm  -CW      Right 6  32.1 cm  -CW      Measurement Location 7  Midfoot  -CW      Left 7  25.2 cm   -CW      Right 7  24.5 cm  -CW      Measurement Location 8  10cm. above knee  -CW      Left 8  51.5 cm  -CW      Right 8  55 cm  -CW      LLE Circumferential Total  263.9 cm  -CW      RLE Circumferential Total  266 cm  -CW         Manual Lymphatic Drainage    Manual Lymphatic Drainage  — Neck, axilla, abd. (modified), groin, BLE's as tolerated.  -CW      Manual Lymphatic Drainage Comments  Tenderness abdominal area. Hx. multiple abd surgeries  -CW         Compression/Skin Care    Compression/Skin Care  skin care;wrapping location;bandaging;compression garment;remove bandages  -CW      Skin Care  moisturizing lotion applied  -CW      Wrapping Location  lower extremity  -CW      Wrapping Location LE  bilateral:;foot to knee  -CW      Wrapping Comments  Tg.9, 2-10 cm. Artiflex, 1- 8cm., 1- 10 cm. Rosidal.   -CW      Bandage Layers  cotton liner;padding/fluff layer;short-stretch bandages (comment size/quantity)  -CW      Bandaging Comments  Less layers around B feet so pt. can wear shoes.   -CW      Bandaging Technique  circumferential/spiral;light compression  -CW        User Key  (r) = Recorded By, (t) = Taken By, (c) = Cosigned By    Initials Name Provider Type    CW Brooke Fam, MAHOGANY Occupational Therapist          OT Ortho     Row Name 11/12/18 1400             General ROM    GENERAL ROM COMMENTS  AROM BLE's is functional   -CW         MMT (Manual Muscle Testing)    General MMT Comments  4+/5 LE's general  -CW        User Key  (r) = Recorded By, (t) = Taken By, (c) = Cosigned By    Initials Name Provider Type    CW Brooke Fam OTR Occupational Therapist              Therapy Education  Education Details: Instructed how to apply bandages and reviewed precautions with pt. and spouse.   Given: Bandaging/dressing change  Program: New  How Provided: Verbal, Demonstration, Written  Provided to: Patient, Caregiver  Level of Understanding: Verbalized            OT Goals     Row Name 11/12/18 1400          OT Short Term  "Goals    STG Date to Achieve  11/26/18  -CW     STG 1  Patient to demonstrate proper awareness of \"What is Lymphedema\" and \"DO's and Don'ts\" for improved prevention, management, care of symptoms and ease of transition to self-care of condition.  -CW     STG 1 Progress  New  -CW     STG 2  Patient independent and compliant with self- wrapping techniques of compression bandages with family member or caregiver as indicated for improved self-management of condition.  -CW     STG 2 Progress  New  -CW     STG 3  Patient demonstrate decreased net edema of BLE's  >/=5-10 cm. for decreased in edema, symptoms, decreased risk of infection and improved skin-care/transition to self-care of condition.  -CW     STG 3 Progress  New  -CW     STG 4  Patient independent and compliant with home exercise program (as able) focused on range of motion, flexibility, to improve lymphatic flow and discomfort.   -CW     STG 4 Progress  New  -CW        Long Term Goals    LTG Date to Achieve  12/10/18  -CW     LTG 1  Patient will demonstrate decreased net edema of BLE's >/= 10-20 cm. for decrease in edema symptoms, decreased risk of infection, and improved skin care/transition to self-care of condition.  -CW     LTG 1 Progress  New  -CW     LTG 2  Patient/family/caregivers independent and compliant with self-care techniques for self- management of condition.  -CW     LTG 2 Progress  New  -CW     LTG 3  Patient independent and compliant with use and care of compression with assistance of a caregiver as needed to promote self-care independence.  -CW     LTG 3 Progress  New  -        Time Calculation    OT Goal Re-Cert Due Date  02/04/19  -       User Key  (r) = Recorded By, (t) = Taken By, (c) = Cosigned By    Initials Name Provider Type    Brooke Verma OTR Occupational Therapist          OT Assessment/Plan     Row Name 11/12/18 0029          OT Assessment    Functional Limitations  Limitations in functional capacity and " performance;Performance in self-care ADL  -CW     Impairments  Impaired venous circulation;Pain;Impaired lymphatic circulation;Edema  -CW     Assessment Comments  Pt. presents 58 y/o female with moderate increased edema BLE's feet to knees. Skin is intact. Her edema started 2/18 after surgery for an incisional hernia and developed sepsis. Pt. underwent 7 surgeries and was in ICU on a vent with dialysis. No weeping or wounds at present. Her AROM BLE's is functional. Measurements taken. See flow sheet. Total circumference .0 cm and .9 cm. Pt. would benefit from CDT to help decreased edema/pain, improve skin integrity, prevent infections, and to learn self care edema management.   -CW     Please refer to paper survey for additional self-reported information  Yes  -CW     OT Rehab Potential  Good  -CW     Patient/caregiver participated in establishment of treatment plan and goals  Yes  -CW     Patient would benefit from skilled therapy intervention  Yes  -CW        OT Plan    OT Frequency  4x/week  -CW     Predicted Duration of Therapy Intervention (Therapy Eval)  4x/week for 1 month  -CW     Planned CPT's?  OT SELF CARE/MGMT/TRAIN 15 MIN: 10053;OT MANUAL THERAPY EA 15 MIN: 23005;OT THER ACT EA 15 MIN: 17747MX;OT EVAL MOD COMPLEXITY: 95155;OT THER PROC EA 15 MIN: 47595HX  -CW     Planned Therapy Interventions (Optional Details)  manual therapy techniques;patient/family education;home exercise program  -CW     OT Plan Comments  Plan to cont. tx.   -CW       User Key  (r) = Recorded By, (t) = Taken By, (c) = Cosigned By    Initials Name Provider Type    Brooke Verma OTR Occupational Therapist                    Time Calculation:   OT Start Time: 0732  OT Stop Time: 0852  OT Time Calculation (min): 80 min  Total Timed Code Minutes- OT: 50 minute(s)     Therapy Suggested Charges     Code   Minutes Charges    42707 (CPT®) Hc Ot Neuromusc Re Education Ea 15 Min      78374 (CPT®) Hc Ot Ther Proc Ea 15 Min       82221 (CPT®) Hc Ot Therapeutic Act Ea 15 Min      39038 (CPT®) Hc Ot Manual Therapy Ea 15 Min 50 3    01021 (CPT®) Hc Ot Iontophoresis Ea 15 Min      87353 (CPT®) Hc Ot Elec Stim Ea-Per 15 Min      64794 (CPT®) Hc Ot Ultrasound Ea 15 Min      74825 (CPT®) Hc Ot Self Care/Mgmt/Train Ea 15 Min       (CPT®) Hc Ot Electrical Stim Unattended      Total  50 3          Therapy Charges for Today     Code Description Service Date Service Provider Modifiers Qty    91800023237 HC OT EVAL MOD COMPLEXITY 2 11/12/2018 Brooke Fam OTR GO 1    35192637594 HC OT MANUAL THERAPY EA 15 MIN 11/12/2018 Brooke Fam OTR GO 3                    MAHOGANY Fields  11/12/2018

## 2018-11-13 ENCOUNTER — HOSPITAL ENCOUNTER (OUTPATIENT)
Dept: OCCUPATIONAL THERAPY | Facility: HOSPITAL | Age: 58
Setting detail: THERAPIES SERIES
Discharge: HOME | End: 2018-11-13

## 2018-11-13 DIAGNOSIS — I89.0 LYMPHEDEMA, NOT ELSEWHERE CLASSIFIED: Primary | ICD-10-CM

## 2018-11-13 PROCEDURE — 97140 MANUAL THERAPY 1/> REGIONS: CPT

## 2018-11-13 NOTE — THERAPY TREATMENT NOTE
"Outpatient Occupational Therapy Lymphedema Treatment Note  [facility]     Patient Name: Mariya Campos  : 1960  MRN: [id number]  Today's Date: 2018      Visit Date: 2018    Patient Active Problem List   Diagnosis   • Atopic rhinitis   • Adiposity   • Abnormal ECG   • CRF (chronic renal failure), stage 3 (moderate) (CMS/HCC)   • Anemia   • Recurrent UTI   • Osteopenia   • Essential hypertension   • Incisional hernia   • Moderate persistent asthma without complication   • Recurrent ventral hernia        Past Medical History:   Diagnosis Date   • Abdominal pain    • Allergic rhinitis    • Anemia     ON IRON QOD   • Asthma    • Chronic bronchitis (CMS/HCC)    • Chronic kidney disease     STAGE 3   SINCE  WITH COLON SURGERY   • Colon cancer (CMS/HCC)     CHEMO 6 MONTHS   • Hiatal hernia    • Hypertension    • Incisional hernia    • Overweight    • Peripheral neuropathy     FROM CHEMO TX'S   • Rosacea    • Visit for screening mammogram         Past Surgical History:   Procedure Laterality Date   • BREAST BIOPSY     • COLECTOMY PARTIAL / TOTAL      Partial Colectomy     • COLON SURGERY      XS 2    COLON CANCER AND    • COLONOSCOPY      Complete Colonoscopy         Visit Dx:      ICD-10-CM ICD-9-CM   1. Lymphedema, not elsewhere classified I89.0 457.1       Lymphedema     Row Name 18 1300             Subjective Pain    Able to rate subjective pain?  yes  -CW      Pre-Treatment Pain Level  0  -CW      Post-Treatment Pain Level  0  -CW      Subjective Pain Comment  No LE pain at rest.   -CW         Subjective Comments    Subjective Comments  Going from sit to stand is difficult at times with \"pulling\" abdominal area.   -CW         Skin Changes/Observations    Location/Assessment  Lower Extremity  -CW      Skin Observations Comment  Skin is intact but dry. No discoloration noted.  -CW         Manual Lymphatic Drainage    Manual Lymphatic Drainage  -- Neck, axilla, abd. " (modified), groin, BLE's as tolerated.  -CW      Manual Lymphatic Drainage Comments  Tenderness abdominal area. Hx. multiple abd surgeries  -CW         Compression/Skin Care    Compression/Skin Care  skin care;wrapping location;bandaging;compression garment;remove bandages  -CW      Skin Care  moisturizing lotion applied  -CW      Wrapping Location  lower extremity  -CW      Wrapping Location LE  bilateral:;foot to knee  -CW      Wrapping Comments  Tg.9, 2-10 cm. Artiflex, 1- 8cm., 1- 10 cm. Rosidal.   -CW      Bandage Layers  cotton liner;padding/fluff layer;short-stretch bandages (comment size/quantity)  -CW      Bandaging Comments  Less layers around B feet so pt. can wear shoes.   -CW      Bandaging Technique  circumferential/spiral;light compression  -CW      Remove Bandages  Bandages were removed at home.   -CW        User Key  (r) = Recorded By, (t) = Taken By, (c) = Cosigned By    Initials Name Provider Type    CW Brooke Fam OTR Occupational Therapist                  OT Assessment/Plan     Row Name 11/13/18 1402 11/12/18 1433       OT Assessment    Functional Limitations  --  Limitations in functional capacity and performance;Performance in self-care ADL  -CW    Impairments  --  Impaired venous circulation;Pain;Impaired lymphatic circulation;Edema  -CW    Assessment Comments  Pt. tolerated the compression bandages yesterday and removed at 5:30 in the morning. Pain LE's if standing, but no pain at rest. Skin intact. Ed. completed for bandage precautions, wearing schedule and skin care.   -CW  Pt. presents 58 y/o female with moderate increased edema BLE's feet to knees. Skin is intact. Her edema started 2/18 after surgery for an incisional hernia and developed sepsis. Pt. underwent 7 surgeries and was in ICU on a vent with dialysis. No weeping or wounds at present. Her AROM BLE's is functional. Measurements taken. See flow sheet. Total circumference .0 cm and .9 cm. Pt. would benefit from  CDT to help decreased edema/pain, improve skin integrity, prevent infections, and to learn self care edema management.   -CW    Please refer to paper survey for additional self-reported information  --  Yes  -CW    OT Rehab Potential  --  Good  -CW    Patient/caregiver participated in establishment of treatment plan and goals  --  Yes  -CW    Patient would benefit from skilled therapy intervention  --  Yes  -CW       OT Plan    OT Frequency  --  4x/week  -CW    Predicted Duration of Therapy Intervention (Therapy Eval)  --  4x/week for 1 month  -CW    Planned CPT's?  --  OT SELF CARE/MGMT/TRAIN 15 MIN: 04802;OT MANUAL THERAPY EA 15 MIN: 76026;OT THER ACT EA 15 MIN: 05834LW;OT EVAL MOD COMPLEXITY: 52917;OT THER PROC EA 15 MIN: 51508CY  -CW    Planned Therapy Interventions (Optional Details)  --  manual therapy techniques;patient/family education;home exercise program  -CW    OT Plan Comments  Plan to cont. tx.   -CW  Plan to cont. tx.   -CW      User Key  (r) = Recorded By, (t) = Taken By, (c) = Cosigned By    Initials Name Provider Type    CW Brooke Fam OTYESICA Occupational Therapist                          Therapy Education  Given: Bandaging/dressing change  Program: Reinforced  How Provided: Verbal, Demonstration  Provided to: Patient  Level of Understanding: Verbalized                Time Calculation:   OT Start Time: 0733  OT Stop Time: 0848  OT Time Calculation (min): 75 min  Total Timed Code Minutes- OT: 75 minute(s)     Therapy Suggested Charges     Code   Minutes Charges    02546 (CPT®) Hc Ot Neuromusc Re Education Ea 15 Min      81371 (CPT®) Hc Ot Ther Proc Ea 15 Min      11964 (CPT®) Hc Ot Therapeutic Act Ea 15 Min      69960 (CPT®) Hc Ot Manual Therapy Ea 15 Min 75 5    29693 (CPT®) Hc Ot Iontophoresis Ea 15 Min      22534 (CPT®) Hc Ot Elec Stim Ea-Per 15 Min      45435 (CPT®) Hc Ot Ultrasound Ea 15 Min      85487 (CPT®) Hc Ot Self Care/Mgmt/Train Ea 15 Min       (CPT®) Hc Ot Electrical Stim  Unattended      Total  75 5            Therapy Charges for Today     Code Description Service Date Service Provider Modifiers Qty    25837086219 HC OT MANUAL THERAPY EA 15 MIN 11/13/2018 Brooke Fam OTR GO 5                      MAHOGANY Fields  11/13/2018

## 2018-11-14 ENCOUNTER — HOSPITAL ENCOUNTER (OUTPATIENT)
Dept: OCCUPATIONAL THERAPY | Facility: HOSPITAL | Age: 58
Setting detail: THERAPIES SERIES
Discharge: HOME | End: 2018-11-14

## 2018-11-14 DIAGNOSIS — I89.0 LYMPHEDEMA, NOT ELSEWHERE CLASSIFIED: Primary | ICD-10-CM

## 2018-11-14 PROCEDURE — 97140 MANUAL THERAPY 1/> REGIONS: CPT

## 2018-11-14 NOTE — THERAPY TREATMENT NOTE
Outpatient Occupational Therapy Lymphedema Treatment Note  [facility]     Patient Name: Mariya Campos  : 1960  MRN: [id number]  Today's Date: 2018      Visit Date: 2018    Patient Active Problem List   Diagnosis   • Atopic rhinitis   • Adiposity   • Abnormal ECG   • CRF (chronic renal failure), stage 3 (moderate) (CMS/HCC)   • Anemia   • Recurrent UTI   • Osteopenia   • Essential hypertension   • Incisional hernia   • Moderate persistent asthma without complication   • Recurrent ventral hernia        Past Medical History:   Diagnosis Date   • Abdominal pain    • Allergic rhinitis    • Anemia     ON IRON QOD   • Asthma    • Chronic bronchitis (CMS/HCC)    • Chronic kidney disease     STAGE 3   SINCE  WITH COLON SURGERY   • Colon cancer (CMS/HCC)     CHEMO 6 MONTHS   • Hiatal hernia    • Hypertension    • Incisional hernia    • Overweight    • Peripheral neuropathy     FROM CHEMO TX'S   • Rosacea    • Visit for screening mammogram         Past Surgical History:   Procedure Laterality Date   • BREAST BIOPSY     • COLECTOMY PARTIAL / TOTAL      Partial Colectomy     • COLON SURGERY      XS 2    COLON CANCER AND    • COLONOSCOPY      Complete Colonoscopy         Visit Dx:      ICD-10-CM ICD-9-CM   1. Lymphedema, not elsewhere classified I89.0 457.1       Lymphedema     Row Name 18 1400             Subjective Pain    Able to rate subjective pain?  yes  -CW      Pre-Treatment Pain Level  0  -CW      Post-Treatment Pain Level  0  -CW      Subjective Pain Comment  No LE pain at rest.   -CW         Subjective Comments    Subjective Comments  Some irritation from the bandages at night.   -CW         Skin Changes/Observations    Location/Assessment  Lower Extremity  -CW      Lower Extremity Color/Pigment  -- No red areas or skin rashes.   -CW      Skin Observations Comment  Skin is intact but dry. No discoloration noted.  -CW         Manual Lymphatic Drainage    Manual Lymphatic  Drainage  -- Neck, axilla, abd. (modified), groin, BLE's as tolerated.  -CW      Manual Lymphatic Drainage Comments  Tenderness abdominal area. Hx. multiple abd surgeries  -CW         Compression/Skin Care    Compression/Skin Care  skin care;wrapping location;bandaging;compression garment;remove bandages  -CW      Skin Care  moisturizing lotion applied  -CW      Wrapping Location  lower extremity  -CW      Wrapping Location LE  bilateral:;foot to knee  -CW      Wrapping Comments  Tg.9, 2-10 cm. Artiflex, 1- 8cm., 1- 10 cm. Rosidal.   -CW      Bandage Layers  cotton liner;padding/fluff layer;short-stretch bandages (comment size/quantity)  -CW      Bandaging Comments  Less layers around B feet so pt. can wear shoes.   -CW      Bandaging Technique  circumferential/spiral;moderate compression  -CW      Remove Bandages  Bandages were removed at home.   -CW        User Key  (r) = Recorded By, (t) = Taken By, (c) = Cosigned By    Initials Name Provider Type    Brooke Verma OTR Occupational Therapist                  OT Assessment/Plan     Row Name 11/14/18 1418          OT Assessment    Assessment Comments  Pt. reports no pain at rest BLE's. Pulling pain when standing abdominal and hip area. Applied the banages with slightly more compression. Skin intact with no rashes or red areas. Reviewed bandage precautions, wearing schedule and skin care. Edema decreased B ankles per observation.   -CW        OT Plan    OT Plan Comments  Plan to cont. tx.   -CW       User Key  (r) = Recorded By, (t) = Taken By, (c) = Cosigned By    Initials Name Provider Type    Brooke Verma OTR Occupational Therapist                          Therapy Education  Given: Bandaging/dressing change, Edema management  Program: Reinforced  How Provided: Verbal, Demonstration  Provided to: Patient  Level of Understanding: Verbalized                Time Calculation:   OT Start Time: 0735  OT Stop Time: 0848  OT Time Calculation (min): 73 min  Total  Timed Code Minutes- OT: 73 minute(s)     Therapy Suggested Charges     Code   Minutes Charges    46207 (CPT®) Hc Ot Neuromusc Re Education Ea 15 Min      34935 (CPT®) Hc Ot Ther Proc Ea 15 Min      57499 (CPT®) Hc Ot Therapeutic Act Ea 15 Min      15761 (CPT®) Hc Ot Manual Therapy Ea 15 Min 73 5    81203 (CPT®) Hc Ot Iontophoresis Ea 15 Min      16677 (CPT®) Hc Ot Elec Stim Ea-Per 15 Min      26063 (CPT®) Hc Ot Ultrasound Ea 15 Min      06507 (CPT®) Hc Ot Self Care/Mgmt/Train Ea 15 Min       (CPT®) Hc Ot Electrical Stim Unattended      Total  73 5            Therapy Charges for Today     Code Description Service Date Service Provider Modifiers Qty    28334990592 HC OT MANUAL THERAPY EA 15 MIN 11/14/2018 Brooke Fam OTR GO 5                      MAHOGANY Fields  11/14/2018

## 2018-11-16 ENCOUNTER — HOSPITAL ENCOUNTER (OUTPATIENT)
Dept: OCCUPATIONAL THERAPY | Facility: HOSPITAL | Age: 58
Setting detail: THERAPIES SERIES
Discharge: HOME | End: 2018-11-16

## 2018-11-16 DIAGNOSIS — I89.0 LYMPHEDEMA, NOT ELSEWHERE CLASSIFIED: Primary | ICD-10-CM

## 2018-11-16 PROCEDURE — 97140 MANUAL THERAPY 1/> REGIONS: CPT

## 2018-11-16 PROCEDURE — 97110 THERAPEUTIC EXERCISES: CPT

## 2018-11-16 NOTE — THERAPY TREATMENT NOTE
Outpatient Occupational Therapy Lymphedema Treatment Note  [facility]     Patient Name: Mariya Campos  : 1960  MRN: [id number]  Today's Date: 2018      Visit Date: 2018    Patient Active Problem List   Diagnosis   • Atopic rhinitis   • Adiposity   • Abnormal ECG   • CRF (chronic renal failure), stage 3 (moderate) (CMS/HCC)   • Anemia   • Recurrent UTI   • Osteopenia   • Essential hypertension   • Incisional hernia   • Moderate persistent asthma without complication   • Recurrent ventral hernia        Past Medical History:   Diagnosis Date   • Abdominal pain    • Allergic rhinitis    • Anemia     ON IRON QOD   • Asthma    • Chronic bronchitis (CMS/HCC)    • Chronic kidney disease     STAGE 3   SINCE  WITH COLON SURGERY   • Colon cancer (CMS/HCC)     CHEMO 6 MONTHS   • Hiatal hernia    • Hypertension    • Incisional hernia    • Overweight    • Peripheral neuropathy     FROM CHEMO TX'S   • Rosacea    • Visit for screening mammogram         Past Surgical History:   Procedure Laterality Date   • BREAST BIOPSY     • COLECTOMY PARTIAL / TOTAL      Partial Colectomy     • COLON SURGERY      XS 2    COLON CANCER AND    • COLONOSCOPY      Complete Colonoscopy         Visit Dx:      ICD-10-CM ICD-9-CM   1. Lymphedema, not elsewhere classified I89.0 457.1       Lymphedema     Row Name 18 1100 18 1000          Subjective Pain    Able to rate subjective pain?  --  yes  -CW     Pre-Treatment Pain Level  --  0  -CW     Post-Treatment Pain Level  --  0  -CW     Subjective Pain Comment  --  No c/o of LE pain at present when at rest.   -CW        Subjective Comments    Subjective Comments  --  Some discomfort B feet at work yesterday.   -CW        Skin Changes/Observations    Location/Assessment  --  Lower Extremity  -CW     Lower Extremity Color/Pigment  --  -- No red areas or skin rashes.   -CW     Skin Observations Comment  --  Skin is intact but dry. No discoloration noted.   -CW        Manual Lymphatic Drainage    Manual Lymphatic Drainage  --  -- Neck, axilla, abd. (modified), groin, BLE's as tolerated.  -CW     Manual Lymphatic Drainage Comments  --  Tenderness abdominal area. Hx. multiple abd surgeries  -CW        Compression/Skin Care    Compression/Skin Care  --  -CW  skin care;wrapping location;bandaging;compression garment;remove bandages  -CW     Skin Care  --  -CW  moisturizing lotion applied  -CW     Wrapping Location  --  -CW  lower extremity  -CW     Wrapping Location LE  --  -CW  bilateral:;foot to knee  -CW     Wrapping Comments  --  Tg.9, 2-10 cm. Artiflex, 1- 8cm., 1- 10 cm. Rosidal.   -CW     Bandage Layers  --  -CW  cotton liner;padding/fluff layer;short-stretch bandages (comment size/quantity)  -CW     Bandaging Comments  --  Less layers around B feet so pt. can wear shoes.   -CW     Bandaging Technique  --  -CW  circumferential/spiral;moderate compression  -CW     Remove Bandages  --  Bandages were removed at home.   -CW       User Key  (r) = Recorded By, (t) = Taken By, (c) = Cosigned By    Initials Name Provider Type    Brooke Verma OTR Occupational Therapist                  OT Assessment/Plan     Row Name 11/16/18 1102          OT Assessment    Assessment Comments  Applied the bandages with slightly more compression BLE's. No LE pain at rest. Skin intact with no red areas. Pt. reports some discomfort B feet at work, but does not have to remove the bandages. Instructed HEP and issued handouts. Ed. completed for bandage precautions, wearing schedule and skin care. Reviewed how to apply the bandages for over the weekend.    -CW        OT Plan    OT Plan Comments  Plan to cont. tx.   -CW       User Key  (r) = Recorded By, (t) = Taken By, (c) = Cosigned By    Initials Name Provider Type    Brooke Verma OTR Occupational Therapist                          Therapy Education  Given: Bandaging/dressing change, HEP  Program: New, Reinforced  How Provided: Verbal,  Demonstration, Written  Provided to: Patient  Level of Understanding: Verbalized                Time Calculation:   OT Start Time: 0737  OT Stop Time: 0849  OT Time Calculation (min): 72 min  Total Timed Code Minutes- OT: 72 minute(s)     Therapy Suggested Charges     Code   Minutes Charges    63715 (CPT®) Hc Ot Neuromusc Re Education Ea 15 Min      03652 (CPT®) Hc Ot Ther Proc Ea 15 Min 8 1    88868 (CPT®) Hc Ot Therapeutic Act Ea 15 Min      13124 (CPT®) Hc Ot Manual Therapy Ea 15 Min 64 4    25804 (CPT®) Hc Ot Iontophoresis Ea 15 Min      74512 (CPT®) Hc Ot Elec Stim Ea-Per 15 Min      72806 (CPT®) Hc Ot Ultrasound Ea 15 Min      20133 (CPT®) Hc Ot Self Care/Mgmt/Train Ea 15 Min       (CPT®) Hc Ot Electrical Stim Unattended      Total  72 5            Therapy Charges for Today     Code Description Service Date Service Provider Modifiers Qty    73644815091 HC OT THER PROC EA 15 MIN 11/16/2018 Brooke Fam OTR GO 1    00745376896 HC OT MANUAL THERAPY EA 15 MIN 11/16/2018 Brooke Fam OTR GO 4                      MAHOGANY Fields  11/16/2018

## 2018-11-19 ENCOUNTER — HOSPITAL ENCOUNTER (OUTPATIENT)
Dept: OCCUPATIONAL THERAPY | Facility: HOSPITAL | Age: 58
Setting detail: THERAPIES SERIES
Discharge: HOME | End: 2018-11-19

## 2018-11-19 DIAGNOSIS — I89.0 LYMPHEDEMA, NOT ELSEWHERE CLASSIFIED: Primary | ICD-10-CM

## 2018-11-19 PROCEDURE — 97140 MANUAL THERAPY 1/> REGIONS: CPT

## 2018-11-19 NOTE — THERAPY TREATMENT NOTE
"Outpatient Occupational Therapy Lymphedema Treatment Note  [facility]     Patient Name: Mariya Campos  : 1960  MRN: [id number]  Today's Date: 2018      Visit Date: 2018    Patient Active Problem List   Diagnosis   • Atopic rhinitis   • Adiposity   • Abnormal ECG   • CRF (chronic renal failure), stage 3 (moderate) (CMS/HCC)   • Anemia   • Recurrent UTI   • Osteopenia   • Essential hypertension   • Incisional hernia   • Moderate persistent asthma without complication   • Recurrent ventral hernia        Past Medical History:   Diagnosis Date   • Abdominal pain    • Allergic rhinitis    • Anemia     ON IRON QOD   • Asthma    • Chronic bronchitis (CMS/HCC)    • Chronic kidney disease     STAGE 3   SINCE  WITH COLON SURGERY   • Colon cancer (CMS/HCC)     CHEMO 6 MONTHS   • Hiatal hernia    • Hypertension    • Incisional hernia    • Overweight    • Peripheral neuropathy     FROM CHEMO TX'S   • Rosacea    • Visit for screening mammogram         Past Surgical History:   Procedure Laterality Date   • BREAST BIOPSY     • COLECTOMY PARTIAL / TOTAL      Partial Colectomy     • COLON SURGERY      XS 2    COLON CANCER AND    • COLONOSCOPY      Complete Colonoscopy         Visit Dx:      ICD-10-CM ICD-9-CM   1. Lymphedema, not elsewhere classified I89.0 457.1       Lymphedema     Row Name 18 0900             Subjective Pain    Able to rate subjective pain?  yes  -CW      Pre-Treatment Pain Level  0  -CW      Post-Treatment Pain Level  0  -CW      Subjective Pain Comment  No LE pain c/o at present.   -CW         Subjective Comments    Subjective Comments  Some \"pulling\" abdominal area going from sit to stand. No pain at rest.   -CW         Skin Changes/Observations    Location/Assessment  Lower Extremity  -CW      Lower Extremity Color/Pigment  -- No red areas or skin rashes.   -CW      Skin Observations Comment  Skin is intact but dry. No discoloration noted.  -CW         Manual " Lymphatic Drainage    Manual Lymphatic Drainage  -- Neck, axilla, abd. (modified), groin, BLE's as tolerated.  -CW      Manual Lymphatic Drainage Comments  Tenderness abdominal area. Hx. multiple abd surgeries  -CW         Compression/Skin Care    Compression/Skin Care  skin care;wrapping location;bandaging;compression garment;remove bandages  -CW      Skin Care  moisturizing lotion applied  -CW      Wrapping Location  lower extremity  -CW      Wrapping Location LE  bilateral:;foot to knee  -CW      Wrapping Comments  Tg.9, 2-10 cm. Artiflex, 1- 8cm., 1- 10 cm. Rosidal.   -CW      Bandage Layers  cotton liner;padding/fluff layer;short-stretch bandages (comment size/quantity)  -CW      Bandaging Comments  Less layers around B feet so pt. can wear shoes.   -CW      Bandaging Technique  circumferential/spiral;moderate compression  -CW      Remove Bandages  Bandages were removed at home.   -CW        User Key  (r) = Recorded By, (t) = Taken By, (c) = Cosigned By    Initials Name Provider Type    Brooke Verma OTR Occupational Therapist                  OT Assessment/Plan     Row Name 11/19/18 2434          OT Assessment    Assessment Comments  Pt. was able to wear the bandages Saturday, but her spouse worked and was not able to assist pt. to apply Sunday. Pt. applied Tubigrip instead. Edema decreased B ankles per observation. Skin intact with no rashed or irritation. Reviewed bandage precautions, wearing schedule and skin care. Encouraged pt. to complete HEP.    -CW        OT Plan    OT Plan Comments  Plan to cont. tx.   -CW       User Key  (r) = Recorded By, (t) = Taken By, (c) = Cosigned By    Initials Name Provider Type    Brooke Verma OTR Occupational Therapist                          Therapy Education  Given: Bandaging/dressing change  Program: Reinforced  How Provided: Verbal, Demonstration  Provided to: Patient  Level of Understanding: Verbalized                Time Calculation:   OT Start Time:  0734  OT Stop Time: 0850  OT Time Calculation (min): 76 min  Total Timed Code Minutes- OT: 76 minute(s)     Therapy Suggested Charges     Code   Minutes Charges    22636 (CPT®) Hc Ot Neuromusc Re Education Ea 15 Min      89086 (CPT®) Hc Ot Ther Proc Ea 15 Min      13014 (CPT®) Hc Ot Therapeutic Act Ea 15 Min      13150 (CPT®) Hc Ot Manual Therapy Ea 15 Min 76 5    17715 (CPT®) Hc Ot Iontophoresis Ea 15 Min      12600 (CPT®) Hc Ot Elec Stim Ea-Per 15 Min      10226 (CPT®) Hc Ot Ultrasound Ea 15 Min      79429 (CPT®) Hc Ot Self Care/Mgmt/Train Ea 15 Min       (CPT®) Hc Ot Electrical Stim Unattended      Total  76 5            Therapy Charges for Today     Code Description Service Date Service Provider Modifiers Qty    56000772176 HC OT MANUAL THERAPY EA 15 MIN 11/19/2018 Brooke Fam OTR GO 5                      MAHOGANY Fields  11/19/2018

## 2018-11-20 ENCOUNTER — HOSPITAL ENCOUNTER (OUTPATIENT)
Dept: OCCUPATIONAL THERAPY | Facility: HOSPITAL | Age: 58
Setting detail: THERAPIES SERIES
Discharge: HOME | End: 2018-11-20

## 2018-11-20 DIAGNOSIS — I89.0 LYMPHEDEMA, NOT ELSEWHERE CLASSIFIED: Primary | ICD-10-CM

## 2018-11-20 PROCEDURE — 97140 MANUAL THERAPY 1/> REGIONS: CPT

## 2018-11-20 NOTE — THERAPY TREATMENT NOTE
Outpatient Occupational Therapy Lymphedema Treatment Note  [facility]     Patient Name: Mariya Campos  : 1960  MRN: [id number]  Today's Date: 2018      Visit Date: 2018    Patient Active Problem List   Diagnosis   • Atopic rhinitis   • Adiposity   • Abnormal ECG   • CRF (chronic renal failure), stage 3 (moderate) (CMS/HCC)   • Anemia   • Recurrent UTI   • Osteopenia   • Essential hypertension   • Incisional hernia   • Moderate persistent asthma without complication   • Recurrent ventral hernia        Past Medical History:   Diagnosis Date   • Abdominal pain    • Allergic rhinitis    • Anemia     ON IRON QOD   • Asthma    • Chronic bronchitis (CMS/HCC)    • Chronic kidney disease     STAGE 3   SINCE  WITH COLON SURGERY   • Colon cancer (CMS/HCC)     CHEMO 6 MONTHS   • Hiatal hernia    • Hypertension    • Incisional hernia    • Overweight    • Peripheral neuropathy     FROM CHEMO TX'S   • Rosacea    • Visit for screening mammogram         Past Surgical History:   Procedure Laterality Date   • BREAST BIOPSY     • COLECTOMY PARTIAL / TOTAL      Partial Colectomy     • COLON SURGERY      XS 2    COLON CANCER AND    • COLONOSCOPY      Complete Colonoscopy         Visit Dx:      ICD-10-CM ICD-9-CM   1. Lymphedema, not elsewhere classified I89.0 457.1       Lymphedema     Row Name 18 0900             Subjective Pain    Able to rate subjective pain?  yes  -CW      Pre-Treatment Pain Level  0  -CW      Post-Treatment Pain Level  0  -CW      Subjective Pain Comment  No LE pain c/o.   -CW         Subjective Comments    Subjective Comments  Pt. slept better with not as much LE discomfort at night. No pins or needles sensation.   -CW         Skin Changes/Observations    Location/Assessment  Lower Extremity  -CW      Lower Extremity Color/Pigment  -- No red areas or skin rashes.   -CW      Skin Observations Comment  Skin is intact but dry. No discoloration noted.  -CW         Manual  Lymphatic Drainage    Manual Lymphatic Drainage  -- Neck, axilla, abd. (modified), groin, BLE's as tolerated.  -CW      Manual Lymphatic Drainage Comments  Tenderness abdominal area. Hx. multiple abd surgeries  -CW         Compression/Skin Care    Compression/Skin Care  skin care;wrapping location;bandaging;compression garment;remove bandages  -CW      Skin Care  moisturizing lotion applied  -CW      Wrapping Location  lower extremity  -CW      Wrapping Location LE  bilateral:;foot to knee  -CW      Wrapping Comments  Tg.9, 2-10 cm. Artiflex, 1- 8cm., 2- 10 cm. Rosidal.   -CW      Bandage Layers  cotton liner;padding/fluff layer;short-stretch bandages (comment size/quantity)  -CW      Bandaging Comments  Less layers around B feet so pt. can wear shoes.   -CW      Bandaging Technique  circumferential/spiral;moderate compression  -CW      Remove Bandages  Bandages were removed at home.   -CW        User Key  (r) = Recorded By, (t) = Taken By, (c) = Cosigned By    Initials Name Provider Type    CW Brooke Fam OTYESICA Occupational Therapist                  OT Assessment/Plan     Row Name 11/20/18 0910 11/19/18 0922       OT Assessment    Assessment Comments  Pt. reports she slept better with the bandages on. No pain c/o at present. No skin rashes or irritation. Edema decreased B ankles per observation. Skin inact.  Ed. completed for bandage precautions, wearing schedule and skin care. Added another 10 cm. Rosidal ankle to knee. Can remove top layer at night if needed.   -CW  Pt. was able to wear the bandages Saturday, but her spouse worked and was not able to assist pt. to apply Sunday. Pt. applied Tubigrip instead. Edema decreased B ankles per observation. Skin intact with no rashed or irritation. Reviewed bandage precautions, wearing schedule and skin care. Encouraged pt. to complete HEP.    -CW       OT Plan    OT Plan Comments  Plan to cont. tx.   -CW  Plan to cont. tx.   -CW      User Key  (r) = Recorded By, (t) =  Taken By, (c) = Cosigned By    Initials Name Provider Type    CW Brooke Fam OTR Occupational Therapist                          Therapy Education  Given: Bandaging/dressing change  Program: Reinforced  How Provided: Verbal, Demonstration  Provided to: Patient  Level of Understanding: Verbalized                Time Calculation:   OT Start Time: 0733  OT Stop Time: 0846  OT Time Calculation (min): 73 min  Total Timed Code Minutes- OT: 73 minute(s)     Therapy Suggested Charges     Code   Minutes Charges    56847 (CPT®) Hc Ot Neuromusc Re Education Ea 15 Min      20444 (CPT®) Hc Ot Ther Proc Ea 15 Min      89491 (CPT®) Hc Ot Therapeutic Act Ea 15 Min      39230 (CPT®) Hc Ot Manual Therapy Ea 15 Min 73 5    71416 (CPT®) Hc Ot Iontophoresis Ea 15 Min      64025 (CPT®) Hc Ot Elec Stim Ea-Per 15 Min      01842 (CPT®) Hc Ot Ultrasound Ea 15 Min      48061 (CPT®) Hc Ot Self Care/Mgmt/Train Ea 15 Min       (CPT®) Hc Ot Electrical Stim Unattended      Total  73 5            Therapy Charges for Today     Code Description Service Date Service Provider Modifiers Qty    39884392065 HC OT MANUAL THERAPY EA 15 MIN 11/20/2018 Brooke Fam OTR GO 5                      MAHOGANY Fields  11/20/2018

## 2018-11-21 ENCOUNTER — HOSPITAL ENCOUNTER (OUTPATIENT)
Dept: OCCUPATIONAL THERAPY | Facility: HOSPITAL | Age: 58
Setting detail: THERAPIES SERIES
Discharge: HOME | End: 2018-11-21

## 2018-11-21 DIAGNOSIS — I89.0 LYMPHEDEMA, NOT ELSEWHERE CLASSIFIED: Primary | ICD-10-CM

## 2018-11-21 DIAGNOSIS — C18.9 MALIGNANT NEOPLASM OF COLON, UNSPECIFIED PART OF COLON (HCC): Primary | ICD-10-CM

## 2018-11-21 PROCEDURE — 97140 MANUAL THERAPY 1/> REGIONS: CPT

## 2018-11-21 NOTE — THERAPY TREATMENT NOTE
Outpatient Occupational Therapy Lymphedema Treatment Note  [facility]     Patient Name: Mariya Campos  : 1960  MRN: [id number]  Today's Date: 2018      Visit Date: 2018    Patient Active Problem List   Diagnosis   • Atopic rhinitis   • Adiposity   • Abnormal ECG   • CRF (chronic renal failure), stage 3 (moderate) (CMS/HCC)   • Anemia   • Recurrent UTI   • Osteopenia   • Essential hypertension   • Incisional hernia   • Moderate persistent asthma without complication   • Recurrent ventral hernia        Past Medical History:   Diagnosis Date   • Abdominal pain    • Allergic rhinitis    • Anemia     ON IRON QOD   • Asthma    • Chronic bronchitis (CMS/HCC)    • Chronic kidney disease     STAGE 3   SINCE  WITH COLON SURGERY   • Colon cancer (CMS/HCC)     CHEMO 6 MONTHS   • Hiatal hernia    • Hypertension    • Incisional hernia    • Overweight    • Peripheral neuropathy     FROM CHEMO TX'S   • Rosacea    • Visit for screening mammogram         Past Surgical History:   Procedure Laterality Date   • BREAST BIOPSY     • COLECTOMY PARTIAL / TOTAL      Partial Colectomy     • COLON SURGERY      XS 2    COLON CANCER AND    • COLONOSCOPY      Complete Colonoscopy         Visit Dx:      ICD-10-CM ICD-9-CM   1. Lymphedema, not elsewhere classified I89.0 457.1       Lymphedema     Row Name 18 1200             Subjective Pain    Able to rate subjective pain?  yes  -CW      Pre-Treatment Pain Level  0  -CW      Post-Treatment Pain Level  0  -CW      Subjective Pain Comment  No LE pain c/o from the bandages at present.   -CW         Subjective Comments    Subjective Comments  Pt. tolerated the bandages well during the day. Some discomfort at night.    -CW         Skin Changes/Observations    Location/Assessment  Lower Extremity  -CW      Lower Extremity Color/Pigment  -- No red areas or skin rashes.   -CW      Skin Observations Comment  Skin is intact but dry. No discoloration noted.   -CW         Lymphedema Measurements    Measurement Type(s)  Circumferential  -CW      Circumferential Areas  Lower extremities  -CW         BLE Circumferential (cm)    Measurement Location 1  Knee  -CW      Left 1  43.5 cm  -CW      Right 1  43.7 cm  -CW      Measurement Location 2  10cm below knee  -CW      Left 2  43.5 cm  -CW      Right 2  42.5 cm  -CW      Measurement Location 3  20cm below knee  -CW      Left 3  34.5 cm  -CW      Right 3  33 cm  -CW      Measurement Location 4  30cm below knee  -CW      Left 4  29 cm  -CW      Right 4  29 cm  -CW      Measurement Location 5  Ankle (smallest)  -CW      Measurement Location 6  Ankle (largest)  -CW      Left 6  31.4 cm  -CW      Right 6  31.5 cm  -CW      Measurement Location 7  Midfoot  -CW      Left 7  24 cm  -CW      Right 7  24 cm  -CW      Measurement Location 8  10cm. above knee  -CW      Left 8  51.5 cm  -CW      Right 8  53.9 cm  -CW      LLE Circumferential Total  257.4 cm  -CW      RLE Circumferential Total  257.6 cm  -CW         Manual Lymphatic Drainage    Manual Lymphatic Drainage  -- Neck, axilla, abd. (modified), groin, BLE's as tolerated.  -CW      Manual Lymphatic Drainage Comments  Tenderness abdominal area. Hx. multiple abd surgeries  -CW         Compression/Skin Care    Compression/Skin Care  skin care;wrapping location;bandaging;compression garment;remove bandages  -CW      Skin Care  moisturizing lotion applied  -CW      Wrapping Location  lower extremity  -CW      Wrapping Location LE  bilateral:;foot to knee  -CW      Wrapping Comments  Tg.9, 2-10 cm. Artiflex, 1- 8cm., 2- 10 cm. Rosidal.   -CW      Bandage Layers  cotton liner;padding/fluff layer;short-stretch bandages (comment size/quantity)  -CW      Bandaging Comments  Less layers around B feet so pt. can wear shoes.   -CW      Bandaging Technique  circumferential/spiral;moderate compression  -CW      Remove Bandages  Bandages were removed at home.   -CW        User Key  (r) = Recorded  "By, (t) = Taken By, (c) = Cosigned By    Initials Name Provider Type    Brooke Verma OTR Occupational Therapist                  OT Assessment/Plan     Row Name 11/21/18 1233          OT Assessment    Assessment Comments  Measurements taken. See flow sheet. Total circumference .6 cm and .4 cm. (8.4 cm. R and 6.5 cm. L net decrease). Pt. is progressing with decreased edema BLE's. Skin intact with no rashes or irritation.  Ed. completed for bandage precautions, wearing schedule and skin care.  Reviewed progress and poc.   -CW        OT Plan    OT Plan Comments  Plan to cont. tx.   -CW       User Key  (r) = Recorded By, (t) = Taken By, (c) = Cosigned By    Initials Name Provider Type    Brooke Verma OTR Occupational Therapist                      OT Goals     Row Name 11/21/18 1200          OT Short Term Goals    STG Date to Achieve  11/26/18  -CW     STG 1  Patient to demonstrate proper awareness of \"What is Lymphedema\" and \"DO's and Don'ts\" for improved prevention, management, care of symptoms and ease of transition to self-care of condition.  -CW     STG 1 Progress  Ongoing;Progressing  -CW     STG 2  Patient independent and compliant with self- wrapping techniques of compression bandages with family member or caregiver as indicated for improved self-management of condition.  -CW     STG 2 Progress  Ongoing;Progressing  -CW     STG 2 Progress Comments  Instructed spouse how to apply bandages and issued handouts.   -CW     STG 3  Patient demonstrate decreased net edema of BLE's  >/=5-10 cm. for decreased in edema, symptoms, decreased risk of infection and improved skin-care/transition to self-care of condition.  -CW     STG 3 Progress  Met  -CW     STG 3 Progress Comments  Total circumference .6 cm. and .4 cm. on 11/12/18   -CW     STG 4  Patient independent and compliant with home exercise program (as able) focused on range of motion, flexibility, to improve lymphatic flow and " discomfort.   -CW     STG 4 Progress  Progressing  -CW        Long Term Goals    LTG Date to Achieve  12/10/18  -CW     LTG 1  Patient will demonstrate decreased net edema of BLE's >/= 10-20 cm. for decrease in edema symptoms, decreased risk of infection, and improved skin care/transition to self-care of condition.  -CW     LTG 1 Progress  Ongoing;Progressing  -CW     LTG 2  Patient/family/caregivers independent and compliant with self-care techniques for self- management of condition.  -CW     LTG 2 Progress  Ongoing;Progressing  -CW     LTG 3  Patient independent and compliant with use and care of compression with assistance of a caregiver as needed to promote self-care independence.  -CW     LTG 3 Progress  Ongoing  -CW       User Key  (r) = Recorded By, (t) = Taken By, (c) = Cosigned By    Initials Name Provider Type    CW Brooke Fam OTR Occupational Therapist          Therapy Education  Given: Bandaging/dressing change, Edema management  Program: Reinforced  How Provided: Verbal, Demonstration  Provided to: Patient  Level of Understanding: Verbalized                Time Calculation:   OT Start Time: 0735  OT Stop Time: 0847  OT Time Calculation (min): 72 min  Total Timed Code Minutes- OT: 72 minute(s)     Therapy Suggested Charges     Code   Minutes Charges    61008 (CPT®) Hc Ot Neuromusc Re Education Ea 15 Min      89117 (CPT®) Hc Ot Ther Proc Ea 15 Min      57599 (CPT®) Hc Ot Therapeutic Act Ea 15 Min      69225 (CPT®) Hc Ot Manual Therapy Ea 15 Min 72 5    31149 (CPT®) Hc Ot Iontophoresis Ea 15 Min      38722 (CPT®) Hc Ot Elec Stim Ea-Per 15 Min      75007 (CPT®) Hc Ot Ultrasound Ea 15 Min      94033 (CPT®) Hc Ot Self Care/Mgmt/Train Ea 15 Min       (CPT®) Hc Ot Electrical Stim Unattended      Total  72 5            Therapy Charges for Today     Code Description Service Date Service Provider Modifiers Qty    98742609853 HC OT MANUAL THERAPY EA 15 MIN 11/21/2018 Brooke Fam OTR GO 5                       Brooke Fam, OTR  11/21/2018

## 2018-11-23 ENCOUNTER — APPOINTMENT (OUTPATIENT)
Dept: OCCUPATIONAL THERAPY | Facility: HOSPITAL | Age: 58
End: 2018-11-23

## 2018-11-26 ENCOUNTER — HOSPITAL ENCOUNTER (OUTPATIENT)
Dept: OCCUPATIONAL THERAPY | Facility: HOSPITAL | Age: 58
Setting detail: THERAPIES SERIES
Discharge: HOME | End: 2018-11-26

## 2018-11-26 DIAGNOSIS — I89.0 LYMPHEDEMA, NOT ELSEWHERE CLASSIFIED: Primary | ICD-10-CM

## 2018-11-26 PROCEDURE — 97140 MANUAL THERAPY 1/> REGIONS: CPT

## 2018-11-26 NOTE — THERAPY TREATMENT NOTE
Outpatient Occupational Therapy Lymphedema Treatment Note  [facility]     Patient Name: Mariya Campos  : 1960  MRN: [id number]  Today's Date: 2018      Visit Date: 2018    Patient Active Problem List   Diagnosis   • Atopic rhinitis   • Adiposity   • Abnormal ECG   • CRF (chronic renal failure), stage 3 (moderate) (CMS/HCC)   • Anemia   • Recurrent UTI   • Osteopenia   • Essential hypertension   • Incisional hernia   • Moderate persistent asthma without complication   • Recurrent ventral hernia        Past Medical History:   Diagnosis Date   • Abdominal pain    • Allergic rhinitis    • Anemia     ON IRON QOD   • Asthma    • Chronic bronchitis (CMS/HCC)    • Chronic kidney disease     STAGE 3   SINCE  WITH COLON SURGERY   • Colon cancer (CMS/HCC)     CHEMO 6 MONTHS   • Hiatal hernia    • Hypertension    • Incisional hernia    • Overweight    • Peripheral neuropathy     FROM CHEMO TX'S   • Rosacea    • Visit for screening mammogram         Past Surgical History:   Procedure Laterality Date   • BREAST BIOPSY     • COLECTOMY PARTIAL / TOTAL      Partial Colectomy     • COLON SURGERY      XS 2    COLON CANCER AND    • COLONOSCOPY      Complete Colonoscopy         Visit Dx:      ICD-10-CM ICD-9-CM   1. Lymphedema, not elsewhere classified I89.0 457.1       Lymphedema     Row Name 18 0900             Subjective Pain    Able to rate subjective pain?  yes  -CW      Pre-Treatment Pain Level  0  -CW      Post-Treatment Pain Level  0  -CW      Subjective Pain Comment  No pain c/o at rest.   -CW         Subjective Comments    Subjective Comments  Pt. reports her L leg was painful over the weekend, but has resolved. The pain eased up after walking.   -CW         Skin Changes/Observations    Location/Assessment  Lower Extremity  -CW      Lower Extremity Color/Pigment  -- No red areas or skin rashes.   -CW      Skin Observations Comment  Skin is intact but dry. No discoloration noted.   -CW         Manual Lymphatic Drainage    Manual Lymphatic Drainage  -- Neck, axilla, abd. (modified), groin, BLE's as tolerated.  -CW      Manual Lymphatic Drainage Comments  Tenderness abdominal area. Hx. multiple abd surgeries  -CW         Compression/Skin Care    Compression/Skin Care  skin care;wrapping location;bandaging;compression garment;remove bandages  -CW      Skin Care  moisturizing lotion applied  -CW      Wrapping Location  lower extremity  -CW      Wrapping Location LE  bilateral:;foot to knee  -CW      Wrapping Comments  Tg.9, 2-10 cm. Artiflex, 1- 8cm., 2- 10 cm. Rosidal.   -CW      Bandage Layers  cotton liner;padding/fluff layer;short-stretch bandages (comment size/quantity)  -CW      Bandaging Comments  Less layers around B feet so pt. can wear shoes.   -CW      Bandaging Technique  circumferential/spiral;moderate compression  -CW      Remove Bandages  Bandages were removed at home.   -CW        User Key  (r) = Recorded By, (t) = Taken By, (c) = Cosigned By    Initials Name Provider Type    Brooke Verma OTR Occupational Therapist                  OT Assessment/Plan     Row Name 11/26/18 0932          OT Assessment    Assessment Comments  Pt. had some LLE anterior shin pain over the weekend and resolved after walking. No pain c/o at present. Pt. tolerated the compression bandages well. Skin intact with no rashes or irritation. Edema decreased B ankles per observation. Ed. completed for bandage precautions, wearing schedule and skin care.   -CW        OT Plan    OT Plan Comments  Plan to cont. tx.   -CW       User Key  (r) = Recorded By, (t) = Taken By, (c) = Cosigned By    Initials Name Provider Type    Brooke Verma OTR Occupational Therapist                          Therapy Education  Given: Bandaging/dressing change  Program: Reinforced  How Provided: Verbal, Demonstration  Provided to: Patient  Level of Understanding: Verbalized                Time Calculation:   OT Start Time:  0730  OT Stop Time: 0846  OT Time Calculation (min): 76 min  Total Timed Code Minutes- OT: 76 minute(s)     Therapy Suggested Charges     Code   Minutes Charges    21590 (CPT®) Hc Ot Neuromusc Re Education Ea 15 Min      15245 (CPT®) Hc Ot Ther Proc Ea 15 Min      77858 (CPT®) Hc Ot Therapeutic Act Ea 15 Min      55491 (CPT®) Hc Ot Manual Therapy Ea 15 Min 76 5    27102 (CPT®) Hc Ot Iontophoresis Ea 15 Min      73191 (CPT®) Hc Ot Elec Stim Ea-Per 15 Min      71645 (CPT®) Hc Ot Ultrasound Ea 15 Min      54484 (CPT®) Hc Ot Self Care/Mgmt/Train Ea 15 Min       (CPT®) Hc Ot Electrical Stim Unattended      Total  76 5            Therapy Charges for Today     Code Description Service Date Service Provider Modifiers Qty    88446518942 HC OT MANUAL THERAPY EA 15 MIN 11/26/2018 Brooke Fam OTR GO 5                      MAHOGANY Fields  11/26/2018

## 2018-11-27 ENCOUNTER — APPOINTMENT (OUTPATIENT)
Dept: OCCUPATIONAL THERAPY | Facility: HOSPITAL | Age: 58
End: 2018-11-27

## 2018-11-28 ENCOUNTER — HOSPITAL ENCOUNTER (OUTPATIENT)
Dept: OCCUPATIONAL THERAPY | Facility: HOSPITAL | Age: 58
Setting detail: THERAPIES SERIES
Discharge: HOME | End: 2018-11-28

## 2018-11-28 DIAGNOSIS — I89.0 LYMPHEDEMA, NOT ELSEWHERE CLASSIFIED: Primary | ICD-10-CM

## 2018-11-28 PROCEDURE — 97140 MANUAL THERAPY 1/> REGIONS: CPT

## 2018-11-28 NOTE — THERAPY TREATMENT NOTE
Outpatient Occupational Therapy Lymphedema Treatment Note/Weekly progress note  [facility]     Patient Name: Mariya Campos  : 1960  MRN: [id number]  Today's Date: 2018      Visit Date: 2018    Patient Active Problem List   Diagnosis   • Atopic rhinitis   • Adiposity   • Abnormal ECG   • CRF (chronic renal failure), stage 3 (moderate) (CMS/HCC)   • Anemia   • Recurrent UTI   • Osteopenia   • Essential hypertension   • Incisional hernia   • Moderate persistent asthma without complication   • Recurrent ventral hernia        Past Medical History:   Diagnosis Date   • Abdominal pain    • Allergic rhinitis    • Anemia     ON IRON QOD   • Asthma    • Chronic bronchitis (CMS/HCC)    • Chronic kidney disease     STAGE 3   SINCE  WITH COLON SURGERY   • Colon cancer (CMS/HCC)     CHEMO 6 MONTHS   • Hiatal hernia    • Hypertension    • Incisional hernia    • Overweight    • Peripheral neuropathy     FROM CHEMO TX'S   • Rosacea    • Visit for screening mammogram         Past Surgical History:   Procedure Laterality Date   • BREAST BIOPSY     • COLECTOMY PARTIAL / TOTAL      Partial Colectomy     • COLON SURGERY      XS 2    COLON CANCER AND    • COLONOSCOPY      Complete Colonoscopy         Visit Dx:      ICD-10-CM ICD-9-CM   1. Lymphedema, not elsewhere classified I89.0 457.1       Lymphedema     Row Name 18 1200             Subjective Pain    Able to rate subjective pain?  yes  -CW      Pre-Treatment Pain Level  0  -CW      Post-Treatment Pain Level  0  -CW      Subjective Pain Comment  No pain c/o at rest or when walking.  -CW         Subjective Comments    Subjective Comments  Pt. removed the bandages Monday and did not reapply.  -CW         Skin Changes/Observations    Location/Assessment  Lower Extremity  -CW      Lower Extremity Color/Pigment  -- No red areas or skin rashes.   -CW      Skin Observations Comment  Skin is intact but dry. No discoloration noted.  -CW          Lymphedema Measurements    Measurement Type(s)  Circumferential  -CW      Circumferential Areas  Lower extremities  -CW         BLE Circumferential (cm)    Measurement Location 1  Knee  -CW      Left 1  44 cm  -CW      Right 1  44 cm  -CW      Measurement Location 2  10cm below knee  -CW      Left 2  45 cm  -CW      Right 2  44.5 cm  -CW      Measurement Location 3  20cm below knee  -CW      Left 3  35 cm  -CW      Right 3  34.2 cm  -CW      Measurement Location 4  30cm below knee  -CW      Left 4  30.5 cm  -CW      Right 4  29.5 cm  -CW      Measurement Location 5  Ankle (smallest)  -CW      Measurement Location 6  Ankle (largest)  -CW      Left 6  32.5 cm  -CW      Right 6  31.5 cm  -CW      Measurement Location 7  Midfoot  -CW      Left 7  25 cm  -CW      Right 7  25 cm  -CW      Measurement Location 8  10cm. above knee  -CW      Left 8  52.5 cm  -CW      Right 8  52.9 cm  -CW      LLE Circumferential Total  264.5 cm  -CW      RLE Circumferential Total  261.6 cm  -CW         Manual Lymphatic Drainage    Manual Lymphatic Drainage  -- Neck, axilla, abd. (modified), groin, BLE's as tolerated.  -CW      Manual Lymphatic Drainage Comments  Tenderness abdominal area. Hx. multiple abd surgeries  -CW         Compression/Skin Care    Compression/Skin Care  skin care;wrapping location;bandaging;compression garment;remove bandages  -CW      Skin Care  moisturizing lotion applied  -CW      Wrapping Location  lower extremity  -CW      Wrapping Location LE  bilateral:;foot to knee  -CW      Wrapping Comments  Tg.9, 2-10 cm. Artiflex, 1- 8cm., 2- 10 cm. Rosidal.   -CW      Bandage Layers  cotton liner;padding/fluff layer;short-stretch bandages (comment size/quantity)  -CW      Bandaging Comments  Less layers around B feet so pt. can wear shoes.   -CW      Bandaging Technique  circumferential/spiral;moderate compression  -CW      Remove Bandages  Bandages were removed at home.   -CW        User Key  (r) = Recorded By, (t) =  "Taken By, (c) = Cosigned By    Initials Name Provider Type    Brooke Verma OTR Occupational Therapist                  OT Assessment/Plan     Row Name 11/28/18 1303          OT Assessment    Assessment Comments  Measurements taken. See flow sheet. Total circumference .6cm. and .5 cm. Pt. removed the bandages Monday and did not reapply. Edema decreased RLE>LLE today. Skin intact with no rashes or irritation. No pain c/o at present. Encouraged pt. to reapply bandages after bathing. Ed. completed for bandage precautions, wearing schedule and skin care. Pt. reports she is busy with her work schedule and may not be able to attend tx consistently next week. Will work with pt. to do teaching and pt. education for self care edema management.    -CW        OT Plan    OT Plan Comments  Plan to cont. tx.   -CW       User Key  (r) = Recorded By, (t) = Taken By, (c) = Cosigned By    Initials Name Provider Type    Brooke Verma OTR Occupational Therapist                      OT Goals     Row Name 11/28/18 1300          OT Short Term Goals    STG Date to Achieve  11/26/18  -CW     STG 1  Patient to demonstrate proper awareness of \"What is Lymphedema\" and \"DO's and Don'ts\" for improved prevention, management, care of symptoms and ease of transition to self-care of condition.  -CW     STG 1 Progress  Ongoing;Progressing  -CW     STG 2  Patient independent and compliant with self- wrapping techniques of compression bandages with family member or caregiver as indicated for improved self-management of condition.  -CW     STG 2 Progress  Ongoing;Progressing  -CW     STG 3  Patient demonstrate decreased net edema of BLE's  >/=5-10 cm. for decreased in edema, symptoms, decreased risk of infection and improved skin-care/transition to self-care of condition.  -CW     STG 3 Progress  Met  -CW     STG 4  Patient independent and compliant with home exercise program (as able) focused on range of motion, flexibility, to " improve lymphatic flow and discomfort.   -CW     STG 4 Progress  Progressing  -CW        Long Term Goals    LTG Date to Achieve  12/10/18  -CW     LTG 1  Patient will demonstrate decreased net edema of BLE's >/= 10-20 cm. for decrease in edema symptoms, decreased risk of infection, and improved skin care/transition to self-care of condition.  -CW     LTG 1 Progress  Ongoing;Progressing  -CW     LTG 1 Progress Comments  Total circumference .6 cm. and .5cm. on 11/28/18  -CW     LTG 2  Patient/family/caregivers independent and compliant with self-care techniques for self- management of condition.  -CW     LTG 2 Progress  Ongoing;Progressing  -CW     LTG 3  Patient independent and compliant with use and care of compression with assistance of a caregiver as needed to promote self-care independence.  -CW     LTG 3 Progress  Ongoing  -CW       User Key  (r) = Recorded By, (t) = Taken By, (c) = Cosigned By    Initials Name Provider Type    Brooke Verma OTR Occupational Therapist          Therapy Education  Given: Bandaging/dressing change  Program: Reinforced  How Provided: Verbal, Demonstration  Provided to: Patient  Level of Understanding: Verbalized                Time Calculation:   OT Start Time: 0732  OT Stop Time: 0846  OT Time Calculation (min): 74 min  Total Timed Code Minutes- OT: 74 minute(s)     Therapy Suggested Charges     Code   Minutes Charges    02969 (CPT®) Hc Ot Neuromusc Re Education Ea 15 Min      94758 (CPT®) Hc Ot Ther Proc Ea 15 Min      26028 (CPT®) Hc Ot Therapeutic Act Ea 15 Min      76018 (CPT®) Hc Ot Manual Therapy Ea 15 Min 74 5    04917 (CPT®) Hc Ot Iontophoresis Ea 15 Min      74174 (CPT®) Hc Ot Elec Stim Ea-Per 15 Min      70631 (CPT®) Hc Ot Ultrasound Ea 15 Min      85837 (CPT®) Hc Ot Self Care/Mgmt/Train Ea 15 Min       (CPT®) Hc Ot Electrical Stim Unattended      Total  74 5            Therapy Charges for Today     Code Description Service Date Service Provider  Modifiers Qty    60624092626 HC OT MANUAL THERAPY EA 15 MIN 11/28/2018 Brooke Fam, OTR GO 5                      Brooke Fam, OTR  11/28/2018

## 2018-11-30 ENCOUNTER — HOSPITAL ENCOUNTER (OUTPATIENT)
Dept: OCCUPATIONAL THERAPY | Facility: HOSPITAL | Age: 58
Setting detail: THERAPIES SERIES
Discharge: HOME | End: 2018-11-30

## 2018-11-30 DIAGNOSIS — I89.0 LYMPHEDEMA, NOT ELSEWHERE CLASSIFIED: Primary | ICD-10-CM

## 2018-11-30 PROCEDURE — 97535 SELF CARE MNGMENT TRAINING: CPT

## 2018-11-30 PROCEDURE — 97140 MANUAL THERAPY 1/> REGIONS: CPT

## 2018-12-03 ENCOUNTER — APPOINTMENT (OUTPATIENT)
Dept: OCCUPATIONAL THERAPY | Facility: HOSPITAL | Age: 58
End: 2018-12-03

## 2018-12-04 ENCOUNTER — APPOINTMENT (OUTPATIENT)
Dept: OCCUPATIONAL THERAPY | Facility: HOSPITAL | Age: 58
End: 2018-12-04

## 2018-12-05 ENCOUNTER — APPOINTMENT (OUTPATIENT)
Dept: OCCUPATIONAL THERAPY | Facility: HOSPITAL | Age: 58
End: 2018-12-05

## 2018-12-05 ENCOUNTER — HOSPITAL ENCOUNTER (OUTPATIENT)
Dept: MAMMOGRAPHY | Facility: HOSPITAL | Age: 58
Discharge: HOME | End: 2018-12-05
Admitting: FAMILY MEDICINE

## 2018-12-05 ENCOUNTER — LAB (OUTPATIENT)
Dept: LAB | Facility: HOSPITAL | Age: 58
End: 2018-12-05

## 2018-12-05 DIAGNOSIS — C18.9 MALIGNANT NEOPLASM OF COLON, UNSPECIFIED PART OF COLON (HCC): ICD-10-CM

## 2018-12-05 DIAGNOSIS — Z12.31 VISIT FOR SCREENING MAMMOGRAM: ICD-10-CM

## 2018-12-05 LAB — CEA SERPL-MCNC: 4.25 NG/ML

## 2018-12-05 PROCEDURE — 77063 BREAST TOMOSYNTHESIS BI: CPT

## 2018-12-05 PROCEDURE — 82378 CARCINOEMBRYONIC ANTIGEN: CPT

## 2018-12-05 PROCEDURE — 77067 SCR MAMMO BI INCL CAD: CPT

## 2018-12-05 PROCEDURE — 36415 COLL VENOUS BLD VENIPUNCTURE: CPT

## 2018-12-07 ENCOUNTER — APPOINTMENT (OUTPATIENT)
Dept: OCCUPATIONAL THERAPY | Facility: HOSPITAL | Age: 58
End: 2018-12-07

## 2018-12-10 ENCOUNTER — APPOINTMENT (OUTPATIENT)
Dept: OCCUPATIONAL THERAPY | Facility: HOSPITAL | Age: 58
End: 2018-12-10

## 2018-12-11 ENCOUNTER — APPOINTMENT (OUTPATIENT)
Dept: OCCUPATIONAL THERAPY | Facility: HOSPITAL | Age: 58
End: 2018-12-11

## 2018-12-11 DIAGNOSIS — R92.8 ABNORMAL MAMMOGRAM: Primary | ICD-10-CM

## 2018-12-12 ENCOUNTER — APPOINTMENT (OUTPATIENT)
Dept: OCCUPATIONAL THERAPY | Facility: HOSPITAL | Age: 58
End: 2018-12-12

## 2018-12-13 ENCOUNTER — APPOINTMENT (OUTPATIENT)
Dept: CT IMAGING | Facility: HOSPITAL | Age: 58
End: 2018-12-13

## 2018-12-13 ENCOUNTER — HOSPITAL ENCOUNTER (INPATIENT)
Facility: HOSPITAL | Age: 58
LOS: 1 days | Discharge: HOME | End: 2018-12-14
Attending: EMERGENCY MEDICINE | Admitting: SURGERY

## 2018-12-13 DIAGNOSIS — K56.609 SMALL BOWEL OBSTRUCTION (HCC): Primary | ICD-10-CM

## 2018-12-13 LAB
ALBUMIN SERPL-MCNC: 4.2 G/DL (ref 3.5–5.2)
ALBUMIN/GLOB SERPL: 1.1 G/DL
ALP SERPL-CCNC: 96 U/L (ref 39–117)
ALT SERPL W P-5'-P-CCNC: 13 U/L (ref 1–33)
ANION GAP SERPL CALCULATED.3IONS-SCNC: 14.7 MMOL/L
AST SERPL-CCNC: 11 U/L (ref 1–32)
BASOPHILS # BLD AUTO: 0.03 10*3/MM3 (ref 0–0.2)
BASOPHILS NFR BLD AUTO: 0.2 % (ref 0–1.5)
BILIRUB SERPL-MCNC: 0.2 MG/DL (ref 0.1–1.2)
BUN BLD-MCNC: 32 MG/DL (ref 6–20)
BUN/CREAT SERPL: 18.6 (ref 7–25)
CALCIUM SPEC-SCNC: 9.4 MG/DL (ref 8.6–10.5)
CHLORIDE SERPL-SCNC: 99 MMOL/L (ref 98–107)
CO2 SERPL-SCNC: 26.3 MMOL/L (ref 22–29)
CREAT BLD-MCNC: 1.72 MG/DL (ref 0.57–1)
DEPRECATED RDW RBC AUTO: 47.9 FL (ref 37–54)
EOSINOPHIL # BLD AUTO: 0.07 10*3/MM3 (ref 0–0.7)
EOSINOPHIL NFR BLD AUTO: 0.6 % (ref 0.3–6.2)
ERYTHROCYTE [DISTWIDTH] IN BLOOD BY AUTOMATED COUNT: 13.5 % (ref 11.7–13)
GFR SERPL CREATININE-BSD FRML MDRD: 31 ML/MIN/1.73
GLOBULIN UR ELPH-MCNC: 3.8 GM/DL
GLUCOSE BLD-MCNC: 136 MG/DL (ref 65–99)
HCT VFR BLD AUTO: 37.7 % (ref 35.6–45.5)
HGB BLD-MCNC: 11.8 G/DL (ref 11.9–15.5)
IMM GRANULOCYTES # BLD: 0.02 10*3/MM3 (ref 0–0.03)
IMM GRANULOCYTES NFR BLD: 0.2 % (ref 0–0.5)
LIPASE SERPL-CCNC: 56 U/L (ref 13–60)
LYMPHOCYTES # BLD AUTO: 1.49 10*3/MM3 (ref 0.9–4.8)
LYMPHOCYTES NFR BLD AUTO: 12.2 % (ref 19.6–45.3)
MCH RBC QN AUTO: 30.4 PG (ref 26.9–32)
MCHC RBC AUTO-ENTMCNC: 31.3 G/DL (ref 32.4–36.3)
MCV RBC AUTO: 97.2 FL (ref 80.5–98.2)
MONOCYTES # BLD AUTO: 0.54 10*3/MM3 (ref 0.2–1.2)
MONOCYTES NFR BLD AUTO: 4.4 % (ref 5–12)
NEUTROPHILS # BLD AUTO: 10.05 10*3/MM3 (ref 1.9–8.1)
NEUTROPHILS NFR BLD AUTO: 82.4 % (ref 42.7–76)
PLATELET # BLD AUTO: 314 10*3/MM3 (ref 140–500)
PMV BLD AUTO: 8.8 FL (ref 6–12)
POTASSIUM BLD-SCNC: 4.2 MMOL/L (ref 3.5–5.2)
PROT SERPL-MCNC: 8 G/DL (ref 6–8.5)
RBC # BLD AUTO: 3.88 10*6/MM3 (ref 3.9–5.2)
SODIUM BLD-SCNC: 140 MMOL/L (ref 136–145)
WBC NRBC COR # BLD: 12.2 10*3/MM3 (ref 4.5–10.7)

## 2018-12-13 PROCEDURE — 74176 CT ABD & PELVIS W/O CONTRAST: CPT

## 2018-12-13 PROCEDURE — 99285 EMERGENCY DEPT VISIT HI MDM: CPT

## 2018-12-13 PROCEDURE — 80053 COMPREHEN METABOLIC PANEL: CPT | Performed by: EMERGENCY MEDICINE

## 2018-12-13 PROCEDURE — 85025 COMPLETE CBC W/AUTO DIFF WBC: CPT | Performed by: EMERGENCY MEDICINE

## 2018-12-13 PROCEDURE — 25010000002 ENOXAPARIN PER 10 MG: Performed by: SURGERY

## 2018-12-13 PROCEDURE — 25010000002 HYDROMORPHONE PER 4 MG: Performed by: EMERGENCY MEDICINE

## 2018-12-13 PROCEDURE — 93005 ELECTROCARDIOGRAM TRACING: CPT | Performed by: EMERGENCY MEDICINE

## 2018-12-13 PROCEDURE — 25010000002 ONDANSETRON PER 1 MG: Performed by: EMERGENCY MEDICINE

## 2018-12-13 PROCEDURE — 83690 ASSAY OF LIPASE: CPT | Performed by: EMERGENCY MEDICINE

## 2018-12-13 PROCEDURE — 99222 1ST HOSP IP/OBS MODERATE 55: CPT | Performed by: SURGERY

## 2018-12-13 PROCEDURE — 93010 ELECTROCARDIOGRAM REPORT: CPT | Performed by: INTERNAL MEDICINE

## 2018-12-13 RX ORDER — PROMETHAZINE HYDROCHLORIDE 25 MG/ML
12.5 INJECTION, SOLUTION INTRAMUSCULAR; INTRAVENOUS EVERY 6 HOURS PRN
Status: DISCONTINUED | OUTPATIENT
Start: 2018-12-13 | End: 2018-12-14 | Stop reason: HOSPADM

## 2018-12-13 RX ORDER — ONDANSETRON 2 MG/ML
4 INJECTION INTRAMUSCULAR; INTRAVENOUS ONCE
Status: COMPLETED | OUTPATIENT
Start: 2018-12-13 | End: 2018-12-13

## 2018-12-13 RX ORDER — DILTIAZEM HYDROCHLORIDE 300 MG/1
300 CAPSULE, COATED, EXTENDED RELEASE ORAL EVERY MORNING
Status: DISCONTINUED | OUTPATIENT
Start: 2018-12-13 | End: 2018-12-14 | Stop reason: HOSPADM

## 2018-12-13 RX ORDER — FAMOTIDINE 20 MG/1
20 TABLET, FILM COATED ORAL NIGHTLY
Status: DISCONTINUED | OUTPATIENT
Start: 2018-12-13 | End: 2018-12-14 | Stop reason: HOSPADM

## 2018-12-13 RX ORDER — HYDROCODONE BITARTRATE AND ACETAMINOPHEN 5; 325 MG/1; MG/1
1 TABLET ORAL EVERY 6 HOURS PRN
Status: DISCONTINUED | OUTPATIENT
Start: 2018-12-13 | End: 2018-12-14 | Stop reason: HOSPADM

## 2018-12-13 RX ORDER — HYDROMORPHONE HYDROCHLORIDE 1 MG/ML
0.5 INJECTION, SOLUTION INTRAMUSCULAR; INTRAVENOUS; SUBCUTANEOUS ONCE
Status: COMPLETED | OUTPATIENT
Start: 2018-12-13 | End: 2018-12-13

## 2018-12-13 RX ORDER — SODIUM CHLORIDE 9 MG/ML
125 INJECTION, SOLUTION INTRAVENOUS CONTINUOUS
Status: DISCONTINUED | OUTPATIENT
Start: 2018-12-13 | End: 2018-12-14

## 2018-12-13 RX ORDER — SODIUM CHLORIDE 0.9 % (FLUSH) 0.9 %
10 SYRINGE (ML) INJECTION AS NEEDED
Status: DISCONTINUED | OUTPATIENT
Start: 2018-12-13 | End: 2018-12-14 | Stop reason: HOSPADM

## 2018-12-13 RX ORDER — HYDROMORPHONE HYDROCHLORIDE 1 MG/ML
0.5 INJECTION, SOLUTION INTRAMUSCULAR; INTRAVENOUS; SUBCUTANEOUS
Status: DISCONTINUED | OUTPATIENT
Start: 2018-12-13 | End: 2018-12-14 | Stop reason: HOSPADM

## 2018-12-13 RX ORDER — ALBUTEROL SULFATE 2.5 MG/3ML
2.5 SOLUTION RESPIRATORY (INHALATION) EVERY 6 HOURS PRN
Status: DISCONTINUED | OUTPATIENT
Start: 2018-12-13 | End: 2018-12-14 | Stop reason: HOSPADM

## 2018-12-13 RX ORDER — AZELASTINE 1 MG/ML
2 SPRAY, METERED NASAL DAILY
Status: DISCONTINUED | OUTPATIENT
Start: 2018-12-13 | End: 2018-12-14 | Stop reason: HOSPADM

## 2018-12-13 RX ORDER — DILTIAZEM HYDROCHLORIDE 300 MG/1
300 CAPSULE, COATED, EXTENDED RELEASE ORAL EVERY MORNING
Status: DISCONTINUED | OUTPATIENT
Start: 2018-12-14 | End: 2018-12-13

## 2018-12-13 RX ADMIN — HYDROMORPHONE HYDROCHLORIDE 0.5 MG: 1 INJECTION, SOLUTION INTRAMUSCULAR; INTRAVENOUS; SUBCUTANEOUS at 03:37

## 2018-12-13 RX ADMIN — AZELASTINE HYDROCHLORIDE 2 SPRAY: 137 SPRAY, METERED NASAL at 17:03

## 2018-12-13 RX ADMIN — HYDROMORPHONE HYDROCHLORIDE 0.5 MG: 1 INJECTION, SOLUTION INTRAMUSCULAR; INTRAVENOUS; SUBCUTANEOUS at 02:12

## 2018-12-13 RX ADMIN — HYDROMORPHONE HYDROCHLORIDE 0.5 MG: 1 INJECTION, SOLUTION INTRAMUSCULAR; INTRAVENOUS; SUBCUTANEOUS at 05:35

## 2018-12-13 RX ADMIN — ONDANSETRON 4 MG: 2 INJECTION INTRAMUSCULAR; INTRAVENOUS at 02:12

## 2018-12-13 RX ADMIN — DILTIAZEM HYDROCHLORIDE 300 MG: 300 CAPSULE, COATED, EXTENDED RELEASE ORAL at 17:03

## 2018-12-13 RX ADMIN — SODIUM CHLORIDE 125 ML/HR: 9 INJECTION, SOLUTION INTRAVENOUS at 04:45

## 2018-12-13 RX ADMIN — FAMOTIDINE 20 MG: 20 TABLET, FILM COATED ORAL at 20:51

## 2018-12-13 RX ADMIN — SODIUM CHLORIDE 125 ML/HR: 9 INJECTION, SOLUTION INTRAVENOUS at 20:51

## 2018-12-13 RX ADMIN — SODIUM CHLORIDE 125 ML/HR: 9 INJECTION, SOLUTION INTRAVENOUS at 13:11

## 2018-12-13 RX ADMIN — ONDANSETRON 4 MG: 2 INJECTION INTRAMUSCULAR; INTRAVENOUS at 03:36

## 2018-12-13 RX ADMIN — ENOXAPARIN SODIUM 40 MG: 40 INJECTION SUBCUTANEOUS at 17:03

## 2018-12-13 NOTE — PLAN OF CARE
Problem: Patient Care Overview  Goal: Plan of Care Review  Outcome: Ongoing (interventions implemented as appropriate)   12/13/18 0624   Coping/Psychosocial   Plan of Care Reviewed With patient   Plan of Care Review   Progress no change   OTHER   Outcome Summary Admitted from ER for small bowel obstruction. Medication given for pain. One episode of vomiting after she arrived to the unit. NG tube to be placed per MD if pt continues to have episodes of emesis. On 2L while sleeping. NPO diet. NS at 125. Will continue to monitor.     Goal: Individualization and Mutuality  Outcome: Ongoing (interventions implemented as appropriate)    Goal: Discharge Needs Assessment  Outcome: Ongoing (interventions implemented as appropriate)    Goal: Interprofessional Rounds/Family Conf  Outcome: Ongoing (interventions implemented as appropriate)      Problem: Nausea/Vomiting (Adult)  Goal: Identify Related Risk Factors and Signs and Symptoms  Outcome: Ongoing (interventions implemented as appropriate)    Goal: Symptom Relief  Outcome: Ongoing (interventions implemented as appropriate)    Goal: Adequate Hydration  Outcome: Ongoing (interventions implemented as appropriate)      Problem: Pain, Acute (Adult)  Goal: Identify Related Risk Factors and Signs and Symptoms  Outcome: Ongoing (interventions implemented as appropriate)    Goal: Acceptable Pain Control/Comfort Level  Outcome: Ongoing (interventions implemented as appropriate)      Problem: Bowel Obstruction (Adult)  Goal: Signs and Symptoms of Listed Potential Problems Will be Absent, Minimized or Managed (Bowel Obstruction)  Outcome: Ongoing (interventions implemented as appropriate)

## 2018-12-13 NOTE — H&P
Admit H&P    Referring physician: Sarthak Figueroa*    Admiting Physician: Sarthak Figueroa MD    Reason for Admission: SBO    Chief Complaint   Patient presents with   • Abdominal Pain   • Nausea   • Vomiting       HPI:   The patient is a very pleasant 58 y.o. years old female that presented to the hospital with abdominal pain nausea and vomiting.  The patient developed sharp abdominal pain that was located at the epigastric area and started all of the sudden.  Pain was 10 out of 10 and did not radiate.  This was associated with nausea and several episodes of nonbloody or.  Vomiting.  He denies any fevers or chills.  She stay at home for several hours but the pain will not improve so she decided to come to the emergency room.  While in the emergency room and after pain medication and her pain completely resolved.  This morning she denies any abdominal pain and she has been passing gas.  Last bowel movement was yesterday.  No nausea or vomiting today.  She had multiple abdominal operations at the beginning of the year with Dr. Guzman after she had an inadvertent enterotomy during robotic-assisted ventral hernia repair.     Past Medical History:   Diagnosis Date   • Abdominal pain    • Allergic rhinitis    • Anemia     ON IRON QOD   • Asthma    • Chronic bronchitis (CMS/HCC)    • Chronic kidney disease     STAGE 3   SINCE 2015 WITH COLON SURGERY   • Colon cancer (CMS/HCC)     CHEMO 6 MONTHS   • Hiatal hernia    • Hypertension    • Incisional hernia    • Overweight    • Peripheral neuropathy     FROM CHEMO TX'S   • Rosacea    • Visit for screening mammogram        PSH:   Open low anterior resection 10/13/1 (Dr. Guzman)  Laparoscopic da Niesha incisional hernia repair with mesh and ALANNA (3/15/16) with Ventralight ST 6 X 8 placement  (Dr. Guzman)  Laparoscopic Da Niesha Ventral Hernia Repair with Mesh and ALANNA 2/1/18 with 2 Bard Meshes  (Dr. Guzman)  Ex. Lap, ALANNA, Small bowel resection, removal of mesh and  appendectomy for enteroromy 2/4/18  (Dr. Guzman)  Wound debridement 2/16/18  (Dr. Guzman)  Wound debridement and washout 2/13/18, 2/27/18  (Dr. Guzman)      Current Facility-Administered Medications:   •  HYDROmorphone (DILAUDID) injection 0.5 mg, 0.5 mg, Intravenous, Q2H PRN, Sarthak Figueroa MD  •  promethazine (PHENERGAN) injection 12.5 mg, 12.5 mg, Intravenous, Q6H PRN, Sarthak Figueroa MD  •  [COMPLETED] Insert peripheral IV, , , Once **AND** sodium chloride 0.9 % flush 10 mL, 10 mL, Intravenous, PRN, Jamari Cardoza MD  •  sodium chloride 0.9 % infusion, 125 mL/hr, Intravenous, Continuous, Jamari Cardoza MD, Last Rate: 125 mL/hr at 12/13/18 0445, 125 mL/hr at 12/13/18 0445    Allergies   Allergen Reactions   • Fish-Derived Products Anaphylaxis   • Shellfish-Derived Products Anaphylaxis       Social History     Socioeconomic History   • Marital status:      Spouse name: Not on file   • Number of children: Not on file   • Years of education: Not on file   • Highest education level: Not on file   Social Needs   • Financial resource strain: Not on file   • Food insecurity - worry: Not on file   • Food insecurity - inability: Not on file   • Transportation needs - medical: Not on file   • Transportation needs - non-medical: Not on file   Occupational History   • Occupation: communication super   Tobacco Use   • Smoking status: Never Smoker   • Smokeless tobacco: Never Used   Substance and Sexual Activity   • Alcohol use: Yes     Comment: 10 drinks per year   • Drug use: No   • Sexual activity: Defer   Other Topics Concern   • Not on file   Social History Narrative   • Not on file       Family History   Problem Relation Age of Onset   • Cancer Mother         Breast Cancer   • Breast cancer Mother    • Cancer Father         Colon Cancer   • Cancer Maternal Grandmother         Breast Cancer   • Hypertension Other    • Cancer Other         Liver Cancer   • Other Other         Varicose veins of  lower extremities   • Malig Hyperthermia Neg Hx        ROS:   Constitutional: denies any weight changes, fatigue or weakness.  Eyes: : denies blurred or double vision  Cardiovascular: denies chest pain, palpitations, edemas.  Respiratory: denies cough, sputum, SOB.  Gastrointestinal: denies diarrhea, constipation.  Genitourinary: denies dysuria, frequency.  Endocrine: denies cold intolerance, lethargy and flushing.  Hem: denies excessive bruising and postop bleeding.  Musculoskeletal: denies weakness, joint swelling, pain or stiffness.  Neuro: denies seizures, CVA, paresthesia, or peripheral neuropathy.   Skin: denies change in nevi, rashes, masses.    Physical Exam:   Vitals:    12/13/18 0751   BP: 148/84   Pulse: 81   Resp: 16   Temp: 98 °F (36.7 °C)   SpO2: 100%     GENERAL:alert, well appearing, and in no distress and oriented to person, place, and time  HEENT: normochephalic, atraumatic, no scleral icterus moist mucous membranes.  NECK: Supple there is no thyromegaly or lymphadenopathy  CHEST: clear to auscultation, no wheezes, rales or rhonchi, symmetric air entry  CARDIAC: regular rate and rhythm    ABDOMEN: soft, nontender, nondistended, no masses or organomegaly.  She has a well-healed midline incision.  There is large hanging pannus.  Over the right lower aspect of the wound there is an area of scarring.  There is no evidence of hernias   EXTREMITIES: no cyanosis, clubbing or edema    NEURO: alert and oriented, normal speech, cranial nerves 2-12 grossly intact, no focal deficits   SKIN: Moist, warm, no rashes.    Diagnostic workup:   Imaging review by me  CT scan of the abdomen and pelvis with dilated loop of small bowel with thickening of short segment of small bowel.  There is no pneumatosis, free air.  There is no cholelithiasis    BMP is consistent with a creatinine of 1.7 that is better than her baseline  White blood cell 12.2  Hemoglobin 11.8    Assessment and plan:   The patient is a very pleasant  58 y.o. years old female with partial small bowel obstruction versus localized enteritis.  Her pain is completely resolved and she denies any nausea or vomiting.  She has history of multiple operations and she is a high risk for small bowel obstruction.  Discussed with the patient about further steps.  I recommend that she is started on a clear liquid diet and we see how she does.  We will advance his diet slowly.  She does not tolerate we will discuss about surgical intervention.  She had multiple operations in the past year and I think it would be wise to try to avoid any type of surgical intervention    - Admit to the hospital  - Clear liquid diet and IV fluids  - Lovenox and SCDs  - Continue home meds    Case was discussed with patient and her .    Risk and benefits of the current recommended treatment were explained to the patient that had time to ask questions that where answered, verbalized understanding and agreed with the plan.     Sarthak Figueroa MD  General, Minimally Invasive and Endoscopic Surgery  [facility], operated by [facility]    [address], [address]  [place], [place], [address]  P: [phone]  F: [phone]

## 2018-12-13 NOTE — PLAN OF CARE
Problem: Patient Care Overview  Goal: Plan of Care Review  Outcome: Ongoing (interventions implemented as appropriate)   12/13/18 1732   Coping/Psychosocial   Plan of Care Reviewed With patient   OTHER   Outcome Summary safety maintained. vss. denies need for pain meds. abd with hypoactive bs. states she's passing flatus. denies nausea. started on clear liquid diet.

## 2018-12-13 NOTE — PROGRESS NOTES
Discharge Planning Assessment  [facility]     Patient Name: Mariya Campos  MRN: [id number]  Today's Date: 12/13/2018    Admit Date: 12/13/2018    Discharge Needs Assessment     Row Name 12/13/18 9349       Living Environment    Lives With  spouse    Current Living Arrangements  home/apartment/condo    Primary Care Provided by  self    Provides Primary Care For  no one    Family Caregiver if Needed  spouse    Family Caregiver Names  Jamari Campos    Quality of Family Relationships  supportive;helpful;involved       Resource/Environmental Concerns    Resource/Environmental Concerns  none       Transition Planning    Patient/Family Anticipates Transition to  home with family    Patient/Family Anticipated Services at Transition  none    Transportation Anticipated  family or friend will provide       Discharge Needs Assessment    Readmission Within the Last 30 Days  no previous admission in last 30 days    Concerns to be Addressed  no discharge needs identified;denies needs/concerns at this time    Equipment Currently Used at Home  none    Anticipated Changes Related to Illness  none    Equipment Needed After Discharge  none    Offered/Gave Vendor List  yes    Patient's Choice of Community Agency(s)  [facility] if needed    Discharge Coordination/Progress  pt plans home with spouse        Discharge Plan     Row Name 12/13/18 0959       Plan    Plan  Pt plans home with spouse; follow for needs    Patient/Family in Agreement with Plan  yes    Plan Comments  Met with pt bedside. Confirmed facesheet correct. Explained role of CCP. Pt reports she lives in a two level house but bedroom on first level. Pt reports she is IADLs no DME used to ambulate. Pt reports her PCP is Dr. Coffman and she uses [employer] [facility] no issues. Pt has used [facility] in past and has been to [facility]. Pt reports she plans home with spouse, currently denies d/c needs. CCP to follow…LAXMI Brumfield        Destination      No service  coordination in this encounter.      Durable Medical Equipment      No service coordination in this encounter.      Dialysis/Infusion      No service coordination in this encounter.      Home Medical Care      No service coordination in this encounter.      Community Resources      No service coordination in this encounter.          Demographic Summary     Row Name 12/13/18 1258       General Information    Admission Type  inpatient    Reason for Consult  discharge planning    Preferred Language  English     Used During This Interaction  no       Contact Information    Permission Granted to Share Info With  facility ;family/designee        Functional Status     Row Name 12/13/18 1257       Functional Status    Usual Activity Tolerance  good    Current Activity Tolerance  moderate       Functional Status, IADL    Medications  independent    Meal Preparation  independent    Housekeeping  independent    Laundry  independent    Shopping  independent       Mental Status    General Appearance WDL  WDL       Mental Status Summary    Recent Changes in Mental Status/Cognitive Functioning  no changes        Psychosocial    No documentation.       Abuse/Neglect    No documentation.       Legal    No documentation.       Substance Abuse    No documentation.       Patient Forms    No documentation.           Sade John

## 2018-12-13 NOTE — ED TRIAGE NOTES
Pt arrived from home with complaints of severe epigastric pain that started abruptly at 2200 last night. She also has nausea and vomiting.   Pt has a significant history of abdominal surgeries and procedures in the past.

## 2018-12-13 NOTE — ED PROVIDER NOTES
" EMERGENCY DEPARTMENT ENCOUNTER    CHIEF COMPLAINT  Chief Complaint: abdominal pain  History given by: pt  History limited by: none  Room Number: 09/09  PMD: Zahida Coffman MD      HPI:  Pt is a 58 y.o. female who presents complaining of upper abdominal 8/10 pain (appears to come in waves) that started around 2200 last night. Pt admits to nausea but denies fever, abnormal stools, SOA, or chest pain.     Duration:  4 hours  Onset: gradual  Timing: wax and waning  Location: upper abdomen  Radiation: none  Quality: \"pain\"  Intensity/Severity: 8/10  Progression: unchaged  Associated Symptoms: nausea  Aggravating Factors: none  Alleviating Factors: none  Previous Episodes: none  Treatment before arrival: none    PAST MEDICAL HISTORY  Active Ambulatory Problems     Diagnosis Date Noted   • Atopic rhinitis 02/23/2016   • Adiposity 02/23/2016   • Abnormal ECG 04/26/2016   • CRF (chronic renal failure), stage 3 (moderate) (CMS/[facility])    • Anemia 04/28/2016   • Recurrent UTI 06/04/2016   • Osteopenia 07/20/2016   • Essential hypertension 10/02/2017   • Incisional hernia 12/18/2017   • Moderate persistent asthma without complication 12/18/2017   • Recurrent ventral hernia 01/30/2018     Resolved Ambulatory Problems     Diagnosis Date Noted   • Fatigue 02/23/2016   • Abnormal mammogram 02/23/2016   • Colonic lump 02/23/2016   • Menopause present 02/23/2016   • Shortness of breath 02/23/2016   • Shoulder, capsulitis, adhesive 04/14/2016   • Bilateral shoulder pain 04/14/2016   • Pain 06/28/2016   • Bursitis/tendonitis, shoulder 06/28/2016   • Incisional hernia, without obstruction or gangrene 12/21/2017   • Bacteremia, escherichia coli 02/10/2018   • Wound infection 01/30/2018   • Complications, dialysis, catheter, mechanical, initial encounter (CMS/[facility]) 02/18/2018     Past Medical History:   Diagnosis Date   • Abdominal pain    • Allergic rhinitis    • Anemia    • Asthma    • Chronic bronchitis (CMS/[facility])    • Chronic kidney " disease    • Colon cancer (CMS/HCC)    • Hiatal hernia    • Hypertension    • Incisional hernia    • Overweight    • Peripheral neuropathy    • Rosacea    • Visit for screening mammogram        PAST SURGICAL HISTORY  Past Surgical History:   Procedure Laterality Date   • BREAST BIOPSY     • COLECTOMY PARTIAL / TOTAL      Partial Colectomy  2015   • COLON SURGERY      XS 2   2006 COLON CANCER AND 2015   • COLONOSCOPY      Complete Colonoscopy       FAMILY HISTORY  Family History   Problem Relation Age of Onset   • Cancer Mother         Breast Cancer   • Breast cancer Mother    • Cancer Father         Colon Cancer   • Cancer Maternal Grandmother         Breast Cancer   • Hypertension Other    • Cancer Other         Liver Cancer   • Other Other         Varicose veins of lower extremities   • Malig Hyperthermia Neg Hx        SOCIAL HISTORY  Social History     Socioeconomic History   • Marital status:      Spouse name: Not on file   • Number of children: Not on file   • Years of education: Not on file   • Highest education level: Not on file   Social Needs   • Financial resource strain: Not on file   • Food insecurity - worry: Not on file   • Food insecurity - inability: Not on file   • Transportation needs - medical: Not on file   • Transportation needs - non-medical: Not on file   Occupational History   • Occupation: communication super   Tobacco Use   • Smoking status: Never Smoker   • Smokeless tobacco: Never Used   Substance and Sexual Activity   • Alcohol use: Yes     Comment: 10 drinks per year   • Drug use: No   • Sexual activity: Defer   Other Topics Concern   • Not on file   Social History Narrative   • Not on file       ALLERGIES  Fish-derived products and Shellfish-derived products    REVIEW OF SYSTEMS  Review of Systems   Constitutional: Negative for fever.   HENT: Negative for sore throat.    Eyes: Negative.    Respiratory: Negative for cough and shortness of breath.    Cardiovascular: Negative for  chest pain.   Gastrointestinal: Positive for abdominal pain and nausea. Negative for diarrhea and vomiting.   Genitourinary: Negative for dysuria.   Musculoskeletal: Negative for neck pain.   Skin: Negative for rash.   Allergic/Immunologic: Negative.    Neurological: Negative for weakness, numbness and headaches.   Hematological: Negative.    Psychiatric/Behavioral: Negative.    All other systems reviewed and are negative.      PHYSICAL EXAM  ED Triage Vitals [12/13/18 0141]   Temp Heart Rate Resp BP SpO2   97.8 °F (36.6 °C) 84 20 -- 100 %      Temp src Heart Rate Source Patient Position BP Location FiO2 (%)   Tympanic -- -- -- --       Physical Exam   Constitutional: She is oriented to person, place, and time. No distress.   HENT:   Head: Normocephalic and atraumatic.   Eyes: EOM are normal. Pupils are equal, round, and reactive to light.   Neck: Normal range of motion. Neck supple.   Cardiovascular: Normal rate, regular rhythm and normal heart sounds.   Pulmonary/Chest: Effort normal and breath sounds normal. No respiratory distress.   Abdominal: Soft. There is tenderness (diffuse). There is guarding (voluntary). There is no rebound.   Musculoskeletal: Normal range of motion. She exhibits no edema.   Neurological: She is alert and oriented to person, place, and time. She has normal sensation and normal strength.   Skin: Skin is warm and dry. No rash noted.   Psychiatric: Mood and affect normal.   Nursing note and vitals reviewed.      LAB RESULTS  Lab Results (last 24 hours)     Procedure Component Value Units Date/Time    CBC & Differential [[id number]] Collected:  12/13/18 0212    Specimen:  Blood Updated:  12/13/18 0240    Narrative:       The following orders were created for panel order CBC & Differential.  Procedure                               Abnormality         Status                     ---------                               -----------         ------                     CBC Auto Differential[[id number]]         Abnormal            Final result                 Please view results for these tests on the individual orders.    Comprehensive Metabolic Panel [[id number]] Collected:  12/13/18 0212    Specimen:  Blood Updated:  12/13/18 0230    Lipase [[id number]] Collected:  12/13/18 0212    Specimen:  Blood Updated:  12/13/18 0230    CBC Auto Differential [[id number]]  (Abnormal) Collected:  12/13/18 0212    Specimen:  Blood Updated:  12/13/18 0240     WBC 12.20 10*3/mm3      RBC 3.88 10*6/mm3      Hemoglobin 11.8 g/dL      Hematocrit 37.7 %      MCV 97.2 fL      MCH 30.4 pg      MCHC 31.3 g/dL      RDW 13.5 %      RDW-SD 47.9 fl      MPV 8.8 fL      Platelets 314 10*3/mm3      Neutrophil % 82.4 %      Lymphocyte % 12.2 %      Monocyte % 4.4 %      Eosinophil % 0.6 %      Basophil % 0.2 %      Immature Grans % 0.2 %      Neutrophils, Absolute 10.05 10*3/mm3      Lymphocytes, Absolute 1.49 10*3/mm3      Monocytes, Absolute 0.54 10*3/mm3      Eosinophils, Absolute 0.07 10*3/mm3      Basophils, Absolute 0.03 10*3/mm3      Immature Grans, Absolute 0.02 10*3/mm3           I ordered the above labs and reviewed the results    RADIOLOGY  CT Abdomen Pelvis Without Contrast   Final Result   At least a partial small bowel obstruction identified, with suspected   transition point located within the left lower quadrant. I think there   is a component of chronic stasis to this area, as there is fecalization   of small bowel contents within multiple loops of small bowel. However,   there is a loop of bowel within the left lower quadrant which is thick   walled with adjacent soft tissue stranding. While no pneumatosis, free   air, or free fluid is seen, closed loop obstruction with some ischemia   cannot be excluded. General surgical consultation is suggested.       Radiation dose reduction techniques were utilized, including automated   exposure control and exposure modulation based on body size.       This report was finalized on  12/13/2018 3:30 AM by Dr. Maria Luz Veloz M.D.               I ordered the above noted radiological studies. Interpreted by radiologist. Reviewed by me in PACS.       PROCEDURES  Procedures    EKG          EKG time: 0120  Rhythm/Rate: sinus rate 79  P waves and NJ: A single PVC is present  QRS, axis: normal   ST and T waves: normal     Interpreted Contemporaneously by me, independently viewed  Unchanged compared to prior 2/22/2018    PROGRESS AND CONSULTS       0200  Ordered labs and abd/pel CT for further viewing. Ordered dilaudid for pain and zofran for nausea.    0234  Rechecked patient who is resting and reports her nausea is better.. Discussed plan to view lab and test results and reassess. Pt understands and agrees with the plan. All questions have been answered.    0331  Nurse states that pt reports her pain and nausea are returning. Ordered dilaudid for pain and zofran for nausea.    0340  Rechecked patient who is resting but in pain. Discussed all lab and test results. Discussed plan to admit the pt after lab results are back. Pt understands and agrees with the plan. All questions have been answered.    0411  Ordered call from surgery.    0414  Discussed case with Dr Figueroa, Surgery  Reviewed history, exam, results and treatments.  Discussed concerns and plan of care. Dr Figueroa accepts pt to be admitted to med/surge.    0416  Rechecked patient who is resting. Discussed all lab and test results. Discussed plan to admit the pt. Pt understands and agrees with the plan. All questions have been answered.        MEDICAL DECISION MAKING  Results were reviewed/discussed with the patient and they were also made aware of online access. Pt also made aware that some labs, such as cultures, will not be resulted during ER visit and follow up with PMD is necessary.     MDM  Number of Diagnoses or Management Options  Small bowel obstruction (CMS/HCC):      Amount and/or Complexity of Data Reviewed  Clinical lab tests:  ordered and reviewed (WBC 12.20)  Tests in the radiology section of CPT®: ordered and reviewed (Abd/Pel CT - shows small bowel obstruction)  Tests in the medicine section of CPT®: ordered and reviewed (Refer to the procedure section of the note for EKG results)  Decide to obtain previous medical records or to obtain history from someone other than the patient: yes (EPIC)  Discuss the patient with other providers: yes (Dr Figueroa)           DIAGNOSIS  Final diagnoses:   Small bowel obstruction (CMS/HCC)       DISPOSITION  ADMISSION    Discussed treatment plan and reason for admission with pt/family and admitting physician.  Pt/family voiced understanding of the plan for admission for further testing/treatment as needed.         Latest Documented Vital Signs:  As of 3:41 AM  BP- 177/85 HR- 88 Temp- 97.8 °F (36.6 °C) (Tympanic) O2 sat- 100%    --  Documentation assistance provided by bobby Moy for Dr Cardoza.  Information recorded by the scribe was done at my direction and has been verified and validated by me.     Lawanda Moy  12/13/18 6092       Jamari Cardoza MD  12/13/18 9662

## 2018-12-14 ENCOUNTER — APPOINTMENT (OUTPATIENT)
Dept: OCCUPATIONAL THERAPY | Facility: HOSPITAL | Age: 58
End: 2018-12-14

## 2018-12-14 VITALS
OXYGEN SATURATION: 98 % | HEIGHT: 67 IN | TEMPERATURE: 98.5 F | RESPIRATION RATE: 16 BRPM | SYSTOLIC BLOOD PRESSURE: 139 MMHG | HEART RATE: 77 BPM | WEIGHT: 277 LBS | DIASTOLIC BLOOD PRESSURE: 80 MMHG | BODY MASS INDEX: 43.47 KG/M2

## 2018-12-14 PROBLEM — K56.609 SMALL BOWEL OBSTRUCTION (HCC): Status: RESOLVED | Noted: 2018-12-13 | Resolved: 2018-12-14

## 2018-12-14 PROCEDURE — 99238 HOSP IP/OBS DSCHRG MGMT 30/<: CPT | Performed by: SURGERY

## 2018-12-14 PROCEDURE — 25010000002 ENOXAPARIN PER 10 MG: Performed by: SURGERY

## 2018-12-14 PROCEDURE — 99231 SBSQ HOSP IP/OBS SF/LOW 25: CPT | Performed by: SURGERY

## 2018-12-14 RX ORDER — SENNA AND DOCUSATE SODIUM 50; 8.6 MG/1; MG/1
2 TABLET, FILM COATED ORAL NIGHTLY
Status: DISCONTINUED | OUTPATIENT
Start: 2018-12-14 | End: 2018-12-14 | Stop reason: HOSPADM

## 2018-12-14 RX ORDER — PROMETHAZINE HYDROCHLORIDE 12.5 MG/1
12.5 TABLET ORAL EVERY 6 HOURS PRN
Qty: 10 TABLET | Refills: 0 | Status: SHIPPED | OUTPATIENT
Start: 2018-12-14 | End: 2019-05-27

## 2018-12-14 RX ORDER — SENNA AND DOCUSATE SODIUM 50; 8.6 MG/1; MG/1
2 TABLET, FILM COATED ORAL NIGHTLY
Qty: 60 TABLET | Refills: 3 | Status: SHIPPED | OUTPATIENT
Start: 2018-12-14 | End: 2019-05-27

## 2018-12-14 RX ADMIN — DILTIAZEM HYDROCHLORIDE 300 MG: 300 CAPSULE, COATED, EXTENDED RELEASE ORAL at 06:13

## 2018-12-14 RX ADMIN — ENOXAPARIN SODIUM 40 MG: 40 INJECTION SUBCUTANEOUS at 16:06

## 2018-12-14 RX ADMIN — SODIUM CHLORIDE 125 ML/HR: 9 INJECTION, SOLUTION INTRAVENOUS at 05:31

## 2018-12-14 RX ADMIN — AZELASTINE HYDROCHLORIDE 2 SPRAY: 137 SPRAY, METERED NASAL at 09:07

## 2018-12-14 NOTE — PLAN OF CARE
Problem: Patient Care Overview  Goal: Plan of Care Review  Outcome: Ongoing (interventions implemented as appropriate)   12/14/18 0552   Coping/Psychosocial   Plan of Care Reviewed With patient   OTHER   Outcome Summary VSS, on 2L NC while sleeping. No c/o pain or nausea overnight. Tolerating clears. Will continue to monitor.     Goal: Individualization and Mutuality  Outcome: Ongoing (interventions implemented as appropriate)    Goal: Discharge Needs Assessment  Outcome: Ongoing (interventions implemented as appropriate)    Goal: Interprofessional Rounds/Family Conf  Outcome: Ongoing (interventions implemented as appropriate)      Problem: Nausea/Vomiting (Adult)  Goal: Identify Related Risk Factors and Signs and Symptoms  Outcome: Ongoing (interventions implemented as appropriate)    Goal: Symptom Relief  Outcome: Ongoing (interventions implemented as appropriate)    Goal: Adequate Hydration  Outcome: Ongoing (interventions implemented as appropriate)      Problem: Pain, Acute (Adult)  Goal: Identify Related Risk Factors and Signs and Symptoms  Outcome: Ongoing (interventions implemented as appropriate)    Goal: Acceptable Pain Control/Comfort Level  Outcome: Ongoing (interventions implemented as appropriate)      Problem: Bowel Obstruction (Adult)  Goal: Signs and Symptoms of Listed Potential Problems Will be Absent, Minimized or Managed (Bowel Obstruction)  Outcome: Ongoing (interventions implemented as appropriate)

## 2018-12-14 NOTE — PROGRESS NOTES
"Chief Complaint   Patient presents with   • Abdominal Pain   • Nausea   • Vomiting       S: There were no events overnight.  Patient is doing fine. The patient is passing gas but didn't have any other bowel movements.     Diet:   Diet Order   Procedures   • Diet Regular; GI Soft/[place]       I/O last 3 completed shifts:  In: 2328.5 [I.V.:2328.5]  Out: -   No intake/output data recorded.    O:/77 (BP Location: Left arm, Patient Position: Sitting)   Pulse 84   Temp 98.5 °F (36.9 °C) (Oral)   Resp 16   Ht 170.2 cm (67\")   Wt 126 kg (277 lb)   SpO2 98%   BMI 43.38 kg/m²   Body mass index is 43.38 kg/m².  GENERAL:alert, well appearing, and in no distress and oriented to person, place, and time  HEENT: normochephalic, atraumatic, moist mucus membranes, clear sclera    CHEST: clear to auscultation, no wheezes, rales or rhonchi, symmetric air entry  CARDIAC: regular rate and rhythm    ABDOMEN: soft, nontender, nondistended, no masses or organomegaly  EXTREMITIES: no cyanosis, clubbing or edema    SKIN: Warm and moist, no rashes     Results from last 7 days   Lab Units  12/13/18   0212   WBC 10*3/mm3  12.20*   HEMOGLOBIN g/dL  11.8*   HEMATOCRIT %  37.7   PLATELETS 10*3/mm3  314     Results from last 7 days   Lab Units  12/13/18   0212   SODIUM mmol/L  140   POTASSIUM mmol/L  4.2   CHLORIDE mmol/L  99   CO2 mmol/L  26.3   BUN mg/dL  32*   CREATININE mg/dL  1.72*   CALCIUM mg/dL  9.4   BILIRUBIN mg/dL  0.2   ALK PHOS U/L  96   ALT (SGPT) U/L  13   AST (SGOT) U/L  11   GLUCOSE mg/dL  136*       ROS:   Denies any nausea or vomiting  Denies fevers or chills  Denies chest pain or palpitations  Denies SOB and coughing    A/P: 58 y.o. female with partial bowel obstruction that is resolving. Tolerating FLD. No more pain. Has hx of constipation.     - I will advance diet at this time. Diet: Regular  - Start senokot daily  - Encourage ambulation  - SCDs  - DVT prophylaxis    Sarthak Figueroa MD  General, Minimally " Invasive and Endoscopic Surgery  [facility]    [address], [address]  [place], [place], [address]  P: [phone]  F: [phone]

## 2018-12-14 NOTE — DISCHARGE SUMMARY
Admission date: 12/13/2018   Discharge date: No discharge date for patient encounter.     Admission diagnosis: Small bowel obstruction (CMS/[facility]) [K56.609]     Discharge diagnosis: Same + incisional hernias     Procedure: None    Hospital course: Patient admitted with small bowel obstruction. She was treated conservatively with bowel rest and IVF. Her pain resolved and tolerated clears on day#1. Diet was advanced and she tolerated diet on day#2. Decision was to discharge the patient home in stable condition.      Meds:      Your medication list      START taking these medications      Instructions Last Dose Given Next Dose Due   promethazine 12.5 MG tablet  Commonly known as:  PHENERGAN      Take 1 tablet by mouth Every 6 (Six) Hours As Needed for Nausea or Vomiting for up to 10 doses.       sennosides-docusate sodium 8.6-50 MG tablet  Commonly known as:  SENOKOT-S      Take 2 tablets by mouth Every Night.          CONTINUE taking these medications      Instructions Last Dose Given Next Dose Due   azelastine 0.1 % nasal spray  Commonly known as:  ASTELIN      2 sprays into the nostril(s) as directed by provider Daily. Use in each nostril as directed       Azelastine-Fluticasone 137-50 MCG/ACT suspension      1 spray into the nostril(s) as directed by provider 2 (Two) Times a Day.       diltiaZEM  MG 24 hr capsule  Commonly known as:  CARDIZEM CD      Take 300 mg by mouth Every Morning.       ELIQUIS 5 MG tablet tablet  Generic drug:  apixaban      TK 1 T PO BID       famotidine 20 MG tablet  Commonly known as:  PEPCID      Take 20 mg by mouth Every Night.       ferrous sulfate 325 (65 FE) MG tablet      Take 325 mg by mouth Every Other Day. QOD       levocetirizine 5 MG tablet  Commonly known as:  XYZAL      Take 5 mg by mouth Every Evening.       MULTIVITAMIN ADULT PO      Take  by mouth.       PROAIR  (90 Base) MCG/ACT inhaler  Generic drug:  albuterol      Inhale 1 puff Every 4 (Four) Hours As  Needed.             Where to Get Your Medications      These medications were sent to [id number] - [place], [place] - [address] - [phone]  - [phone] FX  [address] [facility] [address]    Phone:  [phone]   · promethazine 12.5 MG tablet  · sennosides-docusate sodium 8.6-50 MG tablet         Instructions:    - Call my office in 2 months for follow up and evaluation of incisional hernias.   - Return to ED if having severe and recurrent abdominal pain, nausea and vomiting     Sarthak Figueroa MD  General, Minimally Invasive and Endoscopic Surgery  [facility]

## 2018-12-15 ENCOUNTER — READMISSION MANAGEMENT (OUTPATIENT)
Dept: CALL CENTER | Facility: HOSPITAL | Age: 58
End: 2018-12-15

## 2018-12-15 NOTE — OUTREACH NOTE
Prep Survey      Responses   Facility patient discharged from?  [place]   Is patient eligible?  Yes   Discharge diagnosis  Small bowel obstruction (CMS/HCC) K56.609,  INcisional hernias    Does the patient have one of the following disease processes/diagnoses(primary or secondary)?  General Surgery   Does the patient have Home health ordered?  No   Is there a DME ordered?  No   Comments regarding appointments  Needs f/u scheduled   Prep survey completed?  Yes          Danelle Meraz RN

## 2018-12-17 ENCOUNTER — READMISSION MANAGEMENT (OUTPATIENT)
Dept: CALL CENTER | Facility: HOSPITAL | Age: 58
End: 2018-12-17

## 2018-12-17 ENCOUNTER — APPOINTMENT (OUTPATIENT)
Dept: OCCUPATIONAL THERAPY | Facility: HOSPITAL | Age: 58
End: 2018-12-17

## 2018-12-17 NOTE — OUTREACH NOTE
General Surgery Week 1 Survey      Responses   Facility patient discharged from?  [place]   Does the patient have one of the following disease processes/diagnoses(primary or secondary)?  General Surgery   Is there a successful TCM telephone encounter documented?  No   Week 1 attempt successful?  Yes   Call start time  1344   Rescheduled  Revoked [declined to participate,  verified she has number for "[employer]. Back at work.]   Discharge diagnosis  Small bowel obstruction (CMS/HCC) K56.609,  INcisional hernias           Cathy Yin RN

## 2018-12-17 NOTE — PROGRESS NOTES
Case Management Discharge Note    Final Note: Home with family    Destination      No service has been selected for the patient.      Durable Medical Equipment      No service has been selected for the patient.      Dialysis/Infusion      No service has been selected for the patient.      Home Medical Care      No service has been selected for the patient.      Community Resources      No service has been selected for the patient.        Other: (car)    Final Discharge Disposition Code: 01 - home or self-care

## 2018-12-18 ENCOUNTER — APPOINTMENT (OUTPATIENT)
Dept: OCCUPATIONAL THERAPY | Facility: HOSPITAL | Age: 58
End: 2018-12-18

## 2018-12-19 ENCOUNTER — APPOINTMENT (OUTPATIENT)
Dept: OCCUPATIONAL THERAPY | Facility: HOSPITAL | Age: 58
End: 2018-12-19

## 2018-12-21 ENCOUNTER — APPOINTMENT (OUTPATIENT)
Dept: OCCUPATIONAL THERAPY | Facility: HOSPITAL | Age: 58
End: 2018-12-21

## 2018-12-27 ENCOUNTER — HOSPITAL ENCOUNTER (OUTPATIENT)
Dept: ULTRASOUND IMAGING | Facility: HOSPITAL | Age: 58
Discharge: HOME | End: 2018-12-27

## 2018-12-27 ENCOUNTER — HOSPITAL ENCOUNTER (OUTPATIENT)
Dept: MAMMOGRAPHY | Facility: HOSPITAL | Age: 58
Discharge: HOME | End: 2018-12-27
Admitting: FAMILY MEDICINE

## 2018-12-27 DIAGNOSIS — R92.8 ABNORMAL MAMMOGRAM: ICD-10-CM

## 2018-12-27 PROCEDURE — 77065 DX MAMMO INCL CAD UNI: CPT

## 2018-12-28 ENCOUNTER — HOSPITAL ENCOUNTER (OUTPATIENT)
Dept: ULTRASOUND IMAGING | Facility: HOSPITAL | Age: 58
Discharge: HOME | End: 2018-12-28
Admitting: FAMILY MEDICINE

## 2018-12-28 PROCEDURE — 76642 ULTRASOUND BREAST LIMITED: CPT

## 2019-01-04 DIAGNOSIS — N63.0 BREAST NODULE: Primary | ICD-10-CM

## 2019-01-08 ENCOUNTER — TELEPHONE (OUTPATIENT)
Dept: SURGERY | Facility: CLINIC | Age: 59
End: 2019-01-08

## 2019-01-08 NOTE — TELEPHONE ENCOUNTER
Patient takes Eliquis, she has check with prescribing physician Dr. Dumont. Patient may hold Eliquis x 48 hrs pre-biopsy.     I will let Dr. Swartz know.     I offered to move patient appointment up. She does not want to be moved at this time, and if in future she would need plenty of notice before moving appointment due to her work schedule.     Lml       Correction: Dr. Jamari Neely is prescribing physician for Eliquis. lml

## 2019-01-08 NOTE — TELEPHONE ENCOUNTER
New patient appointment with Dr. Swartz is scheduled on 2/4/2019 @ 9:00am.    Called and left message with patient about appointment, patient to call back and confirm.    Mailed patient new patient packet.    Appt time okay per Madeline.

## 2019-01-22 ENCOUNTER — TELEPHONE (OUTPATIENT)
Dept: SURGERY | Facility: CLINIC | Age: 59
End: 2019-01-22

## 2019-01-22 ENCOUNTER — HOSPITAL ENCOUNTER (OUTPATIENT)
Dept: SURGERY | Facility: HOSPITAL | Age: 59
Discharge: HOME | End: 2019-01-22
Attending: SURGERY | Admitting: SURGERY

## 2019-01-22 ENCOUNTER — OFFICE VISIT (OUTPATIENT)
Dept: SURGERY | Facility: CLINIC | Age: 59
End: 2019-01-22

## 2019-01-22 VITALS
HEIGHT: 67 IN | SYSTOLIC BLOOD PRESSURE: 129 MMHG | DIASTOLIC BLOOD PRESSURE: 84 MMHG | BODY MASS INDEX: 42 KG/M2 | OXYGEN SATURATION: 98 % | HEART RATE: 82 BPM | WEIGHT: 267.6 LBS

## 2019-01-22 DIAGNOSIS — N28.9 RENAL INSUFFICIENCY: ICD-10-CM

## 2019-01-22 DIAGNOSIS — Z80.41 FH: OVARIAN CANCER: ICD-10-CM

## 2019-01-22 DIAGNOSIS — I87.009 POST-PHLEBITIC SYNDROME: ICD-10-CM

## 2019-01-22 DIAGNOSIS — R92.8 ABNORMAL ULTRASOUND OF BREAST: ICD-10-CM

## 2019-01-22 DIAGNOSIS — Z85.038 HISTORY OF COLON CANCER: ICD-10-CM

## 2019-01-22 DIAGNOSIS — N63.0 LUMP OR MASS IN BREAST: ICD-10-CM

## 2019-01-22 DIAGNOSIS — E66.01 MORBID OBESITY WITH BMI OF 40.0-44.9, ADULT (HCC): ICD-10-CM

## 2019-01-22 DIAGNOSIS — Z80.3 FH: BREAST CANCER: ICD-10-CM

## 2019-01-22 DIAGNOSIS — I82.502 CHRONIC DEEP VEIN THROMBOSIS (DVT) OF LEFT LOWER EXTREMITY, UNSPECIFIED VEIN (HCC): ICD-10-CM

## 2019-01-22 DIAGNOSIS — R92.8 ABNORMAL MAMMOGRAM: ICD-10-CM

## 2019-01-22 DIAGNOSIS — N63.0 LUMP OR MASS IN BREAST: Primary | ICD-10-CM

## 2019-01-22 PROCEDURE — 19083 BX BREAST 1ST LESION US IMAG: CPT | Performed by: SURGERY

## 2019-01-22 PROCEDURE — 88305 TISSUE EXAM BY PATHOLOGIST: CPT | Performed by: SURGERY

## 2019-01-22 PROCEDURE — 99244 OFF/OP CNSLTJ NEW/EST MOD 40: CPT | Performed by: SURGERY

## 2019-01-22 NOTE — PROGRESS NOTES
Chief Complaint: Mariya Campos is a 58 y.o. female who was seen in consultation at the request of Zahida Coffman MD  for breast mass, LEFT BREAST     History of Present Illness:  Patient presents with abnormal breast imaging. She noted no new masses, skin changes, nipple discharge, nipple changes prior to her most recent imaging.  Her most recent imaging includes the following:   10/17/2017  [facility] [place]   Mammo  Mariya Augustinson  MAMMO SCREENING TOMOSYNTHESIS BILATERAL  Scattered fibroglandular densities.  BI-RADS CATEGORY: 2, Benign.    12/05/2018  [facility] [place]   Mammo  Mariya Campos  BILATERAL MAMMOGRAPHY AND DIGITAL TOMOSYNTHESIS  Scattered fibroglandular densities.  IMPRESSION:  1. Area of focal asymmetry within the middle third lower inner quadrant of the left breast.  2. There are no findings suspicious for malignancy in the right breast.  BI-RADS CATEGORY: 0    12/27/2018  [facility]   Mammo  Mariya Augustinson  DIAGNOSTIC LEFT-SIDED MAMMOGRAM  Well-defined slightly lobulated nodule in the lower inner left breast approximately 5 cm from the nipple. It measures 10 mm. patient could not wait for associated limited directed left breast ultrasound.  BI-RADS CATEGORY: 0    12/28/2018  [facility]   Ultrasound  Mariya Campos  LFT BREAST SONOGRAPHY  Lower inner quadrant from 6 o’clock through 9 o’clock. 9 o’clock position on the order of 7 cm from the nipple, there is an irregular heterogenous mass with some suspected internal vascularity. Appearance of a possible lymph node on some with a lymph node on other images.  IMPRESSION:  A 1 cm complex mass at the 9 o’clock position in the left breast on the order 7 cm from the nipple.  BI-RADS CATEGORY: 4        She had a LEFT breast biopsy in 3086-3308 that was reportedly benign.  She has her uterus and ovaries, is postmenopausal, and takes nor hormones.  Her family history includes the following: Her mother had breast cancer at 56.  One  maternal aunt had breast cancer at 58 the other maternal aunt had breast cancer at 65.  Her maternal grandmother had breast cancer at 80.  Her paternal grandmother had ovarian cancer in her 70s.  One maternal aunt had melanoma at 65.  The patient has a personal history of colon cancer in 2006.  She was treated by Dr. Guzman with a laparoscopic colon resection in 2006.  She took chemotherapy with Dr. Salo Vora.  He released her after 5 years and she now gets a CEA annually with her primary care physician.  In 2015 she then had a lap bra to me for a partial colon resection for polyp.  In 2018 she then had hernia surgery her second incisional hernia surgery.  She got septic and had to have her mesh removed and this resulted in a hospital stay from February 1 through April 9 of 2018 requiring outpatient rehabilitation.  At that time or around that time she noted pain in her left calf and was found to have a old DVT and is treated by Dr. Corey Dumont for this and is on Eliquis and is in treatment for post phlebitic syndrome.  She is here for evaluation.    Review of Systems:  Review of Systems   Constitutional: Positive for chills.   Respiratory: Positive for cough.    Cardiovascular: Positive for leg swelling.   Endocrine: Negative for hot flashes.   Genitourinary: Positive for frequency and nocturia.    All other systems reviewed and are negative.       Past Medical and Surgical History:  Breast Biopsy History:  Patient has had the following breast biopsies:LEFT BENIGN   Breast Cancer HIstory:  Patient does not have a past medical history of breast cancer.  Breast Operations, and year:  NONE   Obstetric/Gynecologic History:  Age menstrual periods began: 10  Patient is postmenopausal, entered menopause naturally at age:     Number of pregnancies:1  Number of live births: 1  Number of abortions or miscarriages: 0  Age of delivery of first child: 20  Patient did not breast feed.  Length of time taking birth control pills:  14 YRS ON AND OFF   Patient has never taken hormone replacement  PATIENT STILL HAS UTERUS AND OVARIES.     Past Surgical History:   Procedure Laterality Date   • BREAST BIOPSY     • COLECTOMY PARTIAL / TOTAL      Partial Colectomy  2015   • COLON SURGERY      XS 2   2006 COLON CANCER AND 2015   • COLONOSCOPY      Complete Colonoscopy   • DIAGNOSTIC LAPAROSCOPY N/A 2/4/2018    Procedure: EXPLORATORY LAPAROTOMY WITH SMALL BOWEL ANASTAMOSIS, LYSIS OF ADHESIONS, REMOVAL OF MESH;  Surgeon: Tri Guzman MD;  Location: [facility] OR;  Service:    • INCISION AND DRAINAGE TRUNK N/A 2/13/2018    Procedure: WOUND WASHOUT ABDOMEN;  Surgeon: Tri Guzman MD;  Location: [facility] OR;  Service:    • INCISION AND DRAINAGE TRUNK N/A 2/16/2018    Procedure: TRUNK DEBRIDEMENT abdominal wound;  Surgeon: Tri Guzman MD;  Location: [facility] MAIN OR;  Service:    • INCISION AND DRAINAGE TRUNK N/A 2/27/2018    Procedure: INCISION AND DRAINAGE WOUND abdomen;  Surgeon: Tri Guzman MD;  Location: [facility] OR;  Service:    • INSERTION HEMODIALYSIS CATHETER N/A 2/19/2018    Procedure: LEFT QUAD CENTRAL LINE INSERTION AND RIGHT TUNNELED HEMODIALYSIS CATHETER INSERTION;  Surgeon: Meir Guillen MD;  Location: [facility] OR 18/19;  Service:    • JOINT MANIPULATION Left 4/18/2016    Procedure: MANIPULATION OF LT SHOULDER;  Surgeon: Lidia Ugalde MD;  Location: [facility];  Service:    • VENTRAL HERNIA REPAIR N/A 3/15/2016    Procedure: LAPAROSCOPIC INSIONAL HERNIA REPAIR W/ MESH DAVINCI ROBOT;  Surgeon: Tri Guzman MD;  Location: [facility] OR;  Service:    • VENTRAL HERNIA REPAIR N/A 2/1/2018    Procedure: VENTRAL HERNIA REPAIR LAPAROSCOPIC WITH DAVINCI ROBOT;  Surgeon: Tri Guzman MD;  Location: [facility] OR;  Service:      Past Medical History:   Diagnosis Date   • Abdominal pain    • Acid reflux    • Allergic rhinitis    • Anemia     ON IRON QOD   • Asthma    • Chronic bronchitis  "(CMS/HCC)    • Chronic kidney disease     STAGE 3   SINCE 2015 WITH COLON SURGERY   • Colon cancer (CMS/HCC)     CHEMO 6 MONTHS   • Hiatal hernia    • Hypertension    • Incisional hernia    • Overweight    • Peripheral neuropathy     FROM CHEMO TX'S   • Rosacea    • Visit for screening mammogram        Prior Hospitalizations, other than for surgery or childbirth, and year:  NONE     Social History     Socioeconomic History   • Marital status:      Spouse name: Not on file   • Number of children: Not on file   • Years of education: Not on file   • Highest education level: Not on file   Social Needs   • Financial resource strain: Not on file   • Food insecurity - worry: Not on file   • Food insecurity - inability: Not on file   • Transportation needs - medical: Not on file   • Transportation needs - non-medical: Not on file   Occupational History   • Occupation: communication super   Tobacco Use   • Smoking status: Never Smoker   • Smokeless tobacco: Never Used   Substance and Sexual Activity   • Alcohol use: Yes     Comment: 10 drinks per year   • Drug use: No   • Sexual activity: Defer   Other Topics Concern   • Not on file   Social History Narrative   • Not on file     Patient is .  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    Family History:  Family History   Problem Relation Age of Onset   • Cancer Mother         Breast Cancer   • Breast cancer Mother    • Cancer Father         Colon Cancer   • Cancer Maternal Grandmother         Breast Cancer   • Hypertension Other    • Cancer Other         Liver Cancer   • Other Other         Varicose veins of lower extremities   • Malig Hyperthermia Neg Hx        Vital Signs:  /84   Pulse 82   Ht 170.2 cm (67\")   Wt 121 kg (267 lb 9.6 oz)   LMP  (LMP Unknown)   SpO2 98%   Breastfeeding? Unknown   BMI 41.91 kg/m²      Medications:    Current Outpatient " "Medications:   •  azelastine (ASTELIN) 0.1 % nasal spray, 2 sprays into the nostril(s) as directed by provider Daily. Use in each nostril as directed, Disp: 1 each, Rfl: 12  •  Azelastine-Fluticasone 137-50 MCG/ACT suspension, 1 spray into the nostril(s) as directed by provider 2 (Two) Times a Day., Disp: 1 bottle, Rfl: 5  •  diltiaZEM CD (CARDIZEM CD) 300 MG 24 hr capsule, Take 300 mg by mouth Every Morning., Disp: , Rfl:   •  ELIQUIS 5 MG tablet tablet, TK 1 T PO BID, Disp: , Rfl: 5  •  famotidine (PEPCID) 20 MG tablet, Take 20 mg by mouth Every Night., Disp: , Rfl:   •  ferrous sulfate 325 (65 FE) MG tablet, Take 325 mg by mouth Every Other Day. QOD, Disp: , Rfl:   •  levocetirizine (XYZAL) 5 MG tablet, Take 5 mg by mouth Every Evening., Disp: , Rfl:   •  Multiple Vitamins-Minerals (MULTIVITAMIN ADULT PO), Take  by mouth., Disp: , Rfl:   •  PROAIR  (90 BASE) MCG/ACT inhaler, Inhale 1 puff Every 4 (Four) Hours As Needed., Disp: , Rfl: 2  •  promethazine (PHENERGAN) 12.5 MG tablet, Take 1 tablet by mouth Every 6 (Six) Hours As Needed for Nausea or Vomiting for up to 10 doses., Disp: 10 tablet, Rfl: 0  •  sennosides-docusate sodium (SENOKOT-S) 8.6-50 MG tablet, Take 2 tablets by mouth Every Night., Disp: 60 tablet, Rfl: 3  •  TRELEGY ELLIPTA 100-62.5-25 MCG/INH aerosol powder , INL 1 PUFF PO D, Disp: , Rfl: 3     Allergies:  Allergies   Allergen Reactions   • Fish-Derived Products Anaphylaxis   • Shellfish-Derived Products Anaphylaxis       Physical Examination:  /84   Pulse 82   Ht 170.2 cm (67\")   Wt 121 kg (267 lb 9.6 oz)   LMP  (LMP Unknown)   SpO2 98%   Breastfeeding? Unknown   BMI 41.91 kg/m²   General Appearance:  Patient is in no distress.  She is well kept and has an obese build.   Psychiatric:  Patient with appropriate mood and affect. Alert and oriented to self, time, and place.    Breast, RIGHT:  medium sized, symmetric with the contralateral side.  Breast skin is without erythema, " edema, rashes.  There are no visible abnormalities upon inspection during the arm-raising maneuver or with hands on hips in the sitting position. There is no nipple retraction, discharge or nipple/areolar skin changes.There are no masses palpable in the sitting or supine positions.    Breast, LEFT:  medium sized, symmetric with the contralateral side.  Breast skin is without erythema, edema, rashes.  There are no visible abnormalities upon inspection during the arm-raising maneuver or with hands on hips in the sitting position. There is no nipple retraction, discharge or nipple/areolar skin changes.There are no masses palpable in the sitting or supine positions.    Lymphatic:  There is no axillary, cervical, infraclavicular, or supraclavicular adenopathy bilaterally.  Eyes:  Pupils are round and reactive to light.  Cardiovascular:  Heart rate and rhythm are regular.  Respiratory:  Lungs are clear bilaterally with no crackles or wheezes in any lung field.  Gastrointestinal:  Abdomen is soft, nondistended, and nontender.     Musculoskeletal:  Good strength in all 4 extremities.   There is good range of motion in both shoulders.    Skin:  No new skin lesions or rashes on the skin excluding the breast (see breast exam above).        Imaging:  10/17/2017  [facility]   [place]  Mariya Campos  MAMMO SCREENING TOMOSYNTHESIS BILATERAL  Scattered fibroglandular densities.  BI-RADS CATEGORY: 2, Benign.    12/05/2018  [facility]   Mammo  Mariya Campos  BILATERAL MAMMOGRAPHY AND DIGITAL TOMOSYNTHESIS  Scattered fibroglandular densities.  IMPRESSION:  3. Area of focal asymmetry within the middle third lower inner quadrant of the left breast.  4. There are no findings suspicious for malignancy in the right breast.  BI-RADS CATEGORY: 0    12/27/2018  [facility]   Mammo  Mariya Campos  DIAGNOSTIC LEFT-SIDED MAMMOGRAM  Well-defined slightly lobulated nodule in the lower inner left breast approximately 5 cm  from the nipple. It measures 10 mm. patient could not wait for associated limited directed left breast ultrasound.  BI-RADS CATEGORY: 0    12/28/2018  [facility]   Ultrasound  Mariya Campos  LFT BREAST SONOGRAPHY  Lower inner quadrant from 6 o’clock through 9 o’clock. 9 o’clock position on the order of 7 cm from the nipple, there is an irregular heterogenous mass with some suspected internal vascularity. Appearance of a possible lymph node on some with a lymph node on other images.  IMPRESSION:  A 1 cm complex mass at the 9 o’clock position in the left breast on the order 7 cm from the nipple.  BI-RADS CATEGORY: 4        Procedures:  Percutaneous ultrasound-guided vacuum-assisted excisional breast biopsy  Indication:  ultrasound-visible breast mass  Location:LEFT 9:00, 3.5 CFN  Consent:  The risks, benefits, and alternatives to the procedure were discussed with the patient, who understood and wished to proceed.  The risks described included, but were not limited to, bleeding, infection, pneumothorax, and inadequate sampling requiring either repeat percutaneous or open excisional biopsy.  Description of Procedure:   After the patient was positioned supine on the procedure table, I located the lesion using ultrasound.  I prepped and draped the breast skin in sterile fashion.  I anesthetized the breast skin at the site of anticipated mammotomy with 1% lidocaine with epinephrine.  I then anesthetized the underlying subcutaneous tissue and breast parenchyma surrounding the lesion with 1% lidocaine with epinephrine under ultrasound visualization and guidance. I then made a nicking incision with an 11blade and inserted the 10G encore biopsy device from inferolateral to superomedial  under the lesion under ultrasound guidance.   I then took 4 (fewer number due to the eliquis) sequential core samples using the automated sampling of the encore device, until the lesion disappeared on ultrasound. There was no residual  lesion visible at the conclusion of the procedure.  I removed the probe, then placed a hydromark marker, using a stiff introducer, into the biopsy site. We held manual compression for 10 minutes, placed steri-strips at the mammotomy site, and wrapped the patient in a 6 inch super ace wrap with an ice-pack.  Marker placed: hydromark  Tolerance: The patient tolerated the procedure well.  Disposition: We will see her back within a week to review her pathology.    Assessment:   Diagnosis Plan   1. Lump or mass in breast  US Guided Breast Biopsy With & Without Device initial Left    Tissue Pathology Exam   2. Abnormal mammogram     3. Abnormal ultrasound of breast     4. FH: breast cancer     5. FH: ovarian cancer     6. History of colon cancer     7. Renal insufficiency     8. Morbid obesity with BMI of 40.0-44.9, adult (CMS/HCC)     9. Post-phlebitic syndrome     10. Chronic deep vein thrombosis (DVT) of left lower extremity, unspecified vein (CMS/HCC)     1-3  LEFT 9:0, 3.5 CFN- on US 1cm and mammogram 1 cm. BR4- Eliquis on hold for biopsy    4-5  Her mother had breast cancer at 56.  One maternal aunt had breast cancer at 58 the other maternal aunt had breast cancer at 65.  Her maternal grandmother had breast cancer at 80.  Her paternal grandmother had ovarian cancer in her 70s.  One maternal aunt had melanoma at 65.      6-   The patient has a personal history of colon cancer in 2006.  She was treated by Dr. Guzman with a laparoscopic colon resection in 2006.  She took chemotherapy with Dr. Salo Vora.  He released her after 5 years and she now gets a CEA annually with her primary care physician.  In 2015 she then had a lap bra to me for a partial colon resection for polyp.  In 2018 she then had hernia surgery her second incisional hernia surgery.  She got septic and had to have her mesh removed and this resulted in a hospital stay from February 1 through April 9 of 2018 requiring outpatient rehabilitation.  At  that time or around that time she noted pain in her left calf and was found to have a old DVT and is treated by Dr. Corey Dumont for this and is on Eliquis and is in treatment for post phlebitic syndrome.      7-  Dr Blevins nephrology [place]    8-  BMI 40    9-10  LLE chronic DVT- on eliquis 5 md bid, Dr Neely vascular        Plan:  We reviewed her most recent imaging, her history, and her exam together today. We reviewed her indication for eliquis, her cancer history, family cancer history, genetic testing recommendation for her sister.  We reviewed her imaging and bedside ultrasound today.  The lesion of concern on her imaging is:  LEFT 9:00, 3.5 CFN  We discussed that the designation of a BIRADS 4 signifies that there is a 3-89% possibility of malignancy accounting for the imaging finding. We discussed the recommendation to biopsy all BIRADS 4 lesions. We discussed a second option, and recommended only if 1- the above lesion was not technically amenable to either percutaneous or to surgical biopsy, or 2-if the patient refused biopsy. This option would be imaging surveillance in 3-6 months. She understood and wished to proceed with biopsy.    We plan to perform the following biopsy :  LEFT breast percutaneous ultrasound guided excisional biopsy.  She tolerated this well.  We will arrange for a LEFT DX post biopsy mammogram at the time of her Fu appt next week.    She plans to bring her aunts genetic testing results  I will call her with the pathology report as it returns, and we have given her after care instructions post biopsy.      I asked her to obtain her genetic results from her MA, who is currently under treatment at the [facility] [place].    We will obtain a note from her nephrologist about her CRI.    SHe can start her eliquis back, preferably Thursday morning dose, but tomorrow 7pm if Dr Neely will not allow Thursday.    Her next routine bilateral screen mammo is due 12-6-19 [facility].        Mia Swartz,  MD        We have spent 60 minutes in visit today, 35 in face to face .      Next Appointment:  No Follow-up on file.      EMR Dragon/transcription disclaimer:    Much of this encounter note is an electronic transcription/translocation of spoken language to printed text.  The electronic translation of spoken language may permit erroneous, or at times, nonsensical words or phrases to be inadvertently transcribed.  Although I have reviewed the note from such areas, some may still exist.

## 2019-01-23 ENCOUNTER — TELEPHONE (OUTPATIENT)
Dept: SURGERY | Facility: CLINIC | Age: 59
End: 2019-01-23

## 2019-01-23 LAB
CYTO UR: NORMAL
LAB AP CASE REPORT: NORMAL
LAB AP CLINICAL INFORMATION: NORMAL
PATH REPORT.FINAL DX SPEC: NORMAL
PATH REPORT.GROSS SPEC: NORMAL

## 2019-01-23 NOTE — TELEPHONE ENCOUNTER
Pathology frmo 1-22-19 core biopsy in the office returned as  Fibrous mastopathy, duct ectasia, cyst formation, apocrine metasplasia.      We willl et herk now benign and look for her mammo post bx        Final Diagnosis   1. Core Biopsies, Left Breast, 9:00 O'clock:                A. Focal hyalinized fibrous mastopathy.               B. Duct ectasia with cyst formation.               C. Apocrine metaplasia.               D. No significant intraductal hyperplasia nor atypia is identified.     Dma/kds

## 2019-01-31 ENCOUNTER — OFFICE VISIT (OUTPATIENT)
Dept: SURGERY | Facility: CLINIC | Age: 59
End: 2019-01-31

## 2019-01-31 ENCOUNTER — HOSPITAL ENCOUNTER (OUTPATIENT)
Dept: MAMMOGRAPHY | Facility: HOSPITAL | Age: 59
Discharge: HOME | End: 2019-01-31
Attending: SURGERY | Admitting: SURGERY

## 2019-01-31 ENCOUNTER — TELEPHONE (OUTPATIENT)
Dept: SURGERY | Facility: CLINIC | Age: 59
End: 2019-01-31

## 2019-01-31 VITALS
OXYGEN SATURATION: 98 % | DIASTOLIC BLOOD PRESSURE: 60 MMHG | HEIGHT: 67 IN | BODY MASS INDEX: 42.53 KG/M2 | WEIGHT: 271 LBS | HEART RATE: 95 BPM | SYSTOLIC BLOOD PRESSURE: 124 MMHG

## 2019-01-31 DIAGNOSIS — N63.0 LUMP OR MASS IN BREAST: Primary | ICD-10-CM

## 2019-01-31 DIAGNOSIS — E66.01 MORBID OBESITY WITH BMI OF 40.0-44.9, ADULT (HCC): ICD-10-CM

## 2019-01-31 DIAGNOSIS — Z85.038 HISTORY OF COLON CANCER: ICD-10-CM

## 2019-01-31 DIAGNOSIS — R92.8 ABNORMAL ULTRASOUND OF BREAST: ICD-10-CM

## 2019-01-31 DIAGNOSIS — I82.502 CHRONIC DEEP VEIN THROMBOSIS (DVT) OF LEFT LOWER EXTREMITY, UNSPECIFIED VEIN (HCC): ICD-10-CM

## 2019-01-31 DIAGNOSIS — R92.8 ABNORMAL MAMMOGRAM: ICD-10-CM

## 2019-01-31 DIAGNOSIS — Z80.3 FH: BREAST CANCER: ICD-10-CM

## 2019-01-31 DIAGNOSIS — I87.009 POST-PHLEBITIC SYNDROME: ICD-10-CM

## 2019-01-31 DIAGNOSIS — N28.9 RENAL INSUFFICIENCY: ICD-10-CM

## 2019-01-31 DIAGNOSIS — Z80.41 FH: OVARIAN CANCER: ICD-10-CM

## 2019-01-31 PROCEDURE — 99213 OFFICE O/P EST LOW 20 MIN: CPT | Performed by: SURGERY

## 2019-01-31 PROCEDURE — 77065 DX MAMMO INCL CAD UNI: CPT

## 2019-01-31 NOTE — PROGRESS NOTES
Chief Complaint: Mariya Campos is a 58 y.o. female who was seen in consultation at the request of Zahida Coffman MD  for breast mass and a post-biopsy visit, LEFT BREAST     History of Present Illness:  Patient presents with abnormal breast imaging. She noted no new masses, skin changes, nipple discharge, nipple changes prior to her most recent imaging.  Her most recent imaging includes the following:   10/17/2017  [facility] [place]   Mammo  Mariya Augutsinson  MAMMO SCREENING TOMOSYNTHESIS BILATERAL  Scattered fibroglandular densities.  BI-RADS CATEGORY: 2, Benign.    12/05/2018  [facility] [place]   Mammo  Mariya Campos  BILATERAL MAMMOGRAPHY AND DIGITAL TOMOSYNTHESIS  Scattered fibroglandular densities.  IMPRESSION:  1. Area of focal asymmetry within the middle third lower inner quadrant of the left breast.  2. There are no findings suspicious for malignancy in the right breast.  BI-RADS CATEGORY: 0    12/27/2018  [facility]   Mammo  Mariya Campos  DIAGNOSTIC LEFT-SIDED MAMMOGRAM  Well-defined slightly lobulated nodule in the lower inner left breast approximately 5 cm from the nipple. It measures 10 mm. patient could not wait for associated limited directed left breast ultrasound.  BI-RADS CATEGORY: 0    12/28/2018  [facility]   Ultrasound  Mariya Campos  LFT BREAST SONOGRAPHY  Lower inner quadrant from 6 o’clock through 9 o’clock. 9 o’clock position on the order of 7 cm from the nipple, there is an irregular heterogenous mass with some suspected internal vascularity. Appearance of a possible lymph node on some with a lymph node on other images.  IMPRESSION:  A 1 cm complex mass at the 9 o’clock position in the left breast on the order 7 cm from the nipple.  BI-RADS CATEGORY: 4        She had a LEFT breast biopsy in [id number] that was reportedly benign.  She has her uterus and ovaries, is postmenopausal, and takes nor hormones.  Her family history includes the following: Her mother had  breast cancer at 56.  One maternal aunt had breast cancer at 58 the other maternal aunt had breast cancer at 65.  Her maternal grandmother had breast cancer at 80.  Her paternal grandmother had ovarian cancer in her 70s.  One maternal aunt had melanoma at 65.  The patient has a personal history of colon cancer in 2006.  She was treated by Dr. Guzman with a laparoscopic colon resection in 2006.  She took chemotherapy with Dr. Salo Vora.  He released her after 5 years and she now gets a CEA annually with her primary care physician.  In 2015 she then had a lap bra to me for a partial colon resection for polyp.  In 2018 she then had hernia surgery her second incisional hernia surgery.  She got septic and had to have her mesh removed and this resulted in a hospital stay from February 1 through April 9 of 2018 requiring outpatient rehabilitation.  At that time or around that time she noted pain in her left calf and was found to have a old DVT and is treated by Dr. Corey Dumont for this and is on Eliquis and is in treatment for post phlebitic syndrome.    Interval HIstory:  Pathology from her January 22, 2019 biopsy in our office  returned as  Fibrous mastopathy, duct ectasia, cyst formation, apocrine metasplasia.    She denies significant bruising.  She denies any redness warmth or drainage from her mammotomy.    Left breast post biopsy diagnostic mammogram [facility] July 31, 2019: Scattered fibroglandular densities.  A coil-shaped metallic clip is in the middle third lower inner quadrant at the site of the prior 9:00 lesion.  No suspicious findings.  BI-RADS 2    She brought her maternal aunts genetic testing today and it showed a variant of uncertain significance, see below.  No mutation.    Review of Systems:  Review of Systems   Constitutional: Positive for chills and unexpected weight change (5 LB WT GAIN).   Respiratory: Positive for cough.    Cardiovascular: Positive for leg swelling.   Endocrine:  Negative for hot flashes.   Genitourinary: Positive for frequency and nocturia.    All other systems reviewed and are negative.       Past Medical and Surgical History:  Breast Biopsy History:  Patient has had the following breast biopsies:LEFT BENIGN   Breast Cancer HIstory:  Patient does not have a past medical history of breast cancer.  Breast Operations, and year:  NONE   Obstetric/Gynecologic History:  Age menstrual periods began: 10  Patient is postmenopausal, entered menopause naturally at age:     Number of pregnancies:1  Number of live births: 1  Number of abortions or miscarriages: 0  Age of delivery of first child: 20  Patient did not breast feed.  Length of time taking birth control pills: 14 YRS ON AND OFF   Patient has never taken hormone replacement  PATIENT STILL HAS UTERUS AND OVARIES.     Past Surgical History:   Procedure Laterality Date   • BREAST BIOPSY     • COLECTOMY PARTIAL / TOTAL      Partial Colectomy  2015   • COLON SURGERY      XS 2   2006 COLON CANCER AND 2015   • COLONOSCOPY      Complete Colonoscopy   • DIAGNOSTIC LAPAROSCOPY N/A 2/4/2018    Procedure: EXPLORATORY LAPAROTOMY WITH SMALL BOWEL ANASTAMOSIS, LYSIS OF ADHESIONS, REMOVAL OF MESH;  Surgeon: Tri Guzman MD;  Location: [facility];  Service:    • INCISION AND DRAINAGE TRUNK N/A 2/13/2018    Procedure: WOUND WASHOUT ABDOMEN;  Surgeon: Tri Guzman MD;  Location: [facility] OR;  Service:    • INCISION AND DRAINAGE TRUNK N/A 2/16/2018    Procedure: TRUNK DEBRIDEMENT abdominal wound;  Surgeon: Tri Guzman MD;  Location: [facility] OR;  Service:    • INCISION AND DRAINAGE TRUNK N/A 2/27/2018    Procedure: INCISION AND DRAINAGE WOUND abdomen;  Surgeon: Tri Guzman MD;  Location: [facility] OR;  Service:    • INSERTION HEMODIALYSIS CATHETER N/A 2/19/2018    Procedure: LEFT QUAD CENTRAL LINE INSERTION AND RIGHT TUNNELED HEMODIALYSIS CATHETER INSERTION;  Surgeon: Meir Guillen MD;  Location:   [facility] 18/19;  Service:    • JOINT MANIPULATION Left 4/18/2016    Procedure: MANIPULATION OF LT SHOULDER;  Surgeon: Lidia Ugalde MD;  [facility];  Service:    • VENTRAL HERNIA REPAIR N/A 3/15/2016    Procedure: LAPAROSCOPIC INSIONAL HERNIA REPAIR W/ MESH DAVINCI ROBOT;  Surgeon: Tri Guzman MD;  Location: [facility] OR;  Service:    • VENTRAL HERNIA REPAIR N/A 2/1/2018    Procedure: VENTRAL HERNIA REPAIR LAPAROSCOPIC WITH DAVINCI ROBOT;  Surgeon: Tri Guzman MD;  Location: [facility] OR;  Service:      Past Medical History:   Diagnosis Date   • Abdominal pain    • Acid reflux    • Allergic rhinitis    • Anemia     ON IRON QOD   • Asthma    • Chronic bronchitis (CMS/HCC)    • Chronic kidney disease     STAGE 3   SINCE 2015 WITH COLON SURGERY   • Colon cancer (CMS/HCC)     CHEMO 6 MONTHS   • Hiatal hernia    • Hypertension    • Incisional hernia    • Overweight    • Peripheral neuropathy     FROM CHEMO TX'S   • Rosacea    • Visit for screening mammogram        Prior Hospitalizations, other than for surgery or childbirth, and year:  NONE     Social History     Socioeconomic History   • Marital status:      Spouse name: Not on file   • Number of children: Not on file   • Years of education: Not on file   • Highest education level: Not on file   Social Needs   • Financial resource strain: Not on file   • Food insecurity - worry: Not on file   • Food insecurity - inability: Not on file   • Transportation needs - medical: Not on file   • Transportation needs - non-medical: Not on file   Occupational History   • Occupation: communication super   Tobacco Use   • Smoking status: Never Smoker   • Smokeless tobacco: Never Used   Substance and Sexual Activity   • Alcohol use: Yes     Comment: 10 drinks per year   • Drug use: No   • Sexual activity: Defer   Other Topics Concern   • Not on file   Social History Narrative   • Not on file     Patient is .  Patient is employed  "full time with the following occupation: TELECOMMUNICATIONS SUPERVISOR  Patient drinks 2 servings of caffeine per day.    Family History:  Family History   Problem Relation Age of Onset   • Cancer Mother         Breast Cancer   • Breast cancer Mother 56   • Cancer Father         Colon Cancer   • Cancer Maternal Grandmother 80        Breast Cancer   • Hypertension Other    • Cancer Other         Liver Cancer   • Other Other         Varicose veins of lower extremities   • Ovarian cancer Paternal Grandmother 70   • Breast cancer Maternal Aunt 58   • Breast cancer Maternal Aunt 65   • Malig Hyperthermia Neg Hx        Vital Signs:  /60 (BP Location: Left arm, Patient Position: Sitting, Cuff Size: Large Adult)   Pulse 95   Ht 170.2 cm (67\")   Wt 123 kg (271 lb)   LMP  (LMP Unknown)   SpO2 98%   Breastfeeding? No   BMI 42.44 kg/m²      Medications:    Current Outpatient Medications:   •  azelastine (ASTELIN) 0.1 % nasal spray, 2 sprays into the nostril(s) as directed by provider Daily. Use in each nostril as directed, Disp: 1 each, Rfl: 12  •  Azelastine-Fluticasone 137-50 MCG/ACT suspension, 1 spray into the nostril(s) as directed by provider 2 (Two) Times a Day., Disp: 1 bottle, Rfl: 5  •  diltiaZEM CD (CARDIZEM CD) 300 MG 24 hr capsule, Take 300 mg by mouth Every Morning., Disp: , Rfl:   •  ELIQUIS 5 MG tablet tablet, TK 1 T PO BID, Disp: , Rfl: 5  •  famotidine (PEPCID) 20 MG tablet, Take 20 mg by mouth Every Night., Disp: , Rfl:   •  ferrous sulfate 325 (65 FE) MG tablet, Take 325 mg by mouth Every Other Day. QOD, Disp: , Rfl:   •  levocetirizine (XYZAL) 5 MG tablet, Take 5 mg by mouth Every Evening., Disp: , Rfl:   •  Multiple Vitamins-Minerals (MULTIVITAMIN ADULT PO), Take  by mouth., Disp: , Rfl:   •  PROAIR  (90 BASE) MCG/ACT inhaler, Inhale 1 puff Every 4 (Four) Hours As Needed., Disp: , Rfl: 2  •  promethazine (PHENERGAN) 12.5 MG tablet, Take 1 tablet by mouth Every 6 (Six) Hours As Needed " "for Nausea or Vomiting for up to 10 doses., Disp: 10 tablet, Rfl: 0  •  sennosides-docusate sodium (SENOKOT-S) 8.6-50 MG tablet, Take 2 tablets by mouth Every Night., Disp: 60 tablet, Rfl: 3  •  TRELEGY ELLIPTA 100-62.5-25 MCG/INH aerosol powder , INL 1 PUFF PO D, Disp: , Rfl: 3     Allergies:  Allergies   Allergen Reactions   • Fish-Derived Products Anaphylaxis   • Shellfish-Derived Products Anaphylaxis       Physical Examination:  /60 (BP Location: Left arm, Patient Position: Sitting, Cuff Size: Large Adult)   Pulse 95   Ht 170.2 cm (67\")   Wt 123 kg (271 lb)   LMP  (LMP Unknown)   SpO2 98%   Breastfeeding? No   BMI 42.44 kg/m²      General Appearance:  Patient is in no distress.  She is well kept and has an obese build.   Psychiatric:  Patient with appropriate mood and affect. Alert and oriented to self, time, and place.    Breast, RIGHT:  medium sized, symmetric with the contralateral side.  Breast skin is without erythema, edema, rashes.  There are no visible abnormalities upon inspection during the arm-raising maneuver or with hands on hips in the sitting position. There is no nipple retraction, discharge or nipple/areolar skin changes.There are no masses palpable in the sitting or supine positions.    Breast, LEFT:  medium sized, symmetric with the contralateral side.  Breast skin is without erythema, edema, rashes.  There are no visible abnormalities upon inspection during the arm-raising maneuver or with hands on hips in the sitting position. There is no nipple retraction, discharge or nipple/areolar skin changes.There are no masses palpable in the sitting or supine positions.  Well healing mammotomy with no erythema warmth or drainage.    Lymphatic:  There is no axillary, cervical, infraclavicular, or supraclavicular adenopathy bilaterally.  Eyes:  Pupils are round and reactive to light.  Cardiovascular:  Heart rate and rhythm are regular.  Respiratory:  Lungs are clear bilaterally with no " crackles or wheezes in any lung field.  Gastrointestinal:  Abdomen is soft, nondistended, and nontender.     Musculoskeletal:  Good strength in all 4 extremities.   There is good range of motion in both shoulders.    Skin:  No new skin lesions or rashes on the skin excluding the breast (see breast exam above).        Imaging:  10/17/2017  [facility]   Mammo  Mariya Campos  MAMMO SCREENING TOMOSYNTHESIS BILATERAL  Scattered fibroglandular densities.  BI-RADS CATEGORY: 2, Benign.    12/05/2018  [facility]   Mammo  Mariya Campos  BILATERAL MAMMOGRAPHY AND DIGITAL TOMOSYNTHESIS  Scattered fibroglandular densities.  IMPRESSION:  3. Area of focal asymmetry within the middle third lower inner quadrant of the left breast.  4. There are no findings suspicious for malignancy in the right breast.  BI-RADS CATEGORY: 0    12/27/2018  [facility]   Mammo  Mariya Campos  DIAGNOSTIC LEFT-SIDED MAMMOGRAM  Well-defined slightly lobulated nodule in the lower inner left breast approximately 5 cm from the nipple. It measures 10 mm. patient could not wait for associated limited directed left breast ultrasound.  BI-RADS CATEGORY: 0    12/28/2018  [facility]   Ultrasound  Mariya Campos  LFT BREAST SONOGRAPHY  Lower inner quadrant from 6 o’clock through 9 o’clock. 9 o’clock position on the order of 7 cm from the nipple, there is an irregular heterogenous mass with some suspected internal vascularity. Appearance of a possible lymph node on some with a lymph node on other images.  IMPRESSION:  A 1 cm complex mass at the 9 o’clock position in the left breast on the order 7 cm from the nipple.  BI-RADS CATEGORY: 4    Left diagnostic mammogram [facility] July 31, 2019: Scattered fibroglandular densities.  A coil-shaped metallic clip is in the middle third lower inner quadrant at the site of the prior 9:00 lesion.  No suspicious findings.  BI-RADS 2          PATHOLOGY:  Pathology frmo 1-22-19  core biopsy in the office returned as  Fibrous mastopathy, duct ectasia, cyst formation, apocrine metasplasia.              Final Diagnosis   1. Core Biopsies, Left Breast, 9:00 O'clock:                A. Focal hyalinized fibrous mastopathy.               B. Duct ectasia with cyst formation.               C. Apocrine metaplasia.               D. No significant intraductal hyperplasia nor atypia is identified.     Dma/kds              Procedures:  Percutaneous ultrasound-guided vacuum-assisted excisional breast biopsy 1-22-19  Indication:  ultrasound-visible breast mass  Location:LEFT 9:00, 3.5 CFN  Consent:  The risks, benefits, and alternatives to the procedure were discussed with the patient, who understood and wished to proceed.  The risks described included, but were not limited to, bleeding, infection, pneumothorax, and inadequate sampling requiring either repeat percutaneous or open excisional biopsy.  Description of Procedure:   After the patient was positioned supine on the procedure table, I located the lesion using ultrasound.  I prepped and draped the breast skin in sterile fashion.  I anesthetized the breast skin at the site of anticipated mammotomy with 1% lidocaine with epinephrine.  I then anesthetized the underlying subcutaneous tissue and breast parenchyma surrounding the lesion with 1% lidocaine with epinephrine under ultrasound visualization and guidance. I then made a nicking incision with an 11blade and inserted the 10G encore biopsy device from inferolateral to superomedial  under the lesion under ultrasound guidance.   I then took 4 (fewer number due to the eliquis) sequential core samples using the automated sampling of the encore device, until the lesion disappeared on ultrasound. There was no residual lesion visible at the conclusion of the procedure.  I removed the probe, then placed a hydromark marker, using a stiff introducer, into the biopsy site. We held manual compression for 10  minutes, placed steri-strips at the mammotomy site, and wrapped the patient in a 6 inch super ace wrap with an ice-pack.  Marker placed: hydromark  Tolerance: The patient tolerated the procedure well.  Disposition: We will see her back within a week to review her pathology.    Assessment:   Diagnosis Plan   1. Lump or mass in breast     2. Abnormal mammogram  Mammo Screening Digital Tomosynthesis Bilateral With CAD   3. Abnormal ultrasound of breast     4. FH: breast cancer     5. FH: ovarian cancer     6. History of colon cancer  Ambulatory Referral to Gastroenterology   7. Renal insufficiency     8. Morbid obesity with BMI of 40.0-44.9, adult (CMS/HCC)     9. Post-phlebitic syndrome     10. Chronic deep vein thrombosis (DVT) of left lower extremity, unspecified vein (CMS/HCC)          1-3  LEFT 9:0, 3.5 CFN- on US 1cm and mammogram 1 cm. BR4- January 2019 in office biopsy returned as Fibrous mastopathy, duct ectasia, cyst formation, apocrine metasplasia.    4-5  Her mother had breast cancer at 56.  One maternal aunt had breast cancer at 58 the other maternal aunt had breast cancer at 65.  Her maternal grandmother had breast cancer at 80.  Her paternal grandmother had ovarian cancer in her 70s.  One maternal aunt had melanoma at 65.  Mat aunt Ashley Thapa 11-21-17 invitae panel returned as VUS in PMS [id number]    6-   The patient has a personal history of colon cancer in 2006.  She was treated by Dr. Guzman with a laparoscopic colon resection in 2006.  She took chemotherapy with Dr. Salo Vora.  He released her after 5 years and she now gets a CEA annually with her primary care physician.  In 2015 she then had a lap bra to me for a partial colon resection for polyp.  In 2018 she then had hernia surgery her second incisional hernia surgery.  She got septic and had to have her mesh removed and this resulted in a hospital stay from February 1 through April 9 of 2018 requiring outpatient rehabilitation.  At  that time or around that time she noted pain in her left calf and was found to have a old DVT and is treated by Dr. Corey Dumont for this and is on Eliquis and is in treatment for post phlebitic syndrome.    - today , requests a recommendation for provider to perform colonoscopy      7-  Dr Blevins nephrology [place]  CKD stage 3; 3-2018 Cr 1.98    8-  BMI 40    9-10  LLE chronic DVT- on eliquis 5 md bid, Dr Neely vascular        Plan:    We reviewed her pathology report and examination together today as well as her aunts outside labs.  We discussed the nature of fibrous mastopathy duct ectasia and fibrocystic change.  Her post biopsy mammogram showed the marker to be in good position in the previous nodule removed.  Her next routine screening mammogram will be due December 6, 2019 at [facility].  We will perform this with 3-D.  We discussed her most significant risk factor for the development of breast cancers her family history.  We discussed that if she had a lifetime risk over 20% that we recommend breast MRI if she had a 5 year short term risk greater than 1.7% that we recommend a discussion with medical oncology about risk reducing medications.  Due to her chronic kidney disease she is not a candidate to have gadolinium.  The breast MRI without contrast is not useful for breast cancer screening.  She tells me that she would not be willing to take a medication to reduce her rest breast cancer risk.  Therefore without any actionable options, we did not do a risk assessment today on her.  I did recommend that we continue with 3-D mammography to improve her screening.  As above, there is a variant of uncertain significance but no mutation carried by her living affected maternal aunt.  We will refer her to see Dr. Edwin Reyes for a colonoscopy.  I will see her back in December after her screening mammogram.  At that time if everything is in good order she can return to routine screening with her primary  care physician.    Mia Swartz MD    We have spent 20 minutes in visit today, 15 in face to face .    Next Appointment:  Return for Next scheduled follow up, after imaging.      EMR Dragon/transcription disclaimer:    Much of this encounter note is an electronic transcription/translocation of spoken language to printed text.  The electronic translation of spoken language may permit erroneous, or at times, nonsensical words or phrases to be inadvertently transcribed.  Although I have reviewed the note from such areas, some may still exist.

## 2019-01-31 NOTE — TELEPHONE ENCOUNTER
Left diagnostic mammogram [facility] July 31, 2019: Scattered fibroglandular densities.  A coil-shaped metallic clip is in the middle third lower inner quadrant at the site of the prior 9:00 lesion.  No suspicious findings.  BI-RADS 2

## 2019-02-04 ENCOUNTER — TELEPHONE (OUTPATIENT)
Dept: SURGERY | Facility: CLINIC | Age: 59
End: 2019-02-04

## 2019-02-04 NOTE — TELEPHONE ENCOUNTER
Scheduled Consult with Dr Sophy Martinez    2-26-19 arrive 2:45    Pt is aware of this appt.    (Dr Marr had no appts for a couple of months.)    Spoke to pt she v/u with appt  kdl

## 2019-12-02 ENCOUNTER — APPOINTMENT (OUTPATIENT)
Dept: CT IMAGING | Facility: HOSPITAL | Age: 59
End: 2019-12-02

## 2019-12-02 ENCOUNTER — APPOINTMENT (OUTPATIENT)
Dept: GENERAL RADIOLOGY | Facility: HOSPITAL | Age: 59
End: 2019-12-02

## 2019-12-02 ENCOUNTER — OFFICE VISIT (OUTPATIENT)
Dept: INTERNAL MEDICINE | Facility: CLINIC | Age: 59
End: 2019-12-02

## 2019-12-02 ENCOUNTER — HOSPITAL ENCOUNTER (EMERGENCY)
Facility: HOSPITAL | Age: 59
Discharge: HOME | End: 2019-12-02
Attending: EMERGENCY MEDICINE | Admitting: EMERGENCY MEDICINE

## 2019-12-02 VITALS
WEIGHT: 275 LBS | HEART RATE: 120 BPM | HEIGHT: 68 IN | TEMPERATURE: 98.4 F | BODY MASS INDEX: 41.68 KG/M2 | SYSTOLIC BLOOD PRESSURE: 122 MMHG | OXYGEN SATURATION: 97 % | DIASTOLIC BLOOD PRESSURE: 80 MMHG

## 2019-12-02 VITALS
HEIGHT: 68 IN | WEIGHT: 275 LBS | BODY MASS INDEX: 41.68 KG/M2 | DIASTOLIC BLOOD PRESSURE: 100 MMHG | HEART RATE: 85 BPM | TEMPERATURE: 97.7 F | RESPIRATION RATE: 18 BRPM | OXYGEN SATURATION: 98 % | SYSTOLIC BLOOD PRESSURE: 161 MMHG

## 2019-12-02 DIAGNOSIS — R19.7 DIARRHEA, UNSPECIFIED TYPE: Primary | ICD-10-CM

## 2019-12-02 DIAGNOSIS — R11.10 NON-INTRACTABLE VOMITING, PRESENCE OF NAUSEA NOT SPECIFIED, UNSPECIFIED VOMITING TYPE: ICD-10-CM

## 2019-12-02 DIAGNOSIS — I48.91 ATRIAL FIBRILLATION, NEW ONSET (HCC): ICD-10-CM

## 2019-12-02 DIAGNOSIS — R19.7 DIARRHEA, UNSPECIFIED TYPE: ICD-10-CM

## 2019-12-02 DIAGNOSIS — I48.0 PAROXYSMAL ATRIAL FIBRILLATION (HCC): Primary | ICD-10-CM

## 2019-12-02 DIAGNOSIS — I48.0 PAROXYSMAL ATRIAL FIBRILLATION (HCC): ICD-10-CM

## 2019-12-02 DIAGNOSIS — R94.31 ABNORMAL ECG: ICD-10-CM

## 2019-12-02 LAB
ALBUMIN SERPL-MCNC: 4.2 G/DL (ref 3.5–5.2)
ALBUMIN/GLOB SERPL: 1.1 G/DL
ALP SERPL-CCNC: 79 U/L (ref 39–117)
ALT SERPL W P-5'-P-CCNC: 9 U/L (ref 1–33)
ANION GAP SERPL CALCULATED.3IONS-SCNC: 14.2 MMOL/L (ref 5–15)
AST SERPL-CCNC: 18 U/L (ref 1–32)
BASOPHILS # BLD AUTO: 0.04 10*3/MM3 (ref 0–0.2)
BASOPHILS NFR BLD AUTO: 0.9 % (ref 0–1.5)
BILIRUB SERPL-MCNC: 0.2 MG/DL (ref 0.2–1.2)
BUN BLD-MCNC: 26 MG/DL (ref 6–20)
BUN/CREAT SERPL: 14.2 (ref 7–25)
CALCIUM SPEC-SCNC: 8.8 MG/DL (ref 8.6–10.5)
CHLORIDE SERPL-SCNC: 102 MMOL/L (ref 98–107)
CO2 SERPL-SCNC: 22.8 MMOL/L (ref 22–29)
CREAT BLD-MCNC: 1.83 MG/DL (ref 0.57–1)
DEPRECATED RDW RBC AUTO: 41.9 FL (ref 37–54)
EOSINOPHIL # BLD AUTO: 0.03 10*3/MM3 (ref 0–0.4)
EOSINOPHIL NFR BLD AUTO: 0.7 % (ref 0.3–6.2)
ERYTHROCYTE [DISTWIDTH] IN BLOOD BY AUTOMATED COUNT: 13.3 % (ref 12.3–15.4)
GFR SERPL CREATININE-BSD FRML MDRD: 29 ML/MIN/1.73
GLOBULIN UR ELPH-MCNC: 3.7 GM/DL
GLUCOSE BLD-MCNC: 83 MG/DL (ref 65–99)
HCT VFR BLD AUTO: 37.6 % (ref 34–46.6)
HGB BLD-MCNC: 12.6 G/DL (ref 12–15.9)
HOLD SPECIMEN: NORMAL
HOLD SPECIMEN: NORMAL
IMM GRANULOCYTES # BLD AUTO: 0.01 10*3/MM3 (ref 0–0.05)
IMM GRANULOCYTES NFR BLD AUTO: 0.2 % (ref 0–0.5)
LIPASE SERPL-CCNC: 43 U/L (ref 13–60)
LYMPHOCYTES # BLD AUTO: 1.58 10*3/MM3 (ref 0.7–3.1)
LYMPHOCYTES NFR BLD AUTO: 35.7 % (ref 19.6–45.3)
MCH RBC QN AUTO: 29.3 PG (ref 26.6–33)
MCHC RBC AUTO-ENTMCNC: 33.5 G/DL (ref 31.5–35.7)
MCV RBC AUTO: 87.4 FL (ref 79–97)
MONOCYTES # BLD AUTO: 0.42 10*3/MM3 (ref 0.1–0.9)
MONOCYTES NFR BLD AUTO: 9.5 % (ref 5–12)
NEUTROPHILS # BLD AUTO: 2.35 10*3/MM3 (ref 1.7–7)
NEUTROPHILS NFR BLD AUTO: 53 % (ref 42.7–76)
NRBC BLD AUTO-RTO: 0 /100 WBC (ref 0–0.2)
PLATELET # BLD AUTO: 280 10*3/MM3 (ref 140–450)
PMV BLD AUTO: 9 FL (ref 6–12)
POTASSIUM BLD-SCNC: 4 MMOL/L (ref 3.5–5.2)
PROT SERPL-MCNC: 7.9 G/DL (ref 6–8.5)
RBC # BLD AUTO: 4.3 10*6/MM3 (ref 3.77–5.28)
SODIUM BLD-SCNC: 139 MMOL/L (ref 136–145)
TROPONIN T SERPL-MCNC: <0.01 NG/ML (ref 0–0.03)
WBC NRBC COR # BLD: 4.43 10*3/MM3 (ref 3.4–10.8)
WHOLE BLOOD HOLD SPECIMEN: NORMAL
WHOLE BLOOD HOLD SPECIMEN: NORMAL

## 2019-12-02 PROCEDURE — 84443 ASSAY THYROID STIM HORMONE: CPT | Performed by: NURSE PRACTITIONER

## 2019-12-02 PROCEDURE — 74176 CT ABD & PELVIS W/O CONTRAST: CPT

## 2019-12-02 PROCEDURE — 84484 ASSAY OF TROPONIN QUANT: CPT | Performed by: EMERGENCY MEDICINE

## 2019-12-02 PROCEDURE — 93005 ELECTROCARDIOGRAM TRACING: CPT | Performed by: EMERGENCY MEDICINE

## 2019-12-02 PROCEDURE — 99284 EMERGENCY DEPT VISIT MOD MDM: CPT

## 2019-12-02 PROCEDURE — 83690 ASSAY OF LIPASE: CPT | Performed by: EMERGENCY MEDICINE

## 2019-12-02 PROCEDURE — 93010 ELECTROCARDIOGRAM REPORT: CPT | Performed by: INTERNAL MEDICINE

## 2019-12-02 PROCEDURE — 71046 X-RAY EXAM CHEST 2 VIEWS: CPT

## 2019-12-02 PROCEDURE — 85025 COMPLETE CBC W/AUTO DIFF WBC: CPT | Performed by: EMERGENCY MEDICINE

## 2019-12-02 PROCEDURE — 93005 ELECTROCARDIOGRAM TRACING: CPT

## 2019-12-02 PROCEDURE — 99214 OFFICE O/P EST MOD 30 MIN: CPT | Performed by: FAMILY MEDICINE

## 2019-12-02 PROCEDURE — 80053 COMPREHEN METABOLIC PANEL: CPT | Performed by: EMERGENCY MEDICINE

## 2019-12-02 PROCEDURE — 93000 ELECTROCARDIOGRAM COMPLETE: CPT | Performed by: FAMILY MEDICINE

## 2019-12-02 RX ORDER — SODIUM CHLORIDE 0.9 % (FLUSH) 0.9 %
10 SYRINGE (ML) INJECTION AS NEEDED
Status: DISCONTINUED | OUTPATIENT
Start: 2019-12-02 | End: 2019-12-02 | Stop reason: HOSPADM

## 2019-12-02 RX ADMIN — SODIUM CHLORIDE 1000 ML: 9 INJECTION, SOLUTION INTRAVENOUS at 16:39

## 2019-12-02 NOTE — PROGRESS NOTES
Subjective   Mariya Campos is a 58 y.o. female.   Chief Complaint   Patient presents with   • Diarrhea       History of Present Illness     #1 Diarrhea-started 3 days ago.  Initially patient had nausea, but it resolved.  She vomited twice.  2 days ago diarrhea was a little better, but last night it started again and she had multiple watery bowel movements.  No blood in it.  She is not able to eat anything.  She cannot keep fluids down.  She has diarrhea shortly after drinking water.  She has no abdominal pain.  No fever but she does have chills.  She was recently treated for UTI by her nephrologist. She is not sure what antibiotic she took.    #2  Tachycardia-her heart rate is elevated in the office today and it was noted also about 2 weeks ago when she saw her nephrologist.  She was referred at that time to cardiologist for tachycardia and is scheduled on December 16 to see Dr. Franklin.  She was not diagnosed with A. fib at that time.  She has a history of A. fib about 2 years ago, which happened after surgery.  She is on Eliquis for vein thrombosis.  It is managed by Dr. Dumont and dose was decreased few months ago to 2.5 mg twice a day.  She has chronic kidney disease.  She has no chest pain, no shortness of breath, she does not feel dizzy, but felt a little lightheaded when she was stepping off the scale.    The following portions of the patient's history were reviewed and updated as appropriate: allergies, current medications, past medical history, past social history and problem list.    Review of Systems   Constitutional: Positive for chills. Negative for fever.   Respiratory: Negative for shortness of breath.    Cardiovascular: Negative for chest pain.   Gastrointestinal: Positive for diarrhea. Negative for abdominal pain and blood in stool.   Neurological: Positive for light-headedness.         Objective   Wt Readings from Last 3 Encounters:   12/02/19 125 kg (275 lb)   05/27/19 118 kg (260 lb)   01/31/19  123 kg (271 lb)      Vitals:    12/02/19 1106   BP: 122/80   Pulse: 120   Temp: 98.4 °F (36.9 °C)   SpO2: 97%     Temp Readings from Last 3 Encounters:   12/02/19 98.4 °F (36.9 °C)   05/27/19 98.6 °F (37 °C) (Oral)   12/14/18 98.5 °F (36.9 °C) (Oral)     BP Readings from Last 3 Encounters:   12/02/19 122/80   05/27/19 124/86   01/31/19 124/60     Pulse Readings from Last 3 Encounters:   12/02/19 120   05/27/19 (!) 126   01/31/19 95     Body mass index is 41.82 kg/m².    Physical Exam   Constitutional: She is oriented to person, place, and time. She appears well-developed and well-nourished.   HENT:   Head: Normocephalic and atraumatic.   Neck: Neck supple. Carotid bruit is not present.   Cardiovascular: Normal rate and normal heart sounds.   irregularly irregular.   Pulmonary/Chest: Effort normal and breath sounds normal.   Abdominal: Soft. She exhibits no distension. There is no tenderness. There is no guarding.   Neurological: She is alert and oriented to person, place, and time.   Skin: Skin is warm, dry and intact.   Psychiatric: She has a normal mood and affect. Her behavior is normal.       Assessment/Plan   Mariya was seen today for diarrhea and rapid heart rate.    Diagnoses and all orders for this visit:    Paroxysmal atrial fibrillation (CMS/HCC)  -     Basic Metabolic Panel    Abnormal ECG  -     ECG 12 Lead    Diarrhea, unspecified type  -     Clostridium Difficile EIA - , Per Rectum  -     Giardia / Cryptosporidium Screen - , Per Rectum  -     Stool Culture (Reference Lab) - Stool, Per Rectum    Atrial fibrillation, new onset (CMS/HCC)  -     Thyroid Cascade Profile        #1 diarrhea/ #2 atrial fibrillation -we are checking stool studies.  I am recommending patient to go to ER. She is not able to keep fluids down.  EKG in the office is positive for A. fib.  Tracing and interpretation will be scanned.  It could be triggered by dehydration/electrolyte imbalance secondary to diarrhea. However we are not  sure when it started-she already had tachycardia about 2 weeks ago.

## 2019-12-02 NOTE — ED TRIAGE NOTES
Diarrhea x 3 days.  Rapid HR.  Went to pmd.  They did ekg.  Pt is in afib.  Was told she had afib when she was hospitalized 2 years ago

## 2019-12-02 NOTE — ED PROVIDER NOTES
"EMERGENCY DEPARTMENT ENCOUNTER    Room Number:  42/42  PCP: Zahida Coffman MD  Historian: self  History Limited By: nothing      HPI  Chief Complaint: Diarrhea  Context: Mariya Campos is a 58 y.o. female who presents to the ED c/o diarrhea that started 6 days ago. Pt also c/o nausea, vomiting and rapid heart rate. Pt denies abd pain and hematochezia. The pt states she started feeling \"out of sorts\" the morning her sx's started. She states she tried to eat several bites of toast for dinner with multiple episodes of vomiting and diarrhea. Her sx's persisted the following two days and seemed to improve yesterday morning. Last night, her diarrhea returned so she was seen by PMD this morning. While at the PMD office, the pt was found to be in AFIB where she was then referred to the ED for further evaluation. The pt was recently placed on cefdinir on 11/22/19 - 11/29/19 for treatment of UTI. Pt has hx of paroxsymal AFIB.  The pt is anticoagulated on Eliquis for prior DVT.    Location: bowel  Radiation: none stated  Character: diarrhea  Duration: 6 days ago  Severity: moderate  Progression: unchanged  Aggravating Factors: none stated  Alleviating Factors: none stated      MEDICAL RECORD REVIEW    The pt has a hx of paroxysmal AFIB.       PAST MEDICAL HISTORY  Active Ambulatory Problems     Diagnosis Date Noted   • Atopic rhinitis 02/23/2016   • Adiposity 02/23/2016   • Abnormal ECG 04/26/2016   • CRF (chronic renal failure), stage 3 (moderate) (CMS/HCC)    • Anemia 04/28/2016   • Recurrent UTI 06/04/2016   • Osteopenia 07/20/2016   • Essential hypertension 10/02/2017   • Incisional hernia 12/18/2017   • Moderate persistent asthma without complication 12/18/2017   • Recurrent ventral hernia 01/30/2018     Resolved Ambulatory Problems     Diagnosis Date Noted   • Fatigue 02/23/2016   • Abnormal mammogram 02/23/2016   • Colonic lump 02/23/2016   • Menopause present 02/23/2016   • Shortness of breath 02/23/2016   • " Shoulder, capsulitis, adhesive 04/14/2016   • Bilateral shoulder pain 04/14/2016   • Pain 06/28/2016   • Bursitis/tendonitis, shoulder 06/28/2016   • Incisional hernia, without obstruction or gangrene 12/21/2017   • Bacteremia, escherichia coli 02/10/2018   • Wound infection 01/30/2018   • Complications, dialysis, catheter, mechanical, initial encounter (CMS/[facility]) 02/18/2018   • Small bowel obstruction (CMS/[facility]) 12/13/2018     Past Medical History:   Diagnosis Date   • Abdominal pain    • Acid reflux    • Allergic rhinitis    • Anemia    • Asthma    • Chronic bronchitis (CMS/[facility])    • Chronic kidney disease    • Colon cancer (CMS/[facility])    • Hiatal hernia    • Hypertension    • Incisional hernia    • Overweight    • Peripheral neuropathy    • Rosacea    • Visit for screening mammogram          PAST SURGICAL HISTORY  Past Surgical History:   Procedure Laterality Date   • BREAST BIOPSY     • COLECTOMY PARTIAL / TOTAL      Partial Colectomy  2015   • COLON SURGERY      XS 2   2006 COLON CANCER AND 2015   • COLONOSCOPY      Complete Colonoscopy   • DIAGNOSTIC LAPAROSCOPY N/A 2/4/2018    Procedure: EXPLORATORY LAPAROTOMY WITH SMALL BOWEL ANASTAMOSIS, LYSIS OF ADHESIONS, REMOVAL OF MESH;  Surgeon: Tri Guzman MD;  Location: [facility] OR;  Service:    • INCISION AND DRAINAGE TRUNK N/A 2/13/2018    Procedure: WOUND WASHOUT ABDOMEN;  Surgeon: Tri Guzman MD;  Location: [facility] OR;  Service:    • INCISION AND DRAINAGE TRUNK N/A 2/16/2018    Procedure: TRUNK DEBRIDEMENT abdominal wound;  Surgeon: Tri Guzman MD;  Location: [facility] OR;  Service:    • INCISION AND DRAINAGE TRUNK N/A 2/27/2018    Procedure: INCISION AND DRAINAGE WOUND abdomen;  Surgeon: Tri Guzman MD;  Location: [facility] OR;  Service:    • INSERTION HEMODIALYSIS CATHETER N/A 2/19/2018    Procedure: LEFT QUAD CENTRAL LINE INSERTION AND RIGHT TUNNELED HEMODIALYSIS CATHETER INSERTION;  Surgeon: Meir Guillen MD;   Location: [facility] HYBRID OR 18/19;  Service:    • JOINT MANIPULATION Left 4/18/2016    Procedure: MANIPULATION OF LT SHOULDER;  Surgeon: Lidia Ugalde MD;  Location: [facility] OR [facility];  Service:    • VENTRAL HERNIA REPAIR N/A 3/15/2016    Procedure: LAPAROSCOPIC INSIONAL HERNIA REPAIR W/ MESH DAVINCI ROBOT;  Surgeon: Tri Guzman MD;  Location: [facility] MAIN OR;  Service:    • VENTRAL HERNIA REPAIR N/A 2/1/2018    Procedure: VENTRAL HERNIA REPAIR LAPAROSCOPIC WITH DAVINCI ROBOT;  Surgeon: Tri Guzman MD;  Location: [facility] MAIN OR;  Service:          FAMILY HISTORY  Family History   Problem Relation Age of Onset   • Cancer Mother         Breast Cancer   • Breast cancer Mother 56   • Cancer Father         Colon Cancer   • Cancer Maternal Grandmother 80        Breast Cancer   • Hypertension Other    • Cancer Other         Liver Cancer   • Other Other         Varicose veins of lower extremities   • Ovarian cancer Paternal Grandmother 70   • Breast cancer Maternal Aunt 58   • Breast cancer Maternal Aunt 65   • Malig Hyperthermia Neg Hx          SOCIAL HISTORY  Social History     Socioeconomic History   • Marital status:      Spouse name: Not on file   • Number of children: Not on file   • Years of education: Not on file   • Highest education level: Not on file   Occupational History   • Occupation: communication super   Tobacco Use   • Smoking status: Never Smoker   • Smokeless tobacco: Never Used   Substance and Sexual Activity   • Alcohol use: Yes     Comment: 10 drinks per year   • Drug use: No   • Sexual activity: Defer         ALLERGIES  Fish-derived products and Shellfish-derived products        REVIEW OF SYSTEMS  Review of Systems   Constitutional: Negative for fever.   HENT: Negative for sore throat.    Eyes: Negative.    Respiratory: Negative for cough and shortness of breath.    Cardiovascular: Positive for palpitations (Rapid heart rate). Negative for chest pain.   Gastrointestinal: Positive  for diarrhea, nausea and vomiting. Negative for abdominal pain.   Genitourinary: Negative for dysuria.   Musculoskeletal: Negative for neck pain.   Skin: Negative for rash.   Allergic/Immunologic: Negative.    Neurological: Negative for weakness, numbness and headaches.   Hematological: Negative.    Psychiatric/Behavioral: Negative.    All other systems reviewed and are negative.           PHYSICAL EXAM  ED Triage Vitals   Temp Heart Rate Resp BP SpO2   12/02/19 1255 12/02/19 1255 12/02/19 1255 12/02/19 1500 12/02/19 1255   97.7 °F (36.5 °C) 94 16 130/96 98 %      Temp src Heart Rate Source Patient Position BP Location FiO2 (%)   12/02/19 1255 12/02/19 1255 -- -- --   Tympanic Monitor          Physical Exam   Constitutional: She is oriented to person, place, and time. No distress.   HENT:   Head: Normocephalic and atraumatic.   Eyes: EOM are normal. Pupils are equal, round, and reactive to light.   Neck: Normal range of motion. Neck supple.   Cardiovascular: Normal heart sounds. An irregularly irregular rhythm present. Tachycardia present.   Pulmonary/Chest: Effort normal and breath sounds normal. No respiratory distress.   Abdominal: Soft. There is no tenderness. There is no rebound and no guarding.   Musculoskeletal: Normal range of motion. She exhibits no edema.   Neurological: She is alert and oriented to person, place, and time. She has normal sensation and normal strength.   Skin: Skin is warm and dry. No rash noted.   Psychiatric: Mood and affect normal.   Nursing note and vitals reviewed.      LAB RESULTS  Recent Results (from the past 24 hour(s))   Comprehensive Metabolic Panel    Collection Time: 12/02/19  4:06 PM   Result Value Ref Range    Glucose 83 65 - 99 mg/dL    BUN 26 (H) 6 - 20 mg/dL    Creatinine 1.83 (H) 0.57 - 1.00 mg/dL    Sodium 139 136 - 145 mmol/L    Potassium 4.0 3.5 - 5.2 mmol/L    Chloride 102 98 - 107 mmol/L    CO2 22.8 22.0 - 29.0 mmol/L    Calcium 8.8 8.6 - 10.5 mg/dL    Total Protein  7.9 6.0 - 8.5 g/dL    Albumin 4.20 3.50 - 5.20 g/dL    ALT (SGPT) 9 1 - 33 U/L    AST (SGOT) 18 1 - 32 U/L    Alkaline Phosphatase 79 39 - 117 U/L    Total Bilirubin 0.2 0.2 - 1.2 mg/dL    eGFR Non African Amer 29 (L) >60 mL/min/1.73    Globulin 3.7 gm/dL    A/G Ratio 1.1 g/dL    BUN/Creatinine Ratio 14.2 7.0 - 25.0    Anion Gap 14.2 5.0 - 15.0 mmol/L   Lipase    Collection Time: 12/02/19  4:06 PM   Result Value Ref Range    Lipase 43 13 - 60 U/L   Troponin    Collection Time: 12/02/19  4:06 PM   Result Value Ref Range    Troponin T <0.010 0.000 - 0.030 ng/mL   Light Blue Top    Collection Time: 12/02/19  4:06 PM   Result Value Ref Range    Extra Tube hold for add-on    Green Top (Gel)    Collection Time: 12/02/19  4:06 PM   Result Value Ref Range    Extra Tube Hold for add-ons.    Lavender Top    Collection Time: 12/02/19  4:06 PM   Result Value Ref Range    Extra Tube hold for add-on    Gold Top - SST    Collection Time: 12/02/19  4:06 PM   Result Value Ref Range    Extra Tube Hold for add-ons.    CBC Auto Differential    Collection Time: 12/02/19  4:06 PM   Result Value Ref Range    WBC 4.43 3.40 - 10.80 10*3/mm3    RBC 4.30 3.77 - 5.28 10*6/mm3    Hemoglobin 12.6 12.0 - 15.9 g/dL    Hematocrit 37.6 34.0 - 46.6 %    MCV 87.4 79.0 - 97.0 fL    MCH 29.3 26.6 - 33.0 pg    MCHC 33.5 31.5 - 35.7 g/dL    RDW 13.3 12.3 - 15.4 %    RDW-SD 41.9 37.0 - 54.0 fl    MPV 9.0 6.0 - 12.0 fL    Platelets 280 140 - 450 10*3/mm3    Neutrophil % 53.0 42.7 - 76.0 %    Lymphocyte % 35.7 19.6 - 45.3 %    Monocyte % 9.5 5.0 - 12.0 %    Eosinophil % 0.7 0.3 - 6.2 %    Basophil % 0.9 0.0 - 1.5 %    Immature Grans % 0.2 0.0 - 0.5 %    Neutrophils, Absolute 2.35 1.70 - 7.00 10*3/mm3    Lymphocytes, Absolute 1.58 0.70 - 3.10 10*3/mm3    Monocytes, Absolute 0.42 0.10 - 0.90 10*3/mm3    Eosinophils, Absolute 0.03 0.00 - 0.40 10*3/mm3    Basophils, Absolute 0.04 0.00 - 0.20 10*3/mm3    Immature Grans, Absolute 0.01 0.00 - 0.05 10*3/mm3    nRBC  0.0 0.0 - 0.2 /100 WBC       Ordered the above labs and reviewed the results.        RADIOLOGY  CT Abdomen Pelvis Without Contrast   Final Result           1. No acute inflammatory process of bowel is identified. Ventral hernias   containing bowel without evidence for bowel obstruction at this time.   Follow-up as indications persist.           2. No urolithiasis or hydronephrosis.       Discussed by telephone with Dr. Spencer at 1750, 12/02/2019.       This report was finalized on 12/2/2019 5:50 PM by Dr. Aftab Beck M.D.          XR Chest 2 View   Final Result   No evidence for acute pulmonary process. Follow-up as   clinical indications persist.       This report was finalized on 12/2/2019 3:45 PM by Dr. Aftab Beck M.D.               Ordered the above noted radiological studies. Reviewed by me in PACS.  Spoke with Dr. Beck (radiologist) regarding CT/MRI scan results.      PROCEDURES  EKG:          EKG time: 1335  Rhythm/Rate: AFIB, 105  P waves and OK: normal  QRS, axis: normal   ST and T waves: normal     Interpreted Contemporaneously by me, independently viewed  AFIB is new compared to prior 12/13/18      MEDICATIONS GIVEN IN ER  Medications   sodium chloride 0.9 % flush 10 mL (not administered)   sodium chloride 0.9 % bolus 1,000 mL (0 mL Intravenous Stopped 12/2/19 1831)       PROGRESS AND CONSULTS  ED Course as of Dec 02 1847   Mon Dec 02, 2019   1842 6:42 PM  Patient here for diarrhea and found to have a fib.  Diarrhea seems viral.  No evidence of colitis.  Will treat symptomatically.  Patient also in a fib. Has had this before.  Initially tachycardic.  Now heart rate is normal.   On blood thinner already.  Will discharge home.  No medication changes.  Follow up with [facility].  [SL]      ED Course User Index  [SL] Og Spencer MD     1442  CXR ordered. CMP ordered. Lipase ordered. Troponin ordered. UA ordered. Labs ordered.    1709 CT Abd Pelvis ordered.    1746 Discussed pt w/  Kiran (Radiology) regarding CT Abd which is negative acute.     1832 Patient is resting comfortably and in NAD. Patient is stable. BP- 147/96 HR- 83 Temp- 97.7 °F (36.5 °C) (Tympanic) O2 sat- 98%. Informed the patient results of CT Abd Pelvis is negative acute. Sx's are likely viral. Discussed the plan for d/c with f/u to Cardiology. Pt understands and agrees with the plan, all questions answered.    MEDICAL DECISION MAKING      MDM  Number of Diagnoses or Management Options  Diarrhea, unspecified type:   Non-intractable vomiting, presence of nausea not specified, unspecified vomiting type:   Paroxysmal atrial fibrillation (CMS/HCC):      Amount and/or Complexity of Data Reviewed  Clinical lab tests: ordered and reviewed (BUN 26  Creatinine 1.83  Lipase 43)  Tests in the radiology section of CPT®: ordered and reviewed (CT Abd Pelvis ordered.)  Tests in the medicine section of CPT®: ordered and reviewed (See EKG procedure notes)  Discussion of test results with the performing providers: yes (Dr. Beck (Radiology))  Decide to obtain previous medical records or to obtain history from someone other than the patient: yes  Independent visualization of images, tracings, or specimens: yes           DIAGNOSIS  Final diagnoses:   Diarrhea, unspecified type   Paroxysmal atrial fibrillation (CMS/HCC)   Non-intractable vomiting, presence of nausea not specified, unspecified vomiting type         DISPOSITION  DISCHARGE    Patient discharged in stable condition.    Reviewed implications of results, diagnosis, meds, responsibility to follow up, warning signs and symptoms of possible worsening, potential complications and reasons to return to ER.    Patient/Family voiced understanding of above instructions.    Discussed plan for discharge, as there is no emergent indication for admission. Patient referred to primary care provider for BP management due to today's BP. Pt/family is agreeable and understands need for follow up and  repeat testing.  Pt is aware that discharge does not mean that nothing is wrong but it indicates no emergency is present that requires admission and they must continue care with follow-up as given below or physician of their choice.     FOLLOW-UP  Zahida Coffman MD  [address]  [address]  [facility] [id number] [phone]    Schedule an appointment as soon as possible for a visit       [facility]  [address]  [facility] [address] [phone]  Schedule an appointment as soon as possible for a visit            Medication List      No changes were made to your prescriptions during this visit.       Latest Documented Vital Signs:  As of 6:47 PM  BP- 147/96 HR- 83 Temp- 97.7 °F (36.5 °C) (Tympanic) O2 sat- 98%    --  Documentation assistance provided by bobby Drew for Dr. Tj MD.  Information recorded by the scribe was done at my direction and has been verified and validated by me.         Rajendra Walker  12/02/19 3584       Og Spencer MD  12/02/19 6699

## 2019-12-04 ENCOUNTER — OFFICE VISIT (OUTPATIENT)
Dept: CARDIOLOGY | Facility: CLINIC | Age: 59
End: 2019-12-04

## 2019-12-04 VITALS
DIASTOLIC BLOOD PRESSURE: 82 MMHG | SYSTOLIC BLOOD PRESSURE: 126 MMHG | WEIGHT: 278.4 LBS | HEART RATE: 67 BPM | RESPIRATION RATE: 18 BRPM | HEIGHT: 68 IN | BODY MASS INDEX: 42.19 KG/M2 | OXYGEN SATURATION: 97 %

## 2019-12-04 DIAGNOSIS — I82.532 CHRONIC DEEP VEIN THROMBOSIS (DVT) OF POPLITEAL VEIN OF LEFT LOWER EXTREMITY (HCC): ICD-10-CM

## 2019-12-04 DIAGNOSIS — I48.0 PAROXYSMAL ATRIAL FIBRILLATION (HCC): Primary | ICD-10-CM

## 2019-12-04 LAB — TSH SERPL DL<=0.05 MIU/L-ACNC: 2.11 UIU/ML (ref 0.27–4.2)

## 2019-12-04 PROCEDURE — 99214 OFFICE O/P EST MOD 30 MIN: CPT | Performed by: NURSE PRACTITIONER

## 2019-12-04 PROCEDURE — 93000 ELECTROCARDIOGRAM COMPLETE: CPT | Performed by: NURSE PRACTITIONER

## 2019-12-04 NOTE — PROGRESS NOTES
Date of Office Visit: 2019  Encounter Provider: BROCK Nick  Place of Service: [facility] CARDIOLOGY  Patient Name: Mariya Campos  :1960    Chief Complaint   Patient presents with   • Atrial Fibrillation     ED FOLLOW UP   :     HPI: Mariya Campos is a 58 y.o. female, new to me, who presents today for follow-up.  Old records have been obtained and reviewed by me.  She is a patient with a past medical history of colon cancer (in remission) and CKD.  In 2018, she underwent laparoscopic hernia repair.  She developed an infection/sepsis and ultimately underwent an exploratory laparotomy, small bowel adhesions lysis resection, and appendectomy.  Her postoperative course was complicated by septic shock and atrial fibrillation RVR for which she was seen by Dr. So in consultation.  She was treated with amiodarone and metoprolol.  Echocardiogram showed essentially normal LV function.  It was felt the atrial fibrillation was driven by the abdominal infection.  She converted to sinus rhythm.  She subsequently developed a DVT and was started on Eliquis.  She has been following with Dr. Neely with vascular surgery for the DVT.  She suffered severe postphlebitic syndrome with major edema and Dr. Neely recommended she be evaluated by the edema therapist at [facility].  He also mentioned that she will need lifelong anticoagulation.  She last followed up with Dr. Neely on 3/21/2019 at which time he decreased the Eliquis to a maintenance dose of 2.5 mg twice daily.  She was instructed to follow-up in 1 year with a repeat venous ultrasound.   On 2019, she presented to the ED with complaints of diarrhea, N/V, and rapid heart rate.   She was noted to be in atrial fibrillation. She was treated symptomatically and discharged with instructions to follow-up in our office.        Ever since her illness and hospitalization in 2018, she feels she has never fully  recovered.  She has no stamina anymore.  She used to exercise but now is only able to walk for 15 to 20 minutes without becoming completely exhausted.  She has chronic left leg swelling due to her DVT.  She has slight shortness of air on exertion she is also asthmatic.  She denies any chest pain, palpitations, dizziness, syncope, bleeding difficulties or melena.  Per her , she snores heavily and even stops breathing at times throughout the night.  She has never undergone a sleep evaluation.  She denies alcohol use.      Past Medical History:   Diagnosis Date   • Abdominal pain    • Acid reflux    • Allergic rhinitis    • Anemia     ON IRON QOD   • Asthma    • Chronic bronchitis (CMS/HCC)    • Chronic kidney disease     STAGE 3   SINCE 2015 WITH COLON SURGERY   • Colon cancer (CMS/HCC)     CHEMO 6 MONTHS   • Hiatal hernia    • Hypertension    • Incisional hernia    • Overweight    • Peripheral neuropathy     FROM CHEMO TX'S   • Rosacea    • Visit for screening mammogram        Past Surgical History:   Procedure Laterality Date   • BREAST BIOPSY     • COLECTOMY PARTIAL / TOTAL      Partial Colectomy  2015   • COLON SURGERY      XS 2   2006 COLON CANCER AND 2015   • COLONOSCOPY      Complete Colonoscopy   • DIAGNOSTIC LAPAROSCOPY N/A 2/4/2018    Procedure: EXPLORATORY LAPAROTOMY WITH SMALL BOWEL ANASTAMOSIS, LYSIS OF ADHESIONS, REMOVAL OF MESH;  Surgeon: Tri Guzman MD;  Location: [facility] OR;  Service:    • INCISION AND DRAINAGE TRUNK N/A 2/13/2018    Procedure: WOUND WASHOUT ABDOMEN;  Surgeon: Tri Guzman MD;  Location: [facility] OR;  Service:    • INCISION AND DRAINAGE TRUNK N/A 2/16/2018    Procedure: TRUNK DEBRIDEMENT abdominal wound;  Surgeon: Tri Guzman MD;  Location: [facility] OR;  Service:    • INCISION AND DRAINAGE TRUNK N/A 2/27/2018    Procedure: INCISION AND DRAINAGE WOUND abdomen;  Surgeon: Tri Guzman MD;  Location: [facility] OR;  Service:    • INSERTION  HEMODIALYSIS CATHETER N/A 2/19/2018    Procedure: LEFT QUAD CENTRAL LINE INSERTION AND RIGHT TUNNELED HEMODIALYSIS CATHETER INSERTION;  Surgeon: Meir Guillen MD;  Location: [facility] HYBRID OR 18/19;  Service:    • JOINT MANIPULATION Left 4/18/2016    Procedure: MANIPULATION OF LT SHOULDER;  Surgeon: Lidia Uglade MD;  Location: [facility] OR OSC;  Service:    • VENTRAL HERNIA REPAIR N/A 3/15/2016    Procedure: LAPAROSCOPIC INSIONAL HERNIA REPAIR W/ MESH DAVINCI ROBOT;  Surgeon: Tri Guzman MD;  Location: [facility] MAIN OR;  Service:    • VENTRAL HERNIA REPAIR N/A 2/1/2018    Procedure: VENTRAL HERNIA REPAIR LAPAROSCOPIC WITH DAVINCI ROBOT;  Surgeon: Tri Guzman MD;  Location: [facility] OR;  Service:        Social History     Socioeconomic History   • Marital status:      Spouse name: Not on file   • Number of children: Not on file   • Years of education: Not on file   • Highest education level: Not on file   Occupational History   • Occupation: communication super   Tobacco Use   • Smoking status: Never Smoker   • Smokeless tobacco: Never Used   • Tobacco comment: CAFFEINE USE: 1 CUP COFFEE/ 2-16OZ COKES DAILY   Substance and Sexual Activity   • Alcohol use: Yes     Comment: 10 drinks per year   • Drug use: No   • Sexual activity: Defer       Family History   Problem Relation Age of Onset   • Cancer Mother         Breast Cancer   • Breast cancer Mother 56   • Cancer Father         Colon Cancer   • Cancer Maternal Grandmother 80        Breast Cancer   • Hypertension Other    • Cancer Other         Liver Cancer   • Other Other         Varicose veins of lower extremities   • Ovarian cancer Paternal Grandmother 70   • Breast cancer Maternal Aunt 58   • Breast cancer Maternal Aunt 65   • Malig Hyperthermia Neg Hx        Review of Systems   Constitution: Positive for malaise/fatigue. Negative for chills and fever.   Cardiovascular: Positive for dyspnea on exertion and leg swelling. Negative  "for chest pain, near-syncope, orthopnea, palpitations, paroxysmal nocturnal dyspnea and syncope.   Respiratory: Negative for cough and shortness of breath.    Musculoskeletal: Negative for joint pain, joint swelling and myalgias.   Gastrointestinal: Negative for abdominal pain, diarrhea, melena, nausea and vomiting.   Genitourinary: Negative for frequency and hematuria.   Neurological: Negative for light-headedness, numbness, paresthesias and seizures.   Allergic/Immunologic: Negative.    All other systems reviewed and are negative.      Allergies   Allergen Reactions   • Fish-Derived Products Anaphylaxis   • Shellfish-Derived Products Anaphylaxis         Current Outpatient Medications:   •  azelastine (ASTELIN) 0.1 % nasal spray, 2 sprays into the nostril(s) as directed by provider Daily. Use in each nostril as directed, Disp: 1 each, Rfl: 12  •  Cholecalciferol (VITAMIN D) 2000 units tablet, Take 2,000 Units by mouth Daily., Disp: , Rfl:   •  diltiaZEM CD (CARDIZEM CD) 300 MG 24 hr capsule, Take 300 mg by mouth Every Morning., Disp: , Rfl:   •  famotidine (PEPCID) 20 MG tablet, Take 20 mg by mouth Every Night., Disp: , Rfl:   •  PROAIR  (90 BASE) MCG/ACT inhaler, Inhale 1 puff Every 4 (Four) Hours As Needed., Disp: , Rfl: 2  •  apixaban (ELIQUIS) 5 MG tablet tablet, Take 1 tablet by mouth Every 12 (Twelve) Hours., Disp: 60 tablet, Rfl: 11  •  levocetirizine (XYZAL) 5 MG tablet, Take 5 mg by mouth Every Evening., Disp: , Rfl:       Objective:     Vitals:    12/04/19 0956 12/04/19 1005   BP: 126/82 126/82   BP Location: Right arm Left arm   Patient Position: Sitting Sitting   Pulse: 67    Resp: 18    SpO2: 97%    Weight: 126 kg (278 lb 6.4 oz)    Height: 172.7 cm (68\")      Body mass index is 42.33 kg/m².    PHYSICAL EXAM:    Physical Exam   Constitutional: She is oriented to person, place, and time. She appears well-developed and well-nourished. No distress.   HENT:   Head: Normocephalic and atraumatic. "   Eyes: Pupils are equal, round, and reactive to light.   Neck: No JVD present. No thyromegaly present.   Cardiovascular: Normal rate, normal heart sounds and intact distal pulses. An irregularly irregular rhythm present.   No murmur heard.  Pulmonary/Chest: Effort normal and breath sounds normal. No respiratory distress.   Abdominal: Soft. Bowel sounds are normal. She exhibits no distension. There is no splenomegaly or hepatomegaly. There is no tenderness.   Musculoskeletal: Normal range of motion. She exhibits edema.   LLE   Neurological: She is alert and oriented to person, place, and time.   Skin: Skin is warm and dry. She is not diaphoretic. No erythema.   Psychiatric: She has a normal mood and affect. Her behavior is normal. Judgment normal.         ECG 12 Lead  Date/Time: 12/4/2019 10:17 AM  Performed by: Radha Eason APRN  Authorized by: Radha Eason APRN   Comparison: compared with previous ECG from 12/2/2019  Similar to previous ECG  Rhythm: atrial fibrillation  Ectopy: unifocal PVCs  Rate: normal  BPM: 86    Clinical impression: abnormal EKG  Comments: Indication: Atrial fibrillation              Assessment:       Diagnosis Plan   1. Paroxysmal atrial fibrillation (CMS/HCC)  ECG 12 Lead    Adult Transthoracic Echo Complete W/ Cont if Necessary Per Protocol    Ambulatory Referral to Sleep Medicine    TSH   2. Chronic deep vein thrombosis (DVT) of popliteal vein of left lower extremity (CMS/HCC)       Orders Placed This Encounter   Procedures   • TSH     Standing Status:   Future     Number of Occurrences:   1     Standing Expiration Date:   12/4/2020   • Ambulatory Referral to Sleep Medicine     Referral Priority:   Routine     Referral Type:   Consultation     Referral Reason:   Specialty Services Required     Referred to Provider:   Louie Syed MD     Requested Specialty:   Sleep Medicine     Number of Visits Requested:   1   • ECG 12 Lead     This order was created via procedure  documentation   • Adult Transthoracic Echo Complete W/ Cont if Necessary Per Protocol     Standing Status:   Future     Standing Expiration Date:   12/3/2020     Order Specific Question:   Reason for exam?     Answer:   Arrhythmia     Order Specific Question:   Arrhythmias specification?     Answer:   AFib          Plan:       Overall I think she is stable.  She denies any symptoms of angina or heart failure.  She is in atrial fibrillation today with a controlled rate of 86 and is completely asymptomatic.  I am going to do a couple of things.  I am going to increase the Eliquis back to 5 mg twice daily for stroke prevention.  I am going to repeat an echocardiogram to further assess her LV function and valvular structures.  I am also going to place an referral to sleep medicine because I think she has underlying sleep apnea.  I checked a TSH which was normal.  I discussed the plan of care with Dr. So and he is in agreement.  Further recommendations will be made pending these results.       As always, it has been a pleasure to participate in your patient's care.      Sincerely,         BROCK Major

## 2019-12-06 ENCOUNTER — HOSPITAL ENCOUNTER (OUTPATIENT)
Dept: MAMMOGRAPHY | Facility: HOSPITAL | Age: 59
Discharge: HOME | End: 2019-12-06
Admitting: SURGERY

## 2019-12-06 DIAGNOSIS — R92.8 ABNORMAL MAMMOGRAM: ICD-10-CM

## 2019-12-06 PROCEDURE — 77063 BREAST TOMOSYNTHESIS BI: CPT

## 2019-12-06 PROCEDURE — 77067 SCR MAMMO BI INCL CAD: CPT

## 2019-12-09 ENCOUNTER — TELEPHONE (OUTPATIENT)
Dept: SURGERY | Facility: CLINIC | Age: 59
End: 2019-12-09

## 2019-12-09 NOTE — TELEPHONE ENCOUNTER
[facility] bilateral screening mammogram with 3D December 6, 2019.  Scattered fibroglandular densities.  Tiny lobulated opacity left breast 9:00 has been biopsied since the screening mammogram and is smaller.  Marker is identified along the posterior aspect of the nodule.  The nodule smaller than on priors.  BI-RADS 2

## 2019-12-11 ENCOUNTER — TELEPHONE (OUTPATIENT)
Dept: CARDIOLOGY | Facility: CLINIC | Age: 59
End: 2019-12-11

## 2019-12-11 ENCOUNTER — HOSPITAL ENCOUNTER (OUTPATIENT)
Dept: CARDIOLOGY | Facility: HOSPITAL | Age: 59
Discharge: HOME | End: 2019-12-11
Admitting: NURSE PRACTITIONER

## 2019-12-11 VITALS
SYSTOLIC BLOOD PRESSURE: 120 MMHG | WEIGHT: 278 LBS | OXYGEN SATURATION: 97 % | HEART RATE: 100 BPM | BODY MASS INDEX: 42.13 KG/M2 | DIASTOLIC BLOOD PRESSURE: 80 MMHG | HEIGHT: 68 IN

## 2019-12-11 DIAGNOSIS — I48.0 PAROXYSMAL ATRIAL FIBRILLATION (HCC): ICD-10-CM

## 2019-12-11 PROCEDURE — 93306 TTE W/DOPPLER COMPLETE: CPT | Performed by: INTERNAL MEDICINE

## 2019-12-11 PROCEDURE — 25010000002 PERFLUTREN (DEFINITY) 8.476 MG IN SODIUM CHLORIDE 0.9 % 10 ML INJECTION: Performed by: NURSE PRACTITIONER

## 2019-12-11 PROCEDURE — 93306 TTE W/DOPPLER COMPLETE: CPT

## 2019-12-11 RX ADMIN — PERFLUTREN 1.5 ML: 6.52 INJECTION, SUSPENSION INTRAVENOUS at 08:40

## 2019-12-11 NOTE — TELEPHONE ENCOUNTER
I spoke with the patient regarding her echocardiogram results, which were normal.  She has normal LV function, mild mitral regurgitation, and a moderate to severely dilated left atrial cavity.  She is anticoagulated on Eliquis.  She remains asymptomatic.  I am not going to make any changes, she will follow-up with Dr. So in 3 months or sooner if needed.

## 2019-12-12 LAB
AORTIC ARCH: 2.4 CM
AORTIC ROOT ANNULUS: 2.3 CM
ASCENDING AORTA: 3.4 CM
BH CV ECHO MEAS - ACS: 2 CM
BH CV ECHO MEAS - AO MAX PG (FULL): 3.3 MMHG
BH CV ECHO MEAS - AO MAX PG: 6.3 MMHG
BH CV ECHO MEAS - AO MEAN PG (FULL): 1 MMHG
BH CV ECHO MEAS - AO MEAN PG: 3.1 MMHG
BH CV ECHO MEAS - AO V2 MAX: 125.1 CM/SEC
BH CV ECHO MEAS - AO V2 MEAN: 81.5 CM/SEC
BH CV ECHO MEAS - AO V2 VTI: 21.9 CM
BH CV ECHO MEAS - ASC AORTA: 3.4 CM
BH CV ECHO MEAS - AVA(I,A): 2.7 CM^2
BH CV ECHO MEAS - AVA(I,D): 2.7 CM^2
BH CV ECHO MEAS - AVA(V,A): 2.5 CM^2
BH CV ECHO MEAS - AVA(V,D): 2.5 CM^2
BH CV ECHO MEAS - BSA(HAYCOCK): 2.5 M^2
BH CV ECHO MEAS - BSA: 2.4 M^2
BH CV ECHO MEAS - BZI_BMI: 42.3 KILOGRAMS/M^2
BH CV ECHO MEAS - BZI_METRIC_HEIGHT: 172.7 CM
BH CV ECHO MEAS - BZI_METRIC_WEIGHT: 126.1 KG
BH CV ECHO MEAS - EDV(MOD-SP2): 135 ML
BH CV ECHO MEAS - EDV(MOD-SP4): 131 ML
BH CV ECHO MEAS - EDV(TEICH): 87.5 ML
BH CV ECHO MEAS - EF(CUBED): 69.8 %
BH CV ECHO MEAS - EF(MOD-BP): 64 %
BH CV ECHO MEAS - EF(MOD-SP2): 64.4 %
BH CV ECHO MEAS - EF(MOD-SP4): 62.6 %
BH CV ECHO MEAS - EF(TEICH): 61.6 %
BH CV ECHO MEAS - ESV(MOD-SP2): 48 ML
BH CV ECHO MEAS - ESV(MOD-SP4): 49 ML
BH CV ECHO MEAS - ESV(TEICH): 33.6 ML
BH CV ECHO MEAS - FS: 32.9 %
BH CV ECHO MEAS - IVS/LVPW: 1.1
BH CV ECHO MEAS - IVSD: 1.2 CM
BH CV ECHO MEAS - LAT PEAK E' VEL: 14 CM/SEC
BH CV ECHO MEAS - LV DIASTOLIC VOL/BSA (35-75): 55.7 ML/M^2
BH CV ECHO MEAS - LV MASS(C)D: 167.6 GRAMS
BH CV ECHO MEAS - LV MASS(C)DI: 71.3 GRAMS/M^2
BH CV ECHO MEAS - LV MAX PG: 2.9 MMHG
BH CV ECHO MEAS - LV MEAN PG: 2 MMHG
BH CV ECHO MEAS - LV SYSTOLIC VOL/BSA (12-30): 20.8 ML/M^2
BH CV ECHO MEAS - LV V1 MAX: 85.4 CM/SEC
BH CV ECHO MEAS - LV V1 MEAN: 69.4 CM/SEC
BH CV ECHO MEAS - LV V1 VTI: 16.3 CM
BH CV ECHO MEAS - LVIDD: 4.4 CM
BH CV ECHO MEAS - LVIDS: 2.9 CM
BH CV ECHO MEAS - LVLD AP2: 8.4 CM
BH CV ECHO MEAS - LVLD AP4: 8.1 CM
BH CV ECHO MEAS - LVLS AP2: 6.8 CM
BH CV ECHO MEAS - LVLS AP4: 6.6 CM
BH CV ECHO MEAS - LVOT AREA (M): 3.8 CM^2
BH CV ECHO MEAS - LVOT AREA: 3.7 CM^2
BH CV ECHO MEAS - LVOT DIAM: 2.2 CM
BH CV ECHO MEAS - LVPWD: 1 CM
BH CV ECHO MEAS - MED PEAK E' VEL: 14 CM/SEC
BH CV ECHO MEAS - MR MAX PG: 23.3 MMHG
BH CV ECHO MEAS - MR MAX VEL: 241.4 CM/SEC
BH CV ECHO MEAS - MV DEC SLOPE: 755.6 CM/SEC^2
BH CV ECHO MEAS - MV DEC TIME: 0.12 SEC
BH CV ECHO MEAS - MV E MAX VEL: 91.7 CM/SEC
BH CV ECHO MEAS - MV MAX PG: 3.6 MMHG
BH CV ECHO MEAS - MV MEAN PG: 1.2 MMHG
BH CV ECHO MEAS - MV P1/2T MAX VEL: 90.7 CM/SEC
BH CV ECHO MEAS - MV P1/2T: 35.2 MSEC
BH CV ECHO MEAS - MV V2 MAX: 95 CM/SEC
BH CV ECHO MEAS - MV V2 MEAN: 50.2 CM/SEC
BH CV ECHO MEAS - MV V2 VTI: 17.6 CM
BH CV ECHO MEAS - MVA P1/2T LCG: 2.4 CM^2
BH CV ECHO MEAS - MVA(P1/2T): 6.3 CM^2
BH CV ECHO MEAS - MVA(VTI): 3.4 CM^2
BH CV ECHO MEAS - PA ACC TIME: 0.07 SEC
BH CV ECHO MEAS - PA MAX PG (FULL): -0.17 MMHG
BH CV ECHO MEAS - PA MAX PG: 2.8 MMHG
BH CV ECHO MEAS - PA PR(ACCEL): 45.7 MMHG
BH CV ECHO MEAS - PA V2 MAX: 84.4 CM/SEC
BH CV ECHO MEAS - PVA(V,A): 3.7 CM^2
BH CV ECHO MEAS - PVA(V,D): 3.7 CM^2
BH CV ECHO MEAS - QP/QS: 0.96
BH CV ECHO MEAS - RAP SYSTOLE: 3 MMHG
BH CV ECHO MEAS - RV MAX PG: 3 MMHG
BH CV ECHO MEAS - RV MEAN PG: 1.4 MMHG
BH CV ECHO MEAS - RV V1 MAX: 86.8 CM/SEC
BH CV ECHO MEAS - RV V1 MEAN: 54.8 CM/SEC
BH CV ECHO MEAS - RV V1 VTI: 15.8 CM
BH CV ECHO MEAS - RVOT AREA: 3.6 CM^2
BH CV ECHO MEAS - RVOT DIAM: 2.1 CM
BH CV ECHO MEAS - RVSP: 23 MMHG
BH CV ECHO MEAS - SI(CUBED): 25.2 ML/M^2
BH CV ECHO MEAS - SI(LVOT): 25.3 ML/M^2
BH CV ECHO MEAS - SI(MOD-SP2): 37 ML/M^2
BH CV ECHO MEAS - SI(MOD-SP4): 34.9 ML/M^2
BH CV ECHO MEAS - SI(TEICH): 23 ML/M^2
BH CV ECHO MEAS - SUP REN AO DIAM: 2 CM
BH CV ECHO MEAS - SV(CUBED): 59.3 ML
BH CV ECHO MEAS - SV(LVOT): 59.6 ML
BH CV ECHO MEAS - SV(MOD-SP2): 87 ML
BH CV ECHO MEAS - SV(MOD-SP4): 82 ML
BH CV ECHO MEAS - SV(RVOT): 57.2 ML
BH CV ECHO MEAS - SV(TEICH): 54 ML
BH CV ECHO MEAS - TAPSE (>1.6): 2.2 CM2
BH CV ECHO MEAS - TR MAX VEL: 225.9 CM/SEC
BH CV ECHO MEASUREMENTS AVERAGE E/E' RATIO: 6.55
BH CV XLRA - RV BASE: 2.8 CM
BH CV XLRA - RV LENGTH: 7.6 CM
BH CV XLRA - RV MID: 2.2 CM
BH CV XLRA - TDI S': 11 CM/SEC
LEFT ATRIUM VOLUME INDEX: 35 ML/M2
MAXIMAL PREDICTED HEART RATE: 163 BPM
SINUS: 3.3 CM
STJ: 3.1 CM
STRESS TARGET HR: 139 BPM

## 2019-12-18 ENCOUNTER — APPOINTMENT (OUTPATIENT)
Dept: SLEEP MEDICINE | Facility: HOSPITAL | Age: 59
End: 2019-12-18

## 2020-01-06 ENCOUNTER — TELEPHONE (OUTPATIENT)
Dept: SURGERY | Facility: CLINIC | Age: 60
End: 2020-01-06

## 2020-01-06 NOTE — TELEPHONE ENCOUNTER
Patient has canceled her follow up with us due to cost. She will be seeing Dr. Coffman for mammograms in the future.     I called Dr. Coffman's office and let Martha know. [email or web address]

## 2020-01-07 ENCOUNTER — OFFICE VISIT (OUTPATIENT)
Dept: SLEEP MEDICINE | Facility: HOSPITAL | Age: 60
End: 2020-01-07

## 2020-01-07 VITALS
BODY MASS INDEX: 44.26 KG/M2 | HEIGHT: 67 IN | OXYGEN SATURATION: 97 % | WEIGHT: 282 LBS | DIASTOLIC BLOOD PRESSURE: 71 MMHG | SYSTOLIC BLOOD PRESSURE: 128 MMHG | HEART RATE: 100 BPM

## 2020-01-07 DIAGNOSIS — I48.0 PAROXYSMAL ATRIAL FIBRILLATION (HCC): ICD-10-CM

## 2020-01-07 DIAGNOSIS — G47.33 OSA (OBSTRUCTIVE SLEEP APNEA): Primary | ICD-10-CM

## 2020-01-07 DIAGNOSIS — I10 ESSENTIAL HYPERTENSION: ICD-10-CM

## 2020-01-07 PROCEDURE — G0463 HOSPITAL OUTPT CLINIC VISIT: HCPCS

## 2020-01-07 NOTE — PATIENT INSTRUCTIONS
Sleep Apnea  Sleep apnea affects breathing during sleep. It causes breathing to stop for a short time or to become shallow. It can also increase the risk of:  · Heart attack.  · Stroke.  · Being very overweight (obese).  · Diabetes.  · Heart failure.  · Irregular heartbeat.  The goal of treatment is to help you breathe normally again.  What are the causes?  There are three kinds of sleep apnea:  · Obstructive sleep apnea. This is caused by a blocked or collapsed airway.  · Central sleep apnea. This happens when the brain does not send the right signals to the muscles that control breathing.  · Mixed sleep apnea. This is a combination of obstructive and central sleep apnea.  The most common cause of this condition is a collapsed or blocked airway. This can happen if:  · Your throat muscles are too relaxed.  · Your tongue and tonsils are too large.  · You are overweight.  · Your airway is too small.  What increases the risk?  · Being overweight.  · Smoking.  · Having a small airway.  · Being older.  · Being male.  · Drinking alcohol.  · Taking medicines to calm yourself (sedatives or tranquilizers).  · Having family members with the condition.  What are the signs or symptoms?  · Trouble staying asleep.  · Being sleepy or tired during the day.  · Getting angry a lot.  · Loud snoring.  · Headaches in the morning.  · Not being able to focus your mind (concentrate).  · Forgetting things.  · Less interest in sex.  · Mood swings.  · Personality changes.  · Feelings of sadness (depression).  · Waking up a lot during the night to pee (urinate).  · Dry mouth.  · Sore throat.  How is this diagnosed?  · Your medical history.  · A physical exam.  · A test that is done when you are sleeping (sleep study). The test is most often done in a sleep lab but may also be done at home.  How is this treated?    · Sleeping on your side.  · Using a medicine to get rid of mucus in your nose (decongestant).  · Avoiding the use of alcohol,  medicines to help you relax, or certain pain medicines (narcotics).  · Losing weight, if needed.  · Changing your diet.  · Not smoking.  · Using a machine to open your airway while you sleep, such as:  ? An oral appliance. This is a mouthpiece that shifts your lower jaw forward.  ? A CPAP device. This device blows air through a mask when you breathe out (exhale).  ? An EPAP device. This has valves that you put in each nostril.  ? A BPAP device. This device blows air through a mask when you breathe in (inhale) and breathe out.  · Having surgery if other treatments do not work.  It is important to get treatment for sleep apnea. Without treatment, it can lead to:  · High blood pressure.  · Coronary artery disease.  · In men, not being able to have an erection (impotence).  · Reduced thinking ability.  Follow these instructions at home:  Lifestyle  · Make changes that your doctor recommends.  · Eat a healthy diet.  · Lose weight if needed.  · Avoid alcohol, medicines to help you relax, and some pain medicines.  · Do not use any products that contain nicotine or tobacco, such as cigarettes, e-cigarettes, and chewing tobacco. If you need help quitting, ask your doctor.  General instructions  · Take over-the-counter and prescription medicines only as told by your doctor.  · If you were given a machine to use while you sleep, use it only as told by your doctor.  · If you are having surgery, make sure to tell your doctor you have sleep apnea. You may need to bring your device with you.  · Keep all follow-up visits as told by your doctor. This is important.  Contact a doctor if:  · The machine that you were given to use during sleep bothers you or does not seem to be working.  · You do not get better.  · You get worse.  Get help right away if:  · Your chest hurts.  · You have trouble breathing in enough air.  · You have an uncomfortable feeling in your back, arms, or stomach.  · You have trouble talking.  · One side of your  body feels weak.  · A part of your face is hanging down.  These symptoms may be an emergency. Do not wait to see if the symptoms will go away. Get medical help right away. Call your local emergency services (911 in the [place] Do not drive yourself to the hospital.  Summary  · This condition affects breathing during sleep.  · The most common cause is a collapsed or blocked airway.  · The goal of treatment is to help you breathe normally while you sleep.  This information is not intended to replace advice given to you by your health care provider. Make sure you discuss any questions you have with your health care provider.  Document Released: 09/26/2009 Document Revised: 08/13/2019 Document Reviewed: 08/13/2019  [employer] Interactive Patient Education © 2019 [employer]

## 2020-01-07 NOTE — PROGRESS NOTES
Sleep Medicine initial Consultation    Mariya Campos  : 1960  59 y.o. female   Date of Service: 2020  Referring provider: BROCK August    History Of Present Illness:   Mrs. Mariya Campos  is a 59 y.o. right handed Caucasianfemale is seen in the sleep clinic for Snoring, Sleep Apnea and Chronic Fatigue.     The patient has a H/O hypertension and atrial fibrillation deep vein thrombosis and CKD. The patient is on anticoagulation with apixaban.    The patient c/o chronic fatigue.  There is no history of hypersomnia, hypnagogic hallucinations, sleep paralysis or cataplexy.    The patient complains of snoring loud in all sleeping positions, has witnessed episodes of sleep apnea, awakened gasping for breath, has dry mouth or sore mouth when he wakes up and has gained 50 lbs of weight the the last 5 years.    There is no history of RLS or PLMD or bruxism.    The patient complains of difficulty staying asleep, frequent awakenings urination, has restless sleep, Does not feel rested even after a long sleep and Still feels sleepy even when increasing sleep time.    The patient reports no history of sleepwalking, bedwetting at night, frequent nightmares, wake up screaming at night and acting out dreams.    Works: first Shift.  She works from 8 AM to 4 PM as a  for [employer].  Occasionally she may have to stay until 7:30 PM.  Occasionally she has to work from 3:30 AM.    Sleep schedule: While Working: Bed time: 10-11 pm and wake up time: 6:30-7 am: sleep Latency: 30 minutes and Total Sleep Time: 7 hours, 30 minutes.     Naps: No.    Sleep schedule: While NOT Working: Bed time: 11 pm and wake up time: 8 am: sleep Latency : 30 minutes and Total Sleep Time: 9 hours, 30 minutes.     Caffeine intake: 2Cups of coffee/tea/soft drinks  Timpson Sleepiness Scale: .    Past Medical History:   Diagnosis Date   • Abdominal pain    • Acid reflux    • Allergic rhinitis    • Anemia      ON IRON QOD   • Asthma    • Chronic bronchitis (CMS/HCC)    • Chronic kidney disease     STAGE 3   SINCE 2015 WITH COLON SURGERY   • Colon cancer (CMS/HCC)     CHEMO 6 MONTHS   • Hiatal hernia    • Hypertension    • Incisional hernia    • Overweight    • Peripheral neuropathy     FROM CHEMO TX'S   • Rosacea    • Visit for screening mammogram      Past Surgical History:   Procedure Laterality Date   • BREAST BIOPSY     • COLECTOMY PARTIAL / TOTAL      Partial Colectomy  2015   • COLON SURGERY      XS 2   2006 COLON CANCER AND 2015   • COLONOSCOPY      Complete Colonoscopy   • DIAGNOSTIC LAPAROSCOPY N/A 2/4/2018    Procedure: EXPLORATORY LAPAROTOMY WITH SMALL BOWEL ANASTAMOSIS, LYSIS OF ADHESIONS, REMOVAL OF MESH;  Surgeon: Tri Guzman MD;  Location: [facility] OR;  Service:    • INCISION AND DRAINAGE TRUNK N/A 2/13/2018    Procedure: WOUND WASHOUT ABDOMEN;  Surgeon: Tri Guzman MD;  Location: [facility] MAIN OR;  Service:    • INCISION AND DRAINAGE TRUNK N/A 2/16/2018    Procedure: TRUNK DEBRIDEMENT abdominal wound;  Surgeon: Tri Guzman MD;  Location: [facility] MAIN OR;  Service:    • INCISION AND DRAINAGE TRUNK N/A 2/27/2018    Procedure: INCISION AND DRAINAGE WOUND abdomen;  Surgeon: Tri Guzman MD;  Location: [facility] OR;  Service:    • INSERTION HEMODIALYSIS CATHETER N/A 2/19/2018    Procedure: LEFT QUAD CENTRAL LINE INSERTION AND RIGHT TUNNELED HEMODIALYSIS CATHETER INSERTION;  Surgeon: Meir Guillen MD;  Location: [facility] OR 18/19;  Service:    • JOINT MANIPULATION Left 4/18/2016    Procedure: MANIPULATION OF LT SHOULDER;  Surgeon: Lidia Ugalde MD;  Location: [facility] OSC;  Service:    • VENTRAL HERNIA REPAIR N/A 3/15/2016    Procedure: LAPAROSCOPIC INSIONAL HERNIA REPAIR W/ MESH DAVINCI ROBOT;  Surgeon: Tri Guzman MD;  Location: [facility] MAIN OR;  Service:    • VENTRAL HERNIA REPAIR N/A 2/1/2018    Procedure: VENTRAL HERNIA REPAIR LAPAROSCOPIC WITH DAVINCI  ROBOT;  Surgeon: Tri Guzman MD;  Location: [facility] OR;  Service:      Current Outpatient Medications on File Prior to Visit   Medication Sig Dispense Refill   • apixaban (ELIQUIS) 5 MG tablet tablet Take 1 tablet by mouth Every 12 (Twelve) Hours. 60 tablet 11   • azelastine (ASTELIN) 0.1 % nasal spray 2 sprays into the nostril(s) as directed by provider Daily. Use in each nostril as directed 1 each 12   • Cholecalciferol (VITAMIN D) 2000 units tablet Take 2,000 Units by mouth Daily.     • diltiaZEM CD (CARDIZEM CD) 300 MG 24 hr capsule Take 300 mg by mouth Every Morning.     • famotidine (PEPCID) 20 MG tablet Take 20 mg by mouth Every Night.     • levocetirizine (XYZAL) 5 MG tablet Take 5 mg by mouth Every Evening.     • PROAIR  (90 BASE) MCG/ACT inhaler Inhale 1 puff Every 4 (Four) Hours As Needed.  2     No current facility-administered medications on file prior to visit.      Allergies   Allergen Reactions   • Fish-Derived Products Anaphylaxis   • Shellfish-Derived Products Anaphylaxis     Family History   Problem Relation Age of Onset   • Cancer Mother         Breast Cancer   • Breast cancer Mother 56   • Cancer Father         Colon Cancer   • Cancer Maternal Grandmother 80        Breast Cancer   • Hypertension Other    • Cancer Other         Liver Cancer   • Other Other         Varicose veins of lower extremities   • Ovarian cancer Paternal Grandmother 70   • Breast cancer Maternal Aunt 58   • Breast cancer Maternal Aunt 65   • Malig Hyperthermia Neg Hx      Social History     Socioeconomic History   • Marital status:      Spouse name: Not on file   • Number of children: Not on file   • Years of education: Not on file   • Highest education level: Not on file   Occupational History   • Occupation: communication super   Tobacco Use   • Smoking status: Never Smoker   • Smokeless tobacco: Never Used   • Tobacco comment: CAFFEINE USE: 1 CUP COFFEE/ 2-16OZ COKES DAILY   Substance and Sexual  "Activity   • Alcohol use: Yes     Comment: 10 drinks per year   • Drug use: No   • Sexual activity: Defer     Review of Systems   HENT: Positive for postnasal drip.    Cardiovascular: Positive for palpitations and leg swelling.        Irregular heartbeats   Endocrine: Positive for polydipsia.     Patient examination:  Vitals:    01/07/20 0844   BP: 128/71   Pulse: 100   SpO2: 97%   Weight: 128 kg (282 lb)   Height: 170.2 cm (67\")    Body mass index is 44.17 kg/m².  Neck Circumference: 16 inches   HEENT: Normal and Mallampati Class III: Soft palate, base of uvula visible   Neck: Supple no carotid bruits.  Lungs: Clear to auscultation.  Cardiac exam: Normal and regular rate and rhythm. No murmurs.  Extremities: No edema clubbing or cyanosis.    ASSESSMENT AND PLAN:The patient has symptoms of obstructive sleep apnea syndrome with hypersomnia. We will proceed with polysomnography and treat with CPAP therapy if needed. Sleep hygiene techniques discussed.  Exercise diet and weight loss discussed.    Encounter Diagnoses   Name Primary?   • STEVEN (obstructive sleep apnea) Yes   • Essential hypertension    • Paroxysmal atrial fibrillation (CMS/HCC)        Orders Placed This Encounter   Procedures   • Home Sleep Study     Return in about 3 months (around 4/7/2020).    Mookie Coffey MD  1/7/2020  9:29 AM  "

## 2020-01-24 ENCOUNTER — HOSPITAL ENCOUNTER (OUTPATIENT)
Dept: SLEEP MEDICINE | Facility: HOSPITAL | Age: 60
Discharge: HOME | End: 2020-01-24
Admitting: PSYCHIATRY & NEUROLOGY

## 2020-01-24 DIAGNOSIS — G47.33 OSA (OBSTRUCTIVE SLEEP APNEA): ICD-10-CM

## 2020-01-24 DIAGNOSIS — I10 ESSENTIAL HYPERTENSION: ICD-10-CM

## 2020-01-24 DIAGNOSIS — I48.0 PAROXYSMAL ATRIAL FIBRILLATION (HCC): ICD-10-CM

## 2020-01-24 PROCEDURE — 95806 SLEEP STUDY UNATT&RESP EFFT: CPT

## 2020-02-05 ENCOUNTER — TELEPHONE (OUTPATIENT)
Dept: SLEEP MEDICINE | Facility: HOSPITAL | Age: 60
End: 2020-02-05

## 2020-02-05 NOTE — TELEPHONE ENCOUNTER
Spoke with pt about sleep results and faxed orders to [facility] on 2/5/2020.  F/u appt scheduled on 3/24/2020 @ 8:15 am. with Dr. Coffey.  CV

## 2020-03-09 ENCOUNTER — OFFICE VISIT (OUTPATIENT)
Dept: CARDIOLOGY | Facility: CLINIC | Age: 60
End: 2020-03-09

## 2020-03-09 VITALS
HEIGHT: 67 IN | DIASTOLIC BLOOD PRESSURE: 86 MMHG | WEIGHT: 288.8 LBS | HEART RATE: 96 BPM | SYSTOLIC BLOOD PRESSURE: 134 MMHG | OXYGEN SATURATION: 97 % | BODY MASS INDEX: 45.33 KG/M2

## 2020-03-09 DIAGNOSIS — I48.0 PAROXYSMAL ATRIAL FIBRILLATION (HCC): Primary | ICD-10-CM

## 2020-03-09 DIAGNOSIS — I10 ESSENTIAL HYPERTENSION: ICD-10-CM

## 2020-03-09 DIAGNOSIS — I82.532 CHRONIC DEEP VEIN THROMBOSIS (DVT) OF POPLITEAL VEIN OF LEFT LOWER EXTREMITY (HCC): ICD-10-CM

## 2020-03-09 PROCEDURE — 99214 OFFICE O/P EST MOD 30 MIN: CPT | Performed by: NURSE PRACTITIONER

## 2020-03-09 PROCEDURE — 93000 ELECTROCARDIOGRAM COMPLETE: CPT | Performed by: NURSE PRACTITIONER

## 2020-03-09 RX ORDER — DILTIAZEM HYDROCHLORIDE 60 MG/1
1 TABLET, FILM COATED ORAL
COMMUNITY
Start: 2020-03-01

## 2020-03-09 NOTE — PROGRESS NOTES
Date of Office Visit: 2020  Encounter Provider: BROCK Nick  Place of Service: [facility] CARDIOLOGY  Patient Name: Mariya Campos  :1960    Chief Complaint   Patient presents with   • Atrial Fibrillation     3 month f/u    :     HPI: Mariya Campos is a 59 y.o. female who presents today for follow-up.  Old records have been obtained and reviewed by me.  She is a patient with a past medical history significant for colon cancer, CKD, and paroxysmal atrial fibrillation.  The atrial fibrillation occurred in 2018 in the setting of septic shock following a laparoscopic hernia repair.  She subsequently developed a DVT for which she continues to follow with Dr. Neely.  She also has bilateral lower extremity lymphedema for which she wears compression garments.   In 2019, she presented to the ED with diarrhea, nausea, and vomiting and was found to be in atrial fibrillation.  I last saw her in the office following that ER visit on 2019 at which time she felt she had not fully recovered from her illness and hospitalization in 2018.  She reported some slight shortness of air on exertion.  Her  reported that she snored heavily; however, she had never undergone a sleep evaluation.  She was in atrial fibrillation with a controlled rate.  I increased her Eliquis to 5 mg twice daily (she had previously been on 2.5 mg maintenance dosing per Dr. Neely).  I also ordered an echocardiogram which revealed normal LV function with an EF of 64%, moderate to severely dilated left atrial cavity, and mild mitral regurgitation.  I also placed a referral to sleep medicine and she has undergone a home sleep study revealing underlying sleep apnea.  She is here today for follow-up.   Over the past 3 months, she has overall been doing well from a cardiac standpoint.  She denies any chest pain, shortness of air, palpitations, dizziness, or syncope.  She denies any bleeding  difficulties or melena.  She has chronic bilateral lower extremity edema that is unchanged.  She states that she feels much better since she was diagnosed with sleep apnea.  She has been wearing a mouthpiece which has helped her tremendously.    Past Medical History:   Diagnosis Date   • Abdominal pain    • Acid reflux    • Allergic rhinitis    • Anemia     ON IRON QOD   • Asthma    • Chronic bronchitis (CMS/HCC)    • Chronic kidney disease     STAGE 3   SINCE 2015 WITH COLON SURGERY   • Colon cancer (CMS/HCC)     CHEMO 6 MONTHS   • Hiatal hernia    • Hypertension    • Incisional hernia    • Overweight    • Peripheral neuropathy     FROM CHEMO TX'S   • Rosacea    • Visit for screening mammogram        Past Surgical History:   Procedure Laterality Date   • BREAST BIOPSY     • COLECTOMY PARTIAL / TOTAL      Partial Colectomy  2015   • COLON SURGERY      XS 2   2006 COLON CANCER AND 2015   • COLONOSCOPY      Complete Colonoscopy   • DIAGNOSTIC LAPAROSCOPY N/A 2/4/2018    Procedure: EXPLORATORY LAPAROTOMY WITH SMALL BOWEL ANASTAMOSIS, LYSIS OF ADHESIONS, REMOVAL OF MESH;  Surgeon: Tri Guzman MD;  Location: [facility];  Service:    • INCISION AND DRAINAGE TRUNK N/A 2/13/2018    Procedure: WOUND WASHOUT ABDOMEN;  Surgeon: Tri Guzman MD;  Location: [facility] OR;  Service:    • INCISION AND DRAINAGE TRUNK N/A 2/16/2018    Procedure: TRUNK DEBRIDEMENT abdominal wound;  Surgeon: Tri Guzman MD;  Location: [facility] OR;  Service:    • INCISION AND DRAINAGE TRUNK N/A 2/27/2018    Procedure: INCISION AND DRAINAGE WOUND abdomen;  Surgeon: Tri Guzman MD;  Location: [facility] OR;  Service:    • INSERTION HEMODIALYSIS CATHETER N/A 2/19/2018    Procedure: LEFT QUAD CENTRAL LINE INSERTION AND RIGHT TUNNELED HEMODIALYSIS CATHETER INSERTION;  Surgeon: Meir Guillen MD;  Location: [facility] OR 18/19;  Service:    • JOINT MANIPULATION Left 4/18/2016    Procedure: MANIPULATION OF LT  SHOULDER;  Surgeon: Lidia Ugalde MD;  Location: [facility];  Service:    • VENTRAL HERNIA REPAIR N/A 3/15/2016    Procedure: LAPAROSCOPIC INSIONAL HERNIA REPAIR W/ MESH DAVINCI ROBOT;  Surgeon: Tri Guzman MD;  Location: [facility] OR;  Service:    • VENTRAL HERNIA REPAIR N/A 2/1/2018    Procedure: VENTRAL HERNIA REPAIR LAPAROSCOPIC WITH DAVINCI ROBOT;  Surgeon: Tri Guzman MD;  Location: [facility] OR;  Service:        Social History     Socioeconomic History   • Marital status:      Spouse name: Not on file   • Number of children: Not on file   • Years of education: Not on file   • Highest education level: Not on file   Occupational History   • Occupation: communication super   Tobacco Use   • Smoking status: Never Smoker   • Smokeless tobacco: Never Used   • Tobacco comment: CAFFEINE USE: 1 CUP COFFEE/ 2-16OZ COKES DAILY   Substance and Sexual Activity   • Alcohol use: Yes     Comment: 10 drinks per year   • Drug use: No   • Sexual activity: Defer       Family History   Problem Relation Age of Onset   • Cancer Mother         Breast Cancer   • Breast cancer Mother 56   • Cancer Father         Colon Cancer   • Cancer Maternal Grandmother 80        Breast Cancer   • Hypertension Other    • Cancer Other         Liver Cancer   • Other Other         Varicose veins of lower extremities   • Ovarian cancer Paternal Grandmother 70   • Breast cancer Maternal Aunt 58   • Breast cancer Maternal Aunt 65   • Malig Hyperthermia Neg Hx        Review of Systems   Constitution: Negative for chills, fever and malaise/fatigue.   Cardiovascular: Positive for leg swelling (BLE chronic). Negative for chest pain, dyspnea on exertion, near-syncope, orthopnea, palpitations, paroxysmal nocturnal dyspnea and syncope.   Respiratory: Negative for cough and shortness of breath.    Musculoskeletal: Negative for joint pain, joint swelling and myalgias.   Gastrointestinal: Negative for abdominal pain, diarrhea,  "melena, nausea and vomiting.   Genitourinary: Negative for frequency and hematuria.   Neurological: Negative for light-headedness, numbness, paresthesias and seizures.   Allergic/Immunologic: Negative.    All other systems reviewed and are negative.      Allergies   Allergen Reactions   • Fish-Derived Products Anaphylaxis   • Shellfish-Derived Products Anaphylaxis         Current Outpatient Medications:   •  apixaban (ELIQUIS) 5 MG tablet tablet, Take 1 tablet by mouth Every 12 (Twelve) Hours., Disp: 60 tablet, Rfl: 11  •  azelastine (ASTELIN) 0.1 % nasal spray, 2 sprays into the nostril(s) as directed by provider Daily. Use in each nostril as directed, Disp: 1 each, Rfl: 12  •  Cholecalciferol (VITAMIN D) 2000 units tablet, Take 2,000 Units by mouth Daily., Disp: , Rfl:   •  diltiaZEM CD (CARDIZEM CD) 300 MG 24 hr capsule, Take 300 mg by mouth Every Morning., Disp: , Rfl:   •  levocetirizine (XYZAL) 5 MG tablet, Take 5 mg by mouth Every Evening., Disp: , Rfl:   •  PROAIR  (90 BASE) MCG/ACT inhaler, Inhale 1 puff Every 4 (Four) Hours As Needed., Disp: , Rfl: 2  •  SYMBICORT 80-4.5 MCG/ACT inhaler, INHALE 2 PUFFS BID   RINSE AFTER USE, Disp: , Rfl:       Objective:     Vitals:    03/09/20 0919 03/09/20 0941   BP: 132/84 134/86   BP Location: Right arm Left arm   Patient Position: Sitting Sitting   Cuff Size: Large Adult Large Adult   Pulse: 96    SpO2: 97%    Weight: 131 kg (288 lb 12.8 oz)    Height: 170.2 cm (67.01\")      Body mass index is 45.22 kg/m².    PHYSICAL EXAM:    Physical Exam   Constitutional: She is oriented to person, place, and time. She appears well-developed and well-nourished. No distress.   HENT:   Head: Normocephalic and atraumatic.   Eyes: Pupils are equal, round, and reactive to light.   Neck: No JVD present. No thyromegaly present.   Cardiovascular: Normal rate, normal heart sounds and intact distal pulses. An irregularly irregular rhythm present.   No murmur heard.  Pulmonary/Chest: " Effort normal and breath sounds normal. No respiratory distress.   Abdominal: Soft. Bowel sounds are normal. She exhibits no distension. There is no splenomegaly or hepatomegaly. There is no tenderness.   Musculoskeletal: Normal range of motion. She exhibits edema (BLE).   Neurological: She is alert and oriented to person, place, and time.   Skin: Skin is warm and dry. She is not diaphoretic. No erythema.   Psychiatric: She has a normal mood and affect. Her behavior is normal. Judgment normal.         ECG 12 Lead  Date/Time: 3/9/2020 9:41 AM  Performed by: Radha Eason APRN  Authorized by: Radha Eason APRN   Comparison: compared with previous ECG from 12/4/2019  Similar to previous ECG  Rhythm: atrial fibrillation  Rate: normal  BPM: 95    Clinical impression: abnormal EKG  Comments: Indication: Atrial fibrillation              Assessment:       Diagnosis Plan   1. Paroxysmal atrial fibrillation (CMS/HCC)  ECG 12 Lead    Holter Monitor - 24 Hour   2. Essential hypertension     3. Chronic deep vein thrombosis (DVT) of popliteal vein of left lower extremity (CMS/HCC)       Orders Placed This Encounter   Procedures   • Holter Monitor - 24 Hour     Standing Status:   Future     Number of Occurrences:   1     Standing Expiration Date:   3/9/2021     Order Specific Question:   Reason for exam?     Answer:   AFib     Order Specific Question:   AFib specification?     Answer:   Persistent   • ECG 12 Lead     This order was created via procedure documentation          Plan:       1.  Paroxysmal atrial fibrillation.  She has a CHADS2-VASc score of a 2.2%.  She is anticoagulated on Eliquis and rate controlled on diltiazem.  She has normal LV function.  I did discuss the plan of care with Dr. Staton.  Although her rate could be better controlled, she is asymptomatic and otherwise doing well.  Per Dr. Staton's recommendation,  I am going to order a 24-hour Holter monitor to ensure adequate rate control.  I  am also going to reach out to her nephrologist, Dr. Hutchinson, and ensure that he agrees with continuing full dose Eliquis.  Once we are all on the same page regarding the dose, she would like a 3 month supply called into her pharmacy.      2.  Hypertension.  Her blood pressure today is well controlled.  Continue current regimen.      3.  Chronic DVT.  Anticoagulated on Eliquis.  She follows with Dr. Neely and has an appointment with him next month.        Overall I think she is doing well from a cardiac standpoint.  She denies any symptoms of angina or heart failure.  Further recommendations will be made pending the results of her Holter.  She will follow-up with Dr. So in 6 months or sooner if needed.      As always, it has been a pleasure to participate in your patient's care.      Sincerely,         BROCK Major

## 2020-03-12 ENCOUNTER — TELEPHONE (OUTPATIENT)
Dept: CARDIOLOGY | Facility: CLINIC | Age: 60
End: 2020-03-12

## 2020-03-12 RX ORDER — DILTIAZEM HYDROCHLORIDE 360 MG/1
360 CAPSULE, EXTENDED RELEASE ORAL EVERY MORNING
Qty: 90 CAPSULE | Refills: 3 | Status: SHIPPED | OUTPATIENT
Start: 2020-03-12 | End: 2021-04-07 | Stop reason: SDUPTHER

## 2020-03-12 NOTE — TELEPHONE ENCOUNTER
I spoke with her regarding the results of her Holter monitor.  I think she could use a little better rate control, and I am going to increase the dose of her Cardizem to 360 mg daily.  I also informed her that I spoke with Dr. Hutchinson's partner, Dr. Dc regarding the Eliquis dosing.  She will stay on the 5 mg twice daily dosing, and I will send in a 3-month supply.

## 2020-03-16 ENCOUNTER — TELEPHONE (OUTPATIENT)
Dept: CARDIOLOGY | Facility: CLINIC | Age: 60
End: 2020-03-16

## 2020-03-16 NOTE — TELEPHONE ENCOUNTER
I spoke with her.  I advised her to keep the course for now.  She is feeling good and has plans to continue losing weight.  Will reassess at follow-up.

## 2020-03-16 NOTE — TELEPHONE ENCOUNTER
----- Message from Ann-Marie Ruiz MA sent at 3/16/2020  7:56 AM EDT -----  Regarding: FW: Prescription Question  Contact: [phone]  Radha,    Please Advise.    DIANDRA Jacobsen    ----- Message -----  From: Mariya Campos  Sent: 3/13/2020   8:51 AM EDT  To: [facility]  Subject: Prescription Question                            After speaking with you yesterday about changing from the 300 to 360 dose of diltaziem (spelling may be off ), I went to [facility] to  the prescription and I am currently taking the 360. I think Dr Blevins changed it last visit I had with him in November 2019 .  What should I do?

## 2020-03-24 ENCOUNTER — APPOINTMENT (OUTPATIENT)
Dept: SLEEP MEDICINE | Facility: HOSPITAL | Age: 60
End: 2020-03-24

## 2020-04-07 ENCOUNTER — APPOINTMENT (OUTPATIENT)
Dept: SLEEP MEDICINE | Facility: HOSPITAL | Age: 60
End: 2020-04-07

## 2020-05-12 ENCOUNTER — HOSPITAL ENCOUNTER (INPATIENT)
Facility: HOSPITAL | Age: 60
LOS: 3 days | Discharge: HOME | End: 2020-05-15
Attending: EMERGENCY MEDICINE | Admitting: SURGERY

## 2020-05-12 ENCOUNTER — APPOINTMENT (OUTPATIENT)
Dept: CT IMAGING | Facility: HOSPITAL | Age: 60
End: 2020-05-12

## 2020-05-12 DIAGNOSIS — I48.91 ATRIAL FIBRILLATION, UNSPECIFIED TYPE (HCC): ICD-10-CM

## 2020-05-12 DIAGNOSIS — R10.84 GENERALIZED ABDOMINAL PAIN: Primary | ICD-10-CM

## 2020-05-12 DIAGNOSIS — K56.609 SBO (SMALL BOWEL OBSTRUCTION) (HCC): ICD-10-CM

## 2020-05-12 LAB
ALBUMIN SERPL-MCNC: 4.1 G/DL (ref 3.5–5.2)
ALBUMIN/GLOB SERPL: 1 G/DL
ALP SERPL-CCNC: 96 U/L (ref 39–117)
ALT SERPL W P-5'-P-CCNC: 9 U/L (ref 1–33)
ANION GAP SERPL CALCULATED.3IONS-SCNC: 14.8 MMOL/L (ref 5–15)
AST SERPL-CCNC: 13 U/L (ref 1–32)
BACTERIA UR QL AUTO: ABNORMAL /HPF
BASOPHILS # BLD AUTO: 0.02 10*3/MM3 (ref 0–0.2)
BASOPHILS NFR BLD AUTO: 0.2 % (ref 0–1.5)
BILIRUB SERPL-MCNC: 0.4 MG/DL (ref 0.2–1.2)
BILIRUB UR QL STRIP: NEGATIVE
BUN BLD-MCNC: 28 MG/DL (ref 6–20)
BUN/CREAT SERPL: 17.5 (ref 7–25)
CALCIUM SPEC-SCNC: 10 MG/DL (ref 8.6–10.5)
CHLORIDE SERPL-SCNC: 97 MMOL/L (ref 98–107)
CLARITY UR: CLEAR
CO2 SERPL-SCNC: 24.2 MMOL/L (ref 22–29)
COLOR UR: YELLOW
CREAT BLD-MCNC: 1.6 MG/DL (ref 0.57–1)
DEPRECATED RDW RBC AUTO: 42.4 FL (ref 37–54)
EOSINOPHIL # BLD AUTO: 0.01 10*3/MM3 (ref 0–0.4)
EOSINOPHIL NFR BLD AUTO: 0.1 % (ref 0.3–6.2)
ERYTHROCYTE [DISTWIDTH] IN BLOOD BY AUTOMATED COUNT: 13.3 % (ref 12.3–15.4)
GFR SERPL CREATININE-BSD FRML MDRD: 33 ML/MIN/1.73
GLOBULIN UR ELPH-MCNC: 4 GM/DL
GLUCOSE BLD-MCNC: 147 MG/DL (ref 65–99)
GLUCOSE UR STRIP-MCNC: NEGATIVE MG/DL
HCT VFR BLD AUTO: 40.9 % (ref 34–46.6)
HGB BLD-MCNC: 13.6 G/DL (ref 12–15.9)
HGB UR QL STRIP.AUTO: ABNORMAL
HOLD SPECIMEN: NORMAL
HOLD SPECIMEN: NORMAL
HYALINE CASTS UR QL AUTO: ABNORMAL /LPF
IMM GRANULOCYTES # BLD AUTO: 0.03 10*3/MM3 (ref 0–0.05)
IMM GRANULOCYTES NFR BLD AUTO: 0.3 % (ref 0–0.5)
KETONES UR QL STRIP: ABNORMAL
LEUKOCYTE ESTERASE UR QL STRIP.AUTO: ABNORMAL
LIPASE SERPL-CCNC: 33 U/L (ref 13–60)
LYMPHOCYTES # BLD AUTO: 0.69 10*3/MM3 (ref 0.7–3.1)
LYMPHOCYTES NFR BLD AUTO: 7.7 % (ref 19.6–45.3)
MCH RBC QN AUTO: 29.1 PG (ref 26.6–33)
MCHC RBC AUTO-ENTMCNC: 33.3 G/DL (ref 31.5–35.7)
MCV RBC AUTO: 87.4 FL (ref 79–97)
MONOCYTES # BLD AUTO: 0.31 10*3/MM3 (ref 0.1–0.9)
MONOCYTES NFR BLD AUTO: 3.5 % (ref 5–12)
NEUTROPHILS # BLD AUTO: 7.86 10*3/MM3 (ref 1.7–7)
NEUTROPHILS NFR BLD AUTO: 88.2 % (ref 42.7–76)
NITRITE UR QL STRIP: NEGATIVE
NRBC BLD AUTO-RTO: 0 /100 WBC (ref 0–0.2)
PH UR STRIP.AUTO: 7 [PH] (ref 5–8)
PLATELET # BLD AUTO: 358 10*3/MM3 (ref 140–450)
PMV BLD AUTO: 8.6 FL (ref 6–12)
POTASSIUM BLD-SCNC: 4.5 MMOL/L (ref 3.5–5.2)
PROT SERPL-MCNC: 8.1 G/DL (ref 6–8.5)
PROT UR QL STRIP: ABNORMAL
RBC # BLD AUTO: 4.68 10*6/MM3 (ref 3.77–5.28)
RBC # UR: ABNORMAL /HPF
REF LAB TEST METHOD: ABNORMAL
SODIUM BLD-SCNC: 136 MMOL/L (ref 136–145)
SP GR UR STRIP: 1.02 (ref 1–1.03)
SQUAMOUS #/AREA URNS HPF: ABNORMAL /HPF
UROBILINOGEN UR QL STRIP: ABNORMAL
WBC NRBC COR # BLD: 8.92 10*3/MM3 (ref 3.4–10.8)
WBC UR QL AUTO: ABNORMAL /HPF
WHOLE BLOOD HOLD SPECIMEN: NORMAL
WHOLE BLOOD HOLD SPECIMEN: NORMAL

## 2020-05-12 PROCEDURE — 93010 ELECTROCARDIOGRAM REPORT: CPT | Performed by: INTERNAL MEDICINE

## 2020-05-12 PROCEDURE — 93005 ELECTROCARDIOGRAM TRACING: CPT | Performed by: NURSE PRACTITIONER

## 2020-05-12 PROCEDURE — 81001 URINALYSIS AUTO W/SCOPE: CPT | Performed by: NURSE PRACTITIONER

## 2020-05-12 PROCEDURE — 74176 CT ABD & PELVIS W/O CONTRAST: CPT

## 2020-05-12 PROCEDURE — 99254 IP/OBS CNSLTJ NEW/EST MOD 60: CPT | Performed by: INTERNAL MEDICINE

## 2020-05-12 PROCEDURE — 25010000002 ONDANSETRON PER 1 MG: Performed by: NURSE PRACTITIONER

## 2020-05-12 PROCEDURE — 25010000002 HYDROMORPHONE PER 4 MG: Performed by: SURGERY

## 2020-05-12 PROCEDURE — 83690 ASSAY OF LIPASE: CPT | Performed by: NURSE PRACTITIONER

## 2020-05-12 PROCEDURE — 25010000002 ENOXAPARIN PER 10 MG: Performed by: SURGERY

## 2020-05-12 PROCEDURE — 25010000002 HYDROMORPHONE PER 4 MG: Performed by: NURSE PRACTITIONER

## 2020-05-12 PROCEDURE — 99285 EMERGENCY DEPT VISIT HI MDM: CPT

## 2020-05-12 PROCEDURE — 0D9670Z DRAINAGE OF STOMACH WITH DRAINAGE DEVICE, VIA NATURAL OR ARTIFICIAL OPENING: ICD-10-PCS | Performed by: SURGERY

## 2020-05-12 PROCEDURE — 99222 1ST HOSP IP/OBS MODERATE 55: CPT | Performed by: SURGERY

## 2020-05-12 PROCEDURE — 25010000002 ONDANSETRON PER 1 MG: Performed by: SURGERY

## 2020-05-12 PROCEDURE — 85025 COMPLETE CBC W/AUTO DIFF WBC: CPT | Performed by: NURSE PRACTITIONER

## 2020-05-12 PROCEDURE — 25010000002 HYDRALAZINE PER 20 MG: Performed by: SURGERY

## 2020-05-12 PROCEDURE — 80053 COMPREHEN METABOLIC PANEL: CPT | Performed by: NURSE PRACTITIONER

## 2020-05-12 RX ORDER — SODIUM CHLORIDE 0.9 % (FLUSH) 0.9 %
10 SYRINGE (ML) INJECTION EVERY 12 HOURS SCHEDULED
Status: DISCONTINUED | OUTPATIENT
Start: 2020-05-12 | End: 2020-05-15 | Stop reason: HOSPADM

## 2020-05-12 RX ORDER — ONDANSETRON 2 MG/ML
4 INJECTION INTRAMUSCULAR; INTRAVENOUS ONCE
Status: COMPLETED | OUTPATIENT
Start: 2020-05-12 | End: 2020-05-12

## 2020-05-12 RX ORDER — HYDROMORPHONE HYDROCHLORIDE 1 MG/ML
0.5 INJECTION, SOLUTION INTRAMUSCULAR; INTRAVENOUS; SUBCUTANEOUS ONCE
Status: COMPLETED | OUTPATIENT
Start: 2020-05-12 | End: 2020-05-12

## 2020-05-12 RX ORDER — ONDANSETRON 2 MG/ML
4 INJECTION INTRAMUSCULAR; INTRAVENOUS EVERY 4 HOURS PRN
Status: DISCONTINUED | OUTPATIENT
Start: 2020-05-12 | End: 2020-05-15 | Stop reason: HOSPADM

## 2020-05-12 RX ORDER — HYDROMORPHONE HYDROCHLORIDE 1 MG/ML
0.5 INJECTION, SOLUTION INTRAMUSCULAR; INTRAVENOUS; SUBCUTANEOUS
Status: DISCONTINUED | OUTPATIENT
Start: 2020-05-12 | End: 2020-05-15 | Stop reason: HOSPADM

## 2020-05-12 RX ORDER — SODIUM CHLORIDE 0.9 % (FLUSH) 0.9 %
10 SYRINGE (ML) INJECTION AS NEEDED
Status: DISCONTINUED | OUTPATIENT
Start: 2020-05-12 | End: 2020-05-15 | Stop reason: HOSPADM

## 2020-05-12 RX ORDER — SODIUM CHLORIDE 9 MG/ML
100 INJECTION, SOLUTION INTRAVENOUS CONTINUOUS
Status: DISCONTINUED | OUTPATIENT
Start: 2020-05-12 | End: 2020-05-14

## 2020-05-12 RX ORDER — HYDRALAZINE HYDROCHLORIDE 20 MG/ML
5 INJECTION INTRAMUSCULAR; INTRAVENOUS EVERY 6 HOURS PRN
Status: DISCONTINUED | OUTPATIENT
Start: 2020-05-12 | End: 2020-05-15 | Stop reason: HOSPADM

## 2020-05-12 RX ADMIN — ONDANSETRON 4 MG: 2 INJECTION INTRAMUSCULAR; INTRAVENOUS at 15:03

## 2020-05-12 RX ADMIN — HYDRALAZINE HYDROCHLORIDE 5 MG: 20 INJECTION INTRAMUSCULAR; INTRAVENOUS at 14:55

## 2020-05-12 RX ADMIN — ONDANSETRON 4 MG: 2 INJECTION INTRAMUSCULAR; INTRAVENOUS at 22:23

## 2020-05-12 RX ADMIN — TOPICAL ANESTHETIC 2 SPRAY: 200 SPRAY DENTAL; PERIODONTAL at 12:34

## 2020-05-12 RX ADMIN — SODIUM CHLORIDE 1000 ML: 9 INJECTION, SOLUTION INTRAVENOUS at 07:46

## 2020-05-12 RX ADMIN — HYDROMORPHONE HYDROCHLORIDE 0.5 MG: 1 INJECTION, SOLUTION INTRAMUSCULAR; INTRAVENOUS; SUBCUTANEOUS at 22:23

## 2020-05-12 RX ADMIN — SODIUM CHLORIDE 125 ML/HR: 9 INJECTION, SOLUTION INTRAVENOUS at 14:54

## 2020-05-12 RX ADMIN — ONDANSETRON 4 MG: 2 INJECTION INTRAMUSCULAR; INTRAVENOUS at 18:22

## 2020-05-12 RX ADMIN — HYDROMORPHONE HYDROCHLORIDE 0.5 MG: 1 INJECTION, SOLUTION INTRAMUSCULAR; INTRAVENOUS; SUBCUTANEOUS at 07:48

## 2020-05-12 RX ADMIN — SODIUM CHLORIDE 125 ML/HR: 9 INJECTION, SOLUTION INTRAVENOUS at 22:51

## 2020-05-12 RX ADMIN — METOPROLOL TARTRATE 5 MG: 5 INJECTION, SOLUTION INTRAVENOUS at 17:59

## 2020-05-12 RX ADMIN — HYDROMORPHONE HYDROCHLORIDE 0.5 MG: 1 INJECTION, SOLUTION INTRAMUSCULAR; INTRAVENOUS; SUBCUTANEOUS at 18:22

## 2020-05-12 RX ADMIN — ENOXAPARIN SODIUM 40 MG: 40 INJECTION SUBCUTANEOUS at 18:01

## 2020-05-12 RX ADMIN — SODIUM CHLORIDE, PRESERVATIVE FREE 10 ML: 5 INJECTION INTRAVENOUS at 20:24

## 2020-05-12 RX ADMIN — HYDROMORPHONE HYDROCHLORIDE 0.5 MG: 1 INJECTION, SOLUTION INTRAMUSCULAR; INTRAVENOUS; SUBCUTANEOUS at 14:57

## 2020-05-12 RX ADMIN — ONDANSETRON 4 MG: 2 INJECTION INTRAMUSCULAR; INTRAVENOUS at 07:48

## 2020-05-12 NOTE — H&P
SUMMARY (A/P):    59-year-old lady with recurrent small bowel obstruction (K 56.50).  She is afebrile, tachycardic (which is secondary to atrial fibrillation), normal blood pressure, normal white blood cell count, and relatively benign exam.  I placed nasogastric tube in the emergency room with 400 mL return initially.  Plan:  · Conservative management with IV fluids, nasogastric decompression and observation.  · IV fluids  · Intravenous Dilaudid as needed for pain  · Intravenous Zofran as needed for nausea  · Repeat KUB in a.m.  · Cardiology consultation regarding atrial fibrillation.  · We will start her on prophylactic subcutaneous Lovenox given her increased risk for clotting based on BMI and atrial fibrillation.      CC:    Abdominal pain    HPI:    59-year-old lady developed moderately severe generalized abdominal pain earlier today associated with nausea and emesis.  Has had 1 previous bowel obstruction which resolved with conservative management.    PSH:    • Debridement of abdominal wound 2/27/2018  • Insertion hemodialysis catheter 2/19/2018  • Debridement of abdominal wound 2/16/2018  • Debridement of abdominal wound 2/13/2018  • Laparotomy with removal of mesh and small bowel resection 2/4/2018  • Laparoscopic robotic incisional hernia repair 2/1/2018  • Colonoscopy with cold polypectomy of sigmoid polyp 1/8/2018 (fragments of hyperplastic polyp)  • Laparoscopic robotic incisional hernia repair 3/15/2016  • Colectomy 10/13/2015 (pathology indicates tubulovillous adenoma with low anterior resection specimen)  • Colectomy 2006 (pathology indicates laparoscopic low anterior colon resection with invasive colonic adenocarcinoma T3N0)    PMH:    • Asthma/seasonal allergies  • Hypertension  • Bilateral DVT  • Chronic kidney disease  • Colon cancer  • Adenomatous and tubulovillous polyps    FAMILY HISTORY:    • Father with colorectal cancer    SOCIAL HISTORY:   • Denies tobacco use  • Occasional alcohol  use    ALLERGIES:   • Fish and shellfish-anaphylaxis    MEDICATIONS:   • Eliquis  • Cardizem  • Xyzal  • Symbicort  • Pro-air    ROS:    Influenza Like Illness: no fever, no  cough, no  sore throat, no  body aches, no  diarrhea, no loss of sense of taste or smell, no known exposure to person with Covid-19.  All other systems reviewed and negative other than presenting complaints.    RADIOLOGY/ENDOSCOPY:    • CT abdomen pelvis 5/12/2020: High-grade partial or complete small bowel obstruction at the mid to distal jejunum with an abrupt transition point at the left abdominal wall.  I reviewed the images and concur.    LABS:    • Glucose 147, GFR 33, remainder of CMP basically normal  • Lipase 33  • WBC 8.9, hemoglobin 13.6, platelets 358    PHYSICAL EXAM:   • Constitutional: Well-developed well-nourished, no acute distress  • Vital signs: Temperature 97.8, pulse 139, respirations 20, /116, weight 290 pounds, height 68 inches, BMI 44.1  Discussed with patient increased perioperative risks associated with obesity including increased risks of DVT, infection, seromas, poor wound healing and hernias (with abdominal surgery).  • Eyes: Conjunctiva normal, sclera nonicteric  • ENMT: Hearing grossly normal, oral mucosa moist  • Neck: Supple, no palpable mass, trachea midline  • Respiratory: Clear to auscultation, normal inspiratory effort  • Cardiovascular: Regular rate, no murmur, no carotid bruit, moderate lower extremity peripheral edema, no jugular venous distention  • Gastrointestinal: Soft, obese, significant midline scarring contracture from multiple prior surgical interventions, minimal generalized tenderness without peritoneal signs, hernia evident on CT scan not palpable on examination secondary to scarring and obesity  • Lymphatics (palpable nodes):  cervical-negative, axillary-negative  • Skin:  Warm, dry, no rash on visualized skin surfaces  • Musculoskeletal: Symmetric strength, no asymmetric muscular  atrophy  • Psychiatric: Alert and oriented ×3, normal affect     SUHAIL SHEIKH M.D.

## 2020-05-12 NOTE — ED PROVIDER NOTES
Pt presents to the ED c/o  abdominal pain and vomiting.  Patient states she has a history of small bowel obstructions along with colon cancer, status post partial colectomy, who complains of increasing crampy abdominal pain that began about 7 PM last night.  She admits to nausea and nonbloody emesis overnight.  She denies diarrhea and states that she is still passing gas this morning.  The pain is in a bandlike pattern in the central part of her abdomen and does not radiate.  Movement makes it worse and nothing makes it better.  He is on Eliquis for her atrial fibrillation and has not taken that since 7 PM last night.  She takes Cardizem once a day and her last dose of Cardizem was 7 PM last night.  She denies fever, chills, urinary symptoms or chest pain.    Patient was placed in face mask in first look. Patient was wearing facemask when I entered the room and throughout our encounter. I wore full protective equipment throughout this patient encounter including a face mask and gloves. Hand hygiene was performed before donning protective equipment and after removal when leaving the room.       On exam,   Her heart is irregularly irregular and tachycardic without any murmurs.  Lungs are clear to auscultation bilaterally.  Her abdomen reveals hypoactive bowel sounds, soft, moderate tenderness in the periumbilical and suprapubic regions.  There is a midline surgical scar noted.  She does not have rebound.     Plan: I agree with plan of pain medicine, IV fluids and CT of the abdomen pelvis to rule out a recurrent small bowel obstruction.     Attestation:  The JENNIFER and I have discussed this patient's history, physical exam, and treatment plan.  I have reviewed the documentation and personally had a face to face interaction with the patient. I affirm the documentation and agree with the treatment and plan.  The attached note describes my personal findings.       Dragon disclaimer:   Much of this encounter note is an  electronic transcription/translation of spoken language to printed text.  The electronic translation of spoken language may permit erroneous, or at times, nonsensical words or phrases to be inadvertently transcribed.  Although I have reviewed the note for such areas, some may still exist.     Adam Olivas MD  05/12/20 0888

## 2020-05-12 NOTE — ED TRIAGE NOTES
Pt to ER via PV with c/o abdominal pain 8/10 with n/v. Pt states hx of blockages and this feels the same. Pt masked in triage.

## 2020-05-12 NOTE — ED NOTES
Pt very anxious about NG tube insertion and plan of care. Pt visibly upset at this time. Explained procedure and reasoning behind it. Pt states that at this time she would like to speak with her  about everything prior to the NG insertion.      Jeanne Lawrence, RN  05/12/20 [id number]

## 2020-05-12 NOTE — CONSULTS
Patient Name: Mariya Campos  Age/Sex: 59 y.o. female  : 1960  MRN: [id number]    Date of Admission: 2020  Date of Encounter Visit: 20  Encounter Provider: Bradford So MD  [facility]      Referring Provider: Charli Dias MD  Patient Care Team:  Zahida Coffman MD as PCP - General (Family Medicine)    Subjective:     Consulted for: Atrial fibrillation    Chief Complaint: Abdominal Pain/ SBO     History of Present Illness:  59 y.o. female patient of mine with a history of chronic kidney disease, colon cancer, paroxysmal atrial fibrillation (on Eliquis), LE lymphedema (wears compression garment), and hypertension.  She was diagnosed with atrial fibrillation in  after a laparoscopic hernia repair and sepsis.  She also developed a DVT (Followed by Dr Neely) at that time.     In 2019 she was seen in the ED with atrial fibrillation with controlled rate and SOA.  When she was seen in our office for follow up her Eliquis (this was Ok'd by her nephrologist) was increased from 2.5 mg BID to 5 mg BID.  ECHO was done that showed EF 64%, moderate to severe LA dilation, mild MR.  She was referred for outpatient sleep study.      She was seen on 2020 by Radha GUTIÉRREZ and denied any chest pain, SOA, or palpitations. She was diagnosed with sleep apnea and was feeling better using a mouthpiece. EKG showed rate controlled atrial fibrillation.  Her EKG was showed to Dr Staton who recommended a Holter Monitor to ensure adequate rate control. The Holter showed sustained episodes of RVR with 226 episodes of HR > 100. Her Cardizem was increased to 360 mg daily.      She presented to the ED this morning with complaints of abdominal pain that started last night with associated nausea and vomiting. CT scan showed small bowel obstruction.  She is NPO and NG tube was placed per genral surgery recommendation.     We  have been consulted for medicine management of atrial fibrillation now that she is NPO.  She denies any chest pain.  She states that her abdominal pain comes and goes in waves, like contractions.      Previous testing:   Holter Monitor 03/09/2020  · An abnormal monitor study.     Sustained atrial fibrillation with episodes of RVR.  Average heart rate 91 bpm.  Minimum heart rate 61 bpm.  226 episodes of tachycardia with heart rates greater than 100 bpm in atrial fibrillation       ECHO 12/11/2019  · Left ventricular systolic function is normal. Calculated EF = 64%.  · Normal right ventricular cavity size and systolic function noted.  · Left atrial cavity size is moderate-to-severely dilated.  · Mild mitral valve regurgitation is present  · Calculated right ventricular systolic pressure from tricuspid regurgitation is 23 mmHg.  · There is no evidence of pericardial effusion.    Past Medical History:  Past Medical History:   Diagnosis Date   • Abdominal pain    • Acid reflux    • Allergic rhinitis    • Anemia     ON IRON QOD   • Asthma    • Chronic bronchitis (CMS/HCC)    • Chronic kidney disease     STAGE 3   SINCE 2015 WITH COLON SURGERY   • Colon cancer (CMS/HCC)     CHEMO 6 MONTHS   • Hiatal hernia    • Hypertension    • Incisional hernia    • Overweight    • Peripheral neuropathy     FROM CHEMO TX'S   • Rosacea    • Visit for screening mammogram        Past Surgical History:   Procedure Laterality Date   • BREAST BIOPSY     • COLECTOMY PARTIAL / TOTAL      Partial Colectomy  2015   • COLON SURGERY      XS 2   2006 COLON CANCER AND 2015   • COLONOSCOPY      Complete Colonoscopy   • DIAGNOSTIC LAPAROSCOPY N/A 2/4/2018    Procedure: EXPLORATORY LAPAROTOMY WITH SMALL BOWEL ANASTAMOSIS, LYSIS OF ADHESIONS, REMOVAL OF MESH;  Surgeon: Tri Guzman MD;  Location: [facility];  Service:    • INCISION AND DRAINAGE TRUNK N/A 2/13/2018    Procedure: WOUND WASHOUT ABDOMEN;  Surgeon: Tri Guzman MD;  Location:  [facility] MAIN OR;  Service:    • INCISION AND DRAINAGE TRUNK N/A 2/16/2018    Procedure: TRUNK DEBRIDEMENT abdominal wound;  Surgeon: Tri Guzman MD;  Location: [facility] MAIN OR;  Service:    • INCISION AND DRAINAGE TRUNK N/A 2/27/2018    Procedure: INCISION AND DRAINAGE WOUND abdomen;  Surgeon: Tri Guzman MD;  Location: [facility] MAIN OR;  Service:    • INSERTION HEMODIALYSIS CATHETER N/A 2/19/2018    Procedure: LEFT QUAD CENTRAL LINE INSERTION AND RIGHT TUNNELED HEMODIALYSIS CATHETER INSERTION;  Surgeon: Meir Guillen MD;  Location: [facility] HYBRID OR 18/19;  Service:    • JOINT MANIPULATION Left 4/18/2016    Procedure: MANIPULATION OF LT SHOULDER;  Surgeon: Lidia Ugalde MD;  Location: [facility];  Service:    • VENTRAL HERNIA REPAIR N/A 3/15/2016    Procedure: LAPAROSCOPIC INSIONAL HERNIA REPAIR W/ MESH DAVINCI ROBOT;  Surgeon: Tri Guzman MD;  Location: [facility] OR;  Service:    • VENTRAL HERNIA REPAIR N/A 2/1/2018    Procedure: VENTRAL HERNIA REPAIR LAPAROSCOPIC WITH DAVINCI ROBOT;  Surgeon: Tri Guzman MD;  Location: [facility] MAIN OR;  Service:        Home Medications:   Medications Prior to Admission   Medication Sig Dispense Refill Last Dose   • apixaban (ELIQUIS) 5 MG tablet tablet Take 1 tablet by mouth Every 12 (Twelve) Hours. 180 tablet 3    • azelastine (ASTELIN) 0.1 % nasal spray 2 sprays into the nostril(s) as directed by provider Daily. Use in each nostril as directed 1 each 12 Taking   • Cholecalciferol (VITAMIN D) 2000 units tablet Take 2,000 Units by mouth Daily.   Taking   • dilTIAZem CD (CARDIZEM CD) 360 MG 24 hr capsule Take 1 capsule by mouth Every Morning. 90 capsule 3    • levocetirizine (XYZAL) 5 MG tablet Take 5 mg by mouth Every Evening.   Taking   • SYMBICORT 80-4.5 MCG/ACT inhaler INHALE 2 PUFFS BID   RINSE AFTER USE   Taking   • PROAIR  (90 BASE) MCG/ACT inhaler Inhale 1 puff Every 4 (Four) Hours As Needed.  2 Taking        Allergies:  Allergies   Allergen Reactions   • Fish-Derived Products Anaphylaxis   • Shellfish-Derived Products Anaphylaxis       Past Social History:  Social History     Socioeconomic History   • Marital status:      Spouse name: Not on file   • Number of children: Not on file   • Years of education: Not on file   • Highest education level: Not on file   Occupational History   • Occupation: communication super   Tobacco Use   • Smoking status: Never Smoker   • Smokeless tobacco: Never Used   • Tobacco comment: CAFFEINE USE: 1 CUP COFFEE/ 2-16OZ COKES DAILY   Substance and Sexual Activity   • Alcohol use: Yes     Comment: 10 drinks per year   • Drug use: No   • Sexual activity: Defer        Past Family History:  Family History   Problem Relation Age of Onset   • Cancer Mother         Breast Cancer   • Breast cancer Mother 56   • Cancer Father         Colon Cancer   • Cancer Maternal Grandmother 80        Breast Cancer   • Hypertension Other    • Cancer Other         Liver Cancer   • Other Other         Varicose veins of lower extremities   • Ovarian cancer Paternal Grandmother 70   • Breast cancer Maternal Aunt 58   • Breast cancer Maternal Aunt 65   • Malig Hyperthermia Neg Hx          Review of Systems:  All systems reviewed. Pertinent positives identified in HPI. All other systems are negative.       Objective:     Objective:  Temp:  [97.8 °F (36.6 °C)-99.1 °F (37.3 °C)] 97.8 °F (36.6 °C)  Heart Rate:  [103-139] 139  Resp:  [18-20] 20  BP: (109-163)/() 163/107    Intake/Output Summary (Last 24 hours) at 5/12/2020 1631  Last data filed at 5/12/2020 1447  Gross per 24 hour   Intake 1000 ml   Output --   Net 1000 ml     Body mass index is 44.09 kg/m².      05/12/20  0722   Weight: 132 kg (290 lb)           Physical Exam:   Temp:  [97.8 °F (36.6 °C)-99.1 °F (37.3 °C)] 97.8 °F (36.6 °C)  Heart Rate:  [103-139] 139  Resp:  [18-20] 20  BP: (109-163)/() 163/107    Intake/Output Summary (Last 24  "hours) at 5/12/2020 1631  Last data filed at 5/12/2020 1447  Gross per 24 hour   Intake 1000 ml   Output --   Net 1000 ml     Flowsheet Rows      First Filed Value   Admission Height  172.7 cm (68\") Documented at 05/12/2020 0722   Admission Weight  132 kg (290 lb) Documented at 05/12/2020 0722          General Appearance:    Alert, cooperative, in no acute distress.  Obese   Head:    Normocephalic, without obvious abnormality, atraumatic       Neck:   No adenopathy, supple, no thyromegaly, no carotid bruit, no    JVD   Lungs:     Clear to auscultation bilaterally, no wheezes, rales, or     rhonchi    Heart:   Tachycardic irregular rhythm, no murmur, no rub, no gallop   Chest Wall:    No abnormalities observed   Abdomen:    Soft.  Mild tenderness to palpation.  No rebound tenderness.  Nondistended.    Extremities:   No cyanosis, clubbing, or edema   Pulses:   Pulses palpable and equal bilaterally   Skin:  Abdominal surgical scar.       Neurologic:   Cranial nerves 2 - 12 grossly intact, sensation intact             Lab Review:     Results from last 7 days   Lab Units 05/12/20  0725   SODIUM mmol/L 136   POTASSIUM mmol/L 4.5   CHLORIDE mmol/L 97*   CO2 mmol/L 24.2   BUN mg/dL 28*   CREATININE mg/dL 1.60*   GLUCOSE mg/dL 147*   CALCIUM mg/dL 10.0           Results from last 7 days   Lab Units 05/12/20  0724   WBC 10*3/mm3 8.92   HEMOGLOBIN g/dL 13.6   HEMATOCRIT % 40.9   PLATELETS 10*3/mm3 358                                   Imaging:    Imaging Results (Most Recent)     Procedure Component Value Units Date/Time    CT Abdomen Pelvis Without Contrast [[id number]] Collected:  05/12/20 0910     Updated:  05/12/20 1403    Narrative:       CT ABDOMEN AND PELVIS WITHOUT IV CONTRAST     HISTORY: 59-year-old female with diffuse abdominal pain. Nausea and  vomiting. Bowel obstruction history. Previous history of ventral hernia  repair followed by bowel injury requiring reexploration and small bowel  resection, and drainage of " wound infection in 2018.     TECHNIQUE: Radiation dose reduction techniques were utilized, including  automated exposure control and exposure modulation based on body size.   3 mm images were obtained through the abdomen and pelvis without the  administration of IV contrast. Compared with previous CT from  12/02/2019. There is significant dilatation of a long segment of mid to  distal jejunum, measuring approximately 3.2 cm in diameter. There is a  segment with fecalized luminal contents and an abrupt transition point  adjacently along the left aspect of the anterior abdominal wall at the  mid abdomen, image 97. There is a tiny amount of free fluid within the  mesentery. There is no fluid collection. The small bowel distally is  collapsed. There is formed stool scattered throughout the colon. There  is a upper abdominal left paramidline ventral hernia containing a  knuckle of transverse colon without obstruction or incarceration. This  was present previously as well. The noncontrasted liver appears  unremarkable. The gallbladder is distended. Noncontrasted pancreas,  spleen, adrenals, and kidneys appear unremarkable. Noncontrasted uterus  and adnexa appear unremarkable. There are no acute appearing airspace  opacities at the visualized lower lung fields and there are no pleural  effusions.     FINDINGS: High-grade partial or complete small bowel obstruction at the  mid to distal jejunum with an abrupt transition point at the left  abdominal wall at the mid abdomen likely secondary to an adhesion. There  is a tiny amount of free fluid, but there is no fluid collection.     Discussed with BROCK Tariq.     This report was finalized on 5/12/2020 2:00 PM by Dr. Karla Moscoso M.D.             Results for orders placed during the hospital encounter of 12/11/19   Adult Transthoracic Echo Complete W/ Cont if Necessary Per Protocol    Narrative · Left ventricular systolic function is normal. Calculated EF = 64%.  ·  Normal right ventricular cavity size and systolic function noted.  · Left atrial cavity size is moderate-to-severely dilated.  · Mild mitral valve regurgitation is present  · Calculated right ventricular systolic pressure from tricuspid   regurgitation is 23 mmHg.  · There is no evidence of pericardial effusion.          Telemetry      EKG: this admission        BASELINE:       I personally viewed and interpreted the patient's EKG/Telemetry data.    Assessment:   Assessment/Plan   1.  Atrial fibrillation with a rapid ventricular rate: Elevated.  2.  Essential hypertension: Not at goal  3.  Small bowel obstruction  4.  Chronic kidney disease  5.  History of DVT    -Reasonable at this time to hold the Eliquis therapy.  We may consider placing her on heparin tomorrow or the following day depending on what the plan is from a surgical standpoint.  - Recommend starting metoprolol IV 5 mg every 6 hours for rate control.  If this does not do it then we may have to start her on a diltiazem drip as well.  - Echocardiogram late last year with only mild mitral valve regurgitation and a normal systolic function.  -I will continue to follow      Thank you for allowing me to participate in the care of Mariya Campos. Feel free to contact me directly with any further questions or concerns.    Bradford So MD  [facility]  05/12/20  16:31

## 2020-05-12 NOTE — ED PROVIDER NOTES
EMERGENCY DEPARTMENT ENCOUNTER    Room Number:  E449/1  Date of encounter:  5/12/2020  PCP: Zahida Coffman MD  Historian: Patient      HPI:  Chief Complaint: Abdominal pain  A complete HPI/ROS/PMH/PSH/SH/FH are unobtainable due to: Nothing    Context: Mariya Campos is a 59 y.o. female who arrives to the ED via private vehicle with her .  Patient presents with c/o extreme abdominal pain that began at 7 PM last night.   Patient states the symptoms/pain are intermittent and sharp. Patient also complains of nausea, vomiting.  Patient denies fever, chills, chest pain, shortness of breath, diarrhea, dysuria.  Patient states that nothing makes the symptoms better and movement and vomiting worsens symptoms.  Patient states that she is not taking any medication at home to help with the pain or vomiting.  She states the pain is been pretty extreme since 7 PM last night.  Patient states her last normal bowel movement was yesterday.  She states this feels similar to when she had a bowel obstruction several years ago.  Patient has a past medical history of bronchitis, anemia, CKD, colon cancer, A. fib for which she is on Eliquis.        PAST MEDICAL HISTORY  Active Ambulatory Problems     Diagnosis Date Noted   • Atopic rhinitis 02/23/2016   • Adiposity 02/23/2016   • Abnormal ECG 04/26/2016   • CRF (chronic renal failure), stage 3 (moderate) (CMS/HCC)    • Anemia 04/28/2016   • Recurrent UTI 06/04/2016   • Osteopenia 07/20/2016   • Essential hypertension 10/02/2017   • Incisional hernia 12/18/2017   • Moderate persistent asthma without complication 12/18/2017   • Recurrent ventral hernia 01/30/2018   • Paroxysmal atrial fibrillation (CMS/HCC) 12/04/2019   • Chronic deep vein thrombosis (DVT) of popliteal vein of left lower extremity (CMS/HCC) 12/04/2019     Resolved Ambulatory Problems     Diagnosis Date Noted   • Fatigue 02/23/2016   • Abnormal mammogram 02/23/2016   • Colonic lump 02/23/2016   • Menopause  present 02/23/2016   • Shortness of breath 02/23/2016   • Shoulder, capsulitis, adhesive 04/14/2016   • Bilateral shoulder pain 04/14/2016   • Pain 06/28/2016   • Bursitis/tendonitis, shoulder 06/28/2016   • Incisional hernia, without obstruction or gangrene 12/21/2017   • Bacteremia, escherichia coli 02/10/2018   • Wound infection 01/30/2018   • Complications, dialysis, catheter, mechanical, initial encounter (CMS/[facility]) 02/18/2018   • Small bowel obstruction (CMS/[facility]) 12/13/2018     Past Medical History:   Diagnosis Date   • Abdominal pain    • Acid reflux    • Allergic rhinitis    • Asthma    • Chronic bronchitis (CMS/[facility])    • Chronic kidney disease    • Colon cancer (CMS/[facility])    • Hiatal hernia    • Hypertension    • Overweight    • Peripheral neuropathy    • Rosacea    • Visit for screening mammogram          PAST SURGICAL HISTORY  Past Surgical History:   Procedure Laterality Date   • BREAST BIOPSY     • COLECTOMY PARTIAL / TOTAL      Partial Colectomy  2015   • COLON SURGERY      XS 2   2006 COLON CANCER AND 2015   • COLONOSCOPY      Complete Colonoscopy   • DIAGNOSTIC LAPAROSCOPY N/A 2/4/2018    Procedure: EXPLORATORY LAPAROTOMY WITH SMALL BOWEL ANASTAMOSIS, LYSIS OF ADHESIONS, REMOVAL OF MESH;  Surgeon: Tri Guzman MD;  Location: [facility] OR;  Service:    • INCISION AND DRAINAGE TRUNK N/A 2/13/2018    Procedure: WOUND WASHOUT ABDOMEN;  Surgeon: Tri Guzman MD;  Location: [facility] OR;  Service:    • INCISION AND DRAINAGE TRUNK N/A 2/16/2018    Procedure: TRUNK DEBRIDEMENT abdominal wound;  Surgeon: Tri Guzman MD;  Location: [facility] OR;  Service:    • INCISION AND DRAINAGE TRUNK N/A 2/27/2018    Procedure: INCISION AND DRAINAGE WOUND abdomen;  Surgeon: Tri Guzman MD;  Location: [facility] OR;  Service:    • INSERTION HEMODIALYSIS CATHETER N/A 2/19/2018    Procedure: LEFT QUAD CENTRAL LINE INSERTION AND RIGHT TUNNELED HEMODIALYSIS CATHETER INSERTION;  Surgeon: Meir  Jamari Guillen MD;  Location: [facility] HYBRID OR 18/19;  Service:    • JOINT MANIPULATION Left 4/18/2016    Procedure: MANIPULATION OF LT SHOULDER;  Surgeon: Lidia Ugalde MD;  Location: [facility] OR OSC;  Service:    • VENTRAL HERNIA REPAIR N/A 3/15/2016    Procedure: LAPAROSCOPIC INSIONAL HERNIA REPAIR W/ MESH DAVINCI ROBOT;  Surgeon: Tri Guzman MD;  Location: [facility] MAIN OR;  Service:    • VENTRAL HERNIA REPAIR N/A 2/1/2018    Procedure: VENTRAL HERNIA REPAIR LAPAROSCOPIC WITH DAVINCI ROBOT;  Surgeon: Tri Guzman MD;  Location: [facility] MAIN OR;  Service:          FAMILY HISTORY  Family History   Problem Relation Age of Onset   • Cancer Mother         Breast Cancer   • Breast cancer Mother 56   • Cancer Father         Colon Cancer   • Cancer Maternal Grandmother 80        Breast Cancer   • Hypertension Other    • Cancer Other         Liver Cancer   • Other Other         Varicose veins of lower extremities   • Ovarian cancer Paternal Grandmother 70   • Breast cancer Maternal Aunt 58   • Breast cancer Maternal Aunt 65   • Malig Hyperthermia Neg Hx          SOCIAL HISTORY  Social History     Socioeconomic History   • Marital status:      Spouse name: Not on file   • Number of children: Not on file   • Years of education: Not on file   • Highest education level: Not on file   Occupational History   • Occupation: communication super   Tobacco Use   • Smoking status: Never Smoker   • Smokeless tobacco: Never Used   • Tobacco comment: CAFFEINE USE: 1 CUP COFFEE/ 2-16OZ COKES DAILY   Substance and Sexual Activity   • Alcohol use: Yes     Comment: 10 drinks per year   • Drug use: No   • Sexual activity: Defer         ALLERGIES  Fish-derived products and Shellfish-derived products        REVIEW OF SYSTEMS  Review of Systems     All systems reviewed and negative except for those discussed in HPI.        PHYSICAL EXAM    I have reviewed the triage vital signs and nursing notes.    ED Triage Vitals   Temp  Heart Rate Resp BP SpO2   05/12/20 0659 05/12/20 0659 05/12/20 0659 05/12/20 0722 05/12/20 0659   99.1 °F (37.3 °C) 107 20 130/94 98 %       Physical Exam  GENERAL: Well appearing, non-toxic appearing, moderately distressed  HENT: normocephalic, atraumatic, Neck Supple, Trachea midline  EYES: no scleral icterus, PERRL, Normal conjunctiva  CV: Irregularly irregular rhythm, tachycardic, no murmur  RESPIRATORY: normal effort, CTAB  ABDOMEN: soft, diffuse tenderness, hypoactive bowel sounds  MUSCULOSKELETAL: no deformity  NEURO: alert, moves all extremities, follows commands, mental status normal/baseline  SKIN: warm, dry, no rash   Psych: Appropriate mood and affect         LAB RESULTS  Recent Results (from the past 24 hour(s))   Lavender Top    Collection Time: 05/12/20  7:24 AM   Result Value Ref Range    Extra Tube hold for add-on    CBC Auto Differential    Collection Time: 05/12/20  7:24 AM   Result Value Ref Range    WBC 8.92 3.40 - 10.80 10*3/mm3    RBC 4.68 3.77 - 5.28 10*6/mm3    Hemoglobin 13.6 12.0 - 15.9 g/dL    Hematocrit 40.9 34.0 - 46.6 %    MCV 87.4 79.0 - 97.0 fL    MCH 29.1 26.6 - 33.0 pg    MCHC 33.3 31.5 - 35.7 g/dL    RDW 13.3 12.3 - 15.4 %    RDW-SD 42.4 37.0 - 54.0 fl    MPV 8.6 6.0 - 12.0 fL    Platelets 358 140 - 450 10*3/mm3    Neutrophil % 88.2 (H) 42.7 - 76.0 %    Lymphocyte % 7.7 (L) 19.6 - 45.3 %    Monocyte % 3.5 (L) 5.0 - 12.0 %    Eosinophil % 0.1 (L) 0.3 - 6.2 %    Basophil % 0.2 0.0 - 1.5 %    Immature Grans % 0.3 0.0 - 0.5 %    Neutrophils, Absolute 7.86 (H) 1.70 - 7.00 10*3/mm3    Lymphocytes, Absolute 0.69 (L) 0.70 - 3.10 10*3/mm3    Monocytes, Absolute 0.31 0.10 - 0.90 10*3/mm3    Eosinophils, Absolute 0.01 0.00 - 0.40 10*3/mm3    Basophils, Absolute 0.02 0.00 - 0.20 10*3/mm3    Immature Grans, Absolute 0.03 0.00 - 0.05 10*3/mm3    nRBC 0.0 0.0 - 0.2 /100 WBC   Light Blue Top    Collection Time: 05/12/20  7:25 AM   Result Value Ref Range    Extra Tube hold for add-on    Green Top  (Gel)    Collection Time: 05/12/20  7:25 AM   Result Value Ref Range    Extra Tube Hold for add-ons.    Gold Top - SST    Collection Time: 05/12/20  7:25 AM   Result Value Ref Range    Extra Tube Hold for add-ons.    Comprehensive Metabolic Panel    Collection Time: 05/12/20  7:25 AM   Result Value Ref Range    Glucose 147 (H) 65 - 99 mg/dL    BUN 28 (H) 6 - 20 mg/dL    Creatinine 1.60 (H) 0.57 - 1.00 mg/dL    Sodium 136 136 - 145 mmol/L    Potassium 4.5 3.5 - 5.2 mmol/L    Chloride 97 (L) 98 - 107 mmol/L    CO2 24.2 22.0 - 29.0 mmol/L    Calcium 10.0 8.6 - 10.5 mg/dL    Total Protein 8.1 6.0 - 8.5 g/dL    Albumin 4.10 3.50 - 5.20 g/dL    ALT (SGPT) 9 1 - 33 U/L    AST (SGOT) 13 1 - 32 U/L    Alkaline Phosphatase 96 39 - 117 U/L    Total Bilirubin 0.4 0.2 - 1.2 mg/dL    eGFR Non African Amer 33 (L) >60 mL/min/1.73    Globulin 4.0 gm/dL    A/G Ratio 1.0 g/dL    BUN/Creatinine Ratio 17.5 7.0 - 25.0    Anion Gap 14.8 5.0 - 15.0 mmol/L   Lipase    Collection Time: 05/12/20  7:25 AM   Result Value Ref Range    Lipase 33 13 - 60 U/L   Urinalysis With Microscopic If Indicated (No Culture) - Urine, Clean Catch    Collection Time: 05/12/20  9:44 AM   Result Value Ref Range    Color, UA Yellow Yellow, Straw    Appearance, UA Clear Clear    pH, UA 7.0 5.0 - 8.0    Specific Gravity, UA 1.019 1.005 - 1.030    Glucose, UA Negative Negative    Ketones, UA 15 mg/dL (1+) (A) Negative    Bilirubin, UA Negative Negative    Blood, UA Trace (A) Negative    Protein, UA >=300 mg/dL (3+) (A) Negative    Leuk Esterase, UA Trace (A) Negative    Nitrite, UA Negative Negative    Urobilinogen, UA 0.2 E.U./dL 0.2 - 1.0 E.U./dL   Urinalysis, Microscopic Only - Urine, Clean Catch    Collection Time: 05/12/20  9:44 AM   Result Value Ref Range    RBC, UA 3-5 (A) None Seen, 0-2 /HPF    WBC, UA 3-5 (A) None Seen, 0-2 /HPF    Bacteria, UA None Seen None Seen /HPF    Squamous Epithelial Cells, UA 3-6 (A) None Seen, 0-2 /HPF    Hyaline Casts, UA None  Seen None Seen /LPF    Methodology Manual Light Microscopy        Ordered the above labs and independently reviewed the results.      RADIOLOGY  CT Abdomen Pelvis Without Contrast   Final Result   FINDINGS: High-grade partial or complete small bowel obstruction at the  mid to distal jejunum with an abrupt transition point at the left  abdominal wall at the mid abdomen likely secondary to an adhesion. There  is a tiny amount of free fluid, but there is no fluid collection.            I ordered the above noted radiological studies and reviewed the images on the PACS system.  0850:  Discussed with Dr. Moscoso regarding the CT abdomen and pelvis results which revealed high-grade partial or complete small bowel obstruction with a transition point at the left mid abdomen secondary to adhesions      EKG    Time: 1335  Rhythm/Rate- A Fib with rate of 121  P waves and MN: Nomral  ORS, axis: normal  No change from prior EKG done on 12/02/2019        MEDICAL RECORD REVIEW    Medical records reviewed in epic, patient was last admitted December 2018 for small bowel obstruction per Dr. Figueroa.  Not require surgical intervention at that time.    PROCEDURES    Procedures        PROGRESS, DATA ANALYSIS, CONSULTS, AND MEDICAL DECISION MAKING    PPE: Patient was placed in face mask in first look. Patient was wearing facemask when I entered the room and throughout our encounter. I wore full protective equipment throughout this patient encounter including a face mask, and gloves. Hand hygiene was performed before donning protective equipment and after removal when leaving the room.      0800: Discussed/reviewed pt's history and workup with Dr. Olivas.  After bedside evaluation, they agree with the plan of care.    Differential Diagnosis for Abdominal Pain include but are not limited to:  Gastritis, Gastroenteritis, Peptic Ulcer Disease, GERD, Diverticulitis,  SBO, Cholecystitis, Cholelithiasis, Abdominal abscess    ED Course as of May 12  1404   Tue May 12, 2020   0981 Patient updated on CT abdomen and pelvis results which show a small bowel obstruction.  Patient is sitting up on the side of the bed appears to be much more comfortable after receiving pain medication.  Call placed to Dr. Figueroa with general surgery.    [MS]      ED Course User Index  [MS] Rosanne Mccoy, APRN     1030: Patient updated on weight regarding callback from general surgery.  Patient continues to appear comfortable in no acute distress.  1130: Discussed with RN that is in the OR with Dr. Dias regarding patient's history, results including CT of the abdomen pelvis that shows a high-grade partial or complete small bowel obstruction.  Dr. Dias would like for patient to have an NG tube placed and he will admit patient.  1140: Discussed need for NG tube placement with patient, patient became very anxious and is not currently wanting to have the NG tube placed.  Explained the importance of it in regards to her SBO and to help with emptying the stomach contents and to avoid vomiting.  Patient would like a few minutes to think about this and to call her .    ADMISSION    Discussed treatment plan and reason for admission with pt/family and admitting physician.  Pt/family voiced understanding of the plan for admission for further testing/treatment as needed.  1215: Dr. Dias at bedside, he did place an NG tube.    DIAGNOSIS  Final diagnoses:   Generalized abdominal pain   SBO (small bowel obstruction) (CMS/HCC)   Atrial fibrillation, unspecified type (CMS/HCC)           COURSE & MEDICAL DECISION MAKING  Any/All labs and Any/All Imaging studies that were ordered were reviewed and are noted above.  Results were reviewed/discussed with the patient and they were also made aware of online assess.   Pt also made aware that some labs, such as cultures, will not be resulted during ER visit and follow up with PMD is necessary.        Rosanne Mccoy,  APRN  05/12/20 1417

## 2020-05-13 ENCOUNTER — APPOINTMENT (OUTPATIENT)
Dept: GENERAL RADIOLOGY | Facility: HOSPITAL | Age: 60
End: 2020-05-13

## 2020-05-13 LAB
ANION GAP SERPL CALCULATED.3IONS-SCNC: 13.7 MMOL/L (ref 5–15)
BUN BLD-MCNC: 29 MG/DL (ref 6–20)
BUN/CREAT SERPL: 19.6 (ref 7–25)
CALCIUM SPEC-SCNC: 8.8 MG/DL (ref 8.6–10.5)
CHLORIDE SERPL-SCNC: 103 MMOL/L (ref 98–107)
CO2 SERPL-SCNC: 25.3 MMOL/L (ref 22–29)
CREAT BLD-MCNC: 1.48 MG/DL (ref 0.57–1)
DEPRECATED RDW RBC AUTO: 41.6 FL (ref 37–54)
ERYTHROCYTE [DISTWIDTH] IN BLOOD BY AUTOMATED COUNT: 13.1 % (ref 12.3–15.4)
GFR SERPL CREATININE-BSD FRML MDRD: 36 ML/MIN/1.73
GLUCOSE BLD-MCNC: 102 MG/DL (ref 65–99)
HCT VFR BLD AUTO: 35.1 % (ref 34–46.6)
HGB BLD-MCNC: 11.9 G/DL (ref 12–15.9)
MCH RBC QN AUTO: 29.5 PG (ref 26.6–33)
MCHC RBC AUTO-ENTMCNC: 33.9 G/DL (ref 31.5–35.7)
MCV RBC AUTO: 87.1 FL (ref 79–97)
PLATELET # BLD AUTO: 312 10*3/MM3 (ref 140–450)
PMV BLD AUTO: 8.5 FL (ref 6–12)
POTASSIUM BLD-SCNC: 4.2 MMOL/L (ref 3.5–5.2)
RBC # BLD AUTO: 4.03 10*6/MM3 (ref 3.77–5.28)
SODIUM BLD-SCNC: 142 MMOL/L (ref 136–145)
WBC NRBC COR # BLD: 7.01 10*3/MM3 (ref 3.4–10.8)

## 2020-05-13 PROCEDURE — 99232 SBSQ HOSP IP/OBS MODERATE 35: CPT | Performed by: INTERNAL MEDICINE

## 2020-05-13 PROCEDURE — 74018 RADEX ABDOMEN 1 VIEW: CPT

## 2020-05-13 PROCEDURE — 25010000002 HYDROMORPHONE PER 4 MG: Performed by: SURGERY

## 2020-05-13 PROCEDURE — 99231 SBSQ HOSP IP/OBS SF/LOW 25: CPT | Performed by: SURGERY

## 2020-05-13 PROCEDURE — 85027 COMPLETE CBC AUTOMATED: CPT | Performed by: SURGERY

## 2020-05-13 PROCEDURE — 80048 BASIC METABOLIC PNL TOTAL CA: CPT | Performed by: SURGERY

## 2020-05-13 PROCEDURE — 25010000002 ENOXAPARIN PER 10 MG: Performed by: SURGERY

## 2020-05-13 PROCEDURE — 25010000002 ONDANSETRON PER 1 MG: Performed by: SURGERY

## 2020-05-13 RX ADMIN — SODIUM CHLORIDE, PRESERVATIVE FREE 10 ML: 5 INJECTION INTRAVENOUS at 08:18

## 2020-05-13 RX ADMIN — HYDROMORPHONE HYDROCHLORIDE 0.5 MG: 1 INJECTION, SOLUTION INTRAMUSCULAR; INTRAVENOUS; SUBCUTANEOUS at 23:04

## 2020-05-13 RX ADMIN — SODIUM CHLORIDE, PRESERVATIVE FREE 10 ML: 5 INJECTION INTRAVENOUS at 20:07

## 2020-05-13 RX ADMIN — HYDROMORPHONE HYDROCHLORIDE 0.5 MG: 1 INJECTION, SOLUTION INTRAMUSCULAR; INTRAVENOUS; SUBCUTANEOUS at 03:34

## 2020-05-13 RX ADMIN — HYDROMORPHONE HYDROCHLORIDE 0.5 MG: 1 INJECTION, SOLUTION INTRAMUSCULAR; INTRAVENOUS; SUBCUTANEOUS at 08:21

## 2020-05-13 RX ADMIN — METOPROLOL TARTRATE 10 MG: 5 INJECTION, SOLUTION INTRAVENOUS at 23:04

## 2020-05-13 RX ADMIN — SODIUM CHLORIDE 125 ML/HR: 9 INJECTION, SOLUTION INTRAVENOUS at 06:28

## 2020-05-13 RX ADMIN — ONDANSETRON 4 MG: 2 INJECTION INTRAMUSCULAR; INTRAVENOUS at 08:18

## 2020-05-13 RX ADMIN — METOPROLOL TARTRATE 10 MG: 5 INJECTION, SOLUTION INTRAVENOUS at 17:02

## 2020-05-13 RX ADMIN — SODIUM CHLORIDE 125 ML/HR: 9 INJECTION, SOLUTION INTRAVENOUS at 13:18

## 2020-05-13 RX ADMIN — ONDANSETRON 4 MG: 2 INJECTION INTRAMUSCULAR; INTRAVENOUS at 03:34

## 2020-05-13 RX ADMIN — ONDANSETRON 4 MG: 2 INJECTION INTRAMUSCULAR; INTRAVENOUS at 13:15

## 2020-05-13 RX ADMIN — ONDANSETRON 4 MG: 2 INJECTION INTRAMUSCULAR; INTRAVENOUS at 17:09

## 2020-05-13 RX ADMIN — ENOXAPARIN SODIUM 60 MG: 60 INJECTION SUBCUTANEOUS at 17:02

## 2020-05-13 RX ADMIN — METOPROLOL TARTRATE 5 MG: 5 INJECTION, SOLUTION INTRAVENOUS at 05:52

## 2020-05-13 RX ADMIN — ONDANSETRON 4 MG: 2 INJECTION INTRAMUSCULAR; INTRAVENOUS at 20:34

## 2020-05-13 RX ADMIN — HYDROMORPHONE HYDROCHLORIDE 0.5 MG: 1 INJECTION, SOLUTION INTRAMUSCULAR; INTRAVENOUS; SUBCUTANEOUS at 20:06

## 2020-05-13 RX ADMIN — HYDROMORPHONE HYDROCHLORIDE 0.5 MG: 1 INJECTION, SOLUTION INTRAMUSCULAR; INTRAVENOUS; SUBCUTANEOUS at 13:15

## 2020-05-13 RX ADMIN — HYDROMORPHONE HYDROCHLORIDE 0.5 MG: 1 INJECTION, SOLUTION INTRAMUSCULAR; INTRAVENOUS; SUBCUTANEOUS at 17:09

## 2020-05-13 RX ADMIN — METOPROLOL TARTRATE 5 MG: 5 INJECTION, SOLUTION INTRAVENOUS at 10:31

## 2020-05-13 RX ADMIN — METOPROLOL TARTRATE 5 MG: 5 INJECTION, SOLUTION INTRAVENOUS at 00:03

## 2020-05-13 NOTE — PROGRESS NOTES
IMPRESSION & PLAN:  59-year-old lady with what appears to be a resolving small bowel obstruction.  Will maintain n.p.o. state today and if continues to improve then start liquid diet tomorrow.  Management of atrial fibrillation per cardiology.    CC: Follow-up small bowel obstruction    HPI: Indicates that her pain is decreased significantly, no further emesis and minimal nausea, I removed nasogastric tube this morning.    PE:    Awake, alert  VS: Afebrile vital signs stable  Abdomen: Soft, suprapubic tenderness persists but is definitely less than yesterday, no peritoneal signs, no significant distention    LABS:  WBC 7, hemoglobin 11.9  BUN 29, creatinine 1.48    RADIOLOGY:  KUB this morning does not show any evidence of obstructive bowel gas pattern

## 2020-05-13 NOTE — PROGRESS NOTES
Discharge Planning Assessment  [facility]     Patient Name: Mariya Campos  MRN: [id number]  Today's Date: 5/13/2020    Admit Date: 5/12/2020    Discharge Needs Assessment     Row Name 05/13/20 9813       Living Environment    Lives With  spouse    Name(s) of Who Lives With Patient  Jamari Campso, spouse, [phone]    Current Living Arrangements  home/apartment/condo    Primary Care Provided by  self    Provides Primary Care For  no one    Family Caregiver if Needed  spouse    Family Caregiver Names  Jamari Campos, spouse, [phone]    Quality of Family Relationships  unable to assess    Able to Return to Prior Arrangements  yes       Resource/Environmental Concerns    Resource/Environmental Concerns  none       Transition Planning    Patient/Family Anticipates Transition to  home with family    Patient/Family Anticipated Services at Transition  none    Transportation Anticipated  family or friend will provide       Discharge Needs Assessment    Readmission Within the Last 30 Days  no previous admission in last 30 days    Concerns to be Addressed  denies needs/concerns at this time;no discharge needs identified;discharge planning    Equipment Currently Used at Home  none    Anticipated Changes Related to Illness  none    Equipment Needed After Discharge  none    Provided Post Acute Provider List?  N/A        Discharge Plan     Row Name 05/13/20 7082       Plan    Plan  Home with spouse; no needs.     Patient/Family in Agreement with Plan  yes    Plan Comments  Explained roll of . Face sheet and pharmacy verified. Pt lives with Jamari Campos, spouse, [phone]. There are 3 steps to enter home.  Denies the use of any DME.  Pt is independent with ADLs. Pt has been to [facility] and has used [facility] in the past. Pt's PCP is Dr JOSE Coffman. Uses [facility] in [place]. At discharge, family will transport. Pt denies any discharge needs and none identified at this time.  Explained that CCP would follow to assess for discharge needs.  Lawrence Joshi RN-BC                  Demographic Summary     Row Name 05/13/20 1322       General Information    Admission Type  inpatient    Arrived From  emergency department    Reason for Consult  discharge planning    Preferred Language  English     Used During This Interaction  no        Functional Status     Row Name 05/13/20 1322       Functional Status    Usual Activity Tolerance  good    Current Activity Tolerance  good       Functional Status, IADL    Medications  independent    Meal Preparation  independent    Housekeeping  independent    Laundry  independent    Shopping  independent       Employment/    Employment Status  employed full time                Lawrence Joshi, RN

## 2020-05-13 NOTE — PROGRESS NOTES
"Patient Care Team:  Zahida Coffman MD as PCP - General (Family Medicine)    Chief Complaint: Follow-up atrial fibrillation with RVR.    Interval History:   No complaints today.  RVR still documented.  Small bowel obstruction appears to be resolving    Objective   Vital Signs  Temp:  [97.8 °F (36.6 °C)-99.5 °F (37.5 °C)] 98.4 °F (36.9 °C)  Heart Rate:  [114-122] 119  Resp:  [16-20] 16  BP: (119-141)/() 141/101    Intake/Output Summary (Last 24 hours) at 5/13/2020 1507  Last data filed at 5/13/2020 1318  Gross per 24 hour   Intake 2829 ml   Output 300 ml   Net 2529 ml     Flowsheet Rows      First Filed Value   Admission Height  172.7 cm (68\") Documented at 05/12/2020 0722   Admission Weight  132 kg (290 lb) Documented at 05/12/2020 0722          General Appearance:    Alert, cooperative, in no acute distress   Head:    Normocephalic, without obvious abnormality, atraumatic       Neck:   No adenopathy, supple, no thyromegaly, no carotid bruit, no    JVD   Lungs:     Clear to auscultation bilaterally, no wheezes, rales, or     rhonchi    Heart:    tachycardic, irregular rhythm, no murmur, no rub, no gallop   Chest Wall:    No abnormalities observed   Abdomen:     Normal bowel sounds, soft, nontender, nondistended,            no rebound tenderness   Extremities:   No cyanosis, clubbing, or edema   Pulses:   Pulses palpable and equal bilaterally   Skin:  Prior abdominal surgery scar       Neurologic:   Cranial nerves 2 - 12 grossly intact, sensation intact             enoxaparin 60 mg Subcutaneous Q12H   metoprolol tartrate 10 mg Intravenous Q6H   sodium chloride 10 mL Intravenous Q12H         sodium chloride 100 mL/hr Last Rate: 125 mL/hr (05/13/20 1318)       Results Review:    Results from last 7 days   Lab Units 05/13/20  0526   SODIUM mmol/L 142   POTASSIUM mmol/L 4.2   CHLORIDE mmol/L 103   CO2 mmol/L 25.3   BUN mg/dL 29*   CREATININE mg/dL 1.48*   GLUCOSE mg/dL 102*   CALCIUM mg/dL 8.8         Results " from last 7 days   Lab Units 05/13/20  0526   WBC 10*3/mm3 7.01   HEMOGLOBIN g/dL 11.9*   HEMATOCRIT % 35.1   PLATELETS 10*3/mm3 312                     @LABRCNT(bnp)@  I reviewed the patient's new clinical results.  I personally viewed and interpreted the patient's EKG/Telemetry data            Assessment/Plan   1.  Atrial fibrillation with a rapid ventricular rate: Heart rate still elevated   2.  Essential hypertension: Not at goal  3.  Small bowel obstruction  4.  Chronic kidney disease  5.  History of DVT     -Recommend continuing just prophylactic Lovenox.  We will consider restarting Eliquis if no surgical plan in the next couple of days  -Increase metoprolol tartrate to 10 mg IV every 6 hours.  Heart rate and blood pressure still mildly elevated.  -Once she is tolerating p.o. we will try to get her on metoprolol p.o. to allow for better control.  -I will continue to follow.  No additional cardiac work-up at this time.  Rate control only

## 2020-05-13 NOTE — PLAN OF CARE
Problem: Patient Care Overview  Goal: Plan of Care Review  Outcome: Ongoing (interventions implemented as appropriate)  Hr 100-120's when in bed, up to 140's when oob but doesn't sustain. IV metoprolol increased to 10 mg today. NG tube d/c'd. Up in chair. Rec'd prn zofran and dilaudid.

## 2020-05-13 NOTE — PLAN OF CARE
Problem: Patient Care Overview  Goal: Plan of Care Review  Outcome: Ongoing (interventions implemented as appropriate)  Flowsheets  Taken 5/13/2020 0231  Progress: no change  Outcome Summary: NG still to wall suction; PRN IV pain/nausea meds given; up with stand by assist; no s/s of distress  Taken 5/13/2020 0003  Plan of Care Reviewed With: patient

## 2020-05-13 NOTE — PROGRESS NOTES
Continued Stay Note  [facility]     Patient Name: Mariya Campos  MRN: [id number]  Today's Date: 5/13/2020    Admit Date: 5/12/2020    Discharge Plan     Row Name 05/13/20 1338       Plan    Plan  Home with spouse; no needs.     Patient/Family in Agreement with Plan  yes    Plan Comments  Explained roll of . Face sheet and pharmacy verified. Pt lives with Jamari Campos, spouse, [phone]. There are 3 steps to enter home.  Denies the use of any DME.  Pt is independent with ADLs. [facility] and has used [facility] in the past. Pt's PCP is Dr JOSE Coffman. Uses [facility] in [facility]. At discharge, family will transport. Pt denies any discharge needs and none identified at this time. Explained that CCP would follow to assess for discharge needs.  Lawrence Joshi RN-BC        Discharge Codes    No documentation.             Lawrence Joshi RN

## 2020-05-14 PROCEDURE — 99232 SBSQ HOSP IP/OBS MODERATE 35: CPT | Performed by: INTERNAL MEDICINE

## 2020-05-14 PROCEDURE — 25010000002 ENOXAPARIN PER 10 MG: Performed by: SURGERY

## 2020-05-14 RX ORDER — DILTIAZEM HYDROCHLORIDE 180 MG/1
360 CAPSULE, COATED, EXTENDED RELEASE ORAL
Status: DISCONTINUED | OUTPATIENT
Start: 2020-05-14 | End: 2020-05-15 | Stop reason: HOSPADM

## 2020-05-14 RX ADMIN — SODIUM CHLORIDE 100 ML/HR: 9 INJECTION, SOLUTION INTRAVENOUS at 05:00

## 2020-05-14 RX ADMIN — DILTIAZEM HYDROCHLORIDE 360 MG: 180 CAPSULE, COATED, EXTENDED RELEASE ORAL at 18:17

## 2020-05-14 RX ADMIN — SODIUM CHLORIDE, PRESERVATIVE FREE 10 ML: 5 INJECTION INTRAVENOUS at 21:02

## 2020-05-14 RX ADMIN — METOPROLOL TARTRATE 10 MG: 5 INJECTION, SOLUTION INTRAVENOUS at 04:39

## 2020-05-14 RX ADMIN — ENOXAPARIN SODIUM 60 MG: 60 INJECTION SUBCUTANEOUS at 04:39

## 2020-05-14 RX ADMIN — METOPROLOL TARTRATE 10 MG: 5 INJECTION, SOLUTION INTRAVENOUS at 12:34

## 2020-05-14 RX ADMIN — APIXABAN 5 MG: 2.5 TABLET, FILM COATED ORAL at 21:33

## 2020-05-14 RX ADMIN — SODIUM CHLORIDE, PRESERVATIVE FREE 10 ML: 5 INJECTION INTRAVENOUS at 08:19

## 2020-05-14 NOTE — PLAN OF CARE
Problem: Patient Care Overview  Goal: Plan of Care Review  Outcome: Ongoing (interventions implemented as appropriate)  Flowsheets (Taken 5/14/2020 0306)  Progress: improving  Plan of Care Reviewed With: patient  Outcome Summary: Pt NPO except ice chips. Pt c/o intermittent pain and nausea but no vomitting this shift. SEE MAR. Pt states pain has improved since admission. Pt ambulating and passing gas. Afib on telemetry. Pt now on scheduled metoprolol IV per cardiology. NG tube removed 5/13/2020 per day shift. Will continue to monitor.

## 2020-05-14 NOTE — PLAN OF CARE
Problem: Patient Care Overview  Goal: Plan of Care Review  Outcome: Ongoing (interventions implemented as appropriate)     +BM today. Full liquids started. IVF d/c'd. PO meds restarted. Possible d/c tomorrow. VSS, will continue to monitor.

## 2020-05-14 NOTE — PROGRESS NOTES
[facility] Follow Up    Chief Complaint: Follow up atrial fibrillation    Interval History:  Feeling better.  Had a BM this morning.  Does not have an appetite and has no desire to eat any solid foods. Denies palpitations, dyspnea, or chest pain.     Objective:     Objective:  Temp:  [98.4 °F (36.9 °C)-98.6 °F (37 °C)] 98.6 °F (37 °C)  Heart Rate:  [119-122] 120  Resp:  [16-18] 18  BP: (111-141)/() 141/65     Intake/Output Summary (Last 24 hours) at 5/14/2020 0744  Last data filed at 5/14/2020 0445  Gross per 24 hour   Intake 1947 ml   Output --   Net 1947 ml     Body mass index is 44.09 kg/m².      05/12/20  0722   Weight: 132 kg (290 lb)     Weight change:       Physical Exam:   General : Alert, cooperative, in no acute distress.  Neuro: Alert,cooperative and oriented.  Lungs: CTAB. Normal respiratory effort and rate.  CV: irregularly, irregular, rate and rhythm, normal S1 and S2, no murmurs, gallops or rubs.  ABD: Soft, nontender, nondistended. Positive bowel sounds.  Extr: No edema or cyanosis, moves all extremities.    Lab Review:   Results from last 7 days   Lab Units 05/13/20  0526 05/12/20  0725   SODIUM mmol/L 142 136   POTASSIUM mmol/L 4.2 4.5   CHLORIDE mmol/L 103 97*   CO2 mmol/L 25.3 24.2   BUN mg/dL 29* 28*   CREATININE mg/dL 1.48* 1.60*   GLUCOSE mg/dL 102* 147*   CALCIUM mg/dL 8.8 10.0   AST (SGOT) U/L  --  13   ALT (SGPT) U/L  --  9         Results from last 7 days   Lab Units 05/13/20  0526 05/12/20  0724   WBC 10*3/mm3 7.01 8.92   HEMOGLOBIN g/dL 11.9* 13.6   HEMATOCRIT % 35.1 40.9   PLATELETS 10*3/mm3 312 358                   Invalid input(s): LDLCALC          I reviewed the patient's new clinical results.  I personally viewed and interpreted the patient's EKG  Current Medications:   Scheduled Meds:  enoxaparin 60 mg Subcutaneous Q12H   metoprolol tartrate 10 mg Intravenous Q6H   sodium chloride 10 mL Intravenous Q12H     Continuous Infusions:  sodium chloride 100  mL/hr Last Rate: 100 mL/hr (05/14/20 0500)       Allergies:  Allergies   Allergen Reactions   • Fish-Derived Products Anaphylaxis   • Shellfish-Derived Products Anaphylaxis       Assessment/Plan:     1.  Atrial fibrillation with a rapid ventricular rate. Fair rate control with IV metoprolol.  On enoxaparin.   2.  Essential hypertension: Controlled with higher dose of IV metoprolol.  3.  Small bowel obstruction  4.  Chronic kidney disease  5.  History of DVT    -  Resume diltiazem once taking PO  -  Resume apixaban if no plans for invasive procedures.    Radha Pollard MD  05/14/20  07:44

## 2020-05-14 NOTE — PROGRESS NOTES
"Adult Nutrition  Assessment/PES    Patient Name:  Mariya Campos  YOB: 1960  MRN: [id number]  Admit Date:  5/12/2020    Assessment Date:  5/14/2020    Comments:  Nutrition Screen:  59 y.o female with small bowel obstruction.  NGT d/c'd 5/13.  Diet advanced to clear liquids this AM but does not have an appetite, noted.  RD to continue to follow for tolerance of diet advancement once medically able and adequate intake.     Due to the COVID pandemic, nutrition assessment completed based on review of electronic medical record and/or discussion with RN. This RD currently working remotely and can be reached via secure chat or email.      Reason for Assessment     Row Name 05/14/20 0918          Reason for Assessment    Reason For Assessment  identified at risk by screening criteria     Diagnosis  other (see comments) SBO, History of bronchitis, anemia, CKD, colon CA, Afib     Identified At Risk by Screening Criteria  other (see comments) NPO or Clear liquids on Day 3         Nutrition/Diet History     Row Name 05/14/20 0920          Nutrition/Diet History    Typical Food/Fluid Intake  Presented w/ abdomnial pain.  Resolving SBO, noted. NGT d/c'd 5/13.  Diet advaced to clears this AM.       Factors Affecting Nutritional Intake  altered gastrointestinal function         Anthropometrics     Row Name 05/14/20 0926 05/14/20 0921       Anthropometrics    Height  172.7 cm (67.99\")  172.7 cm (67.99\")       Admit Weight    Admit Weight  --  132 kg (291 lb 0.1 oz) 5/12       Ideal Body Weight (IBW)    Ideal Body Weight (IBW) (kg)  64.13  64.13       Body Mass Index (BMI)    BMI Assessment  --  BMI 40 or greater: obesity grade III        Labs/Tests/Procedures/Meds     Row Name 05/14/20 0921          Labs/Procedures/Meds    Lab Results Reviewed  reviewed, pertinent     Lab Results Comments  Gluc: 102-147, BUN: 29, Cr: 1.48        Diagnostic Tests/Procedures    Diagnostic Test/Procedure Reviewed  reviewed, pertinent " "    Diagnostic Test/Procedures Comments  KUB of abd        Medications    Pertinent Medications Reviewed  reviewed, pertinent     Pertinent Medications Comments  Lovenox, 100mL/hr IVF, DIlaudid, Zofran         Physical Findings     Row Name 05/14/20 0924          Physical Findings    Overall Physical Appearance  obese     Skin  other (see comments) B: 19         Estimated/Assessed Needs     Row Name 05/14/20 0926 05/14/20 0921       Calculation Measurements    Weight Used For Calculations  132 kg (291 lb 0.1 oz)  --    Height  172.7 cm (67.99\")  172.7 cm (67.99\")       Estimated/Assessed Needs    Additional Documentation  Fluid Requirements (Group);KCAL/KG (Group);Protein Requirements (Group)  --       KCAL/KG    KCAL/KG  14 Kcal/Kg (kcal)  --    14 Kcal/Kg (kcal)  1848  --       Protein Requirements    Weight Used For Protein Calculations  64 kg (141 lb 1.5 oz) IBW  --    Est Protein Requirement Amount (gms/kg)  2.5 gm protein  --    Estimated Protein Requirements (gms/day)  160  --       Fluid Requirements    Estimated Fluid Requirements (mL/day)  2300  --    Estimated Fluid Requirement Method  RDA Method  --    RDA Method (mL)  2300  --        Nutrition Prescription Ordered     Row Name 05/14/20 0928          Nutrition Prescription PO    Current PO Diet  Clear Liquid     Fluid Consistency  Thin         Evaluation of Received Nutrient/Fluid Intake     Row Name 05/14/20 0929          Fluid Intake Evaluation    IV Fluid (mL)  2400               Problem/Interventions:  Problem 1     Row Name 05/14/20 0930          Nutrition Diagnoses Problem 1    Problem 1  Inadequate Nutrient Intake     Etiology (related to)  Medical Diagnosis     Gastrointestinal  SBO     Signs/Symptoms (evidenced by)  Clear Liquid Diet               Intervention Goal     Row Name 05/14/20 0923          Intervention Goal    General  Maintain nutrition;Reduce/improve symptoms;Meet nutritional needs for age/condition     PO  Tolerate PO     Weight  " Appropriate weight loss         Nutrition Intervention     Row Name 05/14/20 0931          Nutrition Intervention    RD/Tech Action  Care plan reviewd;Follow Tx progress           Education/Evaluation     Row Name 05/14/20 0932          Education    Education  Will Instruct as appropriate        Monitor/Evaluation    Monitor  Per protocol;I&O;PO intake;Pertinent labs;Weight;Skin status;Symptoms     Education Follow-up  Reinforce PRN           Electronically signed by:  Dee Lauren RD  05/14/20 09:33

## 2020-05-15 ENCOUNTER — READMISSION MANAGEMENT (OUTPATIENT)
Dept: CALL CENTER | Facility: HOSPITAL | Age: 60
End: 2020-05-15

## 2020-05-15 VITALS
RESPIRATION RATE: 16 BRPM | TEMPERATURE: 98.1 F | HEART RATE: 86 BPM | HEIGHT: 68 IN | WEIGHT: 290 LBS | BODY MASS INDEX: 43.95 KG/M2 | DIASTOLIC BLOOD PRESSURE: 81 MMHG | SYSTOLIC BLOOD PRESSURE: 115 MMHG | OXYGEN SATURATION: 95 %

## 2020-05-15 PROBLEM — R10.84 GENERALIZED ABDOMINAL PAIN: Status: RESOLVED | Noted: 2020-05-12 | Resolved: 2020-05-15

## 2020-05-15 PROCEDURE — 99238 HOSP IP/OBS DSCHRG MGMT 30/<: CPT | Performed by: SURGERY

## 2020-05-15 PROCEDURE — 99232 SBSQ HOSP IP/OBS MODERATE 35: CPT | Performed by: INTERNAL MEDICINE

## 2020-05-15 RX ADMIN — APIXABAN 5 MG: 2.5 TABLET, FILM COATED ORAL at 08:22

## 2020-05-15 RX ADMIN — SODIUM CHLORIDE, PRESERVATIVE FREE 10 ML: 5 INJECTION INTRAVENOUS at 08:22

## 2020-05-15 RX ADMIN — DILTIAZEM HYDROCHLORIDE 360 MG: 180 CAPSULE, COATED, EXTENDED RELEASE ORAL at 08:22

## 2020-05-15 NOTE — OUTREACH NOTE
Prep Survey      Responses   [facility] patient discharged from?  [place]   Is LACE score < 7 ?  No   Eligibility  [facility]   Date of Admission  05/12/20   Date of Discharge  05/15/20   Discharge Disposition  Home or Self Care   Discharge diagnosis  SBO   COVID-19 Test Status  Not tested   Does the patient have one of the following disease processes/diagnoses(primary or secondary)?  Other   Does the patient have Home health ordered?  No   Is there a DME ordered?  No   Prep survey completed?  Yes          Kathleen Glass RN

## 2020-05-15 NOTE — PROGRESS NOTES
Continued Stay Note  [facility]     Patient Name: Mariya Campos  MRN: [id number]  Today's Date: 5/15/2020    Admit Date: 5/12/2020    Discharge Plan     Row Name 05/15/20 1029       Plan    Plan  Home with spouse, denies needs. On Eliquis PTA. Family to transport    Patient/Family in Agreement with Plan  yes    Plan Comments  On Full liquid diet. Pt plans to return home with spouse. Denies any needs. Pt was Eliquis PTA.  Lawrence Joshi RN-BC        Discharge Codes    No documentation.             Lawrence Joshi RN

## 2020-05-15 NOTE — PLAN OF CARE
Problem: Patient Care Overview  Goal: Plan of Care Review  Outcome: Ongoing (interventions implemented as appropriate)  Flowsheets (Taken 5/15/2020 0324)  Progress: improving  Plan of Care Reviewed With: patient  Note:   Pt tolerating full liquid diet. Pt denies pain or nausea/vomitting. Pt up ad domitila and encouraged to ambulate. Last BM 5/14/2020. Pt afib on telemetry and has periods of afib with RVR when ambulating. PO cardizem started 5/14/2020 pt asymptomatic and denies chest pain or shortness of breath. Cardiology following. Will continue to monitor.

## 2020-05-15 NOTE — PROGRESS NOTES
[facility] Follow Up    Chief Complaint: Follow up atrial fibrillation    Interval History:  Feeling better.  No new complaints.  Rate is controlled.  Pending discharge.  Objective:     Objective:  Temp:  [98.1 °F (36.7 °C)-99.3 °F (37.4 °C)] 98.1 °F (36.7 °C)  Heart Rate:  [] 86  Resp:  [16] 16  BP: (108-145)/(60-84) 115/81   No intake or output data in the 24 hours ending 05/15/20 1309  Body mass index is 44.1 kg/m².      05/12/20  0722   Weight: 132 kg (290 lb)     Weight change:       Physical Exam:   General : Alert, cooperative, in no acute distress.  Neuro: Alert,cooperative and oriented.  Lungs: CTAB. Normal respiratory effort and rate.  CV: irregularly, irregular, rate and rhythm, normal S1 and S2, no murmurs, gallops or rubs.  ABD: Soft, nontender, nondistended. Positive bowel sounds.  Extr: No edema or cyanosis, moves all extremities.    Lab Review:   Results from last 7 days   Lab Units 05/13/20  0526 05/12/20  0725   SODIUM mmol/L 142 136   POTASSIUM mmol/L 4.2 4.5   CHLORIDE mmol/L 103 97*   CO2 mmol/L 25.3 24.2   BUN mg/dL 29* 28*   CREATININE mg/dL 1.48* 1.60*   GLUCOSE mg/dL 102* 147*   CALCIUM mg/dL 8.8 10.0   AST (SGOT) U/L  --  13   ALT (SGPT) U/L  --  9         Results from last 7 days   Lab Units 05/13/20  0526 05/12/20  0724   WBC 10*3/mm3 7.01 8.92   HEMOGLOBIN g/dL 11.9* 13.6   HEMATOCRIT % 35.1 40.9   PLATELETS 10*3/mm3 312 358                   Invalid input(s): LDLCALC          I reviewed the patient's new clinical results.  I personally viewed and interpreted the patient's EKG  Current Medications:   Scheduled Meds:    apixaban 5 mg Oral Q12H   dilTIAZem  mg Oral Q24H   sodium chloride 10 mL Intravenous Q12H     Continuous Infusions:     Allergies:  Allergies   Allergen Reactions   • Fish-Derived Products Anaphylaxis   • Shellfish-Derived Products Anaphylaxis       Assessment/Plan:     1.  Atrial fibrillation with a rapid ventricular rate.  Better rate  control on p.o. medication  2.  Essential hypertension: Reasonable control  3.  Small bowel obstruction  4.  Chronic kidney disease  5.  History of DVT    Doing well.  Continue diltiazem and apixaban.  Appropriate for discharge home.    Bradford So MD  05/15/20  13:09

## 2020-05-15 NOTE — DISCHARGE SUMMARY
DATE OF ADMIT: 5/12/2020    DATE OF DISCHARGE: 5/15/2020    DIAGNOSIS:  · Small bowel obstruction  · Atrial fibrillation    SUMMARY OF HOSPITAL COURSE:   59-year-old lady admitted from the emergency room with recurrent small bowel obstruction.  This resolved with conservative management.  By the date of discharge she was tolerating full liquid diet and did not want to advance just yet.  Was having bowel function and pain had completely resolved.  Cardiology consultation was obtained during the course of her hospitalization for management of her atrial fibrillation.    DIET: Regular    ACTIVITY: Walking encouraged, no lifting or strenuous activity    MEDICATIONS: Refer to MAR    FOLLOW-UP: Follow-up as needed    Charli Dias M.D.

## 2020-05-16 ENCOUNTER — TRANSITIONAL CARE MANAGEMENT TELEPHONE ENCOUNTER (OUTPATIENT)
Dept: CALL CENTER | Facility: HOSPITAL | Age: 60
End: 2020-05-16

## 2020-05-16 NOTE — OUTREACH NOTE
Call Center TCM Note      Responses   [facility] patient discharged from?  [place]   COVID-19 Test Status  Not tested   Does the patient have one of the following disease processes/diagnoses(primary or secondary)?  Other   TCM attempt successful?  Yes   Call start time  1444   Call end time  1447   Discharge diagnosis  SBO   Meds reviewed with patient/caregiver?  Yes   Is the patient having any side effects they believe may be caused by any medication additions or changes?  No   Does the patient have all medications ordered at discharge?  N/A   Is the patient taking all medications as directed (includes completed medication regime)?  Yes   Does the patient have a primary care provider?   Yes   Does the patient have an appointment with their PCP within 7 days of discharge?  Greater than 7 days   Comments regarding PCP  5/26/20 video visit with PCP    What is preventing the patient from scheduling follow up appointments within 7 days of discharge?  Earlier appointment not available   Nursing Interventions  Verified appointment date/time/provider   Has the patient kept scheduled appointments due by today?  N/A   Pulse Ox monitoring  None   Psychosocial issues?  No   Did the patient receive a copy of their discharge instructions?  Yes   Nursing interventions  Reviewed instructions with patient   What is the patient's perception of their health status since discharge?  Improving   Is the patient/caregiver able to teach back signs and symptoms related to disease process for when to call PCP?  Yes   Is the patient/caregiver able to teach back signs and symptoms related to disease process for when to call 911?  Yes   Is the patient/caregiver able to teach back the hierarchy of who to call/visit for symptoms/problems? PCP, Specialist, Home health nurse, Urgent Care, ED, 911  Yes   TCM call completed?  Yes          Rina Humphrey RN    5/16/2020, 14:47

## 2020-05-22 ENCOUNTER — READMISSION MANAGEMENT (OUTPATIENT)
Dept: CALL CENTER | Facility: HOSPITAL | Age: 60
End: 2020-05-22

## 2020-05-22 NOTE — OUTREACH NOTE
Medical Week 2 Survey      Responses   [facility] patient discharged from?  [place]   COVID-19 Test Status  Not tested   Does the patient have one of the following disease processes/diagnoses(primary or secondary)?  Other   Week 2 attempt successful?  Yes   Call start time  1643   Discharge diagnosis  SBO   Call end time  1645   Is patient permission given to speak with other caregiver?  No   Is the patient taking all medications as directed (includes completed medication regime)?  Yes   Does the patient have a primary care provider?   Yes   Comments regarding PCP  5/26/20 video visit with PCP    Has the patient kept scheduled appointments due by today?  N/A   Has home health visited the patient within 72 hours of discharge?  N/A   Pulse Ox monitoring  None   Psychosocial issues?  No   Did the patient receive a copy of their discharge instructions?  Yes   What is the patient's perception of their health status since discharge?  Improving [Patient reports tolerating foods ok,  regular bowel movments. ]   Is the patient/caregiver able to teach back the hierarchy of who to call/visit for symptoms/problems? PCP, Specialist, Home health nurse, Urgent Care, ED, 911  Yes   Week 2 Call Completed?  Yes   Graduated/Revoked comments  Brief call. She reports that she is doing ok. Denies any needs from follow up nurse today.           Sarahi Alexander RN

## 2020-05-26 ENCOUNTER — TELEMEDICINE (OUTPATIENT)
Dept: INTERNAL MEDICINE | Facility: CLINIC | Age: 60
End: 2020-05-26

## 2020-05-26 VITALS — HEART RATE: 90 BPM

## 2020-05-26 DIAGNOSIS — K56.609 SMALL BOWEL OBSTRUCTION (HCC): Primary | ICD-10-CM

## 2020-05-26 PROCEDURE — 99213 OFFICE O/P EST LOW 20 MIN: CPT | Performed by: FAMILY MEDICINE

## 2020-05-26 NOTE — PROGRESS NOTES
Subjective   Mariya Campos is a 59 y.o. female.   Chief Complaint   Patient presents with   • bowel obstruction     hosp follow up       History of Present Illness   Video visit.    #1  Small bowel obstruction- patient was admitted to hospital from 5/12/2020 to 5/15/2020.  She was in ER because of the sharp abdominal pain, nausea and vomiting.  It reminded her of small bowel obstruction that she had before.  CT of the abdomen showed high-grade partial or complete small bowel obstruction.  She was treated conservatively.  Symptoms resolved.  Cardiology was consulted because of A. fib.  There was no change in medications.  Patient reports no pain.  No nausea, no vomiting.  She has normal bowel movements.  On average 3-4 times a week which is normal for her.  She is able to eat.  She does not have much appetite for meat so she has mostly soups, yogurts, protein shakes.    The following portions of the patient's history were reviewed and updated as appropriate: allergies, current medications, past medical history, past social history, past surgical history and problem list.    Review of Systems   Constitutional: Negative for chills and fever.   Cardiovascular: Negative for chest pain.   Gastrointestinal: Negative for abdominal pain, nausea and vomiting.         Objective   Wt Readings from Last 3 Encounters:   05/12/20 132 kg (290 lb)   03/09/20 131 kg (288 lb 12.8 oz)   01/07/20 128 kg (282 lb)      Vitals:    05/26/20 1245   Pulse: 90     Temp Readings from Last 3 Encounters:   05/15/20 98.1 °F (36.7 °C) (Oral)   12/02/19 97.7 °F (36.5 °C) (Tympanic)   12/02/19 98.4 °F (36.9 °C)     BP Readings from Last 3 Encounters:   05/15/20 115/81   03/09/20 134/86   01/07/20 128/71     Pulse Readings from Last 3 Encounters:   05/26/20 90   05/15/20 86   03/09/20 96     There is no height or weight on file to calculate BMI.    Physical Exam   Constitutional: She is oriented to person, place, and time. She appears  well-developed and well-nourished.   Pulmonary/Chest: Effort normal.   Neurological: She is alert and oriented to person, place, and time.   Psychiatric: She has a normal mood and affect. Her behavior is normal.       Assessment/Plan   Mariya was seen today for bowel obstruction.    Diagnoses and all orders for this visit:    Small bowel obstruction (CMS/[facility])        #1 small bowel obstruction-hospital records were reviewed.  Medications are updated.  Blood work results and imaging test results reviewed.  Follow-up as needed.  Current outpatient and discharge medications have been reconciled for the patient.  Reviewed by: Zahida Coffman MD

## 2020-05-29 ENCOUNTER — READMISSION MANAGEMENT (OUTPATIENT)
Dept: CALL CENTER | Facility: HOSPITAL | Age: 60
End: 2020-05-29

## 2020-05-29 NOTE — OUTREACH NOTE
Medical Week 3 Survey      Responses   [facility] patient discharged from?  [place]   COVID-19 Test Status  Not tested   Does the patient have one of the following disease processes/diagnoses(primary or secondary)?  Other   Week 3 attempt successful?  Yes   Call start time  1352   Call end time  1354   Meds reviewed with patient/caregiver?  Yes   Is the patient taking all medications as directed (includes completed medication regime)?  Yes   Has the patient kept scheduled appointments due by today?  Yes   Pulse Ox monitoring  None   What is the patient's perception of their health status since discharge?  Improving   Week 3 Call Completed?  Yes   Graduated  Yes   Did the patient feel the follow up calls were helpful during their recovery period?  Yes   Was the number of calls appropriate?  Yes   Graduated/Revoked comments  She doing fine, shes states, and will call if needs anything,no questions          Merari Suero RN

## 2020-07-28 ENCOUNTER — OFFICE VISIT (OUTPATIENT)
Dept: SLEEP MEDICINE | Facility: HOSPITAL | Age: 60
End: 2020-07-28

## 2020-07-28 VITALS
BODY MASS INDEX: 43.95 KG/M2 | HEART RATE: 97 BPM | WEIGHT: 290 LBS | SYSTOLIC BLOOD PRESSURE: 135 MMHG | OXYGEN SATURATION: 96 % | DIASTOLIC BLOOD PRESSURE: 69 MMHG | HEIGHT: 68 IN

## 2020-07-28 DIAGNOSIS — G47.33 OSA (OBSTRUCTIVE SLEEP APNEA): Primary | ICD-10-CM

## 2020-07-28 PROCEDURE — G0463 HOSPITAL OUTPT CLINIC VISIT: HCPCS

## 2020-07-28 NOTE — PROGRESS NOTES
"Sleep Medicine Follow-up visit Note    Name: Mariya Campos  Age: 59 y.o.  Sex: female  :  1960  MRN: [id number]  Date of Service: 2020    History of Present Illness:   Mrs. Mariya Campos  is a 59 y.o. right handed Caucasianfemale is seen in the sleep clinic for Snoring, Sleep Apnea and Chronic Fatigue.      The patient has a H/O hypertension and atrial fibrillation deep vein thrombosis and CKD. The patient is on anticoagulation with apixaban.    The patient had severe obstructive sleep apnea syndrome with AHI 35.1 per sleep hour (normal less than 5 per sleep hour) AHI during supine position 85.8 per sleep hour and minimum SPO2 69%.  [place] Sleepiness Scale score prior to CPAP therapy was 6/24.    The patient is on auto CPAP therapy at a mean pressure of 9.1 cms of water. Residual AHI 1.5/ sleep hour. Compliance data to indicate 93% usage for more than 4 hours with an average usage of 6 hours and 48 minutes.  Waban sleepiness scale score with CPAP therapy is 4/24 with CPAP therapy.     Mask Type:   DME Company:    Sleep schedule: While Working: Bed time: 10-11 pm and wake up time: 6:30-7 am: sleep Latency: 30 minutes and Total Sleep Time: 7 hours, 30 minutes.      Naps: No.     Sleep schedule: While NOT Working: Bed time: 11 pm and wake up time: 8 am: sleep Latency : 30 minutes and Total Sleep Time: 9 hours, 30 minutes.      Caffeine intake: 2Cups of coffee/tea/soft drinks  Waban Sleepiness Scale: 4/24.  Review of Systems - Negative.  PMH, Surgical Hx, current Medications, Allergies, Family Hx, Social Hx and problem list were reviewed and updated in the chart.    Objective:  Vitals:    20 0809   BP: 135/69   Pulse: 97   SpO2: 96%   Weight: 132 kg (290 lb)   Height: 172.7 cm (68\")    Body mass index is 44.09 kg/m².       GEN: No significant distress, the patient is well developed, well nourished.  HEENT: pupils equal, round and reactive to light, extraocular movements intact, " conjunctiva not injected bilaterally, nasopharyngeal mucosa moist, no lesions appreciated, Mallampati score III (soft and hard palate and base of uvula visible)  NECK: Supple, no jugular venous distention, no thyromegaly, no bruits appreciated  LUNGS: Clear to auscultation bilaterally.    CV: regular rate and rhythm, no gallops, murmurs or rubs  EXT: no cyanosis, clubbing, or edema   NEURO: alert and oriented x 3, motor functions grossly intact, CN II-XII grossly intact  PSYCH:Normal mood and affect.    Home sleep study/polysomnography: 1/24/2020      Assessment and PLAN: STEVEN (obstructive sleep apnea) [G47.33]     The patient has severe obstructive sleep apnea syndrome and is on CPAP. The patient is compliant and is benefiting from it.  I will continue present CPAP therapy and will see the patient for follow-up in one year.    I have discussed the findings of my evaluation with the patient at length, instructed the patient on basic sleep hygiene, stressing the importance of regular sleep schedule without naps and with 8 hours of sleep per night, avoiding alcohol and sedative medications, limiting caffeine consumption, and implementing a healthy diet and exercise program and not to drive if patient is sleepy.       Mookie Coffey MD  7/28/2020  08:41

## 2020-10-20 DIAGNOSIS — Z00.00 PHYSICAL EXAM, ANNUAL: Primary | ICD-10-CM

## 2020-10-21 DIAGNOSIS — C18.9 MALIGNANT NEOPLASM OF COLON, UNSPECIFIED PART OF COLON (HCC): Primary | ICD-10-CM

## 2020-10-22 LAB — CEA SERPL-MCNC: 4.4 NG/ML

## 2020-10-27 ENCOUNTER — TRANSCRIBE ORDERS (OUTPATIENT)
Dept: ADMINISTRATIVE | Facility: HOSPITAL | Age: 60
End: 2020-10-27

## 2020-10-27 DIAGNOSIS — Z12.39 SCREENING BREAST EXAMINATION: Primary | ICD-10-CM

## 2020-11-02 ENCOUNTER — OFFICE VISIT (OUTPATIENT)
Dept: CARDIOLOGY | Facility: CLINIC | Age: 60
End: 2020-11-02

## 2020-11-02 VITALS
WEIGHT: 292 LBS | HEIGHT: 68 IN | BODY MASS INDEX: 44.25 KG/M2 | HEART RATE: 80 BPM | SYSTOLIC BLOOD PRESSURE: 142 MMHG | DIASTOLIC BLOOD PRESSURE: 80 MMHG

## 2020-11-02 DIAGNOSIS — R06.09 DOE (DYSPNEA ON EXERTION): ICD-10-CM

## 2020-11-02 DIAGNOSIS — I10 ESSENTIAL HYPERTENSION: ICD-10-CM

## 2020-11-02 DIAGNOSIS — I48.0 PAROXYSMAL ATRIAL FIBRILLATION (HCC): Primary | ICD-10-CM

## 2020-11-02 PROCEDURE — 93000 ELECTROCARDIOGRAM COMPLETE: CPT | Performed by: INTERNAL MEDICINE

## 2020-11-02 PROCEDURE — 99214 OFFICE O/P EST MOD 30 MIN: CPT | Performed by: INTERNAL MEDICINE

## 2020-11-02 NOTE — PROGRESS NOTES
Mariya Campos  1960  Date of Office Visit: 11/02/20  Encounter Provider: Bradford So MD  Place of Service: [facility] CARDIOLOGY      CHIEF COMPLAINT:  Persistent atrial fibrillation  EDOUARD    HISTORY OF PRESENT ILLNESS:I had the patient of seeing the patient back in followup.  She is a 59-year-old female with a medical history of obesity, atrial fibrillation with a rapid ventricular rate, essential hypertension, and history of DVT who presents back to me in followup.  Since our last visit, she says she has been more easily winded with minimal levels of exertion.  She also complains of mild bilateral lower extremity edema.  She is tolerating her anticoagulation well.  She denies any chest pain, but she really feels fatigued.          Review of Systems   Constitution: Negative for fever and malaise/fatigue.   HENT: Negative for nosebleeds and sore throat.    Eyes: Negative for blurred vision and double vision.   Cardiovascular: Negative for chest pain, claudication, palpitations and syncope.   Respiratory: Negative for cough, shortness of breath and snoring.    Endocrine: Negative for cold intolerance, heat intolerance and polydipsia.   Skin: Negative for itching, poor wound healing and rash.   Musculoskeletal: Negative for joint pain, joint swelling, muscle weakness and myalgias.   Gastrointestinal: Negative for abdominal pain, melena, nausea and vomiting.   Neurological: Negative for light-headedness, loss of balance, seizures, vertigo and weakness.   Psychiatric/Behavioral: Negative for altered mental status and depression.       Past Medical History:   Diagnosis Date   • Abdominal pain    • Acid reflux    • Allergic rhinitis    • Anemia     ON IRON QOD   • Asthma    • Chronic bronchitis (CMS/HCC)    • Chronic kidney disease     STAGE 3   SINCE 2015 WITH COLON SURGERY   • Colon cancer (CMS/HCC)     CHEMO 6 MONTHS   • Hiatal hernia    • Hypertension    • Incisional  "hernia    • Overweight    • Peripheral neuropathy     FROM CHEMO TX'S   • Rosacea    • Visit for screening mammogram        The following portions of the patient's history were reviewed and updated as appropriate: Social history , Family history and Surgical history     Current Outpatient Medications on File Prior to Visit   Medication Sig Dispense Refill   • apixaban (ELIQUIS) 5 MG tablet tablet Take 1 tablet by mouth Every 12 (Twelve) Hours. 180 tablet 3   • azelastine (ASTELIN) 0.1 % nasal spray 2 sprays into the nostril(s) as directed by provider Daily. Use in each nostril as directed 1 each 12   • Cholecalciferol (VITAMIN D) 2000 units tablet Take 2,000 Units by mouth Daily.     • dilTIAZem CD (CARDIZEM CD) 360 MG 24 hr capsule Take 1 capsule by mouth Every Morning. 90 capsule 3   • levocetirizine (XYZAL) 5 MG tablet Take 5 mg by mouth Every Evening.     • SYMBICORT 80-4.5 MCG/ACT inhaler INHALE 2 PUFFS BID   RINSE AFTER USE       No current facility-administered medications on file prior to visit.        Allergies   Allergen Reactions   • Fish-Derived Products Anaphylaxis   • Shellfish-Derived Products Anaphylaxis       Vitals:    11/02/20 1500   BP: 142/80   Pulse: 80   Weight: 132 kg (292 lb)   Height: 172.7 cm (68\")     Constitutional:       Appearance: Well-developed.   Eyes:      General: No scleral icterus.     Conjunctiva/sclera: Conjunctivae normal.   HENT:      Head: Normocephalic and atraumatic.   Neck:      Musculoskeletal: Normal range of motion and neck supple.      Thyroid: No thyromegaly.      Vascular: Normal carotid pulses. No carotid bruit, hepatojugular reflux or JVD.      Trachea: No tracheal deviation.   Pulmonary:      Effort: No respiratory distress.      Breath sounds: Normal breath sounds. No decreased breath sounds. No wheezing. No rhonchi. No rales.   Chest:      Chest wall: Not tender to palpatation.   Cardiovascular:      Normal rate. Regular rhythm.      No gallop.   Pulses:     " Carotid: 2+ bilaterally.     Radial: 2+ bilaterally.     Femoral: 2+ bilaterally.     Dorsalis pedis: 2+ bilaterally.     Posterior tibial: 2+ bilaterally.  Abdominal:      General: Bowel sounds are normal. There is no distension.      Palpations: Abdomen is soft.      Tenderness: There is no abdominal tenderness. There is no rebound.   Musculoskeletal:         General: No deformity.   Skin:     Findings: No erythema or rash.   Neurological:      Mental Status: Alert and oriented to person, place, and time.      Sensory: No sensory deficit.   Psychiatric:         Behavior: Behavior normal.            Lab Results   Component Value Date    WBC 7.01 05/13/2020    HGB 11.9 (L) 05/13/2020    HCT 35.1 05/13/2020    MCV 87.1 05/13/2020     05/13/2020       Lab Results   Component Value Date    GLUCOSE 102 (H) 05/13/2020    BUN 29 (H) 05/13/2020    CREATININE 1.48 (H) 05/13/2020    EGFRIFNONA 36 (L) 05/13/2020    EGFRIFAFRI 38 (L) 11/07/2018    BCR 19.6 05/13/2020    K 4.2 05/13/2020    CO2 25.3 05/13/2020    CALCIUM 8.8 05/13/2020    PROTENTOTREF 7.9 11/07/2018    ALBUMIN 4.10 05/12/2020    LABIL2 0.9 11/07/2018    AST 13 05/12/2020    ALT 9 05/12/2020       Lab Results   Component Value Date    GLUCOSE 102 (H) 05/13/2020    CALCIUM 8.8 05/13/2020     05/13/2020    K 4.2 05/13/2020    CO2 25.3 05/13/2020     05/13/2020    BUN 29 (H) 05/13/2020    CREATININE 1.48 (H) 05/13/2020    EGFRIFAFRI 38 (L) 11/07/2018    EGFRIFNONA 36 (L) 05/13/2020    BCR 19.6 05/13/2020    ANIONGAP 13.7 05/13/2020       Lab Results   Component Value Date    CHOL 136 02/10/2018    CHLPL 183 11/07/2018    TRIG 106 11/07/2018    HDL 51 11/07/2018     (H) 11/07/2018         ECG 12 Lead    Date/Time: 11/2/2020 4:17 PM  Performed by: Bradford oS MD  Authorized by: Bradford So MD   Comparison: compared with previous ECG from 5/12/2020  Similar to previous ECG  Rhythm: atrial fibrillation  Rate: normal  QRS  axis: normal    Clinical impression: normal ECG             Results for orders placed during the hospital encounter of 12/11/19   Adult Transthoracic Echo Complete W/ Cont if Necessary Per Protocol    Narrative · Left ventricular systolic function is normal. Calculated EF = 64%.  · Normal right ventricular cavity size and systolic function noted.  · Left atrial cavity size is moderate-to-severely dilated.  · Mild mitral valve regurgitation is present  · Calculated right ventricular systolic pressure from tricuspid   regurgitation is 23 mmHg.  · There is no evidence of pericardial effusion.          DISCUSSION/SUMMARY  This is a very pleasant 59-year-old female with a medical history of obesity and persistent atrial fibrillation who presents to me with worsening dyspnea.  She does state that she is struggling more as of late with dyspnea on exertion and is unable to do her normal activities.  She reports that she just does not feel as well as she has.    1.  EDOUARD.    - I do feel that this is secondary to her atrial fibrillation.  - The patient had a transthoracic echocardiogram in 12/2019 with normal ejection fraction and no significant valvular heart disease.  I do not think we need to do a repeat echo.    - Lexiscan stress test will be ordered.     2.  Persistent atrial fibrillation:  Rate is better controlled on this visit.    Continue anticoagulation and diltiazem.

## 2020-12-02 ENCOUNTER — OFFICE VISIT (OUTPATIENT)
Dept: INTERNAL MEDICINE | Facility: CLINIC | Age: 60
End: 2020-12-02

## 2020-12-02 VITALS
TEMPERATURE: 97.5 F | HEART RATE: 83 BPM | DIASTOLIC BLOOD PRESSURE: 60 MMHG | SYSTOLIC BLOOD PRESSURE: 124 MMHG | HEIGHT: 68 IN | OXYGEN SATURATION: 97 % | BODY MASS INDEX: 44.1 KG/M2 | WEIGHT: 291 LBS

## 2020-12-02 DIAGNOSIS — E66.01 MORBID OBESITY (HCC): ICD-10-CM

## 2020-12-02 DIAGNOSIS — Z00.00 PHYSICAL EXAM, ANNUAL: Primary | ICD-10-CM

## 2020-12-02 PROCEDURE — 90715 TDAP VACCINE 7 YRS/> IM: CPT | Performed by: FAMILY MEDICINE

## 2020-12-02 PROCEDURE — 99396 PREV VISIT EST AGE 40-64: CPT | Performed by: FAMILY MEDICINE

## 2020-12-02 PROCEDURE — 90471 IMMUNIZATION ADMIN: CPT | Performed by: FAMILY MEDICINE

## 2020-12-02 RX ORDER — AZELASTINE 1 MG/ML
2 SPRAY, METERED NASAL DAILY
Qty: 1 EACH | Refills: 12 | Status: SHIPPED | OUTPATIENT
Start: 2020-12-02 | End: 2023-01-24

## 2020-12-02 NOTE — PROGRESS NOTES
Subjective   Mariya Campos is a 59 y.o. female.   Chief Complaint   Patient presents with   • Annual Exam       History of Present Illness     #1 CPE-patient is here for complete physical exam.  She has no complaints.  She reports no change in medical, surgical or family history from last office visit.  No hospitalizations.  She saw her cardiologist this month.  Dose of Eliquis was increased to 5 mg twice a day.  Otherwise no change in medications.  Over-the-counter she takes vitamin D3 at 2500 units a day.  No new allergies to medications.  She does not use tobacco products.  She occasionally drinks alcohol.  No drugs.  She started to exercise regularly few months ago.  She uses stationary bike, 4-5 times a week for 20 minutes.  She started new weight loss program.  She is in [employer] program.  She likes it.  She met her first goal of losing weight.  She had a dental appointment this morning.  She is scheduled for eye exam this afternoon.  She is scheduled for mammogram on December 16.  She did not do colonoscopy yet.  She had it done in January 2018.  She was due in January 2019.  She scheduled it twice but canceled.  She is afraid of complications.  She has a history of colon cancer.  She knows the importance of it.  Pap smear in November 2018.  It was normal.  HPV negative.  She had flu vaccine in October 2020.  Tetanus vaccine in 2010.    The following portions of the patient's history were reviewed and updated as appropriate: allergies, current medications, past family history, past medical history, past social history, past surgical history and problem list.    Review of Systems   Constitutional: Negative for chills and fever.   HENT: Negative.    Respiratory: Negative for cough and shortness of breath.    Cardiovascular: Negative.  Negative for chest pain and palpitations.   Gastrointestinal: Negative for blood in stool.   Genitourinary: Negative for hematuria.   Neurological: Negative for  light-headedness.   Psychiatric/Behavioral: Negative.          Objective   Wt Readings from Last 3 Encounters:   12/02/20 132 kg (291 lb)   11/02/20 132 kg (292 lb)   07/28/20 132 kg (290 lb)      Vitals:    12/02/20 1132   BP: 124/60   Pulse: 83   Temp: 97.5 °F (36.4 °C)   SpO2: 97%     Temp Readings from Last 3 Encounters:   12/02/20 97.5 °F (36.4 °C)   05/15/20 98.1 °F (36.7 °C) (Oral)   12/02/19 97.7 °F (36.5 °C) (Tympanic)     BP Readings from Last 3 Encounters:   12/02/20 124/60   11/02/20 142/80   07/28/20 135/69     Pulse Readings from Last 3 Encounters:   12/02/20 83   11/02/20 80   07/28/20 97     Body mass index is 44.26 kg/m².    Physical Exam  Vitals signs reviewed.   Constitutional:       General: She is not in acute distress.     Appearance: She is well-developed. She is obese. She is not diaphoretic.   HENT:      Head: Normocephalic and atraumatic. Hair is normal.      Right Ear: Hearing, tympanic membrane, ear canal and external ear normal. No decreased hearing noted. No drainage.      Left Ear: Hearing, tympanic membrane, ear canal and external ear normal. No decreased hearing noted.   Eyes:      General: Lids are normal.         Right eye: No discharge.         Left eye: No discharge.      Conjunctiva/sclera: Conjunctivae normal.      Pupils: Pupils are equal, round, and reactive to light.   Neck:      Musculoskeletal: Normal range of motion and neck supple.      Thyroid: No thyromegaly.      Vascular: No JVD.      Trachea: No tracheal deviation.   Cardiovascular:      Rate and Rhythm: Normal rate and regular rhythm.      Pulses: Normal pulses.      Heart sounds: Normal heart sounds. No murmur. No friction rub. No gallop.    Pulmonary:      Effort: Pulmonary effort is normal. No respiratory distress.      Breath sounds: Normal breath sounds. No wheezing or rales.   Chest:      Chest wall: No tenderness.      Breasts:         Right: Normal. No swelling, inverted nipple, mass, nipple discharge, skin  change or tenderness.         Left: Normal. No swelling, inverted nipple, mass, nipple discharge, skin change or tenderness.   Abdominal:      General: Bowel sounds are normal. There is no distension.      Palpations: Abdomen is soft. There is no mass.      Tenderness: There is no abdominal tenderness. There is no guarding or rebound.      Hernia: No hernia is present.      Comments: Large scars on abdomen.   Musculoskeletal: Normal range of motion.         General: No tenderness or deformity.   Lymphadenopathy:      Cervical: No cervical adenopathy.      Upper Body:      Right upper body: No supraclavicular or axillary adenopathy.      Left upper body: No supraclavicular or axillary adenopathy.   Skin:     General: Skin is warm and dry.      Findings: No erythema or rash.   Neurological:      Mental Status: She is alert and oriented to person, place, and time.      Cranial Nerves: No cranial nerve deficit.      Motor: No abnormal muscle tone.      Coordination: Coordination normal.      Deep Tendon Reflexes: Reflexes are normal and symmetric. Reflexes normal.   Psychiatric:         Behavior: Behavior normal.         Thought Content: Thought content normal.         Judgment: Judgment normal.         Assessment/Plan   Diagnoses and all orders for this visit:    1. Physical exam, annual (Primary)    2. Morbid obesity (CMS/[facility])        #1 CPE-preventing counseling provided.  We are checking labs.  Patient will continue to work on weight loss.  She is successful with naturally Slim program and she will continue it.  She will gradually increase exercise as tolerated.  She will try to get at least 30 minutes a day, 5 days a week.  Continue regular dental appointments and eye exams.  Sun protection discussed and recommended.  We discussed colonoscopy and patient is planning to do it this time.  She understands importance of it.  She is getting tetanus vaccine today.  She is advised to get Shingrix.  Pelvic and rectal exam  postponed.  Patient's preference.    Both patient and I were wearing masks throughout the whole appointment.  I was wearing goggles.

## 2020-12-16 ENCOUNTER — HOSPITAL ENCOUNTER (OUTPATIENT)
Dept: MAMMOGRAPHY | Facility: HOSPITAL | Age: 60
Discharge: HOME | End: 2020-12-16
Admitting: FAMILY MEDICINE

## 2020-12-16 DIAGNOSIS — Z12.39 SCREENING BREAST EXAMINATION: ICD-10-CM

## 2020-12-16 PROCEDURE — 77063 BREAST TOMOSYNTHESIS BI: CPT

## 2020-12-16 PROCEDURE — 77067 SCR MAMMO BI INCL CAD: CPT

## 2021-01-07 ENCOUNTER — TELEPHONE (OUTPATIENT)
Dept: CARDIOLOGY | Facility: CLINIC | Age: 61
End: 2021-01-07

## 2021-01-07 NOTE — TELEPHONE ENCOUNTER
Pt's Eliquis PA was denied. Eliquis is no longer covered per insurance. Insurance will cover Xarelto.  Please advise of recommendations.    DIANDRA Jacobsen

## 2021-01-07 NOTE — TELEPHONE ENCOUNTER
I spoke with her.  She is agreeable to the medication change, and I will prescribe Xarelto 20 mg daily.    She is refusing the stress test because she does not want to be Covid tested take the time off work.    Please schedule a follow-up appointment with Dr. So or myself in 3 months.

## 2021-01-07 NOTE — TELEPHONE ENCOUNTER
Dr. So, she does have CKD.  Would you recommend renal dosing with the Xarelto?    Also, it looks like you saw her in November.  Your note mentions that a stress test would be ordered; however, you did not order one.  Does she need a stress test?

## 2021-03-26 ENCOUNTER — BULK ORDERING (OUTPATIENT)
Dept: CASE MANAGEMENT | Facility: OTHER | Age: 61
End: 2021-03-26

## 2021-03-26 DIAGNOSIS — Z23 IMMUNIZATION DUE: ICD-10-CM

## 2021-04-07 ENCOUNTER — OFFICE VISIT (OUTPATIENT)
Dept: CARDIOLOGY | Facility: CLINIC | Age: 61
End: 2021-04-07

## 2021-04-07 VITALS
OXYGEN SATURATION: 98 % | HEIGHT: 68 IN | WEIGHT: 286 LBS | RESPIRATION RATE: 18 BRPM | DIASTOLIC BLOOD PRESSURE: 78 MMHG | HEART RATE: 90 BPM | SYSTOLIC BLOOD PRESSURE: 122 MMHG | BODY MASS INDEX: 43.35 KG/M2

## 2021-04-07 DIAGNOSIS — I82.532 CHRONIC DEEP VEIN THROMBOSIS (DVT) OF POPLITEAL VEIN OF LEFT LOWER EXTREMITY (HCC): ICD-10-CM

## 2021-04-07 DIAGNOSIS — I10 ESSENTIAL HYPERTENSION: ICD-10-CM

## 2021-04-07 DIAGNOSIS — I48.11 LONGSTANDING PERSISTENT ATRIAL FIBRILLATION (HCC): Primary | ICD-10-CM

## 2021-04-07 PROCEDURE — 99214 OFFICE O/P EST MOD 30 MIN: CPT | Performed by: NURSE PRACTITIONER

## 2021-04-07 PROCEDURE — 93000 ELECTROCARDIOGRAM COMPLETE: CPT | Performed by: NURSE PRACTITIONER

## 2021-04-07 RX ORDER — DILTIAZEM HYDROCHLORIDE 360 MG/1
360 CAPSULE, EXTENDED RELEASE ORAL EVERY MORNING
Qty: 90 CAPSULE | Refills: 3 | Status: SHIPPED | OUTPATIENT
Start: 2021-04-07

## 2021-04-07 NOTE — PROGRESS NOTES
Date of Office Visit: 2021  Encounter Provider: BROCK Nick  Place of Service: [facility] CARDIOLOGY  Patient Name: Mariya Campos  :1960    Chief Complaint   Patient presents with   • Atrial Fibrillation     3 MTH FOLLOW UP   :     HPI: Mariya Campos is a 60 y.o. female who presents today for follow-up.  Old records have been obtained and reviewed by me.  She is a patient of Dr. So's with a past cardiac history significant for atrial fibrillation and DVT.  The atrial fibrillation initially occurred in  in the setting of septic shock following a laparoscopic hernia repair.   Eventually, the rate control and anticoagulation approach were pursued.  Her last echocardiogram was in 2019 which revealed normal LV systolic function, mild mitral regurgitation, and no other significant valvular abnormalities.  In May 2020, she developed atrial fibrillation RVR in the setting of a small bowel obstruction.   She was last seen in the office by Dr. So in 2020 at which time she reported dyspnea on exertion and bilateral lower extremity edema.  Stress test was ordered.  However, the patient refused the stress test.  She is here today for follow-up.   Overall she has been doing well.  She denies any chest pain, palpitations, dizziness, syncope, bleeding difficulties or melena.  She has some shortness of air on exertion which she attributes to asthma and allergies.  She is actively trying to lose weight.  She has some bilateral ankle edema that is chronic and unchanged and does improve with elevation.  She tells me she is just emotionally tired.  She has 14 months until care home.  She has worked for 26 years at [employer].    Past Medical History:   Diagnosis Date   • Abdominal pain    • Acid reflux    • Allergic rhinitis    • Anemia     ON IRON QOD   • Asthma    • Chronic bronchitis (CMS/HCC)    • Chronic kidney disease     STAGE  3   SINCE 2015 WITH COLON SURGERY   • Colon cancer (CMS/HCC)     CHEMO 6 MONTHS   • Hiatal hernia    • Hypertension    • Incisional hernia    • Overweight    • Peripheral neuropathy     FROM CHEMO TX'S   • Rosacea    • Visit for screening mammogram        Past Surgical History:   Procedure Laterality Date   • BREAST BIOPSY     • COLECTOMY PARTIAL / TOTAL      Partial Colectomy  2015   • COLON SURGERY      XS 2   2006 COLON CANCER AND 2015   • COLONOSCOPY      Complete Colonoscopy   • DIAGNOSTIC LAPAROSCOPY N/A 2/4/2018    Procedure: EXPLORATORY LAPAROTOMY WITH SMALL BOWEL ANASTAMOSIS, LYSIS OF ADHESIONS, REMOVAL OF MESH;  Surgeon: Tri Guzman MD;  Location: [facility] MAIN OR;  Service:    • INCISION AND DRAINAGE TRUNK N/A 2/13/2018    Procedure: WOUND WASHOUT ABDOMEN;  Surgeon: Tri Guzman MD;  Location: [facility] MAIN OR;  Service:    • INCISION AND DRAINAGE TRUNK N/A 2/16/2018    Procedure: TRUNK DEBRIDEMENT abdominal wound;  Surgeon: Tri Guzman MD;  Location: [facility] MAIN OR;  Service:    • INCISION AND DRAINAGE TRUNK N/A 2/27/2018    Procedure: INCISION AND DRAINAGE WOUND abdomen;  Surgeon: Tri Guzman MD;  Location: [facility] OR;  Service:    • INSERTION HEMODIALYSIS CATHETER N/A 2/19/2018    Procedure: LEFT QUAD CENTRAL LINE INSERTION AND RIGHT TUNNELED HEMODIALYSIS CATHETER INSERTION;  Surgeon: Meir Guillen MD;  Location: [facility] OR 18/19;  Service:    • JOINT MANIPULATION Left 4/18/2016    Procedure: MANIPULATION OF LT SHOULDER;  Surgeon: Lidia Ugalde MD;  Location: [facility] OR OSC;  Service:    • VENTRAL HERNIA REPAIR N/A 3/15/2016    Procedure: LAPAROSCOPIC INSIONAL HERNIA REPAIR W/ MESH DAVINCI ROBOT;  Surgeon: Tri Guzman MD;  Location: [facility] OR;  Service:    • VENTRAL HERNIA REPAIR N/A 2/1/2018    Procedure: VENTRAL HERNIA REPAIR LAPAROSCOPIC WITH DAVINCI ROBOT;  Surgeon: Tri Guzman MD;  Location: [facility] OR;  Service:        Social  History     Socioeconomic History   • Marital status:      Spouse name: Not on file   • Number of children: Not on file   • Years of education: Not on file   • Highest education level: Not on file   Tobacco Use   • Smoking status: Never Smoker   • Smokeless tobacco: Never Used   • Tobacco comment: CAFFEINE USE: 1 CUP COFFEE/ 2-16OZ 7-UPS DAILY   Vaping Use   • Vaping Use: Never used   Substance and Sexual Activity   • Alcohol use: Yes     Alcohol/week: 0.0 - 1.0 standard drinks     Comment: 10 drinks per year   • Drug use: No   • Sexual activity: Defer       Family History   Problem Relation Age of Onset   • Cancer Mother         Breast Cancer   • Breast cancer Mother 56   • Cancer Father         Colon Cancer   • Cancer Maternal Grandmother 80        Breast Cancer   • Hypertension Other    • Cancer Other         Liver Cancer   • Other Other         Varicose veins of lower extremities   • Ovarian cancer Paternal Grandmother 70   • Breast cancer Maternal Aunt 58   • Breast cancer Maternal Aunt 65   • Malig Hyperthermia Neg Hx        Review of Systems   Constitutional: Negative.   Cardiovascular: Positive for leg swelling (BLE). Negative for chest pain, dyspnea on exertion, orthopnea, paroxysmal nocturnal dyspnea and syncope.   Respiratory: Negative.    Hematologic/Lymphatic: Negative for bleeding problem.   Musculoskeletal: Negative for falls.   Gastrointestinal: Negative for melena.   Neurological: Negative for dizziness and light-headedness.       Allergies   Allergen Reactions   • Fish-Derived Products Anaphylaxis   • Shellfish-Derived Products Anaphylaxis         Current Outpatient Medications:   •  azelastine (ASTELIN) 0.1 % nasal spray, 2 sprays into the nostril(s) as directed by provider Daily. Use in each nostril as directed, Disp: 1 each, Rfl: 12  •  Cholecalciferol (VITAMIN D) 2000 units tablet, Take 2,000 Units by mouth Daily., Disp: , Rfl:   •  dilTIAZem CD (CARDIZEM CD) 360 MG 24 hr capsule, Take 1  "capsule by mouth Every Morning., Disp: 90 capsule, Rfl: 3  •  levocetirizine (XYZAL) 5 MG tablet, Take 5 mg by mouth Every Evening., Disp: , Rfl:   •  rivaroxaban (Xarelto) 20 MG tablet, Take 1 tablet by mouth Daily., Disp: 90 tablet, Rfl: 3  •  SYMBICORT 80-4.5 MCG/ACT inhaler, INHALE 2 PUFFS BID   RINSE AFTER USE, Disp: , Rfl:       Objective:     Vitals:    04/07/21 0909 04/07/21 0916   BP: 128/78 122/78   BP Location: Right arm Left arm   Patient Position: Sitting Sitting   Pulse: 101 90   Resp: 18    SpO2: 98%    Weight: 130 kg (286 lb)    Height: 172.7 cm (68\")      Body mass index is 43.49 kg/m².    PHYSICAL EXAM:    Neck:      Vascular: No JVD.   Pulmonary:      Effort: Pulmonary effort is normal.      Breath sounds: Normal breath sounds.   Cardiovascular:      Normal rate. Irregular rhythm.      Murmurs: There is no murmur.      No gallop. No click. No rub.   Pulses:     Intact distal pulses.   Edema:     Peripheral edema (Bilateral ankle edema) present.          ECG 12 Lead    Date/Time: 4/7/2021 9:22 AM  Performed by: Radha Eason APRN  Authorized by: Radha Eason APRN   Comparison: compared with previous ECG from 11/2/2020  Similar to previous ECG  Rhythm: atrial fibrillation  Rate: normal  BPM: 90    Clinical impression: abnormal EKG  Comments: Indication: Atrial fibrillation              Assessment:       Diagnosis Plan   1. Longstanding persistent atrial fibrillation (CMS/HCC)  ECG 12 Lead   2. Essential hypertension     3. Chronic deep vein thrombosis (DVT) of popliteal vein of left lower extremity (CMS/HCC)       Orders Placed This Encounter   Procedures   • ECG 12 Lead     This order was created via procedure documentation     Order Specific Question:   Release to patient     Answer:   Immediate          Plan:       1.  Atrial fibrillation.  She is rate controlled with diltiazem and anticoagulated on Xarelto.      2.  Hypertension.  Her blood pressure looks great.  Continue " diltiazem.      Overall think she stable and doing well.  She denies any symptoms of angina or heart failure.  She is still declining any kind of stress testing which I feel is appropriate.  Her EKG is very stable and her dyspnea appears stable as well.  I told her to call me should her symptoms change or worsen.  Otherwise, she will follow-up with Dr. So in 6 months.      As always, it has been a pleasure to participate in your patient's care.      Sincerely,         BROCK Major

## 2021-05-03 ENCOUNTER — APPOINTMENT (OUTPATIENT)
Dept: CT IMAGING | Facility: HOSPITAL | Age: 61
End: 2021-05-03

## 2021-05-03 ENCOUNTER — HOSPITAL ENCOUNTER (EMERGENCY)
Facility: HOSPITAL | Age: 61
Discharge: HOME | End: 2021-05-04
Attending: EMERGENCY MEDICINE | Admitting: EMERGENCY MEDICINE

## 2021-05-03 DIAGNOSIS — K56.600 PARTIAL SMALL BOWEL OBSTRUCTION (HCC): Primary | ICD-10-CM

## 2021-05-03 LAB
ALBUMIN SERPL-MCNC: 4.5 G/DL (ref 3.5–5.2)
ALBUMIN/GLOB SERPL: 1.3 G/DL
ALP SERPL-CCNC: 81 U/L (ref 39–117)
ALT SERPL W P-5'-P-CCNC: 11 U/L (ref 1–33)
ANION GAP SERPL CALCULATED.3IONS-SCNC: 10.7 MMOL/L (ref 5–15)
AST SERPL-CCNC: 12 U/L (ref 1–32)
BASOPHILS # BLD AUTO: 0.06 10*3/MM3 (ref 0–0.2)
BASOPHILS NFR BLD AUTO: 0.5 % (ref 0–1.5)
BILIRUB SERPL-MCNC: 0.3 MG/DL (ref 0–1.2)
BUN SERPL-MCNC: 26 MG/DL (ref 6–20)
BUN/CREAT SERPL: 16.5 (ref 7–25)
CALCIUM SPEC-SCNC: 9.6 MG/DL (ref 8.6–10.5)
CHLORIDE SERPL-SCNC: 104 MMOL/L (ref 98–107)
CO2 SERPL-SCNC: 26.3 MMOL/L (ref 22–29)
CREAT SERPL-MCNC: 1.58 MG/DL (ref 0.57–1)
DEPRECATED RDW RBC AUTO: 45.5 FL (ref 37–54)
EOSINOPHIL # BLD AUTO: 0.05 10*3/MM3 (ref 0–0.4)
EOSINOPHIL NFR BLD AUTO: 0.4 % (ref 0.3–6.2)
ERYTHROCYTE [DISTWIDTH] IN BLOOD BY AUTOMATED COUNT: 13.7 % (ref 12.3–15.4)
GFR SERPL CREATININE-BSD FRML MDRD: 34 ML/MIN/1.73
GLOBULIN UR ELPH-MCNC: 3.4 GM/DL
GLUCOSE SERPL-MCNC: 113 MG/DL (ref 65–99)
HCT VFR BLD AUTO: 41.7 % (ref 34–46.6)
HGB BLD-MCNC: 13.5 G/DL (ref 12–15.9)
HOLD SPECIMEN: NORMAL
HOLD SPECIMEN: NORMAL
IMM GRANULOCYTES # BLD AUTO: 0.05 10*3/MM3 (ref 0–0.05)
IMM GRANULOCYTES NFR BLD AUTO: 0.4 % (ref 0–0.5)
LIPASE SERPL-CCNC: 47 U/L (ref 13–60)
LYMPHOCYTES # BLD AUTO: 0.76 10*3/MM3 (ref 0.7–3.1)
LYMPHOCYTES NFR BLD AUTO: 6.4 % (ref 19.6–45.3)
MCH RBC QN AUTO: 29.3 PG (ref 26.6–33)
MCHC RBC AUTO-ENTMCNC: 32.4 G/DL (ref 31.5–35.7)
MCV RBC AUTO: 90.5 FL (ref 79–97)
MONOCYTES # BLD AUTO: 0.5 10*3/MM3 (ref 0.1–0.9)
MONOCYTES NFR BLD AUTO: 4.2 % (ref 5–12)
NEUTROPHILS NFR BLD AUTO: 10.53 10*3/MM3 (ref 1.7–7)
NEUTROPHILS NFR BLD AUTO: 88.1 % (ref 42.7–76)
NRBC BLD AUTO-RTO: 0 /100 WBC (ref 0–0.2)
PLATELET # BLD AUTO: 368 10*3/MM3 (ref 140–450)
PMV BLD AUTO: 8.9 FL (ref 6–12)
POTASSIUM SERPL-SCNC: 4.1 MMOL/L (ref 3.5–5.2)
PROT SERPL-MCNC: 7.9 G/DL (ref 6–8.5)
RBC # BLD AUTO: 4.61 10*6/MM3 (ref 3.77–5.28)
SODIUM SERPL-SCNC: 141 MMOL/L (ref 136–145)
TROPONIN T SERPL-MCNC: <0.01 NG/ML (ref 0–0.03)
WBC # BLD AUTO: 11.95 10*3/MM3 (ref 3.4–10.8)
WHOLE BLOOD HOLD SPECIMEN: NORMAL
WHOLE BLOOD HOLD SPECIMEN: NORMAL

## 2021-05-03 PROCEDURE — 93010 ELECTROCARDIOGRAM REPORT: CPT | Performed by: INTERNAL MEDICINE

## 2021-05-03 PROCEDURE — 99283 EMERGENCY DEPT VISIT LOW MDM: CPT

## 2021-05-03 PROCEDURE — 84484 ASSAY OF TROPONIN QUANT: CPT | Performed by: EMERGENCY MEDICINE

## 2021-05-03 PROCEDURE — 25010000002 ONDANSETRON PER 1 MG: Performed by: EMERGENCY MEDICINE

## 2021-05-03 PROCEDURE — 74176 CT ABD & PELVIS W/O CONTRAST: CPT

## 2021-05-03 PROCEDURE — 80053 COMPREHEN METABOLIC PANEL: CPT | Performed by: EMERGENCY MEDICINE

## 2021-05-03 PROCEDURE — 85025 COMPLETE CBC W/AUTO DIFF WBC: CPT | Performed by: EMERGENCY MEDICINE

## 2021-05-03 PROCEDURE — 96374 THER/PROPH/DIAG INJ IV PUSH: CPT

## 2021-05-03 PROCEDURE — 93005 ELECTROCARDIOGRAM TRACING: CPT | Performed by: EMERGENCY MEDICINE

## 2021-05-03 PROCEDURE — 83690 ASSAY OF LIPASE: CPT | Performed by: EMERGENCY MEDICINE

## 2021-05-03 RX ORDER — SODIUM CHLORIDE 0.9 % (FLUSH) 0.9 %
10 SYRINGE (ML) INJECTION AS NEEDED
Status: DISCONTINUED | OUTPATIENT
Start: 2021-05-03 | End: 2021-05-04 | Stop reason: HOSPADM

## 2021-05-03 RX ORDER — ONDANSETRON 2 MG/ML
4 INJECTION INTRAMUSCULAR; INTRAVENOUS ONCE
Status: COMPLETED | OUTPATIENT
Start: 2021-05-03 | End: 2021-05-03

## 2021-05-03 RX ADMIN — ONDANSETRON 4 MG: 2 INJECTION INTRAMUSCULAR; INTRAVENOUS at 23:06

## 2021-05-04 VITALS
OXYGEN SATURATION: 98 % | WEIGHT: 275 LBS | HEART RATE: 92 BPM | SYSTOLIC BLOOD PRESSURE: 119 MMHG | BODY MASS INDEX: 41.68 KG/M2 | HEIGHT: 68 IN | TEMPERATURE: 98.1 F | RESPIRATION RATE: 18 BRPM | DIASTOLIC BLOOD PRESSURE: 80 MMHG

## 2021-05-04 LAB — QT INTERVAL: 367 MS

## 2021-05-05 ENCOUNTER — TELEPHONE (OUTPATIENT)
Dept: INTERNAL MEDICINE | Facility: CLINIC | Age: 61
End: 2021-05-05

## 2021-05-05 NOTE — TELEPHONE ENCOUNTER
Caller: Mariya Campos    Relationship to patient: Self    Best call back number: [phone]    New or established patient?  [] New  [x] Established    Date of discharge: 5/3/21    Facility discharged from: [facility] ER    Diagnosis/Symptoms: BOWEL OBSTRUCTION    Length of stay (If applicable): NA    Specialty Only: Did you see a [facility] provider?    [] Yes  [x] No  If so, who?   PATIENT WAS SCHEDULED WITH ANOTHER PROVIDER WHO IS NOT HER PRIMARY CARE PHYSICIAN.

## 2021-05-06 ENCOUNTER — OFFICE VISIT (OUTPATIENT)
Dept: INTERNAL MEDICINE | Facility: CLINIC | Age: 61
End: 2021-05-06

## 2021-05-06 VITALS
HEART RATE: 102 BPM | SYSTOLIC BLOOD PRESSURE: 112 MMHG | DIASTOLIC BLOOD PRESSURE: 64 MMHG | BODY MASS INDEX: 43 KG/M2 | WEIGHT: 283.7 LBS | HEIGHT: 68 IN | OXYGEN SATURATION: 96 % | TEMPERATURE: 96.9 F

## 2021-05-06 DIAGNOSIS — Z09 HOSPITAL DISCHARGE FOLLOW-UP: Primary | ICD-10-CM

## 2021-05-06 DIAGNOSIS — D72.829 LEUKOCYTOSIS, UNSPECIFIED TYPE: ICD-10-CM

## 2021-05-06 DIAGNOSIS — Z12.11 SCREEN FOR COLON CANCER: ICD-10-CM

## 2021-05-06 DIAGNOSIS — C18.9 MALIGNANT NEOPLASM OF COLON, UNSPECIFIED PART OF COLON (HCC): ICD-10-CM

## 2021-05-06 DIAGNOSIS — N18.30 CRF (CHRONIC RENAL FAILURE), STAGE 3 (MODERATE) (HCC): ICD-10-CM

## 2021-05-06 DIAGNOSIS — K56.609 SMALL BOWEL OBSTRUCTION (HCC): ICD-10-CM

## 2021-05-06 PROCEDURE — 99214 OFFICE O/P EST MOD 30 MIN: CPT | Performed by: NURSE PRACTITIONER

## 2021-05-06 NOTE — PROGRESS NOTES
"Chief Complaint  Abdominal Pain (Pt is here as a ER f/u for abdominal pain X monday night.)    Subjective          Mariya Campos presents to [facility]  History of Present Illness  She is here today as a new patient to me for a hospital f/u.   She presented to [facility] ER on 05/03/2021 with c/o acute abdominal pain, nausea and vomiting. She felt like this episode of abdominal pain was more severe than prior bowel obstructions. Her abdomen became \"hard\" and \"felt like contractions.\" She had eaten a cauliflower rice frozen meal at work and symptoms began 3-4 hours after this. Labs showed elavated WBC count at 11.95, creatinine 1.58, BUN 26, GFR 34. ECG showed A fib with rate of 92, cardiac enzymes negative. CT abdomen without contrast showed ventral hernia with portion of transverse bowel without obstruction, mildly dilated and fluid filled loops of small bowel, left paramedian ventral hernia with loop of bowel which was a new finding with evidence of prior bowel obstruction with subsequent development of a hernia sac possibly indicating the transition point. Also found on imaging mesenteric edema and diastasis with loops of bowel. She was dx with a partial small bowel obstruction, given zofran in the ER and d/c home after she was passing gas and had a BM.  She is feeling much better today. She has been eating a clear liquid diet, tolerating this well. Denies any abdominal pain, nausea, vomiting, diarrhea, BRBPR, melena, fever, chills, SOA, chest pain, palpitations. Last BM Tuesday morning. She has been passing gas daily. She has a BM every few days normally. Hx of chronic constipation, she takes Miralax PRN. She tries to hydrate well with water, unsure of fiber intake normally.   Last c-scope 01/2018 with hyperplastic polyps with recommended re-screen in 2 years. Positive hx of colon cancer in 2006 with laparoscopic bowel resection. She underwent a hernia repair in 02/2017 and was " "admitted to the ICU for several weeks secondary to sepsis and acute renal failure.      Objective   Vital Signs:   /64 (BP Location: Left arm, Patient Position: Sitting, Cuff Size: Large Adult)   Pulse 102   Temp 96.9 °F (36.1 °C)   Ht 172.7 cm (68\")   Wt 129 kg (283 lb 11.2 oz)   SpO2 96%   BMI 43.14 kg/m²     Physical Exam  Constitutional:       Appearance: She is well-developed.   Neck:      Thyroid: No thyroid mass, thyromegaly or thyroid tenderness.      Vascular: No carotid bruit.      Trachea: Trachea normal.   Cardiovascular:      Rate and Rhythm: Normal rate and regular rhythm.      Chest Wall: PMI is not displaced.      Pulses:           Radial pulses are 2+ on the right side and 2+ on the left side.        Dorsalis pedis pulses are 2+ on the right side and 2+ on the left side.        Posterior tibial pulses are 2+ on the right side and 2+ on the left side.      Heart sounds: S1 normal and S2 normal.   Pulmonary:      Effort: Pulmonary effort is normal.      Breath sounds: Normal breath sounds.   Abdominal:      General: A surgical scar is present. There is no distension or abdominal bruit.      Palpations: Abdomen is soft. There is no hepatomegaly or splenomegaly.      Tenderness: There is abdominal tenderness in the right lower quadrant. There is no guarding or rebound.      Hernia: No hernia is present.   Musculoskeletal:      Right lower leg: No edema.      Left lower leg: No edema.   Lymphadenopathy:      Head:      Right side of head: No submental, submandibular, tonsillar or occipital adenopathy.      Left side of head: No submental, submandibular, tonsillar or occipital adenopathy.      Cervical: No cervical adenopathy.   Skin:     General: Skin is warm and dry.      Capillary Refill: Capillary refill takes less than 2 seconds.      Nails: There is no clubbing.   Neurological:      Mental Status: She is alert and oriented to person, place, and time.   Psychiatric:         Attention and " Perception: Attention normal.         Mood and Affect: Mood and affect normal.         Speech: Speech normal.         Behavior: Behavior normal.         Thought Content: Thought content normal.         Cognition and Memory: Cognition normal.        Result Review :     Common labs    Common Labsle 5/12/20 5/12/20 5/13/20 5/13/20 5/3/21 5/3/21    0724 0725 0526 0526 2136 2136   Glucose  147 (A)  102 (A)  113 (A)   BUN  28 (A)  29 (A)  26 (A)   Creatinine  1.60 (A)  1.48 (A)  1.58 (A)   eGFR Non African Am  33 (A)  36 (A)  34 (A)   Sodium  136  142  141   Potassium  4.5  4.2  4.1   Chloride  97 (A)  103  104   Calcium  10.0  8.8  9.6   Albumin  4.10    4.50   Total Bilirubin  0.4    0.3   Alkaline Phosphatase  96    81   AST (SGOT)  13    12   ALT (SGPT)  9    11   WBC 8.92  7.01  11.95 (A)    Hemoglobin 13.6  11.9 (A)  13.5    Hematocrit 40.9  35.1  41.7    Platelets 358  312  368    (A) Abnormal value                      Assessment and Plan    Diagnoses and all orders for this visit:    1. Hospital discharge follow-up (Primary)    2. Malignant neoplasm of colon, unspecified part of colon (CMS/HCC)  -     Ambulatory Referral to General Surgery    3. Small bowel obstruction (CMS/HCC)  -     Ambulatory Referral to General Surgery  -     Basic Metabolic Panel; Future  -     CBC & Differential; Future    4. Screen for colon cancer  -     Ambulatory Referral to General Surgery    5. CRF (chronic renal failure), stage 3 (moderate) (CMS/HCC)  -     Basic Metabolic Panel; Future    6. Leukocytosis, unspecified type  -     CBC & Differential; Future       1. Hospital follow up for small bowel obstruction- symptoms have improved. Abdominal exam with mild RLQ tenderness today, bowel sounds normoactive, abdomen soft. No hernia palpated. Recommend to continue clear liquid diet and advance as tolerated. Encouraged her to hydrate well with water. Would consider starting fiber supplement and probiotic for chronic constipation once  tolerating regular diet. Discussed in detail to return to ER with acute abdominal pain, fever, nausea, vomiting, inability to pass gas or stool. Referral placed to general surgery for further evaluation and screening colonoscopy.   2. Screen for colon cancer- referral placed to general surgery   3. Leukocytosis/CRF- re-check labs in 2 weeks.      Follow Up   Return if symptoms worsen or fail to improve, for Next scheduled follow up.  Patient was given instructions and counseling regarding her condition or for health maintenance advice. Please see specific information pulled into the AVS if appropriate.

## 2021-05-24 ENCOUNTER — OFFICE VISIT (OUTPATIENT)
Dept: SURGERY | Facility: CLINIC | Age: 61
End: 2021-05-24

## 2021-05-24 VITALS — WEIGHT: 287 LBS | HEIGHT: 68 IN | BODY MASS INDEX: 43.5 KG/M2

## 2021-05-24 DIAGNOSIS — Z85.038 HX OF MALIGNANT NEOPLASM OF COLON: ICD-10-CM

## 2021-05-24 DIAGNOSIS — Z80.0 FAMILY HX OF COLON CANCER: Primary | ICD-10-CM

## 2021-05-24 PROCEDURE — 99213 OFFICE O/P EST LOW 20 MIN: CPT | Performed by: SURGERY

## 2021-05-24 RX ORDER — DIAZEPAM 5 MG/1
5 TABLET ORAL EVERY 8 HOURS PRN
Qty: 2 TABLET | Refills: 0 | Status: SHIPPED | OUTPATIENT
Start: 2021-05-24 | End: 2021-05-25

## 2021-05-24 NOTE — PROGRESS NOTES
SUMMARY (A/P):    60-year-old lady with personal history of colon cancer and family history of colon cancer (father).  She has not having any ongoing gastrointestinal symptoms (was recently admitted for recurrent small bowel obstruction which has resolved).  We discussed surveillance colonoscopy indications and alternatives and she wishes to proceed with endoscopic surveillance.  She understands the rationale for the procedure and the risks as well as the fact that her risks are increased secondary to increased BMI and multiple medical comorbidities.      CC:    Endoscopic surveillance, recent bowel obstruction    HPI:    60-year-old lady who was in the emergency room with moderate generalized abdominal pain associated with nausea on 5/3/2021 and was found on CT scan to have recurrent partial small bowel obstruction which quickly resolved with conservative measures.  Also has personal and family history of colon cancer, last colonoscopy 2018.    ENDOSCOPY:   · Colonoscopy with cold polypectomy of sigmoid polyp 1/8/2018 (fragments of hyperplastic polyp)    RADIOLOGY:   • Noncontrasted CT abdomen pelvis 5/3/2021 showed early partial small bowel obstruction.  I reviewed the images and concur.  No other acute findings present.    LABS:    • CBC 5/17/2021: Normal  • BMP 5/17/2021: GFR 37  • CMP 5/3/2021: GFR 34, LFTs normal    PSH:    · Debridement of abdominal wound 2/27/2018  · Insertion hemodialysis catheter 2/19/2018  · Debridement of abdominal wound 2/16/2018  · Debridement of abdominal wound 2/13/2018  · Laparotomy with removal of mesh and small bowel resection 2/4/2018  · Laparoscopic robotic incisional hernia repair 2/1/2018  · Laparoscopic robotic incisional hernia repair 3/15/2016  · Colectomy 10/13/2015 (pathology indicates tubulovillous adenoma with low anterior resection specimen)  · Colectomy 2006 (pathology indicates laparoscopic low anterior colon resection with invasive colonic adenocarcinoma  T3N0)    PMH:    • Morbid obesity  • Gastroesophageal reflux disease  • Seasonal allergies  • Atrial fibrillation  • Deep venous thrombosis  • Chronic kidney disease  • Colon cancer  • Hypertension    ALLERGIES:   • Fish derived products-anaphylaxis  • Shellfish-anaphylaxis    MEDICATIONS:   • Astelin nasal spray  • Vitamin D  • Cardizem  • Xyzal  • Xarelto  • Symbicort  • Valium    FAMILY HISTORY:    • Colorectal cancer: Father    SOCIAL HISTORY:   • Denies tobacco use  • Occasional alcohol use    PHYSICAL EXAM:   • Constitutional: Well-developed well-nourished, no acute distress  • Vital signs: Weight 287 pounds, height 68 inches, BMI 43.6  • Eyes: Conjunctiva normal, sclera nonicteric  • Respiratory: Normal inspiratory effort  • Cardiovascular: Regular rate  • Gastrointestinal: Soft, nontender, fully healed significant anterior abdominal wall scar  • Musculoskeletal: Symmetric strength, normal gait  • Psychiatric: Alert and oriented ×3, normal affect     ROS:    No cough, chest pain, shortness of air.  All other systems reviewed and negative other than presenting complaints.    SUHAIL SHEIKH M.D.

## 2021-07-27 ENCOUNTER — OFFICE VISIT (OUTPATIENT)
Dept: SLEEP MEDICINE | Facility: HOSPITAL | Age: 61
End: 2021-07-27

## 2021-07-27 VITALS
WEIGHT: 265 LBS | HEIGHT: 68 IN | BODY MASS INDEX: 40.16 KG/M2 | HEART RATE: 99 BPM | SYSTOLIC BLOOD PRESSURE: 150 MMHG | OXYGEN SATURATION: 97 % | DIASTOLIC BLOOD PRESSURE: 89 MMHG

## 2021-07-27 DIAGNOSIS — G47.33 OSA (OBSTRUCTIVE SLEEP APNEA): Primary | ICD-10-CM

## 2021-07-27 PROCEDURE — G0463 HOSPITAL OUTPT CLINIC VISIT: HCPCS

## 2021-07-27 NOTE — PROGRESS NOTES
"Sleep Medicine Follow-up visit Note    Name: Mariya Campos  Age: 60 y.o.  Sex: female  :  1960  MRN: [id number]  Date of Service: 2021    History of Present Illness:   Mrs. Mariya Campos  is a 60 y.o. right handed Caucasianfemale is seen in the sleep clinic for Snoring, Sleep Apnea and Chronic Fatigue.      The patient has a H/O hypertension and atrial fibrillation deep vein thrombosis and CKD. The patient is on anticoagulation with apixaban.    The patient had severe obstructive sleep apnea syndrome with AHI 35.1 per sleep hour (normal less than 5 per sleep hour) AHI during supine position 85.8 per sleep hour and minimum SPO2 69%.  [place] Sleepiness Scale score prior to CPAP therapy was 6/24.    The patient is on auto CPAP therapy at a mean pressure of 9 cms of water. Residual AHI 1.5/ sleep hour. Compliance data to indicate 93% usage for more than 4 hours with an average usage of  hours and 56 minutes.  [place] sleepiness scale score with CPAP therapy is 5/24 with CPAP therapy.     Mask Type: Nasal pillows and has a ResMed air sense 10 auto CPAP machine.  Her mask is irritating and causing tenderness and soreness on the left side of the nostril.  We will request for a new mask fit.  DME Company: [employer].    Sleep schedule: While Working: Bed time: 10-11 pm and wake up time: 6:30-7 am: sleep Latency: 30 minutes and Total Sleep Time: 7 hours, 30 minutes.      Naps: No.     Sleep schedule: While NOT Working: Bed time: 11 pm and wake up time: 8 am: sleep Latency : 30 minutes and Total Sleep Time: 9 hours, 30 minutes.      Caffeine intake: 2Cups of coffee/tea/soft drinks  [place] Sleepiness Scale: 4/24.  Review of Systems - Negative.  PMH, Surgical Hx, current Medications, Allergies, Family Hx, Social Hx and problem list were reviewed and updated in the chart.    Objective:  Vitals:    21 0700   BP: 150/89   Pulse: 99   SpO2: 97%   Weight: 120 kg (265 lb)   Height: 172.7 cm (68\")    " Body mass index is 40.29 kg/m².       GEN: No significant distress, the patient is well developed, well nourished.  NEURO: alert and oriented x 3,  PSYCH:Normal mood and affect.    Home sleep study/polysomnography: 1/24/2020      Assessment and PLAN: STEVEN (obstructive sleep apnea) [G47.33]     The patient has severe obstructive sleep apnea syndrome and is on CPAP. The patient is compliant and is benefiting from it.  I will continue present CPAP therapy and will see the patient for follow-up in one year.    The patient has a mask fit issues and we will request for a new mask fit.    I have discussed the findings of my evaluation with the patient at length, instructed the patient on basic sleep hygiene, stressing the importance of regular sleep schedule without naps and with 8 hours of sleep per night, avoiding alcohol and sedative medications, limiting caffeine consumption, and implementing a healthy diet and exercise program and not to drive if patient is sleepy.       Mookie Coffey MD  7/27/2021  09:02 EDT

## 2021-08-03 NOTE — THERAPY TREATMENT NOTE
Acute Care - Physical Therapy Treatment Note  [facility]     Patient Name: Mariya Campos  : 1960  MRN: [id number]  Today's Date: 3/5/2018  Onset of Illness/Injury or Date of Surgery Date: 18          Admit Date: 2018    Visit Dx:    ICD-10-CM ICD-9-CM   1. Incisional hernia, without obstruction or gangrene K43.2 553.21   2. Recurrent ventral hernia K43.2 553.21   3. Generalized weakness R53.1 780.79   4. CRF (chronic renal failure), stage 3 (moderate) N18.3 585.3   5. Wound infection T14.8XXA 958.3    L08.9    6. ARF (acute renal failure) with tubular necrosis N17.0 584.5     Patient Active Problem List   Diagnosis   • Atopic rhinitis   • Fatigue   • Abnormal mammogram   • Adiposity   • Abnormal ECG   • CRF (chronic renal failure), stage 3 (moderate)   • Anemia   • Recurrent UTI   • Osteopenia   • Essential hypertension   • Incisional hernia   • Moderate persistent asthma without complication   • Incisional hernia, without obstruction or gangrene   • Recurrent ventral hernia   • Bacteremia, escherichia coli   • Wound infection   • Complications, dialysis, catheter, mechanical, initial encounter               Adult Rehabilitation Note       18 1324 18 1154 18 1622    Rehab Assessment/Intervention    Discipline physical therapy assistant  -RW occupational therapist  -SG physical therapist  -SV    Document Type therapy note (daily note)  -RW therapy note (daily note)  -SG therapy note (daily note)  -SV    Subjective Information agree to therapy  -RW agree to therapy  -SG agree to therapy;complains of;fatigue;pain  -SV    Patient Effort, Rehab Treatment good  -RW good  -SG     Precautions/Limitations fall precautions;oxygen therapy device and L/min   1L O2  -RW fall precautions  -SG     Recorded by [RW] Radha Bonds PTA [SG] MAHOGANY Conroy [SV] Yelena Beltran PT    Vital Signs    Pre Systolic BP Rehab   116  -SV    Pre Treatment Diastolic BP   66  -SV     Pretreatment Heart Rate (beats/min)   99  -SV    Pre SpO2 (%) 96  -RW  97  -SV    O2 Delivery Pre Treatment supplemental O2   1L  -RW  supplemental O2  -SV    Intra SpO2 (%)   95  -SV    O2 Delivery Intra Treatment supplemental O2   1L  -RW  supplemental O2  -SV    Post SpO2 (%)   95  -SV    O2 Delivery Post Treatment supplemental O2   1L  -RW  supplemental O2  -SV    Pre Patient Position   Supine  -SV    Intra Patient Position   Standing  -SV    Post Patient Position   Side Lying  -SV    Recorded by [RW] Radha Bonds PTA  [SV] Yelena Beltran, PT    Pain Assessment    Pain Assessment 0-10  -RW 0-10  -SG     Pain Score 2  -RW 2  -SG     Pain Type Surgical pain  -RW Surgical pain  -SG     Pain Location Abdomen  -RW Abdomen  -SG     Recorded by [RW] Radha Bonds PTA [SG] MAHOGANY Conroy     Cognitive Assessment/Intervention    Current Cognitive/Communication Assessment functional  -RW functional  -SG functional  -SV    Orientation Status oriented x 4  -RW oriented x 4  -SG oriented x 4  -SV    Follows Commands/Answers Questions 100% of the time  -% of the time;able to follow single-step instructions  -% of the time;able to follow multi-step instructions  -SV    Personal Safety WNL/WFL  -RW      Personal Safety Interventions fall prevention program maintained;gait belt;nonskid shoes/slippers when out of bed  -RW      Recorded by [RW] Radha Bonds PTA [SG] Nini Layne, STACYR [SV] Yelena Beltran, PT    ROM (Range of Motion)    General ROM Detail  Adrian. shoulder 7/8 AROM  -SG     Recorded by  [SG] MAHOGANY Conroy     Bed Mobility, Assessment/Treatment    Bed Mobility, Assistive Device bed rails;head of bed elevated  -RW      Bed Mobility, Roll Left, [place] minimum assist (75% patient effort);verbal cues required;nonverbal cues required (demo/gesture)  -RW      Bed Mob, Supine to Sit, [place]   moderate assist (50% patient effort);minimum assist (75% patient effort);2 person  assist required  -SV    Bed Mob, Sit to Supine, [place]   moderate assist (50% patient effort);2 person assist required  -SV    Bed Mob, Sidelying to Sit, [place] minimum assist (75% patient effort);2 person assist required;verbal cues required;nonverbal cues required (demo/gesture)  -RW      Bed Mob, Sit to Sidelying, [place] not tested   Pt up in chair  -RW      Recorded by [RW] Rdaha Bonds, VALERIE  [SV] Yelena Beltran, PT    Transfer Assessment/Treatment    Transfers, Bed-Chair [place] minimum assist (75% patient effort);2 person assist required;verbal cues required;nonverbal cues required (demo/gesture)   bed to bsc  -RW      Transfers, Chair-Bed [place] minimum assist (75% patient effort);2 person assist required;verbal cues required;nonverbal cues required (demo/gesture)   bsc to chair  -RW      Transfers, Bed-Chair-Bed, Assist Device rolling walker  -RW      Transfers, Sit-Stand [place] minimum assist (75% patient effort);2 person assist required;verbal cues required;nonverbal cues required (demo/gesture)  -RW  moderate assist (50% patient effort);minimum assist (75% patient effort);2 person assist required  -SV    Transfers, Stand-Sit [place] minimum assist (75% patient effort);2 person assist required;verbal cues required;nonverbal cues required (demo/gesture)  -RW  moderate assist (50% patient effort);minimum assist (75% patient effort);2 person assist required  -SV    Transfers, Sit-Stand-Sit, Assist Device rolling walker;elevated surface  -RW      Toilet Transfer, [place] moderate assist (50% patient effort);2 person assist required;verbal cues required;nonverbal cues required (demo/gesture)  -RW      Toilet Transfer, Assistive Device rolling walker;bedside commode without drop arms  -RW      Transfer, Safety Issues weight-shifting ability decreased  -RW      Transfer, Impairments strength decreased;impaired balance  -RW      Transfer, Comment pt able to take  several pivoting steps from bed to bsc and bsc to chair.   -RW  pt stood with rwx 15 seconds   -SV    Recorded by [RW] Radha Bonds PTA  [SV] Yelena Beltran, PT    Gait Assessment/Treatment    Gait, [place] Level not tested  -RW      Recorded by [RW] Radha Bonds PTA      Balance Skills Training    Sitting-Level of Assistance   Close supervision;Contact guard  -SV    Sitting # of Minutes   15  -SV    Standing-Level of Assistance Contact guard  -RW  Contact guard;x2  -SV    Static Standing Balance Support assistive device  -RW  Right upper extremity supported;Left upper extremity supported  -SV    Standing Balance # of Minutes 2   to get cleaned up at bs  -RW      Recorded by [RW] Radha Bonds PTA  [SV] Yelena Beltran, PT    Therapy Exercises    Bilateral Lower Extremities   AROM:;10 reps;ankle pumps/circles;LAQ;SAQ;quad sets  -SV    Bilateral Upper Extremity  AROM:;elbow flexion/extension;hand pumps;pronation/supination;shoulder extension/flexion   pt states feels UE 's close to baseline function  -SG     Recorded by  [SG] Nini Layne, STACYR [SV] Yelena Beltran, PT    Sensory Assessment/Intervention    Light Touch  LUE;RUE  -SG     LUE Light Touch  WNL  -SG     RUE Light Touch  WNL  -SG     Recorded by  [SG] MAHOGANY Conroy     Positioning and Restraints    Pre-Treatment Position in bed  -RW in bed  -SG in bed  -SV    Post Treatment Position chair  -RW bed  -SG bed  -SV    In Bed  call light within reach;encouraged to call for assist;exit alarm on  -SG supine;call light within reach;encouraged to call for assist;exit alarm on;with family/caregiver;side lying left  -SV    In Chair reclined;call light within reach;encouraged to call for assist;notified nsg  -RW      Recorded by [RW] Radha Bonds PTA [SG] Nini Layne, OTR [SV] Yelena Beltran, PT      User Key  (r) = Recorded By, (t) = Taken By, (c) = Cosigned By    Initials Name Effective Dates     MAHOGANY Conroy 04/13/15 -      SV Yelena Beltran, PT 01/17/16 -     RW Radha Bonds, PTA 04/06/16 -                 IP PT Goals       02/28/18 1536 02/21/18 1421       Bed Mobility PT LTG    Bed Mobility PT LTG, Time to Achieve  1 wk  -PC     Bed Mobility PT LTG, Activity Type  supine to sit/sit to supine  -PC     Bed Mobility PT LTG, [place] Level  moderate assist (50% patient effort)  -PC     Bed Mobility PT LTG, Date Goal Reviewed 02/28/18  -      Bed Mobility PT LTG, Outcome goal ongoing  - goal revised  -PC     Bed Mobility PT LTG, Reason Goal Not Met progress slower than expected  - medical status inhibits participation  -PC     Transfer Training PT LTG    Transfer Training PT LTG, Time to Achieve  1 wk  -PC     Transfer Training PT LTG, Activity Type  sit to stand/stand to sit  -PC     Transfer Training PT LTG, [place] Level  moderate assist (50% patient effort);2 person assist required  -PC     Transfer Training PT  LTG, Date Goal Reviewed 02/28/18  -      Transfer Training PT LTG, Outcome goal ongoing  - goal revised  -PC     Transfer Training PT LTG, Reason Goal Not Met progress slower than expected  -AA medical status inhibits participation  -PC     Gait Training PT LTG    Gait Training Goal PT LTG, Time to Achieve  1 wk  -PC     Gait Training Goal PT LTG, [place] Level  moderate assist (50% patient effort);2 person assist required  -PC     Gait Training Goal PT LTG, Assist Device  walker, rolling  -PC     Gait Training Goal PT LTG, Distance to Achieve  10 ft  -PC     Gait Training Goal PT LTG, Date Goal Reviewed 02/28/18  -      Gait Training Goal PT LTG, Outcome goal ongoing  - goal revised  -PC     Gait Training Goal PT LTG, Reason Goal Not Met progress slower than expected  -AA medical status inhibits participation  -PC       User Key  (r) = Recorded By, (t) = Taken By, (c) = Cosigned By    Initials Name Provider Type    PC Joycelyn Fisher, PT Physical Therapist    JOHN Laguerre, PT  Physical Therapist          Physical Therapy Education     Title: PT OT SLP Therapies (Active)     Topic: Physical Therapy (Done)     Point: Mobility training (Done)    Learning Progress Summary    Learner Readiness Method Response Comment Documented by Status   Patient Acceptance E,TB,D VU,NR  RW 03/05/18 1347 Done    Acceptance E,TB,D NR,VU  SV 03/04/18 1706 Done    Acceptance E,TB VU,DU  CW 03/02/18 1351 Done    Acceptance E VU education on importance of mobility; education on BLE strengthening and core strengthening for improved mobility; education on transfer training AA 03/01/18 0921 Done    Acceptance E VU  AA 02/28/18 1535 Done    Acceptance E,TB DU,VU  CW 02/27/18 0908 Done    Acceptance E,D VU,NR  EJ 02/25/18 1543 Done    Acceptance E,TB,D VU,NR   02/23/18 1558 Done    Acceptance E VU,NR  AA 02/17/18 1156 Done    Acceptance E,TB,D VU,NR  SH 02/12/18 0902 Done    Acceptance E,TB,D NR  RW 02/11/18 1410 Active    Acceptance E,D NR  PC 02/09/18 1725 Active    Acceptance E NR  EM 02/08/18 1319 Active   Family Acceptance E VU education on importance of mobility; education on BLE strengthening and core strengthening for improved mobility; education on transfer training AA 03/01/18 0921 Done    Acceptance E,D VU,NR  EJ 02/25/18 1543 Done    Acceptance E,TB,D VU,NR  SH 02/12/18 0902 Done               Point: Home exercise program (Done)    Learning Progress Summary    Learner Readiness Method Response Comment Documented by Status   Patient Acceptance E,TB,D NR,VU  SV 03/04/18 1706 Done    Acceptance E,TB VU,DU  CW 03/02/18 1351 Done    Acceptance E VU education on importance of mobility; education on BLE strengthening and core strengthening for improved mobility; education on transfer training AA 03/01/18 0921 Done    Acceptance E VU  AA 02/28/18 1535 Done    Acceptance E,TB DU,VU  CW 02/27/18 0908 Done    Acceptance E,D VU,NR  EJ 02/25/18 1543 Done    Acceptance E,D NR  PC 02/21/18 1419 Active    Acceptance E,D  NR  PC 02/15/18 1453 Active    Acceptance E,TB,D VU,NR  SH 02/12/18 0902 Done    Acceptance E,TB,D NR  RW 02/11/18 1410 Active    Acceptance E,D NR  PC 02/09/18 1725 Active    Acceptance E NR  EM 02/08/18 1319 Active   Family Acceptance E VU education on importance of mobility; education on BLE strengthening and core strengthening for improved mobility; education on transfer training AA 03/01/18 0921 Done    Acceptance E,D VU,NR  EJ 02/25/18 1543 Done    Acceptance E,TB,D VU,NR  SH 02/12/18 0902 Done               Point: Body mechanics (Done)    Learning Progress Summary    Learner Readiness Method Response Comment Documented by Status   Patient Acceptance E,TB,D VU,NR  RW 03/05/18 1347 Done    Acceptance E,TB,D NR,VU  SV 03/04/18 1706 Done    Acceptance E,TB VU,DU  CW 03/02/18 1351 Done    Acceptance E VU education on importance of mobility; education on BLE strengthening and core strengthening for improved mobility; education on transfer training AA 03/01/18 0921 Done    Acceptance E VU  AA 02/28/18 1535 Done    Acceptance E,TB DU,VU  CW 02/27/18 0908 Done    Acceptance E,D VU,NR  EJ 02/25/18 1543 Done    Acceptance E,TB,D VU,NR   02/23/18 1558 Done    Acceptance E,TB,D VU,NR  SH 02/12/18 0902 Done    Acceptance E,TB,D NR  RW 02/11/18 1410 Active    Acceptance E,D NR  PC 02/09/18 1725 Active   Family Acceptance E VU education on importance of mobility; education on BLE strengthening and core strengthening for improved mobility; education on transfer training AA 03/01/18 0921 Done    Acceptance E,D VU,NR  EJ 02/25/18 1543 Done    Acceptance E,TB,D VU,NR  SH 02/12/18 0902 Done               Point: Precautions (Done)    Learning Progress Summary    Learner Readiness Method Response Comment Documented by Status   Patient Acceptance E,TB,D VU,NR  RW 03/05/18 1347 Done    Acceptance E,TB,D NR,VU  SV 03/04/18 1706 Done    Acceptance E,TB VU,DU  CW 03/02/18 1351 Done    Acceptance E VU education on importance of  mobility; education on BLE strengthening and core strengthening for improved mobility; education on transfer training AA 03/01/18 0921 Done    Acceptance E VU  AA 02/28/18 1535 Done    Acceptance E,TB DU,VU   02/27/18 0908 Done    Acceptance E,D VU,NR   02/25/18 1543 Done    Acceptance E,TB,D VU,NR   02/23/18 1558 Done    Acceptance E VU,NR  AA 02/17/18 1156 Done    Acceptance E,TB,D VU,NR   02/12/18 0902 Done    Acceptance E,TB,D NR   02/11/18 1410 Active    Acceptance E,D NR  PC 02/09/18 1725 Active   Family Acceptance E VU education on importance of mobility; education on BLE strengthening and core strengthening for improved mobility; education on transfer training  03/01/18 0921 Done    Acceptance E,D VU,NR   02/25/18 1543 Done    Acceptance E,TB,D VU,NR   02/12/18 0902 Done                      User Key     Initials Effective Dates Name Provider Type Discipline     12/01/15 -  Isabel Manning, PT Physical Therapist PT    PC 12/01/15 -  Joycelyn Fisher, PT Physical Therapist PT    EM 12/01/15 -  Sade Vergara, PT Physical Therapist PT    EJ 04/21/17 -  Shira Martínez, PT Physical Therapist PT    SV 01/17/16 -  Yelena Beltran, PT Physical Therapist PT     04/06/16 -  Radha Bonds, PTA Physical Therapy Assistant PT     12/13/16 -  Leandro Posada, PTA Physical Therapy Assistant PT     09/05/17 -  Manju Laguerre, PT Physical Therapist PT     01/08/18 -  Sophy Catalan, PT Student PT Student PT                    PT Recommendation and Plan  Anticipated Discharge Disposition: home with home health  Planned Therapy Interventions: bed mobility training, gait training, home exercise program, transfer training  PT Frequency: daily  Plan of Care Review  Plan Of Care Reviewed With: patient  Outcome Summary/Follow up Plan: Pt able to transfer w/ PT from bed to bsc and from bsc to chair. Did well weight shifting and taking steps during transfer. Less assist w/ bed mobility and  transfers today.            Outcome Measures       03/05/18 1330 03/05/18 1210 03/04/18 1700    How much help from another person do you currently need...    Turning from your back to your side while in flat bed without using bedrails? 3  -RW  3  -SV    Moving from lying on back to sitting on the side of a flat bed without bedrails? 3  -RW  2  -SV    Moving to and from a bed to a chair (including a wheelchair)? 2  -RW  2  -SV    Standing up from a chair using your arms (e.g., wheelchair, bedside chair)? 2  -RW  2  -SV    Climbing 3-5 steps with a railing? 1  -RW  1  -SV    To walk in hospital room? 2  -RW  1  -SV    AM-PAC 6 Clicks Score 13  -RW  11  -SV    How much help from another is currently needed...    Putting on and taking off regular lower body clothing?  1  -SG     Bathing (including washing, rinsing, and drying)  2  -SG     Toileting (which includes using toilet bed pan or urinal)  1  -SG     Putting on and taking off regular upper body clothing  2  -SG     Taking care of personal grooming (such as brushing teeth)  3  -SG     Eating meals  3  -SG     Score  12  -SG     Functional Assessment    Outcome Measure Options AM-PAC 6 Clicks Basic Mobility (PT)  -RW  AM-PAC 6 Clicks Basic Mobility (PT)  -SV      User Key  (r) = Recorded By, (t) = Taken By, (c) = Cosigned By    Initials Name Provider Type    SCOTT Layne, OTR Occupational Therapist    TIERRA Beltran, PT Physical Therapist    PINO Bonds PTA Physical Therapy Assistant           Time Calculation:         PT Charges       03/05/18 1323          Time Calculation    Start Time 1320  -RW      Stop Time 1340  -RW      Time Calculation (min) 20 min  -RW      PT Received On 03/05/18  -RW      PT - Next Appointment 03/06/18  -RW        User Key  (r) = Recorded By, (t) = Taken By, (c) = Cosigned By    Initials Name Provider Type    PINO Bonds PTA Physical Therapy Assistant          Therapy Charges for Today     Code Description  Service Date Service Provider Modifiers Qty    23430691386 HC PT THER PROC EA 15 MIN 3/5/2018 Radha Bonds, PTA GP 1    86319659222 HC PT THER SUPP EA 15 MIN 3/5/2018 Radha Bonds, VALERIE GP 1          PT G-Codes  Outcome Measure Options: AM-PAC 6 Clicks Basic Mobility (PT)    Radha Bonds PTA  3/5/2018        no

## 2021-08-25 ENCOUNTER — HOSPITAL ENCOUNTER (OUTPATIENT)
Facility: HOSPITAL | Age: 61
Setting detail: HOSPITAL OUTPATIENT SURGERY
Discharge: HOME | End: 2021-08-25
Attending: SURGERY | Admitting: SURGERY

## 2021-08-25 ENCOUNTER — ANESTHESIA EVENT (OUTPATIENT)
Dept: GASTROENTEROLOGY | Facility: HOSPITAL | Age: 61
End: 2021-08-25

## 2021-08-25 ENCOUNTER — ANESTHESIA (OUTPATIENT)
Dept: GASTROENTEROLOGY | Facility: HOSPITAL | Age: 61
End: 2021-08-25

## 2021-08-25 VITALS
SYSTOLIC BLOOD PRESSURE: 119 MMHG | BODY MASS INDEX: 41.88 KG/M2 | OXYGEN SATURATION: 99 % | RESPIRATION RATE: 14 BRPM | DIASTOLIC BLOOD PRESSURE: 67 MMHG | HEIGHT: 68 IN | HEART RATE: 86 BPM | WEIGHT: 276.3 LBS | TEMPERATURE: 98.4 F

## 2021-08-25 DIAGNOSIS — Z85.038 HX OF MALIGNANT NEOPLASM OF COLON: ICD-10-CM

## 2021-08-25 DIAGNOSIS — Z80.0 FAMILY HX OF COLON CANCER: ICD-10-CM

## 2021-08-25 PROCEDURE — 45385 COLONOSCOPY W/LESION REMOVAL: CPT | Performed by: SURGERY

## 2021-08-25 PROCEDURE — 25010000002 PROPOFOL 10 MG/ML EMULSION: Performed by: ANESTHESIOLOGY

## 2021-08-25 PROCEDURE — 88305 TISSUE EXAM BY PATHOLOGIST: CPT | Performed by: SURGERY

## 2021-08-25 PROCEDURE — S0260 H&P FOR SURGERY: HCPCS | Performed by: SURGERY

## 2021-08-25 RX ORDER — PROPOFOL 10 MG/ML
VIAL (ML) INTRAVENOUS AS NEEDED
Status: DISCONTINUED | OUTPATIENT
Start: 2021-08-25 | End: 2021-08-25 | Stop reason: SURG

## 2021-08-25 RX ORDER — PROPOFOL 10 MG/ML
VIAL (ML) INTRAVENOUS CONTINUOUS PRN
Status: DISCONTINUED | OUTPATIENT
Start: 2021-08-25 | End: 2021-08-25 | Stop reason: SURG

## 2021-08-25 RX ORDER — SODIUM CHLORIDE 9 MG/ML
30 INJECTION, SOLUTION INTRAVENOUS CONTINUOUS PRN
Status: DISCONTINUED | OUTPATIENT
Start: 2021-08-25 | End: 2021-08-25 | Stop reason: HOSPADM

## 2021-08-25 RX ORDER — LIDOCAINE HYDROCHLORIDE 20 MG/ML
INJECTION, SOLUTION INFILTRATION; PERINEURAL AS NEEDED
Status: DISCONTINUED | OUTPATIENT
Start: 2021-08-25 | End: 2021-08-25 | Stop reason: SURG

## 2021-08-25 RX ADMIN — LIDOCAINE HYDROCHLORIDE 50 MG: 20 INJECTION, SOLUTION INFILTRATION; PERINEURAL at 10:38

## 2021-08-25 RX ADMIN — SODIUM CHLORIDE 30 ML/HR: 9 INJECTION, SOLUTION INTRAVENOUS at 10:36

## 2021-08-25 RX ADMIN — Medication 150 MCG/KG/MIN: at 10:38

## 2021-08-25 RX ADMIN — PROPOFOL 75 MG: 10 INJECTION, EMULSION INTRAVENOUS at 10:38

## 2021-08-25 NOTE — OP NOTE
PREOPERATIVE DIAGNOSIS:  High risk screening secondary to personal history of colon cancer and colon polyps as well as family history of colon cancer in father    POSTOPERATIVE DIAGNOSIS AND FINDINGS:  2 small ascending colon polyps    PROCEDURE:  Colonoscopy to cecum with snare polypectomy    SURGEON:  Charli Dias MD    ANESTHESIA:  MAC    SPECIMEN(S):  Polyps    DESCRIPTION:  In decubitus position digital rectal exam was normal. Colonoscope inserted under direct visualization of lumen to cecum confirmed by visualization of ileocecal valve and appendiceal orifice.    Scope slowly withdrawn circumferentially examining all mucosal surfaces.    Quality of bowel preparation good.    2 small ascending colon polyps were identified and removed completely with a hot snare and retrieved, good hemostasis at both sites.  No other mucosal abnormalities noted.  Intact and widely patent colorectal anastomosis noted with no mucosal abnormality.    Tolerated well.    RECOMMENDATION FOR FUTURE SURVEILLANCE:  To be determined based on polyp pathology and issued as separate report    Charli Dias M.D.

## 2021-08-25 NOTE — H&P
HPI: Family history of colon cancer-father and personal history of colon cancer status post low anterior resection 2006    PMH, PSH, MEDS AND ALLERGIES reviewed and reconciled with [facility]    PHYSICAL EXAM:  -  Constitutional:  no acute distress  -  Respiratory:  normal inspiratory effort  -  Cardiovascular: regular rate  -  Gastrointestinal: Soft    ASSESSMENT/PLAN:    Surveillance colonoscopy    Charli Dias M.D.

## 2021-08-25 NOTE — ANESTHESIA POSTPROCEDURE EVALUATION
"Patient: Mariya Campos    Procedure Summary     Date: 08/25/21 Room / Location:  [facility]    Anesthesia Start: 1036 Anesthesia Stop: 1054    Procedure: COLONOSCOPY to CECUM WITH HOT SNARE POLYPECTOMY (N/A ) Diagnosis:       Family hx of colon cancer      Hx of malignant neoplasm of colon      (Family hx of colon cancer [Z80.0])      (Hx of malignant neoplasm of colon [Z85.038])    Surgeons: Charli Dias MD Provider: Brant Hicks MD    Anesthesia Type: MAC ASA Status: 3          Anesthesia Type: MAC    Vitals  Vitals Value Taken Time   /67 08/25/21 1113   Temp     Pulse 86 08/25/21 1113   Resp 14 08/25/21 1113   SpO2 99 % 08/25/21 1113           Post Anesthesia Care and Evaluation    Patient location during evaluation: PACU  Patient participation: complete - patient participated  Level of consciousness: awake and alert  Pain score: 0  Pain management: adequate  Airway patency: patent  Anesthetic complications: No anesthetic complications    Cardiovascular status: acceptable  Respiratory status: acceptable  Hydration status: acceptable    Comments: /67 (BP Location: Left arm, Patient Position: Lying)   Pulse 86   Temp 36.9 °C (98.4 °F) (Oral)   Resp 14   Ht 172.7 cm (68\")   Wt 125 kg (276 lb 4.8 oz)   LMP  (LMP Unknown)   SpO2 99%   BMI 42.01 kg/m²       "

## 2021-08-25 NOTE — ANESTHESIA PREPROCEDURE EVALUATION
Anesthesia Evaluation     Patient summary reviewed                Airway   No difficulty expected  Dental      Pulmonary    (+) asthma,sleep apnea,   Cardiovascular     Rhythm: irregular    (+) hypertension, dysrhythmias Atrial Fib,       Neuro/Psych  GI/Hepatic/Renal/Endo    (+) obesity,       Musculoskeletal     Abdominal    Substance History      OB/GYN          Other      history of cancer remission                  Anesthesia Plan    ASA 3     MAC       Anesthetic plan, all risks, benefits, and alternatives have been provided, discussed and informed consent has been obtained with: patient.

## 2021-08-26 ENCOUNTER — TELEPHONE (OUTPATIENT)
Dept: SURGERY | Facility: CLINIC | Age: 61
End: 2021-08-26

## 2021-08-26 ENCOUNTER — DOCUMENTATION (OUTPATIENT)
Dept: SURGERY | Facility: CLINIC | Age: 61
End: 2021-08-26

## 2021-08-26 LAB
CYTO UR: NORMAL
LAB AP CASE REPORT: NORMAL
PATH REPORT.FINAL DX SPEC: NORMAL
PATH REPORT.GROSS SPEC: NORMAL

## 2021-08-26 NOTE — TELEPHONE ENCOUNTER
----- Message from Charli Dias MD sent at 8/26/2021  2:13 PM EDT -----  Please let her know that she had two benign polyps and 5-year surveillance recommended-put in computer for reminder

## 2021-08-26 NOTE — PROGRESS NOTES
ENDOSCOPY FOLLOW UP NOTE    Colonoscopy 8/25/2021    Indication:  · Personal history of colon cancer and colon polyps  · Family history of colon cancer-father    Findings (Pathology):  Two small ascending colon polyps (tubular adenomas)    Recommendations:  5-year surveillance    Charli Dias M.D.

## 2021-08-26 NOTE — PROGRESS NOTES
Please let her know that she had two benign polyps and 5-year surveillance recommended-put in computer for reminder

## 2021-11-18 ENCOUNTER — OFFICE VISIT (OUTPATIENT)
Dept: CARDIOLOGY | Facility: CLINIC | Age: 61
End: 2021-11-18

## 2021-11-18 VITALS
WEIGHT: 285 LBS | BODY MASS INDEX: 43.19 KG/M2 | HEIGHT: 68 IN | HEART RATE: 104 BPM | SYSTOLIC BLOOD PRESSURE: 148 MMHG | DIASTOLIC BLOOD PRESSURE: 86 MMHG

## 2021-11-18 DIAGNOSIS — R06.09 DOE (DYSPNEA ON EXERTION): ICD-10-CM

## 2021-11-18 DIAGNOSIS — I48.11 LONGSTANDING PERSISTENT ATRIAL FIBRILLATION (HCC): Primary | ICD-10-CM

## 2021-11-18 DIAGNOSIS — I10 ESSENTIAL HYPERTENSION: ICD-10-CM

## 2021-11-18 PROCEDURE — 93000 ELECTROCARDIOGRAM COMPLETE: CPT | Performed by: INTERNAL MEDICINE

## 2021-11-18 PROCEDURE — 99214 OFFICE O/P EST MOD 30 MIN: CPT | Performed by: INTERNAL MEDICINE

## 2021-11-18 RX ORDER — ALBUTEROL SULFATE 90 UG/1
2 AEROSOL, METERED RESPIRATORY (INHALATION) AS NEEDED
COMMUNITY
Start: 2021-10-24

## 2021-11-18 NOTE — PROGRESS NOTES
Date of Office Visit: 21  Encounter Provider: Bradford So MD  Place of Service: [facility] CARDIOLOGY  Patient Name: Mariya Campos  :1960    Chief Complaint   Patient presents with   • Follow-up     6 month   • Atrial Fibrillation   :     HPI:   60 y.o. female who presents today for follow-up. She has a past cardiac history significant for atrial fibrillation and DVT.  The atrial fibrillation initially occurred in  in the setting of septic shock following a laparoscopic hernia repair.   Eventually, the rate control and anticoagulation approach were pursued.  Her last echocardiogram was in 2019 which revealed normal LV systolic function, mild mitral regurgitation, and no other significant valvular abnormalities.  In May 2020, she developed atrial fibrillation RVR in the setting of a small bowel obstruction.    Since her last visit she continues to complain of dyspnea on exertion that is mild to moderate in intensity.  It typically comes on when she walks upstairs.  He states that she has had no orthopnea and her lower extremity edema is better.  She is mildly tachycardic today but states that her heart rate controlled per her report has been fine at home.    Past Medical History:   Diagnosis Date   • Abdominal pain    • Acid reflux    • Allergic rhinitis    • Anemia     ON IRON QOD   • Asthma    • Atrial fibrillation (HCC)    • Chronic bronchitis (HCC)    • Chronic kidney disease     STAGE 3   SINCE  WITH COLON SURGERY   • Colon cancer (HCC)     CHEMO 6 MONTHS   • Hiatal hernia    • History of blood clots    • History of blood transfusion    • Hypertension    • Incisional hernia    • Overweight    • Peripheral neuropathy     FROM CHEMO TX'S   • Rosacea    • Sleep apnea    • Slow to wake up after anesthesia    • Visit for screening mammogram        Past Surgical History:   Procedure Laterality Date   • BREAST BIOPSY     • COLON RESECTION N/A  08/18/2006    laparoscopic low anterior resection    • COLON RESECTION N/A 10/13/2015    open low anterior resection   • COLONOSCOPY N/A 03/27/2007   • COLONOSCOPY N/A 10/08/2007   • COLONOSCOPY N/A 8/25/2021    Procedure: COLONOSCOPY to CECUM WITH HOT SNARE POLYPECTOMY;  Surgeon: Charli Dias MD;  Location: [facility] ENDOSCOPY;  Service: General;  Laterality: N/A;  PERSONAL AND FAMILY HX COLON CANCER  --POLYPS   • DIAGNOSTIC LAPAROSCOPY N/A 2/4/2018    Procedure: EXPLORATORY LAPAROTOMY WITH SMALL BOWEL ANASTAMOSIS, LYSIS OF ADHESIONS, REMOVAL OF MESH;  Surgeon: Tri Guzman MD;  Location: [facility] MAIN OR;  Service:    • INCISION AND DRAINAGE TRUNK N/A 2/13/2018    Procedure: WOUND WASHOUT ABDOMEN;  Surgeon: Tri Guzman MD;  Location: [facility] MAIN OR;  Service:    • INCISION AND DRAINAGE TRUNK N/A 2/16/2018    Procedure: TRUNK DEBRIDEMENT abdominal wound;  Surgeon: Tri Guzman MD;  Location: [facility] MAIN OR;  Service:    • INCISION AND DRAINAGE TRUNK N/A 2/27/2018    Procedure: INCISION AND DRAINAGE WOUND abdomen;  Surgeon: Tri Guzman MD;  Location: [facility] OR;  Service:    • INSERTION HEMODIALYSIS CATHETER N/A 2/19/2018    Procedure: LEFT QUAD CENTRAL LINE INSERTION AND RIGHT TUNNELED HEMODIALYSIS CATHETER INSERTION;  Surgeon: Meir Guillen MD;  Location: [facility] HYBRID OR 18/19;  Service:    • JOINT MANIPULATION Left 4/18/2016    Procedure: MANIPULATION OF LT SHOULDER;  Surgeon: Lidia Ugalde MD;  Location: [facility] OSC;  Service:    • UPPER GASTROINTESTINAL ENDOSCOPY N/A 07/14/2006   • VENOUS ACCESS DEVICE (PORT) INSERTION     • VENTRAL HERNIA REPAIR N/A 3/15/2016    Procedure: LAPAROSCOPIC INSIONAL HERNIA REPAIR W/ MESH DAVINCI ROBOT;  Surgeon: Tri Guzman MD;  Location: [facility] OR;  Service:    • VENTRAL HERNIA REPAIR N/A 2/1/2018    Procedure: VENTRAL HERNIA REPAIR LAPAROSCOPIC WITH DAVINCI ROBOT;  Surgeon: Tri Guzman MD;  Location: [facility]  OR;  Service:        Social History     Socioeconomic History   • Marital status:    Tobacco Use   • Smoking status: Never Smoker   • Smokeless tobacco: Never Used   • Tobacco comment: CAFFEINE USE: 1 CUP COFFEE/ 2-16OZ 7-UPS DAILY   Vaping Use   • Vaping Use: Never used   Substance and Sexual Activity   • Alcohol use: Yes     Alcohol/week: 0.0 - 1.0 standard drinks     Comment: occ   • Drug use: No   • Sexual activity: Defer       Family History   Problem Relation Age of Onset   • Breast cancer Mother 56   • Colon cancer Father         Colon Cancer   • Heart disease Father    • Breast cancer Maternal Grandmother 80   • Hypertension Other    • Cancer Other         Liver Cancer   • Other Other         Varicose veins of lower extremities   • Ovarian cancer Paternal Grandmother 70   • Breast cancer Maternal Aunt 58   • Breast cancer Maternal Aunt 65   • Malig Hyperthermia Neg Hx        Review of Systems   Constitutional: Negative. Negative for fever and malaise/fatigue.   HENT: Negative for nosebleeds and sore throat.    Eyes: Negative for blurred vision and double vision.   Cardiovascular: Positive for leg swelling (BLE). Negative for chest pain, claudication, dyspnea on exertion, orthopnea, palpitations, paroxysmal nocturnal dyspnea and syncope.   Respiratory: Negative.  Negative for cough, shortness of breath and snoring.    Endocrine: Negative for cold intolerance, heat intolerance and polydipsia.   Hematologic/Lymphatic: Negative for bleeding problem.   Skin: Negative for itching, poor wound healing and rash.   Musculoskeletal: Negative for falls, joint pain, joint swelling, muscle weakness and myalgias.   Gastrointestinal: Negative for abdominal pain, melena, nausea and vomiting.   Neurological: Negative for dizziness, light-headedness, loss of balance, seizures, vertigo and weakness.   Psychiatric/Behavioral: Negative for altered mental status and depression.       Allergies   Allergen Reactions   •  "Fish-Derived Products Anaphylaxis   • Shellfish-Derived Products Anaphylaxis         Current Outpatient Medications:   •  albuterol sulfate  (90 Base) MCG/ACT inhaler, Inhale 2 puffs As Needed. Inhale 2 puffs by mouth every 4 to 6 hours as needed, Disp: , Rfl:   •  azelastine (ASTELIN) 0.1 % nasal spray, 2 sprays into the nostril(s) as directed by provider Daily. Use in each nostril as directed, Disp: 1 each, Rfl: 12  •  Cholecalciferol (VITAMIN D) 2000 units tablet, Take 2,000 Units by mouth Daily., Disp: , Rfl:   •  dilTIAZem CD (CARDIZEM CD) 360 MG 24 hr capsule, Take 1 capsule by mouth Every Morning., Disp: 90 capsule, Rfl: 3  •  levocetirizine (XYZAL) 5 MG tablet, Take 5 mg by mouth Every Evening., Disp: , Rfl:   •  rivaroxaban (Xarelto) 20 MG tablet, Take 1 tablet by mouth Daily., Disp: 90 tablet, Rfl: 3  •  SYMBICORT 80-4.5 MCG/ACT inhaler, INHALE 2 PUFFS BID   RINSE AFTER USE, Disp: , Rfl:       Objective:     Vitals:    11/18/21 0817   BP: 148/86   Pulse: 104   Weight: 129 kg (285 lb)   Height: 172.7 cm (68\")     Body mass index is 43.33 kg/m².    PHYSICAL EXAM:    Constitutional:       Appearance: Well-developed.   Eyes:      General: No scleral icterus.     Conjunctiva/sclera: Conjunctivae normal.   HENT:      Head: Normocephalic and atraumatic.   Neck:      Thyroid: No thyromegaly.      Vascular: Normal carotid pulses. No carotid bruit, hepatojugular reflux or JVD.      Trachea: No tracheal deviation.   Pulmonary:      Effort: Pulmonary effort is normal. No respiratory distress.      Breath sounds: Normal breath sounds.   Chest:      Chest wall: Not tender to palpatation.   Cardiovascular:      Normal rate. Irregular rhythm.      Murmurs: There is no murmur.      No gallop. No click. No rub.   Edema:     Peripheral edema (Bilateral ankle edema) present.  Abdominal:      General: Bowel sounds are normal. There is no distension.      Palpations: Abdomen is soft.      Tenderness: There is no " abdominal tenderness.   Musculoskeletal:         General: No deformity.      Cervical back: Normal range of motion and neck supple. Skin:     Findings: No erythema or rash.   Neurological:      Mental Status: Alert and oriented to person, place, and time.      Sensory: No sensory deficit.   Psychiatric:         Behavior: Behavior normal.           ECG 12 Lead    Date/Time: 11/18/2021 8:43 AM  Performed by: Bradford So MD  Authorized by: Bradford So MD   Comparison: compared with previous ECG from 4/7/2021  Comparison to previous ECG: Hr slightly increased  Rhythm: atrial fibrillation  Rate: tachycardic              Assessment:      No diagnosis found.  No orders of the defined types were placed in this encounter.         Plan:   Very pleasant 60-year-old female with a medical history of obesity, chronic atrial fibrillation, dyspnea on exertion, and essential hypertension who presents back in follow-up.  She denies any new complaints.  She continues to complain of dyspnea that is moderate in intensity and unchanged from prior in my opinion.  No orthopnea or PND.  No evidence of volume overload.      1.  Atrial fibrillation.  Mildly increased ventricular rate today.    -Home ventricular rates along with last couple visits have been well controlled.  I would not recommend increasing the diltiazem therapy at this point in time.  -Continue Xarelto therapy.  No bleeding complications.  Lab work have been reviewed from May and no significant issues with anemia    2.  Hypertension.  Her blood pressure looks great.  Continue diltiazem.    3.  Obesity: Dietary modification and weight loss have been recommended.  Patient is already doing better with her diet.

## 2021-11-23 DIAGNOSIS — Z00.00 PHYSICAL EXAM, ANNUAL: Primary | ICD-10-CM

## 2021-11-23 DIAGNOSIS — E55.9 VITAMIN D DEFICIENCY: ICD-10-CM

## 2021-11-30 LAB
25(OH)D3+25(OH)D2 SERPL-MCNC: 46.2 NG/ML (ref 30–100)
ALBUMIN SERPL-MCNC: 4 G/DL (ref 3.8–4.9)
ALBUMIN/GLOB SERPL: 1.4 {RATIO} (ref 1.2–2.2)
ALP SERPL-CCNC: 76 IU/L (ref 44–121)
ALT SERPL-CCNC: 10 IU/L (ref 0–32)
AST SERPL-CCNC: 14 IU/L (ref 0–40)
BILIRUB SERPL-MCNC: 0.3 MG/DL (ref 0–1.2)
BUN SERPL-MCNC: 31 MG/DL (ref 6–24)
BUN/CREAT SERPL: 18 (ref 9–23)
CALCIUM SERPL-MCNC: 9.1 MG/DL (ref 8.7–10.2)
CHLORIDE SERPL-SCNC: 104 MMOL/L (ref 96–106)
CHOLEST SERPL-MCNC: 185 MG/DL (ref 100–199)
CO2 SERPL-SCNC: 23 MMOL/L (ref 20–29)
CREAT SERPL-MCNC: 1.68 MG/DL (ref 0.57–1)
ERYTHROCYTE [DISTWIDTH] IN BLOOD BY AUTOMATED COUNT: 13.5 % (ref 11.7–15.4)
GLOBULIN SER CALC-MCNC: 2.8 G/DL (ref 1.5–4.5)
GLUCOSE SERPL-MCNC: 92 MG/DL (ref 65–99)
HCT VFR BLD AUTO: 37.8 % (ref 34–46.6)
HDLC SERPL-MCNC: 42 MG/DL
HGB BLD-MCNC: 12.4 G/DL (ref 11.1–15.9)
LDLC SERPL CALC-MCNC: 125 MG/DL (ref 0–99)
LDLC/HDLC SERPL: 3 RATIO (ref 0–3.2)
MCH RBC QN AUTO: 28.6 PG (ref 26.6–33)
MCHC RBC AUTO-ENTMCNC: 32.8 G/DL (ref 31.5–35.7)
MCV RBC AUTO: 87 FL (ref 79–97)
PLATELET # BLD AUTO: 338 X10E3/UL (ref 150–450)
POTASSIUM SERPL-SCNC: 4.3 MMOL/L (ref 3.5–5.2)
PROT SERPL-MCNC: 6.8 G/DL (ref 6–8.5)
RBC # BLD AUTO: 4.33 X10E6/UL (ref 3.77–5.28)
SODIUM SERPL-SCNC: 142 MMOL/L (ref 134–144)
TRIGL SERPL-MCNC: 97 MG/DL (ref 0–149)
TSH SERPL DL<=0.005 MIU/L-ACNC: 2.04 UIU/ML (ref 0.45–4.5)
VLDLC SERPL CALC-MCNC: 18 MG/DL (ref 5–40)
WBC # BLD AUTO: 4.9 X10E3/UL (ref 3.4–10.8)

## 2021-12-06 ENCOUNTER — OFFICE VISIT (OUTPATIENT)
Dept: INTERNAL MEDICINE | Facility: CLINIC | Age: 61
End: 2021-12-06

## 2021-12-06 VITALS
BODY MASS INDEX: 43.53 KG/M2 | HEART RATE: 90 BPM | HEIGHT: 68 IN | DIASTOLIC BLOOD PRESSURE: 78 MMHG | WEIGHT: 287.2 LBS | OXYGEN SATURATION: 100 % | SYSTOLIC BLOOD PRESSURE: 118 MMHG | TEMPERATURE: 97.5 F

## 2021-12-06 DIAGNOSIS — Z00.00 PHYSICAL EXAM, ANNUAL: Primary | ICD-10-CM

## 2021-12-06 DIAGNOSIS — Z78.0 POSTMENOPAUSAL: ICD-10-CM

## 2021-12-06 DIAGNOSIS — Z12.4 CERVICAL CANCER SCREENING: ICD-10-CM

## 2021-12-06 PROCEDURE — 99396 PREV VISIT EST AGE 40-64: CPT | Performed by: FAMILY MEDICINE

## 2021-12-06 NOTE — PROGRESS NOTES
Subjective   Mariya Campos is a 60 y.o. female.   Chief Complaint   Patient presents with   • Annual Exam       History of Present Illness     #1 CPE-patient is here for complete physical exam.  She has no complaints.  There is no change in medical, surgical or family history from last office visit.    There is no change in prescription medications.  Over-the-counter she takes only vitamin D.  No change in dose from last year.  No new allergies to medications.  She does not use tobacco products.  She drinks few drinks a month.  No drugs.  She exercises 5 days a week for 20 minutes -she walks on treadmill.  6 weeks ago she started changing her diet.  She does not eat for at least 12 hours in 24 hours.  She gave up soft drinks and refined sugars.  She is decreasing gluten.  She feels much better since she started doing it.  She has had appointments every 6 months.  Last eye exam was in May 2021.  She had colonoscopy in August 2021.  It was done by Dr. Dias.  Next was recommended 5 years later.  She had Pap smear in November 2018.  HPV negative.  LMP around 2017.  No vaginal bleeding.  Mammogram -12/16/2020.  Covid vaccine x3.  Tetanus vaccine 2020.  Flu vaccine up to date.    Creatinine at 1.68, GFR 33.  Patient follows up with nephrologist every 6 months.  She does not use NSAIDs.    The following portions of the patient's history were reviewed and updated as appropriate: allergies, current medications, past family history, past medical history, past social history, past surgical history and problem list.    Review of Systems   Constitutional: Negative for chills and fever.   Respiratory: Negative for cough and shortness of breath.    Cardiovascular: Negative for chest pain.   Gastrointestinal: Negative for blood in stool.   Genitourinary: Negative for hematuria.   Psychiatric/Behavioral: Negative.          Objective   Wt Readings from Last 3 Encounters:   12/06/21 130 kg (287 lb 3.2 oz)   11/18/21 129 kg (285  lb)   08/25/21 125 kg (276 lb 4.8 oz)      Vitals:    12/06/21 0756   BP: 118/78   Pulse: 90   Temp: 97.5 °F (36.4 °C)   SpO2: 100%     Temp Readings from Last 3 Encounters:   12/06/21 97.5 °F (36.4 °C)   08/25/21 98.4 °F (36.9 °C) (Oral)   05/06/21 96.9 °F (36.1 °C)     BP Readings from Last 3 Encounters:   12/06/21 118/78   11/18/21 148/86   08/25/21 119/67     Pulse Readings from Last 3 Encounters:   12/06/21 90   11/18/21 104   08/25/21 86     Body mass index is 43.67 kg/m².    Physical Exam  Vitals reviewed.   Constitutional:       General: She is not in acute distress.     Appearance: She is well-developed. She is obese. She is not diaphoretic.   HENT:      Head: Normocephalic and atraumatic. Hair is normal.      Right Ear: Hearing, tympanic membrane, ear canal and external ear normal. No decreased hearing noted. No drainage.      Left Ear: Hearing, tympanic membrane, ear canal and external ear normal. No decreased hearing noted.   Eyes:      General: Lids are normal.         Right eye: No discharge.         Left eye: No discharge.      Conjunctiva/sclera: Conjunctivae normal.      Pupils: Pupils are equal, round, and reactive to light.   Neck:      Thyroid: No thyromegaly.      Vascular: No JVD.      Trachea: No tracheal deviation.   Cardiovascular:      Rate and Rhythm: Normal rate and regular rhythm.      Pulses: Normal pulses.      Heart sounds: Normal heart sounds. No murmur heard.  No friction rub. No gallop.    Pulmonary:      Effort: Pulmonary effort is normal. No respiratory distress.      Breath sounds: Normal breath sounds. No wheezing or rales.   Chest:      Chest wall: No tenderness.   Breasts:      Right: Normal. No swelling, bleeding, inverted nipple, mass, axillary adenopathy or supraclavicular adenopathy.      Left: Normal. No swelling, bleeding, inverted nipple, mass, axillary adenopathy or supraclavicular adenopathy.       Abdominal:      General: There is no distension.      Palpations:  Abdomen is soft. There is no mass.      Tenderness: There is no abdominal tenderness. There is no guarding or rebound.      Hernia: No hernia is present.   Genitourinary:     Vagina: Normal.      Cervix: Normal.      Uterus: Normal.       Adnexa: Right adnexa normal and left adnexa normal.        Right: No mass.          Left: No mass.     Musculoskeletal:         General: No tenderness or deformity. Normal range of motion.      Cervical back: Normal range of motion and neck supple.   Lymphadenopathy:      Cervical: No cervical adenopathy.      Upper Body:      Right upper body: No supraclavicular or axillary adenopathy.      Left upper body: No supraclavicular or axillary adenopathy.   Skin:     General: Skin is warm and dry.      Findings: No erythema or rash.   Neurological:      Mental Status: She is alert and oriented to person, place, and time.      Cranial Nerves: No cranial nerve deficit.      Motor: No abnormal muscle tone.      Coordination: Coordination normal.      Deep Tendon Reflexes: Reflexes are normal and symmetric. Reflexes normal.   Psychiatric:         Behavior: Behavior normal.         Thought Content: Thought content normal.         Judgment: Judgment normal.         Assessment/Plan   Diagnoses and all orders for this visit:    1. Physical exam, annual (Primary)    2. Postmenopausal  -     DEXA Bone Density Axial; Future        #1  CPE-preventing counseling provided.  Blood work results reviewed with patient.  She will continue to work on weight loss.  She started making changes 6 weeks ago.  She is encouraged to increase exercise to 60 minutes a day, 5 days a week.  She is advised to get Shingrix vaccine.  Otherwise she is up-to-date with vaccinations.  We are doing Pap smear today.  We are ordering bone density.  Sun protection discussed and recommended.  Continue regular dental appointments at least every 6 months.

## 2021-12-08 ENCOUNTER — TRANSCRIBE ORDERS (OUTPATIENT)
Dept: ADMINISTRATIVE | Facility: HOSPITAL | Age: 61
End: 2021-12-08

## 2021-12-08 DIAGNOSIS — Z92.89 HISTORY OF MAMMOGRAPHY, SCREENING: Primary | ICD-10-CM

## 2021-12-08 LAB
CYTOLOGIST CVX/VAG CYTO: NORMAL
CYTOLOGY CVX/VAG DOC CYTO: NORMAL
CYTOLOGY CVX/VAG DOC THIN PREP: NORMAL
DX ICD CODE: NORMAL
HIV 1 & 2 AB SER-IMP: NORMAL
HPV I/H RISK 4 DNA CVX QL PROBE+SIG AMP: NEGATIVE
OTHER STN SPEC: NORMAL
STAT OF ADQ CVX/VAG CYTO-IMP: NORMAL

## 2022-01-20 RX ORDER — RIVAROXABAN 20 MG/1
TABLET, FILM COATED ORAL
Qty: 90 TABLET | Refills: 3 | Status: SHIPPED | OUTPATIENT
Start: 2022-01-20 | End: 2022-12-27 | Stop reason: SDUPTHER

## 2022-03-14 ENCOUNTER — HOSPITAL ENCOUNTER (OUTPATIENT)
Dept: MAMMOGRAPHY | Facility: HOSPITAL | Age: 62
Discharge: HOME | End: 2022-03-14
Admitting: FAMILY MEDICINE

## 2022-03-14 DIAGNOSIS — Z92.89 HISTORY OF MAMMOGRAPHY, SCREENING: ICD-10-CM

## 2022-03-14 PROCEDURE — 77067 SCR MAMMO BI INCL CAD: CPT

## 2022-03-14 PROCEDURE — 77063 BREAST TOMOSYNTHESIS BI: CPT

## 2022-03-15 DIAGNOSIS — R92.8 ABNORMAL MAMMOGRAM OF BOTH BREASTS: Primary | ICD-10-CM

## 2022-04-26 ENCOUNTER — HOSPITAL ENCOUNTER (OUTPATIENT)
Dept: ULTRASOUND IMAGING | Facility: HOSPITAL | Age: 62
Discharge: HOME | End: 2022-04-26

## 2022-04-26 ENCOUNTER — HOSPITAL ENCOUNTER (OUTPATIENT)
Dept: MAMMOGRAPHY | Facility: HOSPITAL | Age: 62
Discharge: HOME | End: 2022-04-26

## 2022-04-26 DIAGNOSIS — R92.8 ABNORMAL MAMMOGRAM OF BOTH BREASTS: ICD-10-CM

## 2022-04-26 PROCEDURE — 77066 DX MAMMO INCL CAD BI: CPT

## 2022-04-26 PROCEDURE — 76642 ULTRASOUND BREAST LIMITED: CPT

## 2022-04-26 PROCEDURE — G0279 TOMOSYNTHESIS, MAMMO: HCPCS

## 2022-04-27 DIAGNOSIS — R92.8 ABNORMAL MAMMOGRAM OF BOTH BREASTS: Primary | ICD-10-CM

## 2022-05-09 ENCOUNTER — TELEPHONE (OUTPATIENT)
Dept: SURGERY | Facility: CLINIC | Age: 62
End: 2022-05-09

## 2022-05-09 ENCOUNTER — OFFICE VISIT (OUTPATIENT)
Dept: SURGERY | Facility: CLINIC | Age: 62
End: 2022-05-09

## 2022-05-09 VITALS — WEIGHT: 271 LBS | BODY MASS INDEX: 41.07 KG/M2 | HEIGHT: 68 IN

## 2022-05-09 DIAGNOSIS — R92.8 ABNORMAL MAMMOGRAM OF RIGHT BREAST: Primary | ICD-10-CM

## 2022-05-09 PROCEDURE — 99214 OFFICE O/P EST MOD 30 MIN: CPT | Performed by: SURGERY

## 2022-05-09 RX ORDER — CETIRIZINE HYDROCHLORIDE 10 MG/1
10 TABLET ORAL DAILY PRN
COMMUNITY

## 2022-05-09 NOTE — TELEPHONE ENCOUNTER
Contact [facility] and spoke to Lizzie advising that  Mrs. Vasquez would be  going to [facility] to get her stereotatic biopsy. No longer will be going to [facility].

## 2022-05-09 NOTE — TELEPHONE ENCOUNTER
Contact Siria  At [facility] to schedule Mrs. Campos for stereotactic  biopsy. Appointment 5/16. Siria will contact patient and provide her with appointment information.       Contact patient and inform her about appointment and they will contact her for appointment information as well. Patient verbalize understanding.

## 2022-05-13 NOTE — PROGRESS NOTES
ASSESSMENT:    61-year-old lady with abnormal right breast mammogram consisting of new suspicious segmental calcifications in the upper outer right breast and upper inner right breast.    PLAN:  · Agree with radiographic recommendation for 2 site stereotactic biopsy and this has been scheduled accordingly.  I discussed with her the nature of that procedure and the risks.  Further recommendations will follow acquisition of pathology report.    CC:     Abnormal mammogram    HPI:    61-year-old lady presents secondary to abnormal mammography.  No findings on self-exam.    ENDOSCOPY:   • Colonoscopy 8/25/2021: 2 small ascending colon polyps (tubular adenomas), 5-year surveillance recommended    RADIOLOGY:   • Screening mammography 3/14/2022: Interval development of multiple microcalcifications in the middle third upper outer and upper inner quadrants of the right breast.  Focal asymmetry in the posterior one third medial aspect of the right breast.  Focal asymmetry in the posterior one third lateral aspect of the left breast.  Additional imaging recommended.  I reviewed the images and concur.  • Diagnostic mammography and ultrasound 4/26/2022: New suspicious segmental calcifications in the upper outer right breast and new suspicious group of calcifications in the upper inner right breast, 2 site stereotactic biopsy recommended.  I reviewed the images and concur.  No evidence of malignancy in the left breast.  By ultrasound, the other abnormalities on mammography were simple cysts.    SOCIAL HISTORY:   • Denies tobacco use  • Occasional alcohol use    FAMILY HISTORY:    • Colorectal cancer: Father    PREVIOUS ABDOMINAL SURGERY    · Debridement of abdominal wound 2/27/2018  · Insertion hemodialysis catheter 2/19/2018  · Debridement of abdominal wound 2/16/2018  · Debridement of abdominal wound 2/13/2018  · Laparotomy with removal of mesh and small bowel resection 2/4/2018  · Laparoscopic robotic incisional hernia repair  2/1/2018  · Laparoscopic robotic incisional hernia repair 3/15/2016  · Colectomy 10/13/2015 (pathology indicates tubulovillous adenoma with low anterior resection specimen)  · Colectomy 2006 (pathology indicates laparoscopic low anterior colon resection with invasive colonic adenocarcinoma T3N0)    PAST MEDICAL HISTORY:    · Morbid obesity  · Gastroesophageal reflux disease  · Seasonal allergies  · Atrial fibrillation  · Deep venous thrombosis  · Chronic kidney disease  · Colon cancer  · Hypertension    MEDICATIONS:   • Albuterol  • Zyrtec  • Cardizem  • Xyzal  • Symbicort  • Xarelto    ALLERGIES:   • Fish derived products-anaphylaxis  • Shellfish-anaphylaxis    PHYSICAL EXAM:   • Constitutional: Well-developed well-nourished, no acute distress  • Vital signs:   o Weight 271 pounds  o Height 68 inches  o BMI 41.2  • Neck: Supple, no palpable mass, trachea midline  • Respiratory: Normal inspiratory effort  • Cardiovascular: Regular rate  • Breast: Symmetric.  No palpable mass in either breast.  No cutaneous changes.  • Lymphatics (palpable nodes):  cervical-negative, axillary-negative  • Psychiatric: Alert and oriented ×3, normal affect     SUHAIL SHEIKH M.D.

## 2022-05-16 ENCOUNTER — HOSPITAL ENCOUNTER (OUTPATIENT)
Dept: MAMMOGRAPHY | Facility: HOSPITAL | Age: 62
Discharge: HOME | End: 2022-05-16

## 2022-05-16 DIAGNOSIS — R92.1 BREAST CALCIFICATIONS: ICD-10-CM

## 2022-05-16 PROCEDURE — 76098 X-RAY EXAM SURGICAL SPECIMEN: CPT

## 2022-05-16 PROCEDURE — 88360 TUMOR IMMUNOHISTOCHEM/MANUAL: CPT | Performed by: SURGERY

## 2022-05-16 PROCEDURE — 88305 TISSUE EXAM BY PATHOLOGIST: CPT | Performed by: SURGERY

## 2022-05-16 PROCEDURE — 0 LIDOCAINE 1 % SOLUTION: Performed by: SURGERY

## 2022-05-16 PROCEDURE — A4648 IMPLANTABLE TISSUE MARKER: HCPCS

## 2022-05-16 RX ORDER — LIDOCAINE HYDROCHLORIDE AND EPINEPHRINE 10; 10 MG/ML; UG/ML
8 INJECTION, SOLUTION INFILTRATION; PERINEURAL ONCE
Status: COMPLETED | OUTPATIENT
Start: 2022-05-16 | End: 2022-05-16

## 2022-05-16 RX ORDER — LIDOCAINE HYDROCHLORIDE 10 MG/ML
3 INJECTION, SOLUTION INFILTRATION; PERINEURAL ONCE
Status: COMPLETED | OUTPATIENT
Start: 2022-05-16 | End: 2022-05-16

## 2022-05-16 RX ADMIN — LIDOCAINE HYDROCHLORIDE,EPINEPHRINE BITARTRATE 4 ML: 10; .01 INJECTION, SOLUTION INFILTRATION; PERINEURAL at 11:18

## 2022-05-16 RX ADMIN — Medication 0.5 ML: at 11:17

## 2022-05-16 RX ADMIN — LIDOCAINE HYDROCHLORIDE,EPINEPHRINE BITARTRATE 4 ML: 10; .01 INJECTION, SOLUTION INFILTRATION; PERINEURAL at 10:46

## 2022-05-16 RX ADMIN — Medication 1.5 ML: at 10:45

## 2022-05-17 LAB
LAB AP CASE REPORT: NORMAL
LAB AP CASE REPORT: NORMAL
LAB AP SPECIAL STAINS: NORMAL
LAB AP SPECIAL STAINS: NORMAL
PATH REPORT.FINAL DX SPEC: NORMAL
PATH REPORT.FINAL DX SPEC: NORMAL
PATH REPORT.GROSS SPEC: NORMAL
PATH REPORT.GROSS SPEC: NORMAL

## 2022-05-18 ENCOUNTER — TELEPHONE (OUTPATIENT)
Dept: SURGERY | Facility: CLINIC | Age: 62
End: 2022-05-18

## 2022-05-18 ENCOUNTER — TELEPHONE (OUTPATIENT)
Dept: MAMMOGRAPHY | Facility: HOSPITAL | Age: 62
End: 2022-05-18

## 2022-05-18 NOTE — TELEPHONE ENCOUNTER
Spoke with April at Dr. Bello's office with Mariya's positive bx results. Dr. Fields will be dictating the addendum today

## 2022-05-19 NOTE — PROGRESS NOTES
I spoke with her and her  regarding pathology result. Please make her an appointment to see me on Thursday May 26 at 3:00 pm to discuss surgery (diagnosis=DCIS)

## 2022-05-26 ENCOUNTER — OFFICE VISIT (OUTPATIENT)
Dept: SURGERY | Facility: CLINIC | Age: 62
End: 2022-05-26

## 2022-05-26 DIAGNOSIS — D05.11 DUCTAL CARCINOMA IN SITU (DCIS) OF RIGHT BREAST: Primary | ICD-10-CM

## 2022-05-26 PROCEDURE — 99215 OFFICE O/P EST HI 40 MIN: CPT | Performed by: SURGERY

## 2022-05-26 RX ORDER — CEFAZOLIN SODIUM IN 0.9 % NACL 3 G/100 ML
3 INTRAVENOUS SOLUTION, PIGGYBACK (ML) INTRAVENOUS ONCE
Status: CANCELLED | OUTPATIENT
Start: 2022-06-14

## 2022-05-27 NOTE — PROGRESS NOTES
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  · Nature of diagnosis discussed  · Genetic testing discussed, with no daughters and plans for bilateral mastectomy, bit too overwhelmed with diagnosis to discuss implications for other cancer risk at present.  · Surgical treatment discussed. Extent of DCIS on mammogram discussed, do not feel lumpectomy is reasonable option. Discussed unilateral and bilateral mastectomy with and without reconstruction. Presently wishes to proceed with bilateral mastectomy, right axillary sentinel lymph node biopsy, and no reconstruction.  · Very worried about her previous complicated surgical course following iatrogenic bowel injury (which resulted in prolonged hospitalization, renal failure, etc.).  Reassured her that magnitude of this surgery and other issues leading to her prior outcome are not present in this instance.    Duration of office visit 55 minutes, 80% in counseling/coordination of care    CC:     Follow up right breast biopys    HPI:    61 year old lady returns for follow up after right breast biopsy.    ENDOSCOPY:   · Colonoscopy 8/25/2021: 2 small ascending colon polyps (tubular adenomas), 5-year surveillance recommended    RADIOLOGY:   • Screening mammography 3/14/2022: Interval development of multiple microcalcifications in the middle third upper outer and upper inner quadrants of the right breast.  Focal asymmetry in the posterior aspect of the right breast.  Focal asymmetry in the posterior aspect of the left breast.  • Diagnostic bilateral mammography and left breast ultrasound 4/26/2022: New suspicious segmental calcifications in the upper outer right breast and new suspicious calcifications in the upper inner right breast for which biopsy recommended.  No evidence of malignancy in the left breast.  Ultrasound of the left breast showed benign simple appearing cysts.  I reviewed the images and 1 area of  calcifications spans approximately 2 cm, the other spans approximately 9 cm.  • Stereotactic right breast biopsy 5/16/2022: Biopsy clips were in expected position post procedure.    LABS:    • Pathology from both areas in right breast revealed well to moderately differentiated ductal carcinoma in situ, ER/NM strongly positive    SOCIAL HISTORY:   • Denies tobacco use  • Occasional alcohol use    FAMILY HISTORY:    • Colorectal cancer: Father    PREVIOUS ABDOMINAL SURGERY    · Debridement of abdominal wound 2/27/2018  · Insertion hemodialysis catheter 2/19/2018  · Debridement of abdominal wound 2/16/2018  · Debridement of abdominal wound 2/13/2018  · Laparotomy with removal of mesh and small bowel resection 2/4/2018  · Laparoscopic robotic incisional hernia repair 2/1/2018  · Laparoscopic robotic incisional hernia repair 3/15/2016  · Colectomy 10/13/2015 (pathology indicates tubulovillous adenoma with low anterior resection specimen)  · Colectomy 2006 (pathology indicates laparoscopic low anterior colon resection with invasive colonic adenocarcinoma T3N0)    PAST MEDICAL HISTORY:    · Morbid obesity  · Gastroesophageal reflux disease  · Seasonal allergies  · Atrial fibrillation  · Deep venous thrombosis  · Chronic kidney disease  · Colon cancer  · Hypertension    MEDICATIONS:   · Albuterol  · Zyrtec  · Cardizem  · Xyzal  · Symbicort  · Xarelto    ALLERGIES:   · Fish derived products-anaphylaxis  · Shellfish-anaphylaxis    PHYSICAL EXAM:   • Constitutional: Well-developed well-nourished, no acute distress  • Psychiatric: Alert and oriented ×3, normal affect     SUHAIL SHEIKH M.D.

## 2022-06-10 ENCOUNTER — PRE-ADMISSION TESTING (OUTPATIENT)
Dept: PREADMISSION TESTING | Facility: HOSPITAL | Age: 62
End: 2022-06-10

## 2022-06-10 VITALS
HEART RATE: 72 BPM | WEIGHT: 275 LBS | HEIGHT: 68 IN | DIASTOLIC BLOOD PRESSURE: 102 MMHG | TEMPERATURE: 98.1 F | BODY MASS INDEX: 41.68 KG/M2 | SYSTOLIC BLOOD PRESSURE: 167 MMHG | OXYGEN SATURATION: 100 % | RESPIRATION RATE: 16 BRPM

## 2022-06-10 LAB
ANION GAP SERPL CALCULATED.3IONS-SCNC: 9 MMOL/L (ref 5–15)
BUN SERPL-MCNC: 31 MG/DL (ref 6–20)
BUN/CREAT SERPL: 22.1 (ref 7–25)
CALCIUM SPEC-SCNC: 9 MG/DL (ref 8.6–10.5)
CHLORIDE SERPL-SCNC: 105 MMOL/L (ref 98–107)
CO2 SERPL-SCNC: 26 MMOL/L (ref 22–29)
CREAT SERPL-MCNC: 1.4 MG/DL (ref 0.57–1)
DEPRECATED RDW RBC AUTO: 43.3 FL (ref 37–54)
EGFRCR SERPLBLD CKD-EPI 2021: 43.4 ML/MIN/1.73
ERYTHROCYTE [DISTWIDTH] IN BLOOD BY AUTOMATED COUNT: 13.4 % (ref 12.3–15.4)
GLUCOSE SERPL-MCNC: 89 MG/DL (ref 65–99)
HCT VFR BLD AUTO: 39.2 % (ref 34–46.6)
HGB BLD-MCNC: 12.8 G/DL (ref 12–15.9)
MCH RBC QN AUTO: 28.8 PG (ref 26.6–33)
MCHC RBC AUTO-ENTMCNC: 32.7 G/DL (ref 31.5–35.7)
MCV RBC AUTO: 88.3 FL (ref 79–97)
PLATELET # BLD AUTO: 327 10*3/MM3 (ref 140–450)
PMV BLD AUTO: 8.7 FL (ref 6–12)
POTASSIUM SERPL-SCNC: 4.3 MMOL/L (ref 3.5–5.2)
QT INTERVAL: 365 MS
RBC # BLD AUTO: 4.44 10*6/MM3 (ref 3.77–5.28)
SODIUM SERPL-SCNC: 140 MMOL/L (ref 136–145)
WBC NRBC COR # BLD: 6.74 10*3/MM3 (ref 3.4–10.8)

## 2022-06-10 PROCEDURE — 93005 ELECTROCARDIOGRAM TRACING: CPT

## 2022-06-10 PROCEDURE — 80048 BASIC METABOLIC PNL TOTAL CA: CPT

## 2022-06-10 PROCEDURE — 85027 COMPLETE CBC AUTOMATED: CPT

## 2022-06-10 PROCEDURE — 36415 COLL VENOUS BLD VENIPUNCTURE: CPT

## 2022-06-10 PROCEDURE — 93010 ELECTROCARDIOGRAM REPORT: CPT | Performed by: INTERNAL MEDICINE

## 2022-06-10 RX ORDER — CHLORHEXIDINE GLUCONATE 500 MG/1
CLOTH TOPICAL TAKE AS DIRECTED
COMMUNITY
End: 2022-06-15 | Stop reason: HOSPADM

## 2022-06-10 NOTE — DISCHARGE INSTRUCTIONS
CHLORHEXIDINE CLOTH INSTRUCTIONS  The morning of surgery follow these instructions using the Chlorhexidine cloths you've been given.  These steps reduce bacteria on the body.  Do not use the cloths near your eyes, ears mouth, genitalia or on open wounds.  Throw the cloths away after use but do not try to flush them down a toilet.      Open and remove one cloth at a time from the package.    Leave the cloth unfolded and begin the bathing.  Massage the skin with the cloths using gentle pressure to remove bacteria.  Do not scrub harshly.   Follow the steps below with one 2% CHG cloth per area (6 total cloths).  One cloth for neck, shoulders and chest.  One cloth for both arms, hands, fingers and underarms (do underarms last).  One cloth for the abdomen followed by groin.  One cloth for right leg and foot including between the toes.  One cloth for left leg and foot including between the toes.  The last cloth is to be used for the back of the neck, back and buttocks.    Allow the CHG to air dry 3 minutes on the skin which will give it time to work and decrease the chance of irritation.  The skin may feel sticky until it is dry.  Do not rinse with water or any other liquid or you will lose the beneficial effects of the CHG.  If mild skin irritation occurs, do rinse the skin to remove the CHG.  Report this to the nurse at time of admission.  Do not apply lotions, creams, ointments, deodorants or perfumes after using the clothes. Dress in clean clothes before coming to the hospital.     Take the following medications the morning of surgery: SYMBICORT, DILTIAZEM     ARRIVE AT 9:00 AM.      If you are on prescription narcotic pain medication to control your pain you may also take that medication the morning of surgery.    General Instructions:  Do not eat solid food after midnight the night before surgery.  You may drink clear liquids day of surgery but must stop at least one hour before your hospital arrival time. CUTOFF  TIME IS 8:00 AM.  It is beneficial for you to have a clear drink that contains carbohydrates the day of surgery.  We suggest a 12 to 20 ounce bottle of Gatorade or Powerade for non-diabetic patients or a 12 to 20 ounce bottle of G2 or Powerade Zero for diabetic patients. (Pediatric patients, are not advised to drink a 12 to 20 ounce carbohydrate drink)    Clear liquids are liquids you can see through.  Nothing red in color.     Plain water                               Sports drinks  Sodas                                   Gelatin (Jell-O)  Fruit juices without pulp such as white grape juice and apple juice  Popsicles that contain no fruit or yogurt  Tea or coffee (no cream or milk added)  Gatorade / Powerade  G2 / Powerade Zero  Patients who avoid smoking, chewing tobacco and alcohol for 4 weeks prior to surgery have a reduced risk of post-operative complications.  Quit smoking as many days before surgery as you can.  Do not smoke, use chewing tobacco or drink alcohol the day of surgery.   If applicable bring your C-PAP/ BI-PAP machine.  Bring any papers given to you in the doctor’s office.  Wear clean comfortable clothes.  Do not wear contact lenses, false eyelashes or make-up.  Bring a case for your glasses.   Bring crutches or walker if applicable.  Remove all piercings.  Leave jewelry and any other valuables at home.  Hair extensions with metal clips must be removed prior to surgery.  The Pre-Admission Testing nurse will instruct you to bring medications if unable to obtain an accurate list in Pre-Admission Testing.          Preventing a Surgical Site Infection:  For 2 to 3 days before surgery, avoid shaving with a razor because the razor can irritate skin and make it easier to develop an infection.    Any areas of open skin can increase the risk of a post-operative wound infection by allowing bacteria to enter and travel throughout the body.  Notify your surgeon if you have any skin wounds / rashes even if it  is not near the expected surgical site.  The area will need assessed to determine if surgery should be delayed until it is healed.  The night prior to surgery shower using a fresh bar of anti-bacterial soap (such as Dial) and clean washcloth.  Sleep in a clean bed with clean clothing.  Do not allow pets to sleep with you.  Shower on the morning of surgery using a fresh bar of anti-bacterial soap (such as Dial) and clean washcloth.  Dry with a clean towel and dress in clean clothing.  Ask your surgeon if you will be receiving antibiotics prior to surgery.  Make sure you, your family, and all healthcare providers clean their hands with soap and water or an alcohol based hand  before caring for you or your wound.    Day of surgery:  Your arrival time is approximately two hours before your scheduled surgery time.  Upon arrival, a Pre-op nurse and Anesthesiologist will review your health history, obtain vital signs, and answer questions you may have.  The only belongings needed at this time will be a list of your home medications and if applicable your C-PAP/BI-PAP machine.  A Pre-op nurse will start an IV and you may receive medication in preparation for surgery, including something to help you relax.     Please be aware that surgery does come with discomfort.  We want to make every effort to control your discomfort so please discuss any uncontrolled symptoms with your nurse.   Your doctor will most likely have prescribed pain medications.      If you are going home after surgery you will receive individualized written care instructions before being discharged.  A responsible adult must drive you to and from the hospital on the day of your surgery and stay with you for 24 hours.  Discharge prescriptions can be filled by the hospital pharmacy during regular pharmacy hours.  If you are having surgery late in the day/evening your prescription may be e-prescribed to your pharmacy.  Please verify your pharmacy hours  or chose a 24 hour pharmacy to avoid not having access to your prescription because your pharmacy has closed for the day.    If you are staying overnight following surgery, you will be transported to your hospital room following the recovery period.  [facility] has all private rooms.    If you have any questions please call Pre-Admission Testing at [phone].  Deductibles and co-payments are collected on the day of service. Please be prepared to pay the required co-pay, deductible or deposit on the day of service as defined by your plan.    Patient Education for Self-Quarantine Process    Following your COVID testing, we strongly recommend that you wear a mask when you are with other people and practice social distancing.   Limit your activities to only required outings.  Wash your hands with soap and water frequently for at least 20 seconds.   Avoid touching your eyes, nose and mouth with unwashed hands.  Do not share anything - utensils, drinking glasses, food from the same bowl.   Sanitize household surfaces daily. Include all high touch areas (door handles, light switches, phones, countertops, etc.)    Call your surgeon immediately if you experience any of the following symptoms:  Sore Throat  Shortness of Breath or difficulty breathing  Cough  Chills  Body soreness or muscle pain  Headache  Fever  New loss of taste or smell  Do not arrive for your surgery ill.  Your procedure will need to be rescheduled to another time.  You will need to call your physician before the day of surgery to avoid any unnecessary exposure to hospital staff as well as other patients.

## 2022-06-11 ENCOUNTER — LAB (OUTPATIENT)
Dept: LAB | Facility: HOSPITAL | Age: 62
End: 2022-06-11

## 2022-06-11 DIAGNOSIS — D05.11 DUCTAL CARCINOMA IN SITU (DCIS) OF RIGHT BREAST: ICD-10-CM

## 2022-06-11 LAB — SARS-COV-2 ORF1AB RESP QL NAA+PROBE: NOT DETECTED

## 2022-06-11 PROCEDURE — C9803 HOPD COVID-19 SPEC COLLECT: HCPCS

## 2022-06-11 PROCEDURE — U0004 COV-19 TEST NON-CDC HGH THRU: HCPCS

## 2022-06-13 ENCOUNTER — ANESTHESIA EVENT (OUTPATIENT)
Dept: PERIOP | Facility: HOSPITAL | Age: 62
End: 2022-06-13

## 2022-06-13 ENCOUNTER — APPOINTMENT (OUTPATIENT)
Dept: BONE DENSITY | Facility: HOSPITAL | Age: 62
End: 2022-06-13

## 2022-06-14 ENCOUNTER — HOSPITAL ENCOUNTER (OUTPATIENT)
Facility: HOSPITAL | Age: 62
Discharge: HOME | End: 2022-06-15
Attending: SURGERY | Admitting: SURGERY

## 2022-06-14 ENCOUNTER — ANESTHESIA (OUTPATIENT)
Dept: PERIOP | Facility: HOSPITAL | Age: 62
End: 2022-06-14

## 2022-06-14 ENCOUNTER — HOSPITAL ENCOUNTER (OUTPATIENT)
Dept: NUCLEAR MEDICINE | Facility: HOSPITAL | Age: 62
Discharge: HOME | End: 2022-06-14

## 2022-06-14 DIAGNOSIS — D05.11 DUCTAL CARCINOMA IN SITU (DCIS) OF RIGHT BREAST: ICD-10-CM

## 2022-06-14 PROCEDURE — 25010000002 MAGNESIUM SULFATE PER 500 MG OF MAGNESIUM: Performed by: NURSE ANESTHETIST, CERTIFIED REGISTERED

## 2022-06-14 PROCEDURE — 25010000002 ONDANSETRON PER 1 MG: Performed by: NURSE ANESTHETIST, CERTIFIED REGISTERED

## 2022-06-14 PROCEDURE — 25010000002 PROPOFOL 10 MG/ML EMULSION: Performed by: NURSE ANESTHETIST, CERTIFIED REGISTERED

## 2022-06-14 PROCEDURE — 25010000002 SUCCINYLCHOLINE PER 20 MG: Performed by: NURSE ANESTHETIST, CERTIFIED REGISTERED

## 2022-06-14 PROCEDURE — 25010000002 FENTANYL CITRATE (PF) 50 MCG/ML SOLUTION: Performed by: NURSE ANESTHETIST, CERTIFIED REGISTERED

## 2022-06-14 PROCEDURE — 25010000002 DEXAMETHASONE PER 1 MG: Performed by: NURSE ANESTHETIST, CERTIFIED REGISTERED

## 2022-06-14 PROCEDURE — 94799 UNLISTED PULMONARY SVC/PX: CPT

## 2022-06-14 PROCEDURE — 19303 MAST SIMPLE COMPLETE: CPT | Performed by: SURGERY

## 2022-06-14 PROCEDURE — 38792 RA TRACER ID OF SENTINL NODE: CPT

## 2022-06-14 PROCEDURE — 88341 IMHCHEM/IMCYTCHM EA ADD ANTB: CPT | Performed by: SURGERY

## 2022-06-14 PROCEDURE — 25010000002 MIDAZOLAM PER 1 MG: Performed by: NURSE ANESTHETIST, CERTIFIED REGISTERED

## 2022-06-14 PROCEDURE — 94760 N-INVAS EAR/PLS OXIMETRY 1: CPT

## 2022-06-14 PROCEDURE — A9520 TC99 TILMANOCEPT DIAG 0.5MCI: HCPCS | Performed by: SURGERY

## 2022-06-14 PROCEDURE — 88331 PATH CONSLTJ SURG 1 BLK 1SPC: CPT | Performed by: SURGERY

## 2022-06-14 PROCEDURE — 38900 IO MAP OF SENT LYMPH NODE: CPT | Performed by: SURGERY

## 2022-06-14 PROCEDURE — 25010000002 MIDAZOLAM PER 1 MG: Performed by: ANESTHESIOLOGY

## 2022-06-14 PROCEDURE — 0 TECHETIUM TC99M TILMANOCEPT: Performed by: SURGERY

## 2022-06-14 PROCEDURE — 88342 IMHCHEM/IMCYTCHM 1ST ANTB: CPT | Performed by: SURGERY

## 2022-06-14 PROCEDURE — 88307 TISSUE EXAM BY PATHOLOGIST: CPT | Performed by: SURGERY

## 2022-06-14 PROCEDURE — 19303 MAST SIMPLE COMPLETE: CPT

## 2022-06-14 PROCEDURE — 25010000002 NEOSTIGMINE 5 MG/10ML SOLUTION: Performed by: NURSE ANESTHETIST, CERTIFIED REGISTERED

## 2022-06-14 PROCEDURE — 25010000002 CEFAZOLIN PER 500 MG: Performed by: SURGERY

## 2022-06-14 PROCEDURE — 38525 BIOPSY/REMOVAL LYMPH NODES: CPT | Performed by: SURGERY

## 2022-06-14 PROCEDURE — 25010000002 HYDROMORPHONE PER 4 MG: Performed by: NURSE ANESTHETIST, CERTIFIED REGISTERED

## 2022-06-14 PROCEDURE — 94640 AIRWAY INHALATION TREATMENT: CPT

## 2022-06-14 DEVICE — HORIZON TI ML 6 CLIPS/CART
Type: IMPLANTABLE DEVICE | Site: BREAST | Status: FUNCTIONAL
Brand: WECK

## 2022-06-14 DEVICE — HORIZON TI MED 6/CART
Type: IMPLANTABLE DEVICE | Site: BREAST | Status: FUNCTIONAL
Brand: WECK

## 2022-06-14 RX ORDER — DILTIAZEM HYDROCHLORIDE 180 MG/1
360 CAPSULE, COATED, EXTENDED RELEASE ORAL
Status: DISCONTINUED | OUTPATIENT
Start: 2022-06-15 | End: 2022-06-15 | Stop reason: HOSPADM

## 2022-06-14 RX ORDER — SUCCINYLCHOLINE CHLORIDE 20 MG/ML
INJECTION INTRAMUSCULAR; INTRAVENOUS AS NEEDED
Status: DISCONTINUED | OUTPATIENT
Start: 2022-06-14 | End: 2022-06-14 | Stop reason: SURG

## 2022-06-14 RX ORDER — OXYCODONE HYDROCHLORIDE AND ACETAMINOPHEN 5; 325 MG/1; MG/1
1 TABLET ORAL EVERY 4 HOURS PRN
Status: DISCONTINUED | OUTPATIENT
Start: 2022-06-14 | End: 2022-06-15 | Stop reason: HOSPADM

## 2022-06-14 RX ORDER — MIDAZOLAM HYDROCHLORIDE 1 MG/ML
INJECTION INTRAMUSCULAR; INTRAVENOUS AS NEEDED
Status: DISCONTINUED | OUTPATIENT
Start: 2022-06-14 | End: 2022-06-14 | Stop reason: SURG

## 2022-06-14 RX ORDER — OXYCODONE AND ACETAMINOPHEN 7.5; 325 MG/1; MG/1
1 TABLET ORAL EVERY 4 HOURS PRN
Status: DISCONTINUED | OUTPATIENT
Start: 2022-06-14 | End: 2022-06-14 | Stop reason: HOSPADM

## 2022-06-14 RX ORDER — MAGNESIUM SULFATE HEPTAHYDRATE 500 MG/ML
INJECTION, SOLUTION INTRAMUSCULAR; INTRAVENOUS AS NEEDED
Status: DISCONTINUED | OUTPATIENT
Start: 2022-06-14 | End: 2022-06-14 | Stop reason: SURG

## 2022-06-14 RX ORDER — PROMETHAZINE HYDROCHLORIDE 25 MG/1
25 SUPPOSITORY RECTAL ONCE AS NEEDED
Status: DISCONTINUED | OUTPATIENT
Start: 2022-06-14 | End: 2022-06-14 | Stop reason: HOSPADM

## 2022-06-14 RX ORDER — ENOXAPARIN SODIUM 100 MG/ML
40 INJECTION SUBCUTANEOUS DAILY
Status: DISCONTINUED | OUTPATIENT
Start: 2022-06-15 | End: 2022-06-15 | Stop reason: HOSPADM

## 2022-06-14 RX ORDER — NALOXONE HCL 0.4 MG/ML
0.2 VIAL (ML) INJECTION AS NEEDED
Status: DISCONTINUED | OUTPATIENT
Start: 2022-06-14 | End: 2022-06-14 | Stop reason: HOSPADM

## 2022-06-14 RX ORDER — HYDROMORPHONE HYDROCHLORIDE 1 MG/ML
0.5 INJECTION, SOLUTION INTRAMUSCULAR; INTRAVENOUS; SUBCUTANEOUS
Status: DISCONTINUED | OUTPATIENT
Start: 2022-06-14 | End: 2022-06-14 | Stop reason: HOSPADM

## 2022-06-14 RX ORDER — SODIUM CHLORIDE 0.9 % (FLUSH) 0.9 %
3-10 SYRINGE (ML) INJECTION AS NEEDED
Status: DISCONTINUED | OUTPATIENT
Start: 2022-06-14 | End: 2022-06-14 | Stop reason: HOSPADM

## 2022-06-14 RX ORDER — CEFAZOLIN SODIUM IN 0.9 % NACL 3 G/100 ML
3 INTRAVENOUS SOLUTION, PIGGYBACK (ML) INTRAVENOUS ONCE
Status: COMPLETED | OUTPATIENT
Start: 2022-06-14 | End: 2022-06-14

## 2022-06-14 RX ORDER — HYDRALAZINE HYDROCHLORIDE 20 MG/ML
5 INJECTION INTRAMUSCULAR; INTRAVENOUS
Status: DISCONTINUED | OUTPATIENT
Start: 2022-06-14 | End: 2022-06-14 | Stop reason: HOSPADM

## 2022-06-14 RX ORDER — DIPHENHYDRAMINE HCL 25 MG
25 CAPSULE ORAL
Status: DISCONTINUED | OUTPATIENT
Start: 2022-06-14 | End: 2022-06-14 | Stop reason: HOSPADM

## 2022-06-14 RX ORDER — CEFAZOLIN SODIUM IN 0.9 % NACL 3 G/100 ML
3 INTRAVENOUS SOLUTION, PIGGYBACK (ML) INTRAVENOUS EVERY 8 HOURS
Status: COMPLETED | OUTPATIENT
Start: 2022-06-15 | End: 2022-06-15

## 2022-06-14 RX ORDER — MAGNESIUM HYDROXIDE 1200 MG/15ML
LIQUID ORAL AS NEEDED
Status: DISCONTINUED | OUTPATIENT
Start: 2022-06-14 | End: 2022-06-14 | Stop reason: HOSPADM

## 2022-06-14 RX ORDER — FENTANYL CITRATE 50 UG/ML
50 INJECTION, SOLUTION INTRAMUSCULAR; INTRAVENOUS
Status: DISCONTINUED | OUTPATIENT
Start: 2022-06-14 | End: 2022-06-14 | Stop reason: HOSPADM

## 2022-06-14 RX ORDER — GLYCOPYRROLATE 0.2 MG/ML
INJECTION INTRAMUSCULAR; INTRAVENOUS AS NEEDED
Status: DISCONTINUED | OUTPATIENT
Start: 2022-06-14 | End: 2022-06-14 | Stop reason: SURG

## 2022-06-14 RX ORDER — ISOSULFAN BLUE 50 MG/5ML
INJECTION, SOLUTION SUBCUTANEOUS AS NEEDED
Status: DISCONTINUED | OUTPATIENT
Start: 2022-06-14 | End: 2022-06-14 | Stop reason: HOSPADM

## 2022-06-14 RX ORDER — PROMETHAZINE HYDROCHLORIDE 25 MG/1
25 TABLET ORAL ONCE AS NEEDED
Status: DISCONTINUED | OUTPATIENT
Start: 2022-06-14 | End: 2022-06-14 | Stop reason: HOSPADM

## 2022-06-14 RX ORDER — DIPHENHYDRAMINE HYDROCHLORIDE 50 MG/ML
12.5 INJECTION INTRAMUSCULAR; INTRAVENOUS
Status: DISCONTINUED | OUTPATIENT
Start: 2022-06-14 | End: 2022-06-14 | Stop reason: HOSPADM

## 2022-06-14 RX ORDER — LIDOCAINE HYDROCHLORIDE 10 MG/ML
0.5 INJECTION, SOLUTION EPIDURAL; INFILTRATION; INTRACAUDAL; PERINEURAL ONCE AS NEEDED
Status: DISCONTINUED | OUTPATIENT
Start: 2022-06-14 | End: 2022-06-14 | Stop reason: HOSPADM

## 2022-06-14 RX ORDER — ONDANSETRON 2 MG/ML
4 INJECTION INTRAMUSCULAR; INTRAVENOUS ONCE AS NEEDED
Status: COMPLETED | OUTPATIENT
Start: 2022-06-14 | End: 2022-06-14

## 2022-06-14 RX ORDER — OXYCODONE HYDROCHLORIDE AND ACETAMINOPHEN 5; 325 MG/1; MG/1
2 TABLET ORAL EVERY 4 HOURS PRN
Status: DISCONTINUED | OUTPATIENT
Start: 2022-06-14 | End: 2022-06-15 | Stop reason: HOSPADM

## 2022-06-14 RX ORDER — NALOXONE HCL 0.4 MG/ML
0.1 VIAL (ML) INJECTION
Status: DISCONTINUED | OUTPATIENT
Start: 2022-06-14 | End: 2022-06-15 | Stop reason: HOSPADM

## 2022-06-14 RX ORDER — FAMOTIDINE 10 MG/ML
20 INJECTION, SOLUTION INTRAVENOUS ONCE
Status: COMPLETED | OUTPATIENT
Start: 2022-06-14 | End: 2022-06-14

## 2022-06-14 RX ORDER — ONDANSETRON 2 MG/ML
4 INJECTION INTRAMUSCULAR; INTRAVENOUS EVERY 6 HOURS PRN
Status: DISCONTINUED | OUTPATIENT
Start: 2022-06-14 | End: 2022-06-15 | Stop reason: HOSPADM

## 2022-06-14 RX ORDER — ROCURONIUM BROMIDE 10 MG/ML
INJECTION, SOLUTION INTRAVENOUS AS NEEDED
Status: DISCONTINUED | OUTPATIENT
Start: 2022-06-14 | End: 2022-06-14 | Stop reason: SURG

## 2022-06-14 RX ORDER — SODIUM CHLORIDE, SODIUM LACTATE, POTASSIUM CHLORIDE, CALCIUM CHLORIDE 600; 310; 30; 20 MG/100ML; MG/100ML; MG/100ML; MG/100ML
9 INJECTION, SOLUTION INTRAVENOUS CONTINUOUS
Status: DISCONTINUED | OUTPATIENT
Start: 2022-06-14 | End: 2022-06-14

## 2022-06-14 RX ORDER — DEXAMETHASONE SODIUM PHOSPHATE 10 MG/ML
INJECTION INTRAMUSCULAR; INTRAVENOUS AS NEEDED
Status: DISCONTINUED | OUTPATIENT
Start: 2022-06-14 | End: 2022-06-14 | Stop reason: SURG

## 2022-06-14 RX ORDER — EPHEDRINE SULFATE 50 MG/ML
5 INJECTION, SOLUTION INTRAVENOUS ONCE AS NEEDED
Status: DISCONTINUED | OUTPATIENT
Start: 2022-06-14 | End: 2022-06-14 | Stop reason: HOSPADM

## 2022-06-14 RX ORDER — FENTANYL CITRATE 50 UG/ML
INJECTION, SOLUTION INTRAMUSCULAR; INTRAVENOUS AS NEEDED
Status: DISCONTINUED | OUTPATIENT
Start: 2022-06-14 | End: 2022-06-14 | Stop reason: SURG

## 2022-06-14 RX ORDER — BUDESONIDE AND FORMOTEROL FUMARATE DIHYDRATE 80; 4.5 UG/1; UG/1
1 AEROSOL RESPIRATORY (INHALATION)
Status: DISCONTINUED | OUTPATIENT
Start: 2022-06-14 | End: 2022-06-15 | Stop reason: HOSPADM

## 2022-06-14 RX ORDER — FLUMAZENIL 0.1 MG/ML
0.2 INJECTION INTRAVENOUS AS NEEDED
Status: DISCONTINUED | OUTPATIENT
Start: 2022-06-14 | End: 2022-06-14 | Stop reason: HOSPADM

## 2022-06-14 RX ORDER — LABETALOL HYDROCHLORIDE 5 MG/ML
5 INJECTION, SOLUTION INTRAVENOUS
Status: DISCONTINUED | OUTPATIENT
Start: 2022-06-14 | End: 2022-06-14 | Stop reason: HOSPADM

## 2022-06-14 RX ORDER — SODIUM CHLORIDE 0.9 % (FLUSH) 0.9 %
3 SYRINGE (ML) INJECTION EVERY 12 HOURS SCHEDULED
Status: DISCONTINUED | OUTPATIENT
Start: 2022-06-14 | End: 2022-06-14 | Stop reason: HOSPADM

## 2022-06-14 RX ORDER — NEOSTIGMINE METHYLSULFATE 0.5 MG/ML
INJECTION, SOLUTION INTRAVENOUS AS NEEDED
Status: DISCONTINUED | OUTPATIENT
Start: 2022-06-14 | End: 2022-06-14 | Stop reason: SURG

## 2022-06-14 RX ORDER — SODIUM CHLORIDE 9 MG/ML
75 INJECTION, SOLUTION INTRAVENOUS CONTINUOUS
Status: DISCONTINUED | OUTPATIENT
Start: 2022-06-14 | End: 2022-06-15 | Stop reason: HOSPADM

## 2022-06-14 RX ORDER — MIDAZOLAM HYDROCHLORIDE 1 MG/ML
1 INJECTION INTRAMUSCULAR; INTRAVENOUS
Status: DISCONTINUED | OUTPATIENT
Start: 2022-06-14 | End: 2022-06-14 | Stop reason: HOSPADM

## 2022-06-14 RX ORDER — HYDROMORPHONE HCL 110MG/55ML
PATIENT CONTROLLED ANALGESIA SYRINGE INTRAVENOUS AS NEEDED
Status: DISCONTINUED | OUTPATIENT
Start: 2022-06-14 | End: 2022-06-14 | Stop reason: SURG

## 2022-06-14 RX ORDER — PROPOFOL 10 MG/ML
VIAL (ML) INTRAVENOUS AS NEEDED
Status: DISCONTINUED | OUTPATIENT
Start: 2022-06-14 | End: 2022-06-14 | Stop reason: SURG

## 2022-06-14 RX ORDER — BUPIVACAINE HYDROCHLORIDE AND EPINEPHRINE 5; 5 MG/ML; UG/ML
INJECTION, SOLUTION PERINEURAL AS NEEDED
Status: DISCONTINUED | OUTPATIENT
Start: 2022-06-14 | End: 2022-06-14 | Stop reason: HOSPADM

## 2022-06-14 RX ORDER — HYDROCODONE BITARTRATE AND ACETAMINOPHEN 5; 325 MG/1; MG/1
1 TABLET ORAL ONCE AS NEEDED
Status: DISCONTINUED | OUTPATIENT
Start: 2022-06-14 | End: 2022-06-14 | Stop reason: HOSPADM

## 2022-06-14 RX ORDER — ONDANSETRON 2 MG/ML
INJECTION INTRAMUSCULAR; INTRAVENOUS AS NEEDED
Status: DISCONTINUED | OUTPATIENT
Start: 2022-06-14 | End: 2022-06-14 | Stop reason: SURG

## 2022-06-14 RX ORDER — HYDROMORPHONE HYDROCHLORIDE 1 MG/ML
0.5 INJECTION, SOLUTION INTRAMUSCULAR; INTRAVENOUS; SUBCUTANEOUS
Status: DISCONTINUED | OUTPATIENT
Start: 2022-06-14 | End: 2022-06-15 | Stop reason: HOSPADM

## 2022-06-14 RX ORDER — LIDOCAINE AND PRILOCAINE 25; 25 MG/G; MG/G
1 CREAM TOPICAL ONCE
Status: COMPLETED | OUTPATIENT
Start: 2022-06-14 | End: 2022-06-14

## 2022-06-14 RX ADMIN — MIDAZOLAM 1 MG: 1 INJECTION INTRAMUSCULAR; INTRAVENOUS at 10:04

## 2022-06-14 RX ADMIN — HYDROMORPHONE HYDROCHLORIDE 0.5 MG: 2 INJECTION, SOLUTION INTRAMUSCULAR; INTRAVENOUS; SUBCUTANEOUS at 15:25

## 2022-06-14 RX ADMIN — GLYCOPYRROLATE 0.4 MG: 0.2 INJECTION INTRAMUSCULAR; INTRAVENOUS at 16:24

## 2022-06-14 RX ADMIN — SUCCINYLCHOLINE CHLORIDE 180 MG: 20 INJECTION, SOLUTION INTRAMUSCULAR; INTRAVENOUS; PARENTERAL at 14:06

## 2022-06-14 RX ADMIN — ROCURONIUM BROMIDE 30 MG: 50 INJECTION INTRAVENOUS at 14:16

## 2022-06-14 RX ADMIN — MAGNESIUM SULFATE HEPTAHYDRATE 2 G: 500 INJECTION, SOLUTION INTRAMUSCULAR; INTRAVENOUS at 14:57

## 2022-06-14 RX ADMIN — FENTANYL CITRATE 50 MCG: 0.05 INJECTION, SOLUTION INTRAMUSCULAR; INTRAVENOUS at 14:18

## 2022-06-14 RX ADMIN — SODIUM CHLORIDE, POTASSIUM CHLORIDE, SODIUM LACTATE AND CALCIUM CHLORIDE 9 ML/HR: 600; 310; 30; 20 INJECTION, SOLUTION INTRAVENOUS at 10:03

## 2022-06-14 RX ADMIN — LIDOCAINE AND PRILOCAINE 1 APPLICATION: 25; 25 CREAM TOPICAL at 10:04

## 2022-06-14 RX ADMIN — ONDANSETRON 4 MG: 2 INJECTION INTRAMUSCULAR; INTRAVENOUS at 16:24

## 2022-06-14 RX ADMIN — ONDANSETRON 4 MG: 2 INJECTION INTRAMUSCULAR; INTRAVENOUS at 17:18

## 2022-06-14 RX ADMIN — MIDAZOLAM 2 MG: 1 INJECTION INTRAMUSCULAR; INTRAVENOUS at 14:01

## 2022-06-14 RX ADMIN — CEFAZOLIN 3 G: 10 INJECTION, POWDER, FOR SOLUTION INTRAVENOUS at 23:38

## 2022-06-14 RX ADMIN — FENTANYL CITRATE 50 MCG: 50 INJECTION INTRAMUSCULAR; INTRAVENOUS at 17:30

## 2022-06-14 RX ADMIN — SODIUM CHLORIDE 75 ML/HR: 9 INJECTION, SOLUTION INTRAVENOUS at 18:38

## 2022-06-14 RX ADMIN — DEXAMETHASONE SODIUM PHOSPHATE 8 MG: 10 INJECTION INTRAMUSCULAR; INTRAVENOUS at 14:19

## 2022-06-14 RX ADMIN — SODIUM CHLORIDE 3 G: 9 INJECTION, SOLUTION INTRAVENOUS at 13:51

## 2022-06-14 RX ADMIN — FENTANYL CITRATE 50 MCG: 0.05 INJECTION, SOLUTION INTRAMUSCULAR; INTRAVENOUS at 14:43

## 2022-06-14 RX ADMIN — SODIUM CHLORIDE 2 G: 9 INJECTION, SOLUTION INTRAVENOUS at 16:06

## 2022-06-14 RX ADMIN — FAMOTIDINE 20 MG: 10 INJECTION, SOLUTION INTRAVENOUS at 10:04

## 2022-06-14 RX ADMIN — TILMANOCEPT 1 DOSE: KIT at 10:58

## 2022-06-14 RX ADMIN — NEOSTIGMINE METHYLSULFATE 3 MG: 0.5 INJECTION INTRAVENOUS at 16:24

## 2022-06-14 RX ADMIN — PROPOFOL 200 MG: 10 INJECTION, EMULSION INTRAVENOUS at 14:06

## 2022-06-14 RX ADMIN — BUDESONIDE AND FORMOTEROL FUMARATE DIHYDRATE 1 PUFF: 80; 4.5 AEROSOL RESPIRATORY (INHALATION) at 21:18

## 2022-06-14 NOTE — ANESTHESIA PREPROCEDURE EVALUATION
Anesthesia Evaluation     Patient summary reviewed   history of anesthetic complications: prolonged sedation  NPO Solid Status: > 8 hours  NPO Liquid Status: > 2 hours           Airway   Mallampati: III  Neck ROM: full  Large neck circumference  Dental      Pulmonary - normal exam   (+) asthma,sleep apnea on CPAP,   Cardiovascular     PT is on anticoagulation therapy  Rhythm: irregular    (+) hypertension, dysrhythmias Atrial Fib, DVT,       Neuro/Psych  GI/Hepatic/Renal/Endo    (+) obesity, morbid obesity, GERD,  renal disease,     Musculoskeletal     Abdominal   (+) obese,    Substance History      OB/GYN          Other      history of cancer remission                    Anesthesia Plan    ASA 3     general     (Last intubation grade1 view with MAC 3.  Pt very anxious.)    Anesthetic plan, risks, benefits, and alternatives have been provided, discussed and informed consent has been obtained with: patient.

## 2022-06-14 NOTE — OP NOTE
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  Right breast simple mastectomy  2.  Left breast simple mastectomy  3.  Right axillary sentinel lymph node biopsy  4.  Right axillary sentinel lymph node mapping    SURGEON:  Charli Dias MD    ASSISTANT:  Mariana Day was responsible for performing the following activities: suction, irrigation, suturing, closing, retraction, and placing dressing, and their skilled assistance was necessary for the success of this case.    ANESTHESIA:  General    EBL:  Minimal    SPECIMEN(S):  1.  Right breast  2.  Left breast  3.  Right axillary sentinel lymph node x2    DESCRIPTION:  In supine position under general anesthetic prepped and draped usual sterile manner.  Preoperatively injected with technetium sulfur colloid in a right periareolar distribution.  Intraoperatively, I injected 4 mL of Lymphazurin subareolar.  Elliptical incisions had been marked preoperatively.  Half percent Marcaine with epinephrine was infiltrated locally.  Incision was made with a knife and continue with electrocautery into the subcutaneous layer.  Superior, inferior, medial, and lateral flaps were raised towards the clavicle, rectus insertion, sternum, and latissimus respectively.  Breast was removed from the underlying pectoralis muscle with electrocautery.  Clavipectoral fascia was entered and breast was removed towards the axillary tail and latissimus.  Clips were used for the larger blood vessels.  Neoprobe was then used and ultimately 2 blue stained radioactive nodes were identified with counts of [id number] outside of the axilla.  There were no significant residual counts within the axilla after excision of the 2 nodes.  No palpable adenopathy or additional blue stained nodes were evident.  Wound was irrigated and good hemostasis was noted.  2x19 [place] Alhaji drains were placed, one towards the axilla  and one underneath the flaps.  Skin was closed with interrupted 3-0 Vicryl deep dermal and 5-0 Vicryl subcuticular followed by Dermabond.  On the left side, similar elliptical incision was made and flaps were raised in the identical manner as the right.  Breast was removed from the underlying pectoralis muscle with electrocautery and carried towards the axillary tail.  Good hemostasis was ensured after irrigating the wound.  19 [place] Alhaji drain was placed.  Skin was closed with 3-0 Vicryl deep dermal and 5-0 Vicryl subcuticular.  Dermabond applied.  Tolerated well, stable to recovery room.    Charli Dias M.D.

## 2022-06-14 NOTE — ANESTHESIA PROCEDURE NOTES
Airway  Urgency: elective    Date/Time: 6/14/2022 2:10 PM  Airway not difficult    General Information and Staff    Patient location during procedure: OR    Indications and Patient Condition  Indications for airway management: airway protection    Preoxygenated: yes  MILS maintained throughout  Mask difficulty assessment: 2 - vent by mask + OA or adjuvant +/- NMBA    Final Airway Details  Final airway type: endotracheal airway      Successful airway: ETT  Cuffed: yes   Successful intubation technique: direct laryngoscopy  Facilitating devices/methods: intubating stylet  Endotracheal tube insertion site: oral  Blade: Svetlana  Blade size: 3  ETT size (mm): 7.0  Cormack-Lehane Classification: grade I - full view of glottis  Placement verified by: chest auscultation and capnometry   Measured from: lips  ETT/EBT  to lips (cm): 21  Number of attempts at approach: 1  Assessment: lips, teeth, and gum same as pre-op and atraumatic intubation

## 2022-06-15 VITALS
OXYGEN SATURATION: 97 % | HEART RATE: 96 BPM | HEIGHT: 68 IN | DIASTOLIC BLOOD PRESSURE: 83 MMHG | TEMPERATURE: 97.5 F | BODY MASS INDEX: 41.68 KG/M2 | RESPIRATION RATE: 16 BRPM | SYSTOLIC BLOOD PRESSURE: 135 MMHG | WEIGHT: 274.98 LBS

## 2022-06-15 LAB
ANION GAP SERPL CALCULATED.3IONS-SCNC: 12 MMOL/L (ref 5–15)
BUN SERPL-MCNC: 20 MG/DL (ref 6–20)
BUN/CREAT SERPL: 15.7 (ref 7–25)
CALCIUM SPEC-SCNC: 8.7 MG/DL (ref 8.6–10.5)
CHLORIDE SERPL-SCNC: 102 MMOL/L (ref 98–107)
CO2 SERPL-SCNC: 20 MMOL/L (ref 22–29)
CREAT SERPL-MCNC: 1.27 MG/DL (ref 0.57–1)
DEPRECATED RDW RBC AUTO: 43 FL (ref 37–54)
EGFRCR SERPLBLD CKD-EPI 2021: 48.8 ML/MIN/1.73
ERYTHROCYTE [DISTWIDTH] IN BLOOD BY AUTOMATED COUNT: 13.5 % (ref 12.3–15.4)
GLUCOSE SERPL-MCNC: 127 MG/DL (ref 65–99)
HCT VFR BLD AUTO: 36.1 % (ref 34–46.6)
HGB BLD-MCNC: 11.7 G/DL (ref 12–15.9)
MCH RBC QN AUTO: 28.6 PG (ref 26.6–33)
MCHC RBC AUTO-ENTMCNC: 32.4 G/DL (ref 31.5–35.7)
MCV RBC AUTO: 88.3 FL (ref 79–97)
PLATELET # BLD AUTO: 328 10*3/MM3 (ref 140–450)
PMV BLD AUTO: 9 FL (ref 6–12)
POTASSIUM SERPL-SCNC: 4.4 MMOL/L (ref 3.5–5.2)
RBC # BLD AUTO: 4.09 10*6/MM3 (ref 3.77–5.28)
SODIUM SERPL-SCNC: 134 MMOL/L (ref 136–145)
WBC NRBC COR # BLD: 7.46 10*3/MM3 (ref 3.4–10.8)

## 2022-06-15 PROCEDURE — 80048 BASIC METABOLIC PNL TOTAL CA: CPT | Performed by: SURGERY

## 2022-06-15 PROCEDURE — 25010000002 ENOXAPARIN PER 10 MG: Performed by: SURGERY

## 2022-06-15 PROCEDURE — 85027 COMPLETE CBC AUTOMATED: CPT | Performed by: SURGERY

## 2022-06-15 PROCEDURE — 25010000002 CEFAZOLIN PER 500 MG: Performed by: SURGERY

## 2022-06-15 PROCEDURE — 94799 UNLISTED PULMONARY SVC/PX: CPT

## 2022-06-15 RX ADMIN — ENOXAPARIN SODIUM 40 MG: 100 INJECTION SUBCUTANEOUS at 08:53

## 2022-06-15 RX ADMIN — DILTIAZEM HYDROCHLORIDE 360 MG: 180 CAPSULE, COATED, EXTENDED RELEASE ORAL at 08:53

## 2022-06-15 RX ADMIN — CEFAZOLIN 3 G: 10 INJECTION, POWDER, FOR SOLUTION INTRAVENOUS at 08:53

## 2022-06-15 RX ADMIN — BUDESONIDE AND FORMOTEROL FUMARATE DIHYDRATE 1 PUFF: 80; 4.5 AEROSOL RESPIRATORY (INHALATION) at 08:47

## 2022-06-15 NOTE — ANESTHESIA POSTPROCEDURE EVALUATION
Patient: Mariya Campos    Procedure Summary     Date: 06/14/22 Room / Location: [facility] OR  / [facility] MAIN OR    Anesthesia Start: 1356 Anesthesia Stop: 1655    Procedure: BREAST MASTECTOMY BILATERAL RIGHT SENTINEL NODE BIOPSY (Bilateral Breast) Diagnosis:       Ductal carcinoma in situ (DCIS) of right breast      (Ductal carcinoma in situ (DCIS) of right breast [D05.11])    Surgeons: Charli Dias MD Provider: Anthony Glass MD    Anesthesia Type: general ASA Status: 3          Anesthesia Type: general    Vitals  Vitals Value Taken Time   /80 06/14/22 1801   Temp 36.6 °C (97.9 °F) 06/14/22 1651   Pulse 80 06/14/22 1807   Resp 16 06/14/22 1800   SpO2 93 % 06/14/22 1807   Vitals shown include unvalidated device data.        Post Anesthesia Care and Evaluation    Patient participation: complete - patient participated  Level of consciousness: awake and alert  Pain management: adequate    Airway patency: patent  Anesthetic complications: No anesthetic complications  PONV Status: controlled  Cardiovascular status: acceptable  Respiratory status: acceptable  Hydration status: acceptable    Comments: /80   Pulse 87   Temp 36.6 °C (97.9 °F) (Oral)   Resp 16   LMP  (LMP Unknown)   SpO2 95%

## 2022-06-15 NOTE — CASE MANAGEMENT/SOCIAL WORK
Case Management Discharge Note      Final Note: Discharge to home with fmialy support-DRK    Provided Post Acute Provider List?: N/A  N/A Provider List Comment: Patient plans to discharge home  Provided Post Acute Provider Quality & Resource List?: N/A  N/A Quality & Resource List Comment: Patient plans to discharge home    Selected Continued Care - Discharged on 6/15/2022 Admission date: 6/14/2022 - Discharge disposition: Home or Self Care    Destination    No services have been selected for the patient.              Durable Medical Equipment    No services have been selected for the patient.              Dialysis/Infusion    No services have been selected for the patient.              Home Medical Care    No services have been selected for the patient.              Therapy    No services have been selected for the patient.              Community Resources    No services have been selected for the patient.              Community & DME    No services have been selected for the patient.                  Transportation Services  Private: Car    Final Discharge Disposition Code: 01 - home or self-care

## 2022-06-15 NOTE — DISCHARGE SUMMARY
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 Tolerating diet, incisions in good order and afebrile with stable vital signs at discharge.  Was not requiring prescription pain medication.    DIET: Regular    ACTIVITY: Walking encouraged, no lifting or strenuous activity    MEDICATIONS: Refer to MAR    FOLLOW-UP: To call office and schedule 1 week follow-up appointment    Charli Dias M.D.

## 2022-06-15 NOTE — CASE MANAGEMENT/SOCIAL WORK
Discharge Planning Assessment  [facility]     Patient Name: Mariya Campos  MRN: [id number]  Today's Date: 6/15/2022    Admit Date: 6/14/2022     Discharge Needs Assessment     Row Name 06/15/22 1042       Living Environment    People in Home spouse    Name(s) of People in Home Jamari Campos/Conrad    Current Living Arrangements home    Primary Care Provided by self    Provides Primary Care For no one    Family Caregiver if Needed spouse    Family Caregiver Names Jamari Campos/    Quality of Family Relationships helpful;involved;supportive    Able to Return to Prior Arrangements yes       Resource/Environmental Concerns    Resource/Environmental Concerns none    Transportation Concerns none       Transition Planning    Patient/Family Anticipates Transition to home with family    Patient/Family Anticipated Services at Transition none    Transportation Anticipated family or friend will provide       Discharge Needs Assessment    Readmission Within the Last 30 Days no previous admission in last 30 days    Equipment Currently Used at Home cpap    Concerns to be Addressed no discharge needs identified;denies needs/concerns at this time    Anticipated Changes Related to Illness none    Equipment Needed After Discharge none    Provided Post Acute Provider List? N/A    N/A Provider List Comment Patient plans to discharge home    Provided Post Acute Provider Quality & Resource List? N/A    N/A Quality & Resource List Comment Patient plans to discharge home    Patient's Choice of Community Agency(s) N/A               Discharge Plan     Row Name 06/15/22 1043       Plan    Plan Home with family    Patient/Family in Agreement with Plan yes    Plan Comments [facility] met with patient and her  Jamari Campos, [phone] introduced self and explained role. Patient confirmed the demographic information on her face sheet is accurate. Patient confirmed her PCP is Dr. Zahida Coffman. Patient confirmed she lives  at home with her . Patient states that she has worked with [facility] in the past. Patient states that she has been to [facility] in the past for rehab. Patient states she has a CPAP from [facility]. Patient states she has 2 stairs to get in/out of the home with a handrail on the right hand side. Patient denies any stairs within the home to maneuver. Patient denies any needs and states her  can transport her home at discharge.              Continued Care and Services - Admitted Since 6/14/2022    Coordination has not been started for this encounter.       Expected Discharge Date and Time     Expected Discharge Date Expected Discharge Time    [place] 15, 2022          Demographic Summary     Row Name 06/15/22 1041       General Information    Admission Type inpatient    Arrived From home    Reason for Consult discharge planning    Preferred Language English               Functional Status     Row Name 06/15/22 1041       Functional Status    Usual Activity Tolerance good    Current Activity Tolerance good       Functional Status, IADL    Medications independent    Meal Preparation independent    Housekeeping independent    Laundry independent    Shopping independent       Mental Status    General Appearance WDL WDL       Mental Status Summary    Recent Changes in Mental Status/Cognitive Functioning no changes       Employment/    Employment Status employed full-time               Psychosocial    No documentation.                Abuse/Neglect    No documentation.                Legal    No documentation.                Substance Abuse    No documentation.                Patient Forms    No documentation.

## 2022-06-15 NOTE — NURSING NOTE
VSS, up w/ standby assist, voiding, ambulated in halls, no pain meds given, no nausea, no new meds for discharge. Educated on post op care, drain care, and when to seek help. Per patient Dr. Dias's office will call to schedule an appt. Bilateral incisions are CDI and approximated. ANDER x3 w/ serosanguineous output. Pt instructed to call Dr. Dias's office to inquire about resuming blood thinner. Pt. Son is taking her home.

## 2022-06-15 NOTE — PLAN OF CARE
Goal Outcome Evaluation:  Plan of Care Reviewed With: patient        Progress: improving  Outcome Evaluation: VSS. denies pain.  Billateral chest dressing CDI.ANDER x 3 with small output  Ambulated in the [place].  Using CPAP at hs.  Contact isolation maintained for history of CRE.   labs ordered this am

## 2022-06-21 LAB
LAB AP CASE REPORT: NORMAL
LAB AP CLINICAL INFORMATION: NORMAL
LAB AP DIAGNOSIS COMMENT: NORMAL
LAB AP SPECIAL STAINS: NORMAL
LAB AP SYNOPTIC CHECKLIST: NORMAL
Lab: NORMAL
PATH REPORT.FINAL DX SPEC: NORMAL
PATH REPORT.GROSS SPEC: NORMAL

## 2022-06-23 ENCOUNTER — OFFICE VISIT (OUTPATIENT)
Dept: SURGERY | Facility: CLINIC | Age: 62
End: 2022-06-23

## 2022-06-23 DIAGNOSIS — Z48.89 POSTOPERATIVE VISIT: Primary | ICD-10-CM

## 2022-06-23 PROCEDURE — 99024 POSTOP FOLLOW-UP VISIT: CPT | Performed by: SURGERY

## 2022-06-23 NOTE — PROGRESS NOTES
Postoperative visit    Doing well.  Incisions are healing nicely.  One of the 3 drains was removed today.  To call next week with drain outputs and likely remove the remaining drains.

## 2022-06-27 ENCOUNTER — CLINICAL SUPPORT (OUTPATIENT)
Dept: SURGERY | Facility: CLINIC | Age: 62
End: 2022-06-27

## 2022-06-27 ENCOUNTER — TELEPHONE (OUTPATIENT)
Dept: SURGERY | Facility: CLINIC | Age: 62
End: 2022-06-27

## 2022-06-27 NOTE — TELEPHONE ENCOUNTER
Patient called today with drain totals s/p bilat mastectomy, rt SLN bx and mapping on 6/14/22 by Dr. Dias. Totals are as follows:    Left:  6/24/22         50 cc  6/25/22         20 cc  6/26/22         25 cc  So far today  7 cc    Right:  6/24/22         28 cc  6/25/22         48 cc  6/26/22         53 cc  So far today  20 cc    Per Dr. Dias, okay to remove left drain. Right drain can be removed on 6/30 and should be scheduled for an appointment with him that day as well. Patient informed. Appt made today for MA visit at 10:30 am and appt with Dr. Dias on 6/30 at 10:45 am.

## 2022-06-27 NOTE — PROGRESS NOTES
Patient came into the office today for left ANDER drain removal s/p bilat mastectomy, rt SLN bx and mapping on 6/14/22 by Dr. Dias. Left ANDER drain was removed. Skin looked good with no signs of infection. Bacitracin with gauze and Tegaderm were applied to the site. Patient was advised she can remove her bandage after 24 hours of application. Patient verbalized understanding.

## 2022-06-30 ENCOUNTER — OFFICE VISIT (OUTPATIENT)
Dept: SURGERY | Facility: CLINIC | Age: 62
End: 2022-06-30

## 2022-06-30 DIAGNOSIS — D05.11 DUCTAL CARCINOMA IN SITU (DCIS) OF RIGHT BREAST: Primary | ICD-10-CM

## 2022-06-30 PROCEDURE — 99024 POSTOP FOLLOW-UP VISIT: CPT | Performed by: SURGERY

## 2022-07-02 NOTE — PROGRESS NOTES
Assessment and Plan: 61 year old female status post bilateral mastectomy and right sentinel node biopsy for DCIS, ER+/OR+, pTisN0, grade II. She is doing well overall. Incisions are healing well. Drain removed today in the office. Discussed activity restrictions. Recommend 6 weeks before proceeding with prosthesis. She is planning on returning to work in August. Follow up in 4 weeks with Dr. Dias.     CC: post op visit    History of presenting illness:   This is a 61 year old female status post bilateral mastectomy and right axillary SLN biopsy. She reports she hasn't taken any pain medication, only using ice for pain. Her drain amount is <30cc/day. She has no concerns. Denies any complaints with her incisions, denies any masses or skin changes.     Surgical History: 6/14/2022 Dr. Dias   1. Right breast simple mastectomy  2. Left breast simple mastectomy  3. Right axillary sentinel lymph node biopsy  4. Right axillary sentinel lymph node mapping    Pathology:   Final Diagnosis   1. Right Axillary [place] Lymph Node #1, Biopsy (FS):                A. One lymph node, negative for carcinoma (0/1).  2. Right Axillary [place] Lymph Node #2, Biopsy (FS):                A. One lymph node, negative for carcinoma (0/1).  3. Left Breast, Simple Mastectomy (1,065 grams):                 A. Breast parenchyma with atypical lobular hyperplasia, fibroadenomatoid change, columnar cell change,       sclerosing adenosis, intraductal papilloma, and clusters of apocrine cysts.  B. Unremarkable skin and nipple.  4. Right Breast, Simple Mastectomy (1,066 grams):                 A. INTERMEDIATE GRADE DUCTAL CARCINOMA IN SITU (DCIS):                              1. Solid, Cribriform, and Comedo types with associated necrosis and calcifications.                            2. Extent of DCIS:  90 mm (based on slices involved).                            3. Margins are negative for in-situ carcinoma; Closest distance:  DCIS is present  8 mm from the posterior margin.  B.  Incidental lobular carcinoma in situ (LCIS).  C. Two clips and biopsy site changes are present and adjacent to DCIS with associated epithelial displacement.  D. Lactiferous ducts contain LCIS and a single focus of DCIS.   E. Background breast parenchyma with fibroadenomatoid change and adenosis.  F. Unremarkable skin.     Review of Systems:  Constitutional: denies fever or chills  Respiratory: negative for SOB, cough, hemoptysis or wheezing  Cardiovascular: negative for chest pain, palpitations or peripheral edema  Breast: denies skin changes, breast lumps    Physical Exam:  BMI: 42.41  General: alert and oriented, appropriate, no acute distress  Respiratory: There is good bilateral chest expansion, no use of accessory muscles is noted  Cardiovascular: No jugular venous distention or peripheral edema is seen  Breast: bilateral mastectomy incisions healing well, no erythema, warmth, or drainage noted, residual surgical glue present, ANDER drain intact on right side, no skin changes, no seroma or hematoma, no masses noted    Mariana Day PA-C  General Surgery    [facility]  [address], [address]  [place], [place], [address]  P: [phone]  F: [phone]

## 2022-07-06 ENCOUNTER — OFFICE VISIT (OUTPATIENT)
Dept: SLEEP MEDICINE | Facility: HOSPITAL | Age: 62
End: 2022-07-06

## 2022-07-06 VITALS
BODY MASS INDEX: 40.92 KG/M2 | DIASTOLIC BLOOD PRESSURE: 82 MMHG | SYSTOLIC BLOOD PRESSURE: 141 MMHG | WEIGHT: 270 LBS | OXYGEN SATURATION: 98 % | HEART RATE: 117 BPM | HEIGHT: 68 IN

## 2022-07-06 DIAGNOSIS — E66.01 MORBID OBESITY: ICD-10-CM

## 2022-07-06 DIAGNOSIS — G47.33 OSA (OBSTRUCTIVE SLEEP APNEA): Primary | ICD-10-CM

## 2022-07-06 PROCEDURE — 99214 OFFICE O/P EST MOD 30 MIN: CPT | Performed by: FAMILY MEDICINE

## 2022-07-06 PROCEDURE — G0463 HOSPITAL OUTPT CLINIC VISIT: HCPCS

## 2022-07-06 NOTE — PROGRESS NOTES
Follow Up Sleep Disorders Center Note     Chief Complaint:  STEVEN     Primary Care Physician: Zahida Coffman MD    Mariya Campos is a 61 y.o.female  was last seen at [facility] sleep lab: 7/27/2021.  New patient to me.  Presents today for annual follow-up on STEVEN.  Severe STEVEN was reviewed from January 2020 and noted on sleep study with overall AHI 35.1 lowest SPO2 of 69%.  On auto CPAP.  Today reports no problems with mask machine hypersomnia nonrestorative sleep.  Bedtime 11 PM wake time 5:30 AM ESS of 7.  Nasal pillow mask which fits well does not leak air.    Results Review:  DME is [employer].  Downloads between 4/2/2022 - 6/30/2022.  Average usage is 6 hours 19 minutes.  Average AHI is 0.8.  Average AutoCPAP pressure is 10.4 cm H2O.    Current Medications:    Current Outpatient Medications:   •  albuterol sulfate  (90 Base) MCG/ACT inhaler, Inhale 2 puffs As Needed. Inhale 2 puffs by mouth every 4 to 6 hours as needed, Disp: , Rfl:   •  azelastine (ASTELIN) 0.1 % nasal spray, 2 sprays into the nostril(s) as directed by provider Daily. Use in each nostril as directed, Disp: 1 each, Rfl: 12  •  cetirizine (zyrTEC) 10 MG tablet, Take 10 mg by mouth Daily As Needed for Allergies., Disp: , Rfl:   •  Cholecalciferol (VITAMIN D) 2000 units tablet, Take 2,000 Units by mouth Daily., Disp: , Rfl:   •  dilTIAZem CD (CARDIZEM CD) 360 MG 24 hr capsule, Take 1 capsule by mouth Every Morning., Disp: 90 capsule, Rfl: 3  •  levocetirizine (XYZAL) 5 MG tablet, Take 5 mg by mouth Daily As Needed., Disp: , Rfl:   •  SYMBICORT 80-4.5 MCG/ACT inhaler, Inhale 1 puff 2 (Two) Times a Day., Disp: , Rfl:   •  Xarelto 20 MG tablet, TAKE 1 TABLET BY MOUTH DAILY (Patient taking differently: Take 20 mg by mouth Daily With Dinner. HELD FOR SURGERY PER MD INSTRUCTION), Disp: 90 tablet, Rfl: 3   also entered in Sleep Questionnaire    Patient  has a past medical history of Abdominal pain, Acid reflux, Allergic rhinitis, Anemia, Asthma, Atrial  "fibrillation (HCC), Atrial fibrillation (HCC), Chronic bronchitis (HCC), Chronic kidney disease, Colon cancer (HCC) (2006), Colon polyp, Edema, H/O bladder infections, H/O bronchitis, Hiatal hernia, History of blood clots, History of blood transfusion, Hypertension, Incisional hernia, Overweight, Peripheral neuropathy, Postoperative wound infection (2018), Rosacea, Seasonal allergies, Sleep apnea, Slow to wake up after anesthesia, and Visit for screening mammogram.    Social History:    Social History     Socioeconomic History   • Marital status:    Tobacco Use   • Smoking status: Never Smoker   • Smokeless tobacco: Never Used   • Tobacco comment: CAFFEINE USE: 1 CUP COFFEE/ 2-16OZ 7-UPS DAILY   Vaping Use   • Vaping Use: Never used   Substance and Sexual Activity   • Alcohol use: Not Currently     Comment: RARE   • Drug use: No   • Sexual activity: Yes     Partners: Male     Birth control/protection: None       Allergies:  Fish-derived products and Shellfish-derived products    Vital Signs:    Vitals:    07/06/22 1300   BP: 141/82   Pulse: 117   SpO2: 98%   Weight: 122 kg (270 lb)   Height: 172.7 cm (67.99\")     Body mass index is 41.06 kg/m².    REVIEW OF SYSTEMS.  Full review of systems available on the intake form which is scanned in the media tab.  The relevant positive are noted below  1. Daytime excessive sleepiness with Franklin Sleepiness Scale :Total score: 7   2. Snoring  3. All negative      Physical exam:  Vitals:    07/06/22 1300   BP: 141/82   Pulse: 117   SpO2: 98%   Weight: 122 kg (270 lb)   Height: 172.7 cm (67.99\")    Body mass index is 41.06 kg/m².    HEENT: Head is atraumatic, normocephalic  Eyes: pupils are round equal and reacting to light and accommodation, conjunctiva normal  RESPIRATORY SYSTEM: Regular respirations  EXTREMITES: No cyanosis, clubbing  NEUROLOGICAL SYSTEM: Oriented x 3, no gross motor defects, gait normal    Impression:  1. STEVEN (obstructive sleep apnea)    2. Morbid " obesity (HCC)        Obstructive sleep apnea adequately treated with auto CPAP 5-20 cm H2O with good compliance and usage and no complaints of hypersomnolence.  Return to clinic in 1 year for follow-up or sooner if needed.    Patient uses the CPAP device and benefits from its use in terms of reduction of hypersomnia and snoring.Weight loss will be strongly beneficial to reduce the severity of sleep-disordered breathing.  Caution during activities that require prolonged concentration is strongly advised if sleepiness returns. Changing of PAP supplies regularly is important for effective use. Patient needs to change cushion on the mask or plugs on nasal pillows along with disposable filters once every month and change mask frame, tubing, headgear and Velcro straps every 6 months at the minimum.    Monica Wild MD  Sleep Medicine  07/06/22  14:50 EDT

## 2022-08-01 ENCOUNTER — OFFICE VISIT (OUTPATIENT)
Dept: SURGERY | Facility: CLINIC | Age: 62
End: 2022-08-01

## 2022-08-01 VITALS — BODY MASS INDEX: 41.52 KG/M2 | WEIGHT: 274 LBS | HEIGHT: 68 IN

## 2022-08-01 DIAGNOSIS — Z48.89 POSTOPERATIVE VISIT: Primary | ICD-10-CM

## 2022-08-01 PROCEDURE — 99024 POSTOP FOLLOW-UP VISIT: CPT | Performed by: SURGERY

## 2022-08-03 NOTE — PROGRESS NOTES
Postoperative visit    Bilateral mastectomy with right axillary sentinel lymph node biopsy 6/14/2022    Office visit: Incisions have healed beautifully and she is doing well.  Final pathology again reviewed.  We did discuss [facility] genetic testing today given that her paternal grandmother had ovarian cancer (albeit in her 80s) and she does have female relatives.  She wishes to proceed as suggested.

## 2022-09-01 ENCOUNTER — APPOINTMENT (OUTPATIENT)
Dept: CT IMAGING | Facility: HOSPITAL | Age: 62
End: 2022-09-01

## 2022-09-01 ENCOUNTER — HOSPITAL ENCOUNTER (OUTPATIENT)
Facility: HOSPITAL | Age: 62
Setting detail: OBSERVATION
Discharge: HOME | End: 2022-09-02
Attending: EMERGENCY MEDICINE | Admitting: SURGERY

## 2022-09-01 DIAGNOSIS — K56.600 PARTIAL SMALL BOWEL OBSTRUCTION: Primary | ICD-10-CM

## 2022-09-01 LAB
ALBUMIN SERPL-MCNC: 4.1 G/DL (ref 3.5–5.2)
ALBUMIN/GLOB SERPL: 1.1 G/DL
ALP SERPL-CCNC: 86 U/L (ref 39–117)
ALT SERPL W P-5'-P-CCNC: 11 U/L (ref 1–33)
ANION GAP SERPL CALCULATED.3IONS-SCNC: 12.3 MMOL/L (ref 5–15)
AST SERPL-CCNC: 13 U/L (ref 1–32)
BACTERIA UR QL AUTO: ABNORMAL /HPF
BASOPHILS # BLD AUTO: 0.03 10*3/MM3 (ref 0–0.2)
BASOPHILS NFR BLD AUTO: 0.4 % (ref 0–1.5)
BILIRUB SERPL-MCNC: 0.3 MG/DL (ref 0–1.2)
BILIRUB UR QL STRIP: NEGATIVE
BUN SERPL-MCNC: 26 MG/DL (ref 6–20)
BUN/CREAT SERPL: 18.8 (ref 7–25)
CALCIUM SPEC-SCNC: 9.2 MG/DL (ref 8.6–10.5)
CHLORIDE SERPL-SCNC: 100 MMOL/L (ref 98–107)
CLARITY UR: ABNORMAL
CO2 SERPL-SCNC: 24.7 MMOL/L (ref 22–29)
COLOR UR: YELLOW
CREAT SERPL-MCNC: 1.38 MG/DL (ref 0.57–1)
DEPRECATED RDW RBC AUTO: 40.7 FL (ref 37–54)
EGFRCR SERPLBLD CKD-EPI 2021: 44.2 ML/MIN/1.73
EOSINOPHIL # BLD AUTO: 0 10*3/MM3 (ref 0–0.4)
EOSINOPHIL NFR BLD AUTO: 0 % (ref 0.3–6.2)
ERYTHROCYTE [DISTWIDTH] IN BLOOD BY AUTOMATED COUNT: 13.3 % (ref 12.3–15.4)
GLOBULIN UR ELPH-MCNC: 3.9 GM/DL
GLUCOSE SERPL-MCNC: 118 MG/DL (ref 65–99)
GLUCOSE UR STRIP-MCNC: NEGATIVE MG/DL
HCT VFR BLD AUTO: 38.7 % (ref 34–46.6)
HGB BLD-MCNC: 12.8 G/DL (ref 12–15.9)
HGB UR QL STRIP.AUTO: ABNORMAL
HYALINE CASTS UR QL AUTO: ABNORMAL /LPF
IMM GRANULOCYTES # BLD AUTO: 0.03 10*3/MM3 (ref 0–0.05)
IMM GRANULOCYTES NFR BLD AUTO: 0.4 % (ref 0–0.5)
KETONES UR QL STRIP: ABNORMAL
LEUKOCYTE ESTERASE UR QL STRIP.AUTO: ABNORMAL
LIPASE SERPL-CCNC: 40 U/L (ref 13–60)
LYMPHOCYTES # BLD AUTO: 0.43 10*3/MM3 (ref 0.7–3.1)
LYMPHOCYTES NFR BLD AUTO: 5 % (ref 19.6–45.3)
MCH RBC QN AUTO: 27.8 PG (ref 26.6–33)
MCHC RBC AUTO-ENTMCNC: 33.1 G/DL (ref 31.5–35.7)
MCV RBC AUTO: 84.1 FL (ref 79–97)
MONOCYTES # BLD AUTO: 0.2 10*3/MM3 (ref 0.1–0.9)
MONOCYTES NFR BLD AUTO: 2.3 % (ref 5–12)
NEUTROPHILS NFR BLD AUTO: 7.87 10*3/MM3 (ref 1.7–7)
NEUTROPHILS NFR BLD AUTO: 91.9 % (ref 42.7–76)
NITRITE UR QL STRIP: NEGATIVE
NRBC BLD AUTO-RTO: 0 /100 WBC (ref 0–0.2)
PH UR STRIP.AUTO: 6.5 [PH] (ref 5–8)
PLATELET # BLD AUTO: 344 10*3/MM3 (ref 140–450)
PMV BLD AUTO: 8.5 FL (ref 6–12)
POTASSIUM SERPL-SCNC: 4.2 MMOL/L (ref 3.5–5.2)
PROT SERPL-MCNC: 8 G/DL (ref 6–8.5)
PROT UR QL STRIP: ABNORMAL
RBC # BLD AUTO: 4.6 10*6/MM3 (ref 3.77–5.28)
RBC # UR STRIP: ABNORMAL /HPF
REF LAB TEST METHOD: ABNORMAL
SODIUM SERPL-SCNC: 137 MMOL/L (ref 136–145)
SP GR UR STRIP: 1.02 (ref 1–1.03)
SQUAMOUS #/AREA URNS HPF: ABNORMAL /HPF
UROBILINOGEN UR QL STRIP: ABNORMAL
WBC # UR STRIP: ABNORMAL /HPF
WBC NRBC COR # BLD: 8.56 10*3/MM3 (ref 3.4–10.8)

## 2022-09-01 PROCEDURE — 96376 TX/PRO/DX INJ SAME DRUG ADON: CPT

## 2022-09-01 PROCEDURE — G0378 HOSPITAL OBSERVATION PER HR: HCPCS

## 2022-09-01 PROCEDURE — 25010000002 HYDROMORPHONE PER 4 MG: Performed by: EMERGENCY MEDICINE

## 2022-09-01 PROCEDURE — 83690 ASSAY OF LIPASE: CPT | Performed by: EMERGENCY MEDICINE

## 2022-09-01 PROCEDURE — 81001 URINALYSIS AUTO W/SCOPE: CPT | Performed by: EMERGENCY MEDICINE

## 2022-09-01 PROCEDURE — 25010000002 ONDANSETRON PER 1 MG: Performed by: EMERGENCY MEDICINE

## 2022-09-01 PROCEDURE — 74176 CT ABD & PELVIS W/O CONTRAST: CPT

## 2022-09-01 PROCEDURE — 96375 TX/PRO/DX INJ NEW DRUG ADDON: CPT

## 2022-09-01 PROCEDURE — 80053 COMPREHEN METABOLIC PANEL: CPT | Performed by: EMERGENCY MEDICINE

## 2022-09-01 PROCEDURE — 99284 EMERGENCY DEPT VISIT MOD MDM: CPT

## 2022-09-01 PROCEDURE — 96374 THER/PROPH/DIAG INJ IV PUSH: CPT

## 2022-09-01 PROCEDURE — 25010000002 HYDROMORPHONE PER 4 MG: Performed by: STUDENT IN AN ORGANIZED HEALTH CARE EDUCATION/TRAINING PROGRAM

## 2022-09-01 PROCEDURE — 25010000002 ONDANSETRON PER 1 MG: Performed by: STUDENT IN AN ORGANIZED HEALTH CARE EDUCATION/TRAINING PROGRAM

## 2022-09-01 PROCEDURE — 85025 COMPLETE CBC W/AUTO DIFF WBC: CPT | Performed by: EMERGENCY MEDICINE

## 2022-09-01 RX ORDER — ONDANSETRON 2 MG/ML
4 INJECTION INTRAMUSCULAR; INTRAVENOUS EVERY 6 HOURS PRN
Status: DISCONTINUED | OUTPATIENT
Start: 2022-09-01 | End: 2022-09-02 | Stop reason: HOSPADM

## 2022-09-01 RX ORDER — SODIUM CHLORIDE 0.9 % (FLUSH) 0.9 %
10 SYRINGE (ML) INJECTION EVERY 12 HOURS SCHEDULED
Status: DISCONTINUED | OUTPATIENT
Start: 2022-09-01 | End: 2022-09-02 | Stop reason: HOSPADM

## 2022-09-01 RX ORDER — SODIUM CHLORIDE 0.9 % (FLUSH) 0.9 %
10 SYRINGE (ML) INJECTION AS NEEDED
Status: DISCONTINUED | OUTPATIENT
Start: 2022-09-01 | End: 2022-09-02 | Stop reason: HOSPADM

## 2022-09-01 RX ORDER — ONDANSETRON 2 MG/ML
4 INJECTION INTRAMUSCULAR; INTRAVENOUS ONCE
Status: COMPLETED | OUTPATIENT
Start: 2022-09-01 | End: 2022-09-01

## 2022-09-01 RX ORDER — HYDROMORPHONE HYDROCHLORIDE 1 MG/ML
0.5 INJECTION, SOLUTION INTRAMUSCULAR; INTRAVENOUS; SUBCUTANEOUS ONCE
Status: COMPLETED | OUTPATIENT
Start: 2022-09-01 | End: 2022-09-01

## 2022-09-01 RX ORDER — HYDROCODONE BITARTRATE AND ACETAMINOPHEN 5; 325 MG/1; MG/1
1 TABLET ORAL EVERY 6 HOURS PRN
Status: DISCONTINUED | OUTPATIENT
Start: 2022-09-01 | End: 2022-09-02 | Stop reason: HOSPADM

## 2022-09-01 RX ORDER — HYDROMORPHONE HYDROCHLORIDE 1 MG/ML
0.5 INJECTION, SOLUTION INTRAMUSCULAR; INTRAVENOUS; SUBCUTANEOUS
Status: DISCONTINUED | OUTPATIENT
Start: 2022-09-01 | End: 2022-09-02

## 2022-09-01 RX ADMIN — HYDROMORPHONE HYDROCHLORIDE 0.5 MG: 1 INJECTION, SOLUTION INTRAMUSCULAR; INTRAVENOUS; SUBCUTANEOUS at 19:41

## 2022-09-01 RX ADMIN — HYDROMORPHONE HYDROCHLORIDE 0.5 MG: 1 INJECTION, SOLUTION INTRAMUSCULAR; INTRAVENOUS; SUBCUTANEOUS at 09:39

## 2022-09-01 RX ADMIN — Medication 10 ML: at 19:42

## 2022-09-01 RX ADMIN — ONDANSETRON 4 MG: 2 INJECTION INTRAMUSCULAR; INTRAVENOUS at 14:04

## 2022-09-01 RX ADMIN — ONDANSETRON 4 MG: 2 INJECTION INTRAMUSCULAR; INTRAVENOUS at 09:39

## 2022-09-01 RX ADMIN — ONDANSETRON 4 MG: 2 INJECTION INTRAMUSCULAR; INTRAVENOUS at 19:48

## 2022-09-01 RX ADMIN — SODIUM CHLORIDE 500 ML: 9 INJECTION, SOLUTION INTRAVENOUS at 10:31

## 2022-09-01 RX ADMIN — HYDROMORPHONE HYDROCHLORIDE 0.5 MG: 1 INJECTION, SOLUTION INTRAMUSCULAR; INTRAVENOUS; SUBCUTANEOUS at 14:04

## 2022-09-01 NOTE — ED NOTES
Nursing report ED to floor  Mariya Campos  61 y.o.  female    HPI :   Chief Complaint   Patient presents with   • Abdominal Pain       Admitting doctor:   Charli Dias MD    Admitting diagnosis:   The encounter diagnosis was Partial small bowel obstruction (HCC).    Code status:   Current Code Status     Date Active Code Status Order ID Comments User Context       Prior    Advance Care Planning Activity          Allergies:   Fish-derived products and Shellfish-derived products    Intake and Output    Intake/Output Summary (Last 24 hours) at 9/1/2022 1635  Last data filed at 9/1/2022 1407  Gross per 24 hour   Intake 500 ml   Output --   Net 500 ml       Weight:       09/01/22  0828   Weight: 124 kg (274 lb)       Most recent vitals:   Vitals:    09/01/22 1210 09/01/22 1406 09/01/22 1407 09/01/22 1539   BP:   (!) 164/110    Pulse: 99 101 107 101   Resp:       Temp:       TempSrc:       SpO2: 97% 97% 96% 95%   Weight:       Height:           Active LDAs/IV Access:   Lines, Drains & Airways     Active LDAs     Name Placement date Placement time Site Days    Peripheral IV 09/01/22 0927 Anterior;Left;Proximal Forearm 09/01/22 0927  Forearm  less than 1    Closed/Suction Drain 1 Right Chest Bulb 19 Fr. 06/14/22  1545  Chest  79    Closed/Suction Drain Left Chest Bulb 19 Fr. 06/14/22  1546  Chest  79    Closed/Suction Drain 2 Right Chest Bulb 19 Fr. 06/14/22  1548  Chest  79                Labs (abnormal labs have a star):   Labs Reviewed   COMPREHENSIVE METABOLIC PANEL - Abnormal; Notable for the following components:       Result Value    Glucose 118 (*)     BUN 26 (*)     Creatinine 1.38 (*)     eGFR 44.2 (*)     All other components within normal limits    Narrative:     GFR Normal >60  Chronic Kidney Disease <60  Kidney Failure <15     URINALYSIS W/ MICROSCOPIC IF INDICATED (NO CULTURE) - Abnormal; Notable for the following components:    Appearance, UA Cloudy (*)     Ketones, UA 15 mg/dL (1+) (*)     Blood,  UA Trace (*)     Protein, UA >=300 mg/dL (3+) (*)     Leuk Esterase, UA Trace (*)     All other components within normal limits   CBC WITH AUTO DIFFERENTIAL - Abnormal; Notable for the following components:    Neutrophil % 91.9 (*)     Lymphocyte % 5.0 (*)     Monocyte % 2.3 (*)     Eosinophil % 0.0 (*)     Neutrophils, Absolute 7.87 (*)     Lymphocytes, Absolute 0.43 (*)     All other components within normal limits   URINALYSIS, MICROSCOPIC ONLY - Abnormal; Notable for the following components:    WBC, UA 3-5 (*)     Squamous Epithelial Cells, UA 7-12 (*)     All other components within normal limits   LIPASE - Normal   CBC AND DIFFERENTIAL    Narrative:     The following orders were created for panel order CBC & Differential.  Procedure                               Abnormality         Status                     ---------                               -----------         ------                     CBC Auto Differential[[id number]]        Abnormal            Final result                 Please view results for these tests on the individual orders.       EKG:   No orders to display       Meds given in ED:   Medications   sodium chloride 0.9 % flush 10 mL (has no administration in time range)   sodium chloride 0.9 % bolus 500 mL (0 mL Intravenous Stopped 9/1/22 1407)   ondansetron (ZOFRAN) injection 4 mg (4 mg Intravenous Given 9/1/22 0939)   HYDROmorphone (DILAUDID) injection 0.5 mg (0.5 mg Intravenous Given 9/1/22 0939)   HYDROmorphone (DILAUDID) injection 0.5 mg (0.5 mg Intravenous Given 9/1/22 1404)   ondansetron (ZOFRAN) injection 4 mg (4 mg Intravenous Given 9/1/22 1404)       Imaging results:  CT Abdomen Pelvis Without Contrast    Result Date: 9/1/2022  There is a partial small bowel obstruction at the mid/distal ileum with an abrupt transition point likely secondary to an adhesion at the left mid abdomen. There is a tiny amount of free fluid within the mesentery, but there is no fluid collection. There are  also ventral hernias as described.  Discussed with Dr. Fuentes.  This report was finalized on 9/1/2022 3:13 PM by Dr. Karla Moscoso M.D.        Ambulatory status:   - Standby    Social issues:   Social History     Socioeconomic History   • Marital status:    Tobacco Use   • Smoking status: Never Smoker   • Smokeless tobacco: Never Used   • Tobacco comment: CAFFEINE USE: 1 CUP COFFEE/ 2-16OZ 7-UPS DAILY   Vaping Use   • Vaping Use: Never used   Substance and Sexual Activity   • Alcohol use: Not Currently     Comment: RARE   • Drug use: No   • Sexual activity: Yes     Partners: Male     Birth control/protection: None       NIH Stroke Scale: 0        Nursing report ED to floor:

## 2022-09-01 NOTE — ED NOTES
Nursing report ED to floor  Mariya Campos  61 y.o.  female    HPI :   Chief Complaint   Patient presents with   • Abdominal Pain       Admitting doctor:   Charli Dias MD    Admitting diagnosis:   The encounter diagnosis was Partial small bowel obstruction (HCC).    Code status:   Current Code Status     Date Active Code Status Order ID Comments User Context       Prior    Advance Care Planning Activity          Allergies:   Fish-derived products and Shellfish-derived products    Intake and Output    Intake/Output Summary (Last 24 hours) at 9/1/2022 1636  Last data filed at 9/1/2022 1407  Gross per 24 hour   Intake 500 ml   Output --   Net 500 ml       Weight:       09/01/22  0828   Weight: 124 kg (274 lb)       Most recent vitals:   Vitals:    09/01/22 1210 09/01/22 1406 09/01/22 1407 09/01/22 1539   BP:   (!) 164/110    Pulse: 99 101 107 101   Resp:       Temp:       TempSrc:       SpO2: 97% 97% 96% 95%   Weight:       Height:           Active LDAs/IV Access:   Lines, Drains & Airways     Active LDAs     Name Placement date Placement time Site Days    Peripheral IV 09/01/22 0927 Anterior;Left;Proximal Forearm 09/01/22 0927  Forearm  less than 1    Closed/Suction Drain 1 Right Chest Bulb 19 Fr. 06/14/22  1545  Chest  79    Closed/Suction Drain Left Chest Bulb 19 Fr. 06/14/22  1546  Chest  79    Closed/Suction Drain 2 Right Chest Bulb 19 Fr. 06/14/22  1548  Chest  79                Labs (abnormal labs have a star):   Labs Reviewed   COMPREHENSIVE METABOLIC PANEL - Abnormal; Notable for the following components:       Result Value    Glucose 118 (*)     BUN 26 (*)     Creatinine 1.38 (*)     eGFR 44.2 (*)     All other components within normal limits    Narrative:     GFR Normal >60  Chronic Kidney Disease <60  Kidney Failure <15     URINALYSIS W/ MICROSCOPIC IF INDICATED (NO CULTURE) - Abnormal; Notable for the following components:    Appearance, UA Cloudy (*)     Ketones, UA 15 mg/dL (1+) (*)     Blood,  UA Trace (*)     Protein, UA >=300 mg/dL (3+) (*)     Leuk Esterase, UA Trace (*)     All other components within normal limits   CBC WITH AUTO DIFFERENTIAL - Abnormal; Notable for the following components:    Neutrophil % 91.9 (*)     Lymphocyte % 5.0 (*)     Monocyte % 2.3 (*)     Eosinophil % 0.0 (*)     Neutrophils, Absolute 7.87 (*)     Lymphocytes, Absolute 0.43 (*)     All other components within normal limits   URINALYSIS, MICROSCOPIC ONLY - Abnormal; Notable for the following components:    WBC, UA 3-5 (*)     Squamous Epithelial Cells, UA 7-12 (*)     All other components within normal limits   LIPASE - Normal   CBC AND DIFFERENTIAL    Narrative:     The following orders were created for panel order CBC & Differential.  Procedure                               Abnormality         Status                     ---------                               -----------         ------                     CBC Auto Differential[[id number]]        Abnormal            Final result                 Please view results for these tests on the individual orders.       EKG:   No orders to display       Meds given in ED:   Medications   sodium chloride 0.9 % flush 10 mL (has no administration in time range)   sodium chloride 0.9 % bolus 500 mL (0 mL Intravenous Stopped 9/1/22 1407)   ondansetron (ZOFRAN) injection 4 mg (4 mg Intravenous Given 9/1/22 0939)   HYDROmorphone (DILAUDID) injection 0.5 mg (0.5 mg Intravenous Given 9/1/22 0939)   HYDROmorphone (DILAUDID) injection 0.5 mg (0.5 mg Intravenous Given 9/1/22 1404)   ondansetron (ZOFRAN) injection 4 mg (4 mg Intravenous Given 9/1/22 1404)       Imaging results:  CT Abdomen Pelvis Without Contrast    Result Date: 9/1/2022  There is a partial small bowel obstruction at the mid/distal ileum with an abrupt transition point likely secondary to an adhesion at the left mid abdomen. There is a tiny amount of free fluid within the mesentery, but there is no fluid collection. There are  also ventral hernias as described.  Discussed with Dr. Fuentes.  This report was finalized on 9/1/2022 3:13 PM by Dr. Karla Moscoso M.D.        Ambulatory status:   - Standby    Social issues:   Social History     Socioeconomic History   • Marital status:    Tobacco Use   • Smoking status: Never Smoker   • Smokeless tobacco: Never Used   • Tobacco comment: CAFFEINE USE: 1 CUP COFFEE/ 2-16OZ 7-UPS DAILY   Vaping Use   • Vaping Use: Never used   Substance and Sexual Activity   • Alcohol use: Not Currently     Comment: RARE   • Drug use: No   • Sexual activity: Yes     Partners: Male     Birth control/protection: None       NIH Stroke Scale:        Nursing report ED to floor:

## 2022-09-01 NOTE — ED PROVIDER NOTES
EMERGENCY DEPARTMENT ENCOUNTER    Room Number:  39/39  Date of encounter:  9/1/2022  PCP: Zahida Coffman MD  Historian: Patient      HPI:  Chief Complaint: Abdominal pain   A complete HPI/ROS/PMH/PSH/SH/FH are unobtainable due to: N/A     Context: Mariya Campos is a 61 y.o. female who presents to the ED c/o gradual onset of abdominal pain which started approximately 12 hours ago.  Pain is in the epigastrium and lower abdomen.  She has had nausea and several episodes of bilious emesis.  Last bowel movement was the day before yesterday.  No black or bloody stools.  No fevers or chills.  Pain is moderate to severe in intensity.  There are no aggravating relieving factors.  She has had several episodes similar in the past with small bowel obstructions.      Summary of prior records: Patient was admitted here in June of this year for bilateral mastectomy and sentinel node biopsy by Dr. Dias.  She recovered uneventfully.  She has a history of prior bowel obstructions.  She has a history of chronic kidney disease, paroxysmal atrial fibrillation (on Eliquis), or lower extremity lymphedema and hypertension.    The patient was placed in a mask in triage, hand hygiene was performed before and after my interaction with the patient.  I wore a mask, safety glasses and gloves during my entire interaction with the patient.    PAST MEDICAL HISTORY  Active Ambulatory Problems     Diagnosis Date Noted   • Atopic rhinitis 02/23/2016   • Adiposity 02/23/2016   • EDOUARD (dyspnea on exertion) 02/23/2016   • Abnormal ECG 04/26/2016   • CRF (chronic renal failure), stage 3 (moderate) ([facility])    • Anemia 04/28/2016   • Recurrent UTI 06/04/2016   • Osteopenia 07/20/2016   • Essential hypertension 10/02/2017   • Incisional hernia 12/18/2017   • Moderate persistent asthma without complication 12/18/2017   • Recurrent ventral hernia 01/30/2018   • Longstanding persistent atrial fibrillation (CMS/HCC) 12/04/2019   • Chronic deep vein  thrombosis (DVT) of popliteal vein of left lower extremity ([facility]) 12/04/2019   • STEVEN (obstructive sleep apnea) 07/28/2020   • BMI 40.0-44.9, adult ([facility]) 07/28/2020   • Family hx of colon cancer 05/24/2021   • Hx of malignant neoplasm of colon 05/24/2021   • Ductal carcinoma in situ (DCIS) of right breast 05/26/2022     Resolved Ambulatory Problems     Diagnosis Date Noted   • Fatigue 02/23/2016   • Abnormal mammogram 02/23/2016   • Colonic lump 02/23/2016   • Menopause present 02/23/2016   • Shoulder, capsulitis, adhesive 04/14/2016   • Bilateral shoulder pain 04/14/2016   • Pain 06/28/2016   • Bursitis/tendonitis, shoulder 06/28/2016   • Incisional hernia, without obstruction or gangrene 12/21/2017   • Bacteremia, escherichia coli 02/10/2018   • Wound infection 01/30/2018   • Complications, dialysis, catheter, mechanical, initial encounter ([facility]) 02/18/2018   • Small bowel obstruction ([facility]) 12/13/2018   • Generalized abdominal pain 05/12/2020     Past Medical History:   Diagnosis Date   • Abdominal pain    • Acid reflux    • Allergic rhinitis    • Asthma    • Atrial fibrillation ([facility])    • Atrial fibrillation (HCC)    • Chronic bronchitis ([facility])    • Chronic kidney disease    • Colon cancer ([facility]) 2006   • Colon polyp    • Edema    • H/O bladder infections    • H/O bronchitis    • Hiatal hernia    • History of blood clots    • History of blood transfusion    • Hypertension    • Overweight    • Peripheral neuropathy    • Postoperative wound infection 2018   • Rosacea    • Seasonal allergies    • Sleep apnea    • Slow to wake up after anesthesia    • Visit for screening mammogram          PAST SURGICAL HISTORY  Past Surgical History:   Procedure Laterality Date   • BREAST BIOPSY Left 08/16/2010    Stereoteactic biopsy of left breast x2   • COLON RESECTION N/A 08/18/2006    laparoscopic low anterior resection-Dr. Tri Guzman, [facility]   • COLON RESECTION N/A 10/13/2015    open low anterior resection-Dr. Tri Guzman,  [facility]   • COLONOSCOPY N/A 03/27/2007   • COLONOSCOPY N/A 10/08/2007   • COLONOSCOPY N/A 08/25/2021    Procedure: COLONOSCOPY to CECUM WITH HOT SNARE POLYPECTOMY;  Surgeon: Charli Dias MD;  Location: [facility] ENDOSCOPY;  Service: General;  Laterality: N/A;  PERSONAL AND FAMILY HX COLON CANCER  --POLYPS   • DIAGNOSTIC LAPAROSCOPY N/A 02/04/2018    Procedure: EXPLORATORY LAPAROTOMY WITH SMALL BOWEL ANASTAMOSIS, LYSIS OF ADHESIONS, REMOVAL OF MESH;  Surgeon: Tri Guzman MD;  Location: [facility] MAIN OR;  Service:    • INCISION AND DRAINAGE TRUNK N/A 02/13/2018    Procedure: WOUND WASHOUT ABDOMEN;  Surgeon: Tri Guzman MD;  Location: [facility] MAIN OR;  Service:    • INCISION AND DRAINAGE TRUNK N/A 02/16/2018    Procedure: TRUNK DEBRIDEMENT abdominal wound;  Surgeon: Tri Guzman MD;  Location: [facility] MAIN OR;  Service:    • INCISION AND DRAINAGE TRUNK N/A 02/27/2018    Procedure: INCISION AND DRAINAGE WOUND abdomen;  Surgeon: Tri Guzman MD;  Location: [facility] OR;  Service:    • INSERTION HEMODIALYSIS CATHETER N/A 02/19/2018    Procedure: LEFT QUAD CENTRAL LINE INSERTION AND RIGHT TUNNELED HEMODIALYSIS CATHETER INSERTION;  Surgeon: Meir Guillen MD;  Location: [facility] OR 18/19;  Service:    • JOINT MANIPULATION Left 04/18/2016    Procedure: MANIPULATION OF LT SHOULDER;  Surgeon: Lidia Ugalde MD;  Location: [facility] OR OSC;  Service:    • MASTECTOMY W/ SENTINEL NODE BIOPSY Bilateral 6/14/2022    Procedure: BREAST MASTECTOMY BILATERAL RIGHT SENTINEL NODE BIOPSY;  Surgeon: Charli Dias MD;  Location: [facility] MAIN OR;  Service: General;  Laterality: Bilateral;   • UPPER GASTROINTESTINAL ENDOSCOPY N/A 07/14/2006   • VENOUS ACCESS DEVICE (PORT) INSERTION     • VENOUS ACCESS DEVICE (PORT) REMOVAL     • VENTRAL HERNIA REPAIR N/A 03/15/2016    Procedure: LAPAROSCOPIC INSIONAL HERNIA REPAIR W/ MESH DAVINCI ROBOT;  Surgeon: Tri Guzman MD;  Location: [facility] OR;   Service:    • VENTRAL HERNIA REPAIR N/A 02/01/2018    Procedure: VENTRAL HERNIA REPAIR LAPAROSCOPIC WITH DAVINCI ROBOT;  Surgeon: Tri Guzman MD;  Location: [facility];  Service:          FAMILY HISTORY  Family History   Problem Relation Age of Onset   • Breast cancer Mother 56   • Hypertension Mother    • Asthma Father    • Colon cancer Father         Colon Cancer   • Heart disease Father    • Breast cancer Maternal Aunt 58   • Breast cancer Maternal Aunt 65        had genetic testing-negative   • Breast cancer Maternal Grandmother 80   • Ovarian cancer Paternal Grandmother 70   • Hypertension Other    • Cancer Other         Liver Cancer   • Other Other         Varicose veins of lower extremities   • Malig Hyperthermia Neg Hx          SOCIAL HISTORY  Social History     Socioeconomic History   • Marital status:    Tobacco Use   • Smoking status: Never Smoker   • Smokeless tobacco: Never Used   • Tobacco comment: CAFFEINE USE: 1 CUP COFFEE/ 2-16OZ 7-UPS DAILY   Vaping Use   • Vaping Use: Never used   Substance and Sexual Activity   • Alcohol use: Not Currently     Comment: RARE   • Drug use: No   • Sexual activity: Yes     Partners: Male     Birth control/protection: None         ALLERGIES  Fish-derived products and Shellfish-derived products        REVIEW OF SYSTEMS  Review of Systems   Constitutional: Negative for chills and fever.   Respiratory: Negative for shortness of breath.    Cardiovascular: Negative for chest pain.   Gastrointestinal: Positive for abdominal pain, nausea and vomiting. Negative for diarrhea.        All systems reviewed and negative except for those discussed in HPI.       PHYSICAL EXAM    I have reviewed the triage vital signs and nursing notes.    ED Triage Vitals [09/01/22 0800]   Temp Heart Rate Resp BP SpO2   98.1 °F (36.7 °C) 78 16 -- 100 %      Temp src Heart Rate Source Patient Position BP Location FiO2 (%)   Tympanic Monitor -- -- --       Physical Exam    Constitutional: Pt. is oriented to person, place, and time and well-developed, well-nourished, and in moderate distress.   HENT: Normocephalic and atraumatic.   Neck: Normal range of motion. Neck supple. No JVD present.   Cardiovascular: Normal rate, regular rhythm and normal heart sounds. No murmur heard.  Pulmonary/Chest: Effort normal and breath sounds normal. No stridor. No respiratory distress. No wheezes, no rales.   Abdominal: Soft, obese. Bowel sounds are diminished.  There is moderate generalized tenderness. There is no rebound and no guarding.   Musculoskeletal: Normal range of motion. No edema, tenderness or deformity.   Neurological: Pt. is alert and oriented to person, place, and time.  She has no focal neurologic deficits  Skin: Skin is warm and dry. No rash noted. Pt. is not diaphoretic. No erythema.   Psychiatric: Mood, affect and judgment normal.  She is pleasant and cooperative.  Nursing note and vitals reviewed.        LAB RESULTS  Recent Results (from the past 24 hour(s))   Comprehensive Metabolic Panel    Collection Time: 09/01/22  9:27 AM    Specimen: Blood   Result Value Ref Range    Glucose 118 (H) 65 - 99 mg/dL    BUN 26 (H) 6 - 20 mg/dL    Creatinine 1.38 (H) 0.57 - 1.00 mg/dL    Sodium 137 136 - 145 mmol/L    Potassium 4.2 3.5 - 5.2 mmol/L    Chloride 100 98 - 107 mmol/L    CO2 24.7 22.0 - 29.0 mmol/L    Calcium 9.2 8.6 - 10.5 mg/dL    Total Protein 8.0 6.0 - 8.5 g/dL    Albumin 4.10 3.50 - 5.20 g/dL    ALT (SGPT) 11 1 - 33 U/L    AST (SGOT) 13 1 - 32 U/L    Alkaline Phosphatase 86 39 - 117 U/L    Total Bilirubin 0.3 0.0 - 1.2 mg/dL    Globulin 3.9 gm/dL    A/G Ratio 1.1 g/dL    BUN/Creatinine Ratio 18.8 7.0 - 25.0    Anion Gap 12.3 5.0 - 15.0 mmol/L    eGFR 44.2 (L) >60.0 mL/min/1.73   Lipase    Collection Time: 09/01/22  9:27 AM    Specimen: Blood   Result Value Ref Range    Lipase 40 13 - 60 U/L   CBC Auto Differential    Collection Time: 09/01/22  9:27 AM    Specimen: Blood   Result  Value Ref Range    WBC 8.56 3.40 - 10.80 10*3/mm3    RBC 4.60 3.77 - 5.28 10*6/mm3    Hemoglobin 12.8 12.0 - 15.9 g/dL    Hematocrit 38.7 34.0 - 46.6 %    MCV 84.1 79.0 - 97.0 fL    MCH 27.8 26.6 - 33.0 pg    MCHC 33.1 31.5 - 35.7 g/dL    RDW 13.3 12.3 - 15.4 %    RDW-SD 40.7 37.0 - 54.0 fl    MPV 8.5 6.0 - 12.0 fL    Platelets 344 140 - 450 10*3/mm3    Neutrophil % 91.9 (H) 42.7 - 76.0 %    Lymphocyte % 5.0 (L) 19.6 - 45.3 %    Monocyte % 2.3 (L) 5.0 - 12.0 %    Eosinophil % 0.0 (L) 0.3 - 6.2 %    Basophil % 0.4 0.0 - 1.5 %    Immature Grans % 0.4 0.0 - 0.5 %    Neutrophils, Absolute 7.87 (H) 1.70 - 7.00 10*3/mm3    Lymphocytes, Absolute 0.43 (L) 0.70 - 3.10 10*3/mm3    Monocytes, Absolute 0.20 0.10 - 0.90 10*3/mm3    Eosinophils, Absolute 0.00 0.00 - 0.40 10*3/mm3    Basophils, Absolute 0.03 0.00 - 0.20 10*3/mm3    Immature Grans, Absolute 0.03 0.00 - 0.05 10*3/mm3    nRBC 0.0 0.0 - 0.2 /100 WBC       Ordered the above labs and independently reviewed the results.        RADIOLOGY  CT Abdomen Pelvis Without Contrast    Result Date: 9/1/2022  CT ABDOMEN AND PELVIS WITHOUT IV CONTRAST  HISTORY: 61-year-old female with abdominal pain. Bilateral mastectomies, partial colectomy, hemodialysis catheter, DVT history in the past.  TECHNIQUE: Radiation dose reduction techniques were utilized, including automated exposure control and exposure modulation based on body size. 3 mm images were obtained through the abdomen and pelvis without the administration of IV contrast. Compared with previous CT 05/03/2021.  FINDINGS: There is a dilated segment of mid ileum and there is an abrupt transition point along the abdominal wall at the left mid abdomen and distally the small bowel is normal in caliber. There are fecal contents within the small bowel proximal to the transition point and to a lesser degree within the small bowel segment distally. There is a tiny amount of free fluid within the mesentery, but there is no fluid  collection. There is a midline ventral hernia containing a knuckle of transverse colon without obstruction or incarceration. There is also a left para midline ventral hernia containing a knuckle of small bowel without obstruction. There is protrusion of small bowel loops anteriorly in the suprapubic region where there is significant thinning of the abdominal wall musculature, but there is no jcarlos fascial defect. There is no acute abnormality involving the noncontrasted liver, gallbladder, spleen, pancreas, adrenals, or kidneys. Noncontrasted uterus and adnexa appear unremarkable.      There is a partial small bowel obstruction at the mid/distal ileum with an abrupt transition point likely secondary to an adhesion at the left mid abdomen. There is a tiny amount of free fluid within the mesentery, but there is no fluid collection. There are also ventral hernias as described.  Discussed with Dr. Fuentes.        I ordered the above noted radiological studies. Reviewed by me and discussed with radiologist.  See dictation for official radiology interpretation.      PROCEDURES    Procedures      MEDICATIONS GIVEN IN ER    Medications   sodium chloride 0.9 % flush 10 mL (has no administration in time range)   sodium chloride 0.9 % bolus 500 mL (500 mL Intravenous New Bag 9/1/22 1031)   ondansetron (ZOFRAN) injection 4 mg (4 mg Intravenous Given 9/1/22 0939)   HYDROmorphone (DILAUDID) injection 0.5 mg (0.5 mg Intravenous Given 9/1/22 0939)         PROGRESS, DATA ANALYSIS, CONSULTS, AND MEDICAL DECISION MAKING    Any/all labs have been independently reviewed by me.  Any/all radiology studies have been reviewed by me and discussed with radiologist dictating the report.   EKG's independently viewed and interpreted by me.  Discussion below represents my analysis of pertinent findings related to patient's condition, differential diagnosis, treatment plan and final disposition.    Number of Diagnoses or Management Options      Amount and/or Complexity of Data Reviewed  Clinical lab tests:  Yes  Tests in the radiology section of CPT®:  Yes   Tests in the medicine section of CPT®:  Yes  Review and summarize past medical records:  (Yes-see HPI)  Independent visualization of images, tracings, or specimens: (Yes-see below)      ED Course as of 09/01/22 1219   Thu Sep 01, 2022   0948 Neutrophil Rel %(!): 91.9  CBC is otherwise unremarkable. [WC]   1025 BUN(!): 26 [WC]   1025 Creatinine(!): 1.38  This is about her baseline.  CMP is otherwise unremarkable. [WC]   1200 CT of the abdomen pelvis was independently visualized by me and discussed with/interpreted by Dr. Moscoso (radiology)-there is a partial small bowel obstruction.  For official interpretation, see dictated report.    Call placed to Dr. Dias to discuss case and for admission. [WC]   1211 Case discussed with Dr. Dias (general surgery)-he agrees to admit the patient to a MedSur bed. [WC]      ED Course User Index  [WC] Yury Fuentes MD       AS OF 12:19 EDT VITALS:    BP - (!) 147/106  HR - 112  TEMP - 98.1 °F (36.7 °C) (Tympanic)  02 SATS - 92%        DIAGNOSIS  Final diagnoses:   Partial small bowel obstruction (HCC)         DISPOSITION  Admitted-MedSurg          Note Disclaimer: At [facility], we believe that sharing information builds trust and better relationships. You are receiving this note because you recently visited [facility]. It is possible you will see health information before a provider has talked with you about it. This kind of information can be easy to misunderstand. To help you fully understand what it means for your health, we urge you to discuss this note with your provider.\         Yury Fuentes MD  09/01/22 9010

## 2022-09-01 NOTE — ED NOTES
Patient masked in triage and taken to room.      Pt c/o abdominal pain that began last night, pt states that she has a Hx of SBO, current level of pain is 8 out of 10.

## 2022-09-02 ENCOUNTER — READMISSION MANAGEMENT (OUTPATIENT)
Dept: CALL CENTER | Facility: HOSPITAL | Age: 62
End: 2022-09-02

## 2022-09-02 VITALS
HEIGHT: 68 IN | DIASTOLIC BLOOD PRESSURE: 80 MMHG | TEMPERATURE: 98.1 F | BODY MASS INDEX: 41.52 KG/M2 | OXYGEN SATURATION: 97 % | WEIGHT: 274 LBS | RESPIRATION RATE: 17 BRPM | HEART RATE: 101 BPM | SYSTOLIC BLOOD PRESSURE: 151 MMHG

## 2022-09-02 PROCEDURE — 99234 HOSP IP/OBS SM DT SF/LOW 45: CPT | Performed by: SURGERY

## 2022-09-02 PROCEDURE — G0378 HOSPITAL OBSERVATION PER HR: HCPCS

## 2022-09-02 RX ADMIN — Medication 10 ML: at 09:50

## 2022-09-02 NOTE — DISCHARGE SUMMARY
DATE OF ADMIT: 9/1/2022    DATE OF DISCHARGE: 9/2/2022    DIAGNOSIS: Partial small bowel obstruction    SUMMARY OF HOSPITAL COURSE:   61-year-old lady who presents emergency room with recurrent signs and symptoms of partial small bowel obstruction and confirmatory CT scan.  Admitted to the hospital and had very quick resolution of her symptoms and was started on full liquid diet which she tolerated well and was then discharged home.    DIET: Advance as tolerated    ACTIVITY: Ad domitila.    MEDICATIONS: No change to her preadmission medications    FOLLOW-UP: As needed    Charli Dias M.D.

## 2022-09-02 NOTE — H&P
ASSESSMENT/PLAN:    61-year-old lady with recurrent partial small bowel obstruction that already appears to be clinically resolving.  I am going to start her on full liquid diet this morning and if she tolerates this she will be able to go home later today.    CC:     Bowel obstruction    HPI:    61-year-old lady who yesterday morning started having generalized moderately severe crampy abdominal pain associate with nausea and vomiting similar to symptoms she has had with previous bowel obstructions.    ENDOSCOPY:   • Colonoscopy 8/25/2021: 2 benign polyps (pathology demonstrated tubular adenomas), 5-year surveillance was recommended    RADIOLOGY:   • CT abdomen pelvis 9/1/2022: Partial small bowel obstruction mid/distal ileum.  I reviewed the images and concur    LABS:    • BUN 26, creatinine 1.38, GFR 44  • Lipase 40  • WBC 8.5  • Hemoglobin 12.8    SOCIAL HISTORY:   • Denies tobacco use  • Occasional alcohol use    FAMILY HISTORY:    • Colorectal cancer: Father    PREVIOUS ABDOMINAL SURGERY    · Debridement of abdominal wound 2/27/2018  · Insertion hemodialysis catheter 2/19/2018  · Debridement of abdominal wound 2/16/2018  · Debridement of abdominal wound 2/13/2018  · Laparotomy with removal of mesh and small bowel resection 2/4/2018  · Laparoscopic robotic incisional hernia repair 2/1/2018  · Laparoscopic robotic incisional hernia repair 3/15/2016  · Colectomy 10/13/2015 (pathology indicates tubulovillous adenoma with low anterior resection specimen)  · Colectomy 2006 (pathology indicates laparoscopic low anterior colon resection with invasive colonic adenocarcinoma T3N0)    PAST MEDICAL HISTORY:    · Breast cancer 2022 (treated with bilateral mastectomy)  · Morbid obesity  · Gastroesophageal reflux disease  · Seasonal allergies  · Atrial fibrillation  · Deep venous thrombosis  · Chronic kidney disease  · Colon cancer  · Hypertension    MEDICATIONS:   • Reviewed    ALLERGIES:   • Reviewed    PHYSICAL EXAM:    • Constitutional: Well-developed well-nourished, no acute distress  • Vital signs:   o Blood pressure 151/80  o Heart rate 101  o Respiratory rate 17  o Temperature 98.1  o Weight 274 pounds  o Height 68 inches  o BMI 41.7  • Eyes: Conjunctiva normal, sclera nonicteric  • ENMT: Hearing grossly normal, oral mucosa moist  • Neck: Supple, no palpable mass, trachea midline  • Respiratory: Clear to auscultation, normal inspiratory effort  • Cardiovascular: Regular rate, no jugular venous distention  • Gastrointestinal: Soft, nontender, no palpable mass, no hepatosplenomegaly, negative for hernia  • Lymphatics (palpable nodes):  cervical-negative, inguinal-negative  • Skin:  Warm, dry, no rash on visualized skin surfaces  • Musculoskeletal: Symmetric strength, no asymmetric muscular atrophy  • Psychiatric: Alert and oriented ×3, normal affect     ROS:    No cough, chest pain, shortness of air.  All other systems reviewed and negative other than presenting complaints.    SUHAIL SHEIKH M.D.

## 2022-09-02 NOTE — PLAN OF CARE
Goal Outcome Evaluation:              Outcome Evaluation: Admit from ED for partial small bowel obstruction, A&O, VSS, RA, ad domitila, NPO, last BM 8/30, family at bedside, h/o double mastectomy w/reconstructive sx, NO STICKS OR BP ON RIGHT, CTM

## 2022-09-02 NOTE — PROGRESS NOTES
Continued Stay Note  [facility]     Patient Name: Mariya Campos  MRN: [id number]  Today's Date: 9/2/2022    Admit Date: 9/1/2022     Discharge Plan     Row Name 09/02/22 1524       Plan    Final Discharge Disposition Code 01 - home or self-care    Final Note D/c home               Discharge Codes    No documentation.               Expected Discharge Date and Time     Expected Discharge Date Expected Discharge Time    Sep 2, 2022             Mia Ruth RN

## 2022-09-02 NOTE — PLAN OF CARE
Goal Outcome Evaluation:  Plan of Care Reviewed With: patient        Progress: no change  Outcome Evaluation: Pt remains stable. Up ad domitila. NPO. Dilaudid and zofran given x1 with relief. No diarrhea reported. Educated on htn management. Contact precautions maintained. VSS.

## 2022-09-03 NOTE — OUTREACH NOTE
Prep Survey    Flowsheet Row Responses   [facility] patient discharged from? [place]   Is LACE score < 7 ? No   Emergency Room discharge w/ pulse ox? No   Eligibility [facility]   Date of Admission 09/01/22   Date of Discharge 09/02/22   Discharge Disposition Home or Self Care   Discharge diagnosis Partial small bowel obstruction   Does the patient have one of the following disease processes/diagnoses(primary or secondary)? Other   Does the patient have Home health ordered? No   Is there a DME ordered? No   Prep survey completed? Yes          ANGELICA CUI - Registered Nurse

## 2022-09-06 ENCOUNTER — TRANSITIONAL CARE MANAGEMENT TELEPHONE ENCOUNTER (OUTPATIENT)
Dept: CALL CENTER | Facility: HOSPITAL | Age: 62
End: 2022-09-06

## 2022-09-06 NOTE — OUTREACH NOTE
Call Center TCM Note    Flowsheet Row Responses   [facility] patient discharged from? [place]   Does the patient have one of the following disease processes/diagnoses(primary or secondary)? Other   TCM attempt successful? Yes   Call start time 1414   Call end time 1420   Discharge diagnosis Partial small bowel obstruction   Does the patient have all medications ordered at discharge? N/A   Is the patient taking all medications as directed (includes completed medication regime)? Yes   Comments HOSP DC FU appt MYCHART video visit 9/8/22 @ 7:45 am.    Has home health visited the patient within 72 hours of discharge? N/A   Psychosocial issues? No   Did the patient receive a copy of their discharge instructions? Yes   Nursing interventions Reviewed instructions with patient   What is the patient's perception of their health status since discharge? Improving   TCM call completed? Yes          Adele Caldwell RN    9/6/2022, 14:21 EDT

## 2022-09-08 ENCOUNTER — TELEMEDICINE (OUTPATIENT)
Dept: INTERNAL MEDICINE | Facility: CLINIC | Age: 62
End: 2022-09-08

## 2022-09-08 VITALS — WEIGHT: 262 LBS | BODY MASS INDEX: 39.71 KG/M2 | HEIGHT: 68 IN

## 2022-09-08 DIAGNOSIS — K56.600 PARTIAL SMALL BOWEL OBSTRUCTION: ICD-10-CM

## 2022-09-08 DIAGNOSIS — Z09 HOSPITAL DISCHARGE FOLLOW-UP: Primary | ICD-10-CM

## 2022-09-08 PROBLEM — N25.81 SECONDARY HYPERPARATHYROIDISM: Status: ACTIVE | Noted: 2022-03-07

## 2022-09-08 PROBLEM — K21.9 GASTROESOPHAGEAL REFLUX DISEASE: Status: ACTIVE | Noted: 2022-03-07

## 2022-09-08 PROBLEM — N17.9 ACUTE KIDNEY INJURY (HCC): Status: ACTIVE | Noted: 2022-03-07

## 2022-09-08 PROBLEM — E55.9 VITAMIN D DEFICIENCY: Status: ACTIVE | Noted: 2022-03-07

## 2022-09-08 PROBLEM — R60.9 EDEMA: Status: ACTIVE | Noted: 2022-03-07

## 2022-09-08 PROCEDURE — 99213 OFFICE O/P EST LOW 20 MIN: CPT | Performed by: NURSE PRACTITIONER

## 2022-09-08 NOTE — PROGRESS NOTES
Subjective   Mariya Campos is a 61 y.o. female.     Chief Complaint   Patient presents with   • Hospital Follow Up Visit     Pt was see on 9/1 for a small bowel obstruction. Pt stated that they just managed pain & started on a clear liquid diet until her appetite returns.         History of Present Illness   You have chosen to receive care through a telehealth visit.  Do you consent to use a video/audio connection for your medical care today? Yes    She is here today for a telehealth visit using D[employer]im[employer] from her car in her work parking lot, provider is working from her office. TCM note reviewed by me from 09/06.  She is following up on hospitalization with admission on 9/1 to [facility], [place] through the ER with generalized moderately severe crampy abdominal pain associated with nausea and vomiting.  She was diagnosed with a partial small bowel obstruction.  This was confirmed on CT scan with evidence of partial small bowel obstruction mid/distal ileum. Labs showed mild increase in creatinine at 1.38. She was admitted to the hospital with quick resolution of symptoms. Dr. Dias with general surgery consulted.  She was started on full liquid diet which she tolerated well. She was stable with resolution of symptoms, and discharged on 9/2 to follow-up as needed.  She is feeling well today. She does not have much of an appetite still. She has been advancing her diet as tolerated on full liquid diet, transitioning to soft diet with yogurt and protein shakes. She has been trying to increase fluid intake with water, averaging 80 oz a day. She tries to increase fiber intake. She is passing gas and having a BM daily.  She denies any recent constipation, which is typically been a trigger in the past.  She denies any acute abdominal pain, fever, chills, nausea, vomiting. She has a history of recurrent small bowel obstructions, typically occurring once a year since extensive abdominal surgery in 2018 requiring a long  hospitalization.  She has discussed prevention measures with general surgery, and states that there is not much she can do as the bowel obstructions are likely secondary to scar tissue and adhesions from prior abdominal surgery.  Last c-scope completed in August 2021 with Dr. Dias showing 2 small tubular adenomas.  Recommended rescreen in 5 years.    The following portions of the patient's history were reviewed and updated as appropriate: allergies, current medications, past family history, past medical history, past social history, past surgical history and problem list.    Review of Systems   Constitutional: Positive for appetite change. Negative for chills, fatigue and fever.   Respiratory: Negative for cough, chest tightness, shortness of breath and wheezing.    Cardiovascular: Negative for chest pain, palpitations and leg swelling.   Gastrointestinal: Negative for abdominal distention, abdominal pain, constipation, diarrhea, nausea and vomiting.   Neurological: Negative.        Objective   Physical Exam  Constitutional:       General: She is awake.      Appearance: Normal appearance.   Pulmonary:      Effort: Pulmonary effort is normal.   Neurological:      Mental Status: She is alert and oriented to person, place, and time. Mental status is at baseline.   Psychiatric:         Attention and Perception: Attention and perception normal.         Mood and Affect: Mood and affect normal.         Speech: Speech normal.         Behavior: Behavior normal. Behavior is cooperative.         Thought Content: Thought content normal.         Cognition and Memory: Cognition and memory normal.         Judgment: Judgment normal.         There were no vitals filed for this visit.       Assessment & Plan   Diagnoses and all orders for this visit:    1. Hospital discharge follow-up (Primary)    2. Partial small bowel obstruction (HCC)      1.  Hospital discharge follow-up for partial small bowel obstruction- symptoms have  resolved.  She is tolerating a full liquid diet with transition to soft diet.  She is passing gas and having a daily BM.  Encouraged her to continue to hydrate well with water and increase fiber intake as tolerated.  Recommended avoiding red meat, as this is hard in the digestive system.  Encouraged her to stay active with walking.  Notify for return of symptoms.  Follow-up with general surgery as needed.    Doximity visit lasting 15 minutes.

## 2022-09-16 ENCOUNTER — READMISSION MANAGEMENT (OUTPATIENT)
Dept: CALL CENTER | Facility: HOSPITAL | Age: 62
End: 2022-09-16

## 2022-09-16 NOTE — OUTREACH NOTE
Medical Week 2 Survey    Flowsheet Row Responses   [facility] patient discharged from? [place]   Does the patient have one of the following disease processes/diagnoses(primary or secondary)? Other   Week 2 attempt successful? No   Unsuccessful attempts Attempt 1          TANVI Soto Registered Nurse

## 2022-09-19 ENCOUNTER — READMISSION MANAGEMENT (OUTPATIENT)
Dept: CALL CENTER | Facility: HOSPITAL | Age: 62
End: 2022-09-19

## 2022-09-19 NOTE — OUTREACH NOTE
Medical Week 2 Survey    Flowsheet Row Responses   [facility] patient discharged from? [place]   Does the patient have one of the following disease processes/diagnoses(primary or secondary)? Other   Week 2 attempt successful? Yes   Call start time 1302   Discharge diagnosis Partial small bowel obstruction   Call end time 1303   Meds reviewed with patient/caregiver? Yes   Is the patient having any side effects they believe may be caused by any medication additions or changes? No   Is the patient taking all medications as directed (includes completed medication regime)? Yes   Does the patient have a primary care provider?  Yes   Does the patient have an appointment with their PCP within 7 days of discharge? Yes   Has the patient kept scheduled appointments due by today? Yes   Has home health visited the patient within 72 hours of discharge? N/A   Psychosocial issues? No   What is the patient's perception of their health status since discharge? Improving   Is the patient/caregiver able to teach back the hierarchy of who to call/visit for symptoms/problems? PCP, Specialist, Home health nurse, Urgent Care, ED, 911 Yes   Week 2 Call Completed? Yes   Graduated Yes   Did the patient feel the follow up calls were helpful during their recovery period? No  [Pt stated if this was a new issue then yes, but this always for her]   Graduated/Revoked comments Pt reports doing better, has had a PCP f/u appt. No questions/concerns at this time          EMMY GARCIA - Registered Nurse

## 2022-12-05 DIAGNOSIS — Z00.00 ANNUAL PHYSICAL EXAM: Primary | ICD-10-CM

## 2022-12-06 ENCOUNTER — HOSPITAL ENCOUNTER (OUTPATIENT)
Dept: BONE DENSITY | Facility: HOSPITAL | Age: 62
Discharge: HOME | End: 2022-12-06
Admitting: FAMILY MEDICINE

## 2022-12-06 DIAGNOSIS — Z78.0 POSTMENOPAUSAL: ICD-10-CM

## 2022-12-06 PROCEDURE — 77080 DXA BONE DENSITY AXIAL: CPT

## 2022-12-12 ENCOUNTER — OFFICE VISIT (OUTPATIENT)
Dept: INTERNAL MEDICINE | Facility: CLINIC | Age: 62
End: 2022-12-12

## 2022-12-12 VITALS
WEIGHT: 282 LBS | HEART RATE: 90 BPM | OXYGEN SATURATION: 96 % | BODY MASS INDEX: 42.74 KG/M2 | DIASTOLIC BLOOD PRESSURE: 86 MMHG | SYSTOLIC BLOOD PRESSURE: 142 MMHG | TEMPERATURE: 97.7 F | HEIGHT: 68 IN

## 2022-12-12 DIAGNOSIS — I10 ESSENTIAL HYPERTENSION: ICD-10-CM

## 2022-12-12 DIAGNOSIS — Z00.00 ANNUAL PHYSICAL EXAM: Primary | ICD-10-CM

## 2022-12-12 PROCEDURE — 99396 PREV VISIT EST AGE 40-64: CPT | Performed by: FAMILY MEDICINE

## 2022-12-12 RX ORDER — DILTIAZEM HYDROCHLORIDE 360 MG/1
360 CAPSULE, EXTENDED RELEASE ORAL
COMMUNITY
Start: 2022-09-12 | End: 2022-12-12 | Stop reason: SDUPTHER

## 2022-12-12 NOTE — PROGRESS NOTES
Subjective   Mariya Campos is a 62 y.o. female.   Chief Complaint   Patient presents with   • Annual Exam       History of Present Illness     1. CPE-patient is here for complete physical exam.  She has no complaints.    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  She underwent bilateral mastectomy and right sentinel node biopsy.  Surgery was done by Dr. Dias.  She did not require radiation or chemotherapy.     No change in family history.  No change in prescription medications.  Over-the-counter she takes vitamin D 2000 units a day and oral antihistamine Zyrtec or Xyzal as needed.  She is not allergic to any new medications.  She does not use tobacco products.  She has a few drinks a month or less.  No drugs.  She does not exercise regularly.  She has dental appointments every 6 months.  Last eye exam was in August 2021.     She had colonoscopy in August 2021.  It was done by Dr. Dias.  Next was recommended 5 years later.  She had Pap smear in 12/2021.  HPV negative.  LMP around 2017.  No vaginal bleeding.  She does not get any more mammograms.  She is post bilateral mastectomy.  Covid vaccine x3.  Tetanus vaccine 2020.  Pneumovax 2017. Flu vaccine up to date.    Patient follows up with nephrologist every 6 months.  Kidney tests are stable.  Creatinine 1.38, GFR 44.2 in 9/2022.  Hypertension-blood pressure is elevated in the office today.  Patient  monitors it at home and it stays between 126-130/76-80.  No chest pain, no shortness of breath, no lightheadedness, no palpitations.      The following portions of the patient's history were reviewed and updated as appropriate: allergies, current medications, past family history, past medical history, past social history, past surgical history and problem list.    Review of Systems   Constitutional: Negative.    Respiratory: Negative for shortness of breath.    Cardiovascular: Negative for chest pain and  palpitations.   Gastrointestinal: Negative for blood in stool.   Genitourinary: Negative for hematuria.   Neurological: Negative for light-headedness.   Psychiatric/Behavioral: Negative.          Objective   Wt Readings from Last 3 Encounters:   12/12/22 128 kg (282 lb)   09/08/22 119 kg (262 lb)   09/01/22 124 kg (274 lb)      Vitals:    12/12/22 0753   BP: 142/86   Pulse: 90   Temp: 97.7 °F (36.5 °C)   SpO2: 96%     Temp Readings from Last 3 Encounters:   12/12/22 97.7 °F (36.5 °C)   09/02/22 98.1 °F (36.7 °C) (Oral)   06/15/22 97.5 °F (36.4 °C) (Oral)     BP Readings from Last 3 Encounters:   12/12/22 142/86   09/02/22 151/80   07/06/22 141/82     Pulse Readings from Last 3 Encounters:   12/12/22 90   09/02/22 101   07/06/22 117     Body mass index is 42.89 kg/m².    Physical Exam  Vitals reviewed.   Constitutional:       General: She is not in acute distress.     Appearance: She is well-developed. She is obese. She is not diaphoretic.   HENT:      Head: Normocephalic and atraumatic. Hair is normal.      Right Ear: Hearing, tympanic membrane, ear canal and external ear normal. No decreased hearing noted. No drainage.      Left Ear: Hearing, tympanic membrane, ear canal and external ear normal. No decreased hearing noted.      Nose: No nasal deformity.   Eyes:      General: Lids are normal.         Right eye: No discharge.         Left eye: No discharge.      Conjunctiva/sclera: Conjunctivae normal.      Pupils: Pupils are equal, round, and reactive to light.   Neck:      Thyroid: No thyromegaly.      Vascular: No JVD.      Trachea: No tracheal deviation.   Cardiovascular:      Rate and Rhythm: Normal rate and regular rhythm.      Pulses: Normal pulses.      Heart sounds: Normal heart sounds. No murmur heard.    No friction rub. No gallop.   Pulmonary:      Effort: Pulmonary effort is normal. No respiratory distress.      Breath sounds: Normal breath sounds. No wheezing or rales.   Chest:      Chest wall: No  tenderness.      Comments: Post bilateral mastectomy.  No nodules/ no redness along incisions.  Abdominal:      General: There is no distension.      Palpations: Abdomen is soft. There is no mass.      Tenderness: There is no abdominal tenderness. There is no guarding or rebound.      Hernia: No hernia is present.      Comments: Multiple postsurgical scars.   Musculoskeletal:         General: No tenderness or deformity. Normal range of motion.      Cervical back: Normal range of motion and neck supple.   Lymphadenopathy:      Cervical: No cervical adenopathy.   Skin:     General: Skin is warm and dry.      Findings: No erythema or rash.   Neurological:      Mental Status: She is alert and oriented to person, place, and time.      Cranial Nerves: No cranial nerve deficit.      Motor: No abnormal muscle tone.      Coordination: Coordination normal.      Deep Tendon Reflexes: Reflexes are normal and symmetric. Reflexes normal.   Psychiatric:         Behavior: Behavior normal.         Thought Content: Thought content normal.         Judgment: Judgment normal.         Assessment & Plan   Diagnoses and all orders for this visit:    1. Annual physical exam (Primary)    2. Essential hypertension        1. CPE-preventive counseling provided.  Patient is getting labs today.  Obesity increases risk for developing diabetes and heart disease.  Information on counting calories to lose weight and exercise to lose weight provided.  Patient is encouraged to start to exercise.  She is very busy at work but is going to retire in 14 months.  She will try to get as much exercise as she can. She is up-to-date with cancer screening.  No more mammograms, she is post bilateral mastectomy.  Continue regular dental appointments at least every 6 months.  She is reminded to schedule eye exam.  Sun protection discussed and recommended.  Hypertension- patient will continue current treatment.  She will continue to monitor blood pressure at home.   If blood pressure gets at or above 140/90 she will follow-up with us or with her nephrologist.  She will make sure to have her blood pressure cuff checked in the office once a year.

## 2022-12-27 ENCOUNTER — OFFICE VISIT (OUTPATIENT)
Dept: CARDIOLOGY | Facility: CLINIC | Age: 62
End: 2022-12-27

## 2022-12-27 VITALS
DIASTOLIC BLOOD PRESSURE: 92 MMHG | HEART RATE: 96 BPM | BODY MASS INDEX: 44.92 KG/M2 | SYSTOLIC BLOOD PRESSURE: 142 MMHG | WEIGHT: 286.2 LBS | HEIGHT: 67 IN

## 2022-12-27 DIAGNOSIS — I48.11 LONGSTANDING PERSISTENT ATRIAL FIBRILLATION: Primary | ICD-10-CM

## 2022-12-27 DIAGNOSIS — I10 ESSENTIAL HYPERTENSION: ICD-10-CM

## 2022-12-27 PROCEDURE — 99214 OFFICE O/P EST MOD 30 MIN: CPT | Performed by: NURSE PRACTITIONER

## 2022-12-27 PROCEDURE — 93000 ELECTROCARDIOGRAM COMPLETE: CPT | Performed by: NURSE PRACTITIONER

## 2022-12-27 NOTE — PROGRESS NOTES
"  Date of Office Visit: 2022  Encounter Provider: BROCK Yates  Place of Service: [facility] CARDIOLOGY  Patient Name: Mariya Campos  :1960    Chief Complaint   Patient presents with   • Atrial Fibrillation   :     HPI: Mariya Campos is a 62 y.o. female.  She is a patient of Dr. So's whom we follow for the management of atrial fibrillation.  She also has a history of DVT.  Her last echocardiogram was in 2019 which revealed normal LV systolic function, mild mitral regurgitation, and no other significant valvular abnormalities.    She was last seen in the office by Dr. So in 2021 at which time she reported dyspnea on exertion, mostly with inclines.  Dr. So encourage dietary modification and weight loss.  Otherwise, she was advised to follow-up in 1 year.   This past summer, she was diagnosed with extensive ductal carcinoma in situ of the right breast.  In , she underwent a double mastectomy.  She did not require radiation or chemotherapy.  In September, she presented with a partial small bowel obstruction which resolved with conservative management.   Overall, she has been doing well.  She denies any chest pain, palpitations, edema, dizziness, syncope, bleeding difficulties or melena.  She always struggles this time to be year with allergies.  The cold weather makes her short of breath.  Reportedly she has been \"living on albuterol and allergy medication\" since the first part of December which is why she states her blood pressures have been higher.     Past Medical History:   Diagnosis Date   • Abdominal pain    • Acid reflux    • Allergic rhinitis    • Anemia    • Asthma    • Atrial fibrillation (HCC)    • Atrial fibrillation (HCC)    • Chronic bronchitis (HCC)    • Chronic kidney disease     STAGE 3  AFTER COLON SURGERY   • Colon cancer (HCC)     CHEMO 6 MONTHS   • Colon polyp    • Edema     BILATERAL ANKLES.  LEFT > " RIGHT.   • H/O bladder infections    • H/O bronchitis    • Hiatal hernia    • History of blood clots     S/P HERNIA SURGERY 2018   • History of blood transfusion    • Hypertension    • Incisional hernia    • Overweight    • Peripheral neuropathy     FROM CHEMO TX'S   • Postoperative wound infection 2018    BOWEL KNICKED/KIDNEY FAILURE/SEPSIS/BLOOD CLOT/WOUND VAC TO ABDOMEN   • Rosacea    • Seasonal allergies    • Sleep apnea     CPAP   • Slow to wake up after anesthesia    • Visit for screening mammogram        Past Surgical History:   Procedure Laterality Date   • BREAST BIOPSY Left 08/16/2010    Stereoteactic biopsy of left breast x2   • COLON RESECTION N/A 08/18/2006    laparoscopic low anterior resection-Dr. Tri Guzman, [facility]   • COLON RESECTION N/A 10/13/2015    open low anterior resection-Dr. Tri Guzman, [facility]   • COLONOSCOPY N/A 03/27/2007   • COLONOSCOPY N/A 10/08/2007   • COLONOSCOPY N/A 08/25/2021    Procedure: COLONOSCOPY to CECUM WITH HOT SNARE POLYPECTOMY;  Surgeon: Charli Dias MD;  Location: [facility] ENDOSCOPY;  Service: General;  Laterality: N/A;  PERSONAL AND FAMILY HX COLON CANCER  --POLYPS   • DIAGNOSTIC LAPAROSCOPY N/A 02/04/2018    Procedure: EXPLORATORY LAPAROTOMY WITH SMALL BOWEL ANASTAMOSIS, LYSIS OF ADHESIONS, REMOVAL OF MESH;  Surgeon: Tri Guzman MD;  Location: [facility] OR;  Service:    • INCISION AND DRAINAGE TRUNK N/A 02/13/2018    Procedure: WOUND WASHOUT ABDOMEN;  Surgeon: Tri Guzman MD;  Location: [facility] OR;  Service:    • INCISION AND DRAINAGE TRUNK N/A 02/16/2018    Procedure: TRUNK DEBRIDEMENT abdominal wound;  Surgeon: Tri Guzman MD;  Location: [facility] OR;  Service:    • INCISION AND DRAINAGE TRUNK N/A 02/27/2018    Procedure: INCISION AND DRAINAGE WOUND abdomen;  Surgeon: Tri Guzman MD;  Location: [facility] OR;  Service:    • INSERTION HEMODIALYSIS CATHETER N/A 02/19/2018    Procedure: LEFT QUAD CENTRAL LINE INSERTION AND  RIGHT TUNNELED HEMODIALYSIS CATHETER INSERTION;  Surgeon: Meir Guillen MD;  Location: [facility] HYBRID OR 18/19;  Service:    • JOINT MANIPULATION Left 04/18/2016    Procedure: MANIPULATION OF LT SHOULDER;  Surgeon: Lidia Ugalde MD;  Location: [facility] OR OSC;  Service:    • MASTECTOMY W/ SENTINEL NODE BIOPSY Bilateral 6/14/2022    Procedure: BREAST MASTECTOMY BILATERAL RIGHT SENTINEL NODE BIOPSY;  Surgeon: Charli Dias MD;  Location: [facility] OR;  Service: General;  Laterality: Bilateral;   • UPPER GASTROINTESTINAL ENDOSCOPY N/A 07/14/2006   • VENOUS ACCESS DEVICE (PORT) INSERTION     • VENOUS ACCESS DEVICE (PORT) REMOVAL     • VENTRAL HERNIA REPAIR N/A 03/15/2016    Procedure: LAPAROSCOPIC INSIONAL HERNIA REPAIR W/ MESH DAVINCI ROBOT;  Surgeon: Tri Guzman MD;  Location: [facility] MAIN OR;  Service:    • VENTRAL HERNIA REPAIR N/A 02/01/2018    Procedure: VENTRAL HERNIA REPAIR LAPAROSCOPIC WITH DAVINCI ROBOT;  Surgeon: Tri Guzman MD;  Location: [facility] OR;  Service:        Social History     Socioeconomic History   • Marital status:    Tobacco Use   • Smoking status: Never   • Smokeless tobacco: Never   • Tobacco comments:     CAFFEINE USE: 1 CUP COFFEE/ 2-16OZ 7-UPS DAILY   Vaping Use   • Vaping Use: Never used   Substance and Sexual Activity   • Alcohol use: Not Currently     Comment: RARE   • Drug use: No   • Sexual activity: Yes     Partners: Male     Birth control/protection: None       Family History   Problem Relation Age of Onset   • Breast cancer Mother 56   • Hypertension Mother    • Asthma Father    • Colon cancer Father         Colon Cancer   • Heart disease Father    • Breast cancer Maternal Aunt 58   • Breast cancer Maternal Aunt 65        had genetic testing-negative   • Breast cancer Maternal Grandmother 80   • Ovarian cancer Paternal Grandmother 70   • Hypertension Other    • Cancer Other         Liver Cancer   • Other Other         Varicose veins of lower  "extremities   • Malig Hyperthermia Neg Hx        Review of Systems   Constitutional: Negative.   Cardiovascular: Positive for dyspnea on exertion. Negative for chest pain, leg swelling, orthopnea, paroxysmal nocturnal dyspnea and syncope.   Respiratory: Negative.    Hematologic/Lymphatic: Negative for bleeding problem.   Musculoskeletal: Negative for falls.   Gastrointestinal: Negative for melena.   Neurological: Negative for dizziness and light-headedness.       Allergies   Allergen Reactions   • Fish-Derived Products Anaphylaxis   • Shellfish-Derived Products Anaphylaxis         Current Outpatient Medications:   •  albuterol sulfate  (90 Base) MCG/ACT inhaler, Inhale 2 puffs As Needed. Inhale 2 puffs by mouth every 4 to 6 hours as needed, Disp: , Rfl:   •  azelastine (ASTELIN) 0.1 % nasal spray, 2 sprays into the nostril(s) as directed by provider Daily. Use in each nostril as directed, Disp: 1 each, Rfl: 12  •  cetirizine (zyrTEC) 10 MG tablet, Take 10 mg by mouth Daily As Needed for Allergies., Disp: , Rfl:   •  Cholecalciferol (VITAMIN D) 2000 units tablet, Take 2,000 Units by mouth Daily., Disp: , Rfl:   •  dilTIAZem CD (CARDIZEM CD) 360 MG 24 hr capsule, Take 1 capsule by mouth Every Morning., Disp: 90 capsule, Rfl: 3  •  levocetirizine (XYZAL) 5 MG tablet, Take 5 mg by mouth Daily As Needed., Disp: , Rfl:   •  rivaroxaban (Xarelto) 20 MG tablet, Take 1 tablet by mouth Daily With Dinner., Disp: 90 tablet, Rfl: 3  •  SYMBICORT 80-4.5 MCG/ACT inhaler, Inhale 1 puff 2 (Two) Times a Day., Disp: , Rfl:       Objective:     Vitals:    12/27/22 0920   BP: 142/92   Pulse: 96   Weight: 130 kg (286 lb 3.2 oz)   Height: 170.2 cm (67\")     Body mass index is 44.83 kg/m².    PHYSICAL EXAM:    Neck:      Vascular: No JVD.   Pulmonary:      Effort: Pulmonary effort is normal.      Breath sounds: Normal breath sounds.   Cardiovascular:      Normal rate. Irregular rhythm.      Murmurs: There is no murmur.      No " gallop. No click. No rub.   Pulses:     Intact distal pulses.           ECG 12 Lead    Date/Time: 12/27/2022 9:32 AM  Performed by: Radha Eason APRN  Authorized by: Radha Eason APRN   Comparison: compared with previous ECG from 6/10/2022  Similar to previous ECG  Rhythm: atrial fibrillation  Rate: normal  BPM: 96                Assessment:       Diagnosis Plan   1. Longstanding persistent atrial fibrillation (CMS/HCC)  ECG 12 Lead      2. Essential hypertension          Orders Placed This Encounter   Procedures   • ECG 12 Lead     This order was created via procedure documentation     Order Specific Question:   Release to patient     Answer:   Routine Release          Plan:       1.  Persistent atrial fibrillation.  She is rate controlled with diltiazem and anticoagulated with Xarelto.      2.  Hypertension.  Her blood pressure is mildly elevated, likely related to decongestant and albuterol use..  I told her to call me in a couple of weeks with an update.      Overall, I think she is doing well.  I am not making any changes today, and she will follow-up with Dr. So in 1 year.      As always, it has been a pleasure to participate in your patient's care.      Sincerely,         BROCK Major

## 2023-03-09 NOTE — ANESTHESIA POSTPROCEDURE EVALUATION
Patient: Mariya Campos    Procedure Summary     Date Anesthesia Start Anesthesia Stop Room / Location    02/01/18 1226 1953  [facility] / [facility]       Procedure Diagnosis Surgeon Provider    VENTRAL HERNIA REPAIR LAPAROSCOPIC WITH DAVINCI ROBOT (N/A Abdomen) Recurrent ventral hernia  (Recurrent ventral hernia [K43.2]) MD Aftab Neville MD          Anesthesia Type: general  Last vitals  BP   139/83 (02/01/18 1014)   Temp   36.7 °C (98 °F) (02/01/18 1014)   Pulse   72 (02/01/18 1037)   Resp   16 (02/01/18 1037)     SpO2   95 % (02/01/18 1037)     Post Anesthesia Care and Evaluation    Patient location during evaluation: PACU  Anesthetic complications: No anesthetic complications       09-Mar-2023 20:38

## 2023-03-30 NOTE — PROGRESS NOTES
"   LOS: 38 days    Patient Care Team:  Zahida Coffman MD as PCP - General (Family Medicine)    Chief Complaint:  No chief complaint on file.      Subjective follow-up acute kidney injury on chronic kidney disease    Interval History:   She is feeling much better, eating better, no chest pain or shortness of air, no nausea or vomiting.  Urine output is good.  She has one diet placed, her tunnel dialysis catheter was removed, her last dialysis was on 2/27/2018     Objective     Vital Signs  Temp:  [99.6 °F (37.6 °C)-99.7 °F (37.6 °C)] 99.6 °F (37.6 °C)  Heart Rate:  [101-108] 101  Resp:  [16-18] 16  BP: (111-129)/(61-69) 129/65    Flowsheet Rows         First Filed Value    Admission Height  172.7 cm (68\") Documented at 02/01/2018 1014    Admission Weight  123 kg (271 lb 14.4 oz) Documented at 02/01/2018 1014          I/O this shift:  In: 240 [P.O.:240]  Out: -   I/O last 3 completed shifts:  In: 450 [P.O.:450]  Out: 800 [Urine:800]    Intake/Output Summary (Last 24 hours) at 03/11/18 1030  Last data filed at 03/11/18 0910   Gross per 24 hour   Intake              480 ml   Output              175 ml   Net              305 ml       Physical Exam:  General Appearance: alert, oriented x 3, no acute distress, morbidly obese  Skin: warm and dry  HEENT: Anicteric sclerae, oral mucosa normal,   Neck: supple, no JVD, trachea midline  Lungs: Clear breath sounds in the bases, unlabored breathing effort  Heart: RRR, normal S1 and S2, no S3, no rub  Abdomen: soft, non-tender,  present bowel sounds to auscultation, she had dressing and wound VAC  : no palpable bladder,  Extremities: no edema, cyanosis or clubbing  Neuro: normal speech and mental status        Results Review:      Results from last 7 days  Lab Units 03/11/18  0412 03/09/18  0427 03/07/18  0317 03/06/18  0501   SODIUM mmol/L 137 137 139 139   POTASSIUM mmol/L 3.9 3.9 3.7 3.6   CHLORIDE mmol/L 99 99 100 100   CO2 mmol/L 24.8 26.7 27.0 32.0*   BUN mg/dL 33* 42* " Tyrex pouch placed around pacemaker device 51* 59*   CREATININE mg/dL 1.99* 1.94* 2.27* 2.48*   CALCIUM mg/dL 8.2* 8.1* 8.3* 8.3*   BILIRUBIN mg/dL  --  0.3 0.3 0.3   ALK PHOS U/L  --  64 65 63   ALT (SGPT) U/L  --  9 8 7   AST (SGOT) U/L  --  26 17 16   GLUCOSE mg/dL 95 98 97 96       Estimated Creatinine Clearance: 42.7 mL/min (by C-G formula based on SCr of 1.99 mg/dL (H)).      Results from last 7 days  Lab Units 03/11/18  0412 03/06/18  0501 03/05/18  0524 03/04/18  1220   MAGNESIUM mg/dL  --  2.0 2.1 1.5*   PHOSPHORUS mg/dL 4.7* 5.3*  --  6.2*         Results from last 7 days  Lab Units 03/06/18  0501   URIC ACID mg/dL 8.9*         Results from last 7 days  Lab Units 03/11/18  0412 03/09/18  0427 03/07/18  0317 03/06/18  0501 03/05/18  0524   WBC 10*3/mm3 10.02 9.59 8.66 8.23 7.76   HEMOGLOBIN g/dL 8.1* 8.5* 8.5* 8.4* 8.4*   PLATELETS 10*3/mm3 336 272 272 311 300               Imaging Results (last 24 hours)     ** No results found for the last 24 hours. **          alteplase 2 mg Intravenous Once   epoetin kristen 20,000 Units Subcutaneous Weekly   heparin (porcine) 5,000 Units Subcutaneous Q8H   iopamidol 50 mL Intravenous Once in imaging   lidocaine PF 1% 5 mL Injection Once   metoprolol tartrate 12.5 mg Oral Q12H   mirtazapine 15 mg Oral Nightly   pantoprazole 40 mg Oral Q AM          Medication Review:   Current Facility-Administered Medications   Medication Dose Route Frequency Provider Last Rate Last Dose   • acetaminophen (TYLENOL) suppository 325 mg  325 mg Rectal Q4H PRN Edgard Felder MD   325 mg at 02/05/18 0653   • acetaminophen (TYLENOL) tablet 650 mg  650 mg Oral Q6H PRN Tri Guzman MD   650 mg at 03/01/18 0014   • albumin human 25 % IV SOLN 12.5 g  12.5 g Intravenous PRN Madeline Kay MD   12.5 g at 02/27/18 1305   • albuterol (PROVENTIL) nebulizer solution 0.083% 2.5 mg/3mL  2.5 mg Nebulization Q4H PRN Tri Guzman MD       • alteplase ((CATHFLO/ACTIVASE)) injection 2 mg  2 mg Intravenous Once Madeline Kay,  MD       • calcium carbonate (TUMS) chewable tablet 500 mg (200 mg elemental)  1 tablet Oral BID PRN Jensen Forbes MD   1 tablet at 03/10/18 2200   • calcium gluconate 1 g in sodium chloride 0.9 % 50 mL IVPB  1 g Intravenous PRN Madeline Kay MD        Or   • calcium gluconate 2 g in sodium chloride 0.9 % 50 mL IVPB  2 g Intravenous PRN Madeline Kay MD       • epoetin kristen (EPOGEN,PROCRIT) injection 20,000 Units  20,000 Units Subcutaneous Weekly Madeline Kay MD   20,000 Units at 03/08/18 1514   • heparin (porcine) 5000 UNIT/ML injection 5,000 Units  5,000 Units Subcutaneous Q8H Benjy Samson MD   5,000 Units at 03/11/18 0612   • heparin (porcine) injection 1,000-2,000 Units  1,000-2,000 Units Intravenous PRN Madeline Kay MD   4,000 Units at 02/15/18 1843   • heparin (porcine) injection 4,000 Units  4,000 Units Intracatheter PRN Madeline Kay MD   4,000 Units at 02/27/18 1258   • HYDROmorphone (DILAUDID) injection 1 mg  1 mg Intravenous BID PRN Bryant Spence MD   0.5 mg at 03/09/18 1449   • iopamidol (ISOVUE-250) 51 % injection 50 mL  50 mL Intravenous Once in imaging Meir Guillen MD       • lidocaine PF 1% (XYLOCAINE) injection 5 mL  5 mL Injection Once Dwayne Lyons MD       • magic mouthwash oral supsension 10 mL  10 mL Swish & Spit Q4H PRN Madeline Kay MD   10 mL at 03/10/18 2200   • metoprolol tartrate (LOPRESSOR) tablet 12.5 mg  12.5 mg Oral Q12H Bradford So MD   12.5 mg at 03/11/18 0900   • mirtazapine (REMERON) tablet 15 mg  15 mg Oral Nightly Ernestina Hussein MD   15 mg at 03/10/18 2057   • ondansetron ODT (ZOFRAN-ODT) disintegrating tablet 4 mg  4 mg Oral Q6H PRN Tri Guzman MD        Or   • ondansetron (ZOFRAN) injection 4 mg  4 mg Intravenous Q6H PRN Tri Guzman MD   4 mg at 03/09/18 0523   • oxyCODONE-acetaminophen (PERCOCET) 5-325 MG per tablet 1 tablet  1 tablet Oral Q4H PRN Tri Guzman MD   1  tablet at 03/09/18 1032    Or   • oxyCODONE-acetaminophen (PERCOCET) 5-325 MG per tablet 2 tablet  2 tablet Oral Q4H PRN Tri Guzman MD       • pantoprazole (PROTONIX) EC tablet 40 mg  40 mg Oral Q AM Madeline Kay MD   40 mg at 03/11/18 0612   • promethazine (PHENERGAN) injection 12.5 mg  12.5 mg Intravenous Q8H PRN Sarthak Figueroa MD   12.5 mg at 02/09/18 2345   • sodium chloride (OCEAN) nasal spray 2 spray  2 spray Each Nare PRN Ernestina Hussein MD           Assessment/Plan       1. Acute kidney injury on chronic kidney disease stage III due to shock. Has recovered.  Last HD 2/27. Serum creatinine today is 1.94, with the normal electrolytes.  Monitor labs for now.  2.  Chronic kidney disease stage III baseline Crt 1.5.   3.   SP incisional hernia repair 2/1.  Status post exploratory laparotomy with small bowel resection, mesh removal, RLQ port site debridement, and  Appendectomy 2/4.  SP subcutaneous debridement 2/13 and 2/16, 2/27. 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  4.  Acute on Chronic anemia: Hg  8.5.  On weekly EPO.  5.  Small bowel enterotomy, sp repair 2/4.    ESBL, now carbapenem resistant from wound.   E. coli from blood 2/5. Blood cultures 2/9 negative.  Wound Vac in place.  6. Encephalopathy . Resolved.   7. Afib with RVR.  Now in SR.  On beta blocker   8.  PCM. Improving po intake.  carnation instant breakfast to supplement.   9.  Oral mucositis. Resolved.   10. Debility.  Working with PT .        Juan Howard MD  03/11/18  10:30 AM

## 2023-08-16 ENCOUNTER — OFFICE VISIT (OUTPATIENT)
Dept: SLEEP MEDICINE | Facility: HOSPITAL | Age: 63
End: 2023-08-16
Payer: COMMERCIAL

## 2023-08-16 VITALS — HEIGHT: 68 IN | OXYGEN SATURATION: 97 % | WEIGHT: 275 LBS | BODY MASS INDEX: 41.68 KG/M2 | HEART RATE: 101 BPM

## 2023-08-16 DIAGNOSIS — E66.01 MORBID OBESITY: ICD-10-CM

## 2023-08-16 DIAGNOSIS — G47.33 OSA (OBSTRUCTIVE SLEEP APNEA): Primary | ICD-10-CM

## 2023-08-16 PROCEDURE — G0463 HOSPITAL OUTPT CLINIC VISIT: HCPCS

## 2023-08-16 NOTE — PROGRESS NOTES
"Follow Up [facility] Note     Chief Complaint:  STEVEN     Primary Care Physician: Zahida Coffman MD    Interval History:   The patient is a 62 y.o. female  who was last seen in the sleep lab: 7/6/2022.  Presents today for annual follow-up of STEVEN.  Severe STEVEN noted in 2020 with overall AHI 35.1 events per hour lowest SPO2 of 69%.  On auto CPAP.  No problems with mask machine hypersomnia nonrestorative sleep.    Downloaded PAP Data Reviewed For Compliance:  DME is [facility].  Downloads between 7/16/2023 - 8/14/2023.  Average usage is 6 hours 19 minutes 0.  Average AHI is 0.6.  Average auto CPAP pressure is 10.6 centimeters H2O    I have reviewed the above results and compared them with the patient's last downloads and reviewed with the patient.    Review of Systems:    A complete review of systems was done and all were negative with the exception of nasal congestion postnasal drip    Social History:    Social History     Socioeconomic History    Marital status:    Tobacco Use    Smoking status: Never    Smokeless tobacco: Never    Tobacco comments:     CAFFEINE USE: 1 CUP COFFEE/ 2-16OZ 7-UPS DAILY   Vaping Use    Vaping Use: Never used   Substance and Sexual Activity    Alcohol use: Not Currently     Comment: RARE    Drug use: No    Sexual activity: Yes     Partners: Male     Birth control/protection: None       Allergies:  Fish-derived products and Shellfish-derived products     Medication Review:  Reviewed.      Vital Signs:    Vitals:    08/16/23 1129   Pulse: 101   SpO2: 97%   Weight: 125 kg (275 lb)   Height: 172.7 cm (68\")     Body mass index is 41.81 kg/mý.    Physical Exam:    Constitutional:  Well developed 62 y.o. female that appears in no apparent distress.  Awake & oriented times 3.  Normal mood with normal recent and remote memory and normal judgement.  Eyes:  Conjunctivae normal.  Oropharynx: Previously, moist mucous membranes.    Self-administered [place] Sleepiness Scale test results: " 5   0-5 Lower normal daytime sleepiness  6-10 Higher normal daytime sleepiness  11-12 Mild, 13-15 Moderate, & 16-24 Severe excessive daytime sleepiness    Impression:   1. STEVEN (obstructive sleep apnea)    2. Morbid obesity        Obstructive sleep apnea adequately treated with auto CPAP 5-20 cm H2O. The patient appears to be at goal with good compliance and usage. The patient has no complaints of hypersomnolence.    Plan:  Good sleep hygiene measures should be maintained.  Weight loss would be beneficial in this patient who morbid obese by Body mass index is 41.81 kg/mý..      After evaluating the patient and assessing results available, the patient is benefiting from the treatment being provided.     The patient will continue auto C PAP 5-20 cm H2O.  After clinical evaluation and review of downloads, I recommend no changes to the patient's pressures.  A new prescription will be sent to the patient's DME.    Caution during activities that require prolonged concentration is strongly advised if sleepiness returns. Changing of PAP supplies regularly is important for effective use. Patient needs to change cushion on the mask or plugs on nasal pillows along with disposable filters once every month and change mask frame, tubing, headgear and Velcro straps every 6 months at the minimum.    I answered all of the patient's questions.  The patient will call for any problems and will follow up in 1 year.      Monica Wild MD  Sleep Medicine  08/16/23  11:45 EDT

## 2023-10-04 PROBLEM — Z86.718 HISTORY OF DVT (DEEP VEIN THROMBOSIS): Status: ACTIVE | Noted: 2023-10-04

## 2023-10-04 NOTE — PROGRESS NOTES
Date of Office Visit: 10/06/2023  Encounter Provider: BROCK Elizondo  Place of Service: [facility] CARDIOLOGY  Patient Name: Mariya Campos  :1960    No chief complaint on file.  : dyspnea    HPI: Mariya Campos is a 62 y.o. female who is a patient of  Dr. So.  She is new to me today and presents with complaints of dyspnea.  She has a history of persistent atrial fibrillation and past DVT.  Echocardiogram 2019 revealed normal left ventricular function, mild mitral regurgitation no other significant valvular abnormalities.    On follow-up in 2021, patient reported dyspnea on exertion.  Dietary modifications and weight loss were recommended.  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  In 2022, she underwent double mastectomy.  She did not require radiation or chemotherapy.      On her last office visit, patient was doing well.  She had been taking decongestant and using her albuterol inhaler quite a bit.  She correlated that with her slightly elevated blood pressure.    Today patient presents with increased dyspnea.  She states that she does not think it is her asthma because she is not wheezing.  She noticed that when she got off the plane from a trip to [place], she could barely get to the airport with out becoming some short of breath.  She noticed her dyspnea was worse and it has been about the same since she is come back home.  She does not notice any increased edema.  It appears that she has gained about 17 pounds over the last month.  She does admit to  also going on a recent cruise and eating quite a bit.  On physical exam, she has some diminished breath sounds throughout.  She has not been taking her allergy medicine she states that she does not take it.  Is not allergy season.  She is on numerous inhalers for her asthma.  EKG shows atrial fibrillation with controlled rate.   Dyspnea:    Previous testing and notes have been reviewed by me.   Past Medical History:   Diagnosis Date    Abdominal pain     Acid reflux     Allergic rhinitis     Anemia     Asthma     Atrial fibrillation     Atrial fibrillation     Chronic bronchitis     Chronic kidney disease     STAGE 3  AFTER COLON SURGERY    Colon cancer 2006    CHEMO 6 MONTHS    Colon polyp     Edema     BILATERAL ANKLES.  LEFT > RIGHT.    H/O bladder infections     H/O bronchitis     Hiatal hernia     History of blood clots     S/P HERNIA SURGERY 2018    History of blood transfusion     Hypertension     Incisional hernia     Overweight     Peripheral neuropathy     FROM CHEMO TX'S    Postoperative wound infection 2018    BOWEL KNICKED/KIDNEY FAILURE/SEPSIS/BLOOD CLOT/WOUND VAC TO ABDOMEN    Rosacea     Seasonal allergies     Sleep apnea     CPAP    Slow to wake up after anesthesia     Visit for screening mammogram        Past Surgical History:   Procedure Laterality Date    BREAST BIOPSY Left 08/16/2010    Stereoteactic biopsy of left breast x2    COLON RESECTION N/A 08/18/2006    laparoscopic low anterior resection-Dr. Tri Guzman, [facility]    COLON RESECTION N/A 10/13/2015    open low anterior resection-Dr. Tri Guzman, [facility]    COLONOSCOPY N/A 03/27/2007    COLONOSCOPY N/A 10/08/2007    COLONOSCOPY N/A 08/25/2021    Procedure: COLONOSCOPY to CECUM WITH HOT SNARE POLYPECTOMY;  Surgeon: Charli Dias MD;  Location: [facility] ENDOSCOPY;  Service: General;  Laterality: N/A;  PERSONAL AND FAMILY HX COLON CANCER  --POLYPS    DIAGNOSTIC LAPAROSCOPY N/A 02/04/2018    Procedure: EXPLORATORY LAPAROTOMY WITH SMALL BOWEL ANASTAMOSIS, LYSIS OF ADHESIONS, REMOVAL OF MESH;  Surgeon: Tri Guzman MD;  Location: [facility] MAIN OR;  Service:     INCISION AND DRAINAGE TRUNK N/A 02/13/2018    Procedure: WOUND WASHOUT ABDOMEN;  Surgeon: Tri Guzman MD;  Location: [facility] MAIN OR;  Service:     INCISION AND DRAINAGE TRUNK N/A 02/16/2018     Procedure: TRUNK DEBRIDEMENT abdominal wound;  Surgeon: Tri Guzman MD;  Location: [facility] MAIN OR;  Service:     INCISION AND DRAINAGE TRUNK N/A 02/27/2018    Procedure: INCISION AND DRAINAGE WOUND abdomen;  Surgeon: Tri Guzman MD;  Location: [facility] MAIN OR;  Service:     INSERTION HEMODIALYSIS CATHETER N/A 02/19/2018    Procedure: LEFT QUAD CENTRAL LINE INSERTION AND RIGHT TUNNELED HEMODIALYSIS CATHETER INSERTION;  Surgeon: Meir Guillen MD;  Location: [facility] OR 18/19;  Service:     JOINT MANIPULATION Left 04/18/2016    Procedure: MANIPULATION OF LT SHOULDER;  Surgeon: Lidia Ugalde MD;  Location: [facility] OR OSC;  Service:     MASTECTOMY W/ SENTINEL NODE BIOPSY Bilateral 6/14/2022    Procedure: BREAST MASTECTOMY BILATERAL RIGHT SENTINEL NODE BIOPSY;  Surgeon: Charli Dias MD;  Location: [facility] OR;  Service: General;  Laterality: Bilateral;    UPPER GASTROINTESTINAL ENDOSCOPY N/A 07/14/2006    VENOUS ACCESS DEVICE (PORT) INSERTION      VENOUS ACCESS DEVICE (PORT) REMOVAL      VENTRAL HERNIA REPAIR N/A 03/15/2016    Procedure: LAPAROSCOPIC INSIONAL HERNIA REPAIR W/ MESH DAVINCI ROBOT;  Surgeon: Tri Guzman MD;  Location: [facility] MAIN OR;  Service:     VENTRAL HERNIA REPAIR N/A 02/01/2018    Procedure: VENTRAL HERNIA REPAIR LAPAROSCOPIC WITH DAVINCI ROBOT;  Surgeon: Tri Guzman MD;  Location: [facility] OR;  Service:        Social History     Socioeconomic History    Marital status:    Tobacco Use    Smoking status: Never    Smokeless tobacco: Never    Tobacco comments:     CAFFEINE USE: 1 CUP COFFEE/ 2-16OZ 7-UPS DAILY   Vaping Use    Vaping Use: Never used   Substance and Sexual Activity    Alcohol use: Not Currently     Comment: RARE    Drug use: No    Sexual activity: Yes     Partners: Male     Birth control/protection: None       Family History   Problem Relation Age of Onset    Breast cancer Mother 56    Hypertension Mother     Asthma Father      Colon cancer Father         Colon Cancer    Heart disease Father     Breast cancer Maternal Aunt 58    Breast cancer Maternal Aunt 65        had genetic testing-negative    Breast cancer Maternal Grandmother 80    Ovarian cancer Paternal Grandmother 70    Hypertension Other     Cancer Other         Liver Cancer    Other Other         Varicose veins of lower extremities    Malig Hyperthermia Neg Hx        Review of Systems   Constitutional: Negative.   HENT: Negative.     Eyes: Negative.    Cardiovascular:  Positive for dyspnea on exertion, irregular heartbeat and palpitations.   Respiratory:  Positive for shortness of breath.    Endocrine: Negative.    Hematologic/Lymphatic:        Xarelto   Skin: Negative.    Musculoskeletal: Negative.    Gastrointestinal: Negative.    Genitourinary: Negative.    Neurological: Negative.    Psychiatric/Behavioral: Negative.     Allergic/Immunologic: Negative.      Allergies   Allergen Reactions    Fish-Derived Products Anaphylaxis    Shellfish-Derived Products Anaphylaxis         Current Outpatient Medications:     albuterol sulfate  (90 Base) MCG/ACT inhaler, Inhale 2 puffs As Needed. Inhale 2 puffs by mouth every 4 to 6 hours as needed, Disp: , Rfl:     azelastine (ASTELIN) 0.1 % nasal spray, USE 1 TO 2 SPRAYS IN EACH NOSTRIL TWICE DAILY AS NEEDED, Disp: , Rfl:     cetirizine (zyrTEC) 10 MG tablet, Take 1 tablet by mouth Daily As Needed for Allergies., Disp: , Rfl:     Cholecalciferol (VITAMIN D) 2000 units tablet, Take 1 tablet by mouth Daily., Disp: , Rfl:     dilTIAZem CD (CARDIZEM CD) 360 MG 24 hr capsule, Take 1 capsule by mouth Every Morning., Disp: 90 capsule, Rfl: 3    levocetirizine (XYZAL) 5 MG tablet, Take 1 tablet by mouth Daily As Needed., Disp: , Rfl:     rivaroxaban (Xarelto) 20 MG tablet, Take 1 tablet by mouth Daily With Dinner., Disp: 90 tablet, Rfl: 3    SYMBICORT 80-4.5 MCG/ACT inhaler, Inhale 1 puff 2 (Two) Times a Day., Disp: , Rfl:     furosemide  "(LASIX) 40 MG tablet, Take 1 tablet by mouth Daily., Disp: 10 tablet, Rfl: 0      Objective:     Vitals:    10/06/23 1251   BP: 130/84   Pulse: 93   Weight: 132 kg (291 lb 9.6 oz)   Height: 172.7 cm (67.99\")     Body mass index is 44.35 kg/m².    24 hour Holter 03/09/2020:  Sustained atrial fibrillation with episodes of RVR. Average heart rate 91 bpm. Minimum heart rate 61 bpm. 226 episodes of tachycardia with heart rates greater than 100 bpm in atrial fibrillation     2D Echocardiogram 12/11/2019:  Left ventricular systolic function is normal. Calculated EF = 64%.  Normal right ventricular cavity size and systolic function noted.  Left atrial cavity size is moderate-to-severely dilated.  Mild mitral valve regurgitation is present  Calculated right ventricular systolic pressure from tricuspid regurgitation is 23 mmHg.  There is no evidence of pericardial effusion.     2D Echocardiogram 2/9/2018:  Normal left ventricular cavity size and wall thickness noted. All left ventricular wall segments contract normally. Left ventricular diastolic function was indeterminate. LVEF varies depending on the R-R interval, and ranges from 45-55%.     PHYSICAL EXAM:    Constitutional:       Appearance: Healthy appearance. Not in distress.   Neck:      Vascular: No JVR. JVD normal.   Pulmonary:      Effort: Pulmonary effort is normal. Increased respiratory effort.      Breath sounds: Normal breath sounds. No wheezing. No rhonchi. No rales.      Comments: Slightly diminished t/o  Chest:      Chest wall: Not tender to palpatation.   Cardiovascular:      PMI at left midclavicular line. Normal rate. Irregularly irregular rhythm. Normal S1. Normal S2.       Murmurs: There is no murmur.      No gallop.  No click. No rub.      Comments: Generalized chronic edema  Pulses:     Intact distal pulses.   Edema:     Peripheral edema absent.   Abdominal:      General: Bowel sounds are normal.      Palpations: Abdomen is soft.      Tenderness: There " is no abdominal tenderness.   Musculoskeletal: Normal range of motion.         General: No tenderness. Skin:     General: Skin is warm and dry.   Neurological:      General: No focal deficit present.      Mental Status: Alert and oriented to person, place and time.         ECG 12 Lead    Date/Time: 10/6/2023 2:03 PM  Performed by: Vandana Moy APRN  Authorized by: Vandana Moy APRN   Comparison: compared with previous ECG from 12/27/2022  Rhythm: atrial fibrillation  BPM: 93          Assessment:       Diagnosis Plan   1. Essential hypertension  ECG 12 Lead      2. Longstanding persistent atrial fibrillation  ECG 12 Lead      3. History of DVT (deep vein thrombosis)  ECG 12 Lead      4. BMI 40.0-44.9, adult  ECG 12 Lead      5. EDOUARD (dyspnea on exertion)          Orders Placed This Encounter   Procedures    ECG 12 Lead     This order was created via procedure documentation     Order Specific Question:   Release to patient     Answer:   Routine Release [[id number]]          Plan:       Persistent atrial fibrillation: Rate controlled with diltiazem.  On anticoagulation with Xarelto.  Hypertension: Well-controlled  Obesity: Dietary modifications and increase exercise activity  Dyspnea: She has noticed increased dyspnea since she got off the plane in [place].  She did have some palpitations as well.  I am going to start her on Lasix daily for a few days and see if that helps.  She has also noticed significant weight gain of 17 pounds in 2 months.  Possibly with the change in elevation, atrial fibrillation could have been a faster rate therefore diuretic would eliminate any excess fluid that may have built up.  I have asked patient to weigh herself every morning.  If dyspnea does not improve after 3 days of Lasix, I recommend her following up with her ENT.  STEVEN: CPAP compliant    Ms. Campos has a scheduled appointment with Dr. So in January.           Your medication list            Accurate as of  October 6, 2023  2:04 PM. If you have any questions, ask your nurse or doctor.                START taking these medications        Instructions Last Dose Given Next Dose Due   furosemide 40 MG tablet  Commonly known as: LASIX  Started by: BROCK Elizondo      Take 1 tablet by mouth Daily.              CONTINUE taking these medications        Instructions Last Dose Given Next Dose Due   albuterol sulfate  (90 Base) MCG/ACT inhaler  Commonly known as: PROVENTIL HFA;VENTOLIN HFA;PROAIR HFA      Inhale 2 puffs As Needed. Inhale 2 puffs by mouth every 4 to 6 hours as needed       azelastine 0.1 % nasal spray  Commonly known as: ASTELIN      USE 1 TO 2 SPRAYS IN EACH NOSTRIL TWICE DAILY AS NEEDED       cetirizine 10 MG tablet  Commonly known as: zyrTEC      Take 1 tablet by mouth Daily As Needed for Allergies.       dilTIAZem  MG 24 hr capsule  Commonly known as: CARDIZEM CD      Take 1 capsule by mouth Every Morning.       levocetirizine 5 MG tablet  Commonly known as: XYZAL      Take 1 tablet by mouth Daily As Needed.       rivaroxaban 20 MG tablet  Commonly known as: Xarelto      Take 1 tablet by mouth Daily With Dinner.       Symbicort 80-4.5 MCG/ACT inhaler  Generic drug: budesonide-formoterol      Inhale 1 puff 2 (Two) Times a Day.       Vitamin D 50 MCG (2000 UT) tablet      Take 1 tablet by mouth Daily.                 Where to Get Your Medications        These medications were sent to [id number] - [address] AT SEC OF [address] & [facility] - [phone] [facility] [phone] [address], [facility] [address]      Phone: [phone]   furosemide 40 MG tablet  rivaroxaban 20 MG tablet           As always, it has been a pleasure to participate in your patient's care.      Sincerely,       BROCK Tobin

## 2023-10-06 ENCOUNTER — OFFICE VISIT (OUTPATIENT)
Dept: CARDIOLOGY | Facility: CLINIC | Age: 63
End: 2023-10-06
Payer: COMMERCIAL

## 2023-10-06 VITALS
BODY MASS INDEX: 44.19 KG/M2 | HEIGHT: 68 IN | SYSTOLIC BLOOD PRESSURE: 130 MMHG | DIASTOLIC BLOOD PRESSURE: 84 MMHG | WEIGHT: 291.6 LBS | HEART RATE: 93 BPM

## 2023-10-06 DIAGNOSIS — R06.09 DOE (DYSPNEA ON EXERTION): ICD-10-CM

## 2023-10-06 DIAGNOSIS — I48.11 LONGSTANDING PERSISTENT ATRIAL FIBRILLATION: ICD-10-CM

## 2023-10-06 DIAGNOSIS — Z86.718 HISTORY OF DVT (DEEP VEIN THROMBOSIS): ICD-10-CM

## 2023-10-06 DIAGNOSIS — I10 ESSENTIAL HYPERTENSION: Primary | ICD-10-CM

## 2023-10-06 PROCEDURE — 93000 ELECTROCARDIOGRAM COMPLETE: CPT | Performed by: NURSE PRACTITIONER

## 2023-10-06 PROCEDURE — 99214 OFFICE O/P EST MOD 30 MIN: CPT | Performed by: NURSE PRACTITIONER

## 2023-10-06 RX ORDER — FUROSEMIDE 40 MG/1
40 TABLET ORAL DAILY
Qty: 10 TABLET | Refills: 0 | Status: SHIPPED | OUTPATIENT
Start: 2023-10-06

## 2023-10-06 RX ORDER — AZELASTINE 1 MG/ML
SPRAY, METERED NASAL
COMMUNITY
Start: 2023-06-19

## 2023-12-06 DIAGNOSIS — I10 ESSENTIAL HYPERTENSION: Primary | ICD-10-CM

## 2023-12-06 DIAGNOSIS — Z00.00 PHYSICAL EXAM, ANNUAL: ICD-10-CM

## 2023-12-09 LAB
ALBUMIN SERPL-MCNC: 4.3 G/DL (ref 3.5–5.2)
ALBUMIN/GLOB SERPL: 1.5 G/DL
ALP SERPL-CCNC: 76 U/L (ref 39–117)
ALT SERPL-CCNC: 12 U/L (ref 1–33)
AST SERPL-CCNC: 14 U/L (ref 1–32)
BILIRUB SERPL-MCNC: 0.3 MG/DL (ref 0–1.2)
BUN SERPL-MCNC: 32 MG/DL (ref 8–23)
BUN/CREAT SERPL: 19.9 (ref 7–25)
CALCIUM SERPL-MCNC: 9.5 MG/DL (ref 8.6–10.5)
CHLORIDE SERPL-SCNC: 104 MMOL/L (ref 98–107)
CHOLEST SERPL-MCNC: 193 MG/DL (ref 0–200)
CO2 SERPL-SCNC: 23.4 MMOL/L (ref 22–29)
CREAT SERPL-MCNC: 1.61 MG/DL (ref 0.57–1)
EGFRCR SERPLBLD CKD-EPI 2021: 36.3 ML/MIN/1.73
ERYTHROCYTE [DISTWIDTH] IN BLOOD BY AUTOMATED COUNT: 13.4 % (ref 12.3–15.4)
GLOBULIN SER CALC-MCNC: 2.8 GM/DL
GLUCOSE SERPL-MCNC: 92 MG/DL (ref 65–99)
HCT VFR BLD AUTO: 38.7 % (ref 34–46.6)
HDLC SERPL-MCNC: 49 MG/DL (ref 40–60)
HGB BLD-MCNC: 12.6 G/DL (ref 12–15.9)
LDLC SERPL CALC-MCNC: 127 MG/DL (ref 0–100)
LDLC/HDLC SERPL: 2.57 {RATIO}
MCH RBC QN AUTO: 28.3 PG (ref 26.6–33)
MCHC RBC AUTO-ENTMCNC: 32.6 G/DL (ref 31.5–35.7)
MCV RBC AUTO: 87 FL (ref 79–97)
PLATELET # BLD AUTO: 321 10*3/MM3 (ref 140–450)
POTASSIUM SERPL-SCNC: 4.8 MMOL/L (ref 3.5–5.2)
PROT SERPL-MCNC: 7.1 G/DL (ref 6–8.5)
RBC # BLD AUTO: 4.45 10*6/MM3 (ref 3.77–5.28)
SODIUM SERPL-SCNC: 142 MMOL/L (ref 136–145)
TRIGL SERPL-MCNC: 91 MG/DL (ref 0–150)
TSH SERPL DL<=0.005 MIU/L-ACNC: 2.14 UIU/ML (ref 0.45–4.5)
VLDLC SERPL CALC-MCNC: 17 MG/DL (ref 5–40)
WBC # BLD AUTO: 6.09 10*3/MM3 (ref 3.4–10.8)

## 2023-12-15 ENCOUNTER — OFFICE VISIT (OUTPATIENT)
Dept: INTERNAL MEDICINE | Facility: CLINIC | Age: 63
End: 2023-12-15
Payer: COMMERCIAL

## 2023-12-15 VITALS
HEIGHT: 68 IN | BODY MASS INDEX: 44.41 KG/M2 | DIASTOLIC BLOOD PRESSURE: 86 MMHG | HEART RATE: 101 BPM | SYSTOLIC BLOOD PRESSURE: 132 MMHG | WEIGHT: 293 LBS | OXYGEN SATURATION: 97 % | TEMPERATURE: 97.1 F

## 2023-12-15 DIAGNOSIS — Z00.00 ANNUAL PHYSICAL EXAM: Primary | ICD-10-CM

## 2023-12-15 PROCEDURE — 99396 PREV VISIT EST AGE 40-64: CPT | Performed by: FAMILY MEDICINE

## 2023-12-15 NOTE — PROGRESS NOTES
Subjective   Mariya Campos is a 63 y.o. female.   Chief Complaint   Patient presents with    Annual Exam       History of Present Illness     1. CPE-patient is here for complete physical exam.  She has no complaints.    She retired in August 2023.  She enjoys it.    There is no change in medical or surgical history from last office visit.  No ER visits or hospitalizations.  No change in family history.     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  She underwent bilateral mastectomy and right sentinel node biopsy.  Surgery was done by Dr. Dias.  She did not require radiation or chemotherapy.  She reports no discomfort, no nodules.     No change in prescription medications.  Over-the-counter she takes vitamin D 2000 units a day and oral antihistamine Zyrtec or Xyzal as needed.  No change in over-the-counter medications.  She is not allergic to any new medications.  She does not use tobacco products.  She has a few drinks a month or less.  No drugs.  She does not exercise regularly.  She has dental appointments every 6 months.  Last eye exam was done more than a year ago.      She had colonoscopy in August 2021.  It was done by Dr. Dias.  Next was recommended 5 years later.  She had Pap smear in 12/2021.  HPV negative.  LMP around 2017.  No vaginal bleeding.  She does not get any more mammograms.  She is post bilateral mastectomy.  Covid vaccine x3.  Tetanus vaccine 2020.  Pneumovax 2017. Flu vaccine this season.  Shingrix # 1 November 2023.     Patient follows up with nephrologist every 6 months.  Kidney tests are stable.  Creatinine 1.61, GFR 36.3.  She follows up with cardiologist every 6 months.  Last visit was in fall 2023.  She had EKG done.   She follows up with sleep specialist.  Last visit was less than a year ago.  No change in CPAP settings.    , HDL 49.    Review of Systems   Constitutional:  Negative for chills and fever.   Respiratory:  Negative.     Cardiovascular: Negative.    Gastrointestinal:  Negative for blood in stool.   Genitourinary:  Negative for hematuria.   Neurological:  Negative for light-headedness.   Psychiatric/Behavioral: Negative.  Negative for suicidal ideas.          Objective   Wt Readings from Last 3 Encounters:   12/15/23 134 kg (296 lb)   10/06/23 132 kg (291 lb 9.6 oz)   08/16/23 125 kg (275 lb)      Vitals:    12/15/23 0805   BP: 132/86   Pulse: 101   Temp: 97.1 °F (36.2 °C)   SpO2: 97%     Temp Readings from Last 3 Encounters:   12/15/23 97.1 °F (36.2 °C)   01/24/23 98.4 °F (36.9 °C) (Oral)   12/12/22 97.7 °F (36.5 °C)     BP Readings from Last 3 Encounters:   12/15/23 132/86   10/06/23 130/84   01/24/23 134/79     Pulse Readings from Last 3 Encounters:   12/15/23 101   10/06/23 93   08/16/23 101     Body mass index is 45.01 kg/m².    Physical Exam  Vitals reviewed.   Constitutional:       General: She is not in acute distress.     Appearance: She is well-developed. She is not diaphoretic.   HENT:      Head: Normocephalic and atraumatic. Hair is normal.      Right Ear: Hearing, tympanic membrane, ear canal and external ear normal. No decreased hearing noted. No drainage.      Left Ear: Hearing, tympanic membrane, ear canal and external ear normal. No decreased hearing noted.      Nose: No nasal deformity.   Eyes:      General: Lids are normal.         Right eye: No discharge.         Left eye: No discharge.      Conjunctiva/sclera: Conjunctivae normal.      Pupils: Pupils are equal, round, and reactive to light.   Neck:      Thyroid: No thyromegaly.      Vascular: No JVD.      Trachea: No tracheal deviation.   Cardiovascular:      Rate and Rhythm: Normal rate and regular rhythm.      Pulses: Normal pulses.      Heart sounds: Normal heart sounds. No murmur heard.     No friction rub. No gallop.   Pulmonary:      Effort: Pulmonary effort is normal. No respiratory distress.      Breath sounds: Normal breath sounds. No  wheezing or rales.   Chest:      Chest wall: No tenderness.      Comments: Post bilateral mastectomy.  No erythema or nodules palpable.  Abdominal:      General: There is no distension.      Palpations: Abdomen is soft. There is no mass.      Tenderness: There is no abdominal tenderness. There is no guarding or rebound.      Hernia: No hernia is present.      Comments: Multiple abdominal scars.   Genitourinary:     Labia:         Right: No lesion.         Left: No lesion.       Urethra: No urethral lesion.      Vagina: No vaginal discharge.      Cervix: No cervical motion tenderness.      Uterus: Normal.       Rectum: No external hemorrhoid.      Comments: Ovaries not palpable.  Musculoskeletal:         General: No tenderness or deformity. Normal range of motion.      Cervical back: Normal range of motion.   Lymphadenopathy:      Cervical: No cervical adenopathy.      Upper Body:      Right upper body: No supraclavicular adenopathy.      Left upper body: No supraclavicular adenopathy.   Skin:     General: Skin is warm and dry.      Findings: No erythema or rash.   Neurological:      Mental Status: She is alert and oriented to person, place, and time.      Cranial Nerves: No cranial nerve deficit.      Motor: No abnormal muscle tone.      Coordination: Coordination normal.      Deep Tendon Reflexes: Reflexes are normal and symmetric. Reflexes normal.   Psychiatric:         Behavior: Behavior normal.         Thought Content: Thought content normal.         Judgment: Judgment normal.         Assessment & Plan   Diagnoses and all orders for this visit:    1. Annual physical exam (Primary)      CPE-preventive counseling provided.  Blood work results reviewed with patient.  Obesity increases risk for developing diabetes and heart disease.  Patient was successful in the past with using weight watchers.  She is lifelong member.  She has my fitness pal dina on her phone.  I encouraged her to start using some form of tracing  food intake, either calorie count or points count.  Start to exercise at least 30 minutes a day, 5 days a week.  Continue regular dental appointments at least every 6 months.  Schedule eye exam.  She is up-to-date with cancer screening.  No more mammograms.  She is post bilateral mastectomy.  She will be due for Pap smear in 2024.  We discussed recommendations for vaccinations including Shingrix vaccine, COVID vaccine.  Sun protection discussed and recommended.          Class 3 Severe Obesity (BMI >=40). Obesity-related health conditions include the following: obstructive sleep apnea and hypertension. Obesity is worsening. BMI is is above average; BMI management plan is completed. We discussed portion control and increasing exercise.

## 2024-03-20 ENCOUNTER — OFFICE VISIT (OUTPATIENT)
Age: 64
End: 2024-03-20
Payer: COMMERCIAL

## 2024-03-20 VITALS
WEIGHT: 293 LBS | HEART RATE: 84 BPM | DIASTOLIC BLOOD PRESSURE: 86 MMHG | BODY MASS INDEX: 44.41 KG/M2 | SYSTOLIC BLOOD PRESSURE: 138 MMHG | HEIGHT: 68 IN

## 2024-03-20 DIAGNOSIS — I10 ESSENTIAL HYPERTENSION: Primary | ICD-10-CM

## 2024-03-20 DIAGNOSIS — Z86.718 HISTORY OF DVT (DEEP VEIN THROMBOSIS): ICD-10-CM

## 2024-03-20 DIAGNOSIS — R06.09 DOE (DYSPNEA ON EXERTION): ICD-10-CM

## 2024-03-20 PROCEDURE — 93000 ELECTROCARDIOGRAM COMPLETE: CPT | Performed by: INTERNAL MEDICINE

## 2024-03-20 PROCEDURE — 99214 OFFICE O/P EST MOD 30 MIN: CPT | Performed by: INTERNAL MEDICINE

## 2024-03-20 RX ORDER — BUDESONIDE AND FORMOTEROL FUMARATE DIHYDRATE 160; 4.5 UG/1; UG/1
2 AEROSOL RESPIRATORY (INHALATION)
COMMUNITY
Start: 2024-01-31

## 2024-03-20 RX ORDER — METOPROLOL SUCCINATE 25 MG/1
25 TABLET, EXTENDED RELEASE ORAL DAILY
COMMUNITY
Start: 2024-03-14 | End: 2025-03-14

## 2024-03-20 NOTE — PROGRESS NOTES
Date of Office Visit: 24    Encounter Provider: Bradford So MD  Place of Service: [facility] CARDIOLOGY  Patient Name: Mariya Campos  :1960    Chief Complaint   Patient presents with    Atrial Fibrillation    Follow-up     1 year   EDOUARD    HPI: Mariya Campos is a 63 y.o. female with a medical history of persistent atrial fibrillation and past DVT.  Echocardiogram 2019 revealed normal left ventricular function, mild mitral regurgitation no other significant valvular abnormalities.  On follow-up in 2021, patient reported dyspnea on exertion.  Dietary modifications and weight loss were recommended.  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  In 2022, she underwent double mastectomy.  She did not require radiation or chemotherapy.      Last evaluation was done with an echocardiogram in  which is mild mitral valve regurgitation.  This was otherwise normal.  She has been doing fine since her visit.  She occasionally will have shortness of air but is very short in duration lasting less than 1 minute.  She denies any orthopnea or PND.  No chest pain    Previous testing and notes have been reviewed by me.   Past Medical History:   Diagnosis Date    Abdominal pain     Acid reflux     Allergic rhinitis     Anemia     Asthma     Atrial fibrillation     Atrial fibrillation     Chronic bronchitis     Chronic kidney disease     STAGE 3  AFTER COLON SURGERY    Colon cancer 2006    CHEMO 6 MONTHS    Colon polyp     Deep vein thrombosis left leg    Edema     BILATERAL ANKLES.  LEFT > RIGHT.    H/O bladder infections     H/O bronchitis     Hiatal hernia     History of blood clots     S/P HERNIA SURGERY 2018    History of blood transfusion     Hypertension     Incisional hernia     Overweight     Peripheral neuropathy     FROM CHEMO TX'S    Postoperative wound infection 2018    BOWEL KNICKED/KIDNEY  FAILURE/SEPSIS/BLOOD CLOT/WOUND VAC TO ABDOMEN    Rosacea     Seasonal allergies     Sleep apnea     CPAP    Slow to wake up after anesthesia     Visit for screening mammogram        Past Surgical History:   Procedure Laterality Date    BREAST BIOPSY Left 08/16/2010    Stereoteactic biopsy of left breast x2    COLON RESECTION N/A 08/18/2006    laparoscopic low anterior resection-Dr. Tri Guzman, [facility]    COLON RESECTION N/A 10/13/2015    open low anterior resection-Dr. Tri Guzman, [facility]    COLONOSCOPY N/A 03/27/2007    COLONOSCOPY N/A 10/08/2007    COLONOSCOPY N/A 08/25/2021    Procedure: COLONOSCOPY to CECUM WITH HOT SNARE POLYPECTOMY;  Surgeon: Charli Dias MD;  Location: [facility] ENDOSCOPY;  Service: General;  Laterality: N/A;  PERSONAL AND FAMILY HX COLON CANCER  --POLYPS    DIAGNOSTIC LAPAROSCOPY N/A 02/04/2018    Procedure: EXPLORATORY LAPAROTOMY WITH SMALL BOWEL ANASTAMOSIS, LYSIS OF ADHESIONS, REMOVAL OF MESH;  Surgeon: Tri Guzman MD;  Location: [facility] MAIN OR;  Service:     INCISION AND DRAINAGE TRUNK N/A 02/13/2018    Procedure: WOUND WASHOUT ABDOMEN;  Surgeon: Tri Guzman MD;  Location: [facility] MAIN OR;  Service:     INCISION AND DRAINAGE TRUNK N/A 02/16/2018    Procedure: TRUNK DEBRIDEMENT abdominal wound;  Surgeon: Tri Guzman MD;  Location: [facility] MAIN OR;  Service:     INCISION AND DRAINAGE TRUNK N/A 02/27/2018    Procedure: INCISION AND DRAINAGE WOUND abdomen;  Surgeon: Tri Guzman MD;  Location: [facility] MAIN OR;  Service:     INSERTION HEMODIALYSIS CATHETER N/A 02/19/2018    Procedure: LEFT QUAD CENTRAL LINE INSERTION AND RIGHT TUNNELED HEMODIALYSIS CATHETER INSERTION;  Surgeon: Meir Guillen MD;  Location: [facility] HYBRID OR 18/19;  Service:     JOINT MANIPULATION Left 04/18/2016    Procedure: MANIPULATION OF LT SHOULDER;  Surgeon: Lidia Ugalde MD;  Location: [facility] OR OSC;  Service:     MASTECTOMY W/ SENTINEL NODE BIOPSY Bilateral 6/14/2022     Procedure: BREAST MASTECTOMY BILATERAL RIGHT SENTINEL NODE BIOPSY;  Surgeon: Charli Dias MD;  Location: [facility] OR;  Service: General;  Laterality: Bilateral;    UPPER GASTROINTESTINAL ENDOSCOPY N/A 07/14/2006    VENOUS ACCESS DEVICE (PORT) INSERTION      VENOUS ACCESS DEVICE (PORT) REMOVAL      VENTRAL HERNIA REPAIR N/A 03/15/2016    Procedure: LAPAROSCOPIC INSIONAL HERNIA REPAIR W/ MESH DAVINCI ROBOT;  Surgeon: Tri Guzman MD;  Location: [facility] MAIN OR;  Service:     VENTRAL HERNIA REPAIR N/A 02/01/2018    Procedure: VENTRAL HERNIA REPAIR LAPAROSCOPIC WITH DAVINCI ROBOT;  Surgeon: Tri Guzman MD;  Location: [facility] OR;  Service:        Social History     Socioeconomic History    Marital status:    Tobacco Use    Smoking status: Never    Smokeless tobacco: Never    Tobacco comments:     CAFFEINE USE: 1 CUP COFFEE/ 2-16OZ 7-UPS DAILY   Vaping Use    Vaping status: Never Used   Substance and Sexual Activity    Alcohol use: Not Currently     Comment: Occasionally- less than 1 per week    Drug use: No    Sexual activity: Yes     Partners: Male     Birth control/protection: None       Family History   Problem Relation Age of Onset    Breast cancer Mother 56    Hypertension Mother     Asthma Father     Colon cancer Father         Colon Cancer    Heart disease Father     Breast cancer Maternal Aunt 58    Breast cancer Maternal Aunt 65        had genetic testing-negative    Breast cancer Maternal Grandmother 80    Ovarian cancer Paternal Grandmother 70    Hypertension Other     Cancer Other         Liver Cancer    Other Other         Varicose veins of lower extremities    Malig Hyperthermia Neg Hx        Review of Systems   Constitutional: Negative. Negative for fever and malaise/fatigue.   HENT: Negative.  Negative for nosebleeds and sore throat.    Eyes: Negative.  Negative for blurred vision and double vision.   Cardiovascular:  Positive for dyspnea on exertion. Negative for chest  pain, claudication, irregular heartbeat, palpitations and syncope.   Respiratory:  Positive for shortness of breath. Negative for cough and snoring.    Endocrine: Negative.  Negative for cold intolerance, heat intolerance and polydipsia.   Skin: Negative.  Negative for itching, poor wound healing and rash.   Musculoskeletal: Negative.  Negative for joint pain, joint swelling, muscle weakness and myalgias.   Gastrointestinal: Negative.  Negative for abdominal pain, melena, nausea and vomiting.   Genitourinary: Negative.    Neurological: Negative.  Negative for light-headedness, loss of balance, seizures, vertigo and weakness.   Psychiatric/Behavioral: Negative.  Negative for altered mental status and depression.    Allergic/Immunologic: Negative.        Allergies   Allergen Reactions    Fish-Derived Products Anaphylaxis    Shellfish-Derived Products Anaphylaxis         Current Outpatient Medications:     albuterol sulfate  (90 Base) MCG/ACT inhaler, Inhale 2 puffs As Needed. Inhale 2 puffs by mouth every 4 to 6 hours as needed, Disp: , Rfl:     azelastine (ASTELIN) 0.1 % nasal spray, USE 1 TO 2 SPRAYS IN EACH NOSTRIL TWICE DAILY AS NEEDED, Disp: , Rfl:     cetirizine (zyrTEC) 10 MG tablet, Take 1 tablet by mouth Daily As Needed for Allergies., Disp: , Rfl:     Cholecalciferol (VITAMIN D) 2000 units tablet, Take 1 tablet by mouth Daily., Disp: , Rfl:     dilTIAZem CD (CARDIZEM CD) 360 MG 24 hr capsule, Take 1 capsule by mouth Every Morning., Disp: 90 capsule, Rfl: 3    levocetirizine (XYZAL) 5 MG tablet, Take 1 tablet by mouth Daily As Needed., Disp: , Rfl:     metoprolol succinate XL (TOPROL-XL) 25 MG 24 hr tablet, Take 1 tablet by mouth Daily., Disp: , Rfl:     rivaroxaban (Xarelto) 20 MG tablet, Take 1 tablet by mouth Daily With Dinner., Disp: 90 tablet, Rfl: 3    Symbicort 160-4.5 MCG/ACT inhaler, Inhale 2 puffs 2 (Two) Times a Day., Disp: , Rfl:       Objective:     Vitals:    03/20/24 0949   BP: 138/86  "  Pulse: 84   Weight: 133 kg (293 lb)   Height: 172.7 cm (68\")     Body mass index is 44.55 kg/m².       PHYSICAL EXAM:    Constitutional:       Appearance: Healthy appearance. Well-developed and not in distress.   Eyes:      General: No scleral icterus.     Conjunctiva/sclera: Conjunctivae normal.   HENT:      Head: Normocephalic and atraumatic.   Neck:      Thyroid: No thyromegaly.      Vascular: Normal carotid pulses. No carotid bruit, hepatojugular reflux, JVD or JVR. JVD normal.      Trachea: No tracheal deviation.   Pulmonary:      Effort: Pulmonary effort is normal.      Breath sounds: Normal breath sounds. No decreased breath sounds. No wheezing. No rhonchi. No rales.   Chest:      Chest wall: Not tender to palpatation.   Cardiovascular:      PMI at left midclavicular line. Normal rate. Irregularly irregular rhythm. Normal S1. Normal S2.       Murmurs: There is no murmur.      No gallop.  No click. No rub.   Pulses:     Intact distal pulses.      Carotid: 2+ bilaterally.     Radial: 2+ bilaterally.     Femoral: 2+ bilaterally.     Dorsalis pedis: 2+ bilaterally.     Posterior tibial: 2+ bilaterally.  Edema:     Peripheral edema absent.   Abdominal:      General: Bowel sounds are normal. There is no distension.      Palpations: Abdomen is soft.      Tenderness: There is no abdominal tenderness.   Musculoskeletal: Normal range of motion.         General: No tenderness or deformity.      Cervical back: Normal range of motion and neck supple. Skin:     General: Skin is warm and dry.      Findings: No erythema or rash.   Neurological:      General: No focal deficit present.      Mental Status: Alert, oriented to person, place, and time and oriented to person, place and time.      Sensory: No sensory deficit.   Psychiatric:         Behavior: Behavior normal.           ECG 12 Lead    Date/Time: 3/20/2024 10:32 AM  Performed by: Bradford So MD    Authorized by: Bradford So MD  Comparison: " compared with previous ECG from 10/6/2023  Similar to previous ECG  Rhythm: atrial fibrillation          24 hour Holter 03/09/2020:  Sustained atrial fibrillation with episodes of RVR. Average heart rate 91 bpm. Minimum heart rate 61 bpm. 226 episodes of tachycardia with heart rates greater than 100 bpm in atrial fibrillation     2D Echocardiogram 12/11/2019:  Left ventricular systolic function is normal. Calculated EF = 64%.  Normal right ventricular cavity size and systolic function noted.  Left atrial cavity size is moderate-to-severely dilated.  Mild mitral valve regurgitation is present  Calculated right ventricular systolic pressure from tricuspid regurgitation is 23 mmHg.  There is no evidence of pericardial effusion.     2D Echocardiogram 2/9/2018:  Normal left ventricular cavity size and wall thickness noted. All left ventricular wall segments contract normally. Left ventricular diastolic function was indeterminate. LVEF varies depending on the R-R interval, and ranges from 45-55%.           Assessment/Plan  Persistent atrial fibrillation: Rate controlled with diltiazem and more recently Metoprolol Succinate.  On anticoagulation with Xarelto.   -No recent bleeding complications.  Labs have been reviewed.  Continue current medicine    Hypertension: Well-controlled.  No changes.  Tolerating the addition of Toprol therapy.    Obesity: Dietary modifications and increase exercise activity    Dyspnea: Short in duration and intermittent.  No additional workup from a cardiac standpoint is indicated.    STEVEN: CPAP compliant           Your medication list            Accurate as of March 20, 2024 10:27 AM. If you have any questions, ask your nurse or doctor.                CHANGE how you take these medications        Instructions Last Dose Given Next Dose Due   Symbicort 160-4.5 MCG/ACT inhaler  Generic drug: budesonide-formoterol  What changed: Another medication with the same name was removed. Continue taking this  medication, and follow the directions you see here.  Changed by: Bradford So MD      Inhale 2 puffs 2 (Two) Times a Day.              CONTINUE taking these medications        Instructions Last Dose Given Next Dose Due   albuterol sulfate  (90 Base) MCG/ACT inhaler  Commonly known as: PROVENTIL HFA;VENTOLIN HFA;PROAIR HFA      Inhale 2 puffs As Needed. Inhale 2 puffs by mouth every 4 to 6 hours as needed       azelastine 0.1 % nasal spray  Commonly known as: ASTELIN      USE 1 TO 2 SPRAYS IN EACH NOSTRIL TWICE DAILY AS NEEDED       cetirizine 10 MG tablet  Commonly known as: zyrTEC      Take 1 tablet by mouth Daily As Needed for Allergies.       dilTIAZem  MG 24 hr capsule  Commonly known as: CARDIZEM CD      Take 1 capsule by mouth Every Morning.       levocetirizine 5 MG tablet  Commonly known as: XYZAL      Take 1 tablet by mouth Daily As Needed.       metoprolol succinate XL 25 MG 24 hr tablet  Commonly known as: TOPROL-XL      Take 1 tablet by mouth Daily.       rivaroxaban 20 MG tablet  Commonly known as: Xarelto      Take 1 tablet by mouth Daily With Dinner.       Vitamin D 50 MCG (2000 UT) tablet      Take 1 tablet by mouth Daily.

## 2024-08-21 ENCOUNTER — OFFICE VISIT (OUTPATIENT)
Dept: SLEEP MEDICINE | Facility: HOSPITAL | Age: 64
End: 2024-08-21
Payer: COMMERCIAL

## 2024-08-21 VITALS — HEART RATE: 89 BPM | OXYGEN SATURATION: 97 % | BODY MASS INDEX: 44.56 KG/M2 | HEIGHT: 68 IN

## 2024-08-21 DIAGNOSIS — G47.33 OBSTRUCTIVE SLEEP APNEA: Primary | ICD-10-CM

## 2024-08-21 DIAGNOSIS — E66.01 MORBID OBESITY: ICD-10-CM

## 2024-08-21 PROCEDURE — G0463 HOSPITAL OUTPT CLINIC VISIT: HCPCS

## 2024-08-21 PROCEDURE — 99213 OFFICE O/P EST LOW 20 MIN: CPT | Performed by: FAMILY MEDICINE

## 2024-08-21 NOTE — PROGRESS NOTES
"Follow Up [facility] Note     Chief Complaint:  STEVEN     Primary Care Physician: Zahida Coffman MD    Interval History:   The patient is a 63 y.o. female  who was last seen in the sleep lab: 8/16/2023.  Presents today for annual follow-up of STEVEN.  2020 study showed AHI 35.1 lowest SpO2 69%.  On auto CPAP.  No positive mask machine hypersomnia nonrestorative sleep.    Downloaded PAP Data Reviewed For Compliance:  DME is [facility].  Downloads between 7/21/2024 - 8/19/2024.  Average usage is 7 hours 0 minutes.  Average AHI is 0.8.  Average auto CPAP pressure is 10.2 cm H2O    I have reviewed the above results and compared them with the patient's last downloads and reviewed with the patient.    Review of Systems:    A complete review of systems was done and all were negative with the exception of postnasal drip shortness of breath cough    Social History:    Social History     Socioeconomic History    Marital status:    Tobacco Use    Smoking status: Never    Smokeless tobacco: Never    Tobacco comments:     CAFFEINE USE: 1 CUP COFFEE/ 2-16OZ 7-UPS DAILY   Vaping Use    Vaping status: Never Used   Substance and Sexual Activity    Alcohol use: Not Currently     Comment: Occasionally- less than 1 per week    Drug use: No    Sexual activity: Yes     Partners: Male     Birth control/protection: None       Allergies:  Fish-derived products and Shellfish-derived products     Medication Review:  Reviewed.      Vital Signs:    Vitals:    08/21/24 0900   Pulse: 89   SpO2: 97%   Height: 172.7 cm (67.99\")     Body mass index is 44.56 kg/m².    Physical Exam:    Constitutional:  Well developed 63 y.o. female that appears in no apparent distress.  Awake & oriented times 3.  Normal mood with normal recent and remote memory and normal judgement.  Eyes:  Conjunctivae normal.  Oropharynx: Previously, moist mucous membranes.    Self-administered [place] Sleepiness Scale test results: 4  0-5 Lower normal daytime " sleepiness  6-10 Higher normal daytime sleepiness  11-12 Mild, 13-15 Moderate, & 16-24 Severe excessive daytime sleepiness    Impression:   1. Obstructive sleep apnea    2. Morbid obesity        Obstructive sleep apnea adequately treated with auto CPAP 5-20 cm H2O. The patient appears to be at goal with good compliance and usage. The patient has no complaints of hypersomnolence.    Plan:  Good sleep hygiene measures should be maintained.  Weight loss would be beneficial in this patient who morbid obesity by Body mass index is 44.56 kg/m²..      After evaluating the patient and assessing results available, the patient is benefiting from the treatment being provided.     The patient will continue auto CPAP.  After clinical evaluation and review of downloads, I recommend no changes to the patient's pressures.  A new prescription will be sent to the patient's DME.    Caution during activities that require prolonged concentration is strongly advised if sleepiness returns. Changing of PAP supplies regularly is important for effective use. Patient needs to change cushion on the mask or plugs on nasal pillows along with disposable filters once every month and change mask frame, tubing, headgear and Velcro straps every 6 months at the minimum.    I answered all of the patient's questions.  The patient will call for any problems and will follow up in 1 year.      Monica Wild MD  Sleep Medicine  08/21/24  10:37 EDT

## 2024-09-11 ENCOUNTER — TRANSCRIBE ORDERS (OUTPATIENT)
Dept: ADMINISTRATIVE | Facility: HOSPITAL | Age: 64
End: 2024-09-11
Payer: COMMERCIAL

## 2024-09-11 DIAGNOSIS — R06.09 DOE (DYSPNEA ON EXERTION): Primary | ICD-10-CM

## 2024-09-11 DIAGNOSIS — R06.02 SOB (SHORTNESS OF BREATH): ICD-10-CM

## 2024-10-07 ENCOUNTER — HOSPITAL ENCOUNTER (OUTPATIENT)
Dept: GENERAL RADIOLOGY | Facility: HOSPITAL | Age: 64
Discharge: HOME | End: 2024-10-07
Payer: COMMERCIAL

## 2024-10-07 ENCOUNTER — HOSPITAL ENCOUNTER (OUTPATIENT)
Dept: CT IMAGING | Facility: HOSPITAL | Age: 64
Discharge: HOME | End: 2024-10-07
Payer: COMMERCIAL

## 2024-10-07 DIAGNOSIS — R06.02 SOB (SHORTNESS OF BREATH): ICD-10-CM

## 2024-10-07 DIAGNOSIS — R06.09 DOE (DYSPNEA ON EXERTION): ICD-10-CM

## 2024-10-07 PROCEDURE — 71250 CT THORAX DX C-: CPT

## 2024-10-07 PROCEDURE — 76000 FLUOROSCOPY <1 HR PHYS/QHP: CPT

## 2024-10-18 ENCOUNTER — TRANSCRIBE ORDERS (OUTPATIENT)
Dept: ADMINISTRATIVE | Facility: HOSPITAL | Age: 64
End: 2024-10-18
Payer: COMMERCIAL

## 2024-10-18 DIAGNOSIS — J92.9 PLEURAL THICKENING: Primary | ICD-10-CM

## 2024-12-10 DIAGNOSIS — E55.9 VITAMIN D DEFICIENCY: ICD-10-CM

## 2024-12-10 DIAGNOSIS — Z00.00 PHYSICAL EXAM, ANNUAL: Primary | ICD-10-CM

## 2024-12-11 RX ORDER — RIVAROXABAN 20 MG/1
20 TABLET, FILM COATED ORAL
Qty: 90 TABLET | Refills: 1 | Status: SHIPPED | OUTPATIENT
Start: 2024-12-11

## 2024-12-11 NOTE — TELEPHONE ENCOUNTER
Labs completed on 9/14/24 at [facility]  CBC was normal and GFR 37

## 2024-12-12 LAB
25(OH)D3+25(OH)D2 SERPL-MCNC: 48.3 NG/ML (ref 30–100)
ALBUMIN SERPL-MCNC: 3.9 G/DL (ref 3.5–5.2)
ALBUMIN/GLOB SERPL: 1.3 G/DL
ALP SERPL-CCNC: 75 U/L (ref 39–117)
ALT SERPL-CCNC: 13 U/L (ref 1–33)
AST SERPL-CCNC: 15 U/L (ref 1–32)
BILIRUB SERPL-MCNC: 0.3 MG/DL (ref 0–1.2)
BUN SERPL-MCNC: 27 MG/DL (ref 8–23)
BUN/CREAT SERPL: 18.1 (ref 7–25)
CALCIUM SERPL-MCNC: 9.1 MG/DL (ref 8.6–10.5)
CHLORIDE SERPL-SCNC: 102 MMOL/L (ref 98–107)
CHOLEST SERPL-MCNC: 172 MG/DL (ref 0–200)
CO2 SERPL-SCNC: 26.8 MMOL/L (ref 22–29)
CREAT SERPL-MCNC: 1.49 MG/DL (ref 0.57–1)
EGFRCR SERPLBLD CKD-EPI 2021: 39.6 ML/MIN/1.73
ERYTHROCYTE [DISTWIDTH] IN BLOOD BY AUTOMATED COUNT: 13.5 % (ref 12.3–15.4)
GLOBULIN SER CALC-MCNC: 3.1 GM/DL
GLUCOSE SERPL-MCNC: 93 MG/DL (ref 65–99)
HCT VFR BLD AUTO: 36.1 % (ref 34–46.6)
HDLC SERPL-MCNC: 50 MG/DL (ref 40–60)
HGB BLD-MCNC: 11.9 G/DL (ref 12–15.9)
LDLC SERPL CALC-MCNC: 105 MG/DL (ref 0–100)
LDLC/HDLC SERPL: 2.06 {RATIO}
MCH RBC QN AUTO: 29.2 PG (ref 26.6–33)
MCHC RBC AUTO-ENTMCNC: 33 G/DL (ref 31.5–35.7)
MCV RBC AUTO: 88.5 FL (ref 79–97)
PLATELET # BLD AUTO: 330 10*3/MM3 (ref 140–450)
POTASSIUM SERPL-SCNC: 4.6 MMOL/L (ref 3.5–5.2)
PROT SERPL-MCNC: 7 G/DL (ref 6–8.5)
RBC # BLD AUTO: 4.08 10*6/MM3 (ref 3.77–5.28)
SODIUM SERPL-SCNC: 138 MMOL/L (ref 136–145)
TRIGL SERPL-MCNC: 95 MG/DL (ref 0–150)
TSH SERPL DL<=0.005 MIU/L-ACNC: 3.2 UIU/ML (ref 0.45–4.5)
VLDLC SERPL CALC-MCNC: 17 MG/DL (ref 5–40)
WBC # BLD AUTO: 6.58 10*3/MM3 (ref 3.4–10.8)

## 2024-12-18 ENCOUNTER — OFFICE VISIT (OUTPATIENT)
Dept: INTERNAL MEDICINE | Facility: CLINIC | Age: 64
End: 2024-12-18
Payer: COMMERCIAL

## 2024-12-18 VITALS
BODY MASS INDEX: 44.41 KG/M2 | WEIGHT: 293 LBS | OXYGEN SATURATION: 98 % | DIASTOLIC BLOOD PRESSURE: 65 MMHG | TEMPERATURE: 98 F | HEIGHT: 68 IN | HEART RATE: 96 BPM | SYSTOLIC BLOOD PRESSURE: 128 MMHG

## 2024-12-18 DIAGNOSIS — Z00.00 PHYSICAL EXAM, ANNUAL: Primary | ICD-10-CM

## 2024-12-18 PROCEDURE — 99396 PREV VISIT EST AGE 40-64: CPT | Performed by: FAMILY MEDICINE

## 2024-12-18 NOTE — PROGRESS NOTES
Subjective   Mariya Campos is a 64 y.o. female.   Chief Complaint   Patient presents with    Annual Exam       History of Present Illness     CPE-patient is here for complete physical exam.  She has no complaints.    In October 2024 she had chest CT for exertional dyspnea ordered by her pulmonologist.  Prior to that she was evaluated by allergist and cardiologist.  All tests were negative.  She reports that shortness of breath resolved.  She only occasionally gets short of breath.  She feels back to baseline.  She has no cough, no wheezing.    Patient follows up with nephrologist every 6 months.  In March 2024 they added metoprolol 25 mg a day because of elevated blood pressure and tachycardia.  Kidney tests are stable.  Creatinine 1.49, GFR 39.6.   She follows up with cardiologist.  She was evaluated in March 2024.  No change in medications.  Sleep apnea - she follows up with sleep specialist.  She was evaluated in August 2024.  No change     Otherwise no change in medical or surgical history from last office visit.  No ER visits or hospitalizations.  No change in family history.     OTC-no change in over-the-counter medications.  She is not allergic to any new medications.  She does not use tobacco products.  She has a few drinks a month or less.  No drugs.  She does not exercise regularly.  She did water exercise in summer.  She has dental appointments every 4 months.  Last eye exam was done in 8/2024.     She had colonoscopy in August 2021.  It was done by Dr. Dias.  Next was recommended 5 years later.  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  She underwent bilateral mastectomy and right sentinel node biopsy.  Surgery was done by Dr. Dias. She did not require radiation or chemotherapy.    She had Pap smear in 12/2021.  HPV negative.  LMP around 2017.  No vaginal bleeding.  She does not get any more mammograms.  She is post bilateral  mastectomy.  Covid vaccine x3.  Tetanus vaccine 2020.  Pneumovax 2017. Flu vaccine this season.  Shingrix x2.     Pink discoloration lower abdomen-started about a month ago.  Getting smaller.       Review of Systems   Constitutional:  Negative for chills and fever.   Respiratory:  Positive for shortness of breath. Negative for cough and wheezing.    Cardiovascular:  Negative for chest pain.   Gastrointestinal:  Negative for blood in stool.   Genitourinary:  Negative for hematuria.   Neurological:  Negative for light-headedness.   Hematological:  Bruises/bleeds easily.   Psychiatric/Behavioral:  Negative for suicidal ideas.          Objective   Wt Readings from Last 3 Encounters:   12/18/24 135 kg (297 lb)   03/20/24 133 kg (293 lb)   12/15/23 134 kg (296 lb)      Vitals:    12/18/24 0853   BP: 128/65   Pulse: 96   Temp: 98 °F (36.7 °C)   SpO2: 98%     Temp Readings from Last 3 Encounters:   12/18/24 98 °F (36.7 °C)   12/15/23 97.1 °F (36.2 °C)   01/24/23 98.4 °F (36.9 °C) (Oral)     BP Readings from Last 3 Encounters:   12/18/24 128/65   03/20/24 138/86   12/15/23 132/86     Pulse Readings from Last 3 Encounters:   12/18/24 96   08/21/24 89   03/20/24 84     Body mass index is 45.17 kg/m².    Physical Exam  Vitals reviewed.   Constitutional:       General: She is not in acute distress.     Appearance: She is well-developed. She is not diaphoretic.   HENT:      Head: Normocephalic and atraumatic. Hair is normal.      Right Ear: Hearing, tympanic membrane, ear canal and external ear normal. No decreased hearing noted. No drainage.      Left Ear: Hearing, tympanic membrane, ear canal and external ear normal. No decreased hearing noted.      Nose: No nasal deformity.   Eyes:      General: Lids are normal.         Right eye: No discharge.         Left eye: No discharge.      Conjunctiva/sclera: Conjunctivae normal.      Pupils: Pupils are equal, round, and reactive to light.   Neck:      Thyroid: No thyromegaly.       Vascular: No JVD.      Trachea: No tracheal deviation.   Cardiovascular:      Rate and Rhythm: Normal rate and regular rhythm.      Pulses: Normal pulses.      Heart sounds: Normal heart sounds. No murmur heard.     No friction rub. No gallop.   Pulmonary:      Effort: Pulmonary effort is normal. No respiratory distress.      Breath sounds: Normal breath sounds. No wheezing or rales.   Chest:      Chest wall: No tenderness.      Comments: No nodules, redness palpable.  Abdominal:      General: There is no distension.      Palpations: Abdomen is soft. There is no mass.      Tenderness: There is no abdominal tenderness. There is no guarding or rebound.      Hernia: No hernia is present.      Comments: Multiple scars over abdomen.  Dry, pink skin discoloration in right lower abdomen.   Genitourinary:     Vagina: Normal.      Cervix: Normal.      Uterus: Normal.       Comments: Ovaries not palpable.  Musculoskeletal:         General: No tenderness or deformity. Normal range of motion.      Cervical back: Normal range of motion.   Lymphadenopathy:      Cervical: No cervical adenopathy.      Upper Body:      Right upper body: No supraclavicular adenopathy.      Left upper body: No supraclavicular adenopathy.   Skin:     General: Skin is warm and dry.      Findings: No erythema or rash.   Neurological:      Mental Status: She is alert and oriented to person, place, and time.      Cranial Nerves: No cranial nerve deficit.      Motor: No abnormal muscle tone.      Coordination: Coordination normal.      Deep Tendon Reflexes: Reflexes are normal and symmetric. Reflexes normal.   Psychiatric:         Behavior: Behavior normal.         Thought Content: Thought content normal.         Judgment: Judgment normal.       Assessment & Plan   Diagnoses and all orders for this visit:    1. Physical exam, annual (Primary)        CPE - preventive counseling provided.  Blood work results reviewed with patient.  Obesity increases risk for  developing diabetes and heart disease.  Information on counting calories to lose weight added to discharge summary.  Information on exercise to lose weight added to discharge summary.  She is advised to start water exercise again.  She is lifelong member with weight watchers and she is encouraged to continue to follow-up with them.  She is up-to-date with cancer screening.  We discussed recommendations for vaccinations including COVID-vaccine.  Sun protection recommended.  Continue regular dental appointments as recommended by your dentist.    Pain discoloration in lower abdomen -apply Vaseline twice a day over skin discoloration on lower abdomen.  If no resolution in a month see  dermatologist.          Class 3 Severe Obesity (BMI >=40). Obesity-related health conditions include the following: obstructive sleep apnea and hypertension. Obesity is unchanged. BMI is is above average; BMI management plan is completed. We discussed portion control and increasing exercise.

## 2024-12-18 NOTE — PATIENT INSTRUCTIONS
Calorie Counting for Weight Loss  Calories are units of energy. Your body needs a certain number of calories from food to keep going throughout the day. When you eat or drink more calories than your body needs, your body stores the extra calories mostly as fat. When you eat or drink fewer calories than your body needs, your body burns fat to get the energy it needs.  Calorie counting means keeping track of how many calories you eat and drink each day. Calorie counting can be helpful if you need to lose weight. If you eat fewer calories than your body needs, you should lose weight. Ask your health care provider what a healthy weight is for you.  For calorie counting to work, you will need to eat the right number of calories each day to lose a healthy amount of weight per week. A dietitian can help you figure out how many calories you need in a day and will suggest ways to reach your calorie goal.  A healthy amount of weight to lose each week is usually 1-2 lb (0.5-0.9 kg). This usually means that your daily calorie intake should be reduced by 500-750 calories.  Eating 1,200-1,500 calories a day can help most women lose weight.  Eating 1,500-1,800 calories a day can help most men lose weight.  What do I need to know about calorie counting?  Work with your health care provider or dietitian to determine how many calories you should get each day. To meet your daily calorie goal, you will need to:  Find out how many calories are in each food that you would like to eat. Try to do this before you eat.  Decide how much of the food you plan to eat.  Keep a food log. Do this by writing down what you ate and how many calories it had.  To successfully lose weight, it is important to balance calorie counting with a healthy lifestyle that includes regular activity.  Where do I find calorie information?    The number of calories in a food can be found on a Nutrition Facts label. If a food does not have a Nutrition Facts label, try  to look up the calories online or ask your dietitian for help.  Remember that calories are listed per serving. If you choose to have more than one serving of a food, you will have to multiply the calories per serving by the number of servings you plan to eat. For example, the label on a package of bread might say that a serving size is 1 slice and that there are 90 calories in a serving. If you eat 1 slice, you will have eaten 90 calories. If you eat 2 slices, you will have eaten 180 calories.  How do I keep a food log?  After each time that you eat, record the following in your food log as soon as possible:  What you ate. Be sure to include toppings, sauces, and other extras on the food.  How much you ate. This can be measured in cups, ounces, or number of items.  How many calories were in each food and drink.  The total number of calories in the food you ate.  Keep your food log near you, such as in a pocket-sized notebook or on an dina or website on your mobile phone. Some programs will calculate calories for you and show you how many calories you have left to meet your daily goal.  What are some portion-control tips?  Know how many calories are in a serving. This will help you know how many servings you can have of a certain food.  Use a measuring cup to measure serving sizes. You could also try weighing out portions on a kitchen scale. With time, you will be able to estimate serving sizes for some foods.  Take time to put servings of different foods on your favorite plates or in your favorite bowls and cups so you know what a serving looks like.  Try not to eat straight from a food's packaging, such as from a bag or box. Eating straight from the package makes it hard to see how much you are eating and can lead to overeating. Put the amount you would like to eat in a cup or on a plate to make sure you are eating the right portion.  Use smaller plates, glasses, and bowls for smaller portions and to prevent  overeating.  Try not to multitask. For example, avoid watching TV or using your computer while eating. If it is time to eat, sit down at a table and enjoy your food. This will help you recognize when you are full. It will also help you be more mindful of what and how much you are eating.  What are tips for following this plan?  Reading food labels  Check the calorie count compared with the serving size. The serving size may be smaller than what you are used to eating.  Check the source of the calories. Try to choose foods that are high in protein, fiber, and vitamins, and low in saturated fat, trans fat, and sodium.  Shopping  Read nutrition labels while you shop. This will help you make healthy decisions about which foods to buy.  Pay attention to nutrition labels for low-fat or fat-free foods. These foods sometimes have the same number of calories or more calories than the full-fat versions. They also often have added sugar, starch, or salt to make up for flavor that was removed with the fat.  Make a grocery list of lower-calorie foods and stick to it.  Cooking  Try to cook your favorite foods in a healthier way. For example, try baking instead of frying.  Use low-fat dairy products.  Meal planning  Use more fruits and vegetables. One-half of your plate should be fruits and vegetables.  Include lean proteins, such as chicken, turkey, and fish.  Lifestyle  Each week, aim to do one of the followin minutes of moderate exercise, such as walking.  75 minutes of vigorous exercise, such as running.  General information  Know how many calories are in the foods you eat most often. This will help you calculate calorie counts faster.  Find a way of tracking calories that works for you. Get creative. Try different apps or programs if writing down calories does not work for you.  What foods should I eat?    Eat nutritious foods. It is better to have a nutritious, high-calorie food, such as an avocado, than a food with  few nutrients, such as a bag of potato chips.  Use your calories on foods and drinks that will fill you up and will not leave you hungry soon after eating.  Examples of foods that fill you up are nuts and nut butters, vegetables, lean proteins, and high-fiber foods such as whole grains. High-fiber foods are foods with more than 5 g of fiber per serving.  Pay attention to calories in drinks. Low-calorie drinks include water and unsweetened drinks.  The items listed above may not be a complete list of foods and beverages you can eat. Contact a dietitian for more information.  What foods should I limit?  Limit foods or drinks that are not good sources of vitamins, minerals, or protein or that are high in unhealthy fats. These include:  Candy.  Other sweets.  Sodas, specialty coffee drinks, alcohol, and juice.  The items listed above may not be a complete list of foods and beverages you should avoid. Contact a dietitian for more information.  How do I count calories when eating out?  Pay attention to portions. Often, portions are much larger when eating out. Try these tips to keep portions smaller:  Consider sharing a meal instead of getting your own.  If you get your own meal, eat only half of it. Before you start eating, ask for a container and put half of your meal into it.  When available, consider ordering smaller portions from the menu instead of full portions.  Pay attention to your food and drink choices. Knowing the way food is cooked and what is included with the meal can help you eat fewer calories.  If calories are listed on the menu, choose the lower-calorie options.  Choose dishes that include vegetables, fruits, whole grains, low-fat dairy products, and lean proteins.  Choose items that are boiled, broiled, grilled, or steamed. Avoid items that are buttered, battered, fried, or served with cream sauce. Items labeled as crispy are usually fried, unless stated otherwise.  Choose water, low-fat milk,  unsweetened iced tea, or other drinks without added sugar. If you want an alcoholic beverage, choose a lower-calorie option, such as a glass of wine or light beer.  Ask for dressings, sauces, and syrups on the side. These are usually high in calories, so you should limit the amount you eat.  If you want a salad, choose a garden salad and ask for grilled meats. Avoid extra toppings such as walter, cheese, or fried items. Ask for the dressing on the side, or ask for olive oil and vinegar or lemon to use as dressing.  Estimate how many servings of a food you are given. Knowing serving sizes will help you be aware of how much food you are eating at restaurants.  Where to find more information  Centers for Disease Control and Prevention: www.cdc.gov  [place] Department of Agriculture: myplate.gov  Summary  Calorie counting means keeping track of how many calories you eat and drink each day. If you eat fewer calories than your body needs, you should lose weight.  A healthy amount of weight to lose per week is usually 1-2 lb (0.5-0.9 kg). This usually means reducing your daily calorie intake by 500-750 calories.  The number of calories in a food can be found on a Nutrition Facts label. If a food does not have a Nutrition Facts label, try to look up the calories online or ask your dietitian for help.  Use smaller plates, glasses, and bowls for smaller portions and to prevent overeating.  Use your calories on foods and drinks that will fill you up and not leave you hungry shortly after a meal.  This information is not intended to replace advice given to you by your health care provider. Make sure you discuss any questions you have with your health care provider.  Document Revised: 01/28/2021 Document Reviewed: 01/28/2021  Elsevier Patient Education © 2023 [employer]

## 2024-12-24 LAB
CYTOLOGIST CVX/VAG CYTO: NORMAL
CYTOLOGY CVX/VAG DOC CYTO: NORMAL
CYTOLOGY CVX/VAG DOC THIN PREP: NORMAL
DX ICD CODE: NORMAL
HPV I/H RISK 4 DNA CVX QL PROBE+SIG AMP: NEGATIVE
Lab: NORMAL
OTHER STN SPEC: NORMAL
STAT OF ADQ CVX/VAG CYTO-IMP: NORMAL

## 2025-03-14 NOTE — PROGRESS NOTES
RM:________     PCP: Zahida Coffman MD                       Last EKG : 3/20/24    : 1960  AGE: 64 y.o.    REASON FOR VISIT: __________________________________________    ____________________________________________________________                                                  Wt Readings from Last 3 Encounters:   24 135 kg (297 lb)   24 133 kg (293 lb)   12/15/23 134 kg (296 lb)      BP Readings from Last 3 Encounters:   24 128/65   24 138/86   12/15/23 132/86      WT: ____________ HT: ______ BP: __________ HR ______   02% _______               Lipid Panel          2024    07:51   Lipid Panel   Total Cholesterol 172    Triglycerides 95    HDL Cholesterol 50    VLDL Cholesterol 17    LDL Cholesterol  105    LDL/HDL Ratio 2.06

## 2025-03-21 ENCOUNTER — OFFICE VISIT (OUTPATIENT)
Dept: CARDIOLOGY | Facility: CLINIC | Age: 65
End: 2025-03-21
Payer: COMMERCIAL

## 2025-03-21 VITALS
HEIGHT: 68 IN | OXYGEN SATURATION: 100 % | SYSTOLIC BLOOD PRESSURE: 130 MMHG | DIASTOLIC BLOOD PRESSURE: 88 MMHG | BODY MASS INDEX: 45.17 KG/M2 | HEART RATE: 78 BPM

## 2025-03-21 DIAGNOSIS — R06.09 DOE (DYSPNEA ON EXERTION): Primary | ICD-10-CM

## 2025-03-21 DIAGNOSIS — I48.11 LONGSTANDING PERSISTENT ATRIAL FIBRILLATION: ICD-10-CM

## 2025-03-21 DIAGNOSIS — Z86.718 HISTORY OF DVT (DEEP VEIN THROMBOSIS): ICD-10-CM

## 2025-03-21 DIAGNOSIS — I10 ESSENTIAL HYPERTENSION: ICD-10-CM

## 2025-03-21 PROCEDURE — 99214 OFFICE O/P EST MOD 30 MIN: CPT | Performed by: NURSE PRACTITIONER

## 2025-03-21 PROCEDURE — 93000 ELECTROCARDIOGRAM COMPLETE: CPT | Performed by: NURSE PRACTITIONER

## 2025-03-21 NOTE — PROGRESS NOTES
"    CARDIOLOGY        Patient Name: Mariya Campos  :1960  Age: 64 y.o.  Primary Cardiologist: Bradford So MD  Encounter Provider:  BROCK Travis    Date of Service: 2025      CHIEF COMPLAINT / REASON FOR OFFICE VISIT     Follow-up and Hypertension      HISTORY OF PRESENT ILLNESS       HPI  Mariya Campos is a 64 y.o. female who presents today for annual assessment.     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    Patient presents today for routine care.  She notes that she has done well since the last assessment.  She notes that over the past year she has noted increased EDOUARD with walking an incline or 1 flight of steps, last year, she did not have this complaint.  She adds that she used to take 1-2 flights of stairs in buildings and avoided elevators, now she looks for the elevator.  Denies chest pain, palpitations, increased fatigue.  She does walk in her neighborhood, but because of the EDOUARD, she admits that she is not walking as much.      The following portions of the patient's history were reviewed and updated as appropriate: allergies, current medications, past family history, past medical history, past social history, past surgical history and problem list.      VITAL SIGNS     Visit Vitals  /88 (BP Location: Left arm, Patient Position: Sitting, Cuff Size: Adult)   Pulse 78   Ht 172.7 cm (67.99\")   LMP  (LMP Unknown)   SpO2 100%   BMI 45.17 kg/m²         Wt Readings from Last 3 Encounters:   24 135 kg (297 lb)   24 133 kg (293 lb)   12/15/23 134 kg (296 lb)     Body mass index is 45.17 kg/m².      REVIEW OF SYSTEMS     Review of Systems   Constitutional: Negative for chills, fever, weight gain and " weight loss.   Cardiovascular:  Positive for dyspnea on exertion. Negative for leg swelling.   Respiratory:  Negative for cough, snoring and wheezing.    Hematologic/Lymphatic: Negative for bleeding problem. Does not bruise/bleed easily.   Skin:  Negative for color change.   Musculoskeletal:  Negative for falls, joint pain and myalgias.   Gastrointestinal:  Negative for melena.   Genitourinary:  Negative for hematuria.   Neurological:  Negative for excessive daytime sleepiness.   Psychiatric/Behavioral:  Negative for depression. The patient is not nervous/anxious.            PHYSICAL EXAMINATION     Vitals and nursing note reviewed.   Constitutional:       Appearance: Normal appearance. Well-developed.   Eyes:      Conjunctiva/sclera: Conjunctivae normal.   Neck:      Vascular: No carotid bruit.   Pulmonary:      Breath sounds: Normal breath sounds.   Cardiovascular:      Normal rate. Irregularly irregular rhythm. Normal S1 with normal intensity. Normal S2 with normal intensity.       Murmurs: There is no murmur.      No gallop.  No click. No rub.   Edema:     Peripheral edema absent.   Musculoskeletal: Normal range of motion. Skin:     General: Skin is warm and dry.   Neurological:      Mental Status: Alert and oriented to person, place, and time.      GCS: GCS eye subscore is 4. GCS verbal subscore is 5. GCS motor subscore is 6.   Psychiatric:         Speech: Speech normal.         Behavior: Behavior normal.         Thought Content: Thought content normal.         Judgment: Judgment normal.           REVIEWED DATA       ECG 12 Lead    Date/Time: 3/21/2025 10:08 AM  Performed by: Loreto Mcknight APRN    Authorized by: Loreto Mcknight APRN  Comparison: compared with previous ECG from 3/20/2024  Rhythm: atrial fibrillation  Rate: normal  BPM: 78  Conduction: conduction normal  ST Segments: ST segments normal  T Waves: T waves normal  QRS axis: normal    Clinical impression: abnormal EKG              Lipid Panel  "         12/11/2024    07:51   Lipid Panel   Total Cholesterol 172    Triglycerides 95    HDL Cholesterol 50    VLDL Cholesterol 17    LDL Cholesterol  105    LDL/HDL Ratio 2.06        Lab Results   Component Value Date     12/11/2024     12/08/2023    K 4.6 12/11/2024    K 4.8 12/08/2023     12/11/2024     12/08/2023    CO2 26.8 12/11/2024    CO2 23.4 12/08/2023    BUN 27 (H) 12/11/2024    BUN 32 (H) 12/08/2023    CREATININE 1.49 (H) 12/11/2024    CREATININE 1.61 (H) 12/08/2023    EGFRIFNONA 33 (L) 11/29/2021    EGFRIFNONA 30 (L) 05/17/2021    EGFRIFAFRI 38 (L) 11/29/2021    EGFRIFAFRI 37 (L) 05/17/2021    GLUCOSE 93 12/11/2024    GLUCOSE 92 12/08/2023    CALCIUM 9.1 12/11/2024    CALCIUM 9.5 12/08/2023    ALBUMIN 3.9 12/11/2024    ALBUMIN 4.3 12/08/2023    BILITOT 0.3 12/11/2024    BILITOT 0.3 12/08/2023    AST 15 12/11/2024    AST 14 12/08/2023    ALT 13 12/11/2024    ALT 12 12/08/2023     Lab Results   Component Value Date    WBC 6.58 12/11/2024    WBC 6.09 12/08/2023    HGB 11.9 (L) 12/11/2024    HGB 12.6 12/08/2023    HCT 36.1 12/11/2024    HCT 38.7 12/08/2023    MCV 88.5 12/11/2024    MCV 87.0 12/08/2023     12/11/2024     12/08/2023     No results found for: \"PROBNP\", \"BNP\"  Lab Results   Component Value Date    TROPONINT <0.010 05/03/2021     Lab Results   Component Value Date    TSH 3.200 12/11/2024    TSH 2.140 12/08/2023             ASSESSMENT & PLAN     Diagnoses and all orders for this visit:    1. EDOUARD (dyspnea on exertion) (Primary)  Assessment & Plan:  Patient with increased dyspnea with exertion.  She also notes decreased stamina with her normal walks.  Patient states that 1 year ago she was able to walk up an incline with no symptoms, now she is avoiding walking up hills due to dyspnea with exertion.  She also notes that walking up steps causes her dyspnea with exertion  I am concerned that patient's symptoms could represent ischemia and have recommended a " myocardial perfusion PET stress test.  Patient will not be able to exercise on treadmill due to persistent atrial fibrillation.  Also recommend echocardiogram    Orders:  -     Adult Transthoracic Echo Complete W/ Cont if Necessary Per Protocol; Future  -     Stress Test With PET Myocardial Perfusion; Future    2. Longstanding persistent atrial fibrillation  Overview:  CHADS-VASc Risk Assessment               4 Total Score    1 Hypertension    2 PRIOR STROKE/TIA/THROMBO    1 Sex: Female    Criteria that do not apply:    CHF    Age >/= 75    DM    Vascular Disease    Age 65-74              Assessment & Plan:  Reports increased shortness of breath with exertion  Echocardiogram to further assess  Continue diltiazem and metoprolol succinate  Anticoagulated with rivaroxaban    Orders:  -     Adult Transthoracic Echo Complete W/ Cont if Necessary Per Protocol; Future  -     Stress Test With PET Myocardial Perfusion; Future    3. History of DVT (deep vein thrombosis)  Assessment & Plan:  Anticoagulated with rivaroxaban      4. Essential hypertension  Overview:  Diltiazem 360 mg/day  Metoprolol succinate 25 mg/day    Assessment & Plan:  Hypertension is stable and controlled  Continue current treatment regimen.  Dietary sodium restriction.  Weight loss.  Regular aerobic exercise.  Ambulatory blood pressure monitoring.  Blood pressure will be reassessed in 1 year.      Other orders  -     ECG 12 Lead        Return in about 1 year (around 3/21/2026) for Dr. So, routine assessment.    Future Appointments         Provider Department Center    4/4/2025 9:15 AM [facility] ECHO/VAS [facility] 1 [facility] OUTPATIENT ECHOCARDIOGRAPHY [facility]    4/4/2025 9:45 AM [facility] PET [facility] CARDIAC NUC MED & CT [facility]    4/8/2025 10:45 AM [facility] CT 3 [facility] CT [facility]    8/20/2025 10:15 AM Joel Treadwell MD [facility] SLEEP MEDICINE [facility]    1/2/2026 2:15 PM Zahida Coffman MD [facility]  [facility] INTERNAL MEDICINE [facility]    3/25/2026 9:40 AM Bradford So MD [facility] CARDIOLOGY               MEDICATIONS         Discharge Medications            Accurate as of March 21, 2025  1:09 PM. If you have any questions, ask your nurse or doctor.                Continue These Medications        Instructions Start Date   albuterol sulfate  (90 Base) MCG/ACT inhaler  Commonly known as: PROVENTIL HFA;VENTOLIN HFA;PROAIR HFA   2 puffs, As Needed      azelastine 0.1 % nasal spray  Commonly known as: ASTELIN   USE 1 TO 2 SPRAYS IN EACH NOSTRIL TWICE DAILY AS NEEDED      cetirizine 10 MG tablet  Commonly known as: zyrTEC   10 mg, Daily PRN      dilTIAZem  MG 24 hr capsule  Commonly known as: CARDIZEM CD   360 mg, Oral, Every Morning      levocetirizine 5 MG tablet  Commonly known as: XYZAL   5 mg, Daily PRN      metoprolol succinate XL 25 MG 24 hr tablet  Commonly known as: TOPROL-XL   25 mg, Daily      Symbicort 160-4.5 MCG/ACT inhaler  Generic drug: budesonide-formoterol   2 puffs, 2 Times Daily - RT      Vitamin D 50 MCG (2000 UT) tablet   2,000 Units, Daily      Xarelto 20 MG tablet  Generic drug: rivaroxaban   20 mg, Oral, Daily With Dinner                   **Dragon Disclaimer:   Much of this encounter note is an electronic transcription/translation of spoken language to printed text. The electronic translation of spoken language may permit erroneous, or at times, nonsensical words or phrases to be inadvertently transcribed. Although I have reviewed the note for such errors, some may still exist.

## 2025-03-21 NOTE — ASSESSMENT & PLAN NOTE
Patient with increased dyspnea with exertion.  She also notes decreased stamina with her normal walks.  Patient states that 1 year ago she was able to walk up an incline with no symptoms, now she is avoiding walking up hills due to dyspnea with exertion.  She also notes that walking up steps causes her dyspnea with exertion  I am concerned that patient's symptoms could represent ischemia and have recommended a myocardial perfusion PET stress test.  Patient will not be able to exercise on treadmill due to persistent atrial fibrillation.  Also recommend echocardiogram

## 2025-03-21 NOTE — ASSESSMENT & PLAN NOTE
Hypertension is stable and controlled  Continue current treatment regimen.  Dietary sodium restriction.  Weight loss.  Regular aerobic exercise.  Ambulatory blood pressure monitoring.  Blood pressure will be reassessed in 1 year.

## 2025-03-21 NOTE — ASSESSMENT & PLAN NOTE
Reports increased shortness of breath with exertion  Echocardiogram to further assess  Continue diltiazem and metoprolol succinate  Anticoagulated with rivaroxaban

## 2025-04-03 ENCOUNTER — TELEPHONE (OUTPATIENT)
Dept: CARDIOLOGY | Age: 65
End: 2025-04-03
Payer: COMMERCIAL

## 2025-04-04 ENCOUNTER — HOSPITAL ENCOUNTER (OUTPATIENT)
Dept: CARDIOLOGY | Facility: HOSPITAL | Age: 65
Discharge: HOME | End: 2025-04-04
Payer: COMMERCIAL

## 2025-04-04 VITALS — BODY MASS INDEX: 45.99 KG/M2 | HEIGHT: 67 IN | WEIGHT: 293 LBS

## 2025-04-04 VITALS
WEIGHT: 293 LBS | HEIGHT: 67 IN | OXYGEN SATURATION: 98 % | BODY MASS INDEX: 45.99 KG/M2 | DIASTOLIC BLOOD PRESSURE: 83 MMHG | SYSTOLIC BLOOD PRESSURE: 127 MMHG | HEART RATE: 95 BPM

## 2025-04-04 DIAGNOSIS — R06.09 DOE (DYSPNEA ON EXERTION): ICD-10-CM

## 2025-04-04 DIAGNOSIS — I48.11 LONGSTANDING PERSISTENT ATRIAL FIBRILLATION: ICD-10-CM

## 2025-04-04 LAB
AORTIC ARCH: 3.2 CM
AORTIC DIMENSIONLESS INDEX: 0.72 (DI)
ASCENDING AORTA: 3.3 CM
AV MEAN PRESS GRAD SYS DOP V1V2: 2.5 MMHG
AV VMAX SYS DOP: 104.7 CM/SEC
BH CV ECHO MEAS - ACS: 2.08 CM
BH CV ECHO MEAS - AO MAX PG: 4.4 MMHG
BH CV ECHO MEAS - AO ROOT AREA (BSA CORRECTED): 1.2 CM2
BH CV ECHO MEAS - AO ROOT DIAM: 2.8 CM
BH CV ECHO MEAS - AO V2 VTI: 18.9 CM
BH CV ECHO MEAS - AVA(I,D): 2.5 CM2
BH CV ECHO MEAS - EDV(CUBED): 72.5 ML
BH CV ECHO MEAS - EDV(MOD-SP2): 92 ML
BH CV ECHO MEAS - EDV(MOD-SP4): 136 ML
BH CV ECHO MEAS - EF(MOD-SP2): 70.7 %
BH CV ECHO MEAS - EF(MOD-SP4): 72.8 %
BH CV ECHO MEAS - ESV(CUBED): 20.4 ML
BH CV ECHO MEAS - ESV(MOD-SP2): 27 ML
BH CV ECHO MEAS - ESV(MOD-SP4): 37 ML
BH CV ECHO MEAS - FS: 34.5 %
BH CV ECHO MEAS - IVS/LVPW: 0.96 CM
BH CV ECHO MEAS - IVSD: 1.04 CM
BH CV ECHO MEAS - LAT PEAK E' VEL: 11.9 CM/SEC
BH CV ECHO MEAS - LV DIASTOLIC VOL/BSA (35-75): 58.1 CM2
BH CV ECHO MEAS - LV MASS(C)D: 146.6 GRAMS
BH CV ECHO MEAS - LV MAX PG: 2.22 MMHG
BH CV ECHO MEAS - LV MEAN PG: 1.3 MMHG
BH CV ECHO MEAS - LV SYSTOLIC VOL/BSA (12-30): 15.8 CM2
BH CV ECHO MEAS - LV V1 MAX: 74.5 CM/SEC
BH CV ECHO MEAS - LV V1 VTI: 13.6 CM
BH CV ECHO MEAS - LVIDD: 4.2 CM
BH CV ECHO MEAS - LVIDS: 2.7 CM
BH CV ECHO MEAS - LVOT AREA: 3.5 CM2
BH CV ECHO MEAS - LVOT DIAM: 2.12 CM
BH CV ECHO MEAS - LVPWD: 1.08 CM
BH CV ECHO MEAS - MED PEAK E' VEL: 13.6 CM/SEC
BH CV ECHO MEAS - MV DEC SLOPE: 623.6 CM/SEC2
BH CV ECHO MEAS - MV DEC TIME: 0.14 SEC
BH CV ECHO MEAS - MV E MAX VEL: 92.2 CM/SEC
BH CV ECHO MEAS - MV MAX PG: 4.2 MMHG
BH CV ECHO MEAS - MV MEAN PG: 2.23 MMHG
BH CV ECHO MEAS - MV P1/2T: 46.5 MSEC
BH CV ECHO MEAS - MV V2 VTI: 16.3 CM
BH CV ECHO MEAS - MVA(P1/2T): 4.7 CM2
BH CV ECHO MEAS - MVA(VTI): 2.9 CM2
BH CV ECHO MEAS - PA ACC TIME: 0.08 SEC
BH CV ECHO MEAS - PA V2 MAX: 105.5 CM/SEC
BH CV ECHO MEAS - PULM DIAS VEL: 53.7 CM/SEC
BH CV ECHO MEAS - PULM S/D: 0.42
BH CV ECHO MEAS - PULM SYS VEL: 22.4 CM/SEC
BH CV ECHO MEAS - QP/QS: 1.26
BH CV ECHO MEAS - RAP SYSTOLE: 3 MMHG
BH CV ECHO MEAS - RV MAX PG: 3.5 MMHG
BH CV ECHO MEAS - RV V1 MAX: 93.3 CM/SEC
BH CV ECHO MEAS - RV V1 VTI: 17.7 CM
BH CV ECHO MEAS - RVOT DIAM: 2.08 CM
BH CV ECHO MEAS - RVSP: 29.1 MMHG
BH CV ECHO MEAS - SUP REN AO DIAM: 2.4 CM
BH CV ECHO MEAS - SV(LVOT): 47.7 ML
BH CV ECHO MEAS - SV(MOD-SP2): 65 ML
BH CV ECHO MEAS - SV(MOD-SP4): 99 ML
BH CV ECHO MEAS - SV(RVOT): 60.3 ML
BH CV ECHO MEAS - SVI(LVOT): 20.4 ML/M2
BH CV ECHO MEAS - SVI(MOD-SP2): 27.8 ML/M2
BH CV ECHO MEAS - SVI(MOD-SP4): 42.3 ML/M2
BH CV ECHO MEAS - TAPSE (>1.6): 2.43 CM
BH CV ECHO MEAS - TR MAX PG: 26.1 MMHG
BH CV ECHO MEAS - TR MAX VEL: 255.2 CM/SEC
BH CV ECHO MEASUREMENTS AVERAGE E/E' RATIO: 7.23
BH CV NUCLEAR PRIOR STUDY: 2
BH CV REST NUCLEAR ISOTOPE DOSE: 35.2 MCI
BH CV STRESS COMMENTS STAGE 1: NORMAL
BH CV STRESS DOSE REGADENOSON STAGE 1: 0.4
BH CV STRESS DURATION MIN STAGE 1: 0
BH CV STRESS DURATION SEC STAGE 1: 10
BH CV STRESS NUCLEAR ISOTOPE DOSE: 35.2 MCI
BH CV STRESS PROTOCOL 1: NORMAL
BH CV STRESS RECOVERY BP: NORMAL MMHG
BH CV STRESS RECOVERY HR: 82 BPM
BH CV STRESS STAGE 1: 1
BH CV XLRA - RV BASE: 3.6 CM
BH CV XLRA - RV LENGTH: 7.3 CM
BH CV XLRA - RV MID: 3.3 CM
BH CV XLRA - TDI S': 8.6 CM/SEC
LEFT ATRIUM VOLUME INDEX: 34.4 ML/M2
LV EF BIPLANE MOD: 71.8 %
MAXIMAL PREDICTED HEART RATE: 158 BPM
PERCENT MAX PREDICTED HR: 64.56 %
SINUS: 3.1 CM
SPECT HRT GATED+EF W RNC IV: 67 %
STJ: 3.1 CM
STRESS BASELINE BP: NORMAL MMHG
STRESS BASELINE HR: 76 BPM
STRESS O2 SAT REST: 99 %
STRESS PERCENT HR: 76 %
STRESS POST O2 SAT PEAK: 96 %
STRESS POST PEAK BP: NORMAL MMHG
STRESS POST PEAK HR: 102 BPM
STRESS TARGET HR: 134 BPM

## 2025-04-04 PROCEDURE — 25010000002 REGADENOSON 0.4 MG/5ML SOLUTION: Performed by: NURSE PRACTITIONER

## 2025-04-04 PROCEDURE — A9555 RB82 RUBIDIUM: HCPCS | Performed by: NURSE PRACTITIONER

## 2025-04-04 PROCEDURE — 93017 CV STRESS TEST TRACING ONLY: CPT

## 2025-04-04 PROCEDURE — 78492 MYOCRD IMG PET MLT RST&STRS: CPT

## 2025-04-04 PROCEDURE — 34310000005 RUBIDIUM CHLORIDE: Performed by: NURSE PRACTITIONER

## 2025-04-04 PROCEDURE — 25510000001 PERFLUTREN 6.52 MG/ML SUSPENSION 2 ML VIAL: Performed by: NURSE PRACTITIONER

## 2025-04-04 PROCEDURE — 93306 TTE W/DOPPLER COMPLETE: CPT

## 2025-04-04 RX ORDER — REGADENOSON 0.08 MG/ML
0.4 INJECTION, SOLUTION INTRAVENOUS
Status: COMPLETED | OUTPATIENT
Start: 2025-04-04 | End: 2025-04-04

## 2025-04-04 RX ADMIN — PERFLUTREN 2 ML: 6.52 INJECTION, SUSPENSION INTRAVENOUS at 09:59

## 2025-04-04 RX ADMIN — REGADENOSON 0.4 MG: 0.08 INJECTION, SOLUTION INTRAVENOUS at 10:19

## 2025-04-08 ENCOUNTER — HOSPITAL ENCOUNTER (OUTPATIENT)
Dept: CT IMAGING | Facility: HOSPITAL | Age: 65
Discharge: HOME | End: 2025-04-08
Admitting: INTERNAL MEDICINE
Payer: COMMERCIAL

## 2025-04-08 DIAGNOSIS — J92.9 PLEURAL THICKENING: ICD-10-CM

## 2025-04-08 PROCEDURE — 71250 CT THORAX DX C-: CPT

## 2025-04-28 ENCOUNTER — TRANSCRIBE ORDERS (OUTPATIENT)
Dept: ADMINISTRATIVE | Facility: HOSPITAL | Age: 65
End: 2025-04-28
Payer: COMMERCIAL

## 2025-04-28 DIAGNOSIS — R91.8 LUNG NODULES: Primary | ICD-10-CM

## 2025-06-05 NOTE — PROGRESS NOTES
- Reports heavy and painful periods since delivery on 12/15/2024, requiring hourly tampon changes for the first 3-4 days.  - Pelvic ultrasound ordered to rule out fibroids or cysts.  - Advised to consult with OB/GYN, Dr. Marli Torres, at [facility] for further evaluation and management options, including birth control pills, an IUD, or an ablation procedure.  - Labs ordered to check iron levels and ferritin due to heavy bleeding.   Chief Complaint   Patient presents with   • Abdominal Pain     possible hernia        Patient is a 57 y.o. female referred by Zahida Coffman MD for Evaluation of possible recurrent incisional hernia.  Patient reports off to the left side of her midline incision when she coughs she can see a bulging and some discomfort associated with the lump.  Patient also reports that the lump has been getting bigger over the last 2-3 months.  Patient denies fever, chills, nausea or vomiting.  Patient reports coughing and increased activity makes it worse rest makes it better.  Patient has had a previous incisional hernia in the past.     Past Medical History:   Diagnosis Date   • Abdominal pain    • Allergic rhinitis    • Anemia    • Asthma    • Chronic bronchitis    • Chronic kidney disease    • Colon cancer    • Hernia    • Hiatal hernia    • Hypertension    • Incisional hernia    • Overweight    • Peripheral neuropathy    • Rosacea    • Shoulder pain, left     PT STATES MIGHT BE TORN ROTATOR CUFF   • Visit for screening mammogram      Past Surgical History:   Procedure Laterality Date   • BREAST BIOPSY     • COLECTOMY PARTIAL / TOTAL      Partial Colectomy    • COLON SURGERY      XS 2   • COLONOSCOPY      Complete Colonoscopy   • JOINT MANIPULATION Left 4/18/2016    Procedure: MANIPULATION OF LT SHOULDER;  Surgeon: Lidia Ugalde MD;  Location: [facility];  Service:    • SHOULDER SURGERY     • VENTRAL HERNIA REPAIR N/A 3/15/2016    Procedure: LAPAROSCOPIC INSIONAL HERNIA REPAIR W/ MESH DAVINCI ROBOT;  Surgeon: Tri Guzman MD;  Location: [facility];  Service:      Family History   Problem Relation Age of Onset   • Cancer Mother      Breast Cancer   • Breast cancer Mother    • Cancer Father      Colon Cancer   • Cancer Maternal Grandmother      Breast Cancer   • Hypertension Other    • Cancer Other      Liver Cancer   • Other Other      Varicose veins of lower extremities     Social History   Substance Use  "Topics   • Smoking status: Never Smoker   • Smokeless tobacco: Never Used   • Alcohol use Yes      Comment: Social drinker         Current Outpatient Prescriptions:   •  diltiaZEM CD (CARDIZEM CD) 300 MG 24 hr capsule, TK 1 C PO D, Disp: , Rfl: 11  •  ferrous sulfate 325 (65 FE) MG tablet, Take 325 mg by mouth Every Other Day., Disp: , Rfl:   •  furosemide (LASIX) 40 MG tablet, TK 1 T PO D, Disp: , Rfl: 3  •  montelukast (SINGULAIR) 10 MG tablet, Take 1 tablet by mouth Every Night., Disp: 90 tablet, Rfl: 1  •  PROAIR  (90 BASE) MCG/ACT inhaler, 1 puff every 4 (four) hours as needed., Disp: , Rfl: 2    Review of Systems   Constitutional: Positive for activity change and fatigue.   HENT: Positive for congestion, postnasal drip and sinus pressure.    Respiratory: Positive for cough.    Gastrointestinal: Positive for abdominal distention.   Genitourinary: Positive for frequency.   All other systems reviewed and are negative.    Vitals:    12/21/17 0847   BP: 150/80   Pulse: 75   SpO2: 99%   Weight: 124 kg (274 lb)   Height: 172.7 cm (68\")       Physical Exam  General/physcological:   Alert and oriented x3 in no acute distress  HEENT: Normal cephalic, atraumatic, PERRLA, EOMI, sclera anicteric, moist mucous membranes, neck is supple, no JVD, no carotid bruits, no thyromegaly no adenopathy  Respiratory: CTA and percussion  CVA: RRR, normal S1-S2, no murmurs, no gallops or rubs  GI: Positive BS, soft, nondistended, nontender, no rebound, no guarding, Reducible midline recurrent incisional hernia off to the left side near the umbilicus, no organomegaly and no masses  Musculoskeletal: Full range of motion, no clubbing, no cyanosis or edema  Neurovascular: Grossly intact    Patient does not use tobacco products currently and I have counseled the patient to not start using tobacco products in the future.    Assessment:  Recurrent incisional hernia  Plan:  I have recommended that the patient undergo repair with " laparoscopic hernia repair with mesh using the robot.  I have discussed this in detail with the patient.  I have discussed the risks, benefits and alternatives.  I have discussed the risk of anesthesia, bleeding, infection, bowel injuries and recurrence.  Patient understands these risk, benefits and alternatives and wishes to proceed.  I have her scheduled at her earliest convenience.    Tri Guzman MD  General, Minimally Invasive and Endoscopic Surgery  [facility]    [address], [address]  [place], [place], [address]  P: [phone]  F: [phone]    Cc:  Zahida Coffman MD

## 2025-06-13 RX ORDER — RIVAROXABAN 20 MG/1
20 TABLET, FILM COATED ORAL
Qty: 90 TABLET | Refills: 1 | Status: SHIPPED | OUTPATIENT
Start: 2025-06-13

## 2025-08-03 ENCOUNTER — APPOINTMENT (OUTPATIENT)
Dept: GENERAL RADIOLOGY | Facility: HOSPITAL | Age: 65
End: 2025-08-03
Payer: COMMERCIAL

## 2025-08-03 ENCOUNTER — HOSPITAL ENCOUNTER (EMERGENCY)
Facility: HOSPITAL | Age: 65
Discharge: HOME | End: 2025-08-03
Attending: EMERGENCY MEDICINE | Admitting: EMERGENCY MEDICINE
Payer: COMMERCIAL

## 2025-08-03 VITALS
SYSTOLIC BLOOD PRESSURE: 130 MMHG | TEMPERATURE: 97.7 F | HEART RATE: 79 BPM | DIASTOLIC BLOOD PRESSURE: 77 MMHG | BODY MASS INDEX: 48.15 KG/M2 | HEIGHT: 65 IN | OXYGEN SATURATION: 95 % | WEIGHT: 289 LBS | RESPIRATION RATE: 14 BRPM

## 2025-08-03 DIAGNOSIS — S80.12XA LEG HEMATOMA, LEFT, INITIAL ENCOUNTER: Primary | ICD-10-CM

## 2025-08-03 DIAGNOSIS — Z79.01 CHRONIC ANTICOAGULATION: ICD-10-CM

## 2025-08-03 DIAGNOSIS — S96.911A STRAIN OF FOOT, RIGHT, INITIAL ENCOUNTER: ICD-10-CM

## 2025-08-03 PROCEDURE — 99283 EMERGENCY DEPT VISIT LOW MDM: CPT

## 2025-08-03 PROCEDURE — 73590 X-RAY EXAM OF LOWER LEG: CPT

## 2025-08-03 PROCEDURE — 73560 X-RAY EXAM OF KNEE 1 OR 2: CPT

## 2025-08-03 PROCEDURE — 73630 X-RAY EXAM OF FOOT: CPT

## 2025-08-03 RX ORDER — OXYCODONE AND ACETAMINOPHEN 10; 325 MG/1; MG/1
1 TABLET ORAL EVERY 6 HOURS PRN
Qty: 12 TABLET | Refills: 0 | Status: SHIPPED | OUTPATIENT
Start: 2025-08-03 | End: 2025-08-08

## 2025-08-03 RX ORDER — OXYCODONE AND ACETAMINOPHEN 10; 325 MG/1; MG/1
1 TABLET ORAL EVERY 6 HOURS PRN
Qty: 12 TABLET | Refills: 0 | Status: SHIPPED | OUTPATIENT
Start: 2025-08-03 | End: 2025-08-03

## 2025-08-03 RX ORDER — OXYCODONE AND ACETAMINOPHEN 10; 325 MG/1; MG/1
1 TABLET ORAL ONCE
Refills: 0 | Status: COMPLETED | OUTPATIENT
Start: 2025-08-03 | End: 2025-08-03

## 2025-08-03 RX ADMIN — OXYCODONE AND ACETAMINOPHEN 1 TABLET: 325; 10 TABLET ORAL at 12:22

## 2025-08-05 ENCOUNTER — TELEPHONE (OUTPATIENT)
Dept: INTERNAL MEDICINE | Facility: CLINIC | Age: 65
End: 2025-08-05
Payer: COMMERCIAL

## 2025-08-08 ENCOUNTER — OFFICE VISIT (OUTPATIENT)
Dept: INTERNAL MEDICINE | Facility: CLINIC | Age: 65
End: 2025-08-08
Payer: COMMERCIAL

## 2025-08-08 VITALS
DIASTOLIC BLOOD PRESSURE: 62 MMHG | WEIGHT: 272 LBS | SYSTOLIC BLOOD PRESSURE: 120 MMHG | BODY MASS INDEX: 45.32 KG/M2 | HEART RATE: 103 BPM | OXYGEN SATURATION: 96 % | HEIGHT: 65 IN | TEMPERATURE: 98.6 F

## 2025-08-08 DIAGNOSIS — M25.562 ACUTE PAIN OF LEFT KNEE: Primary | ICD-10-CM

## 2025-08-08 DIAGNOSIS — M89.8X6 PAIN IN LEFT TIBIA: ICD-10-CM

## 2025-08-08 DIAGNOSIS — T14.8XXA HEMATOMA: ICD-10-CM

## 2025-08-08 DIAGNOSIS — R22.42 LOCALIZED SWELLING OF LEFT LOWER EXTREMITY: ICD-10-CM

## 2025-08-08 RX ORDER — BUDESONIDE AND FORMOTEROL FUMARATE DIHYDRATE 80; 4.5 UG/1; UG/1
2 AEROSOL RESPIRATORY (INHALATION)
COMMUNITY
Start: 2025-06-01

## 2025-08-08 RX ORDER — TRAMADOL HYDROCHLORIDE 50 MG/1
50 TABLET ORAL EVERY 8 HOURS PRN
Qty: 20 TABLET | Refills: 1 | Status: SHIPPED | OUTPATIENT
Start: 2025-08-08

## 2025-08-08 RX ORDER — LEVALBUTEROL TARTRATE 45 UG/1
2 AEROSOL, METERED ORAL EVERY 4 HOURS PRN
COMMUNITY
Start: 2025-06-11

## 2025-08-12 ENCOUNTER — OFFICE VISIT (OUTPATIENT)
Dept: ORTHOPEDIC SURGERY | Facility: CLINIC | Age: 65
End: 2025-08-12
Payer: COMMERCIAL

## 2025-08-12 VITALS — BODY MASS INDEX: 40.97 KG/M2 | HEIGHT: 68 IN | WEIGHT: 270.3 LBS | TEMPERATURE: 98.3 F

## 2025-08-12 DIAGNOSIS — S80.12XA HEMATOMA OF LEFT LOWER EXTREMITY, INITIAL ENCOUNTER: Primary | ICD-10-CM

## 2025-08-12 DIAGNOSIS — M25.562 LEFT KNEE PAIN, UNSPECIFIED CHRONICITY: ICD-10-CM

## 2025-08-26 ENCOUNTER — OFFICE VISIT (OUTPATIENT)
Dept: ORTHOPEDIC SURGERY | Facility: CLINIC | Age: 65
End: 2025-08-26
Payer: COMMERCIAL

## 2025-08-26 VITALS — BODY MASS INDEX: 40.92 KG/M2 | WEIGHT: 270 LBS | HEIGHT: 68 IN | TEMPERATURE: 98.6 F

## 2025-08-26 DIAGNOSIS — S80.12XD HEMATOMA OF LEFT LOWER EXTREMITY, SUBSEQUENT ENCOUNTER: Primary | ICD-10-CM

## (undated) DEVICE — DRSNG SURESITE WNDW 2.38X2.75

## (undated) DEVICE — PATIENT RETURN ELECTRODE, SINGLE-USE, CONTACT QUALITY MONITORING, ADULT, WITH 9FT CORD, FOR PATIENTS WEIGING OVER 33LBS. (15KG): Brand: MEGADYNE

## (undated) DEVICE — Device

## (undated) DEVICE — ANTIBACTERIAL UNDYED BRAIDED (POLYGLACTIN 910), SYNTHETIC ABSORBABLE SUTURE: Brand: COATED VICRYL

## (undated) DEVICE — SPNG GZ WOVN 4X4IN 12PLY 10/BX STRL

## (undated) DEVICE — THE SINGLE USE ETRAP – POLYP TRAP IS USED FOR SUCTION RETRIEVAL OF ENDOSCOPICALLY REMOVED POLYPS.: Brand: ETRAP

## (undated) DEVICE — OBT BLADLES ENDOWRIST DAVINCI/S 8MM

## (undated) DEVICE — GLV SURG BIOGEL LTX PF 6

## (undated) DEVICE — 3M™ STERI-STRIP™ REINFORCED ADHESIVE SKIN CLOSURES, R1547, 1/2 IN X 4 IN (12 MM X 100 MM), 6 STRIPS/ENVELOPE: Brand: 3M™ STERI-STRIP™

## (undated) DEVICE — SUT ETHLN 2/0 PS 18IN 585H

## (undated) DEVICE — LN SMPL CO2 SHTRM SD STREAM W/M LUER

## (undated) DEVICE — BIOPATCH™ ANTIMICROBIAL DRESSING WITH CHLORHEXIDINE GLUCONATE IS A HYDROPHILLIC POLYURETHANE ABSORPTIVE FOAM WITH CHLORHEXIDINE GLUCONATE (CHG) WHICH INHIBITS BACTERIAL GROWTH UNDER THE DRESSING. THE DRESSING IS INTENDED TO BE USED TO ABSORB EXUDATE, COVER A WOUND CAUSED BY VASCULAR AND NONVASCULAR PERCUTANEOUS MEDICAL DEVICES DURING SURGERY, AS WELL AS REDUCE LOCAL INFECTION AND COLONIZATION OF MICROORGANISMS.: Brand: BIOPATCH

## (undated) DEVICE — CVR PROB 96IN LF STRL

## (undated) DEVICE — PK UNIV COMPL 40

## (undated) DEVICE — SOL NACL 0.9PCT 1000ML

## (undated) DEVICE — DRSNG SURESITE WNDW 4X4.5

## (undated) DEVICE — INTENDED FOR TISSUE SEPARATION, AND OTHER PROCEDURES THAT REQUIRE A SHARP SURGICAL BLADE TO PUNCTURE OR CUT.: Brand: BARD-PARKER ® CARBON RIB-BACK BLADES

## (undated) DEVICE — GOWN ,SIRUS,NONREINFORCED SMALL: Brand: MEDLINE

## (undated) DEVICE — COVER,MAYO STAND,STERILE: Brand: MEDLINE

## (undated) DEVICE — TBG PENCL TELESCP MEGADYNE SMOKE EVAC 10FT

## (undated) DEVICE — SOL ANTISTICK CAUTRY ELECTROLUBE LF

## (undated) DEVICE — HANDPIECE SET WITH COAXIAL HIGH FLOW TIP AND SUCTION TUBE: Brand: INTERPULSE

## (undated) DEVICE — DEV SUT GRSPR CLOSUR 15CM 14G

## (undated) DEVICE — PENCL E/S HNDSWCH ROCKR CB

## (undated) DEVICE — STPLR SKIN VISISTAT WD 35CT

## (undated) DEVICE — PAD,ABDOMINAL,8"X10",ST,LF: Brand: MEDLINE

## (undated) DEVICE — GLIDEPATH HEMODIALYSIS CATH, ST, DL, 14.5 FR. 23CM: Brand: GLIDEPATH LONG-TERM HEMODIALYSIS CATHETER WITH PRELOADED STYLET

## (undated) DEVICE — DRAPE,REIN 53X77,STERILE: Brand: MEDLINE

## (undated) DEVICE — APPL CHLORAPREP W/TINT 26ML ORNG

## (undated) DEVICE — BNDR ABD PREMIUM/UNIV 10IN 27TO48IN

## (undated) DEVICE — APPL CHLORAPREP W/TINT 10.5ML PERC STRL

## (undated) DEVICE — NDL HYPO ECLPS SFTY 22G 1 1/2IN

## (undated) DEVICE — JACKSON-PRATT 100CC BULB RESERVOIR: Brand: CARDINAL HEALTH

## (undated) DEVICE — LOU LAP CHOLE: Brand: MEDLINE INDUSTRIES, INC.

## (undated) DEVICE — SYR LUERLOK 20CC

## (undated) DEVICE — GOWN,NON-REINFORCED,SIRUS,SET IN SLV,XL: Brand: MEDLINE

## (undated) DEVICE — ADHS SKIN DERMABOND TOP ADVANCED

## (undated) DEVICE — TOTAL TRAY, 16FR 10ML SIL FOLEY, URN: Brand: MEDLINE

## (undated) DEVICE — VISUALIZATION SYSTEM: Brand: CLEARIFY

## (undated) DEVICE — BANDAGE,GAUZE,BULKEE II,2.25"X3YD,STRL: Brand: MEDLINE

## (undated) DEVICE — ENDOPATH XCEL BLADELESS TROCARS WITH STABILITY SLEEVES: Brand: ENDOPATH XCEL

## (undated) DEVICE — MARKR SKIN W/RULR AND LBL

## (undated) DEVICE — ENDOPATH PNEUMONEEDLE INSUFFLATION NEEDLES WITH LUER LOCK CONNECTORS 120MM: Brand: ENDOPATH

## (undated) DEVICE — 1842 FOAM BLOCK NEEDLE COUNTER: Brand: DEVON

## (undated) DEVICE — DRN PENRS 1/4X18IN LTX

## (undated) DEVICE — ENCORE® LATEX ORTHO SIZE 6.5, STERILE LATEX POWDER-FREE SURGICAL GLOVE: Brand: ENCORE

## (undated) DEVICE — 1812 FOAM BLOCK NEEDLE COUNTER: Brand: DEVON

## (undated) DEVICE — UNDYED BRAIDED (POLYGLACTIN 910), SYNTHETIC ABSORBABLE SUTURE: Brand: COATED VICRYL

## (undated) DEVICE — NDL HYPO PRECISIONGLIDE REG 25G 1 1/2

## (undated) DEVICE — PROXIMATE RH ROTATING HEAD SKIN STAPLERS (35 WIDE) CONTAINS 35 STAINLESS STEEL STAPLES: Brand: PROXIMATE

## (undated) DEVICE — CVR EQ IMG 48X27

## (undated) DEVICE — SENSR O2 OXIMAX FNGR A/ 18IN NONSTR

## (undated) DEVICE — ELECTRD BLD EZ CLN MOD XLNG 2.75IN

## (undated) DEVICE — SPNG LAP 18X18IN LF STRL PK/5

## (undated) DEVICE — DRSNG PAD ABD 8X10IN STRL

## (undated) DEVICE — BANDAGE,GAUZE,BULKEE II,4.5"X4.1YD,STRL: Brand: MEDLINE

## (undated) DEVICE — SYR LUERLOK 5CC

## (undated) DEVICE — SUT VIC 3/0 TIES 18IN J110T

## (undated) DEVICE — TIP COVER ACCESSORY

## (undated) DEVICE — TUBING, SUCTION, 1/4" X 10', STRAIGHT: Brand: MEDLINE

## (undated) DEVICE — ADAPT CLN BIOGUARD AIR/H2O DISP

## (undated) DEVICE — VIOLET BRAIDED (POLYGLACTIN 910), SYNTHETIC ABSORBABLE SUTURE: Brand: COATED VICRYL

## (undated) DEVICE — SUT ETHIB 0/0 CT1 30IN X424H

## (undated) DEVICE — BNDG ADHS CURAD FLX/FABRC 2X4IN STRL LF

## (undated) DEVICE — 3M™ STERI-STRIP™ COMPOUND BENZOIN TINCTURE 40 BAGS/CARTON 4 CARTONS/CASE C1544: Brand: 3M™ STERI-STRIP™

## (undated) DEVICE — SUT VIC 5/0 PS2 18IN J495H

## (undated) DEVICE — CULT AER/ANAEROB FASTIDIOUS BACT

## (undated) DEVICE — GLV SURG PREMIERPRO ORTHO LTX PF SZ7.5 BRN

## (undated) DEVICE — IRRIGATOR BULB ASEPTO 60CC STRL

## (undated) DEVICE — GAUZE,SPONGE,4"X4",16PLY,XRAY,STRL,LF: Brand: MEDLINE

## (undated) DEVICE — DRSNG WND GZ PAD BORDERED 4X8IN STRL

## (undated) DEVICE — CVR TRANSD CIV FLX TPR 11.9 TO 3.8X61CM

## (undated) DEVICE — CANN O2 ETCO2 FITS ALL CONN CO2 SMPL A/ 7IN DISP LF

## (undated) DEVICE — DECANT BG O JET

## (undated) DEVICE — TOWEL,OR,DSP,ST,BLUE,STD,4/PK,20PK/CS: Brand: MEDLINE

## (undated) DEVICE — LOU MINOR PROCEDURE: Brand: MEDLINE INDUSTRIES, INC.

## (undated) DEVICE — RADIFOCUS GLIDECATH: Brand: GLIDECATH

## (undated) DEVICE — KT ORCA ORCAPOD DISP STRL

## (undated) DEVICE — DISPOSABLE GRASPER CARTRIDGE: Brand: DIRECT DRIVE REPOSABLE GRASPERS

## (undated) DEVICE — SUT ETHLN 3/0 PS1 18IN 1663H

## (undated) DEVICE — TRAP FLD MINIVAC MEGADYNE 100ML

## (undated) DEVICE — BNDR ABD 4PANEL 12IN 46 TO 62IN

## (undated) DEVICE — SKIN PREP TRAY W/CHG: Brand: MEDLINE INDUSTRIES, INC.

## (undated) DEVICE — RADIFOCUS GLIDEWIRE: Brand: GLIDEWIRE

## (undated) DEVICE — ELECTRD BLD MEGADYNE EZCLEAN STD 2.75IN XLNG

## (undated) DEVICE — SNAR POLYP SENSATION STDOVL 27 240 BX40

## (undated) DEVICE — GLV SURG BIOGEL LTX PF 7